# Patient Record
Sex: FEMALE | Race: WHITE | Employment: OTHER | URBAN - METROPOLITAN AREA
[De-identification: names, ages, dates, MRNs, and addresses within clinical notes are randomized per-mention and may not be internally consistent; named-entity substitution may affect disease eponyms.]

---

## 2018-06-14 ENCOUNTER — HOSPITAL ENCOUNTER (INPATIENT)
Age: 83
LOS: 1 days | Discharge: HOME | DRG: 310 | End: 2018-06-15
Attending: EMERGENCY MEDICINE | Admitting: NEUROMUSCULOSKELETAL MEDICINE & OMM
Payer: MEDICARE

## 2018-06-14 ENCOUNTER — APPOINTMENT (OUTPATIENT)
Dept: GENERAL RADIOLOGY | Age: 83
DRG: 310 | End: 2018-06-14
Payer: MEDICARE

## 2018-06-14 DIAGNOSIS — I48.91 NEW ONSET ATRIAL FIBRILLATION (HCC): Primary | ICD-10-CM

## 2018-06-14 LAB
ALBUMIN SERPL-MCNC: 4.3 G/DL (ref 3.5–5.2)
ALP BLD-CCNC: 49 U/L (ref 35–104)
ALT SERPL-CCNC: 12 U/L (ref 0–32)
ANION GAP SERPL CALCULATED.3IONS-SCNC: 15 MMOL/L (ref 7–16)
APTT: 29.5 SEC (ref 24.5–35.1)
AST SERPL-CCNC: 32 U/L (ref 0–31)
BACTERIA: ABNORMAL /HPF
BILIRUB SERPL-MCNC: 0.8 MG/DL (ref 0–1.2)
BILIRUBIN URINE: NEGATIVE
BLOOD, URINE: ABNORMAL
BUN BLDV-MCNC: 16 MG/DL (ref 8–23)
CALCIUM SERPL-MCNC: 9.9 MG/DL (ref 8.6–10.2)
CHLORIDE BLD-SCNC: 101 MMOL/L (ref 98–107)
CLARITY: CLEAR
CO2: 25 MMOL/L (ref 22–29)
COLOR: YELLOW
CORTISOL TOTAL: 1.89 MCG/DL (ref 2.68–18.4)
CORTISOL TOTAL: 18.03 MCG/DL (ref 2.68–18.4)
CREAT SERPL-MCNC: 0.9 MG/DL (ref 0.5–1)
EKG ATRIAL RATE: 147 BPM
EKG Q-T INTERVAL: 332 MS
EKG QRS DURATION: 66 MS
EKG QTC CALCULATION (BAZETT): 499 MS
EKG R AXIS: 67 DEGREES
EKG T AXIS: -94 DEGREES
EKG VENTRICULAR RATE: 136 BPM
GFR AFRICAN AMERICAN: >60
GFR NON-AFRICAN AMERICAN: 60 ML/MIN/1.73
GLUCOSE BLD-MCNC: 107 MG/DL (ref 74–109)
GLUCOSE URINE: NEGATIVE MG/DL
HCT VFR BLD CALC: 50 % (ref 34–48)
HEMOGLOBIN: 16.2 G/DL (ref 11.5–15.5)
INR BLD: 1
KETONES, URINE: 15 MG/DL
LACTIC ACID: 3.4 MMOL/L (ref 0.5–2.2)
LEUKOCYTE ESTERASE, URINE: NEGATIVE
MAGNESIUM: 1.6 MG/DL (ref 1.6–2.6)
MAGNESIUM: 1.8 MG/DL (ref 1.6–2.6)
MCH RBC QN AUTO: 28.2 PG (ref 26–35)
MCHC RBC AUTO-ENTMCNC: 32.4 % (ref 32–34.5)
MCV RBC AUTO: 87 FL (ref 80–99.9)
NITRITE, URINE: NEGATIVE
PDW BLD-RTO: 13.8 FL (ref 11.5–15)
PH UA: 6 (ref 5–9)
PLATELET # BLD: 177 E9/L (ref 130–450)
PMV BLD AUTO: 13.7 FL (ref 7–12)
POTASSIUM SERPL-SCNC: 4.9 MMOL/L (ref 3.5–5)
PRO-BNP: 534 PG/ML (ref 0–450)
PROTEIN UA: NEGATIVE MG/DL
PROTHROMBIN TIME: 11.7 SEC (ref 9.3–12.4)
RBC # BLD: 5.75 E12/L (ref 3.5–5.5)
RBC UA: ABNORMAL /HPF (ref 0–2)
SODIUM BLD-SCNC: 141 MMOL/L (ref 132–146)
SPECIFIC GRAVITY UA: 1.01 (ref 1–1.03)
TOTAL CK: 72 U/L (ref 20–180)
TOTAL PROTEIN: 7 G/DL (ref 6.4–8.3)
TROPONIN: <0.01 NG/ML (ref 0–0.03)
TROPONIN: <0.01 NG/ML (ref 0–0.03)
UROBILINOGEN, URINE: 0.2 E.U./DL
WBC # BLD: 7.2 E9/L (ref 4.5–11.5)
WBC UA: ABNORMAL /HPF (ref 0–5)

## 2018-06-14 PROCEDURE — 6370000000 HC RX 637 (ALT 250 FOR IP): Performed by: EMERGENCY MEDICINE

## 2018-06-14 PROCEDURE — 99223 1ST HOSP IP/OBS HIGH 75: CPT | Performed by: INTERNAL MEDICINE

## 2018-06-14 PROCEDURE — 6360000002 HC RX W HCPCS: Performed by: EMERGENCY MEDICINE

## 2018-06-14 PROCEDURE — 83605 ASSAY OF LACTIC ACID: CPT

## 2018-06-14 PROCEDURE — 2500000003 HC RX 250 WO HCPCS: Performed by: EMERGENCY MEDICINE

## 2018-06-14 PROCEDURE — 2580000003 HC RX 258: Performed by: PHYSICIAN ASSISTANT

## 2018-06-14 PROCEDURE — 6370000000 HC RX 637 (ALT 250 FOR IP): Performed by: INTERNAL MEDICINE

## 2018-06-14 PROCEDURE — 99285 EMERGENCY DEPT VISIT HI MDM: CPT

## 2018-06-14 PROCEDURE — 85610 PROTHROMBIN TIME: CPT

## 2018-06-14 PROCEDURE — 2580000003 HC RX 258: Performed by: EMERGENCY MEDICINE

## 2018-06-14 PROCEDURE — 93005 ELECTROCARDIOGRAM TRACING: CPT | Performed by: PHYSICIAN ASSISTANT

## 2018-06-14 PROCEDURE — 96372 THER/PROPH/DIAG INJ SC/IM: CPT

## 2018-06-14 PROCEDURE — 81001 URINALYSIS AUTO W/SCOPE: CPT

## 2018-06-14 PROCEDURE — 6370000000 HC RX 637 (ALT 250 FOR IP): Performed by: NEUROMUSCULOSKELETAL MEDICINE & OMM

## 2018-06-14 PROCEDURE — 87088 URINE BACTERIA CULTURE: CPT

## 2018-06-14 PROCEDURE — 84484 ASSAY OF TROPONIN QUANT: CPT

## 2018-06-14 PROCEDURE — 83735 ASSAY OF MAGNESIUM: CPT

## 2018-06-14 PROCEDURE — 2140000000 HC CCU INTERMEDIATE R&B

## 2018-06-14 PROCEDURE — 80053 COMPREHEN METABOLIC PANEL: CPT

## 2018-06-14 PROCEDURE — 82533 TOTAL CORTISOL: CPT

## 2018-06-14 PROCEDURE — 71045 X-RAY EXAM CHEST 1 VIEW: CPT

## 2018-06-14 PROCEDURE — 82550 ASSAY OF CK (CPK): CPT

## 2018-06-14 PROCEDURE — 83880 ASSAY OF NATRIURETIC PEPTIDE: CPT

## 2018-06-14 PROCEDURE — 85027 COMPLETE CBC AUTOMATED: CPT

## 2018-06-14 PROCEDURE — 36415 COLL VENOUS BLD VENIPUNCTURE: CPT

## 2018-06-14 PROCEDURE — 96374 THER/PROPH/DIAG INJ IV PUSH: CPT

## 2018-06-14 PROCEDURE — 85730 THROMBOPLASTIN TIME PARTIAL: CPT

## 2018-06-14 RX ORDER — DILTIAZEM HYDROCHLORIDE 5 MG/ML
INJECTION INTRAVENOUS
Status: DISPENSED
Start: 2018-06-14 | End: 2018-06-15

## 2018-06-14 RX ORDER — CHOLECALCIFEROL (VITAMIN D3) 50 MCG
5000 TABLET ORAL DAILY
Status: DISCONTINUED | OUTPATIENT
Start: 2018-06-14 | End: 2018-06-15 | Stop reason: HOSPADM

## 2018-06-14 RX ORDER — LEVOTHYROXINE SODIUM 0.1 MG/1
100 TABLET ORAL DAILY
Status: DISCONTINUED | OUTPATIENT
Start: 2018-06-15 | End: 2018-06-15 | Stop reason: HOSPADM

## 2018-06-14 RX ORDER — ALBUTEROL SULFATE 90 UG/1
2 AEROSOL, METERED RESPIRATORY (INHALATION) EVERY 4 HOURS PRN
Status: DISCONTINUED | OUTPATIENT
Start: 2018-06-14 | End: 2018-06-15 | Stop reason: HOSPADM

## 2018-06-14 RX ORDER — SODIUM CHLORIDE 0.9 % (FLUSH) 0.9 %
10 SYRINGE (ML) INJECTION PRN
Status: DISCONTINUED | OUTPATIENT
Start: 2018-06-14 | End: 2018-06-15 | Stop reason: HOSPADM

## 2018-06-14 RX ORDER — SIMVASTATIN 20 MG
20 TABLET ORAL NIGHTLY
Status: DISCONTINUED | OUTPATIENT
Start: 2018-06-14 | End: 2018-06-15 | Stop reason: HOSPADM

## 2018-06-14 RX ORDER — SODIUM CHLORIDE 0.9 % (FLUSH) 0.9 %
10 SYRINGE (ML) INJECTION EVERY 12 HOURS SCHEDULED
Status: DISCONTINUED | OUTPATIENT
Start: 2018-06-14 | End: 2018-06-15 | Stop reason: HOSPADM

## 2018-06-14 RX ORDER — SIMVASTATIN 40 MG
40 TABLET ORAL NIGHTLY
Refills: 3 | COMMUNITY
Start: 2018-03-29

## 2018-06-14 RX ORDER — ACETAMINOPHEN 325 MG/1
650 TABLET ORAL EVERY 4 HOURS PRN
Status: DISCONTINUED | OUTPATIENT
Start: 2018-06-14 | End: 2018-06-15 | Stop reason: HOSPADM

## 2018-06-14 RX ORDER — VITAMIN E 268 MG
400 CAPSULE ORAL DAILY
Status: DISCONTINUED | OUTPATIENT
Start: 2018-06-15 | End: 2018-06-15 | Stop reason: HOSPADM

## 2018-06-14 RX ORDER — UBIDECARENONE 75 MG
50 CAPSULE ORAL DAILY
Status: DISCONTINUED | OUTPATIENT
Start: 2018-06-15 | End: 2018-06-15 | Stop reason: HOSPADM

## 2018-06-14 RX ORDER — 0.9 % SODIUM CHLORIDE 0.9 %
1000 INTRAVENOUS SOLUTION INTRAVENOUS ONCE
Status: COMPLETED | OUTPATIENT
Start: 2018-06-14 | End: 2018-06-14

## 2018-06-14 RX ORDER — ASPIRIN 81 MG/1
324 TABLET, CHEWABLE ORAL ONCE
Status: COMPLETED | OUTPATIENT
Start: 2018-06-14 | End: 2018-06-14

## 2018-06-14 RX ORDER — CITALOPRAM 20 MG/1
20 TABLET ORAL DAILY
Status: DISCONTINUED | OUTPATIENT
Start: 2018-06-14 | End: 2018-06-15 | Stop reason: HOSPADM

## 2018-06-14 RX ORDER — LEVOTHYROXINE SODIUM 0.1 MG/1
1 TABLET ORAL DAILY
Refills: 3 | COMMUNITY
Start: 2018-03-29 | End: 2021-02-11

## 2018-06-14 RX ORDER — DEXTROSE MONOHYDRATE 50 MG/ML
INJECTION, SOLUTION INTRAVENOUS
Status: DISPENSED
Start: 2018-06-14 | End: 2018-06-15

## 2018-06-14 RX ADMIN — SODIUM CHLORIDE 1000 ML: 9 INJECTION, SOLUTION INTRAVENOUS at 11:29

## 2018-06-14 RX ADMIN — RIVAROXABAN 20 MG: 20 TABLET, FILM COATED ORAL at 21:29

## 2018-06-14 RX ADMIN — DILTIAZEM HYDROCHLORIDE 25 MG: 5 INJECTION INTRAVENOUS at 13:14

## 2018-06-14 RX ADMIN — SIMVASTATIN 20 MG: 20 TABLET, FILM COATED ORAL at 21:29

## 2018-06-14 RX ADMIN — ASPIRIN 81 MG CHEWABLE TABLET 324 MG: 81 TABLET CHEWABLE at 12:50

## 2018-06-14 RX ADMIN — DEXTROSE MONOHYDRATE 2.5 MG/HR: 50 INJECTION, SOLUTION INTRAVENOUS at 13:32

## 2018-06-14 RX ADMIN — ENOXAPARIN SODIUM 80 MG: 80 INJECTION SUBCUTANEOUS at 12:50

## 2018-06-14 RX ADMIN — BECLOMETHASONE DIPROPIONATE 1 PUFF: 40 AEROSOL, METERED RESPIRATORY (INHALATION) at 21:30

## 2018-06-14 ASSESSMENT — PAIN SCALES - GENERAL
PAINLEVEL_OUTOF10: 0
PAINLEVEL_OUTOF10: 8
PAINLEVEL_OUTOF10: 0

## 2018-06-14 NOTE — ED NOTES
All blood work obtained with additional green/yellow top on hold, sent to lab for [address] [place] [address], N  24/21/20 9405

## 2018-06-14 NOTE — ED PROVIDER NOTES
Department of Emergency Medicine   ED  Provider Note  Admit Date/RoomTime: 6/14/2018 10:31 AM  ED Room: 6411/6411-A     HPI:   Eliot An is a 80 y.o. female presenting to the ED for shortness of breath, beginning this morning. The complaint has been persistent, moderate in severity, and worsened by light exertion. Patient states she has a history of asthma and COPD. This morning she experienced shortness of breath and generalized weakness worsened with light exertion. She tried her inhaler once with no relief and states this is different from her normal asthma and COPD. She also complains of back pain she describes as a aching sensation across her shoulder blades with this generalized weakness and shortness of breath. Patient is in atrial fibrillation with rapid ventricular response in the monitor, she reports she has never had this before and no history of atrial fibrillation or irregular heartbeat. Patient denies fever/chills, cough, congestion, edema, headache, abdominal pain, nausea, vomiting, diarrhea, constipation, hematochezia, melena, incontinence, dysuria, hematuria, trauma, neck pain or other complaints. No-patient states her blood pressure is little on the low side, states 98/58 is typical for her. ROS:   Pertinent positives and negatives are stated within HPI, all other systems reviewed and are negative.    --------------------------------------------- PAST HISTORY ---------------------------------------------  Past Medical History:  has a past medical history of Asthma; Depression; Hyperlipidemia; Nausea & vomiting; and Thyroid disease. Past Surgical History:  has a past surgical history that includes Tonsillectomy; Hysterectomy; Thyroidectomy (17 YEARS AGO); Appendectomy (58 YEARS AGO); eye surgery; Neck surgery (9/9/2011); and Cholecystectomy. Social History:  reports that she quit smoking about 23 years ago. Her smoking use included Cigarettes.  She does not have any smokeless extremities x 4. Warm and well perfused. No pedal edema or calf tenderness. Skin: warm and dry without rash. Skin intact. Neurologic: No focal deficits. Cranial nerves II-XII grossly intact. 5/5 muscle strength to extremities x 4. Psych: Normal Affect      ------------------------------ ED COURSE/MEDICAL DECISION MAKING----------------------  Medications   diltiazem (CARDIZEM) 25 mg in dextrose 5% 30 mL IVPB (ER Load) (0 mg Intravenous Stopped 6/14/18 1324)   diltiazem 25 MG/5ML injection (not administered)   dextrose 5 % solution (not administered)   0.9 % sodium chloride bolus (0 mLs Intravenous Stopped 6/14/18 1229)   enoxaparin (LOVENOX) injection 80 mg (80 mg Subcutaneous Given 6/14/18 1250)   aspirin chewable tablet 324 mg (324 mg Oral Given 6/14/18 1250)       Medical Decision Making:    Patient presented ED for shortness of breath and fatigue. Patient is a new onset A. fib and rate control with Cardizem. Her PCP will admit the patient and [address] [facility] cardiology will provide consultation while inpatient. Re-Evaluation:  Time: 1330  Re-evaluation. Patients symptoms are improving  Repeat physical examination is improved    Consultation:  Time: 1340. Spoke with Dr. Ollie Carver (Medicine). Discussed case. They will admit this patient. Please note that the withdrawal or failure to initiate urgent interventions for this patient would likely result in a life threatening deterioration or permanent disability. Accordingly this patient received 30 minutes of critical care time, excluding separately billable procedures. Counseling: The emergency provider has spoken with the patient and family member daughter and discussed todays results, in addition to providing specific details for the plan of care and counseling regarding the diagnosis and prognosis.   Questions are answered at this time and they are agreeable with the plan.      --------------------------------- IMPRESSION AND DISPOSITION ---------------------------------    IMPRESSION  1. New onset atrial fibrillation ([address]        DISPOSITION  Disposition: Admit to telemetry  Patient condition is stable    6/14/18, 11:25 AM.    This note is prepared by Corrina Kim, acting as Scribe for Matthew Garcia DO. Matthew Garcia DO:  The scribe's documentation has been prepared under my direction and personally reviewed by me in its entirety. I confirm that the note above accurately reflects all work, treatment, procedures, and medical decision making performed by me.          Matthew Garcia DO  06/21/18 0040

## 2018-06-15 ENCOUNTER — ANESTHESIA EVENT (OUTPATIENT)
Dept: CARDIAC CATH/INVASIVE PROCEDURES | Age: 83
DRG: 310 | End: 2018-06-15
Payer: MEDICARE

## 2018-06-15 ENCOUNTER — ANESTHESIA (OUTPATIENT)
Dept: CARDIAC CATH/INVASIVE PROCEDURES | Age: 83
DRG: 310 | End: 2018-06-15
Payer: MEDICARE

## 2018-06-15 VITALS
SYSTOLIC BLOOD PRESSURE: 110 MMHG | OXYGEN SATURATION: 94 % | DIASTOLIC BLOOD PRESSURE: 76 MMHG | RESPIRATION RATE: 16 BRPM

## 2018-06-15 VITALS
OXYGEN SATURATION: 96 % | SYSTOLIC BLOOD PRESSURE: 120 MMHG | TEMPERATURE: 98 F | WEIGHT: 182 LBS | BODY MASS INDEX: 28.56 KG/M2 | DIASTOLIC BLOOD PRESSURE: 64 MMHG | HEART RATE: 80 BPM | RESPIRATION RATE: 18 BRPM | HEIGHT: 67 IN

## 2018-06-15 PROBLEM — I48.19 PERSISTENT ATRIAL FIBRILLATION (HCC): Status: ACTIVE | Noted: 2018-06-15

## 2018-06-15 LAB
ALBUMIN SERPL-MCNC: 4.4 G/DL (ref 3.5–5.2)
ALP BLD-CCNC: 58 U/L (ref 35–104)
ALT SERPL-CCNC: 10 U/L (ref 0–32)
ANION GAP SERPL CALCULATED.3IONS-SCNC: 11 MMOL/L (ref 7–16)
AST SERPL-CCNC: 17 U/L (ref 0–31)
BILIRUB SERPL-MCNC: 0.6 MG/DL (ref 0–1.2)
BUN BLDV-MCNC: 16 MG/DL (ref 8–23)
CALCIUM SERPL-MCNC: 9.7 MG/DL (ref 8.6–10.2)
CHLORIDE BLD-SCNC: 101 MMOL/L (ref 98–107)
CO2: 27 MMOL/L (ref 22–29)
CREAT SERPL-MCNC: 0.9 MG/DL (ref 0.5–1)
GFR AFRICAN AMERICAN: >60
GFR NON-AFRICAN AMERICAN: 60 ML/MIN/1.73
GLUCOSE BLD-MCNC: 106 MG/DL (ref 74–109)
HCT VFR BLD CALC: 48.8 % (ref 34–48)
HEMOGLOBIN: 15.5 G/DL (ref 11.5–15.5)
MCH RBC QN AUTO: 27.8 PG (ref 26–35)
MCHC RBC AUTO-ENTMCNC: 31.8 % (ref 32–34.5)
MCV RBC AUTO: 87.5 FL (ref 80–99.9)
PDW BLD-RTO: 13.7 FL (ref 11.5–15)
PLATELET # BLD: 153 E9/L (ref 130–450)
PMV BLD AUTO: 13.9 FL (ref 7–12)
POTASSIUM SERPL-SCNC: 4.1 MMOL/L (ref 3.5–5)
RBC # BLD: 5.58 E12/L (ref 3.5–5.5)
SODIUM BLD-SCNC: 139 MMOL/L (ref 132–146)
TOTAL CK: 71 U/L (ref 20–180)
TOTAL PROTEIN: 6.6 G/DL (ref 6.4–8.3)
TROPONIN: <0.01 NG/ML (ref 0–0.03)
URINE CULTURE, ROUTINE: NORMAL
WBC # BLD: 7.5 E9/L (ref 4.5–11.5)

## 2018-06-15 PROCEDURE — 85027 COMPLETE CBC AUTOMATED: CPT

## 2018-06-15 PROCEDURE — 2580000003 HC RX 258: Performed by: NEUROMUSCULOSKELETAL MEDICINE & OMM

## 2018-06-15 PROCEDURE — 93312 ECHO TRANSESOPHAGEAL: CPT

## 2018-06-15 PROCEDURE — 2580000003 HC RX 258

## 2018-06-15 PROCEDURE — 99024 POSTOP FOLLOW-UP VISIT: CPT | Performed by: INTERNAL MEDICINE

## 2018-06-15 PROCEDURE — 80053 COMPREHEN METABOLIC PANEL: CPT

## 2018-06-15 PROCEDURE — 6370000000 HC RX 637 (ALT 250 FOR IP): Performed by: NEUROMUSCULOSKELETAL MEDICINE & OMM

## 2018-06-15 PROCEDURE — 6360000002 HC RX W HCPCS

## 2018-06-15 PROCEDURE — 93321 DOPPLER ECHO F-UP/LMTD STD: CPT

## 2018-06-15 PROCEDURE — 82550 ASSAY OF CK (CPK): CPT

## 2018-06-15 PROCEDURE — 36415 COLL VENOUS BLD VENIPUNCTURE: CPT

## 2018-06-15 PROCEDURE — 5A2204Z RESTORATION OF CARDIAC RHYTHM, SINGLE: ICD-10-PCS | Performed by: INTERNAL MEDICINE

## 2018-06-15 PROCEDURE — 6370000000 HC RX 637 (ALT 250 FOR IP): Performed by: INTERNAL MEDICINE

## 2018-06-15 PROCEDURE — 2709999900 HC NON-CHARGEABLE SUPPLY

## 2018-06-15 PROCEDURE — 84484 ASSAY OF TROPONIN QUANT: CPT

## 2018-06-15 PROCEDURE — 93325 DOPPLER ECHO COLOR FLOW MAPG: CPT

## 2018-06-15 PROCEDURE — B246ZZ4 ULTRASONOGRAPHY OF RIGHT AND LEFT HEART, TRANSESOPHAGEAL: ICD-10-PCS | Performed by: INTERNAL MEDICINE

## 2018-06-15 PROCEDURE — 92960 CARDIOVERSION ELECTRIC EXT: CPT | Performed by: INTERNAL MEDICINE

## 2018-06-15 RX ORDER — PROPOFOL 10 MG/ML
INJECTION, EMULSION INTRAVENOUS PRN
Status: DISCONTINUED | OUTPATIENT
Start: 2018-06-15 | End: 2018-06-15 | Stop reason: SDUPTHER

## 2018-06-15 RX ORDER — METOPROLOL SUCCINATE 25 MG/1
25 TABLET, EXTENDED RELEASE ORAL DAILY
Qty: 30 TABLET | Refills: 3 | Status: SHIPPED | OUTPATIENT
Start: 2018-06-16 | End: 2019-09-20

## 2018-06-15 RX ORDER — SODIUM CHLORIDE 9 MG/ML
INJECTION, SOLUTION INTRAVENOUS CONTINUOUS PRN
Status: DISCONTINUED | OUTPATIENT
Start: 2018-06-15 | End: 2018-06-15 | Stop reason: SDUPTHER

## 2018-06-15 RX ORDER — METOPROLOL SUCCINATE 25 MG/1
25 TABLET, EXTENDED RELEASE ORAL DAILY
Status: DISCONTINUED | OUTPATIENT
Start: 2018-06-15 | End: 2018-06-15 | Stop reason: HOSPADM

## 2018-06-15 RX ADMIN — SIMVASTATIN 20 MG: 20 TABLET, FILM COATED ORAL at 20:32

## 2018-06-15 RX ADMIN — Medication 10 ML: at 10:47

## 2018-06-15 RX ADMIN — CITALOPRAM 20 MG: 20 TABLET, FILM COATED ORAL at 10:44

## 2018-06-15 RX ADMIN — PROPOFOL 10 MG: 10 INJECTION, EMULSION INTRAVENOUS at 08:42

## 2018-06-15 RX ADMIN — BECLOMETHASONE DIPROPIONATE 1 PUFF: 40 AEROSOL, METERED RESPIRATORY (INHALATION) at 10:47

## 2018-06-15 RX ADMIN — LEVOTHYROXINE SODIUM 100 MCG: 0.1 TABLET ORAL at 06:34

## 2018-06-15 RX ADMIN — SODIUM CHLORIDE: 9 INJECTION, SOLUTION INTRAVENOUS at 08:33

## 2018-06-15 RX ADMIN — VITAMIN E CAP 400 UNIT 400 UNITS: 400 CAP at 10:44

## 2018-06-15 RX ADMIN — PROPOFOL 20 MG: 10 INJECTION, EMULSION INTRAVENOUS at 08:40

## 2018-06-15 RX ADMIN — RIVAROXABAN 20 MG: 20 TABLET, FILM COATED ORAL at 20:32

## 2018-06-15 RX ADMIN — CHOLECALCIFEROL TAB 50 MCG (2000 UNIT) 5000 UNITS: 50 TAB at 10:44

## 2018-06-15 RX ADMIN — PROPOFOL 60 MG: 10 INJECTION, EMULSION INTRAVENOUS at 08:37

## 2018-06-15 RX ADMIN — VITAMIN B12 0.1 MG ORAL TABLET 50 MCG: 0.1 TABLET ORAL at 10:44

## 2018-06-15 RX ADMIN — METOPROLOL SUCCINATE 25 MG: 25 TABLET, FILM COATED, EXTENDED RELEASE ORAL at 19:58

## 2018-06-15 ASSESSMENT — PAIN SCALES - GENERAL
PAINLEVEL_OUTOF10: 0

## 2018-06-15 NOTE — PROGRESS NOTES
Received call from pharmacy regarding Lovenox that Dr Angelito Newman ordered. Dr Bea Skinner already ordered Xarelto. PerfectServe message to Dr Angelito Newman regarding this.

## 2018-06-15 NOTE — PROGRESS NOTES
Pharmacy Note    Brandy Padilla was ordered biotin. As per the [address] [place] [address], herbals and certain dietary supplements will be discontinued. The herbal or dietary supplement may be continued after discharge from the hospital.    Thank you,  Janet Malin.  Haresh Najera, PharmD 6/14/2018 8:38 PM

## 2018-06-15 NOTE — PROGRESS NOTES
PROGRESS NOTE     CARDIOLOGY    Chief complaint: Seen today for follow up, management & recommendations for afib    She denies chest pain or shortness of breath today.     Wt Readings from Last 3 Encounters:   06/14/18 182 lb (82.6 kg)   11/17/17 182 lb (82.6 kg)   09/24/16 178 lb (80.7 kg)     Temp Readings from Last 3 Encounters:   06/15/18 97.2 °F (36.2 °C) (Temporal)   09/24/16 97.9 °F (36.6 °C) (Oral)   08/19/15 97.9 °F (36.6 °C) (Oral)     BP Readings from Last 3 Encounters:   06/15/18 111/65   11/17/17 (!) 162/89   09/24/16 142/85     Pulse Readings from Last 3 Encounters:   06/15/18 71   11/17/17 74   09/24/16 78         Intake/Output Summary (Last 24 hours) at 06/15/18 0549  Last data filed at 06/15/18 0407   Gross per 24 hour   Intake             1270 ml   Output              950 ml   Net              320 ml       Recent Labs      06/14/18   1041  06/15/18   0231   WBC  7.2  7.5   HGB  16.2*  15.5   HCT  50.0*  48.8*   MCV  87.0  87.5   PLT  177  153     Recent Labs      06/14/18   1041  06/14/18   2046  06/15/18   0231   NA  141   --   139   K  4.9   --   4.1   CL  101   --   101   CO2  25   --   27   BUN  16   --   16   CREATININE  0.9   --   0.9   MG  1.8  1.6   --        Recent Labs      06/14/18   1041  06/14/18   2046  06/15/18   0231   CKTOTAL   --   72  71   TROPONINI  <0.01  <0.01  <0.01           diltiazem 100 mg in dextrose 5 % 100 mL infusion (ADD-[place]) Continuous   sodium chloride flush 0.9 % injection 10 mL 2 times per day   sodium chloride flush 0.9 % injection 10 mL PRN   acetaminophen (TYLENOL) tablet 650 mg Q4H PRN   rivaroxaban (XARELTO) tablet 20 mg Daily   albuterol sulfate  (90 Base) MCG/ACT inhaler 2 puff Q4H PRN   beclomethasone (QVAR) 40 MCG/ACT inhaler 1 puff BID   vitamin D tablet 5,000 Units Daily   citalopram (CELEXA) tablet 20 mg Daily   levothyroxine (SYNTHROID) tablet 100 mcg Daily   simvastatin (ZOCOR) tablet 20 mg Nightly   vitamin B-12 (CYANOCOBALAMIN) tablet 50 mcg Daily   vitamin E capsule 400 Units Daily   enoxaparin (LOVENOX) injection 80 mg BID       Review of systems:     Heart: as above   Lungs: as above   Eyes: denies changes in vision or discharge. Ears: denies changes in hearing or pain. Nose: denies epistaxis or masses   Throat: denies sore throat or trouble swallowing. Neuro: denies numbness, tingling, tremors. Skin: denies rashes or itching. : denies hematuria, dysuria   GI: denies vomiting, diarrhea   Psych: denies mood changed, anxiety, depression. Physical exam:    Constitutional: A&O x3, communicates well, no acute distress. Eyes: extraocular muscles intact, PERRL. Normal lids & conjunctiva. No icterus. ENT: clear, no bleeding. No external masses. Lips normal formation. Neck: supple, full ROM, no JVD, no bruits, no lymphadenopathy. No masses. trachea midline. Heart: irregular rate & rhythm, normal S1 & S2, no murmur  Lungs: CTA. No accessory muscles. Abd: soft, non-tender. Normal bowel sounds. Neuro: Full ROM X 4, EOMI, no tremors. EXT: No bilateral lower extremity edema  Skin: warm, dry, intact. Good turgor. Psych: A&O x 3, normal behavior, not anxious. Assessment/Recommendations  1. afib: will do CONSTANTINO guided DC cardioversion today. Pt started yesterday on Xarelto. THERE IS NO NEED FOR LOVENOX.   WILL STOP IMMEDIATELY

## 2018-06-15 NOTE — PROGRESS NOTES
[facility]   Department of Pharmacy   Pharmacist Transition of Care Services         Patient Demographics  Name:  Petra Amin   Medical Record Number:  [id number]  Gender:  female   Age:  80 y.o. YOB: 1934    Address:    [address] [place] [address]  Phone number:  [phone]    Admission date: 6/14/2018  Admission Diagnosis:  New onset atrial fibrillation ([address] [I48.91]  New onset atrial fibrillation ([address] [I48.91]  Atrial fibrillation [facility] [I48.91]  Admitting Physician: Adrien Royal DO    Primary Care Physician: Adrien Royal DO  Primary Care Physician phone number:  [phone]  Outpatient Preferred Pharmacy:   [address] [place] [address] [phone] [facility] [phone] [address] [facility] [address]  Phone: [phone] Fax: [phone]        Readmission Risk (% from EPIC Patient List):   9%    Patient plans to participate in [facility] [place] (Y/N): N      Pharmacist Review and Summary of Medication Changes Made During Admission     Date of last reviewed/update: 06/15/18    Sources(s) of medication information (check all that apply):  [x] EPIC - documentation for current admission  [] EPIC - documentation of recent physician office visit, Care Everywhere chart   [] Patient - interview or personal medication list   [] Patient's family/caretaker/POA/friend   [] Outpatient Pharmacy - phone call   [] Outpatient Pharmacy - medication bottles  [] Physician's office - phone call   [] OARRS Report  [] Nursing home/rehab/assisted living - printed medication list  [] Nursing home/rehab/assisted living - phone call      Category Comments  (Include Pharmacist Initials and Date)   Change in Outpatient Medication  (Dosage Form, Route,   Dose, or Frequency) 1. New Medication Started 1. Xarelto 20 mg daily  2.  Toprol XL 25 mg       Outpatient Medication Discontinued   (or on Hold During Admission) 6/15/2018 9:36 AM

## 2018-06-20 PROBLEM — E78.5 HLD (HYPERLIPIDEMIA): Status: ACTIVE | Noted: 2018-06-20

## 2018-06-22 ENCOUNTER — OFFICE VISIT (OUTPATIENT)
Dept: CARDIOLOGY CLINIC | Age: 83
End: 2018-06-22
Payer: MEDICARE

## 2018-06-22 ENCOUNTER — TELEPHONE (OUTPATIENT)
Dept: CARDIOLOGY CLINIC | Age: 83
End: 2018-06-22

## 2018-06-22 VITALS
HEIGHT: 68 IN | WEIGHT: 183 LBS | HEART RATE: 68 BPM | BODY MASS INDEX: 27.74 KG/M2 | SYSTOLIC BLOOD PRESSURE: 128 MMHG | DIASTOLIC BLOOD PRESSURE: 72 MMHG | RESPIRATION RATE: 14 BRPM

## 2018-06-22 DIAGNOSIS — E78.5 HYPERLIPIDEMIA, UNSPECIFIED HYPERLIPIDEMIA TYPE: ICD-10-CM

## 2018-06-22 DIAGNOSIS — I48.19 PERSISTENT ATRIAL FIBRILLATION (HCC): Primary | ICD-10-CM

## 2018-06-22 PROCEDURE — 1036F TOBACCO NON-USER: CPT | Performed by: INTERNAL MEDICINE

## 2018-06-22 PROCEDURE — G8427 DOCREV CUR MEDS BY ELIG CLIN: HCPCS | Performed by: INTERNAL MEDICINE

## 2018-06-22 PROCEDURE — 1111F DSCHRG MED/CURRENT MED MERGE: CPT | Performed by: INTERNAL MEDICINE

## 2018-06-22 PROCEDURE — G8400 PT W/DXA NO RESULTS DOC: HCPCS | Performed by: INTERNAL MEDICINE

## 2018-06-22 PROCEDURE — 1123F ACP DISCUSS/DSCN MKR DOCD: CPT | Performed by: INTERNAL MEDICINE

## 2018-06-22 PROCEDURE — 4040F PNEUMOC VAC/ADMIN/RCVD: CPT | Performed by: INTERNAL MEDICINE

## 2018-06-22 PROCEDURE — 1090F PRES/ABSN URINE INCON ASSESS: CPT | Performed by: INTERNAL MEDICINE

## 2018-06-22 PROCEDURE — 99214 OFFICE O/P EST MOD 30 MIN: CPT | Performed by: INTERNAL MEDICINE

## 2018-06-22 PROCEDURE — G8419 CALC BMI OUT NRM PARAM NOF/U: HCPCS | Performed by: INTERNAL MEDICINE

## 2018-06-22 PROCEDURE — 93000 ELECTROCARDIOGRAM COMPLETE: CPT | Performed by: INTERNAL MEDICINE

## 2018-07-12 ENCOUNTER — TELEPHONE (OUTPATIENT)
Dept: CARDIOLOGY CLINIC | Age: 83
End: 2018-07-12

## 2018-07-12 NOTE — TELEPHONE ENCOUNTER
Never heard of that as side effect of either  Stop ZOCOR and see if she feels better  If not, call on Monday to let us know.

## 2018-07-12 NOTE — TELEPHONE ENCOUNTER
. Nivia Brainindra called and states that she has been on Xarelto and Metoprolol for about one month and her body is aching so bad she cant stand it. She states she feels it is from one of those medications. Please advise if there is anything else she can take or if it is indeed a side affect of medication.

## 2018-07-12 NOTE — TELEPHONE ENCOUNTER
Patient notified and instructed on stopping medications as stated below. She will call Monday and let us know how she is feeling.

## 2018-07-29 ENCOUNTER — HOSPITAL ENCOUNTER (EMERGENCY)
Age: 83
Discharge: HOME | End: 2018-07-29
Attending: EMERGENCY MEDICINE
Payer: MEDICARE

## 2018-07-29 ENCOUNTER — APPOINTMENT (OUTPATIENT)
Dept: CT IMAGING | Age: 83
End: 2018-07-29
Payer: MEDICARE

## 2018-07-29 VITALS
DIASTOLIC BLOOD PRESSURE: 79 MMHG | HEART RATE: 74 BPM | OXYGEN SATURATION: 95 % | BODY MASS INDEX: 27.28 KG/M2 | SYSTOLIC BLOOD PRESSURE: 129 MMHG | TEMPERATURE: 97.5 F | HEIGHT: 68 IN | WEIGHT: 180 LBS | RESPIRATION RATE: 16 BRPM

## 2018-07-29 DIAGNOSIS — R39.15 URINARY URGENCY: Primary | ICD-10-CM

## 2018-07-29 DIAGNOSIS — R10.9 FLANK PAIN: ICD-10-CM

## 2018-07-29 DIAGNOSIS — R52 BODY ACHES: ICD-10-CM

## 2018-07-29 DIAGNOSIS — R31.9 HEMATURIA, UNSPECIFIED TYPE: ICD-10-CM

## 2018-07-29 LAB
ALBUMIN SERPL-MCNC: 4.3 G/DL (ref 3.5–5.2)
ALP BLD-CCNC: 91 U/L (ref 35–104)
ALT SERPL-CCNC: 22 U/L (ref 0–32)
AMORPHOUS: ABNORMAL
ANION GAP SERPL CALCULATED.3IONS-SCNC: 12 MMOL/L (ref 7–16)
AST SERPL-CCNC: 32 U/L (ref 0–31)
BACTERIA: ABNORMAL /HPF
BASOPHILS ABSOLUTE: 0.05 E9/L (ref 0–0.2)
BASOPHILS RELATIVE PERCENT: 0.5 % (ref 0–2)
BILIRUB SERPL-MCNC: 1.1 MG/DL (ref 0–1.2)
BILIRUBIN URINE: NEGATIVE
BLOOD, URINE: ABNORMAL
BUN BLDV-MCNC: 10 MG/DL (ref 8–23)
CALCIUM SERPL-MCNC: 9.6 MG/DL (ref 8.6–10.2)
CHLORIDE BLD-SCNC: 98 MMOL/L (ref 98–107)
CLARITY: CLEAR
CO2: 27 MMOL/L (ref 22–29)
COLOR: YELLOW
CREAT SERPL-MCNC: 0.8 MG/DL (ref 0.5–1)
EKG ATRIAL RATE: 79 BPM
EKG P AXIS: 57 DEGREES
EKG P-R INTERVAL: 158 MS
EKG Q-T INTERVAL: 424 MS
EKG QRS DURATION: 74 MS
EKG QTC CALCULATION (BAZETT): 486 MS
EKG R AXIS: 19 DEGREES
EKG T AXIS: 43 DEGREES
EKG VENTRICULAR RATE: 79 BPM
EOSINOPHILS ABSOLUTE: 0.16 E9/L (ref 0.05–0.5)
EOSINOPHILS RELATIVE PERCENT: 1.7 % (ref 0–6)
GFR AFRICAN AMERICAN: >60
GFR NON-AFRICAN AMERICAN: >60 ML/MIN/1.73
GLUCOSE BLD-MCNC: 105 MG/DL (ref 74–109)
GLUCOSE URINE: NEGATIVE MG/DL
HCT VFR BLD CALC: 46.6 % (ref 34–48)
HEMOGLOBIN: 15.6 G/DL (ref 11.5–15.5)
IMMATURE GRANULOCYTES #: 0.02 E9/L
IMMATURE GRANULOCYTES %: 0.2 % (ref 0–5)
KETONES, URINE: NEGATIVE MG/DL
LACTIC ACID: 1.4 MMOL/L (ref 0.5–2.2)
LEUKOCYTE ESTERASE, URINE: ABNORMAL
LYMPHOCYTES ABSOLUTE: 1.71 E9/L (ref 1.5–4)
LYMPHOCYTES RELATIVE PERCENT: 17.8 % (ref 20–42)
MCH RBC QN AUTO: 28.5 PG (ref 26–35)
MCHC RBC AUTO-ENTMCNC: 33.5 % (ref 32–34.5)
MCV RBC AUTO: 85.2 FL (ref 80–99.9)
MONOCYTES ABSOLUTE: 0.81 E9/L (ref 0.1–0.95)
MONOCYTES RELATIVE PERCENT: 8.4 % (ref 2–12)
NEUTROPHILS ABSOLUTE: 6.87 E9/L (ref 1.8–7.3)
NEUTROPHILS RELATIVE PERCENT: 71.4 % (ref 43–80)
NITRITE, URINE: NEGATIVE
PDW BLD-RTO: 13.3 FL (ref 11.5–15)
PH UA: 6 (ref 5–9)
PLATELET # BLD: 157 E9/L (ref 130–450)
PMV BLD AUTO: 13.3 FL (ref 7–12)
POTASSIUM SERPL-SCNC: 3.7 MMOL/L (ref 3.5–5)
PROTEIN UA: NEGATIVE MG/DL
RBC # BLD: 5.47 E12/L (ref 3.5–5.5)
RBC UA: ABNORMAL /HPF (ref 0–2)
RENAL EPITHELIAL, UA: ABNORMAL /HPF
SODIUM BLD-SCNC: 137 MMOL/L (ref 132–146)
SPECIFIC GRAVITY UA: 1.01 (ref 1–1.03)
TOTAL PROTEIN: 7.4 G/DL (ref 6.4–8.3)
TROPONIN: <0.01 NG/ML (ref 0–0.03)
UROBILINOGEN, URINE: 0.2 E.U./DL
WBC # BLD: 9.6 E9/L (ref 4.5–11.5)
WBC UA: ABNORMAL /HPF (ref 0–5)

## 2018-07-29 PROCEDURE — 2580000003 HC RX 258: Performed by: PHYSICIAN ASSISTANT

## 2018-07-29 PROCEDURE — 74176 CT ABD & PELVIS W/O CONTRAST: CPT

## 2018-07-29 PROCEDURE — 81001 URINALYSIS AUTO W/SCOPE: CPT

## 2018-07-29 PROCEDURE — 36415 COLL VENOUS BLD VENIPUNCTURE: CPT

## 2018-07-29 PROCEDURE — 87040 BLOOD CULTURE FOR BACTERIA: CPT

## 2018-07-29 PROCEDURE — 80053 COMPREHEN METABOLIC PANEL: CPT

## 2018-07-29 PROCEDURE — 83605 ASSAY OF LACTIC ACID: CPT

## 2018-07-29 PROCEDURE — 99283 EMERGENCY DEPT VISIT LOW MDM: CPT

## 2018-07-29 PROCEDURE — 84484 ASSAY OF TROPONIN QUANT: CPT

## 2018-07-29 PROCEDURE — 6370000000 HC RX 637 (ALT 250 FOR IP): Performed by: PHYSICIAN ASSISTANT

## 2018-07-29 PROCEDURE — 85025 COMPLETE CBC W/AUTO DIFF WBC: CPT

## 2018-07-29 RX ORDER — CEPHALEXIN 500 MG/1
500 CAPSULE ORAL 3 TIMES DAILY
Qty: 21 CAPSULE | Refills: 0 | Status: SHIPPED | OUTPATIENT
Start: 2018-07-29 | End: 2018-08-05

## 2018-07-29 RX ORDER — HYDROCODONE BITARTRATE AND ACETAMINOPHEN 5; 325 MG/1; MG/1
1 TABLET ORAL ONCE
Status: COMPLETED | OUTPATIENT
Start: 2018-07-29 | End: 2018-07-29

## 2018-07-29 RX ORDER — CEPHALEXIN 500 MG/1
500 CAPSULE ORAL ONCE
Status: COMPLETED | OUTPATIENT
Start: 2018-07-29 | End: 2018-07-29

## 2018-07-29 RX ORDER — 0.9 % SODIUM CHLORIDE 0.9 %
1000 INTRAVENOUS SOLUTION INTRAVENOUS ONCE
Status: COMPLETED | OUTPATIENT
Start: 2018-07-29 | End: 2018-07-29

## 2018-07-29 RX ADMIN — HYDROCODONE BITARTRATE AND ACETAMINOPHEN 1 TABLET: 5; 325 TABLET ORAL at 18:25

## 2018-07-29 RX ADMIN — CEPHALEXIN 500 MG: 500 CAPSULE ORAL at 19:57

## 2018-07-29 RX ADMIN — SODIUM CHLORIDE 1000 ML: 9 INJECTION, SOLUTION INTRAVENOUS at 18:25

## 2018-07-29 ASSESSMENT — PAIN DESCRIPTION - PAIN TYPE: TYPE: ACUTE PAIN

## 2018-07-29 ASSESSMENT — PAIN DESCRIPTION - DESCRIPTORS: DESCRIPTORS: ACHING

## 2018-07-29 ASSESSMENT — PAIN SCALES - GENERAL: PAINLEVEL_OUTOF10: 8

## 2018-07-29 ASSESSMENT — PAIN DESCRIPTION - LOCATION: LOCATION: GENERALIZED

## 2018-07-29 NOTE — ED PROVIDER NOTES
ED Attending  CC: Michelle     Department of Emergency Medicine   ED  Provider Note  Admit Date/Time: 7/29/2018  5:21 PM  ED Bed: 18B/18B-18  Chief Complaint:       Urinary Frequency (getting up every hour to go to restroom along with right flank pain starting in the middle of the night) and Generalized Body Aches (onset last evening)    History of Present Illness   Source of history provided by:  patient. History/Exam Limitations: none. Isadora Unger is a 80 y.o. old female who has a past medical history of:   Past Medical History:   Diagnosis Date    Asthma     Depression     Hyperlipidemia     Nausea & vomiting     Thyroid disease     presents to the emergency department by private vehicle, for complaints of increased frequency and R flank pain which began 1 day(s) prior to arrival.   There has been similar episodes in the past with UTI and kidney stones. Since onset the symptoms have been intermittent. The pain is associated with chills, sweats, R flank pain, urinary frequency. The pain is aggravated by none and relieved by nothing. There has been NO chest pain, SOB, documented fever, urinary burning, blood in urine/stool, diarrhea, constipation, nausea, vomiting. Pt notes hx of asthma, A fib, HTN, and hyperlipidemia. ROS   Pertinent positives and negatives are stated within HPI, all other systems reviewed and are negative. Past Surgical History:   Procedure Laterality Date    APPENDECTOMY  58 YEARS AGO    CHOLECYSTECTOMY      EYE SURGERY      CATARACT RIGHT AND LEFT    HYSTERECTOMY      NECK SURGERY  9/9/2011    THYROIDECTOMY  17 YEARS AGO    TONSILLECTOMY     Social History:  reports that she quit smoking about 23 years ago. Her smoking use included Cigarettes. She does not have any smokeless tobacco history on file. She reports that she does not drink alcohol. Family History: family history is not on file.    Allergies: Codeine; Lipitor; Spiriva handihaler [tiotropium bromide outpatient follow-up. The plan has been discussed in detail and they are aware of the specific conditions for emergent return, as well as the importance of follow-up. Counseling:   I have spoken with the patient and discussed todays results, in addition to providing specific details for the plan of care and counseling regarding the diagnosis and prognosis and are agreeable with the plan. Assessment      1. Urinary urgency    2. Flank pain    3. Hematuria, unspecified type    4. Body aches      This patient's ED course included: a personal history and physicial examination, re-evaluation prior to disposition and multiple bedside re-evaluations  This patient has remained hemodynamically stable during their ED course. Plan   Discharge to home. Patient condition is good. New Medications     New Prescriptions    CEPHALEXIN (KEFLEX) 500 MG CAPSULE    Take 1 capsule by mouth 3 times daily for 7 days     Electronically signed by Lashanda Slater PA-C   DD: 7/29/18  **This report was transcribed using voice recognition software. Every effort was made to ensure accuracy; however, inadvertent computerized transcription errors may be present.   END OF PROVIDER NOTE        Toshia Alegria PA-C  07/29/18 6486

## 2018-08-03 LAB
BLOOD CULTURE, ROUTINE: NORMAL
CULTURE, BLOOD 2: NORMAL

## 2018-08-16 ENCOUNTER — TELEPHONE (OUTPATIENT)
Dept: CARDIOLOGY CLINIC | Age: 83
End: 2018-08-16

## 2018-08-27 ENCOUNTER — APPOINTMENT (OUTPATIENT)
Dept: NUCLEAR MEDICINE | Age: 83
End: 2018-08-27
Payer: MEDICARE

## 2018-08-27 ENCOUNTER — HOSPITAL ENCOUNTER (OUTPATIENT)
Age: 83
Setting detail: OBSERVATION
Discharge: HOME | End: 2018-08-27
Attending: EMERGENCY MEDICINE | Admitting: NEUROMUSCULOSKELETAL MEDICINE & OMM
Payer: MEDICARE

## 2018-08-27 ENCOUNTER — APPOINTMENT (OUTPATIENT)
Dept: GENERAL RADIOLOGY | Age: 83
End: 2018-08-27
Payer: MEDICARE

## 2018-08-27 VITALS
HEART RATE: 66 BPM | OXYGEN SATURATION: 94 % | WEIGHT: 184.5 LBS | SYSTOLIC BLOOD PRESSURE: 134 MMHG | RESPIRATION RATE: 18 BRPM | TEMPERATURE: 97.7 F | DIASTOLIC BLOOD PRESSURE: 69 MMHG | HEIGHT: 68 IN | BODY MASS INDEX: 27.96 KG/M2

## 2018-08-27 DIAGNOSIS — R07.9 CHEST PAIN, UNSPECIFIED TYPE: Primary | ICD-10-CM

## 2018-08-27 LAB
ALBUMIN SERPL-MCNC: 4 G/DL (ref 3.5–5.2)
ALP BLD-CCNC: 81 U/L (ref 35–104)
ALT SERPL-CCNC: 12 U/L (ref 0–32)
AMYLASE: 57 U/L (ref 20–100)
ANION GAP SERPL CALCULATED.3IONS-SCNC: 12 MMOL/L (ref 7–16)
APTT: 39.3 SEC (ref 24.5–35.1)
AST SERPL-CCNC: 17 U/L (ref 0–31)
BILIRUB SERPL-MCNC: 0.3 MG/DL (ref 0–1.2)
BILIRUBIN DIRECT: <0.2 MG/DL (ref 0–0.3)
BILIRUBIN, INDIRECT: NORMAL MG/DL (ref 0–1)
BUN BLDV-MCNC: 14 MG/DL (ref 8–23)
CALCIUM SERPL-MCNC: 9.9 MG/DL (ref 8.6–10.2)
CHLORIDE BLD-SCNC: 103 MMOL/L (ref 98–107)
CK MB: 1.6 NG/ML (ref 0–4.3)
CK MB: 2.1 NG/ML (ref 0–4.3)
CO2: 27 MMOL/L (ref 22–29)
CREAT SERPL-MCNC: 0.9 MG/DL (ref 0.5–1)
D DIMER: <200 NG/ML DDU
EKG ATRIAL RATE: 63 BPM
EKG P AXIS: 55 DEGREES
EKG P-R INTERVAL: 156 MS
EKG Q-T INTERVAL: 444 MS
EKG QRS DURATION: 74 MS
EKG QTC CALCULATION (BAZETT): 454 MS
EKG R AXIS: 16 DEGREES
EKG T AXIS: 51 DEGREES
EKG VENTRICULAR RATE: 63 BPM
GFR AFRICAN AMERICAN: >60
GFR NON-AFRICAN AMERICAN: 60 ML/MIN/1.73
GLUCOSE BLD-MCNC: 103 MG/DL (ref 74–109)
HCT VFR BLD CALC: 45.3 % (ref 34–48)
HEMOGLOBIN: 14.2 G/DL (ref 11.5–15.5)
INR BLD: 1.7
LIPASE: 45 U/L (ref 13–60)
LV EF: 63 %
LVEF MODALITY: NORMAL
MAGNESIUM: 1.6 MG/DL (ref 1.6–2.6)
MAGNESIUM: 1.7 MG/DL (ref 1.6–2.6)
MCH RBC QN AUTO: 27.8 PG (ref 26–35)
MCHC RBC AUTO-ENTMCNC: 31.3 % (ref 32–34.5)
MCV RBC AUTO: 88.8 FL (ref 80–99.9)
PDW BLD-RTO: 13.8 FL (ref 11.5–15)
PLATELET # BLD: 149 E9/L (ref 130–450)
PMV BLD AUTO: 12.9 FL (ref 7–12)
POTASSIUM SERPL-SCNC: 3.7 MMOL/L (ref 3.5–5)
PRO-BNP: 1327 PG/ML (ref 0–450)
PROTHROMBIN TIME: 19.9 SEC (ref 9.3–12.4)
RBC # BLD: 5.1 E12/L (ref 3.5–5.5)
SODIUM BLD-SCNC: 142 MMOL/L (ref 132–146)
T4 FREE: 1.21 NG/DL (ref 0.93–1.7)
TOTAL CK: 36 U/L (ref 20–180)
TOTAL PROTEIN: 6.6 G/DL (ref 6.4–8.3)
TROPONIN: <0.01 NG/ML (ref 0–0.03)
TROPONIN: <0.01 NG/ML (ref 0–0.03)
TSH SERPL DL<=0.05 MIU/L-ACNC: 4.43 UIU/ML (ref 0.27–4.2)
WBC # BLD: 8.7 E9/L (ref 4.5–11.5)

## 2018-08-27 PROCEDURE — A9500 TC99M SESTAMIBI: HCPCS | Performed by: RADIOLOGY

## 2018-08-27 PROCEDURE — G0378 HOSPITAL OBSERVATION PER HR: HCPCS

## 2018-08-27 PROCEDURE — 99285 EMERGENCY DEPT VISIT HI MDM: CPT

## 2018-08-27 PROCEDURE — 84484 ASSAY OF TROPONIN QUANT: CPT

## 2018-08-27 PROCEDURE — 80076 HEPATIC FUNCTION PANEL: CPT

## 2018-08-27 PROCEDURE — 6360000002 HC RX W HCPCS: Performed by: NURSE PRACTITIONER

## 2018-08-27 PROCEDURE — 93016 CV STRESS TEST SUPVJ ONLY: CPT | Performed by: INTERNAL MEDICINE

## 2018-08-27 PROCEDURE — 6370000000 HC RX 637 (ALT 250 FOR IP): Performed by: EMERGENCY MEDICINE

## 2018-08-27 PROCEDURE — 85027 COMPLETE CBC AUTOMATED: CPT

## 2018-08-27 PROCEDURE — 84443 ASSAY THYROID STIM HORMONE: CPT

## 2018-08-27 PROCEDURE — 80048 BASIC METABOLIC PNL TOTAL CA: CPT

## 2018-08-27 PROCEDURE — 99223 1ST HOSP IP/OBS HIGH 75: CPT | Performed by: INTERNAL MEDICINE

## 2018-08-27 PROCEDURE — 83690 ASSAY OF LIPASE: CPT

## 2018-08-27 PROCEDURE — 93005 ELECTROCARDIOGRAM TRACING: CPT | Performed by: EMERGENCY MEDICINE

## 2018-08-27 PROCEDURE — 71045 X-RAY EXAM CHEST 1 VIEW: CPT

## 2018-08-27 PROCEDURE — 82550 ASSAY OF CK (CPK): CPT

## 2018-08-27 PROCEDURE — 2580000003 HC RX 258: Performed by: NEUROMUSCULOSKELETAL MEDICINE & OMM

## 2018-08-27 PROCEDURE — 85610 PROTHROMBIN TIME: CPT

## 2018-08-27 PROCEDURE — 85730 THROMBOPLASTIN TIME PARTIAL: CPT

## 2018-08-27 PROCEDURE — 82552 ASSAY OF CPK IN BLOOD: CPT

## 2018-08-27 PROCEDURE — 3430000000 HC RX DIAGNOSTIC RADIOPHARMACEUTICAL: Performed by: RADIOLOGY

## 2018-08-27 PROCEDURE — 36415 COLL VENOUS BLD VENIPUNCTURE: CPT

## 2018-08-27 PROCEDURE — 78452 HT MUSCLE IMAGE SPECT MULT: CPT

## 2018-08-27 PROCEDURE — 93017 CV STRESS TEST TRACING ONLY: CPT

## 2018-08-27 PROCEDURE — 82150 ASSAY OF AMYLASE: CPT

## 2018-08-27 PROCEDURE — 82553 CREATINE MB FRACTION: CPT

## 2018-08-27 PROCEDURE — 6370000000 HC RX 637 (ALT 250 FOR IP): Performed by: NURSE PRACTITIONER

## 2018-08-27 PROCEDURE — 83880 ASSAY OF NATRIURETIC PEPTIDE: CPT

## 2018-08-27 PROCEDURE — 6370000000 HC RX 637 (ALT 250 FOR IP): Performed by: NEUROMUSCULOSKELETAL MEDICINE & OMM

## 2018-08-27 PROCEDURE — 84439 ASSAY OF FREE THYROXINE: CPT

## 2018-08-27 PROCEDURE — 85378 FIBRIN DEGRADE SEMIQUANT: CPT

## 2018-08-27 PROCEDURE — 83735 ASSAY OF MAGNESIUM: CPT

## 2018-08-27 PROCEDURE — 93018 CV STRESS TEST I&R ONLY: CPT | Performed by: INTERNAL MEDICINE

## 2018-08-27 RX ORDER — LANOLIN ALCOHOL/MO/W.PET/CERES
400 CREAM (GRAM) TOPICAL ONCE
Status: COMPLETED | OUTPATIENT
Start: 2018-08-27 | End: 2018-08-27

## 2018-08-27 RX ORDER — CYANOCOBALAMIN (VITAMIN B-12) 500 MCG
1 LOZENGE ORAL DAILY
Status: DISCONTINUED | OUTPATIENT
Start: 2018-08-27 | End: 2018-08-27 | Stop reason: CLARIF

## 2018-08-27 RX ORDER — CHOLECALCIFEROL (VITAMIN D3) 50 MCG
5000 TABLET ORAL DAILY
Status: DISCONTINUED | OUTPATIENT
Start: 2018-08-27 | End: 2018-08-27 | Stop reason: HOSPADM

## 2018-08-27 RX ORDER — UBIDECARENONE 75 MG
50 CAPSULE ORAL DAILY
Status: DISCONTINUED | OUTPATIENT
Start: 2018-08-27 | End: 2018-08-27 | Stop reason: HOSPADM

## 2018-08-27 RX ORDER — CITALOPRAM 20 MG/1
20 TABLET ORAL DAILY
Status: DISCONTINUED | OUTPATIENT
Start: 2018-08-27 | End: 2018-08-27 | Stop reason: HOSPADM

## 2018-08-27 RX ORDER — ASPIRIN 81 MG/1
81 TABLET ORAL DAILY
Status: DISCONTINUED | OUTPATIENT
Start: 2018-08-27 | End: 2018-08-27 | Stop reason: HOSPADM

## 2018-08-27 RX ORDER — SIMVASTATIN 20 MG
20 TABLET ORAL NIGHTLY
Status: DISCONTINUED | OUTPATIENT
Start: 2018-08-27 | End: 2018-08-27 | Stop reason: HOSPADM

## 2018-08-27 RX ORDER — ACETAMINOPHEN 325 MG/1
650 TABLET ORAL EVERY 4 HOURS PRN
Status: DISCONTINUED | OUTPATIENT
Start: 2018-08-27 | End: 2018-08-27 | Stop reason: HOSPADM

## 2018-08-27 RX ORDER — SODIUM CHLORIDE 0.9 % (FLUSH) 0.9 %
10 SYRINGE (ML) INJECTION EVERY 12 HOURS SCHEDULED
Status: DISCONTINUED | OUTPATIENT
Start: 2018-08-27 | End: 2018-08-27 | Stop reason: HOSPADM

## 2018-08-27 RX ORDER — LEVOTHYROXINE SODIUM 0.1 MG/1
100 TABLET ORAL DAILY
Status: DISCONTINUED | OUTPATIENT
Start: 2018-08-27 | End: 2018-08-27 | Stop reason: HOSPADM

## 2018-08-27 RX ORDER — NITROGLYCERIN 0.4 MG/1
0.4 TABLET SUBLINGUAL
Status: DISPENSED | OUTPATIENT
Start: 2018-08-27 | End: 2018-08-27

## 2018-08-27 RX ORDER — SODIUM CHLORIDE 0.9 % (FLUSH) 0.9 %
10 SYRINGE (ML) INJECTION PRN
Status: DISCONTINUED | OUTPATIENT
Start: 2018-08-27 | End: 2018-08-27 | Stop reason: HOSPADM

## 2018-08-27 RX ORDER — ASPIRIN 81 MG/1
324 TABLET, CHEWABLE ORAL ONCE
Status: COMPLETED | OUTPATIENT
Start: 2018-08-27 | End: 2018-08-27

## 2018-08-27 RX ORDER — METOPROLOL SUCCINATE 25 MG/1
25 TABLET, EXTENDED RELEASE ORAL DAILY
Status: DISCONTINUED | OUTPATIENT
Start: 2018-08-27 | End: 2018-08-27 | Stop reason: HOSPADM

## 2018-08-27 RX ORDER — ALBUTEROL SULFATE 90 UG/1
2 AEROSOL, METERED RESPIRATORY (INHALATION) EVERY 4 HOURS PRN
Status: DISCONTINUED | OUTPATIENT
Start: 2018-08-27 | End: 2018-08-27 | Stop reason: HOSPADM

## 2018-08-27 RX ADMIN — LEVOTHYROXINE SODIUM 100 MCG: 100 TABLET ORAL at 16:52

## 2018-08-27 RX ADMIN — ASPIRIN 81 MG 324 MG: 81 TABLET ORAL at 03:02

## 2018-08-27 RX ADMIN — Medication 10 ML: at 11:30

## 2018-08-27 RX ADMIN — VITAMIN D, TAB 1000IU (100/BT) 5000 UNITS: 25 TAB at 16:52

## 2018-08-27 RX ADMIN — BECLOMETHASONE DIPROPIONATE HFA 2 PUFF: 80 AEROSOL, METERED RESPIRATORY (INHALATION) at 11:29

## 2018-08-27 RX ADMIN — REGADENOSON 0.4 MG: 0.08 INJECTION, SOLUTION INTRAVENOUS at 15:00

## 2018-08-27 RX ADMIN — CITALOPRAM 20 MG: 20 TABLET, FILM COATED ORAL at 16:51

## 2018-08-27 RX ADMIN — NITROGLYCERIN 0.4 MG: 0.4 TABLET SUBLINGUAL at 03:04

## 2018-08-27 RX ADMIN — Medication 31.4 MILLICURIE: at 15:04

## 2018-08-27 RX ADMIN — Medication 11 MILLICURIE: at 12:48

## 2018-08-27 RX ADMIN — NITROGLYCERIN 0.4 MG: 0.4 TABLET SUBLINGUAL at 03:09

## 2018-08-27 RX ADMIN — NITROGLYCERIN 1 INCH: 20 OINTMENT TOPICAL at 03:04

## 2018-08-27 RX ADMIN — Medication 400 MG: at 17:03

## 2018-08-27 ASSESSMENT — PAIN SCALES - GENERAL
PAINLEVEL_OUTOF10: 0
PAINLEVEL_OUTOF10: 0
PAINLEVEL_OUTOF10: 8
PAINLEVEL_OUTOF10: 5
PAINLEVEL_OUTOF10: 0

## 2018-08-27 ASSESSMENT — ENCOUNTER SYMPTOMS
NAUSEA: 0
HEARTBURN: 1
VOMITING: 0
DIARRHEA: 0
ABDOMINAL PAIN: 1
EYE PAIN: 0
EYE REDNESS: 0
TROUBLE SWALLOWING: 0
WHEEZING: 0
BACK PAIN: 1
EYE DISCHARGE: 0
SINUS PRESSURE: 0
ABDOMINAL DISTENTION: 0
SORE THROAT: 0
COUGH: 0
ORTHOPNEA: 0
SHORTNESS OF BREATH: 0

## 2018-08-27 ASSESSMENT — PAIN DESCRIPTION - ORIENTATION
ORIENTATION: MID
ORIENTATION: LOWER

## 2018-08-27 ASSESSMENT — PAIN DESCRIPTION - DESCRIPTORS
DESCRIPTORS: BURNING
DESCRIPTORS: SHARP

## 2018-08-27 ASSESSMENT — PAIN DESCRIPTION - PAIN TYPE
TYPE: ACUTE PAIN
TYPE: ACUTE PAIN

## 2018-08-27 ASSESSMENT — PAIN DESCRIPTION - LOCATION
LOCATION: BACK
LOCATION: CHEST

## 2018-08-27 ASSESSMENT — PAIN DESCRIPTION - DIRECTION: RADIATING_TOWARDS: RADIATES TO THE BACK

## 2018-08-27 ASSESSMENT — PAIN DESCRIPTION - ONSET: ONSET: AWAKENED FROM SLEEP

## 2018-08-27 NOTE — PROGRESS NOTES
Dr. Merle Tellez paged via ans service regarding pt discharge. Awaiting discharge. Dr. Merle Tellez returned call; notified stress was negative. States to discharge pt. Orders obtained.

## 2018-08-27 NOTE — PROGRESS NOTES
Pharmacy Note    Navinhanh Higuera was ordered vitamin E. Per the [address], this medication is non-formulary and not stocked by pharmacy for the reason indicated below. The medication can be reordered at discharge.      Medications in which risks outweigh benefits during hospitalization:         -  oral bisphosphonates         -  raloxifene (Evista)    Medications that lack necessity during an acute hospital stay:      -  ezetimibe (Zetia)         -  nasal antihistamines        -  nasal miacalcin        -  acyclovir topical cream/ointment orders for herpes labialis (cold sores)

## 2018-08-27 NOTE — CONSULTS
Inpatient Cardiology Consultation      Reason for Consult:  Chest  Pain    Consulting Physician: Dr Melissa Osborne    Requesting Physician:  Dr Joann Mcneill    Date of Consultation: 8/27/2018    HISTORY OF PRESENT ILLNESS: 79 yo  female known to Dr Dariana Ren seen in office 6/22/2018. [facility] 8/27/2018 around 0300. Was awoken around 0000 with epigastric pain radiating into back took a TUMS with minimal relief but was able to fall back asleep again was awoken to epigastric pain radiating to back  Rated an 8 out of 10, came to ED for evaluation. Bumn/Cr 14/0.9, p-BNP 1327, INR 1.7, CE x 2 negative. Currently resting in bed with no epigastric or back pain. Received ASA and topical nitrates in ED. Patient reports feeling weak, SOB, \"legs felt like jello. \" Felt like she did in AF and took additional 12.5 mg Torpol with resolution of weakness. Please note: past medical records were reviewed per electronic medical record (EMR) - see detailed reports under Past Medical/ Surgical History. Past Medical History:    1. Denies MI, HTN, DM, CVA  2. Tobacco use smoked on and off x 40 years at most 1 ppd quit in 1994  3. Asthma/COPD  4. HLD>> Zocor stopped 7/12/2018 due to myalgias  5. Depression  6. Thyroid nodules s/p thyroidectomy on replacement therapy  7. ANTONIO/BSO, bilateral cataracts, Cholecystomy, tonsillectomy, appendectomy  8. [place] wears hearing aides  9. Exercise MPS 2014. 3:06 minutes. 4.6 METS. 89% MPHR. Non ischemic  10. PAF diagnosed on admission 6/14/2018 (p-) [id number]= 3  11. CONSTANTINO 6/15/2018 Dr [facility]: No valve disease of significance. Dilated left atrium with increased left atrial volume. No evidence of thrombus within left atrium or appendage.  JACKIE emptying velocity: > 50 mm/sec. Atrial fibrillation  12. DCCV with 200 joules x 1 with conversion to SR 6/15/2018  13. Toprol decreased to 12.5 mg daily by PCP (7/2018) due to patient feeling \"foggy\" and weak.  reprots improvement in symptoms with decrease near left midclavicular line   Heart Ausculation -Normal S1 and S2, RRR, no murmur, s3, s4 or rub noted. PV: no extremity edema. No varicosities. Pedal pulses palpable, no clubbing or cyanosis   ABDOMEN: Soft, non-tender to light palpation. Bowel sounds present. No palpable masses no hepatosplenomegaly or splenomegaly; no abdominal bruit / pulsation  MS: Good muscle strength and tone. Not atrophy or abnormal movements. : deferred. SKIN: Warm and dry no statis dermatitis or ulcers. NEURO / PSYCH: Oriented to person, place and time. Speech clear and appropriate. Follows all commands. Pleasant affect. EKG as per my interpretation: SR, nonspecific STT changes  CXR on my review: no CHF      Lab Review     Last 3 Troponin:    Lab Results   Component Value Date    TROPONINI <0.01 08/27/2018    TROPONINI <0.01 08/27/2018    TROPONINI <0.01 07/29/2018        Recent Labs      08/27/18   0258   WBC  8.7   HGB  14.2   HCT  45.3   PLT  149       Recent Labs      08/27/18   0258   NA  142   K  3.7   CL  103   CO2  27   BUN  14   CREATININE  0.9       Recent Labs      08/27/18   0258   AST  17   ALT  12   BILIDIR  <0.2   ALKPHOS  81       Recent Labs      08/27/18   0258  08/27/18   0816   CKTOTAL  36   --    CKMB  2.1  1.6       Recent Labs      08/27/18   0258   INR  1.7       Assessment:  1. Admitted with epigastric pain: nonexertional. No acute EKG changes or biomarker elevation. No clinical indication of ACS or angina. Clinically low suspicion for PE or dissection(no SOB, hypoxia, tachycardia or widened mediastinum). Probably GI in nature: she had fried chicken prior to onset of symptoms. 2. No prior CAD history. 3. PAF: in SR presently. On Xarelto. Toprol lowered secondary to symptoms. Plan:  1. NPO  2. Tara MPS today. 3. Non cardiac CP. If stress test is negative: can be discharged from CV perspective later today. Differential diagnosis and management plans discussed with the patient in detail. Thank you for the consult. Will follow. Dr. Kerline Dangelo MD.  [facility] Card[facility].

## 2018-08-27 NOTE — ED PROVIDER NOTES
time. She appears well-developed and well-nourished. HENT:   Head: Normocephalic and atraumatic. Right Ear: Hearing and external ear normal.   Left Ear: Hearing and external ear normal.   Nose: Nose normal.   Mouth/Throat: Uvula is midline, oropharynx is clear and moist and mucous membranes are normal.   Eyes: Pupils are equal, round, and reactive to light. Conjunctivae, EOM and lids are normal.   Neck: Normal range of motion. Neck supple. Cardiovascular: Normal rate, regular rhythm and normal heart sounds. No murmur heard. Pulmonary/Chest: Effort normal and breath sounds normal.   Abdominal: Soft. Bowel sounds are normal. There is no tenderness. There is no rigidity, no rebound, no guarding and no CVA tenderness. Musculoskeletal: She exhibits no edema. Neurological: She is alert and oriented to person, place, and time. She has normal strength. No cranial nerve deficit or sensory deficit. Coordination and gait normal. GCS eye subscore is 4. GCS verbal subscore is 5. GCS motor subscore is 6. Skin: Skin is warm and dry. No abrasion and no rash noted. Nursing note and vitals reviewed. Procedures    MDM        --------------------------------------------- PAST HISTORY ---------------------------------------------  Past Medical History:  has a past medical history of Asthma; Depression; Hyperlipidemia; Nausea & vomiting; and Thyroid disease. Past Surgical History:  has a past surgical history that includes Tonsillectomy; Hysterectomy; Thyroidectomy (17 YEARS AGO); Appendectomy (58 YEARS AGO); eye surgery; Neck surgery (9/9/2011); and Cholecystectomy. Social History:  reports that she quit smoking about 23 years ago. Her smoking use included Cigarettes. She has never used smokeless tobacco. She reports that she does not drink alcohol. Family History: family history is not on file. The patients home medications have been reviewed.     Allergies: Codeine; Lipitor; Spiriva handihaler [tiotropium bromide monohydrate]; Tudorza pressair [aclidinium bromide]; and Ciprofloxacin    -------------------------------------------------- RESULTS -------------------------------------------------    LABS:  Results for orders placed or performed during the hospital encounter of 08/27/18   Protime-INR   Result Value Ref Range    Protime 19.9 (H) 9.3 - 12.4 sec    INR 1.7        RADIOLOGY:  XR CHEST PORTABLE    (Results Pending)       EKG: This EKG is signed and interpreted by me. Rate: 63  Rhythm: Sinus  Interpretation: no acute changes  Comparison: stable as compared to patient's most recent EKG      ------------------------- NURSING NOTES AND VITALS REVIEWED ---------------------------   The nursing notes within the ED encounter and vital signs as below have been reviewed. /78   Pulse 67   Temp 98 °F (36.7 °C)   Resp 18   Ht 5' 8\" (1.727 m)   Wt 180 lb (81.6 kg)   SpO2 96%   BMI 27.37 kg/m²   Oxygen Saturation Interpretation: Normal      ------------------------------------------ PROGRESS NOTES ------------------------------------------     Consultations:      Counseling:   I have spoken with the patient and discussed todays results, in addition to providing specific details for the plan of care and counseling regarding the diagnosis and prognosis. Their questions are answered at this time and they are agreeable with the plan.      --------------------------------- ADDITIONAL PROVIDER NOTES ---------------------------------         This patient's ED course included: a personal history and physicial examination, re-evaluation prior to disposition, multiple bedside re-evaluations, IV medications, cardiac monitoring, continuous pulse oximetry and complex medical decision making and emergency management    This patient has remained hemodynamically stable during their ED course.     Critical Care:          --------------------------------- IMPRESSION AND DISPOSITION

## 2018-08-27 NOTE — H&P
[address]                   [address] [facility],  [facility]                               HISTORY AND PHYSICAL    PATIENT NAME: Cinthia Johnson                   :        1934  MED REC NO:   [id number]                            ROOM:       6411  ACCOUNT NO:   [de-identified]                           ADMIT DATE: 2018  PROVIDER:     Thomas Thrasher DO    CHIEF COMPLAINT:  Chest pain. HISTORY OF CHIEF COMPLAINT:  An 45-year-old  female presents to  emergency room here at [facility].  She started having substernal chest  pain which radiated to her back. The pain was moderate in severity and  onset was gradual.  The patient was not short of breath with it. Did have  some nausea. Did not vomit. Some abdominal pain with it as well as the  back pain. Nothing seemed to make it better or worse. She denied any  diaphoresis associated with the episodes. Her workup in the ER included  negative cardiac enzymes. Blood work showed normal complete metabolic  panel. ProBNP was 1327. CK total 36, CK-MB 2.1. Troponin less than 0.01.  CBC, white count 8.7, platelets 073, H and H 14.2, 45.3. Her D-dimer was  less than 200. Portable chest x-ray showed the findings compatible with  emphysema, tortuous ectatic aorta, otherwise no other acute findings were  discovered. IMPRESSION:  1. Chest pain, likely noncardiac. 2.  Hyperlipidemia. 3.  Depression. 4.  Hypothyroidism. 5.  Asthma/COPD. 6.  Vitamin D deficiency. 7.  History of atrial fib. PLAN:  The patient will be admitted under my service, observation to  telemetry chest pain unit. The patient does see Dr. Rosalia Fox as an outpatient. We will consult him regarding the chest pain. Continue to cycle cardiac  enzymes. Await further recommendations per consultants and treat  accordingly. DISPOSITION:  To home when medically stable.         Wendie Mcclure DO    D: 2018 8:22:20       T: 08/27/2018 8:23:15     [email or web address]  Job#: [id number]     Doc#: [id number]    CC:

## 2018-08-27 NOTE — CARE COORDINATION
Social Work/Discharge Planning; Pt currently out of room for testing. Chart reviewed. Will follow for care transition as available.   Katia PISANO

## 2018-08-29 NOTE — DISCHARGE SUMMARY
Family Practice Discharge Summary    Julieta Unger  :  1934  MRN:  [id number]    Admit date:  2018  Discharge date:  2018    Admitting Physician:  Krystina Thompson DO    Discharge Diagnoses:    Patient Active Problem List   Diagnosis    Persistent atrial fibrillation ([address]    HLD (hyperlipidemia)    Chest pain       Admission Condition:  fair    Discharged Condition:  fair    Hospital Course:   See chart for clinical details. Discharge Medications:    See medication reconciliation under discharge insructions. Consults:  cardiology    Significant Diagnostic Studies:  nuclear medicine: stress testing normal/negative. Patient MRN:  [id number]   : 1934   Age: 80 years   Gender: Female   Order Date:  2018 10:00 AM   EXAM: NM MYOCARDIAL SPECT REST EXERCISE OR RX   Number of Images: 2 views   INDICATION:  SUBSTERNAL CHEST PAIN RELIEVED BY REST OR NITROGLYCERIN    Procedure Type->Rx    COMPARISON: None       TECHNIQUE:   11 mCi of Tc-99m MIBI was injected intravenously at rest and cardiac   SPECT images were performed. In addition 31.4 mCi of Tc-99m MIBI was   injected intravenously at maximum stress by using Lexiscan stress. Stress SPECT images and gated study were performed.        FINDINGS:   Perfusion images demonstrate no reversible perfusion defect. Wall motion is within normal limits. The end diastolic volume is 62 ml. The end systolic volume is 23 ml. The estimated ejection fraction is 63 %.            Impression   1. No reversible perfusion defect   2. Ejection fraction is 63 %.    3. No significant wall motion abnormality             Treatments:   IV hydration    Discharge Exam:  /69   Pulse 66   Temp 97.7 °F (36.5 °C) (Temporal)   Resp 18   Ht 5' 8\" (1.727 m)   Wt 184 lb 8 oz (83.7 kg)   SpO2 94%   BMI 28.05 kg/m²     General Appearance:    Alert, cooperative, no distress, appears stated age   Head:    Normocephalic, without obvious abnormality, atraumatic   Eyes:    PERRL, conjunctiva/corneas clear, EOM's intact, fundi     benign, both eyes   Ears:    Normal TM's and external ear canals, both ears   Nose:   Nares normal, septum midline, mucosa normal, no drainage    or sinus tenderness   Throat:   Lips, mucosa, and tongue normal; teeth and gums normal   Neck:   Supple, symmetrical, trachea midline, no adenopathy;     thyroid:  no enlargement/tenderness/nodules; no carotid    bruit or JVD   Back:     Symmetric, no curvature, ROM normal, no CVA tenderness   Lungs:     Clear to auscultation bilaterally, respirations unlabored   Chest Wall:    No tenderness or deformity    Heart:    Regular rate and rhythm, S1 and S2 normal, no murmur, rub   or gallop   Breast Exam:    No tenderness, masses, or nipple abnormality   Abdomen:     Soft, non-tender, bowel sounds active all four quadrants,     no masses, no organomegaly   Genitalia:    Normal female without lesion, discharge or tenderness   Rectal:    Normal tone ;guaiac negative stool   Extremities:   Extremities normal, atraumatic, no cyanosis or edema   Pulses:   2+ and symmetric all extremities   Skin:   Skin color, texture, turgor normal, no rashes or lesions   Lymph nodes:   Cervical, supraclavicular, and axillary nodes normal   Neurologic:   CNII-XII intact, normal strength, sensation and reflexes     throughout       Disposition:   home    Signed:  Zohra Luna D.O.  8/29/2018, 9:20 AM

## 2018-08-30 LAB
% CKMB: 0 % (ref 0–4)
CK-BB: 0 % (ref 0–0)
CK-MACRO TYPE I: 0 % (ref 0–0)
CK-MACRO TYPE II: 0 % (ref 0–0)
CK-MM: 100 % (ref 96–100)
TOTAL CK: 28 U/L (ref 20–180)

## 2018-09-17 ENCOUNTER — TELEPHONE (OUTPATIENT)
Dept: CARDIOLOGY CLINIC | Age: 83
End: 2018-09-17

## 2018-09-24 ENCOUNTER — OFFICE VISIT (OUTPATIENT)
Dept: CARDIOLOGY CLINIC | Age: 83
End: 2018-09-24
Payer: MEDICARE

## 2018-09-24 VITALS
HEART RATE: 81 BPM | SYSTOLIC BLOOD PRESSURE: 130 MMHG | HEIGHT: 68 IN | RESPIRATION RATE: 14 BRPM | WEIGHT: 184 LBS | BODY MASS INDEX: 27.89 KG/M2 | DIASTOLIC BLOOD PRESSURE: 74 MMHG

## 2018-09-24 DIAGNOSIS — Z92.89 HISTORY OF NUCLEAR STRESS TEST: ICD-10-CM

## 2018-09-24 PROCEDURE — 1101F PT FALLS ASSESS-DOCD LE1/YR: CPT | Performed by: INTERNAL MEDICINE

## 2018-09-24 PROCEDURE — 1036F TOBACCO NON-USER: CPT | Performed by: INTERNAL MEDICINE

## 2018-09-24 PROCEDURE — G8419 CALC BMI OUT NRM PARAM NOF/U: HCPCS | Performed by: INTERNAL MEDICINE

## 2018-09-24 PROCEDURE — G8400 PT W/DXA NO RESULTS DOC: HCPCS | Performed by: INTERNAL MEDICINE

## 2018-09-24 PROCEDURE — 1123F ACP DISCUSS/DSCN MKR DOCD: CPT | Performed by: INTERNAL MEDICINE

## 2018-09-24 PROCEDURE — G8427 DOCREV CUR MEDS BY ELIG CLIN: HCPCS | Performed by: INTERNAL MEDICINE

## 2018-09-24 PROCEDURE — 93000 ELECTROCARDIOGRAM COMPLETE: CPT | Performed by: INTERNAL MEDICINE

## 2018-09-24 PROCEDURE — 99213 OFFICE O/P EST LOW 20 MIN: CPT | Performed by: INTERNAL MEDICINE

## 2018-09-24 PROCEDURE — 1090F PRES/ABSN URINE INCON ASSESS: CPT | Performed by: INTERNAL MEDICINE

## 2018-09-24 PROCEDURE — 4040F PNEUMOC VAC/ADMIN/RCVD: CPT | Performed by: INTERNAL MEDICINE

## 2018-09-24 NOTE — PROGRESS NOTES
Chief Complaint   Patient presents with    Atrial Fibrillation       Patient Active Problem List    Diagnosis Date Noted    History of nuclear stress test 09/24/2018     Overview Note:     Normal pharm stress 8/2018      HLD (hyperlipidemia) 06/20/2018    Persistent atrial fibrillation ([address] 06/15/2018     Overview Note:     A. Initial presentation June 2018. CHADS-VASC = 3  B. CONSTANTINO guided DC cardioversion  6/15/2018. No significant structural disesae on CONSTANTINO         Current Outpatient Prescriptions   Medication Sig Dispense Refill    rivaroxaban (XARELTO) 20 MG TABS tablet Take 1 tablet by mouth daily 30 tablet 5    metoprolol succinate (TOPROL XL) 25 MG extended release tablet Take 1 tablet by mouth daily (Patient taking differently: Take 12.5 mg by mouth daily ) 30 tablet 3    levothyroxine (SYNTHROID) 100 MCG tablet Take 1 tablet by mouth daily  3    budesonide (PULMICORT) 90 MCG/ACT AEPB inhaler Inhale 2 puffs into the lungs daily      citalopram (CELEXA) 20 MG tablet Take 20 mg by mouth daily      vitamin B-12 (CYANOCOBALAMIN) 100 MCG tablet Take 50 mcg by mouth daily.  albuterol (PROVENTIL HFA) 108 (90 BASE) MCG/ACT inhaler Inhale 2 puffs into the lungs every 4 hours as needed for Wheezing or Shortness of Breath. 1 Inhaler 0    Cholecalciferol (VITAMIN D3) 5000 UNIT TABS Take 1 tablet by mouth daily       Biotin 5000 MCG CAPS Take 1 capsule by mouth daily.  Vitamin E 400 UNIT TABS Take 1 tablet by mouth daily.  rivaroxaban (XARELTO) 20 MG TABS tablet Take 1 tablet by mouth daily (with breakfast) for 1 day 28 tablet 0    simvastatin (ZOCOR) 40 MG tablet Take 20 mg by mouth nightly  3    aspirin 81 MG tablet Take 81 mg by mouth daily       No current facility-administered medications for this visit.          Allergies   Allergen Reactions    Codeine Nausea And Vomiting    Lipitor Other (See Comments)     GENERALIZED ACHING, DIFFICULT TO FUNCTION    Spiriva Handihaler

## 2018-10-22 ENCOUNTER — TELEPHONE (OUTPATIENT)
Dept: CARDIOLOGY CLINIC | Age: 83
End: 2018-10-22

## 2018-11-12 ENCOUNTER — OFFICE VISIT (OUTPATIENT)
Dept: PULMONOLOGY | Age: 83
End: 2018-11-12
Payer: MEDICARE

## 2018-11-12 VITALS
HEIGHT: 68 IN | WEIGHT: 189 LBS | SYSTOLIC BLOOD PRESSURE: 183 MMHG | BODY MASS INDEX: 28.64 KG/M2 | DIASTOLIC BLOOD PRESSURE: 76 MMHG | TEMPERATURE: 98.1 F | HEART RATE: 92 BPM | RESPIRATION RATE: 20 BRPM

## 2018-11-12 DIAGNOSIS — J44.9 CHRONIC OBSTRUCTIVE PULMONARY DISEASE, UNSPECIFIED COPD TYPE (HCC): ICD-10-CM

## 2018-11-12 PROCEDURE — G8926 SPIRO NO PERF OR DOC: HCPCS | Performed by: INTERNAL MEDICINE

## 2018-11-12 PROCEDURE — 1123F ACP DISCUSS/DSCN MKR DOCD: CPT | Performed by: INTERNAL MEDICINE

## 2018-11-12 PROCEDURE — G8484 FLU IMMUNIZE NO ADMIN: HCPCS | Performed by: INTERNAL MEDICINE

## 2018-11-12 PROCEDURE — 1036F TOBACCO NON-USER: CPT | Performed by: INTERNAL MEDICINE

## 2018-11-12 PROCEDURE — G8419 CALC BMI OUT NRM PARAM NOF/U: HCPCS | Performed by: INTERNAL MEDICINE

## 2018-11-12 PROCEDURE — 3023F SPIROM DOC REV: CPT | Performed by: INTERNAL MEDICINE

## 2018-11-12 PROCEDURE — 99213 OFFICE O/P EST LOW 20 MIN: CPT | Performed by: INTERNAL MEDICINE

## 2018-11-12 PROCEDURE — 1090F PRES/ABSN URINE INCON ASSESS: CPT | Performed by: INTERNAL MEDICINE

## 2018-11-12 PROCEDURE — 4040F PNEUMOC VAC/ADMIN/RCVD: CPT | Performed by: INTERNAL MEDICINE

## 2018-11-12 PROCEDURE — G8400 PT W/DXA NO RESULTS DOC: HCPCS | Performed by: INTERNAL MEDICINE

## 2018-11-12 PROCEDURE — G8428 CUR MEDS NOT DOCUMENT: HCPCS | Performed by: INTERNAL MEDICINE

## 2018-11-12 PROCEDURE — 1101F PT FALLS ASSESS-DOCD LE1/YR: CPT | Performed by: INTERNAL MEDICINE

## 2018-11-12 RX ORDER — BUDESONIDE AND FORMOTEROL FUMARATE DIHYDRATE 160; 4.5 UG/1; UG/1
2 AEROSOL RESPIRATORY (INHALATION) 2 TIMES DAILY
Qty: 1 INHALER | Refills: 1 | Status: SHIPPED
Start: 2018-11-12 | End: 2020-07-10 | Stop reason: SDUPTHER

## 2018-11-12 NOTE — PROGRESS NOTES
1 year follow up visit today. Symbicort 160 samples given to pt per Dr. Adriana Ahuja's orders. Instrcuted pt on use and to notify Dr. Potter Back of any issues. Plan for CT scan in 1 year prior to follow up in office. Appt given for 1 yr follow up. Instructed pt we will notify her of date and time of CT Scan.
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 There has been no weight changes. No fevers, fatigue or weakness. Head: Patient denies any history of trauma, convulsive disorder or syncope. Skin:  Patient denies history of changes in pigmentation, eruptions or pruritus. No easy bruising or bleeding. EENT: no nasal congestion   Cardiovascular ,No chest pain ,No edema ,  Respiration:SOB:  No,VASQUES : Minimal  Gastrointestinal:No GI bleed ,no melena  ,no hematemesis    Musculoskeletal: no joint pain ,no swelling  Neurological:no , syncope. Denies twitching, convulsions, loss of consciousness or memory. Endocrine:  . No history of goiter, exophthalmos or dryness of skin. The patient has no history of diabetes. Hematopoietic:  No history of bleeding disorders or easy bruising. Rheumatic:  No connective tissue disease history or polyarthritis/inflammatory joint disease. PHYSICAL EXAMINATION:  Vitals:    11/12/18 1337   BP: (!) 183/76   Pulse: 92   Resp: 20   Temp: 98.1 °F (36.7 °C)     Constitutional: This is a well developed, well nourished 80y.o. year old female who is alert, oriented, cooperative and in no apparent distress. Head was normocephalic and atraumatic. EENT: Mallampati class : 2  Extraocular muscles intact. External canals are patent and no discharge was appreciated. Septum was midline,   mucosa was without erythema, exudates or cobblestoning. No thrush was noted.       Neck: Supple without

## 2018-12-10 ENCOUNTER — TELEPHONE (OUTPATIENT)
Dept: CARDIOLOGY CLINIC | Age: 83
End: 2018-12-10

## 2018-12-18 ENCOUNTER — HOSPITAL ENCOUNTER (OUTPATIENT)
Dept: GENERAL RADIOLOGY | Age: 83
Discharge: HOME | End: 2018-12-20
Payer: MEDICARE

## 2018-12-18 DIAGNOSIS — Z13.9 VISIT FOR SCREENING: ICD-10-CM

## 2018-12-18 PROCEDURE — 77063 BREAST TOMOSYNTHESIS BI: CPT

## 2019-01-21 ENCOUNTER — OFFICE VISIT (OUTPATIENT)
Dept: FAMILY MEDICINE CLINIC | Age: 84
End: 2019-01-21
Payer: MEDICARE

## 2019-01-21 VITALS
TEMPERATURE: 98.2 F | HEIGHT: 67 IN | OXYGEN SATURATION: 95 % | RESPIRATION RATE: 15 BRPM | WEIGHT: 180.5 LBS | BODY MASS INDEX: 28.33 KG/M2 | HEART RATE: 108 BPM

## 2019-01-21 DIAGNOSIS — J06.9 URI WITH COUGH AND CONGESTION: Primary | ICD-10-CM

## 2019-01-21 DIAGNOSIS — J45.31 MILD PERSISTENT ASTHMA WITH ACUTE EXACERBATION: ICD-10-CM

## 2019-01-21 PROCEDURE — 99213 OFFICE O/P EST LOW 20 MIN: CPT | Performed by: PHYSICIAN ASSISTANT

## 2019-01-21 PROCEDURE — 1123F ACP DISCUSS/DSCN MKR DOCD: CPT | Performed by: PHYSICIAN ASSISTANT

## 2019-01-21 PROCEDURE — G8400 PT W/DXA NO RESULTS DOC: HCPCS | Performed by: PHYSICIAN ASSISTANT

## 2019-01-21 PROCEDURE — 1090F PRES/ABSN URINE INCON ASSESS: CPT | Performed by: PHYSICIAN ASSISTANT

## 2019-01-21 PROCEDURE — 87804 INFLUENZA ASSAY W/OPTIC: CPT | Performed by: PHYSICIAN ASSISTANT

## 2019-01-21 PROCEDURE — 1036F TOBACCO NON-USER: CPT | Performed by: PHYSICIAN ASSISTANT

## 2019-01-21 PROCEDURE — G8419 CALC BMI OUT NRM PARAM NOF/U: HCPCS | Performed by: PHYSICIAN ASSISTANT

## 2019-01-21 PROCEDURE — G8484 FLU IMMUNIZE NO ADMIN: HCPCS | Performed by: PHYSICIAN ASSISTANT

## 2019-01-21 PROCEDURE — 1101F PT FALLS ASSESS-DOCD LE1/YR: CPT | Performed by: PHYSICIAN ASSISTANT

## 2019-01-21 PROCEDURE — G8427 DOCREV CUR MEDS BY ELIG CLIN: HCPCS | Performed by: PHYSICIAN ASSISTANT

## 2019-01-21 PROCEDURE — 4040F PNEUMOC VAC/ADMIN/RCVD: CPT | Performed by: PHYSICIAN ASSISTANT

## 2019-01-21 RX ORDER — DOXYCYCLINE HYCLATE 100 MG/1
100 CAPSULE ORAL 2 TIMES DAILY
Qty: 20 CAPSULE | Refills: 0 | Status: SHIPPED | OUTPATIENT
Start: 2019-01-21 | End: 2019-01-31

## 2019-03-25 ENCOUNTER — OFFICE VISIT (OUTPATIENT)
Dept: CARDIOLOGY CLINIC | Age: 84
End: 2019-03-25
Payer: MEDICARE

## 2019-03-25 ENCOUNTER — TELEPHONE (OUTPATIENT)
Dept: CARDIOLOGY CLINIC | Age: 84
End: 2019-03-25

## 2019-03-25 VITALS
SYSTOLIC BLOOD PRESSURE: 126 MMHG | BODY MASS INDEX: 28.19 KG/M2 | WEIGHT: 186 LBS | HEIGHT: 68 IN | DIASTOLIC BLOOD PRESSURE: 78 MMHG | RESPIRATION RATE: 16 BRPM | HEART RATE: 75 BPM

## 2019-03-25 DIAGNOSIS — I48.19 PERSISTENT ATRIAL FIBRILLATION (HCC): Primary | ICD-10-CM

## 2019-03-25 PROCEDURE — 1123F ACP DISCUSS/DSCN MKR DOCD: CPT | Performed by: INTERNAL MEDICINE

## 2019-03-25 PROCEDURE — 1101F PT FALLS ASSESS-DOCD LE1/YR: CPT | Performed by: INTERNAL MEDICINE

## 2019-03-25 PROCEDURE — G8419 CALC BMI OUT NRM PARAM NOF/U: HCPCS | Performed by: INTERNAL MEDICINE

## 2019-03-25 PROCEDURE — 1090F PRES/ABSN URINE INCON ASSESS: CPT | Performed by: INTERNAL MEDICINE

## 2019-03-25 PROCEDURE — 4040F PNEUMOC VAC/ADMIN/RCVD: CPT | Performed by: INTERNAL MEDICINE

## 2019-03-25 PROCEDURE — G8427 DOCREV CUR MEDS BY ELIG CLIN: HCPCS | Performed by: INTERNAL MEDICINE

## 2019-03-25 PROCEDURE — 1036F TOBACCO NON-USER: CPT | Performed by: INTERNAL MEDICINE

## 2019-03-25 PROCEDURE — 93000 ELECTROCARDIOGRAM COMPLETE: CPT | Performed by: INTERNAL MEDICINE

## 2019-03-25 PROCEDURE — 99213 OFFICE O/P EST LOW 20 MIN: CPT | Performed by: INTERNAL MEDICINE

## 2019-03-25 PROCEDURE — G8400 PT W/DXA NO RESULTS DOC: HCPCS | Performed by: INTERNAL MEDICINE

## 2019-03-25 PROCEDURE — G8484 FLU IMMUNIZE NO ADMIN: HCPCS | Performed by: INTERNAL MEDICINE

## 2019-04-04 ENCOUNTER — TELEPHONE (OUTPATIENT)
Dept: CARDIOLOGY CLINIC | Age: 84
End: 2019-04-04

## 2019-04-04 NOTE — TELEPHONE ENCOUNTER
Spoke with Nathan Carvajal and she states she is feeling fine and this only happened once. She is going to continue to check her pulse and let us know if it continues. If it does happen again and she is not feeling well she will take her to the ER.

## 2019-04-04 NOTE — TELEPHONE ENCOUNTER
Abbey Vega called in on [employer] and stated you told her to use the pulse ox to get a better pulse reading for her. She states in the last hour her pulse went from 89 up to 157 and she is concerned about it.   Please advise

## 2019-04-04 NOTE — TELEPHONE ENCOUNTER
Remind her she is on xarelto  If she feels fine she can stay at home and let us know first thing tmrw am  If she feels bad go to [facility] ER

## 2019-06-16 ENCOUNTER — HOSPITAL ENCOUNTER (INPATIENT)
Age: 84
LOS: 3 days | Discharge: HOME | DRG: 440 | End: 2019-06-19
Attending: EMERGENCY MEDICINE | Admitting: NEUROMUSCULOSKELETAL MEDICINE & OMM
Payer: MEDICARE

## 2019-06-16 ENCOUNTER — APPOINTMENT (OUTPATIENT)
Dept: CT IMAGING | Age: 84
DRG: 440 | End: 2019-06-16
Payer: MEDICARE

## 2019-06-16 DIAGNOSIS — R11.2 NAUSEA AND VOMITING, INTRACTABILITY OF VOMITING NOT SPECIFIED, UNSPECIFIED VOMITING TYPE: ICD-10-CM

## 2019-06-16 DIAGNOSIS — K85.90 ACUTE PANCREATITIS, UNSPECIFIED COMPLICATION STATUS, UNSPECIFIED PANCREATITIS TYPE: Primary | ICD-10-CM

## 2019-06-16 DIAGNOSIS — R10.32 ABDOMINAL PAIN, LEFT LOWER QUADRANT: ICD-10-CM

## 2019-06-16 PROBLEM — K85.00 IDIOPATHIC ACUTE PANCREATITIS WITHOUT INFECTION OR NECROSIS: Status: ACTIVE | Noted: 2019-06-16

## 2019-06-16 LAB
ALBUMIN SERPL-MCNC: 4.3 G/DL (ref 3.5–5.2)
ALP BLD-CCNC: 75 U/L (ref 35–104)
ALT SERPL-CCNC: 19 U/L (ref 0–32)
ANION GAP SERPL CALCULATED.3IONS-SCNC: 11 MMOL/L (ref 7–16)
AST SERPL-CCNC: 41 U/L (ref 0–31)
BILIRUB SERPL-MCNC: 0.6 MG/DL (ref 0–1.2)
BUN BLDV-MCNC: 16 MG/DL (ref 8–23)
CALCIUM SERPL-MCNC: 9.3 MG/DL (ref 8.6–10.2)
CHLORIDE BLD-SCNC: 102 MMOL/L (ref 98–107)
CO2: 24 MMOL/L (ref 22–29)
CREAT SERPL-MCNC: 0.9 MG/DL (ref 0.5–1)
GFR AFRICAN AMERICAN: >60
GFR NON-AFRICAN AMERICAN: 60 ML/MIN/1.73
GLUCOSE BLD-MCNC: 134 MG/DL (ref 74–99)
INR BLD: 1.2
LIPASE: 444 U/L (ref 13–60)
POTASSIUM SERPL-SCNC: 4.4 MMOL/L (ref 3.5–5)
PROTHROMBIN TIME: 13.4 SEC (ref 9.3–12.4)
SODIUM BLD-SCNC: 137 MMOL/L (ref 132–146)
TOTAL PROTEIN: 7.4 G/DL (ref 6.4–8.3)

## 2019-06-16 PROCEDURE — 83690 ASSAY OF LIPASE: CPT

## 2019-06-16 PROCEDURE — 87040 BLOOD CULTURE FOR BACTERIA: CPT

## 2019-06-16 PROCEDURE — 36415 COLL VENOUS BLD VENIPUNCTURE: CPT

## 2019-06-16 PROCEDURE — 96374 THER/PROPH/DIAG INJ IV PUSH: CPT

## 2019-06-16 PROCEDURE — 87088 URINE BACTERIA CULTURE: CPT

## 2019-06-16 PROCEDURE — 80053 COMPREHEN METABOLIC PANEL: CPT

## 2019-06-16 PROCEDURE — 6360000002 HC RX W HCPCS: Performed by: PREVENTIVE MEDICINE

## 2019-06-16 PROCEDURE — 81001 URINALYSIS AUTO W/SCOPE: CPT

## 2019-06-16 PROCEDURE — 96375 TX/PRO/DX INJ NEW DRUG ADDON: CPT

## 2019-06-16 PROCEDURE — 2580000003 HC RX 258: Performed by: PREVENTIVE MEDICINE

## 2019-06-16 PROCEDURE — 85025 COMPLETE CBC W/AUTO DIFF WBC: CPT

## 2019-06-16 PROCEDURE — 85610 PROTHROMBIN TIME: CPT

## 2019-06-16 PROCEDURE — 6360000004 HC RX CONTRAST MEDICATION: Performed by: RADIOLOGY

## 2019-06-16 PROCEDURE — 74177 CT ABD & PELVIS W/CONTRAST: CPT

## 2019-06-16 PROCEDURE — 2580000003 HC RX 258: Performed by: RADIOLOGY

## 2019-06-16 PROCEDURE — 1200000000 HC SEMI PRIVATE

## 2019-06-16 PROCEDURE — 99285 EMERGENCY DEPT VISIT HI MDM: CPT

## 2019-06-16 RX ORDER — SODIUM CHLORIDE 0.9 % (FLUSH) 0.9 %
10 SYRINGE (ML) INJECTION PRN
Status: DISCONTINUED | OUTPATIENT
Start: 2019-06-16 | End: 2019-06-17 | Stop reason: SDUPTHER

## 2019-06-16 RX ORDER — FENTANYL CITRATE 50 UG/ML
75 INJECTION, SOLUTION INTRAMUSCULAR; INTRAVENOUS ONCE
Status: COMPLETED | OUTPATIENT
Start: 2019-06-16 | End: 2019-06-16

## 2019-06-16 RX ORDER — ONDANSETRON 2 MG/ML
4 INJECTION INTRAMUSCULAR; INTRAVENOUS ONCE
Status: COMPLETED | OUTPATIENT
Start: 2019-06-16 | End: 2019-06-16

## 2019-06-16 RX ORDER — 0.9 % SODIUM CHLORIDE 0.9 %
1000 INTRAVENOUS SOLUTION INTRAVENOUS ONCE
Status: COMPLETED | OUTPATIENT
Start: 2019-06-16 | End: 2019-06-16

## 2019-06-16 RX ADMIN — ONDANSETRON 4 MG: 2 INJECTION INTRAMUSCULAR; INTRAVENOUS at 21:33

## 2019-06-16 RX ADMIN — FENTANYL CITRATE 75 MCG: 50 INJECTION, SOLUTION INTRAMUSCULAR; INTRAVENOUS at 21:33

## 2019-06-16 RX ADMIN — Medication 10 ML: at 23:12

## 2019-06-16 RX ADMIN — IOPAMIDOL 110 ML: 755 INJECTION, SOLUTION INTRAVENOUS at 23:12

## 2019-06-16 RX ADMIN — SODIUM CHLORIDE 1000 ML: 9 INJECTION, SOLUTION INTRAVENOUS at 21:33

## 2019-06-16 ASSESSMENT — ENCOUNTER SYMPTOMS
COUGH: 0
SHORTNESS OF BREATH: 0
ALLERGIC/IMMUNOLOGIC NEGATIVE: 1
CONSTIPATION: 0
CHEST TIGHTNESS: 0
VOMITING: 1
ABDOMINAL PAIN: 1
NAUSEA: 1
DIARRHEA: 1

## 2019-06-16 ASSESSMENT — PAIN SCALES - GENERAL
PAINLEVEL_OUTOF10: 8
PAINLEVEL_OUTOF10: 8

## 2019-06-16 ASSESSMENT — PAIN DESCRIPTION - PAIN TYPE: TYPE: ACUTE PAIN

## 2019-06-16 ASSESSMENT — PAIN DESCRIPTION - LOCATION: LOCATION: ABDOMEN;GENERALIZED

## 2019-06-16 ASSESSMENT — PAIN DESCRIPTION - DESCRIPTORS: DESCRIPTORS: CRAMPING;ACHING

## 2019-06-17 ENCOUNTER — APPOINTMENT (OUTPATIENT)
Dept: MRI IMAGING | Age: 84
DRG: 440 | End: 2019-06-17
Payer: MEDICARE

## 2019-06-17 LAB
ALBUMIN SERPL-MCNC: 3.9 G/DL (ref 3.5–5.2)
ALP BLD-CCNC: 67 U/L (ref 35–104)
ALT SERPL-CCNC: 18 U/L (ref 0–32)
AMYLASE: 116 U/L (ref 20–100)
ANION GAP SERPL CALCULATED.3IONS-SCNC: 10 MMOL/L (ref 7–16)
AST SERPL-CCNC: 26 U/L (ref 0–31)
BACTERIA: ABNORMAL /HPF
BASOPHILS ABSOLUTE: 0.02 E9/L (ref 0–0.2)
BASOPHILS RELATIVE PERCENT: 0.2 % (ref 0–2)
BILIRUB SERPL-MCNC: 0.4 MG/DL (ref 0–1.2)
BILIRUBIN URINE: NEGATIVE
BLOOD, URINE: ABNORMAL
BUN BLDV-MCNC: 13 MG/DL (ref 8–23)
CALCIUM SERPL-MCNC: 9.1 MG/DL (ref 8.6–10.2)
CHLORIDE BLD-SCNC: 104 MMOL/L (ref 98–107)
CLARITY: CLEAR
CO2: 25 MMOL/L (ref 22–29)
COLOR: YELLOW
CREAT SERPL-MCNC: 0.8 MG/DL (ref 0.5–1)
EOSINOPHILS ABSOLUTE: 0.06 E9/L (ref 0.05–0.5)
EOSINOPHILS RELATIVE PERCENT: 0.7 % (ref 0–6)
GFR AFRICAN AMERICAN: >60
GFR NON-AFRICAN AMERICAN: >60 ML/MIN/1.73
GLUCOSE BLD-MCNC: 120 MG/DL (ref 74–99)
GLUCOSE URINE: NEGATIVE MG/DL
HCT VFR BLD CALC: 46.9 % (ref 34–48)
HCT VFR BLD CALC: 48.3 % (ref 34–48)
HEMOGLOBIN: 14.6 G/DL (ref 11.5–15.5)
HEMOGLOBIN: 15.5 G/DL (ref 11.5–15.5)
IMMATURE GRANULOCYTES #: 0.03 E9/L
IMMATURE GRANULOCYTES %: 0.3 % (ref 0–5)
KETONES, URINE: ABNORMAL MG/DL
LACTIC ACID: 0.9 MMOL/L (ref 0.5–2.2)
LEUKOCYTE ESTERASE, URINE: NEGATIVE
LIPASE: 85 U/L (ref 13–60)
LYMPHOCYTES ABSOLUTE: 0.41 E9/L (ref 1.5–4)
LYMPHOCYTES RELATIVE PERCENT: 4.4 % (ref 20–42)
MCH RBC QN AUTO: 28 PG (ref 26–35)
MCH RBC QN AUTO: 28.4 PG (ref 26–35)
MCHC RBC AUTO-ENTMCNC: 31.1 % (ref 32–34.5)
MCHC RBC AUTO-ENTMCNC: 32.1 % (ref 32–34.5)
MCV RBC AUTO: 88.6 FL (ref 80–99.9)
MCV RBC AUTO: 89.8 FL (ref 80–99.9)
MONOCYTES ABSOLUTE: 0.57 E9/L (ref 0.1–0.95)
MONOCYTES RELATIVE PERCENT: 6.2 % (ref 2–12)
NEUTROPHILS ABSOLUTE: 8.13 E9/L (ref 1.8–7.3)
NEUTROPHILS RELATIVE PERCENT: 88.2 % (ref 43–80)
NITRITE, URINE: NEGATIVE
PDW BLD-RTO: 14.2 FL (ref 11.5–15)
PDW BLD-RTO: 14.4 FL (ref 11.5–15)
PH UA: 5 (ref 5–9)
PLATELET # BLD: 132 E9/L (ref 130–450)
PLATELET # BLD: 134 E9/L (ref 130–450)
PMV BLD AUTO: 13.4 FL (ref 7–12)
PMV BLD AUTO: ABNORMAL FL (ref 7–12)
POTASSIUM SERPL-SCNC: 3.7 MMOL/L (ref 3.5–5)
PROTEIN UA: NEGATIVE MG/DL
RBC # BLD: 5.22 E12/L (ref 3.5–5.5)
RBC # BLD: 5.45 E12/L (ref 3.5–5.5)
RBC UA: ABNORMAL /HPF (ref 0–2)
SODIUM BLD-SCNC: 139 MMOL/L (ref 132–146)
SPECIFIC GRAVITY UA: 1.01 (ref 1–1.03)
TOTAL PROTEIN: 6.4 G/DL (ref 6.4–8.3)
TRIGL SERPL-MCNC: 89 MG/DL (ref 0–149)
UROBILINOGEN, URINE: 0.2 E.U./DL
WBC # BLD: 5.8 E9/L (ref 4.5–11.5)
WBC # BLD: 9.2 E9/L (ref 4.5–11.5)
WBC UA: ABNORMAL /HPF (ref 0–5)

## 2019-06-17 PROCEDURE — 84478 ASSAY OF TRIGLYCERIDES: CPT

## 2019-06-17 PROCEDURE — 2580000003 HC RX 258: Performed by: NEUROMUSCULOSKELETAL MEDICINE & OMM

## 2019-06-17 PROCEDURE — 83605 ASSAY OF LACTIC ACID: CPT

## 2019-06-17 PROCEDURE — 82150 ASSAY OF AMYLASE: CPT

## 2019-06-17 PROCEDURE — 80053 COMPREHEN METABOLIC PANEL: CPT

## 2019-06-17 PROCEDURE — 2580000003 HC RX 258: Performed by: STUDENT IN AN ORGANIZED HEALTH CARE EDUCATION/TRAINING PROGRAM

## 2019-06-17 PROCEDURE — 6360000004 HC RX CONTRAST MEDICATION: Performed by: RADIOLOGY

## 2019-06-17 PROCEDURE — 36415 COLL VENOUS BLD VENIPUNCTURE: CPT

## 2019-06-17 PROCEDURE — 85027 COMPLETE CBC AUTOMATED: CPT

## 2019-06-17 PROCEDURE — 74183 MRI ABD W/O CNTR FLWD CNTR: CPT

## 2019-06-17 PROCEDURE — 1200000000 HC SEMI PRIVATE

## 2019-06-17 PROCEDURE — 6370000000 HC RX 637 (ALT 250 FOR IP): Performed by: NEUROMUSCULOSKELETAL MEDICINE & OMM

## 2019-06-17 PROCEDURE — 6360000002 HC RX W HCPCS: Performed by: NEUROMUSCULOSKELETAL MEDICINE & OMM

## 2019-06-17 PROCEDURE — 83690 ASSAY OF LIPASE: CPT

## 2019-06-17 PROCEDURE — A9579 GAD-BASE MR CONTRAST NOS,1ML: HCPCS | Performed by: RADIOLOGY

## 2019-06-17 PROCEDURE — 99222 1ST HOSP IP/OBS MODERATE 55: CPT | Performed by: TRANSPLANT SURGERY

## 2019-06-17 RX ORDER — SODIUM CHLORIDE 9 MG/ML
INJECTION, SOLUTION INTRAVENOUS CONTINUOUS
Status: DISCONTINUED | OUTPATIENT
Start: 2019-06-17 | End: 2019-06-17

## 2019-06-17 RX ORDER — LEVOTHYROXINE SODIUM 0.1 MG/1
100 TABLET ORAL DAILY
Status: DISCONTINUED | OUTPATIENT
Start: 2019-06-17 | End: 2019-06-19 | Stop reason: HOSPADM

## 2019-06-17 RX ORDER — VITAMIN E 268 MG
400 CAPSULE ORAL DAILY
Status: DISCONTINUED | OUTPATIENT
Start: 2019-06-17 | End: 2019-06-19 | Stop reason: HOSPADM

## 2019-06-17 RX ORDER — SODIUM CHLORIDE 0.9 % (FLUSH) 0.9 %
10 SYRINGE (ML) INJECTION EVERY 12 HOURS SCHEDULED
Status: DISCONTINUED | OUTPATIENT
Start: 2019-06-17 | End: 2019-06-19 | Stop reason: HOSPADM

## 2019-06-17 RX ORDER — CHOLECALCIFEROL (VITAMIN D3) 50 MCG
2000 TABLET ORAL DAILY
Status: DISCONTINUED | OUTPATIENT
Start: 2019-06-17 | End: 2019-06-19 | Stop reason: HOSPADM

## 2019-06-17 RX ORDER — ALBUTEROL SULFATE 90 UG/1
2 AEROSOL, METERED RESPIRATORY (INHALATION) EVERY 6 HOURS PRN
Status: DISCONTINUED | OUTPATIENT
Start: 2019-06-17 | End: 2019-06-19 | Stop reason: HOSPADM

## 2019-06-17 RX ORDER — METOPROLOL SUCCINATE 25 MG/1
12.5 TABLET, EXTENDED RELEASE ORAL DAILY
Status: DISCONTINUED | OUTPATIENT
Start: 2019-06-17 | End: 2019-06-19 | Stop reason: HOSPADM

## 2019-06-17 RX ORDER — LORAZEPAM 2 MG/ML
0.5 INJECTION INTRAMUSCULAR ONCE
Status: COMPLETED | OUTPATIENT
Start: 2019-06-17 | End: 2019-06-17

## 2019-06-17 RX ORDER — SODIUM CHLORIDE 0.9 % (FLUSH) 0.9 %
10 SYRINGE (ML) INJECTION PRN
Status: DISCONTINUED | OUTPATIENT
Start: 2019-06-17 | End: 2019-06-19 | Stop reason: HOSPADM

## 2019-06-17 RX ORDER — SODIUM CHLORIDE, SODIUM LACTATE, POTASSIUM CHLORIDE, CALCIUM CHLORIDE 600; 310; 30; 20 MG/100ML; MG/100ML; MG/100ML; MG/100ML
INJECTION, SOLUTION INTRAVENOUS CONTINUOUS
Status: DISCONTINUED | OUTPATIENT
Start: 2019-06-17 | End: 2019-06-19 | Stop reason: HOSPADM

## 2019-06-17 RX ORDER — LANOLIN ALCOHOL/MO/W.PET/CERES
500 CREAM (GRAM) TOPICAL DAILY
Status: DISCONTINUED | OUTPATIENT
Start: 2019-06-17 | End: 2019-06-19 | Stop reason: HOSPADM

## 2019-06-17 RX ORDER — ACETAMINOPHEN 325 MG/1
650 TABLET ORAL EVERY 4 HOURS PRN
Status: DISCONTINUED | OUTPATIENT
Start: 2019-06-17 | End: 2019-06-19 | Stop reason: HOSPADM

## 2019-06-17 RX ORDER — CITALOPRAM 20 MG/1
20 TABLET ORAL DAILY
Status: DISCONTINUED | OUTPATIENT
Start: 2019-06-17 | End: 2019-06-19 | Stop reason: HOSPADM

## 2019-06-17 RX ADMIN — RIVAROXABAN 20 MG: 20 TABLET, FILM COATED ORAL at 17:26

## 2019-06-17 RX ADMIN — SODIUM CHLORIDE, POTASSIUM CHLORIDE, SODIUM LACTATE AND CALCIUM CHLORIDE: 600; 310; 30; 20 INJECTION, SOLUTION INTRAVENOUS at 10:40

## 2019-06-17 RX ADMIN — Medication 500 MCG: at 08:47

## 2019-06-17 RX ADMIN — ACETAMINOPHEN 650 MG: 325 TABLET, FILM COATED ORAL at 17:26

## 2019-06-17 RX ADMIN — GADOTERIDOL 16 ML: 279.3 INJECTION, SOLUTION INTRAVENOUS at 20:17

## 2019-06-17 RX ADMIN — LORAZEPAM 0.5 MG: 2 INJECTION INTRAMUSCULAR; INTRAVENOUS at 18:35

## 2019-06-17 RX ADMIN — SODIUM CHLORIDE, POTASSIUM CHLORIDE, SODIUM LACTATE AND CALCIUM CHLORIDE: 600; 310; 30; 20 INJECTION, SOLUTION INTRAVENOUS at 03:49

## 2019-06-17 RX ADMIN — ACETAMINOPHEN 650 MG: 325 TABLET, FILM COATED ORAL at 08:08

## 2019-06-17 RX ADMIN — LEVOTHYROXINE SODIUM 100 MCG: 100 TABLET ORAL at 06:27

## 2019-06-17 RX ADMIN — SODIUM CHLORIDE, POTASSIUM CHLORIDE, SODIUM LACTATE AND CALCIUM CHLORIDE: 600; 310; 30; 20 INJECTION, SOLUTION INTRAVENOUS at 17:30

## 2019-06-17 RX ADMIN — Medication 10 ML: at 20:55

## 2019-06-17 RX ADMIN — CITALOPRAM 20 MG: 20 TABLET, FILM COATED ORAL at 08:47

## 2019-06-17 RX ADMIN — ACETAMINOPHEN 650 MG: 325 TABLET, FILM COATED ORAL at 13:26

## 2019-06-17 RX ADMIN — Medication 10 ML: at 21:27

## 2019-06-17 RX ADMIN — MOMETASONE FUROATE AND FORMOTEROL FUMARATE DIHYDRATE 2 PUFF: 200; 5 AEROSOL RESPIRATORY (INHALATION) at 08:48

## 2019-06-17 RX ADMIN — Medication 400 UNITS: at 08:46

## 2019-06-17 RX ADMIN — METOPROLOL SUCCINATE 12.5 MG: 25 TABLET, EXTENDED RELEASE ORAL at 08:47

## 2019-06-17 RX ADMIN — ACETAMINOPHEN 650 MG: 325 TABLET, FILM COATED ORAL at 21:27

## 2019-06-17 RX ADMIN — Medication 2000 UNITS: at 08:47

## 2019-06-17 ASSESSMENT — PAIN SCALES - GENERAL
PAINLEVEL_OUTOF10: 0
PAINLEVEL_OUTOF10: 4
PAINLEVEL_OUTOF10: 0
PAINLEVEL_OUTOF10: 1
PAINLEVEL_OUTOF10: 3
PAINLEVEL_OUTOF10: 3
PAINLEVEL_OUTOF10: 1
PAINLEVEL_OUTOF10: 6
PAINLEVEL_OUTOF10: 0
PAINLEVEL_OUTOF10: 0

## 2019-06-17 ASSESSMENT — PAIN DESCRIPTION - LOCATION
LOCATION: ABDOMEN;GENERALIZED
LOCATION: ABDOMEN;GENERALIZED

## 2019-06-17 ASSESSMENT — PAIN DESCRIPTION - ONSET
ONSET: ON-GOING
ONSET: ON-GOING

## 2019-06-17 ASSESSMENT — PAIN DESCRIPTION - PROGRESSION
CLINICAL_PROGRESSION: GRADUALLY WORSENING
CLINICAL_PROGRESSION: GRADUALLY WORSENING

## 2019-06-17 ASSESSMENT — PAIN - FUNCTIONAL ASSESSMENT
PAIN_FUNCTIONAL_ASSESSMENT: ACTIVITIES ARE NOT PREVENTED
PAIN_FUNCTIONAL_ASSESSMENT: ACTIVITIES ARE NOT PREVENTED

## 2019-06-17 ASSESSMENT — PAIN DESCRIPTION - PAIN TYPE
TYPE: ACUTE PAIN
TYPE: ACUTE PAIN

## 2019-06-17 ASSESSMENT — PAIN DESCRIPTION - DESCRIPTORS
DESCRIPTORS: CRAMPING;ACHING
DESCRIPTORS: CRAMPING;ACHING

## 2019-06-17 ASSESSMENT — PAIN DESCRIPTION - FREQUENCY
FREQUENCY: CONTINUOUS
FREQUENCY: CONTINUOUS

## 2019-06-17 NOTE — H&P
[address]                  [address] [facility],  [facility]                              HISTORY AND PHYSICAL    PATIENT NAME: Melody HI                   :        1934  MED REC NO:   [id number]                            ROOM:       8421  ACCOUNT NO:   [de-identified]                           ADMIT DATE: 2019  PROVIDER:     Krishna Haro DO    CHIEF COMPLAINT:  Vomiting, diarrhea, and abdominal pain. HISTORY OF CHIEF COMPLAINT:  An 80-year-old  female presents to  emergency room with one- to two-day history of persistent nausea,  vomiting, diarrhea and left-sided abdominal pain. The patient states  that she has too numerous to count episodes of diarrhea and vomiting. She did try some Imodium over-the-counter which did help some, but did  not completely resolve the issue. She could not keep any food or  liquids down. She denies any blood in her stool or vomit. She reports  her left side abdominal pain as a 7 out of 10 in intensity with no  radiation. She does have a history of appendectomy, cholecystectomy and  hysterectomy in the past.  She does have some reports of fevers as well. Workup in the ER showed she was afebrile. White count 9.2, H and H 15.5  and 48.3. Her initial liver functions were unremarkable except for an  AST of 41. Her lipase was 444. Urinalysis was negative. CT scan of  the abdomen and pelvis with IV contrast showed no acute pathology seen  in the abdomen or pelvis, biliary _____ and common hepatic duct and bile  duct appears more prominent. There was no stone or mass obstructing it. I would recommend an MRCP. She does have some coronary disease and  moderate sized hiatal hernia with some diverticulosis with no evidence  of diverticulitis. The patient will be admitted under my service with  acute pancreatitis to a telemetry floor.   I will consult to Dr. Blane Klein for GI and possible surgical intervention. We will obtain  an MRCP and treat accordingly. She offers no other complaints at this  time. PAST MEDICAL HISTORY:  1. Hyperlipidemia. 2.  Depression. 3.  Asthma. 4.  Hypothyroidism. PAST SURGICAL HISTORY:  1. Tonsillectomy. 2   Thyroidectomy 17 years ago. 3.  Neck surgery in 09/2011. 4.  Hysterectomy. 5.  Cholecystectomy. 6.  Appendectomy. 7.  Eye surgery. ALLERGIES:  She has an allergy to,  1. CODEINE. 2.  LIPITOR. [address]. 4.  CIPRO. [address] [facility]. MEDICATIONS PRIOR TO ADMISSION:  1. Cymbalta 160/4.5 two puffs twice a day. 2.  Xarelto 20 mg one daily. 3.  Toprol-XL 25 mg one daily. 4.  Synthroid 100 mcg one daily. 5.  Celexa 20 mg one daily. 6.  Vitamin B12 50 mcg one daily. 7.  Proventil two puffs every four to six hours p.r.n. shortness of  breath, congestion. 8.  Vitamin D3 2000 units daily. 9.  Biotin 5000 mcg one capsule daily. 10.  Vitamin E 400 units one capsule daily. 11.  Zocor 20 mg one daily. 12.  Aspirin 81 mg daily. 13.  Pulmicort two puffs by mouth daily. SOCIAL HISTORY:  Former smoker. She quit back in 1994. Nondrinker. No  history of illicit drug use. The patient does not vape. Occupation is  retired. The patient is , does have children. FAMILY HISTORY:  Noncontributory. REVIEW OF SYSTEMS:  As mentioned in chief complaint, otherwise  unremarkable. PHYSICAL EXAMINATION:  VITAL SIGNS:  On admission, temperature 98.8, pulse 90, respirations 18,  blood pressure 118/79, pulse ox 93% on room air. Height 5 feet 8  inches, weight 185 pounds, BMI is 28.2. GENERAL:  Alert and oriented x3, appears to be in no acute distress. HEENT:  Is unremarkable. HEART:  Regular rate and rhythm. No appreciable murmurs, rubs or  gallops. LUNGS:  Clear to auscultation bilaterally. ABDOMEN:  Soft, nontender, nondistended. Good bowel sounds throughout. No masses, no organomegaly, no bruit.   EXTREMITIES:  No clubbing, cyanosis, edema noted. NEUROLOGICAL:  Intact. No focal deficits. Cranial nerves II to XII  grossly intact. MUSCULOSKELETAL:  Appropriate for above-stated age. LABORATORY DATA:  Metabolic panel complete was unremarkable except for  sugar of 134 and lipase of 444, AST 41. CBC, white count 9.2, platelets  038, H and H 15.5 and 48.3. INR of 1.2. Pending her blood cultures x2  and urine culture. Urinalysis showed trace ketones, moderate blood,  rare bacteria. IMPRESSION:  1. Acute pancreatitis with an elevated lipase. 2.  Abdominal pain secondary to #1. 3.  Hypothyroidism. 4.  Depression. 5.  Hyperlipidemia. 6.  Asthma. 7.  Chronic atrial fib. PLAN:  The patient will be admitted under my service to a telemetry  floor with Dr. Brayan Ruiz who consulted for acute pancreatitis. We  will make her n.p.o.  Start IV fluids. We will order an MRCP to  evaluate biliary tree and liver ducts as well. Await further  recommendations per consultants and treat accordingly.         Wnedie Mcclure DO    D: 06/17/2019 10:17:44       T: 06/17/2019 10:21:30     [email or web address]  Job#: [id number]     Doc#: [id number]    CC:

## 2019-06-17 NOTE — ED NOTES
This nurse entered room to do hourly rounding, patient asleep with no s/s of distress, respirations easy, even and unlabored, spox 88% on room air, 3L nc applied, patient up to 96-97%.  Will continue to monitor     Adri Tellez RN  06/16/19 [id number]

## 2019-06-17 NOTE — ED PROVIDER NOTES
Conjunctivae and EOM are normal. Right eye exhibits no discharge. Left eye exhibits no discharge. Sunken appearance of the eyes bilaterally. Pupils 3 mm reactive bilaterally. Neck: Normal range of motion. Neck supple. No JVD present. No tracheal deviation present. No thyromegaly present. Cardiovascular: Normal rate, regular rhythm, normal heart sounds and intact distal pulses. Exam reveals no gallop and no friction rub. No murmur heard. Pulmonary/Chest: Effort normal and breath sounds normal. No respiratory distress. She has no wheezes. She has no rales. Lungs clear bilaterally. Abdominal: Soft. Bowel sounds are normal. She exhibits no distension. There is no tenderness. There is no rebound and no guarding. Semination of the abdomen demonstrates left lower quadrant tenderness to palpation with voluntary guarding, no rebound. Bowel sounds are normoactive. Musculoskeletal: Normal range of motion. She exhibits no edema. Lymphadenopathy:     She has no cervical adenopathy. Neurological: She is alert and oriented to person, place, and time. Skin: Skin is warm and dry. She is not diaphoretic. Psychiatric: She has a normal mood and affect. Her behavior is normal. Judgment and thought content normal.   Vitals reviewed. Procedures    MDM  Number of Diagnoses or Management Options  Acute pancreatitis, unspecified complication status, unspecified pancreatitis type:   Nausea and vomiting, intractability of vomiting not specified, unspecified vomiting type:   Diagnosis management comments: 70-year-old female with the above-stated history, signs and symptoms. During the patient's emergency department stay she was treated supportively and stated much better after receiving pain and nausea medication. Review of laboratory studies demonstrated findings consistent with acute pancreatitis. Plan is for admission.   Case was discussed with the patient's internal medicine Dr. Mary Chávez who is agreeable to plan and general surgeon Jose Hernandez consulted at this time. Patient will remain n.p.o. with IV fluids and pain control at this time. I have discussed the results of the workup as well as the plan with the patient and family who indicate understanding and agreement with the plan. All questions answered at this time. --------------------------------------------- PAST HISTORY ---------------------------------------------  Past Medical History:  has a past medical history of Asthma, Depression, Hyperlipidemia, Nausea & vomiting, and Thyroid disease. Past Surgical History:  has a past surgical history that includes Tonsillectomy; Hysterectomy; Thyroidectomy (17 YEARS AGO); Appendectomy (58 YEARS AGO); eye surgery; Neck surgery (9/9/2011); and Cholecystectomy. Social History:  reports that she quit smoking about 24 years ago. Her smoking use included cigarettes. She has never used smokeless tobacco. She reports that she does not drink alcohol or use drugs. Family History: family history is not on file. The patients home medications have been reviewed. Allergies: Codeine; Codimal-a [brompheniramine]; Lipitor; Spiriva handihaler [tiotropium bromide monohydrate];  Tudorza pressair [aclidinium bromide]; and Ciprofloxacin    -------------------------------------------------- RESULTS -------------------------------------------------    LABS:  Results for orders placed or performed during the hospital encounter of 06/16/19   CBC auto differential   Result Value Ref Range    WBC 9.2 4.5 - 11.5 E9/L    RBC 5.45 3.50 - 5.50 E12/L    Hemoglobin 15.5 11.5 - 15.5 g/dL    Hematocrit 48.3 (H) 34.0 - 48.0 %    MCV 88.6 80.0 - 99.9 fL    MCH 28.4 26.0 - 35.0 pg    MCHC 32.1 32.0 - 34.5 %    RDW 14.4 11.5 - 15.0 fL    Platelets 858 686 - 805 E9/L    MPV NOT CALC 7.0 - 12.0 fL    Neutrophils % 88.2 (H) 43.0 - 80.0 %    Immature Granulocytes % 0.3 0.0 - 5.0 %    Lymphocytes % 4.4 (L) 20.0 - 42.0 % Monocytes % 6.2 2.0 - 12.0 %    Eosinophils % 0.7 0.0 - 6.0 %    Basophils % 0.2 0.0 - 2.0 %    Neutrophils # 8.13 (H) 1.80 - 7.30 E9/L    Immature Granulocytes # 0.03 E9/L    Lymphocytes # 0.41 (L) 1.50 - 4.00 E9/L    Monocytes # 0.57 0.10 - 0.95 E9/L    Eosinophils # 0.06 0.05 - 0.50 E9/L    Basophils # 0.02 0.00 - 0.20 E9/L   Comprehensive Metabolic Panel   Result Value Ref Range    Sodium 137 132 - 146 mmol/L    Potassium 4.4 3.5 - 5.0 mmol/L    Chloride 102 98 - 107 mmol/L    CO2 24 22 - 29 mmol/L    Anion Gap 11 7 - 16 mmol/L    Glucose 134 (H) 74 - 99 mg/dL    BUN 16 8 - 23 mg/dL    CREATININE 0.9 0.5 - 1.0 mg/dL    GFR Non-African American 60 >=60 mL/min/1.73    GFR African American >60     Calcium 9.3 8.6 - 10.2 mg/dL    Total Protein 7.4 6.4 - 8.3 g/dL    Alb 4.3 3.5 - 5.2 g/dL    Total Bilirubin 0.6 0.0 - 1.2 mg/dL    Alkaline Phosphatase 75 35 - 104 U/L    ALT 19 0 - 32 U/L    AST 41 (H) 0 - 31 U/L   Lipase   Result Value Ref Range    Lipase 444 (H) 13 - 60 U/L   Urinalysis   Result Value Ref Range    Color, UA Yellow Straw/Yellow    Clarity, UA Clear Clear    Glucose, Ur Negative Negative mg/dL    Bilirubin Urine Negative Negative    Ketones, Urine TRACE (A) Negative mg/dL    Specific Gravity, UA 1.015 1.005 - 1.030    Blood, Urine MODERATE (A) Negative    pH, UA 5.0 5.0 - 9.0    Protein, UA Negative Negative mg/dL    Urobilinogen, Urine 0.2 <2.0 E.U./dL    Nitrite, Urine Negative Negative    Leukocyte Esterase, Urine Negative Negative   Protime-INR   Result Value Ref Range    Protime 13.4 (H) 9.3 - 12.4 sec    INR 1.2    Microscopic Urinalysis   Result Value Ref Range    WBC, UA NONE 0 - 5 /HPF    RBC, UA 0-1 0 - 2 /HPF    Bacteria, UA RARE (A) /HPF       RADIOLOGY:  CT ABDOMEN PELVIS W IV CONTRAST Additional Contrast? None   Final Result      NO ACUTE PATHOLOGY SEEN IN ABDOMEN OR PELVIS       The biliary radicles and the common hepatic duct and bile duct appears   to be prominent.  There is no stone or mass obstructing it. I would   recommend MRCP for further workup. Liquid stool seen throughout the large bowel      Diverticulosis most pronounced in the sigmoid colon with no evidence   of diverticulitis. Moderate-sized hiatal hernia      Coronary artery disease         MRI ABDOMEN W WO CONTRAST MRCP    (Results Pending)           ------------------------- NURSING NOTES AND VITALS REVIEWED ---------------------------  Date / Time Roomed:  6/16/2019  9:00 PM  ED Bed Assignment:  [id number]    The nursing notes within the ED encounter and vital signs as below have been reviewed. Patient Vitals for the past 24 hrs:   BP Temp Temp src Pulse Resp SpO2 Height Weight   06/17/19 0245 -- -- -- -- -- -- 5' 8\" (1.727 m) 185 lb (83.9 kg)   06/17/19 0130 128/76 98 °F (36.7 °C) Temporal 80 18 -- -- --   06/17/19 0053 111/61 98.3 °F (36.8 °C) Oral 89 17 96 % -- --   06/16/19 2105 -- -- -- -- -- -- -- 195 lb (88.5 kg)   06/16/19 2104 118/79 98.8 °F (37.1 °C) -- 90 18 93 % -- --       Oxygen Saturation Interpretation: Normal    ------------------------------------------ PROGRESS NOTES ------------------------------------------      Counseling:  I have spoken with the patient and discussed todays results, in addition to providing specific details for the plan of care and counseling regarding the diagnosis and prognosis. Their questions are answered at this time and they are agreeable with the plan of admission.    --------------------------------- ADDITIONAL PROVIDER NOTES ---------------------------------  This patient's ED course included: a personal history and physicial examination and re-evaluation prior to disposition    This patient has remained hemodynamically stable during their ED course. Diagnosis:  1. Acute pancreatitis, unspecified complication status, unspecified pancreatitis type    2. Nausea and vomiting, intractability of vomiting not specified, unspecified vomiting type    3.  Abdominal pain, left lower quadrant        Disposition:  Patient's disposition: Admit to med/surg floor  Patient's condition is stable.                 Floyd Verma DO  Resident  06/17/19 9567

## 2019-06-17 NOTE — CONSULTS
[facility]  CONSULT NOTE  6/17/2019    Physician Consulted: Dr. Shantel Currie  Reason for Consult: Pancreatitis  Referring Physician: Dr. Temo ENGEL  Ko Kinney is a 80 y.o. female who presents for evaluation of abdominal pain, nausea, vomiting, and diarrhea. She states the profuse diarrhea started approximately 1 AM Sunday and occurred every hour. Later in the morning she developed nausea and vomiting, which she reported as bilious. She denies any hematemesis, coffee-ground emesis, melena, hematochezia. She endorsed some sharp lower abdominal pain, without radiation anywhere. She also endorsed some midthoracic back pain. She she was a previous smoker, however quit approximately 25 years ago. She endorses social alcohol use, without any recent increased in intake. She denies any weight loss, or recent diagnosis of diabetes. She is never had this type of pain before. She has a history of diverticulitis and states this pain feels different. She last had a colonoscopy by Dr. Madi Cid in April 2019, and had 5 polyps removed. Other surgical history includes thyroidectomy for \"polyps\", appendectomy, cholecystectomy, total abdominal hysterectomy. She is on Xarelto for history of A. fib      Past Medical History:   Diagnosis Date    Asthma     Depression     Hyperlipidemia     Nausea & vomiting     Thyroid disease        Past Surgical History:   Procedure Laterality Date    APPENDECTOMY  58 YEARS AGO    CHOLECYSTECTOMY      EYE SURGERY      CATARACT RIGHT AND LEFT    HYSTERECTOMY      NECK SURGERY  9/9/2011    THYROIDECTOMY  17 YEARS AGO    TONSILLECTOMY         Medications Prior to Admission:    Prior to Admission medications    Medication Sig Start Date End Date Taking? Authorizing Provider   budesonide-formoterol (SYMBICORT) 160-4.5 MCG/ACT AERO Inhale 2 puffs into the lungs 2 times daily Rinse mouth after use.  11/12/18  Yes Sierra Alas MD   rivaroxaban (XARELTO) 20 MG TABS tablet Take 1994     Years since quittin.7    Smokeless tobacco: Never Used   Substance Use Topics    Alcohol use: No     Comment: RARE    Drug use: No         Review of Systems   General ROS: negative for - chills, fatigue, fever or weight loss  Hematological and Lymphatic ROS: negative for - fatigue, jaundice or weight loss  Respiratory ROS: no cough, shortness of breath, or wheezing  Cardiovascular ROS: no chest pain or dyspnea on exertion  Gastrointestinal ROS: as above  Genito-Urinary ROS: no dysuria, trouble voiding, or hematuria  Musculoskeletal ROS: negative      PHYSICAL EXAM:    Vitals:    19 0130   BP: 128/76   Pulse: 80   Resp: 18   Temp: 98 °F (36.7 °C)   SpO2:        General Appearance:  awake, alert, oriented, in no acute distress and well developed, well nourished  Skin:  Skin color, texture, turgor normal. No rashes or lesions. Head/face:  NCAT  Eyes:  No gross abnormalities. Lungs:  No increased work of breathing   Heart:  RR  Abdomen:  Soft, ttp lower abdomen LLQ > RLQ  Extremities: Extremities warm to touch, pink, with no edema. LABS:  CBC  Recent Labs     19   WBC 9.2   HGB 15.5   HCT 48.3*        BMP  Recent Labs     19      K 4.4      CO2 24   BUN 16   CREATININE 0.9   CALCIUM 9.3     Liver Function  Recent Labs     19   LIPASE 444*   BILITOT 0.6   AST 41*   ALT 19   ALKPHOS 75   PROT 7.4   LABALBU 4.3     No results for input(s): LACTATE in the last 72 hours. Recent Labs     19   INR 1.2       RADIOLOGY  Ct Abdomen Pelvis W Iv Contrast Additional Contrast? None    Result Date: 2019  Patient MRN: [id number] : 1934 Age:  80 years Gender: Female Order Date: 2019 9:30 PM Exam: CT ABDOMEN PELVIS W IV CONTRAST Number of Images: 547 views Indication:   LLQ pain, fever, eval for diverticular dz  Comparison: Prior CT from 2018 is available Technique:  The sequential axial CT of the scan of the abdomen and pelvis was obtained from base of the diaphragm to the pubic symphysis with multiplanar reformat. The study was obtained with intravenous contrast. Radiation Output: CTDIvol 25.00 (mGy); DLP 1319.90 (mGy-cm) Finding: The lung bases are clear. The heart and pericardium appear normal. There is coronary calcification suggesting of coronary artery disease. There is a moderate-sized hiatal hernia. The liver demonstrates no evidence of parenchymal lesions. There is dilatation of the intrahepatic biliary radicles. The common hepatic duct and the common bile duct appears to be prominent. . The gallbladder is surgically absent with clips in the gallbladder fossa. . The spleen, pancreas and adrenal glands do not show any appreciable abnormality. Both kidneys have normal nephrogram with normal excretion of contrast. There is no stone, mass or hydronephrosis. No evidence of intestinal obstruction is seen. There is liquid stool seen throughout the large bowel. The appendix is not visualized; however, no evidence of appendicitis is seen. No retroperitoneal or mesenteric lymphadenopathy is detected. There is diverticulosis seen within the sigmoid colon with no evidence of diverticulitis. The abdominal aorta and iliac arteries demonstrate mild atherosclerotic changes with calcified plaque with no evidence of aneurysmal dilatation. The osseous structures of the abdomen and pelvis demonstrate osteopenia. There is multilevel osteoarthritic changes of the lumbar spine. . The urinary bladder appears to be normal. There is no mass or debris seen. There is no mass or free fluid seen in the pelvic structure. The GYN structures is not identified suggesting patient is status post hysterectomy. NO ACUTE PATHOLOGY SEEN IN ABDOMEN OR PELVIS The biliary radicles and the common hepatic duct and bile duct appears to be prominent. There is no stone or mass obstructing it. I would recommend MRCP for further workup.  Liquid stool seen throughout the large bowel Diverticulosis most pronounced in the sigmoid colon with no evidence of diverticulitis. Moderate-sized hiatal hernia Coronary artery disease         ASSESSMENT:  80 y.o. female with likely gastroenteritis and viral induced pancreatitis    PLAN:  - okay to start clears after MRI  - decrease IVF to 100 after MRI  - MRI today - will review results  - pain mostly lower abdominal compared to upper abdominal  - ambulate  - suspect this is a viral induced pancreatitis     Minutes of which greater than 50% was spent counseling or coordinating her care. Thank you for the consultation allowing me to take part in Ms. Dixon's care.       Electronically signed by Ashwin Canales MD on 6/17/2019 at 12:42 PM

## 2019-06-17 NOTE — ED NOTES
Bed: 08  Expected date:   Expected time:   Means of arrival:   Comments:  lauren Gutierrez RN  06/16/19 2100

## 2019-06-18 PROBLEM — I25.10 CAD (CORONARY ARTERY DISEASE): Status: ACTIVE | Noted: 2019-06-18

## 2019-06-18 PROBLEM — E55.9 VITAMIN D DEFICIENCY: Status: ACTIVE | Noted: 2019-06-18

## 2019-06-18 LAB — URINE CULTURE, ROUTINE: NORMAL

## 2019-06-18 PROCEDURE — 99232 SBSQ HOSP IP/OBS MODERATE 35: CPT | Performed by: TRANSPLANT SURGERY

## 2019-06-18 PROCEDURE — 6370000000 HC RX 637 (ALT 250 FOR IP): Performed by: STUDENT IN AN ORGANIZED HEALTH CARE EDUCATION/TRAINING PROGRAM

## 2019-06-18 PROCEDURE — 97161 PT EVAL LOW COMPLEX 20 MIN: CPT | Performed by: PHYSICAL THERAPIST

## 2019-06-18 PROCEDURE — 97530 THERAPEUTIC ACTIVITIES: CPT | Performed by: PHYSICAL THERAPIST

## 2019-06-18 PROCEDURE — 1200000000 HC SEMI PRIVATE

## 2019-06-18 PROCEDURE — 2580000003 HC RX 258: Performed by: NEUROMUSCULOSKELETAL MEDICINE & OMM

## 2019-06-18 PROCEDURE — 6370000000 HC RX 637 (ALT 250 FOR IP): Performed by: NEUROMUSCULOSKELETAL MEDICINE & OMM

## 2019-06-18 RX ORDER — SIMETHICONE 80 MG
80 TABLET,CHEWABLE ORAL EVERY 6 HOURS PRN
Status: DISCONTINUED | OUTPATIENT
Start: 2019-06-18 | End: 2019-06-19 | Stop reason: HOSPADM

## 2019-06-18 RX ADMIN — ACETAMINOPHEN 650 MG: 325 TABLET, FILM COATED ORAL at 02:29

## 2019-06-18 RX ADMIN — METOPROLOL SUCCINATE 12.5 MG: 25 TABLET, EXTENDED RELEASE ORAL at 08:56

## 2019-06-18 RX ADMIN — Medication 10 ML: at 06:42

## 2019-06-18 RX ADMIN — SIMETHICONE CHEW TAB 80 MG 80 MG: 80 TABLET ORAL at 21:51

## 2019-06-18 RX ADMIN — Medication 400 UNITS: at 08:55

## 2019-06-18 RX ADMIN — CITALOPRAM 20 MG: 20 TABLET, FILM COATED ORAL at 08:56

## 2019-06-18 RX ADMIN — MOMETASONE FUROATE AND FORMOTEROL FUMARATE DIHYDRATE 2 PUFF: 200; 5 AEROSOL RESPIRATORY (INHALATION) at 08:49

## 2019-06-18 RX ADMIN — ACETAMINOPHEN 650 MG: 325 TABLET, FILM COATED ORAL at 08:43

## 2019-06-18 RX ADMIN — MOMETASONE FUROATE AND FORMOTEROL FUMARATE DIHYDRATE 2 PUFF: 200; 5 AEROSOL RESPIRATORY (INHALATION) at 21:28

## 2019-06-18 RX ADMIN — ACETAMINOPHEN 650 MG: 325 TABLET, FILM COATED ORAL at 21:29

## 2019-06-18 RX ADMIN — Medication 500 MCG: at 08:56

## 2019-06-18 RX ADMIN — RIVAROXABAN 20 MG: 20 TABLET, FILM COATED ORAL at 18:09

## 2019-06-18 RX ADMIN — LEVOTHYROXINE SODIUM 100 MCG: 100 TABLET ORAL at 06:41

## 2019-06-18 RX ADMIN — Medication 2000 UNITS: at 08:55

## 2019-06-18 ASSESSMENT — PAIN DESCRIPTION - LOCATION
LOCATION: BACK;ABDOMEN
LOCATION: ABDOMEN

## 2019-06-18 ASSESSMENT — PAIN DESCRIPTION - ONSET
ONSET: ON-GOING
ONSET: ON-GOING

## 2019-06-18 ASSESSMENT — PAIN DESCRIPTION - PAIN TYPE
TYPE: ACUTE PAIN
TYPE: ACUTE PAIN

## 2019-06-18 ASSESSMENT — PAIN SCALES - GENERAL
PAINLEVEL_OUTOF10: 0
PAINLEVEL_OUTOF10: 0
PAINLEVEL_OUTOF10: 2
PAINLEVEL_OUTOF10: 3
PAINLEVEL_OUTOF10: 4
PAINLEVEL_OUTOF10: 0
PAINLEVEL_OUTOF10: 8

## 2019-06-18 ASSESSMENT — PAIN DESCRIPTION - FREQUENCY
FREQUENCY: CONTINUOUS
FREQUENCY: CONTINUOUS

## 2019-06-18 ASSESSMENT — PAIN DESCRIPTION - ORIENTATION: ORIENTATION: MID;LOWER

## 2019-06-18 ASSESSMENT — PAIN DESCRIPTION - DESCRIPTORS
DESCRIPTORS: DISCOMFORT
DESCRIPTORS: ACHING;SORE;CRAMPING

## 2019-06-18 ASSESSMENT — PAIN DESCRIPTION - PROGRESSION: CLINICAL_PROGRESSION: GRADUALLY WORSENING

## 2019-06-18 ASSESSMENT — PAIN - FUNCTIONAL ASSESSMENT: PAIN_FUNCTIONAL_ASSESSMENT: ACTIVITIES ARE NOT PREVENTED

## 2019-06-18 NOTE — PROGRESS NOTES
Hepatobiliary and Pancreatic Surgery Progress Note    CC: diarrhea    Subjective: Patient states that her diarrhea is resolving and her lower abdominal pain is markedly improved. Now mostly has back pain from the MRI. OBJECTIVE      Physical    VITALS:  BP (!) 142/68   Pulse 80   Temp 98.6 °F (37 °C) (Temporal)   Resp 16   Ht 5' 8\" (1.727 m)   Wt 185 lb (83.9 kg)   SpO2 92%   BMI 28.13 kg/m²     General appearance: appears in no acute distress  Lungs:CTABL  Heart: RRR  Abdomen: soft, nondistended, nontympanic, no guarding, no peritoneal signs, normoactive bowel sounds  Extremities:no peripheral edema    ASSESSMENT: likely viral gastroenteritis and viral pancreatitis    PLAN:    - I reviewed her MRI and I agree with the read. No clear evidence of a pancreatic mass  - clear liquid diet and advance diet as tolerated  - for completeness sake, will plan for an EUS in 4 - 6 weeks for a definitive answer    Thank you for the consultation and allowing me to take part in Ms. Tanner's care.       Bishop Dean 6/18/2019 7:38 AM

## 2019-06-18 NOTE — CARE COORDINATION
Patient admitted for Acute pancreatitis. Per GI note today, likely viral gastroenteritis and viral pancreatitis. Patient now on IVFS and a full liquid diet, advance as tolerated. Plan is for an EUS in 4 - 6 weeks op. Cm/Sw following for any discharge needs.   Marvel Hernandez RN CM

## 2019-06-18 NOTE — PROGRESS NOTES
General Progress Note  6/18/2019 9:26 AM  Subjective:   Admit Date: 6/16/2019  PCP: Estiven Patel DO  Interval History: no acute issues overnight. Abdominal pain is improving and diarrhea is clearing up. MRI of abdomen/pelvis reviewed. Diet: DIET CLEAR LIQUID;  Pain is:None  Nausea:Mild  Bowel Movement/Flatus yes    Data:   Scheduled Meds:   sodium chloride flush  10 mL Intravenous 2 times per day    vitamin B-12  500 mcg Oral Daily    vitamin D  2,000 Units Oral Daily    citalopram  20 mg Oral Daily    levothyroxine  100 mcg Oral Daily    metoprolol succinate  12.5 mg Oral Daily    rivaroxaban  20 mg Oral Daily    vitamin E  400 Units Oral Daily    mometasone-formoterol  2 puff Inhalation BID     Continuous Infusions:   lactated ringers 50 mL/hr at 06/18/19 0753     PRN Meds:sodium chloride flush, acetaminophen, albuterol sulfate HFA  I/O last 3 completed shifts: In: 988 [P.O.:200; I.V.:788]  Out: -   No intake/output data recorded.     Intake/Output Summary (Last 24 hours) at 6/18/2019 0926  Last data filed at 6/18/2019 0503  Gross per 24 hour   Intake 988 ml   Output --   Net 988 ml       CBC with Differential:    Lab Results   Component Value Date    WBC 5.8 06/17/2019    RBC 5.22 06/17/2019    HGB 14.6 06/17/2019    HCT 46.9 06/17/2019     06/17/2019    MCV 89.8 06/17/2019    MCH 28.0 06/17/2019    MCHC 31.1 06/17/2019    RDW 14.2 06/17/2019    LYMPHOPCT 4.4 06/16/2019    MONOPCT 6.2 06/16/2019    BASOPCT 0.2 06/16/2019    MONOSABS 0.57 06/16/2019    LYMPHSABS 0.41 06/16/2019    EOSABS 0.06 06/16/2019    BASOSABS 0.02 06/16/2019     CMP:    Lab Results   Component Value Date     06/17/2019    K 3.7 06/17/2019     06/17/2019    CO2 25 06/17/2019    BUN 13 06/17/2019    CREATININE 0.8 06/17/2019    GFRAA >60 06/17/2019    LABGLOM >60 06/17/2019    GLUCOSE 120 06/17/2019    PROT 6.4 06/17/2019    LABALBU 3.9 06/17/2019    CALCIUM 9.1 06/17/2019    BILITOT 0.4 06/17/2019 ALKPHOS 67 06/17/2019    AST 26 06/17/2019    ALT 18 06/17/2019     Magnesium:    Lab Results   Component Value Date    MG 1.6 08/27/2018     Phosphorus:  No results found for: PHOS  PT/INR:    Lab Results   Component Value Date    PROTIME 13.4 06/16/2019    INR 1.2 06/16/2019     Last 3 Troponin:    Lab Results   Component Value Date    TROPONINI <0.01 08/27/2018    TROPONINI <0.01 08/27/2018    TROPONINI <0.01 07/29/2018     U/A:    Lab Results   Component Value Date    COLORU Yellow 06/16/2019    PHUR 5.0 06/16/2019    WBCUA NONE 06/16/2019    RBCUA 0-1 06/16/2019    BACTERIA RARE 06/16/2019    CLARITYU Clear 06/16/2019    SPECGRAV 1.015 06/16/2019    LEUKOCYTESUR Negative 06/16/2019    UROBILINOGEN 0.2 06/16/2019    BILIRUBINUR Negative 06/16/2019    BLOODU MODERATE 06/16/2019    GLUCOSEU Negative 06/16/2019    AMORPHOUS FEW 07/29/2018     HgBA1c:  No components found for: HGBA1C  TSH:    Lab Results   Component Value Date    TSH 4.430 08/27/2018     -----------------------------------------------------------------    Objective:   Vitals: /80   Pulse 72   Temp 97.9 °F (36.6 °C) (Temporal)   Resp 16   Ht 5' 8\" (1.727 m)   Wt 185 lb (83.9 kg)   SpO2 95%   BMI 28.13 kg/m²   General appearance: alert, appears stated age and cooperative  Skin: Skin color, texture, turgor normal. No rashes or lesions  HEENT: Head: Normal, normocephalic, atraumatic.   Neck: no adenopathy, no carotid bruit, no JVD, supple, symmetrical, trachea midline and thyroid not enlarged, symmetric, no tenderness/mass/nodules  Lungs: clear to auscultation bilaterally  Heart: regular rate and rhythm, S1, S2 normal, no murmur, click, rub or gallop  Abdomen: soft, non-tender; bowel sounds normal; no masses,  no organomegaly  Extremities: extremities normal, atraumatic, no cyanosis or edema  Neurologic: Mental status: Alert, oriented, thought content appropriate        MRI ABDOMEN W WO CONTRAST MRCP [[id number]] Resulted: 06/17/19 2240 Updated: 19    Narrative:     Patient MRN:  [id number]  : 1934  Age: 80 years  Gender: Female    Order Date:  2019 3:45 AM    EXAM: MRI ABDOMEN W WO CONTRAST MRCP    COMPARISON: Correlation made with CT performed 2019    INDICATION:  r/o pancreatic mass, pancreatitis      FINDINGS:    16 mL ProHance utilized. No evidence of pancreatic mass. Pancreatic duct measures 2 mm in  diameter. No abnormal pancreatic duct side branching. The common bile  duct is nondilated. Common hepatic duct measures 7 mm in diameter. Common bile duct measures 5 mm in diameter. There is evidence of  cholecystectomy. There is mild intrahepatic bile duct dilatation. No  evidence of bile duct stricture or choledocholithiasis. No evidence of  hepatic mass. No ascites. Spleen is nonenlarged. No hydronephrosis. View of lung bases shows no evidence of pneumonia. Impression:       1. Minimal nonspecific prominence of intrahepatic bile ducts, however  no evidence of stricture at level of common hepatic duct or common  bile duct. No evidence of choledocholithiasis. 2. No evidence of acute pancreatitis or evidence of pancreatic mass. Assessment:   Principal Problem:    Idiopathic acute pancreatitis without infection or necrosis  Active Problems:    Persistent atrial fibrillation (HCC)    Hyperlipidemia    Depression    Asthma    CAD (coronary artery disease)    Vitamin D deficiency  Resolved Problems:    * No resolved hospital problems. *    Plan:   1. Advance diet as tolerated. 2. Discharge soon.     Maxwell Ramirez D.O.  9:26 AM  2019

## 2019-06-18 NOTE — PROGRESS NOTES
[facility] MED SURG ONC  Physical Therapy Initial Evaluation    Name: Torres Romero  : 1934  MRN: [id number]    Date of Service: 2019        Evaluating Therapist: Lashaun Alexander PT, DPT       Equipment Recommendations: no needs - pt has cane. Room #: 6898/7352-E  DIAGNOSIS: idiopathic acute pancreatitis   PRECAUTIONS: fall risk     Social:  Pt lives with daughter in a 1 floor plan no  steps and no rails to enter. Prior to admission pt walked with cane PRN     Initial Evaluation  Date:  Treatment      Short Term/ Long Term   Goals   Was pt agreeable to Eval/treatment? yes     Does pt have pain? No c/o pain     Bed Mobility  Rolling: independent  Supine to sit: independent  Sit to supine: NT  Scooting: NT  independent   Transfers Sit to stand: Supervision  Stand to sit: supervision  Stand pivot: SBA  independent   Ambulation    300 feet with no device with SBA - see below. 300+ feet with no device vs cane with independent/modified independent   Stair negotiation: ascended and descended  NT  NT   AM-PAC Raw Score 20/24       Pt is alert and Oriented x3  ROM:  L LE grossly WFL  R LE grossly wFL  Strength:   L LE grossly 4+/5  R LE grossly 4+/5  Balance: standing SBA  Sensation: no c/o numbness/tingling  Endurance: fair +       ASSESSMENT  Pt displays functional ability as noted in the objective portion of this evaluation. Comments/Treatment:  Pt found laying in bed agreeable to evaluation. Pt instructed in mobility. Approximately first 1/2 of ambulation, pt staggers/unsteady and occasionally reaches for wall for support. Pt reports at times she staggers at home as well. As pt ambulates further this improved during session. Pt left sitting side of bed with call button in reach end of evaluation. Patient education  Pt educated on mobility.     Patient response to education:   Pt verbalized understanding Pt demonstrated skill Pt requires further education in this area   x  x     Rehab potential is Good for reaching above PT goals. Pts/ family goals   1. None stated. Patient and or family understand(s) diagnosis, prognosis, and plan of care. PLAN  PT care will be provided in accordance with the objectives noted above. Whenever appropriate, clear delegation orders will be provided for nursing staff. Exercises and functional mobility practice will be used as well as appropriate assistive devices or modalities to obtain goals. Patient and family education will also be administered as needed. Frequency of treatments will be 2-5x/week x 2-4 days.     Time in: 1521  Time out: 1536  Evaluation + 8  minutes tx time    Michelle Way PT, DPT   License number:  [id number]

## 2019-06-19 VITALS
TEMPERATURE: 97.5 F | BODY MASS INDEX: 28.04 KG/M2 | HEIGHT: 68 IN | SYSTOLIC BLOOD PRESSURE: 148 MMHG | WEIGHT: 185 LBS | OXYGEN SATURATION: 96 % | HEART RATE: 60 BPM | DIASTOLIC BLOOD PRESSURE: 62 MMHG | RESPIRATION RATE: 18 BRPM

## 2019-06-19 PROCEDURE — 6370000000 HC RX 637 (ALT 250 FOR IP): Performed by: NEUROMUSCULOSKELETAL MEDICINE & OMM

## 2019-06-19 PROCEDURE — 97165 OT EVAL LOW COMPLEX 30 MIN: CPT

## 2019-06-19 RX ADMIN — MOMETASONE FUROATE AND FORMOTEROL FUMARATE DIHYDRATE 2 PUFF: 200; 5 AEROSOL RESPIRATORY (INHALATION) at 08:15

## 2019-06-19 RX ADMIN — Medication 2000 UNITS: at 08:15

## 2019-06-19 RX ADMIN — Medication 500 MCG: at 08:16

## 2019-06-19 RX ADMIN — Medication 400 UNITS: at 08:15

## 2019-06-19 RX ADMIN — CITALOPRAM 20 MG: 20 TABLET, FILM COATED ORAL at 08:16

## 2019-06-19 RX ADMIN — METOPROLOL SUCCINATE 12.5 MG: 25 TABLET, EXTENDED RELEASE ORAL at 08:18

## 2019-06-19 RX ADMIN — LEVOTHYROXINE SODIUM 100 MCG: 100 TABLET ORAL at 06:17

## 2019-06-19 ASSESSMENT — PAIN SCALES - GENERAL
PAINLEVEL_OUTOF10: 0

## 2019-06-19 ASSESSMENT — PAIN DESCRIPTION - PROGRESSION: CLINICAL_PROGRESSION: GRADUALLY WORSENING

## 2019-06-19 NOTE — CARE COORDINATION
6/19/2019 social work:discharge planning   Initial assessment done with patient. Patient states she is independent and lives by herself. Patient has no hhc or dme. Her dr is dr Bandar Michelle. Discussed sw role and discharge plan/transtion of care. Plan is home with no needs via her dtr. She startes her dtr will be here soon to pick her up.

## 2019-06-19 NOTE — PROGRESS NOTES
WFL  Fine Motor Coordination:  B WFL    Hearing: WFL  Sensation:  No c/o numbness or tingling  Tone:  WFL  Edema: None noted                            Comments/Treatment: Upon arrival, patient in bed. Pt demonstrated indep with ADLs/functional mobility. Rec. D/c OT. Osmel Dominguez At end of session, patient in bed with call light and phone within reach, all lines and tubes intact. Eval Complexity: Low    Assessment of current deficits   Functional mobility []  ADLs [] Strength []  Cognition []  Functional transfers  [] IADLs [] Safety Awareness []  Endurance []  Fine Motor Coordination [] Balance [] Vision/perception [] Sensation []   Gross Motor Coordination [] ROM [] Delirium []                  Motor Control []    Evaluation time includes thorough review of current medical information, gathering information on past medical & social history & PLOF, completion of standardized testing, informal observation of tasks, consultation with other medical professions/disciplines, assessment of data & development of POC/goals.      Low Evaluation + 0 timed treatment minutes  Treatment Time in: 11:00  Treatment Time out: 11:15      Danyell Kirk OTR/L #[id number]

## 2019-06-19 NOTE — PROGRESS NOTES
General Progress Note  6/19/2019 12:07 PM  Subjective:   Admit Date: 6/16/2019  PCP: Chanelle Patel DO  Interval History: tolerating her diet pretty well. No acute issues overnight. Diet: DIET GENERAL;  Pain is:None  Nausea:None  Bowel Movement/Flatus yes    Data:   Scheduled Meds:   sodium chloride flush  10 mL Intravenous 2 times per day    vitamin B-12  500 mcg Oral Daily    vitamin D  2,000 Units Oral Daily    citalopram  20 mg Oral Daily    levothyroxine  100 mcg Oral Daily    metoprolol succinate  12.5 mg Oral Daily    rivaroxaban  20 mg Oral Daily    vitamin E  400 Units Oral Daily    mometasone-formoterol  2 puff Inhalation BID     Continuous Infusions:   lactated ringers 50 mL/hr at 06/18/19 0753     PRN Meds:simethicone, sodium chloride flush, acetaminophen, albuterol sulfate HFA  I/O last 3 completed shifts: In: 360 [P.O.:360]  Out: -   No intake/output data recorded.     Intake/Output Summary (Last 24 hours) at 6/19/2019 1207  Last data filed at 6/18/2019 2141  Gross per 24 hour   Intake 360 ml   Output --   Net 360 ml       CBC with Differential:    Lab Results   Component Value Date    WBC 5.8 06/17/2019    RBC 5.22 06/17/2019    HGB 14.6 06/17/2019    HCT 46.9 06/17/2019     06/17/2019    MCV 89.8 06/17/2019    MCH 28.0 06/17/2019    MCHC 31.1 06/17/2019    RDW 14.2 06/17/2019    LYMPHOPCT 4.4 06/16/2019    MONOPCT 6.2 06/16/2019    BASOPCT 0.2 06/16/2019    MONOSABS 0.57 06/16/2019    LYMPHSABS 0.41 06/16/2019    EOSABS 0.06 06/16/2019    BASOSABS 0.02 06/16/2019     CMP:    Lab Results   Component Value Date     06/17/2019    K 3.7 06/17/2019     06/17/2019    CO2 25 06/17/2019    BUN 13 06/17/2019    CREATININE 0.8 06/17/2019    GFRAA >60 06/17/2019    LABGLOM >60 06/17/2019    GLUCOSE 120 06/17/2019    PROT 6.4 06/17/2019    LABALBU 3.9 06/17/2019    CALCIUM 9.1 06/17/2019    BILITOT 0.4 06/17/2019    ALKPHOS 67 06/17/2019    AST 26 06/17/2019    ALT 18 06/17/2019     Magnesium:    Lab Results   Component Value Date    MG 1.6 08/27/2018     Phosphorus:  No results found for: PHOS  PT/INR:    Lab Results   Component Value Date    PROTIME 13.4 06/16/2019    INR 1.2 06/16/2019     Last 3 Troponin:    Lab Results   Component Value Date    TROPONINI <0.01 08/27/2018    TROPONINI <0.01 08/27/2018    TROPONINI <0.01 07/29/2018     U/A:    Lab Results   Component Value Date    COLORU Yellow 06/16/2019    PHUR 5.0 06/16/2019    WBCUA NONE 06/16/2019    RBCUA 0-1 06/16/2019    BACTERIA RARE 06/16/2019    CLARITYU Clear 06/16/2019    SPECGRAV 1.015 06/16/2019    LEUKOCYTESUR Negative 06/16/2019    UROBILINOGEN 0.2 06/16/2019    BILIRUBINUR Negative 06/16/2019    BLOODU MODERATE 06/16/2019    GLUCOSEU Negative 06/16/2019    AMORPHOUS FEW 07/29/2018     HgBA1c:  No components found for: HGBA1C  TSH:    Lab Results   Component Value Date    TSH 4.430 08/27/2018     -----------------------------------------------------------------    Objective:   Vitals: BP (!) 148/62 Comment: manual  Pulse 60   Temp 97.5 °F (36.4 °C) (Temporal)   Resp 18   Ht 5' 8\" (1.727 m)   Wt 185 lb (83.9 kg)   SpO2 96%   BMI 28.13 kg/m²   General appearance: alert, appears stated age and cooperative  Skin: Skin color, texture, turgor normal. No rashes or lesions  HEENT: Head: Normal, normocephalic, atraumatic.   Neck: no adenopathy, no carotid bruit, no JVD, supple, symmetrical, trachea midline and thyroid not enlarged, symmetric, no tenderness/mass/nodules  Lungs: clear to auscultation bilaterally  Heart: regular rate and rhythm, S1, S2 normal, no murmur, click, rub or gallop  Abdomen: soft, non-tender; bowel sounds normal; no masses,  no organomegaly  Extremities: extremities normal, atraumatic, no cyanosis or edema  Neurologic: Mental status: Alert, oriented, thought content appropriate    Assessment:   Principal Problem:    Idiopathic acute pancreatitis without infection or necrosis  Active Problems:    Persistent atrial fibrillation (HCC)    Hyperlipidemia    Depression    Asthma    CAD (coronary artery disease)    Vitamin D deficiency  Resolved Problems:    * No resolved hospital problems. *    Plan:   1. Discharge to home today.     Galilea Valente D.O.  69:70 PM  6/19/2019

## 2019-06-19 NOTE — PLAN OF CARE
Problem: Falls - Risk of:  Goal: Will remain free from falls  Description  Will remain free from falls  6/19/2019 1103 by Saroj Lopez RN  Outcome: Met This Shift     Problem: Falls - Risk of:  Goal: Absence of physical injury  Description  Absence of physical injury  6/19/2019 1103 by Saroj Lopez RN  Outcome: Met This Shift

## 2019-06-19 NOTE — PLAN OF CARE
Problem: Falls - Risk of:  Goal: Will remain free from falls  Description  Will remain free from falls  6/19/2019 0350 by Shivani Agudelo RN  Outcome: Met This Shift  6/18/2019 1839 by Tammy Juarez RN  Outcome: Met This Shift  Goal: Absence of physical injury  Description  Absence of physical injury  6/19/2019 0350 by Shivani Agudelo RN  Outcome: Met This Shift  6/18/2019 1839 by Tammy Juarez RN  Outcome: Met This Shift

## 2019-06-21 LAB
BLOOD CULTURE, ROUTINE: NORMAL
CULTURE, BLOOD 2: NORMAL

## 2019-06-26 NOTE — DISCHARGE SUMMARY
Impression       1. Minimal nonspecific prominence of intrahepatic bile ducts, however   no evidence of stricture at level of common hepatic duct or common   bile duct. No evidence of choledocholithiasis.       2.  No evidence of acute pancreatitis or evidence of pancreatic mass.                 Discharge Exam:  BP (!) 148/62 Comment: manual  Pulse 60   Temp 97.5 °F (36.4 °C) (Temporal)   Resp 18   Ht 5' 8\" (1.727 m)   Wt 185 lb (83.9 kg)   SpO2 96%   BMI 28.13 kg/m²     General Appearance:    Alert, cooperative, no distress, appears stated age   Head:    Normocephalic, without obvious abnormality, atraumatic   Eyes:    PERRL, conjunctiva/corneas clear, EOM's intact, fundi     benign, both eyes   Ears:    Normal TM's and external ear canals, both ears   Nose:   Nares normal, septum midline, mucosa normal, no drainage    or sinus tenderness   Throat:   Lips, mucosa, and tongue normal; teeth and gums normal   Neck:   Supple, symmetrical, trachea midline, no adenopathy;     thyroid:  no enlargement/tenderness/nodules; no carotid    bruit or JVD   Back:     Symmetric, no curvature, ROM normal, no CVA tenderness   Lungs:     Clear to auscultation bilaterally, respirations unlabored   Chest Wall:    No tenderness or deformity    Heart:    Regular rate and rhythm, S1 and S2 normal, no murmur, rub   or gallop   Breast Exam:    No tenderness, masses, or nipple abnormality   Abdomen:     Soft, non-tender, bowel sounds active all four quadrants,     no masses, no organomegaly   Genitalia:    Normal female without lesion, discharge or tenderness   Rectal:    Normal tone ;guaiac negative stool   Extremities:   Extremities normal, atraumatic, no cyanosis or edema   Pulses:   2+ and symmetric all extremities   Skin:   Skin color, texture, turgor normal, no rashes or lesions   Lymph nodes:   Cervical, supraclavicular, and axillary nodes normal   Neurologic:   CNII-XII intact, normal strength, sensation and reflexes throughout       Disposition:   home       Medication List      CHANGE how you take these medications    metoprolol succinate 25 MG extended release tablet  Commonly known as:  TOPROL XL  Take 1 tablet by mouth daily  What changed:  how much to take        CONTINUE taking these medications    albuterol sulfate  (90 Base) MCG/ACT inhaler  Commonly known as:  PROVENTIL HFA  Inhale 2 puffs into the lungs every 4 hours as needed for Wheezing or Shortness of Breath. aspirin 81 MG tablet     Biotin 5000 MCG Caps     budesonide 90 MCG/ACT Aepb inhaler  Commonly known as:  PULMICORT     budesonide-formoterol 160-4.5 MCG/ACT Aero  Commonly known as:  SYMBICORT  Inhale 2 puffs into the lungs 2 times daily Rinse mouth after use. citalopram 20 MG tablet  Commonly known as:  CELEXA     levothyroxine 100 MCG tablet  Commonly known as:  SYNTHROID     * rivaroxaban 20 MG Tabs tablet  Commonly known as:  XARELTO  Take 1 tablet by mouth daily     * rivaroxaban 20 MG Tabs tablet  Commonly known as:  XARELTO  Take 1 tablet by mouth daily (with breakfast) for 1 day LOT#[id number]  EXP 4/21     simvastatin 40 MG tablet  Commonly known as:  ZOCOR     vitamin B-12 100 MCG tablet  Commonly known as:  CYANOCOBALAMIN     Vitamin D3 2000 units Tabs     Vitamin E 400 units Tabs         * This list has 2 medication(s) that are the same as other medications prescribed for you. Read the directions carefully, and ask your doctor or other care provider to review them with you.                     Signed:  Tamara Zarate D.O.  6/26/2019, 9:51 AM

## 2019-07-15 ENCOUNTER — TELEPHONE (OUTPATIENT)
Dept: CARDIOLOGY CLINIC | Age: 84
End: 2019-07-15

## 2019-07-24 ENCOUNTER — TELEPHONE (OUTPATIENT)
Dept: CARDIOLOGY CLINIC | Age: 84
End: 2019-07-24

## 2019-08-16 ENCOUNTER — TELEPHONE (OUTPATIENT)
Dept: CARDIOLOGY CLINIC | Age: 84
End: 2019-08-16

## 2019-09-10 ENCOUNTER — HOSPITAL ENCOUNTER (EMERGENCY)
Age: 84
Discharge: HOME | End: 2019-09-10
Attending: EMERGENCY MEDICINE
Payer: MEDICARE

## 2019-09-10 ENCOUNTER — APPOINTMENT (OUTPATIENT)
Dept: GENERAL RADIOLOGY | Age: 84
End: 2019-09-10
Payer: MEDICARE

## 2019-09-10 ENCOUNTER — TELEPHONE (OUTPATIENT)
Dept: CARDIOLOGY CLINIC | Age: 84
End: 2019-09-10

## 2019-09-10 VITALS
HEART RATE: 115 BPM | RESPIRATION RATE: 15 BRPM | BODY MASS INDEX: 28.04 KG/M2 | WEIGHT: 185 LBS | OXYGEN SATURATION: 94 % | SYSTOLIC BLOOD PRESSURE: 140 MMHG | TEMPERATURE: 98.7 F | HEIGHT: 68 IN | DIASTOLIC BLOOD PRESSURE: 99 MMHG

## 2019-09-10 DIAGNOSIS — R06.09 DYSPNEA ON EXERTION: Primary | ICD-10-CM

## 2019-09-10 DIAGNOSIS — I48.20 CHRONIC ATRIAL FIBRILLATION (HCC): ICD-10-CM

## 2019-09-10 LAB
ALBUMIN SERPL-MCNC: 4.2 G/DL (ref 3.5–5.2)
ALP BLD-CCNC: 62 U/L (ref 35–104)
ALT SERPL-CCNC: 20 U/L (ref 0–32)
ANION GAP SERPL CALCULATED.3IONS-SCNC: 10 MMOL/L (ref 7–16)
AST SERPL-CCNC: 25 U/L (ref 0–31)
BASOPHILS ABSOLUTE: 0.04 E9/L (ref 0–0.2)
BASOPHILS RELATIVE PERCENT: 0.5 % (ref 0–2)
BILIRUB SERPL-MCNC: 0.9 MG/DL (ref 0–1.2)
BUN BLDV-MCNC: 19 MG/DL (ref 8–23)
CALCIUM SERPL-MCNC: 10.1 MG/DL (ref 8.6–10.2)
CHLORIDE BLD-SCNC: 104 MMOL/L (ref 98–107)
CO2: 29 MMOL/L (ref 22–29)
CREAT SERPL-MCNC: 0.9 MG/DL (ref 0.5–1)
EKG ATRIAL RATE: 312 BPM
EKG Q-T INTERVAL: 364 MS
EKG QRS DURATION: 66 MS
EKG QTC CALCULATION (BAZETT): 495 MS
EKG R AXIS: 15 DEGREES
EKG T AXIS: 90 DEGREES
EKG VENTRICULAR RATE: 111 BPM
EOSINOPHILS ABSOLUTE: 0.07 E9/L (ref 0.05–0.5)
EOSINOPHILS RELATIVE PERCENT: 0.8 % (ref 0–6)
GFR AFRICAN AMERICAN: >60
GFR NON-AFRICAN AMERICAN: 59 ML/MIN/1.73
GLUCOSE BLD-MCNC: 106 MG/DL (ref 74–99)
HCT VFR BLD CALC: 48.7 % (ref 34–48)
HEMOGLOBIN: 15.4 G/DL (ref 11.5–15.5)
IMMATURE GRANULOCYTES #: 0.03 E9/L
IMMATURE GRANULOCYTES %: 0.3 % (ref 0–5)
LYMPHOCYTES ABSOLUTE: 1.46 E9/L (ref 1.5–4)
LYMPHOCYTES RELATIVE PERCENT: 17 % (ref 20–42)
MAGNESIUM: 1.8 MG/DL (ref 1.6–2.6)
MCH RBC QN AUTO: 28.6 PG (ref 26–35)
MCHC RBC AUTO-ENTMCNC: 31.6 % (ref 32–34.5)
MCV RBC AUTO: 90.4 FL (ref 80–99.9)
MONOCYTES ABSOLUTE: 0.68 E9/L (ref 0.1–0.95)
MONOCYTES RELATIVE PERCENT: 7.9 % (ref 2–12)
NEUTROPHILS ABSOLUTE: 6.33 E9/L (ref 1.8–7.3)
NEUTROPHILS RELATIVE PERCENT: 73.5 % (ref 43–80)
PDW BLD-RTO: 14.5 FL (ref 11.5–15)
PLATELET # BLD: 150 E9/L (ref 130–450)
PMV BLD AUTO: 13.5 FL (ref 7–12)
POTASSIUM SERPL-SCNC: 3.9 MMOL/L (ref 3.5–5)
PRO-BNP: 1742 PG/ML (ref 0–450)
RBC # BLD: 5.39 E12/L (ref 3.5–5.5)
SODIUM BLD-SCNC: 143 MMOL/L (ref 132–146)
TOTAL PROTEIN: 6.7 G/DL (ref 6.4–8.3)
TROPONIN: <0.01 NG/ML (ref 0–0.03)
WBC # BLD: 8.6 E9/L (ref 4.5–11.5)

## 2019-09-10 PROCEDURE — 71046 X-RAY EXAM CHEST 2 VIEWS: CPT

## 2019-09-10 PROCEDURE — 36415 COLL VENOUS BLD VENIPUNCTURE: CPT

## 2019-09-10 PROCEDURE — 84484 ASSAY OF TROPONIN QUANT: CPT

## 2019-09-10 PROCEDURE — 93005 ELECTROCARDIOGRAM TRACING: CPT | Performed by: PHYSICIAN ASSISTANT

## 2019-09-10 PROCEDURE — 85025 COMPLETE CBC W/AUTO DIFF WBC: CPT

## 2019-09-10 PROCEDURE — 80053 COMPREHEN METABOLIC PANEL: CPT

## 2019-09-10 PROCEDURE — 83735 ASSAY OF MAGNESIUM: CPT

## 2019-09-10 PROCEDURE — 93010 ELECTROCARDIOGRAM REPORT: CPT | Performed by: INTERNAL MEDICINE

## 2019-09-10 PROCEDURE — 99285 EMERGENCY DEPT VISIT HI MDM: CPT

## 2019-09-10 PROCEDURE — 83880 ASSAY OF NATRIURETIC PEPTIDE: CPT

## 2019-09-10 NOTE — ED NOTES
Discharge instructions reviewed , including diagnosis, medications, follow up appointments, home care, and also when to call 911. All discharge instructions questions answered.          Pt left ED ambulatory         Leigh Carr RN  09/10/19 [id number]

## 2019-09-10 NOTE — ED PROVIDER NOTES
of 09/10/19   CBC Auto Differential   Result Value Ref Range    WBC 8.6 4.5 - 11.5 E9/L    RBC 5.39 3.50 - 5.50 E12/L    Hemoglobin 15.4 11.5 - 15.5 g/dL    Hematocrit 48.7 (H) 34.0 - 48.0 %    MCV 90.4 80.0 - 99.9 fL    MCH 28.6 26.0 - 35.0 pg    MCHC 31.6 (L) 32.0 - 34.5 %    RDW 14.5 11.5 - 15.0 fL    Platelets 366 312 - 896 E9/L    MPV 13.5 (H) 7.0 - 12.0 fL    Neutrophils % 73.5 43.0 - 80.0 %    Immature Granulocytes % 0.3 0.0 - 5.0 %    Lymphocytes % 17.0 (L) 20.0 - 42.0 %    Monocytes % 7.9 2.0 - 12.0 %    Eosinophils % 0.8 0.0 - 6.0 %    Basophils % 0.5 0.0 - 2.0 %    Neutrophils Absolute 6.33 1.80 - 7.30 E9/L    Immature Granulocytes # 0.03 E9/L    Lymphocytes Absolute 1.46 (L) 1.50 - 4.00 E9/L    Monocytes Absolute 0.68 0.10 - 0.95 E9/L    Eosinophils Absolute 0.07 0.05 - 0.50 E9/L    Basophils Absolute 0.04 0.00 - 0.20 E9/L   Comprehensive Metabolic Panel   Result Value Ref Range    Sodium 143 132 - 146 mmol/L    Potassium 3.9 3.5 - 5.0 mmol/L    Chloride 104 98 - 107 mmol/L    CO2 29 22 - 29 mmol/L    Anion Gap 10 7 - 16 mmol/L    Glucose 106 (H) 74 - 99 mg/dL    BUN 19 8 - 23 mg/dL    CREATININE 0.9 0.5 - 1.0 mg/dL    GFR Non-African American 59 >=60 mL/min/1.73    GFR African American >60     Calcium 10.1 8.6 - 10.2 mg/dL    Total Protein 6.7 6.4 - 8.3 g/dL    Alb 4.2 3.5 - 5.2 g/dL    Total Bilirubin 0.9 0.0 - 1.2 mg/dL    Alkaline Phosphatase 62 35 - 104 U/L    ALT 20 0 - 32 U/L    AST 25 0 - 31 U/L   Troponin   Result Value Ref Range    Troponin <0.01 0.00 - 0.03 ng/mL   Magnesium   Result Value Ref Range    Magnesium 1.8 1.6 - 2.6 mg/dL   Brain Natriuretic Peptide   Result Value Ref Range    Pro-BNP 1,742 (H) 0 - 450 pg/mL       RADIOLOGY:  Interpreted by Radiologist.  XR CHEST STANDARD (2 VW)   ED Interpretation   No infiltrates or CHF. Unchanged from previous.       Final Result   No acute cardiopulmonary abnormality is identified.                         ------------------------- NURSING NOTES

## 2019-09-10 NOTE — TELEPHONE ENCOUNTER
It is very unlikely that it is the metoprolol that is making her tired (only takes 12.5 mg) but the RAPID AFIB that is  But she wont be convinced so don't try  Change her to cardizem cd 180 qd  Get ekg on Monday in our office  If afib still we can cardiovert her later in the week

## 2019-09-11 RX ORDER — DILTIAZEM HYDROCHLORIDE 180 MG/1
180 CAPSULE, COATED, EXTENDED RELEASE ORAL DAILY
Qty: 30 CAPSULE | Refills: 5 | Status: SHIPPED | OUTPATIENT
Start: 2019-09-11 | End: 2019-12-18

## 2019-09-16 ENCOUNTER — NURSE ONLY (OUTPATIENT)
Dept: CARDIOLOGY CLINIC | Age: 84
End: 2019-09-16
Payer: MEDICARE

## 2019-09-16 ENCOUNTER — TELEPHONE (OUTPATIENT)
Dept: CARDIOLOGY CLINIC | Age: 84
End: 2019-09-16

## 2019-09-16 DIAGNOSIS — I48.19 PERSISTENT ATRIAL FIBRILLATION (HCC): Primary | ICD-10-CM

## 2019-09-16 PROCEDURE — 93000 ELECTROCARDIOGRAM COMPLETE: CPT | Performed by: INTERNAL MEDICINE

## 2019-09-16 NOTE — TELEPHONE ENCOUNTER
----- Message from Flor Malcolm MD sent at 9/16/2019  1:53 PM EDT -----  Please set up DCCV this week no CONSTANTINO

## 2019-09-17 ENCOUNTER — TELEPHONE (OUTPATIENT)
Dept: CARDIOLOGY CLINIC | Age: 84
End: 2019-09-17

## 2019-09-20 ENCOUNTER — HOSPITAL ENCOUNTER (OUTPATIENT)
Dept: CARDIAC CATH/INVASIVE PROCEDURES | Age: 84
Discharge: HOME | End: 2019-09-20
Payer: MEDICARE

## 2019-09-20 ENCOUNTER — ANESTHESIA (OUTPATIENT)
Dept: CARDIAC CATH/INVASIVE PROCEDURES | Age: 84
End: 2019-09-20

## 2019-09-20 ENCOUNTER — ANESTHESIA EVENT (OUTPATIENT)
Dept: CARDIAC CATH/INVASIVE PROCEDURES | Age: 84
End: 2019-09-20

## 2019-09-20 ENCOUNTER — TELEPHONE (OUTPATIENT)
Dept: CARDIOLOGY CLINIC | Age: 84
End: 2019-09-20

## 2019-09-20 VITALS
RESPIRATION RATE: 13 BRPM | DIASTOLIC BLOOD PRESSURE: 95 MMHG | OXYGEN SATURATION: 93 % | SYSTOLIC BLOOD PRESSURE: 131 MMHG

## 2019-09-20 VITALS
HEIGHT: 68 IN | WEIGHT: 180 LBS | SYSTOLIC BLOOD PRESSURE: 126 MMHG | BODY MASS INDEX: 27.28 KG/M2 | DIASTOLIC BLOOD PRESSURE: 79 MMHG | HEART RATE: 80 BPM | TEMPERATURE: 97 F | RESPIRATION RATE: 12 BRPM | OXYGEN SATURATION: 98 %

## 2019-09-20 PROCEDURE — 92960 CARDIOVERSION ELECTRIC EXT: CPT | Performed by: INTERNAL MEDICINE

## 2019-09-20 PROCEDURE — 2709999900 HC NON-CHARGEABLE SUPPLY

## 2019-09-20 PROCEDURE — 2580000003 HC RX 258: Performed by: NURSE ANESTHETIST, CERTIFIED REGISTERED

## 2019-09-20 PROCEDURE — 2580000003 HC RX 258: Performed by: INTERNAL MEDICINE

## 2019-09-20 PROCEDURE — 6360000002 HC RX W HCPCS: Performed by: NURSE ANESTHETIST, CERTIFIED REGISTERED

## 2019-09-20 PROCEDURE — 3700000000 HC ANESTHESIA ATTENDED CARE

## 2019-09-20 RX ORDER — SODIUM CHLORIDE 9 MG/ML
INJECTION, SOLUTION INTRAVENOUS CONTINUOUS PRN
Status: DISCONTINUED | OUTPATIENT
Start: 2019-09-20 | End: 2019-09-20 | Stop reason: SDUPTHER

## 2019-09-20 RX ORDER — SODIUM CHLORIDE 9 MG/ML
INJECTION, SOLUTION INTRAVENOUS ONCE
Status: COMPLETED | OUTPATIENT
Start: 2019-09-20 | End: 2019-09-20

## 2019-09-20 RX ORDER — PROPOFOL 10 MG/ML
INJECTION, EMULSION INTRAVENOUS PRN
Status: DISCONTINUED | OUTPATIENT
Start: 2019-09-20 | End: 2019-09-20 | Stop reason: SDUPTHER

## 2019-09-20 RX ADMIN — PROPOFOL 80 MG: 10 INJECTION, EMULSION INTRAVENOUS at 08:24

## 2019-09-20 RX ADMIN — SODIUM CHLORIDE: 9 INJECTION, SOLUTION INTRAVENOUS at 08:23

## 2019-09-20 RX ADMIN — SODIUM CHLORIDE: 9 INJECTION, SOLUTION INTRAVENOUS at 08:10

## 2019-09-20 ASSESSMENT — ENCOUNTER SYMPTOMS: SHORTNESS OF BREATH: 1

## 2019-09-20 NOTE — ANESTHESIA POSTPROCEDURE EVALUATION
Department of Anesthesiology  Postprocedure Note    Patient: Guadalupe Contreras  MRN: [id number]  YOB: 1934  Date of evaluation: 9/20/2019  Time:  2:22 PM     Procedure Summary     Date:  09/20/19 Room / Location:  [facility] CATH LAB    Anesthesia Start:  9255 Anesthesia Stop:  0836    Procedure:  CARDIOVERSION WITH ANESTHESIA Diagnosis:  Unspecified atrial fibrillation    Scheduled Providers:   Responsible Provider:  Merari Us MD    Anesthesia Type:  MAC ASA Status:  3          Anesthesia Type: MAC    Lottie Phase I:      Lottie Phase II:      Last vitals: Reviewed and per EMR flowsheets.        Anesthesia Post Evaluation    Patient location during evaluation: PACU  Patient participation: complete - patient participated  Level of consciousness: awake and alert  Airway patency: patent  Nausea & Vomiting: no nausea and no vomiting  Complications: no  Cardiovascular status: hemodynamically stable and blood pressure returned to baseline  Respiratory status: acceptable  Hydration status: euvolemic

## 2019-09-20 NOTE — PROCEDURES
[address]                  [address] [facility],  [facility]                            CARDIAC CATHETERIZATION    PATIENT NAME: Yuliet Sanford                   :        1934  MED REC NO:   [id number]                            ROOM:  ACCOUNT NO:   [de-identified]                           ADMIT DATE: 2019  PROVIDER:     Meggan Jones MD    DATE OF PROCEDURE:  2019    PROCEDURE:  DC cardioversion. TECHNIQUE:  The procedure was performed in the fasting state with  Anesthesia providing sedation. The patient is on chronic  anticoagulation with Xarelto. A single shock of 200 joules restored  sinus rhythm. There were no complications. Pre-cardioversion rhythm: atrial fibrillation. Post-cardioversion rhythm:  sinus rhythm.         Parker Greenberg MD    D: 2019 8:28:32       T: 2019 8:52:08     [email or web address]  Job#: [id number]     Doc#: [id number]    CC:

## 2019-09-20 NOTE — ANESTHESIA PRE PROCEDURE
140/99   06/19/19 (!) 148/62       NPO Status: Solid 9/19/19 1800                         Liquid 9/19/19 2000    BMI:   Wt Readings from Last 3 Encounters:   09/20/19 180 lb (81.6 kg)   09/10/19 185 lb (83.9 kg)   06/17/19 185 lb (83.9 kg)     Body mass index is 27.37 kg/m². CBC:   Lab Results   Component Value Date    WBC 8.6 09/10/2019    RBC 5.39 09/10/2019    HGB 15.4 09/10/2019    HCT 48.7 09/10/2019    MCV 90.4 09/10/2019    RDW 14.5 09/10/2019     09/10/2019       CMP:   Lab Results   Component Value Date     09/10/2019    K 3.9 09/10/2019     09/10/2019    CO2 29 09/10/2019    BUN 19 09/10/2019    CREATININE 0.9 09/10/2019    GFRAA >60 09/10/2019    LABGLOM 59 09/10/2019    GLUCOSE 106 09/10/2019    PROT 6.7 09/10/2019    CALCIUM 10.1 09/10/2019    BILITOT 0.9 09/10/2019    ALKPHOS 62 09/10/2019    AST 25 09/10/2019    ALT 20 09/10/2019       POC Tests: No results for input(s): POCGLU, POCNA, POCK, POCCL, POCBUN, POCHEMO, POCHCT in the last 72 hours.     Coags:   Lab Results   Component Value Date    PROTIME 13.4 06/16/2019    INR 1.2 06/16/2019    APTT 39.3 08/27/2018       HCG (If Applicable): No results found for: PREGTESTUR, PREGSERUM, HCG, HCGQUANT     ABGs: No results found for: PHART, PO2ART, HRI8FEW, HJH4XVK, BEART, U3ZOBTLT     Type & Screen (If Applicable):  No results found for: LABABO, LABRH       CXR 6/14/18  Impression   No gross focal consolidation.           9/10/2019  8:55 PM - Melvin, Mhy Incoming Ekg Results From [facility]     Component Value Ref Range & Units Status Collected Lab   Ventricular Rate 111  BPM Final 09/10/2019 11:42 AM [facility]   Atrial Rate 312  BPM Final 09/10/2019 11:42 AM [facility]   QRS Duration 66  ms Final 09/10/2019 11:42 AM [facility]   Q-T Interval 364  ms Final 09/10/2019 11:42 AM [facility]   QTc Calculation (Bazett) 495  ms Final 09/10/2019 11:42 AM [facility]   R Axis 15  degrees Final 09/10/2019 11:42 AM [facility]   T Axis 90  degrees Final 09/10/2019 11:42 AM [facility]   Testing Performed By     Lab - Abbreviation Name Director Address Valid Date Range   360-[facility] MUSE Unknown Unknown 04/18/16 0721-Present   Narrative   Performed by: [facility]   Atrial flutter with variable AV block  Septal infarct , age undetermined  Abnormal ECG  No previous ECGs available  Confirmed by Lianna Carrillo ([id number]) on 9/10/2019 8:55:22 PM             Anesthesia Evaluation  Patient summary reviewed and Nursing notes reviewed   history of anesthetic complications: PONV. Airway: Mallampati: III  TM distance: >3 FB   Neck ROM: full  Mouth opening: > = 3 FB Dental:      Comment: Pt denies having any loose or chipped teeth. Pt does have removable partials that are for her missing molar teeth. Pt did not have them in @ present. Pt denies having any caps. Pulmonary: breath sounds clear to auscultation  (+) shortness of breath: new,  asthma (pt states the last time she used albuterol was several weeks ago):                           ROS comment: Former smoker, quit 1994   Cardiovascular:    (+) CAD:, dysrhythmias (pt states to have felt SOB and weak, came to ER last night and was found to be in A. Fib): atrial fibrillation, VASQUES:, hyperlipidemia      ECG reviewed  Rhythm: irregular  Rate: abnormal           Beta Blocker:  Not on Beta Blocker         Neuro/Psych:   (+) psychiatric history:depression/anxiety             GI/Hepatic/Renal:             Endo/Other:    (+) hypothyroidism (pt states to have had a thyroidectomy 17 yrs ago), blood dyscrasia (pt taking xarelto): anticoagulation therapy:., .                 Abdominal:           Vascular:   + DVT, . Anesthesia Plan      MAC     ASA 3             Anesthetic plan and risks discussed with patient. Plan discussed with attending.                   Merry Aguirre, SONY - CRNA   9/20/2019

## 2019-09-30 ENCOUNTER — TELEPHONE (OUTPATIENT)
Dept: PULMONOLOGY | Age: 84
End: 2019-09-30

## 2019-10-21 ENCOUNTER — OFFICE VISIT (OUTPATIENT)
Dept: CARDIOLOGY CLINIC | Age: 84
End: 2019-10-21
Payer: MEDICARE

## 2019-10-21 VITALS
DIASTOLIC BLOOD PRESSURE: 80 MMHG | SYSTOLIC BLOOD PRESSURE: 124 MMHG | RESPIRATION RATE: 18 BRPM | HEART RATE: 61 BPM | BODY MASS INDEX: 28.76 KG/M2 | HEIGHT: 68 IN | WEIGHT: 189.8 LBS

## 2019-10-21 DIAGNOSIS — I48.19 PERSISTENT ATRIAL FIBRILLATION (HCC): ICD-10-CM

## 2019-10-21 PROCEDURE — 1123F ACP DISCUSS/DSCN MKR DOCD: CPT | Performed by: INTERNAL MEDICINE

## 2019-10-21 PROCEDURE — 99214 OFFICE O/P EST MOD 30 MIN: CPT | Performed by: INTERNAL MEDICINE

## 2019-10-21 PROCEDURE — 4040F PNEUMOC VAC/ADMIN/RCVD: CPT | Performed by: INTERNAL MEDICINE

## 2019-10-21 PROCEDURE — 1090F PRES/ABSN URINE INCON ASSESS: CPT | Performed by: INTERNAL MEDICINE

## 2019-10-21 PROCEDURE — G8400 PT W/DXA NO RESULTS DOC: HCPCS | Performed by: INTERNAL MEDICINE

## 2019-10-21 PROCEDURE — G8427 DOCREV CUR MEDS BY ELIG CLIN: HCPCS | Performed by: INTERNAL MEDICINE

## 2019-10-21 PROCEDURE — 1036F TOBACCO NON-USER: CPT | Performed by: INTERNAL MEDICINE

## 2019-10-21 PROCEDURE — G8484 FLU IMMUNIZE NO ADMIN: HCPCS | Performed by: INTERNAL MEDICINE

## 2019-10-21 PROCEDURE — G8417 CALC BMI ABV UP PARAM F/U: HCPCS | Performed by: INTERNAL MEDICINE

## 2019-10-21 PROCEDURE — 93000 ELECTROCARDIOGRAM COMPLETE: CPT | Performed by: INTERNAL MEDICINE

## 2019-10-23 ENCOUNTER — TELEPHONE (OUTPATIENT)
Dept: CASE MANAGEMENT | Age: 84
End: 2019-10-23

## 2019-10-23 DIAGNOSIS — J45.20 MILD INTERMITTENT ASTHMA, UNSPECIFIED WHETHER COMPLICATED: Primary | ICD-10-CM

## 2019-10-28 ENCOUNTER — HOSPITAL ENCOUNTER (OUTPATIENT)
Dept: CT IMAGING | Age: 84
Discharge: HOME | End: 2019-10-30
Payer: MEDICARE

## 2019-10-28 DIAGNOSIS — J44.9 CHRONIC OBSTRUCTIVE PULMONARY DISEASE, UNSPECIFIED COPD TYPE (HCC): ICD-10-CM

## 2019-10-28 DIAGNOSIS — J45.20 MILD INTERMITTENT ASTHMA, UNSPECIFIED WHETHER COMPLICATED: ICD-10-CM

## 2019-10-28 PROCEDURE — 71250 CT THORAX DX C-: CPT

## 2019-11-11 ENCOUNTER — OFFICE VISIT (OUTPATIENT)
Dept: PULMONOLOGY | Age: 84
End: 2019-11-11
Payer: MEDICARE

## 2019-11-11 ENCOUNTER — TELEPHONE (OUTPATIENT)
Dept: CARDIOLOGY CLINIC | Age: 84
End: 2019-11-11

## 2019-11-11 VITALS
BODY MASS INDEX: 29.4 KG/M2 | DIASTOLIC BLOOD PRESSURE: 75 MMHG | HEIGHT: 68 IN | OXYGEN SATURATION: 96 % | TEMPERATURE: 98 F | RESPIRATION RATE: 18 BRPM | SYSTOLIC BLOOD PRESSURE: 133 MMHG | HEART RATE: 76 BPM | WEIGHT: 194 LBS

## 2019-11-11 DIAGNOSIS — J45.20 MILD INTERMITTENT ASTHMA WITHOUT COMPLICATION: Primary | ICD-10-CM

## 2019-11-11 LAB
EXPIRATORY TIME: 6.02 SEC
FEF 25-75% %PRED-PRE: 77 L/SEC
FEF 25-75% PRED: 1.54 L/SEC
FEF 25-75%-PRE: 1.2 L/SEC
FENO: 40 PPB
FEV1 %PRED-PRE: 82 %
FEV1 PRED: 2.05 L
FEV1/FVC %PRED-PRE: 97 %
FEV1/FVC PRED: 76 %
FEV1/FVC: 74 %
FEV1: 1.69 L
FVC %PRED-PRE: 82 %
FVC PRED: 2.75 L
FVC: 2.27 L
PEF %PRED-PRE: NORMAL
PEF PRED: NORMAL
PEF-PRE: NORMAL

## 2019-11-11 PROCEDURE — 1123F ACP DISCUSS/DSCN MKR DOCD: CPT | Performed by: INTERNAL MEDICINE

## 2019-11-11 PROCEDURE — 99214 OFFICE O/P EST MOD 30 MIN: CPT | Performed by: INTERNAL MEDICINE

## 2019-11-11 PROCEDURE — G8427 DOCREV CUR MEDS BY ELIG CLIN: HCPCS | Performed by: INTERNAL MEDICINE

## 2019-11-11 PROCEDURE — 1090F PRES/ABSN URINE INCON ASSESS: CPT | Performed by: INTERNAL MEDICINE

## 2019-11-11 PROCEDURE — 99213 OFFICE O/P EST LOW 20 MIN: CPT | Performed by: INTERNAL MEDICINE

## 2019-11-11 PROCEDURE — 4040F PNEUMOC VAC/ADMIN/RCVD: CPT | Performed by: INTERNAL MEDICINE

## 2019-11-11 PROCEDURE — G8417 CALC BMI ABV UP PARAM F/U: HCPCS | Performed by: INTERNAL MEDICINE

## 2019-11-11 PROCEDURE — 1036F TOBACCO NON-USER: CPT | Performed by: INTERNAL MEDICINE

## 2019-11-11 PROCEDURE — 94010 BREATHING CAPACITY TEST: CPT | Performed by: INTERNAL MEDICINE

## 2019-11-11 PROCEDURE — 95012 NITRIC OXIDE EXP GAS DETER: CPT | Performed by: INTERNAL MEDICINE

## 2019-11-11 PROCEDURE — G8484 FLU IMMUNIZE NO ADMIN: HCPCS | Performed by: INTERNAL MEDICINE

## 2019-11-11 PROCEDURE — G8400 PT W/DXA NO RESULTS DOC: HCPCS | Performed by: INTERNAL MEDICINE

## 2019-11-11 ASSESSMENT — PULMONARY FUNCTION TESTS
FEV1/FVC_PREDICTED: 76
FVC_PERCENT_PREDICTED_PRE: 82
FVC_PREDICTED: 2.75
FEV1/FVC: 74
FENO: 40
FEV1_PERCENT_PREDICTED_PRE: 82
FVC: 2.27
FEV1: 1.69
FEV1_PREDICTED: 2.05
FEV1/FVC_PERCENT_PREDICTED_PRE: 97

## 2019-12-04 ENCOUNTER — HOSPITAL ENCOUNTER (EMERGENCY)
Age: 84
Discharge: HOME | End: 2019-12-04
Attending: EMERGENCY MEDICINE
Payer: MEDICARE

## 2019-12-04 ENCOUNTER — APPOINTMENT (OUTPATIENT)
Dept: GENERAL RADIOLOGY | Age: 84
End: 2019-12-04
Payer: MEDICARE

## 2019-12-04 VITALS
OXYGEN SATURATION: 95 % | WEIGHT: 190 LBS | BODY MASS INDEX: 28.79 KG/M2 | HEART RATE: 82 BPM | RESPIRATION RATE: 18 BRPM | TEMPERATURE: 97.8 F | DIASTOLIC BLOOD PRESSURE: 95 MMHG | SYSTOLIC BLOOD PRESSURE: 138 MMHG | HEIGHT: 68 IN

## 2019-12-04 LAB
ALBUMIN SERPL-MCNC: 4.1 G/DL (ref 3.5–5.2)
ALP BLD-CCNC: 63 U/L (ref 35–104)
ALT SERPL-CCNC: 16 U/L (ref 0–32)
ANION GAP SERPL CALCULATED.3IONS-SCNC: 12 MMOL/L (ref 7–16)
AST SERPL-CCNC: 25 U/L (ref 0–31)
BILIRUB SERPL-MCNC: 0.7 MG/DL (ref 0–1.2)
BUN BLDV-MCNC: 11 MG/DL (ref 8–23)
CALCIUM SERPL-MCNC: 9.8 MG/DL (ref 8.6–10.2)
CHLORIDE BLD-SCNC: 104 MMOL/L (ref 98–107)
CO2: 24 MMOL/L (ref 22–29)
CREAT SERPL-MCNC: 0.9 MG/DL (ref 0.5–1)
GFR AFRICAN AMERICAN: >60
GFR NON-AFRICAN AMERICAN: 59 ML/MIN/1.73
GLUCOSE BLD-MCNC: 128 MG/DL (ref 74–99)
HCT VFR BLD CALC: 47.9 % (ref 34–48)
HEMOGLOBIN: 14.8 G/DL (ref 11.5–15.5)
MCH RBC QN AUTO: 27.8 PG (ref 26–35)
MCHC RBC AUTO-ENTMCNC: 30.9 % (ref 32–34.5)
MCV RBC AUTO: 89.9 FL (ref 80–99.9)
PDW BLD-RTO: 14.1 FL (ref 11.5–15)
PLATELET # BLD: 179 E9/L (ref 130–450)
PMV BLD AUTO: 13.1 FL (ref 7–12)
POTASSIUM SERPL-SCNC: 3.7 MMOL/L (ref 3.5–5)
PRO-BNP: 876 PG/ML (ref 0–450)
RBC # BLD: 5.33 E12/L (ref 3.5–5.5)
SODIUM BLD-SCNC: 140 MMOL/L (ref 132–146)
TOTAL PROTEIN: 6.7 G/DL (ref 6.4–8.3)
TROPONIN: <0.01 NG/ML (ref 0–0.03)
WBC # BLD: 7.4 E9/L (ref 4.5–11.5)

## 2019-12-04 PROCEDURE — 80053 COMPREHEN METABOLIC PANEL: CPT

## 2019-12-04 PROCEDURE — 84484 ASSAY OF TROPONIN QUANT: CPT

## 2019-12-04 PROCEDURE — 99285 EMERGENCY DEPT VISIT HI MDM: CPT

## 2019-12-04 PROCEDURE — 85027 COMPLETE CBC AUTOMATED: CPT

## 2019-12-04 PROCEDURE — 93005 ELECTROCARDIOGRAM TRACING: CPT | Performed by: NURSE PRACTITIONER

## 2019-12-04 PROCEDURE — 83880 ASSAY OF NATRIURETIC PEPTIDE: CPT

## 2019-12-04 PROCEDURE — 71046 X-RAY EXAM CHEST 2 VIEWS: CPT

## 2019-12-04 PROCEDURE — 36415 COLL VENOUS BLD VENIPUNCTURE: CPT

## 2019-12-04 NOTE — ED PROVIDER NOTES
ED Attending  CC: No  HPI:  12/4/19,   Time: 3:38 PM       Rayo Napoles is a 80 y.o. female presenting to the ED for palpitations this AM that lasted approximately 40 minutes. She states she did have some \"tingling\" in her fingers at that time also. She states she did not have chest pain when she had the palpitations. She did have some shortness of breath. She states she used her portable pulse oximeter when she had the palpitations and her heart rate went from 40's to 100's. She states she does have a history of A fib and does take Cardizem and Xarelto daily. She did call her cardiologist, Dr Amalia Oh, this am and he told her to come to ER for further evaluation. She denies chest pain, palpitations or shortness of breath at this time. She is sinus rhythm on the monitor. She denies nausea. , vomiting, fever or chills. She denies recent sick contacts. She states she is feeling back to her normal with no \"tingling\" to her fingers at this time. The complaint has been intermittent, mild in severity, and worsened by nothing. Review of Systems:   Pertinent positives and negatives are stated within HPI, all other systems reviewed and are negative.          --------------------------------------------- PAST HISTORY ---------------------------------------------  Past Medical History:  has a past medical history of Asthma, Atrial fibrillation ([facility] [address] CAD (coronary artery disease), Cancer ([facility] [address] Depression, Hx of blood clots, Hyperlipidemia, Nausea & vomiting, PONV (postoperative nausea and vomiting), and Thyroid disease. Past Surgical History:  has a past surgical history that includes Tonsillectomy; Hysterectomy; Thyroidectomy (17 YEARS AGO); Appendectomy (58 YEARS AGO); eye surgery; Neck surgery (9/9/2011); Cholecystectomy; and Cardioversion (09/20/2019). Social History:  reports that she quit smoking about 25 years ago. Her smoking use included cigarettes.  She has never used smokeless tobacco. She providing specific details for the plan of care and counseling regarding the diagnosis and prognosis. Questions are answered at this time and they are agreeable with the plan.       --------------------------------- IMPRESSION AND DISPOSITION ---------------------------------    IMPRESSION  1. Palpitations        DISPOSITION  Disposition: Discharge to home  Patient condition is stable    NOTE: This report was transcribed using voice recognition software.  Every effort was made to ensure accuracy; however, inadvertent computerized transcription errors may be present     Rosanna Helm, SONY - MICHAEL  12/04/19 1544

## 2019-12-05 LAB
EKG ATRIAL RATE: 72 BPM
EKG P AXIS: 69 DEGREES
EKG P-R INTERVAL: 152 MS
EKG Q-T INTERVAL: 418 MS
EKG QRS DURATION: 70 MS
EKG QTC CALCULATION (BAZETT): 457 MS
EKG R AXIS: 66 DEGREES
EKG T AXIS: 88 DEGREES
EKG VENTRICULAR RATE: 72 BPM

## 2019-12-05 PROCEDURE — 93010 ELECTROCARDIOGRAM REPORT: CPT | Performed by: INTERNAL MEDICINE

## 2019-12-10 ENCOUNTER — TELEPHONE (OUTPATIENT)
Dept: CARDIOLOGY CLINIC | Age: 84
End: 2019-12-10

## 2019-12-16 ENCOUNTER — OFFICE VISIT (OUTPATIENT)
Dept: CARDIOLOGY CLINIC | Age: 84
End: 2019-12-16
Payer: MEDICARE

## 2019-12-16 VITALS
WEIGHT: 189 LBS | HEIGHT: 68 IN | SYSTOLIC BLOOD PRESSURE: 124 MMHG | DIASTOLIC BLOOD PRESSURE: 76 MMHG | BODY MASS INDEX: 28.64 KG/M2 | RESPIRATION RATE: 16 BRPM | HEART RATE: 72 BPM

## 2019-12-16 DIAGNOSIS — I48.19 PERSISTENT ATRIAL FIBRILLATION (HCC): Primary | ICD-10-CM

## 2019-12-16 PROCEDURE — 1123F ACP DISCUSS/DSCN MKR DOCD: CPT | Performed by: INTERNAL MEDICINE

## 2019-12-16 PROCEDURE — G8400 PT W/DXA NO RESULTS DOC: HCPCS | Performed by: INTERNAL MEDICINE

## 2019-12-16 PROCEDURE — 93000 ELECTROCARDIOGRAM COMPLETE: CPT | Performed by: INTERNAL MEDICINE

## 2019-12-16 PROCEDURE — 4040F PNEUMOC VAC/ADMIN/RCVD: CPT | Performed by: INTERNAL MEDICINE

## 2019-12-16 PROCEDURE — 99213 OFFICE O/P EST LOW 20 MIN: CPT | Performed by: INTERNAL MEDICINE

## 2019-12-16 PROCEDURE — 1090F PRES/ABSN URINE INCON ASSESS: CPT | Performed by: INTERNAL MEDICINE

## 2019-12-16 PROCEDURE — 1036F TOBACCO NON-USER: CPT | Performed by: INTERNAL MEDICINE

## 2019-12-16 PROCEDURE — G8417 CALC BMI ABV UP PARAM F/U: HCPCS | Performed by: INTERNAL MEDICINE

## 2019-12-16 PROCEDURE — G8427 DOCREV CUR MEDS BY ELIG CLIN: HCPCS | Performed by: INTERNAL MEDICINE

## 2019-12-16 PROCEDURE — G8484 FLU IMMUNIZE NO ADMIN: HCPCS | Performed by: INTERNAL MEDICINE

## 2019-12-18 RX ORDER — DILTIAZEM HYDROCHLORIDE 180 MG/1
CAPSULE, COATED, EXTENDED RELEASE ORAL
Qty: 90 CAPSULE | Refills: 1 | Status: SHIPPED
Start: 2019-12-18 | End: 2020-06-15

## 2019-12-26 ENCOUNTER — HOSPITAL ENCOUNTER (OUTPATIENT)
Dept: GENERAL RADIOLOGY | Age: 84
Discharge: HOME | End: 2019-12-28
Payer: MEDICARE

## 2019-12-26 DIAGNOSIS — Z12.31 VISIT FOR SCREENING MAMMOGRAM: ICD-10-CM

## 2019-12-26 PROCEDURE — 77063 BREAST TOMOSYNTHESIS BI: CPT

## 2019-12-30 ENCOUNTER — OFFICE VISIT (OUTPATIENT)
Dept: FAMILY MEDICINE CLINIC | Age: 84
End: 2019-12-30
Payer: MEDICARE

## 2019-12-30 VITALS
TEMPERATURE: 98.6 F | WEIGHT: 192 LBS | SYSTOLIC BLOOD PRESSURE: 108 MMHG | OXYGEN SATURATION: 95 % | HEART RATE: 70 BPM | DIASTOLIC BLOOD PRESSURE: 68 MMHG | HEIGHT: 68 IN | BODY MASS INDEX: 29.1 KG/M2

## 2019-12-30 DIAGNOSIS — J45.41 MODERATE PERSISTENT ASTHMA WITH EXACERBATION: ICD-10-CM

## 2019-12-30 DIAGNOSIS — J20.9 BRONCHITIS WITH BRONCHOSPASM: Primary | ICD-10-CM

## 2019-12-30 LAB
INFLUENZA A ANTIBODY: NORMAL
INFLUENZA B ANTIBODY: NORMAL

## 2019-12-30 PROCEDURE — 4040F PNEUMOC VAC/ADMIN/RCVD: CPT | Performed by: PHYSICIAN ASSISTANT

## 2019-12-30 PROCEDURE — 87804 INFLUENZA ASSAY W/OPTIC: CPT | Performed by: PHYSICIAN ASSISTANT

## 2019-12-30 PROCEDURE — G8400 PT W/DXA NO RESULTS DOC: HCPCS | Performed by: PHYSICIAN ASSISTANT

## 2019-12-30 PROCEDURE — G8484 FLU IMMUNIZE NO ADMIN: HCPCS | Performed by: PHYSICIAN ASSISTANT

## 2019-12-30 PROCEDURE — 1090F PRES/ABSN URINE INCON ASSESS: CPT | Performed by: PHYSICIAN ASSISTANT

## 2019-12-30 PROCEDURE — 99213 OFFICE O/P EST LOW 20 MIN: CPT | Performed by: PHYSICIAN ASSISTANT

## 2019-12-30 PROCEDURE — G8417 CALC BMI ABV UP PARAM F/U: HCPCS | Performed by: PHYSICIAN ASSISTANT

## 2019-12-30 PROCEDURE — 1036F TOBACCO NON-USER: CPT | Performed by: PHYSICIAN ASSISTANT

## 2019-12-30 PROCEDURE — 1123F ACP DISCUSS/DSCN MKR DOCD: CPT | Performed by: PHYSICIAN ASSISTANT

## 2019-12-30 PROCEDURE — G8427 DOCREV CUR MEDS BY ELIG CLIN: HCPCS | Performed by: PHYSICIAN ASSISTANT

## 2019-12-30 RX ORDER — DOXYCYCLINE HYCLATE 100 MG/1
100 CAPSULE ORAL 2 TIMES DAILY
Qty: 20 CAPSULE | Refills: 0 | Status: SHIPPED | OUTPATIENT
Start: 2019-12-30 | End: 2020-01-09

## 2019-12-30 RX ORDER — BENZONATATE 200 MG/1
200 CAPSULE ORAL 3 TIMES DAILY PRN
Qty: 15 CAPSULE | Refills: 0 | Status: ON HOLD
Start: 2019-12-30 | End: 2020-09-05

## 2019-12-30 RX ORDER — METHYLPREDNISOLONE 4 MG/1
TABLET ORAL
Qty: 1 KIT | Refills: 0 | Status: SHIPPED | OUTPATIENT
Start: 2019-12-30 | End: 2020-01-05

## 2020-03-25 PROBLEM — E78.5 HLD (HYPERLIPIDEMIA): Status: RESOLVED | Noted: 2018-06-20 | Resolved: 2020-03-24

## 2020-06-04 ENCOUNTER — OFFICE VISIT (OUTPATIENT)
Dept: FAMILY MEDICINE CLINIC | Age: 85
End: 2020-06-04
Payer: MEDICARE

## 2020-06-04 VITALS
SYSTOLIC BLOOD PRESSURE: 120 MMHG | TEMPERATURE: 98 F | DIASTOLIC BLOOD PRESSURE: 78 MMHG | WEIGHT: 189 LBS | HEART RATE: 117 BPM | BODY MASS INDEX: 28.64 KG/M2 | OXYGEN SATURATION: 98 % | HEIGHT: 68 IN

## 2020-06-04 PROCEDURE — 99213 OFFICE O/P EST LOW 20 MIN: CPT | Performed by: PHYSICIAN ASSISTANT

## 2020-06-04 PROCEDURE — G8417 CALC BMI ABV UP PARAM F/U: HCPCS | Performed by: PHYSICIAN ASSISTANT

## 2020-06-04 PROCEDURE — 1123F ACP DISCUSS/DSCN MKR DOCD: CPT | Performed by: PHYSICIAN ASSISTANT

## 2020-06-04 PROCEDURE — 1090F PRES/ABSN URINE INCON ASSESS: CPT | Performed by: PHYSICIAN ASSISTANT

## 2020-06-04 PROCEDURE — G8400 PT W/DXA NO RESULTS DOC: HCPCS | Performed by: PHYSICIAN ASSISTANT

## 2020-06-04 PROCEDURE — 4040F PNEUMOC VAC/ADMIN/RCVD: CPT | Performed by: PHYSICIAN ASSISTANT

## 2020-06-04 PROCEDURE — 1036F TOBACCO NON-USER: CPT | Performed by: PHYSICIAN ASSISTANT

## 2020-06-04 PROCEDURE — G8427 DOCREV CUR MEDS BY ELIG CLIN: HCPCS | Performed by: PHYSICIAN ASSISTANT

## 2020-06-04 RX ORDER — METHYLPREDNISOLONE 4 MG/1
TABLET ORAL
Qty: 1 KIT | Refills: 0 | Status: SHIPPED | OUTPATIENT
Start: 2020-06-04 | End: 2020-06-10

## 2020-06-04 ASSESSMENT — PATIENT HEALTH QUESTIONNAIRE - PHQ9
2. FEELING DOWN, DEPRESSED OR HOPELESS: 0
1. LITTLE INTEREST OR PLEASURE IN DOING THINGS: 0
SUM OF ALL RESPONSES TO PHQ9 QUESTIONS 1 & 2: 0
SUM OF ALL RESPONSES TO PHQ QUESTIONS 1-9: 0
SUM OF ALL RESPONSES TO PHQ QUESTIONS 1-9: 0

## 2020-06-04 NOTE — PROGRESS NOTES
Chief Complaint:   Rash (scalp and base of neck x 3 months)      History of Present Illness   Source of history provided by:  patient. Guero Gupta is a 80 y.o. old female who has a past medical history of:   Past Medical History:   Diagnosis Date    Asthma     Atrial fibrillation ([facility] [address]     CAD (coronary artery disease)     Cancer ([facility] [address]     had hysterectomy    Depression     Hx of blood clots     Hyperlipidemia     Nausea & vomiting     PONV (postoperative nausea and vomiting)     Thyroid disease    Presents to the walk in clinic for evaluation of a rash to the scalp and base of the neck which has been present x 3 weeks. Denies any known cause for the rash including any new soaps, detergents, lotions, hair products, shampoos, foods, or medications. Pt does admit to swimming frequently and wonders if this may have played a role in exacerbating the rash. Since onset the symptoms have been persistent and improving slighlty. Reports associated erythema, mild burning, and pruritis. Denies any bleeding or drainage. Denies any lymphangitic streaking, fever, chills, HA , dyspnea, dysphagia, recent illness, myalgias, vomiting, or lethargy. Pt has tried [employer] and Albertina lotion OTC without relief. Denies any known lice exposure. ROS    Unless otherwise stated in this report or unable to obtain because of the patient's clinical or mental status as evidenced by the medical record, this patients's positive and negative responses for Review of Systems, constitutional, psych, eyes, ENT, cardiovascular, respiratory, gastrointestinal, neurological, genitourinary, musculoskeletal, integument systems and systems related to the presenting problem are either stated in the preceding or were not pertinent or were negative for the symptoms and/or complaints related to the medical problem. Past Surgical History:  has a past surgical history that includes Tonsillectomy; Hysterectomy;  Thyroidectomy (17 YEARS AGO); Appendectomy (58 YEARS AGO); eye surgery; Neck surgery (9/9/2011); Cholecystectomy; and Cardioversion (09/20/2019). Social History:  reports that she quit smoking about 25 years ago. Her smoking use included cigarettes. She has never used smokeless tobacco. She reports that she does not drink alcohol or use drugs. Family History: family history is not on file. Allergies: Codeine; Codimal-a [brompheniramine]; Lipitor; Spiriva handihaler [tiotropium bromide monohydrate]; Tudorza pressair [aclidinium bromide]; and Ciprofloxacin    Physical Exam         VS:  /78   Pulse 117   Temp 98 °F (36.7 °C)   Ht 5' 8\" (1.727 m)   Wt 189 lb (85.7 kg)   SpO2 98%   BMI 28.74 kg/m²    Oxygen Saturation Interpretation: Normal.    Constitutional:  Alert, development consistent with age. HEENT:  NC/NT. Airway patent. Eyes:  PERRL, EOMI, no discharge. Lungs:  CTAB, no wheezing, rales, or rhonchi  Heart:  RRR without pathologic murmurs  Skin:  Normal turgor and appropriately dry to touch. Scattered erythematous, maculopapular eczematoid-type rash noted over the scalp and base of the neck. Signs of excoriation noted but no signs of secondary infection including TTP, pustules, induration, or fluctuance. No bleeding or discharge noted although there is some mild scabbing noted. No lymphangitic streaking. No nits or lice noted. Neurological:  Orientation age-appropriate unless noted elsewhere. Motor functions intact. Lab / Imaging Results   (All laboratory and radiology results have been personally reviewed by myself)  Labs:      Imaging: All Radiology results interpreted by Radiologist unless otherwise noted. Assessment / Plan     Impression(s):  1. Allergic contact dermatitis, unspecified trigger    2. Eczema of scalp      Disposition:  Disposition: Discharge to home. Rash appears most consistent with contact dermatitis/acute eczema flare.  Scripts written for a Medrol dose pack and topical

## 2020-06-10 ENCOUNTER — OFFICE VISIT (OUTPATIENT)
Dept: CARDIOLOGY CLINIC | Age: 85
End: 2020-06-10
Payer: MEDICARE

## 2020-06-10 VITALS
HEIGHT: 68 IN | TEMPERATURE: 97.1 F | BODY MASS INDEX: 28.07 KG/M2 | DIASTOLIC BLOOD PRESSURE: 84 MMHG | WEIGHT: 185.2 LBS | SYSTOLIC BLOOD PRESSURE: 128 MMHG | HEART RATE: 104 BPM

## 2020-06-10 PROCEDURE — 1036F TOBACCO NON-USER: CPT | Performed by: INTERNAL MEDICINE

## 2020-06-10 PROCEDURE — G8427 DOCREV CUR MEDS BY ELIG CLIN: HCPCS | Performed by: INTERNAL MEDICINE

## 2020-06-10 PROCEDURE — 99214 OFFICE O/P EST MOD 30 MIN: CPT | Performed by: INTERNAL MEDICINE

## 2020-06-10 PROCEDURE — 1123F ACP DISCUSS/DSCN MKR DOCD: CPT | Performed by: INTERNAL MEDICINE

## 2020-06-10 PROCEDURE — 1090F PRES/ABSN URINE INCON ASSESS: CPT | Performed by: INTERNAL MEDICINE

## 2020-06-10 PROCEDURE — G8417 CALC BMI ABV UP PARAM F/U: HCPCS | Performed by: INTERNAL MEDICINE

## 2020-06-10 PROCEDURE — G8400 PT W/DXA NO RESULTS DOC: HCPCS | Performed by: INTERNAL MEDICINE

## 2020-06-10 PROCEDURE — 4040F PNEUMOC VAC/ADMIN/RCVD: CPT | Performed by: INTERNAL MEDICINE

## 2020-06-10 PROCEDURE — 93000 ELECTROCARDIOGRAM COMPLETE: CPT | Performed by: INTERNAL MEDICINE

## 2020-06-10 NOTE — PROGRESS NOTES
Chief Complaint   Patient presents with    Atrial Fibrillation       Patient Active Problem List    Diagnosis Date Noted    Vitamin D deficiency 06/18/2019    Hyperlipidemia     Depression     Asthma     History of nuclear stress test 09/24/2018     Overview Note:     Normal pharm stress 8/2018      Persistent atrial fibrillation 06/15/2018     Overview Note:     A. Initial presentation June 2018. CHADS-VASC = 3  B. CONSTANTINO guided DC cardioversion  6/15/2018. No significant structural disesae on CONSTANTINO  C. DCCV 9/2019         Current Outpatient Medications   Medication Sig Dispense Refill    mupirocin (BACTROBAN) 2 % ointment Apply twice a day until resolved. 15 g 0    rivaroxaban (XARELTO) 20 MG TABS tablet Take 1 tablet by mouth daily 30 tablet 5    diltiazem (CARDIZEM CD) 180 MG extended release capsule TAKE 1 CAPSULE BY MOUTH EVERY DAY 90 capsule 1    budesonide-formoterol (SYMBICORT) 160-4.5 MCG/ACT AERO Inhale 2 puffs into the lungs 2 times daily Rinse mouth after use. 1 Inhaler 1    levothyroxine (SYNTHROID) 100 MCG tablet Take 1 tablet by mouth daily  3    simvastatin (ZOCOR) 40 MG tablet Take 40 mg by mouth nightly   3    citalopram (CELEXA) 20 MG tablet Take 20 mg by mouth daily      vitamin B-12 (CYANOCOBALAMIN) 100 MCG tablet Take 50 mcg by mouth daily.  albuterol (PROVENTIL HFA) 108 (90 BASE) MCG/ACT inhaler Inhale 2 puffs into the lungs every 4 hours as needed for Wheezing or Shortness of Breath. 1 Inhaler 0    Cholecalciferol (VITAMIN D3) 2000 units TABS Take 1 tablet by mouth daily       Biotin 5000 MCG CAPS Take 1 capsule by mouth daily.  Vitamin E 400 UNIT TABS Take 1 tablet by mouth daily.  methylPREDNISolone (MEDROL DOSEPACK) 4 MG tablet Take by mouth.  (Patient not taking: Reported on 6/10/2020) 1 kit 0    benzonatate (TESSALON) 200 MG capsule Take 1 capsule by mouth 3 times daily as needed for Cough (Patient not taking: Reported on 6/10/2020) 15 capsule 0    Physically abused: Not on file     Forced sexual activity: Not on file   Other Topics Concern    Not on file   Social History Narrative    Not on file       History reviewed. No pertinent family history. SUBJECTIVE: Tahir Ceron presents to the office today for re-evaluation of cardiac diagnoses. She complains of no cardiac symptoms and denies   chest pain, chest pressure/discomfort, claudication, dyspnea, exertional chest pressure/discomfort, fatigue, irregular heart beat, lower extremity edema, near-syncope, orthopnea, palpitations, paroxysmal nocturnal dyspnea, syncope and tachypnea. Compliant with NOACs, no bleeding. Does all  chores without any issue. Review of Systems:   Heart: as above   Lungs: as above   Eyes: denies changes in vision or discharge. Ears: denies changes in hearing or pain. Nose: denies epistaxis or masses   Throat: denies sore throat or trouble swallowing. Neuro: denies numbness, tingling, tremors. Skin: denies rashes or itching. : denies hematuria, dysuria   GI: denies vomiting, diarrhea   Psych: denies mood changed, anxiety, depression. all others negative. Physical Exam   /84   Pulse 104   Temp 97.1 °F (36.2 °C)   Ht 5' 8\" (1.727 m)   Wt 185 lb 3.2 oz (84 kg)   BMI 28.16 kg/m²   Constitutional: Oriented to person, place, and time. Well-developed and well-nourished. No distress. Head: Normocephalic and atraumatic. Eyes: EOM are normal. Pupils are equal, round, and reactive to light. Neck: Normal range of motion. Neck supple. No hepatojugular reflux and no JVD present. Carotid bruit is not present. No tracheal deviation present. No thyromegaly present. Cardiovascular: rapid rate, irregular rhythm, normal heart sounds and intact distal pulses. Exam reveals no gallop and no friction rub. No murmur heard. Pulmonary/Chest: Effort normal and breath sounds normal. No respiratory distress. No wheezes. No rales. No tenderness. Abdominal: Soft. Bowel sounds are normal. No distension and no mass. No tenderness. No rebound and no guarding. Musculoskeletal: Normal range of motion. No edema and no tenderness. Neurological: Alert and oriented to person, place, and time. Skin: Skin is warm and dry. No rash noted. Not diaphoretic. No erythema. Psychiatric: Normal mood and affect. Behavior is normal.     EKG: afib with RVR, nonspecific ST and T waves changes. ASSESSMENT AND PLAN:  Patient Active Problem List   Diagnosis    Persistent atrial fibrillation: s/p DC cardio 6/2018 and 9/2019 with elevated chads vasc . Had some diastolic failure with initial presenation, but no structural disease of significance on CONSTANTINO. Now back in afib. Long discussion with her and sis about rate vs rhtyhm control strategy. I recommended former. will up diltiazem to BID dosing,  Continue  NOAC.  HLD (hyperlipidemia) on statin          The patient was educated as to the symptoms that would require a prompt return to our office.   Return visit one month  Tadeo Angulo M.D  [facility]

## 2020-06-15 RX ORDER — DILTIAZEM HYDROCHLORIDE 180 MG/1
CAPSULE, COATED, EXTENDED RELEASE ORAL
Qty: 90 CAPSULE | Refills: 3 | Status: SHIPPED
Start: 2020-06-15 | End: 2020-07-13 | Stop reason: SDUPTHER

## 2020-06-24 ENCOUNTER — OFFICE VISIT (OUTPATIENT)
Dept: FAMILY MEDICINE CLINIC | Age: 85
End: 2020-06-24
Payer: MEDICARE

## 2020-06-24 VITALS
OXYGEN SATURATION: 98 % | TEMPERATURE: 98 F | SYSTOLIC BLOOD PRESSURE: 138 MMHG | RESPIRATION RATE: 20 BRPM | BODY MASS INDEX: 28.19 KG/M2 | HEART RATE: 103 BPM | DIASTOLIC BLOOD PRESSURE: 80 MMHG | WEIGHT: 186 LBS | HEIGHT: 68 IN

## 2020-06-24 PROCEDURE — 1123F ACP DISCUSS/DSCN MKR DOCD: CPT | Performed by: NURSE PRACTITIONER

## 2020-06-24 PROCEDURE — 1090F PRES/ABSN URINE INCON ASSESS: CPT | Performed by: NURSE PRACTITIONER

## 2020-06-24 PROCEDURE — 4040F PNEUMOC VAC/ADMIN/RCVD: CPT | Performed by: NURSE PRACTITIONER

## 2020-06-24 PROCEDURE — G8427 DOCREV CUR MEDS BY ELIG CLIN: HCPCS | Performed by: NURSE PRACTITIONER

## 2020-06-24 PROCEDURE — G8400 PT W/DXA NO RESULTS DOC: HCPCS | Performed by: NURSE PRACTITIONER

## 2020-06-24 PROCEDURE — 1036F TOBACCO NON-USER: CPT | Performed by: NURSE PRACTITIONER

## 2020-06-24 PROCEDURE — 96372 THER/PROPH/DIAG INJ SC/IM: CPT | Performed by: NURSE PRACTITIONER

## 2020-06-24 PROCEDURE — G8417 CALC BMI ABV UP PARAM F/U: HCPCS | Performed by: NURSE PRACTITIONER

## 2020-06-24 PROCEDURE — 99213 OFFICE O/P EST LOW 20 MIN: CPT | Performed by: NURSE PRACTITIONER

## 2020-06-24 RX ORDER — TRIAMCINOLONE ACETONIDE 40 MG/ML
40 INJECTION, SUSPENSION INTRA-ARTICULAR; INTRAMUSCULAR ONCE
Status: COMPLETED | OUTPATIENT
Start: 2020-06-24 | End: 2020-06-24

## 2020-06-24 RX ORDER — KETOCONAZOLE 20 MG/ML
SHAMPOO TOPICAL
Qty: 1 BOTTLE | Refills: 1 | Status: ON HOLD
Start: 2020-06-24 | End: 2020-09-05

## 2020-06-24 RX ORDER — KETOROLAC TROMETHAMINE 30 MG/ML
30 INJECTION, SOLUTION INTRAMUSCULAR; INTRAVENOUS ONCE
Status: COMPLETED | OUTPATIENT
Start: 2020-06-24 | End: 2020-06-24

## 2020-06-24 RX ADMIN — TRIAMCINOLONE ACETONIDE 40 MG: 40 INJECTION, SUSPENSION INTRA-ARTICULAR; INTRAMUSCULAR at 12:01

## 2020-06-24 RX ADMIN — KETOROLAC TROMETHAMINE 30 MG: 30 INJECTION, SOLUTION INTRAMUSCULAR; INTRAVENOUS at 12:00

## 2020-06-24 NOTE — PROGRESS NOTES
Chief Complaint:   Rash (Rash on back since yesterday / itching  / Given a cream from her PCP for scalp itching, but cannot remember name. of med.)    History of Present Illness   Source of history provided by:  patient. Lena Lion is a 80 y.o. old female who has a past medical history of:   Past Medical History:   Diagnosis Date    Asthma     [facility]rial fibrillation [facility] [address]     CAD (coronary artery disease)     Cancer ([facility] [address]     had hysterectomy    Depression     Hx of blood clots     Hyperlipidemia     Nausea & vomiting     PONV (postoperative nausea and vomiting)     Thyroid disease     presents to the [facility] for complaint of a rash to the back, which began 1 days ago. The symptoms were triggered by unknown, daughter reports possible change in laundry detergent. Since onset the symptoms have remained, reports as itchy, soothed by warm water in shower. Pt denies symptoms in the past, though reports has not improved from previous visit to [facility], has been using steroid & antibiotic cream.  Pt states the area is itchy, red, and has noted streaking. Denies any fever, chills, HA, recent illness, myalgias, vomiting, or lethargy. Had taken Allegra, which stopped because of side effects of making her dizzy. ROS    Unless otherwise stated in this report or unable to obtain because of the patient's clinical or mental status as evidenced by the medical record, this patients's positive and negative responses for Review of Systems, constitutional, psych, eyes, ENT, cardiovascular, respiratory, gastrointestinal, neurological, genitourinary, musculoskeletal, integument systems and systems related to the presenting problem are either stated in the preceding or were not pertinent or were negative for the symptoms and/or complaints related to the medical problem. Past Surgical History:  has a past surgical history that includes Tonsillectomy; Hysterectomy; Thyroidectomy (17 YEARS AGO);  Appendectomy Plan     Impression(s):  1. Eczema of scalp    2. Allergic contact dermatitis, unspecified trigger      Disposition:  Disposition: Discharge to home    New Prescriptions    KETOCONAZOLE (NIZORAL) 2 % SHAMPOO    Apply for 10 minutes to skin daily & rinse, use for 1-2 weeks if symptoms progress. Pt advised to f/u with PCP in 2-3 days for recheck and further management. ER if changes or worse. Pt advised to take all medications as directed.      Administrations This Visit     ketorolac (TORADOL) injection 30 mg     Admin Date  06/24/2020 Action  Given Dose  30 mg Route  Intramuscular Administered By  Levi Bonilla          triamcinolone acetonide (KENALOG-40) injection 40 mg     Admin Date  06/24/2020 Action  Given Dose  40 mg Route  Intramuscular Administered By  Arlettetan

## 2020-07-10 ENCOUNTER — TELEPHONE (OUTPATIENT)
Dept: PULMONOLOGY | Age: 85
End: 2020-07-10

## 2020-07-10 RX ORDER — BUDESONIDE AND FORMOTEROL FUMARATE DIHYDRATE 160; 4.5 UG/1; UG/1
2 AEROSOL RESPIRATORY (INHALATION) 2 TIMES DAILY
Qty: 1 INHALER | Refills: 1 | Status: SHIPPED
Start: 2020-07-10 | End: 2020-11-20 | Stop reason: SDUPTHER

## 2020-07-13 ENCOUNTER — OFFICE VISIT (OUTPATIENT)
Dept: CARDIOLOGY CLINIC | Age: 85
End: 2020-07-13
Payer: MEDICARE

## 2020-07-13 VITALS
BODY MASS INDEX: 28.31 KG/M2 | HEART RATE: 102 BPM | SYSTOLIC BLOOD PRESSURE: 114 MMHG | RESPIRATION RATE: 16 BRPM | DIASTOLIC BLOOD PRESSURE: 82 MMHG | HEIGHT: 68 IN | WEIGHT: 186.8 LBS

## 2020-07-13 PROCEDURE — 1090F PRES/ABSN URINE INCON ASSESS: CPT | Performed by: INTERNAL MEDICINE

## 2020-07-13 PROCEDURE — 93000 ELECTROCARDIOGRAM COMPLETE: CPT | Performed by: INTERNAL MEDICINE

## 2020-07-13 PROCEDURE — G8427 DOCREV CUR MEDS BY ELIG CLIN: HCPCS | Performed by: INTERNAL MEDICINE

## 2020-07-13 PROCEDURE — 99214 OFFICE O/P EST MOD 30 MIN: CPT | Performed by: INTERNAL MEDICINE

## 2020-07-13 PROCEDURE — G8417 CALC BMI ABV UP PARAM F/U: HCPCS | Performed by: INTERNAL MEDICINE

## 2020-07-13 PROCEDURE — 1036F TOBACCO NON-USER: CPT | Performed by: INTERNAL MEDICINE

## 2020-07-13 PROCEDURE — 4040F PNEUMOC VAC/ADMIN/RCVD: CPT | Performed by: INTERNAL MEDICINE

## 2020-07-13 PROCEDURE — 1123F ACP DISCUSS/DSCN MKR DOCD: CPT | Performed by: INTERNAL MEDICINE

## 2020-07-13 RX ORDER — DILTIAZEM HYDROCHLORIDE 180 MG/1
180 CAPSULE, COATED, EXTENDED RELEASE ORAL 2 TIMES DAILY
Qty: 60 CAPSULE | Refills: 5 | Status: SHIPPED
Start: 2020-07-13 | End: 2020-11-11

## 2020-07-13 NOTE — PROGRESS NOTES
Chief Complaint   Patient presents with    Atrial Fibrillation       Patient Active Problem List    Diagnosis Date Noted    Vitamin D deficiency 06/18/2019    Hyperlipidemia     Depression     Asthma     History of nuclear stress test 09/24/2018     Overview Note:     Normal pharm stress 8/2018      Persistent atrial fibrillation 06/15/2018     Overview Note:     A. Initial presentation June 2018. CHADS-VASC = 3  B. CONSTANTINO guided DC cardioversion  6/15/2018. No significant structural disesae on CONSTANTINO  C. DCCV 9/2019         Current Outpatient Medications   Medication Sig Dispense Refill    budesonide-formoterol (SYMBICORT) 160-4.5 MCG/ACT AERO Inhale 2 puffs into the lungs 2 times daily Rinse mouth after use. 1 Inhaler 1    ketoconazole (NIZORAL) 2 % shampoo Apply for 10 minutes to skin daily & rinse, use for 1-2 weeks if symptoms progress. 1 Bottle 1    dilTIAZem (CARDIZEM CD) 180 MG extended release capsule TAKE 1 CAPSULE BY MOUTH EVERY DAY 90 capsule 3    rivaroxaban (XARELTO) 20 MG TABS tablet Take 1 tablet by mouth daily 30 tablet 5    levothyroxine (SYNTHROID) 100 MCG tablet Take 1 tablet by mouth daily  3    simvastatin (ZOCOR) 40 MG tablet Take 40 mg by mouth nightly   3    citalopram (CELEXA) 20 MG tablet Take 20 mg by mouth daily      vitamin B-12 (CYANOCOBALAMIN) 100 MCG tablet Take 50 mcg by mouth daily.  albuterol (PROVENTIL HFA) 108 (90 BASE) MCG/ACT inhaler Inhale 2 puffs into the lungs every 4 hours as needed for Wheezing or Shortness of Breath. 1 Inhaler 0    Cholecalciferol (VITAMIN D3) 2000 units TABS Take 1 tablet by mouth daily       Biotin 5000 MCG CAPS Take 1 capsule by mouth daily.  Vitamin E 400 UNIT TABS Take 1 tablet by mouth daily.  benzonatate (TESSALON) 200 MG capsule Take 1 capsule by mouth 3 times daily as needed for Cough (Patient not taking: Reported on 7/13/2020) 15 capsule 0     No current facility-administered medications for this visit. Allergies   Allergen Reactions    Codeine Nausea And Vomiting    Codimal-A [Brompheniramine]     Lipitor Other (See Comments)     GENERALIZED ACHING, DIFFICULT TO FUNCTION    Spiriva Handihaler [Tiotropium Bromide Monohydrate]     Tudorza Pressair [Aclidinium Bromide]     Ciprofloxacin Nausea And Vomiting       Vitals:    20 0908   BP: 114/82   Pulse: 102   Resp: 16   Weight: 186 lb 12.8 oz (84.7 kg)   Height: 5' 8\" (1.727 m)       Social History     Socioeconomic History    Marital status:      Spouse name: Not on file    Number of children: Not on file    Years of education: Not on file    Highest education level: Not on file   Occupational History    Not on file   Social Needs    Financial resource strain: Not on file    Food insecurity     Worry: Not on file     Inability: Not on file    Transportation needs     Medical: Not on file     Non-medical: Not on file   Tobacco Use    Smoking status: Former Smoker     Types: Cigarettes     Last attempt to quit: 1994     Years since quittin.8    Smokeless tobacco: Never Used   Substance and Sexual Activity    Alcohol use: No     Comment: RARE    Drug use: No    Sexual activity: Not on file   Lifestyle    Physical activity     Days per week: Not on file     Minutes per session: Not on file    Stress: Not on file   Relationships    Social connections     Talks on phone: Not on file     Gets together: Not on file     Attends Je[employer]'s witness service: Not on file     Active member of club or organization: Not on file     Attends meetings of clubs or organizations: Not on file     Relationship status: Not on file    Intimate partner violence     Fear of current or ex partner: Not on file     Emotionally abused: Not on file     Physically abused: Not on file     Forced sexual activity: Not on file   Other Topics Concern    Not on file   Social History Narrative    Not on file       History reviewed.  No pertinent family history. SUBJECTIVE: Ignacia Yip presents to the office today for re-evaluation of cardiac diagnoses. She complains of VASQUES and denies   chest pain, chest pressure/discomfort, claudication, dyspnea, exertional chest pressure/discomfort, fatigue, irregular heart beat, lower extremity edema, near-syncope, orthopnea, palpitations, paroxysmal nocturnal dyspnea, syncope and tachypnea. Compliant with NOACs, no bleeding. Does all  chores without any issue. NOT taking AV yovani blocker as prescribed. Review of Systems:   Heart: as above   Lungs: as above   Eyes: denies changes in vision or discharge. Ears: denies changes in hearing or pain. Nose: denies epistaxis or masses   Throat: denies sore throat or trouble swallowing. Neuro: denies numbness, tingling, tremors. Skin: denies rashes or itching. : denies hematuria, dysuria   GI: denies vomiting, diarrhea   Psych: denies mood changed, anxiety, depression. all others negative. Physical Exam   /82   Pulse 102   Resp 16   Ht 5' 8\" (1.727 m)   Wt 186 lb 12.8 oz (84.7 kg)   BMI 28.40 kg/m²   Constitutional: Oriented to person, place, and time. Well-developed and well-nourished. No distress. Head: Normocephalic and atraumatic. Eyes: EOM are normal. Pupils are equal, round, and reactive to light. Neck: Normal range of motion. Neck supple. No hepatojugular reflux and no JVD present. Carotid bruit is not present. No tracheal deviation present. No thyromegaly present. Cardiovascular: rapid rate, irregular rhythm, normal heart sounds and intact distal pulses. Exam reveals no gallop and no friction rub. No murmur heard. Pulmonary/Chest: Effort normal and breath sounds normal. No respiratory distress. No wheezes. No rales. No tenderness. Abdominal: Soft. Bowel sounds are normal. No distension and no mass. No tenderness. No rebound and no guarding. Musculoskeletal: Normal range of motion. No edema and no tenderness. Neurological: Alert and oriented to person, place, and time. Skin: Skin is warm and dry. No rash noted. Not diaphoretic. No erythema. Psychiatric: Normal mood and affect. Behavior is normal.     EKG: afib with RVR, nonspecific ST and T waves changes. ASSESSMENT AND PLAN:  Patient Active Problem List   Diagnosis    Persistent atrial fibrillation: s/p DC cardio 6/2018 and 9/2019 with elevated chads vasc . Had some diastolic failure with initial presenation, but no structural disease of significance on CONSTANTINO. Now back in afib. Rate control strategy - Xarelto samples given, also E-prescribed a refill, and BID dosing of cardizem.  HLD (hyperlipidemia) on statin          The patient was educated as to the symptoms that would require a prompt return to our office.   Return visit 6 months  Delbert Small M.D  [id number] [facility]

## 2020-08-28 ENCOUNTER — HOSPITAL ENCOUNTER (OUTPATIENT)
Age: 85
Discharge: HOME | End: 2020-08-30

## 2020-08-28 PROCEDURE — 88305 TISSUE EXAM BY PATHOLOGIST: CPT

## 2020-09-05 ENCOUNTER — HOSPITAL ENCOUNTER (OUTPATIENT)
Age: 85
Setting detail: OBSERVATION
Discharge: HOME | End: 2020-09-07
Attending: EMERGENCY MEDICINE | Admitting: INTERNAL MEDICINE
Payer: MEDICARE

## 2020-09-05 PROBLEM — K92.2 LOWER GI BLEED: Status: ACTIVE | Noted: 2020-09-05

## 2020-09-05 LAB
ABO/RH: NORMAL
ALBUMIN SERPL-MCNC: 3.7 G/DL (ref 3.5–5.2)
ALP BLD-CCNC: 64 U/L (ref 35–104)
ALT SERPL-CCNC: 11 U/L (ref 0–32)
ANION GAP SERPL CALCULATED.3IONS-SCNC: 13 MMOL/L (ref 7–16)
ANTIBODY SCREEN: NORMAL
APTT: 34.6 SEC (ref 24.5–35.1)
AST SERPL-CCNC: 20 U/L (ref 0–31)
BASOPHILS ABSOLUTE: 0.03 E9/L (ref 0–0.2)
BASOPHILS RELATIVE PERCENT: 0.4 % (ref 0–2)
BILIRUB SERPL-MCNC: 0.5 MG/DL (ref 0–1.2)
BUN BLDV-MCNC: 20 MG/DL (ref 8–23)
CALCIUM SERPL-MCNC: 9.1 MG/DL (ref 8.6–10.2)
CHLORIDE BLD-SCNC: 103 MMOL/L (ref 98–107)
CO2: 25 MMOL/L (ref 22–29)
CREAT SERPL-MCNC: 0.9 MG/DL (ref 0.5–1)
EOSINOPHILS ABSOLUTE: 0.25 E9/L (ref 0.05–0.5)
EOSINOPHILS RELATIVE PERCENT: 3.2 % (ref 0–6)
GFR AFRICAN AMERICAN: >60
GFR NON-AFRICAN AMERICAN: 59 ML/MIN/1.73
GLUCOSE BLD-MCNC: 100 MG/DL (ref 74–99)
HCT VFR BLD CALC: 37.4 % (ref 34–48)
HCT VFR BLD CALC: 42.2 % (ref 34–48)
HEMOGLOBIN: 11.8 G/DL (ref 11.5–15.5)
HEMOGLOBIN: 13.3 G/DL (ref 11.5–15.5)
IMMATURE GRANULOCYTES #: 0.04 E9/L
IMMATURE GRANULOCYTES %: 0.5 % (ref 0–5)
INR BLD: 1.5
LYMPHOCYTES ABSOLUTE: 1.21 E9/L (ref 1.5–4)
LYMPHOCYTES RELATIVE PERCENT: 15.5 % (ref 20–42)
MCH RBC QN AUTO: 28.6 PG (ref 26–35)
MCHC RBC AUTO-ENTMCNC: 31.5 % (ref 32–34.5)
MCV RBC AUTO: 90.8 FL (ref 80–99.9)
MONOCYTES ABSOLUTE: 0.55 E9/L (ref 0.1–0.95)
MONOCYTES RELATIVE PERCENT: 7 % (ref 2–12)
NEUTROPHILS ABSOLUTE: 5.73 E9/L (ref 1.8–7.3)
NEUTROPHILS RELATIVE PERCENT: 73.4 % (ref 43–80)
PDW BLD-RTO: 14.4 FL (ref 11.5–15)
PLATELET # BLD: 165 E9/L (ref 130–450)
PMV BLD AUTO: 12.7 FL (ref 7–12)
POTASSIUM REFLEX MAGNESIUM: 4.1 MMOL/L (ref 3.5–5)
PROTHROMBIN TIME: 17 SEC (ref 9.3–12.4)
RBC # BLD: 4.65 E12/L (ref 3.5–5.5)
SODIUM BLD-SCNC: 141 MMOL/L (ref 132–146)
TOTAL PROTEIN: 6 G/DL (ref 6.4–8.3)
WBC # BLD: 7.8 E9/L (ref 4.5–11.5)

## 2020-09-05 PROCEDURE — 85025 COMPLETE CBC W/AUTO DIFF WBC: CPT

## 2020-09-05 PROCEDURE — 80053 COMPREHEN METABOLIC PANEL: CPT

## 2020-09-05 PROCEDURE — 99284 EMERGENCY DEPT VISIT MOD MDM: CPT

## 2020-09-05 PROCEDURE — 86850 RBC ANTIBODY SCREEN: CPT

## 2020-09-05 PROCEDURE — G0378 HOSPITAL OBSERVATION PER HR: HCPCS

## 2020-09-05 PROCEDURE — 85014 HEMATOCRIT: CPT

## 2020-09-05 PROCEDURE — 85610 PROTHROMBIN TIME: CPT

## 2020-09-05 PROCEDURE — 86901 BLOOD TYPING SEROLOGIC RH(D): CPT

## 2020-09-05 PROCEDURE — 85730 THROMBOPLASTIN TIME PARTIAL: CPT

## 2020-09-05 PROCEDURE — 85018 HEMOGLOBIN: CPT

## 2020-09-05 PROCEDURE — 86900 BLOOD TYPING SEROLOGIC ABO: CPT

## 2020-09-05 PROCEDURE — 36415 COLL VENOUS BLD VENIPUNCTURE: CPT

## 2020-09-05 RX ORDER — BUDESONIDE AND FORMOTEROL FUMARATE DIHYDRATE 160; 4.5 UG/1; UG/1
2 AEROSOL RESPIRATORY (INHALATION) 2 TIMES DAILY
Status: DISCONTINUED | OUTPATIENT
Start: 2020-09-05 | End: 2020-09-05 | Stop reason: CLARIF

## 2020-09-05 RX ORDER — DILTIAZEM HYDROCHLORIDE 180 MG/1
180 CAPSULE, COATED, EXTENDED RELEASE ORAL DAILY
Status: DISCONTINUED | OUTPATIENT
Start: 2020-09-05 | End: 2020-09-07 | Stop reason: HOSPADM

## 2020-09-05 RX ORDER — ALBUTEROL SULFATE 2.5 MG/3ML
2.5 SOLUTION RESPIRATORY (INHALATION) EVERY 4 HOURS PRN
Status: DISCONTINUED | OUTPATIENT
Start: 2020-09-05 | End: 2020-09-07 | Stop reason: HOSPADM

## 2020-09-05 RX ORDER — ARFORMOTEROL TARTRATE 15 UG/2ML
15 SOLUTION RESPIRATORY (INHALATION) 2 TIMES DAILY
Status: DISCONTINUED | OUTPATIENT
Start: 2020-09-05 | End: 2020-09-07 | Stop reason: HOSPADM

## 2020-09-05 RX ORDER — CITALOPRAM 20 MG/1
20 TABLET ORAL DAILY
Status: DISCONTINUED | OUTPATIENT
Start: 2020-09-05 | End: 2020-09-07 | Stop reason: HOSPADM

## 2020-09-05 RX ORDER — BUDESONIDE 0.5 MG/2ML
0.5 INHALANT ORAL 2 TIMES DAILY
Status: DISCONTINUED | OUTPATIENT
Start: 2020-09-05 | End: 2020-09-07 | Stop reason: HOSPADM

## 2020-09-05 RX ORDER — LEVOTHYROXINE SODIUM 0.1 MG/1
100 TABLET ORAL DAILY
Status: DISCONTINUED | OUTPATIENT
Start: 2020-09-06 | End: 2020-09-07 | Stop reason: HOSPADM

## 2020-09-05 RX ORDER — VITAMIN E 268 MG
400 CAPSULE ORAL DAILY
Status: DISCONTINUED | OUTPATIENT
Start: 2020-09-05 | End: 2020-09-07 | Stop reason: HOSPADM

## 2020-09-05 RX ORDER — LANOLIN ALCOHOL/MO/W.PET/CERES
2000 CREAM (GRAM) TOPICAL DAILY
Status: DISCONTINUED | OUTPATIENT
Start: 2020-09-05 | End: 2020-09-07 | Stop reason: HOSPADM

## 2020-09-05 RX ORDER — ATORVASTATIN CALCIUM 40 MG/1
40 TABLET, FILM COATED ORAL DAILY
Status: DISCONTINUED | OUTPATIENT
Start: 2020-09-05 | End: 2020-09-06

## 2020-09-05 RX ORDER — CHOLECALCIFEROL (VITAMIN D3) 50 MCG
2000 TABLET ORAL DAILY
Status: DISCONTINUED | OUTPATIENT
Start: 2020-09-06 | End: 2020-09-07 | Stop reason: HOSPADM

## 2020-09-05 RX ORDER — PANTOPRAZOLE SODIUM 40 MG/1
40 TABLET, DELAYED RELEASE ORAL
Status: DISCONTINUED | OUTPATIENT
Start: 2020-09-06 | End: 2020-09-07 | Stop reason: HOSPADM

## 2020-09-05 ASSESSMENT — PAIN SCALES - GENERAL
PAINLEVEL_OUTOF10: 0
PAINLEVEL_OUTOF10: 0

## 2020-09-05 NOTE — ED PROVIDER NOTES
HPI:  9/5/20,   Time: 9:49 AM EDT       Tery Thompson is a 80 y.o. female presenting to the ED for rectal bleeding, beginning 4 hrs ago. The complaint has been intermittent, moderate in severity, and worsened by nothing. On xarelto, colonoscopy 1 week ago with biopsies. Gi bleed this am.  No pain, no other c/o, spoke with dr Hattie Blanco, told to come to ed for eval.      Review of Systems:   Pertinent positives and negatives are stated within HPI, all other systems reviewed and are negative.          --------------------------------------------- PAST HISTORY ---------------------------------------------  Past Medical History:  has a past medical history of Asthma, Depression, Hyperlipidemia, and Thyroid disease. Past Surgical History:  has a past surgical history that includes Tonsillectomy; Hysterectomy; Thyroidectomy (17 YEARS AGO); Appendectomy (58 YEARS AGO); eye surgery; Neck surgery (9/9/2011); Cholecystectomy; and Cardioversion (09/20/2019). Social History:  reports that she quit smoking about 25 years ago. Her smoking use included cigarettes. She has never used smokeless tobacco. She reports that she does not drink alcohol or use drugs. Family History: family history is not on file. The patients home medications have been reviewed. Allergies: Codeine; Codimal-a [brompheniramine]; Lipitor; Spiriva handihaler [tiotropium bromide monohydrate];  Tudorza pressair [aclidinium bromide]; and Ciprofloxacin        ---------------------------------------------------PHYSICAL EXAM--------------------------------------    Constitutional/General: Alert and oriented x3, well appearing, non toxic in NAD  Head: Normocephalic and atraumatic  Eyes: PERRL, EOMI, conjunctive normal, sclera non icteric  Mouth: Oropharynx clear, handling secretions, no trismus, no asymmetry of the posterior oropharynx or uvular edema  Neck: Supple, full ROM, non tender to palpation in the midline, no stridor, no crepitus, no meningeal signs  Respiratory: Lungs clear to auscultation bilaterally, no wheezes, rales, or rhonchi. Not in respiratory distress  Cardiovascular:  Regular rate. Regular rhythm. No murmurs, gallops, or rubs. 2+ distal pulses  Chest: No chest wall tenderness  GI:  Abdomen Soft, Non tender, Non distended. +BS. No organomegaly, no palpable masses,  No rebound, guarding, or rigidity. Musculoskeletal: Moves all extremities x 4. Warm and well perfused, no clubbing, cyanosis, or edema. Capillary refill <3 seconds  Integument: skin warm and dry. No rashes. Lymphatic: no lymphadenopathy noted  Neurologic: GCS 15, no focal deficits, symmetric strength 5/5 in the upper and lower extremities bilaterally  Psychiatric: Normal Affect    -------------------------------------------------- RESULTS -------------------------------------------------  I have personally reviewed all laboratory and imaging results for this patient. Results are listed below.      LABS:  Results for orders placed or performed during the hospital encounter of 09/05/20   CBC Auto Differential   Result Value Ref Range    WBC 7.8 4.5 - 11.5 E9/L    RBC 4.65 3.50 - 5.50 E12/L    Hemoglobin 13.3 11.5 - 15.5 g/dL    Hematocrit 42.2 34.0 - 48.0 %    MCV 90.8 80.0 - 99.9 fL    MCH 28.6 26.0 - 35.0 pg    MCHC 31.5 (L) 32.0 - 34.5 %    RDW 14.4 11.5 - 15.0 fL    Platelets 919 823 - 665 E9/L    MPV 12.7 (H) 7.0 - 12.0 fL    Neutrophils % 73.4 43.0 - 80.0 %    Immature Granulocytes % 0.5 0.0 - 5.0 %    Lymphocytes % 15.5 (L) 20.0 - 42.0 %    Monocytes % 7.0 2.0 - 12.0 %    Eosinophils % 3.2 0.0 - 6.0 %    Basophils % 0.4 0.0 - 2.0 %    Neutrophils Absolute 5.73 1.80 - 7.30 E9/L    Immature Granulocytes # 0.04 E9/L    Lymphocytes Absolute 1.21 (L) 1.50 - 4.00 E9/L    Monocytes Absolute 0.55 0.10 - 0.95 E9/L    Eosinophils Absolute 0.25 0.05 - 0.50 E9/L    Basophils Absolute 0.03 0.00 - 0.20 E9/L   Comprehensive Metabolic Panel w/ Reflex to MG   Result Value Ref Range Sodium 141 132 - 146 mmol/L    Potassium reflex Magnesium 4.1 3.5 - 5.0 mmol/L    Chloride 103 98 - 107 mmol/L    CO2 25 22 - 29 mmol/L    Anion Gap 13 7 - 16 mmol/L    Glucose 100 (H) 74 - 99 mg/dL    BUN 20 8 - 23 mg/dL    CREATININE 0.9 0.5 - 1.0 mg/dL    GFR Non-African American 59 >=60 mL/min/1.73    GFR African American >60     Calcium 9.1 8.6 - 10.2 mg/dL    Total Protein 6.0 (L) 6.4 - 8.3 g/dL    Alb 3.7 3.5 - 5.2 g/dL    Total Bilirubin 0.5 0.0 - 1.2 mg/dL    Alkaline Phosphatase 64 35 - 104 U/L    ALT 11 0 - 32 U/L    AST 20 0 - 31 U/L   Protime-INR   Result Value Ref Range    Protime 17.0 (H) 9.3 - 12.4 sec    INR 1.5    APTT   Result Value Ref Range    aPTT 34.6 24.5 - 35.1 sec   TYPE AND SCREEN   Result Value Ref Range    ABO/Rh O NEG     Antibody Screen NEG        RADIOLOGY:  Interpreted by Radiologist.  No orders to display       EKG: This EKG is signed and interpreted by the EP. Time:   Rate:   Rhythm:   Interpretation:   Comparison:       ------------------------- NURSING NOTES AND VITALS REVIEWED ---------------------------   The nursing notes within the ED encounter and vital signs as below have been reviewed by myself. BP (!) 134/90   Pulse 101   Temp 97.1 °F (36.2 °C) (Temporal)   Resp 18   Wt 186 lb (84.4 kg)   SpO2 97%   BMI 28.28 kg/m²   Oxygen Saturation Interpretation: Normal    The patients available past medical records and past encounters were reviewed. ------------------------------ ED COURSE/MEDICAL DECISION MAKING----------------------  Medications - No data to display      ED COURSE:       Medical Decision Making:    Gi bleed on anticoag, obs for serial h/h. Pt agreeable with poc      This patient's ED course included: a personal history and physicial examination    This patient has remained hemodynamically stable during their ED course. Re-Evaluations:             Re-evaluation.   Patients symptoms show no change    Re-examination  9/5/20   9:49 AM

## 2020-09-05 NOTE — CONSULTS
Department of General Surgery - Adult  Surgical Service  Medical Student Consult Note      Reason for Consult:  Hematochezia  Requesting Physician:  Apolinar Mendez COMPLAINT:  Hematochezia    History Obtained From:  patient    HISTORY OF PRESENT ILLNESS:                The patient is a 80 y.o. female with a hx of permanent A-fib on xarelto that presents with painless geovanny bloody diarrhea that started this morning. The patient underwent a colonoscopy performed by Dr. Gissel Matos on 08/28 during which 2x cecal and 1x ascending colon polypectomies were performed. She states that she had experienced no issues from the time the colonoscopy was performed until today, when she had a bowel movement this morning and noticed red/maroon blood filling the toilet bowl. She claims that she had 3 more bloody bowel movements this morning which prompted her to present to the ED for evaluation. The hematochezia has persisted throughout the day, totaling 9-10 bloody bowel movements at the time of evaluation. She reports the passage of some stool and clots with the blood. She denies any associated abdominal pain or discomfort during or following bowel movements. She denies any shortness of breath, chest pain, or dizziness and states that she feels completely normal with the exception of passing blood. The patient is currently on Xarelto 20mg for her A fib and her last dose was last night.     In the ED at 1000 the patient's Hgb was found to be 13.3, repeat H/H at 1500 was 11.8    Past Medical History:        Diagnosis Date    Asthma     Depression     Hyperlipidemia     Thyroid disease      Past Surgical History:        Procedure Laterality Date    APPENDECTOMY  62 YEARS AGO    CARDIOVERSION  09/20/2019    Dr. Aamir Desai 1 shock 200 joules Successful    CHOLECYSTECTOMY      EYE SURGERY      CATARACT RIGHT AND LEFT    HYSTERECTOMY      NECK SURGERY  9/9/2011    THYROIDECTOMY  17 YEARS AGO    TONSILLECTOMY       Current Medications: Current Facility-Administered Medications: albuterol (PROVENTIL) nebulizer solution 2.5 mg, 2.5 mg, Nebulization, Q4H PRN  [START ON 9/6/2020] vitamin D (CHOLECALCIFEROL) tablet 2,000 Units, 2,000 Units, Oral, Daily  citalopram (CELEXA) tablet 20 mg, 20 mg, Oral, Daily  dilTIAZem (CARDIZEM CD) extended release capsule 180 mg, 180 mg, Oral, Daily  [START ON 9/6/2020] levothyroxine (SYNTHROID) tablet 100 mcg, 100 mcg, Oral, Daily  atorvastatin (LIPITOR) tablet 40 mg, 40 mg, Oral, Daily  vitamin B-12 (CYANOCOBALAMIN) tablet 2,000 mcg, 2,000 mcg, Oral, Daily  vitamin E capsule 400 Units, 400 Units, Oral, Daily  Arformoterol Tartrate (BROVANA) nebulizer solution 15 mcg, 15 mcg, Nebulization, BID **AND** budesonide (PULMICORT) nebulizer suspension 500 mcg, 0.5 mg, Nebulization, BID  Allergies:  Codeine; Codimal-a [brompheniramine]; Lipitor; Spiriva handihaler [tiotropium bromide monohydrate]; Tudorza pressair [aclidinium bromide]; and Ciprofloxacin    Social History:   TOBACCO:  Former smoker, quit 15 years ago  ETOH:  Never  DRUGS:  Never used recreational drugs  Family History:   History reviewed. No pertinent family history. REVIEW OF SYSTEMS:    CONSTITUTIONAL:  negative for  fevers, chills, fatigue and malaise  HEENT:  negative for  hearing loss, tinnitus, epistaxis and sore mouth  RESPIRATORY:  negative for  cough with sputum, dyspnea, hemoptysis and chest pain  CARDIOVASCULAR:  negative for  chest pain, dyspnea, palpitations, exertional chest pressure/discomfort, fatigue  GASTROINTESTINAL:  negative for nausea, vomiting, abdominal pain, abdominal distention, regurgitation and hematemesis.  POSITIVE for hematochezia  GENITOURINARY:  negative for frequency, dysuria and nocturia  HEMATOLOGIC/LYMPHATIC:  negative for easy bruising, lymphadenopathy and petechiae  MUSCULOSKELETAL:  negative for  myalgias, joint swelling and muscle weakness  NEUROLOGICAL:  negative for dizziness, seizures, tremor, dysphagia and syncope  BEHAVIOR/PSYCH:  negative for poor appetite, poor concentration and depressed mood    PHYSICAL EXAM:    VITALS:  BP (!) 134/90   Pulse 101   Temp 97.1 °F (36.2 °C) (Temporal)   Resp 18   Wt 186 lb (84.4 kg)   SpO2 97%   BMI 28.28 kg/m²   CONSTITUTIONAL:  awake, alert, cooperative, no apparent distress, and appears younger than stated age  EYES:  pupils equal, round and reactive to light, extra-ocular muscles intact, sclera clear and vision intact  HEMATOLOGIC/LYMPHATICS:  no cervical lymphadenopathy  LUNGS:  No increased work of breathing, good air exchange, clear to auscultation bilaterally, no crackles or wheezing  CARDIOVASCULAR:  irregularly irregular rhythm, normal S1 and S2 and no murmur noted  ABDOMEN:  scars noted right upper quadrant, normal bowel sounds, non-distended and non-tender  NEUROLOGIC:  Awake, alert, oriented to name, place and time. Cranial nerves II-XII are grossly intact.     SKIN:  normal skin color, texture, turgor and no redness, warmth, or swelling  DATA:    CBC with Differential:    Lab Results   Component Value Date    WBC 7.8 09/05/2020    RBC 4.65 09/05/2020    HGB 11.8 09/05/2020    HCT 37.4 09/05/2020     09/05/2020    MCV 90.8 09/05/2020    MCH 28.6 09/05/2020    MCHC 31.5 09/05/2020    RDW 14.4 09/05/2020    LYMPHOPCT 15.5 09/05/2020    MONOPCT 7.0 09/05/2020    BASOPCT 0.4 09/05/2020    MONOSABS 0.55 09/05/2020    LYMPHSABS 1.21 09/05/2020    EOSABS 0.25 09/05/2020    BASOSABS 0.03 09/05/2020     CMP:    Lab Results   Component Value Date     09/05/2020    K 4.1 09/05/2020     09/05/2020    CO2 25 09/05/2020    BUN 20 09/05/2020    CREATININE 0.9 09/05/2020    GFRAA >60 09/05/2020    LABGLOM 59 09/05/2020    GLUCOSE 100 09/05/2020    PROT 6.0 09/05/2020    LABALBU 3.7 09/05/2020    CALCIUM 9.1 09/05/2020    BILITOT 0.5 09/05/2020    ALKPHOS 64 09/05/2020    AST 20 09/05/2020    ALT 11 09/05/2020     Hepatic Function Panel:    Lab Results Component Value Date    ALKPHOS 64 09/05/2020    ALT 11 09/05/2020    AST 20 09/05/2020    PROT 6.0 09/05/2020    BILITOT 0.5 09/05/2020    BILIDIR <0.2 08/27/2018    IBILI see below 08/27/2018    LABALBU 3.7 09/05/2020       Assessment  81 yo F hx A-fib on xarelto s/p colonoscopy w/ polypectomy x3 one week ago presents with painless hematochezia; suspected bleeding from polypectomy sites vs internal hemorrhoids    Plan  - Hold home xarelto - most recent dose last night  - Continue to monitor H/H Q8. Monitor for any signs of symptomatic anemia including dizziness, shortness of breath, chest pain.    - Type and cross; transfuse pRBC's as clinically indicated  - If bleeding fails to slow following cessation of anticoagulation, will consider urgent endoscopic intervention  - Home meds

## 2020-09-06 LAB
HCT VFR BLD CALC: 33.1 % (ref 34–48)
HCT VFR BLD CALC: 34.7 % (ref 34–48)
HCT VFR BLD CALC: 35 % (ref 34–48)
HCT VFR BLD CALC: 35.4 % (ref 34–48)
HEMOGLOBIN: 10.5 G/DL (ref 11.5–15.5)
HEMOGLOBIN: 11 G/DL (ref 11.5–15.5)
HEMOGLOBIN: 11.1 G/DL (ref 11.5–15.5)
HEMOGLOBIN: 11.3 G/DL (ref 11.5–15.5)

## 2020-09-06 PROCEDURE — 36415 COLL VENOUS BLD VENIPUNCTURE: CPT

## 2020-09-06 PROCEDURE — 6370000000 HC RX 637 (ALT 250 FOR IP): Performed by: INTERNAL MEDICINE

## 2020-09-06 PROCEDURE — 6370000000 HC RX 637 (ALT 250 FOR IP): Performed by: STUDENT IN AN ORGANIZED HEALTH CARE EDUCATION/TRAINING PROGRAM

## 2020-09-06 PROCEDURE — 85018 HEMOGLOBIN: CPT

## 2020-09-06 PROCEDURE — 85014 HEMATOCRIT: CPT

## 2020-09-06 PROCEDURE — G0378 HOSPITAL OBSERVATION PER HR: HCPCS

## 2020-09-06 PROCEDURE — 6360000002 HC RX W HCPCS: Performed by: INTERNAL MEDICINE

## 2020-09-06 PROCEDURE — 94664 DEMO&/EVAL PT USE INHALER: CPT

## 2020-09-06 RX ORDER — SIMVASTATIN 10 MG
40 TABLET ORAL NIGHTLY
Status: DISCONTINUED | OUTPATIENT
Start: 2020-09-06 | End: 2020-09-07 | Stop reason: HOSPADM

## 2020-09-06 RX ADMIN — ALBUTEROL SULFATE 2.5 MG: 2.5 SOLUTION RESPIRATORY (INHALATION) at 21:31

## 2020-09-06 RX ADMIN — LEVOTHYROXINE SODIUM 100 MCG: 0.1 TABLET ORAL at 06:21

## 2020-09-06 RX ADMIN — Medication 400 UNITS: at 08:23

## 2020-09-06 RX ADMIN — PANTOPRAZOLE SODIUM 40 MG: 40 TABLET, DELAYED RELEASE ORAL at 08:25

## 2020-09-06 RX ADMIN — SIMVASTATIN 40 MG: 10 TABLET, FILM COATED ORAL at 22:14

## 2020-09-06 RX ADMIN — Medication 2000 UNITS: at 08:22

## 2020-09-06 RX ADMIN — CITALOPRAM HYDROBROMIDE 20 MG: 20 TABLET ORAL at 09:00

## 2020-09-06 RX ADMIN — DILTIAZEM HYDROCHLORIDE 180 MG: 180 CAPSULE, EXTENDED RELEASE ORAL at 08:24

## 2020-09-06 RX ADMIN — Medication 2000 MCG: at 08:23

## 2020-09-06 ASSESSMENT — PAIN SCALES - GENERAL
PAINLEVEL_OUTOF10: 0

## 2020-09-06 NOTE — PROGRESS NOTES
Surgical resident notified that patient has had two more bloody bowel movements within the last hour.     Ethel Carranza RN

## 2020-09-06 NOTE — H&P
Eva Warren is a 80 y.o. female with PMH of HTN, thyroid disease presents with bright red blood per rectum. She had a colonoscopy done by Dr. Clif Huff one week ago and has been having a normal post-operative course until this morning when she had a bowel movement that contained a large amount of blood. She then proceeded to have 4 more of these bloody BMs at home before she called Dr. Clif Huff and was instructed to come to the hospital. While here, her hemoglobin was found to be 13.3 then down to 11. 8. it is being trended every 8 hours. She is on Xarelto at home and her last dose was yesterday evening. She denies any abdominal pain, nausea or vomiting. No dizziness or lightheadedness. admitted to monitor     Past Medical History:   Diagnosis Date    Asthma     Depression     Hyperlipidemia     Thyroid disease        Past Surgical History:   Procedure Laterality Date    APPENDECTOMY  62 YEARS AGO    CARDIOVERSION  09/20/2019    Dr. Ana Cespedes 1 shock 200 joules Successful    CHOLECYSTECTOMY      EYE SURGERY      CATARACT RIGHT AND LEFT    HYSTERECTOMY      NECK SURGERY  9/9/2011    THYROIDECTOMY  17 YEARS AGO    TONSILLECTOMY         History reviewed. No pertinent family history. Prior to Admission medications    Medication Sig Start Date End Date Taking? Authorizing Provider   rivaroxaban (XARELTO) 20 MG TABS tablet Take 1 tablet by mouth daily (with breakfast) for 1 day [id number] 4/22 7/13/20 9/5/20 Yes Betsy Daniel MD   dilTIAZem (CARDIZEM CD) 180 MG extended release capsule Take 1 capsule by mouth 2 times daily  Patient taking differently: Take 180 mg by mouth daily  7/13/20  Yes Betsy Daniel MD   budesonide-formoterol (SYMBICORT) 160-4.5 MCG/ACT AERO Inhale 2 puffs into the lungs 2 times daily Rinse mouth after use.  7/10/20  Yes Manas Gross MD   levothyroxine (SYNTHROID) 100 MCG tablet Take 1 tablet by mouth daily 3/29/18  Yes Historical Provider, MD   simvastatin (ZOCOR) 40 MG tablet Take 40 mg by mouth nightly  3/29/18  Yes Historical Provider, MD   citalopram (CELEXA) 20 MG tablet Take 20 mg by mouth daily   Yes Historical Provider, MD   vitamin B-12 (CYANOCOBALAMIN) 1000 MCG tablet Take 6,000 mcg by mouth daily    Yes Historical Provider, MD   albuterol (PROVENTIL HFA) 108 (90 BASE) MCG/ACT inhaler Inhale 2 puffs into the lungs every 4 hours as needed for Wheezing or Shortness of Breath. 14  Yes Joycelyn Gomez MD   Cholecalciferol (VITAMIN D3) 2000 units TABS Take 1 tablet by mouth daily    Yes Historical Provider, MD   Biotin 5000 MCG CAPS Take 1 capsule by mouth daily. Yes Historical Provider, MD   Vitamin E 400 UNIT TABS Take 1 tablet by mouth daily. Yes Historical Provider, MD        Allergies: Codeine; Codimal-a [brompheniramine]; Lipitor; Spiriva handihaler [tiotropium bromide monohydrate]; Richard Hitch [aclidinium bromide]; and Ciprofloxacin    Social History     Tobacco Use    Smoking status: Former Smoker     Types: Cigarettes     Last attempt to quit: 1994     Years since quittin.9    Smokeless tobacco: Never Used   Substance Use Topics    Alcohol use: No     Comment: RARE        Review of Systems:  Respiratory: negative for cough and hemoptysis  Cardiovascular: negative for chest pain and dyspnea  Gastrointestinal: negative for abdominal pain, diarrhea, nausea and vomiting,pos. Rectal bleed   Genitourinary:negative for dysuria and hematuria  Derm: negative for rash and skin lesion(s)  Neurological: negative for seizures and tremors  Endocrine: negative for diabetic symptoms including polydipsia and polyuria    Physical Exam:  Vitals:    20 0815   BP: 126/86   Pulse: 96   Resp: 18   Temp: 97.8 °F (36.6 °C)   SpO2: 96%      Skin:  Warm and dry.   No rash or bruises  HEENT:  PERRLA, EOMI  Neck:  No JVD, No thyromegaly, No carotid bruit  Cardiac:  RRR, No gallop or murmur  Lungs:  CTA, Normal percussion  Abdomen: Normal bowel sounds, no HSM, non-tender  Extremities:  No clubbing, edema or cyanosis  Neurological:  Moves all extremities. Labs:    CBC with Differential:    Lab Results   Component Value Date    WBC 7.8 09/05/2020    RBC 4.65 09/05/2020    HGB 11.1 09/06/2020    HCT 35.0 09/06/2020     09/05/2020    MCV 90.8 09/05/2020    MCH 28.6 09/05/2020    MCHC 31.5 09/05/2020    RDW 14.4 09/05/2020    LYMPHOPCT 15.5 09/05/2020    MONOPCT 7.0 09/05/2020    BASOPCT 0.4 09/05/2020    MONOSABS 0.55 09/05/2020    LYMPHSABS 1.21 09/05/2020    EOSABS 0.25 09/05/2020    BASOSABS 0.03 09/05/2020     CMP:    Lab Results   Component Value Date     09/05/2020    K 4.1 09/05/2020     09/05/2020    CO2 25 09/05/2020    BUN 20 09/05/2020    CREATININE 0.9 09/05/2020    GFRAA >60 09/05/2020    LABGLOM 59 09/05/2020    GLUCOSE 100 09/05/2020    PROT 6.0 09/05/2020    LABALBU 3.7 09/05/2020    CALCIUM 9.1 09/05/2020    BILITOT 0.5 09/05/2020    ALKPHOS 64 09/05/2020    AST 20 09/05/2020    ALT 11 09/05/2020         Assessment and Plan:    Patient Active Problem List   Diagnosis    Lower GI bleed     Anemia blood loss     Hyperlipidemia    Depression    A. Fib

## 2020-09-06 NOTE — PROGRESS NOTES
This nurse perfect served Dr. Enoch Stone, d/t Pt being upset that she has not seen a doctor today and wants to know what the plan of care is, message left with answering service, awaiting return call.

## 2020-09-06 NOTE — PROGRESS NOTES
GENERAL SURGERY  DAILY PROGRESS NOTE  9/6/2020    Subjective:  No acute events overnight. No nausea or vomiting. I BM at [facility] that showed some minor blood. She is tender in LLQ    Objective:  /86   Pulse 96   Temp 97.8 °F (36.6 °C) (Temporal)   Resp 18   Wt 186 lb (84.4 kg)   SpO2 96%   BMI 28.28 kg/m²     GENERAL:  Laying in bed, awake, and in no apparent distress  HEAD: Normocephalic  EYES: No sclera icterus  LUNGS:  No increased work of breathing  CARDIOVASCULAR:  RR  ABDOMEN:  Soft, non-distended, but mildly tender in the LLQ  EXTREMITIES: No edema or swelling  SKIN: Warm and dry    Assessment/Plan:  80 y.o. female with consistent bloody bowel movements that began this morning, 1 week post op from colonoscopy. - advance diet  - h/h monitoring  - ppi  - holding anticoagulants   - Will discuss with Dr. Maria Elena Pickens     Electronically signed by Odessa Caro DO on 9/6/2020 at 9:04 AM   No further bleeding; Zion Loo for diet; No Xarelto X3 more days. Clinically stable. Will follow in office, unless she rebleeds, our plans will change and she is aware of this.

## 2020-09-06 NOTE — CONSULTS
GENERAL SURGERY  CONSULT NOTE  9/5/2020    Physician Consulted: Dr. Ant Dias  Reason for Consult: Bloody per rectum  Referring Physician: Dr. Nat Vincent    MAREN Huertas is a 80 y.o. female with PMH of HTN, thyroid disease presents with bright red blood per rectum. She had a colonoscopy done by Dr. Jamel Samuels one week ago and has been having a normal post-operative course until this morning when she had a bowel movement that contained a large amount of blood. She then proceeded to have 4 more of these bloody BMs at home before she called Dr. Jamel Samuels and was instructed to come to the hospital. While here, her hemoglobin was found to be 13.3 then down to 11. 8. it is being trended every 8 hours. She is on Xarelto at home and her last dose was yesterday evening. She denies any abdominal pain, nausea or vomiting. No dizziness or lightheadedness. Past Medical History:   Diagnosis Date    Asthma     Depression     Hyperlipidemia     Thyroid disease        Past Surgical History:   Procedure Laterality Date    APPENDECTOMY  62 YEARS AGO    CARDIOVERSION  09/20/2019    Dr. Donnie Yung 1 shock 200 joules Successful    CHOLECYSTECTOMY      EYE SURGERY      CATARACT RIGHT AND LEFT    HYSTERECTOMY      NECK SURGERY  9/9/2011    THYROIDECTOMY  17 YEARS AGO    TONSILLECTOMY         Medications Prior to Admission:    Prior to Admission medications    Medication Sig Start Date End Date Taking? Authorizing Provider   rivaroxaban (XARELTO) 20 MG TABS tablet Take 1 tablet by mouth daily (with breakfast) for 1 day [id number] 4/22 7/13/20 9/5/20 Yes mG Campuzano MD   dilTIAZem (CARDIZEM CD) 180 MG extended release capsule Take 1 capsule by mouth 2 times daily  Patient taking differently: Take 180 mg by mouth daily  7/13/20  Yes Gm Campuzano MD   budesonide-formoterol (SYMBICORT) 160-4.5 MCG/ACT AERO Inhale 2 puffs into the lungs 2 times daily Rinse mouth after use.  7/10/20  Yes Silvano Zabala MD   levothyroxine (SYNTHROID) 100 MCG tablet Take 1 tablet by mouth daily 3/29/18  Yes Historical Provider, MD   simvastatin (ZOCOR) 40 MG tablet Take 40 mg by mouth nightly  3/29/18  Yes Historical Provider, MD   citalopram (CELEXA) 20 MG tablet Take 20 mg by mouth daily   Yes Historical Provider, MD   vitamin B-12 (CYANOCOBALAMIN) 1000 MCG tablet Take 6,000 mcg by mouth daily    Yes Historical Provider, MD   albuterol (PROVENTIL HFA) 108 (90 BASE) MCG/ACT inhaler Inhale 2 puffs into the lungs every 4 hours as needed for Wheezing or Shortness of Breath. 14  Yes Jersey Wayne MD   Cholecalciferol (VITAMIN D3) 2000 units TABS Take 1 tablet by mouth daily    Yes Historical Provider, MD   Biotin 5000 MCG CAPS Take 1 capsule by mouth daily. Yes Historical Provider, MD   Vitamin E 400 UNIT TABS Take 1 tablet by mouth daily. Yes Historical Provider, MD       Allergies   Allergen Reactions    Codeine Nausea And Vomiting    Codimal-A [Brompheniramine] Other (See Comments)     Altered mental state and confusion    Lipitor Other (See Comments)     GENERALIZED ACHING, DIFFICULT TO FUNCTION    Spiriva Handihaler [Tiotropium Bromide Monohydrate]     Tudorza Pressair [Aclidinium Bromide]     Ciprofloxacin Nausea And Vomiting       History reviewed. No pertinent family history.     Social History     Tobacco Use    Smoking status: Former Smoker     Types: Cigarettes     Last attempt to quit: 1994     Years since quittin.9    Smokeless tobacco: Never Used   Substance Use Topics    Alcohol use: No     Comment: RARE    Drug use: No         Review of Systems   General ROS: negative  Hematological and Lymphatic ROS: negative  Respiratory ROS: negative  Cardiovascular ROS: negative  Gastrointestinal ROS: positive for - blood in stools  Genito-Urinary ROS: negative  Musculoskeletal ROS: negative      PHYSICAL EXAM:    Vitals:    20 1230   BP: (!) 134/90   Pulse: 101   Resp: 18   Temp: 97.1 °F (36.2 °C)   SpO2: 97%       General Appearance:  awake, alert, oriented, in no acute distress  Skin:  Skin color, texture, turgor normal. No rashes or lesions. Head/face:  NCAT  Eyes:  No gross abnormalities. Lungs:  Normal expansion. Clear to auscultation. Heart:  Heart regular rate and rhythm  Abdomen:  Soft, non-tender, non-distended  Extremities: Extremities warm to touch, pink, with no edema. LABS:    CBC  Recent Labs     09/05/20 0958 09/05/20  1519   WBC 7.8  --    HGB 13.3 11.8   HCT 42.2 37.4     --      BMP  Recent Labs     09/05/20  0958      K 4.1      CO2 25   BUN 20   CREATININE 0.9   CALCIUM 9.1     Liver Function  Recent Labs     09/05/20  0958   BILITOT 0.5   AST 20   ALT 11   ALKPHOS 64   PROT 6.0*   LABALBU 3.7     No results for input(s): LACTATE in the last 72 hours. Recent Labs     09/05/20  0958   INR 1.5       RADIOLOGY    No results found. ASSESSMENT:  80 y.o. female with consistent bloody bowel movements that began this morning, 1 week post op from colonoscopy.     PLAN:  - H/H q8h  - Type and screen  - Hold anticoagulants  - PPI    Discussed with Dr. Nafisa Alexander    Electronically signed by Zunilda Montano MD on 9/5/20 at 8:15 PM EDT

## 2020-09-07 VITALS
HEART RATE: 88 BPM | WEIGHT: 186 LBS | OXYGEN SATURATION: 98 % | BODY MASS INDEX: 28.28 KG/M2 | RESPIRATION RATE: 16 BRPM | SYSTOLIC BLOOD PRESSURE: 113 MMHG | DIASTOLIC BLOOD PRESSURE: 71 MMHG | TEMPERATURE: 98.9 F

## 2020-09-07 LAB
HCT VFR BLD CALC: 33.1 % (ref 34–48)
HEMOGLOBIN: 10.5 G/DL (ref 11.5–15.5)

## 2020-09-07 PROCEDURE — 36415 COLL VENOUS BLD VENIPUNCTURE: CPT

## 2020-09-07 PROCEDURE — 85014 HEMATOCRIT: CPT

## 2020-09-07 PROCEDURE — 85018 HEMOGLOBIN: CPT

## 2020-09-07 PROCEDURE — G0378 HOSPITAL OBSERVATION PER HR: HCPCS

## 2020-09-07 PROCEDURE — 6370000000 HC RX 637 (ALT 250 FOR IP): Performed by: STUDENT IN AN ORGANIZED HEALTH CARE EDUCATION/TRAINING PROGRAM

## 2020-09-07 PROCEDURE — 6370000000 HC RX 637 (ALT 250 FOR IP): Performed by: INTERNAL MEDICINE

## 2020-09-07 RX ADMIN — PANTOPRAZOLE SODIUM 40 MG: 40 TABLET, DELAYED RELEASE ORAL at 06:44

## 2020-09-07 RX ADMIN — Medication 2000 MCG: at 08:39

## 2020-09-07 RX ADMIN — DILTIAZEM HYDROCHLORIDE 180 MG: 180 CAPSULE, EXTENDED RELEASE ORAL at 08:39

## 2020-09-07 RX ADMIN — LEVOTHYROXINE SODIUM 100 MCG: 0.1 TABLET ORAL at 06:44

## 2020-09-07 RX ADMIN — Medication 400 UNITS: at 08:39

## 2020-09-07 RX ADMIN — Medication 2000 UNITS: at 08:39

## 2020-09-07 RX ADMIN — CITALOPRAM HYDROBROMIDE 20 MG: 20 TABLET ORAL at 08:39

## 2020-09-07 ASSESSMENT — PAIN SCALES - GENERAL: PAINLEVEL_OUTOF10: 0

## 2020-09-07 NOTE — PROGRESS NOTES
GENERAL SURGERY  DAILY PROGRESS NOTE  9/7/2020    Subjective:  No acute events overnight. Did not have any further bleeding episodes overnight. Tolerating diet. Still no abdominal pain. No nausea or vomiting. Objective:  /71   Pulse 88   Temp 98.9 °F (37.2 °C) (Temporal)   Resp 16   Wt 186 lb (84.4 kg)   SpO2 98%   BMI 28.28 kg/m²     GENERAL:  Laying in bed, awake, and in no apparent distress  HEAD: Normocephalic  EYES: No sclera icterus  LUNGS:  No increased work of breathing  CARDIOVASCULAR:  RR  ABDOMEN:  Soft, non-distended, non-tender  EXTREMITIES: No edema or swelling  SKIN: Warm and dry    Assessment/Plan:  80 y.o. female with consistent bloody bowel movements that began this morning, 1 week post op from colonoscopy.     - Ok to discharge from surgery standpoint  - Continue to hold Xarelto until she follows up with Dr. Cindy Magallanes in office later this week    Electronically signed by Cori Painting MD on 9/7/2020 at 8:45 AM

## 2020-09-07 NOTE — PROGRESS NOTES
General Progress Note  9/7/2020 8:21 AM  Subjective:   Admit Date: 9/5/2020  PCP: Brooks Patel DO  Interval History: events of admission noted. Still with intermittent rectal bleeding. Diet: DIET PEPTIC ULCER;  Dietary Nutrition Supplements: Standard High Calorie Oral Supplement  Pain is:None  Nausea:None  Bowel Movement/Flatus yes    Data:   Scheduled Meds:   simvastatin  40 mg Oral Nightly    vitamin D  2,000 Units Oral Daily    citalopram  20 mg Oral Daily    dilTIAZem  180 mg Oral Daily    levothyroxine  100 mcg Oral Daily    vitamin B-12  2,000 mcg Oral Daily    vitamin E  400 Units Oral Daily    Arformoterol Tartrate  15 mcg Nebulization BID    And    budesonide  0.5 mg Nebulization BID    pantoprazole  40 mg Oral QAM AC     Continuous Infusions:  PRN Meds:albuterol  I/O last 3 completed shifts: In: 840 [P.O.:840]  Out: 700 [Urine:300; Stool:400]  No intake/output data recorded.     Intake/Output Summary (Last 24 hours) at 9/7/2020 0821  Last data filed at 9/6/2020 2222  Gross per 24 hour   Intake 600 ml   Output 700 ml   Net -100 ml       CBC with Differential:    Lab Results   Component Value Date    WBC 7.8 09/05/2020    RBC 4.65 09/05/2020    HGB 10.5 09/07/2020    HCT 33.1 09/07/2020     09/05/2020    MCV 90.8 09/05/2020    MCH 28.6 09/05/2020    MCHC 31.5 09/05/2020    RDW 14.4 09/05/2020    LYMPHOPCT 15.5 09/05/2020    MONOPCT 7.0 09/05/2020    BASOPCT 0.4 09/05/2020    MONOSABS 0.55 09/05/2020    LYMPHSABS 1.21 09/05/2020    EOSABS 0.25 09/05/2020    BASOSABS 0.03 09/05/2020     CMP:    Lab Results   Component Value Date     09/05/2020    K 4.1 09/05/2020     09/05/2020    CO2 25 09/05/2020    BUN 20 09/05/2020    CREATININE 0.9 09/05/2020    GFRAA >60 09/05/2020    LABGLOM 59 09/05/2020    GLUCOSE 100 09/05/2020    PROT 6.0 09/05/2020    LABALBU 3.7 09/05/2020    CALCIUM 9.1 09/05/2020    BILITOT 0.5 09/05/2020    ALKPHOS 64 09/05/2020    AST 20 09/05/2020 Asthma    Vitamin D deficiency  Resolved Problems:    * No resolved hospital problems. *    Plan:   1. Continue to hold oral anticoagulation. 2. Monitor H/H.  3. Will follow.     Awilda Cooney D.O.  8:21 AM  9/7/2020

## 2020-09-08 RX ORDER — RIVAROXABAN 20 MG/1
TABLET, FILM COATED ORAL
Qty: 90 TABLET | Refills: 1 | Status: ON HOLD
Start: 2020-09-08 | End: 2021-08-07 | Stop reason: HOSPADM

## 2020-09-08 NOTE — DISCHARGE SUMMARY
Family Practice Discharge Summary    Marco Figueroa  :  1934  MRN:  [id number]    Admit date:  2020  Discharge date:  2020    Admitting Physician:  Fanny Kay MD    Discharge Diagnoses:    Patient Active Problem List   Diagnosis    Persistent atrial fibrillation    History of nuclear stress test    Hyperlipidemia    Depression    Asthma    Vitamin D deficiency    Lower GI bleed       Admission Condition:  fair    Discharged Condition:  fair    Hospital Course:   Wicho Kaur a 80 y.o. female with PMH of HTN, thyroid disease presents with bright red blood per rectum. She had a colonoscopy done by Dr. Mateusz Ernst one week ago and has been having a normal post-operative course until this morning when she had a bowel movement that contained a large amount of blood. She then proceeded to have 4 more of these bloody BMs at home before she called Dr. Mateusz Ernst and was instructed to come to the hospital. While here, her hemoglobin was found to be 13.3 then down to 11. 8. it is being trended every 8 hours. She is on Xarelto at home and her last dose was yesterday evening. She denies any abdominal pain, nausea or vomiting. No dizziness or lightheadedness. admitted to monitor     Discharge Medications:    See medication reconciliation under discharge insructions. Consults:  general surgery (Dr. Mateusz Ernst)    Significant Diagnostic Studies:  labs: decreasing H/H. Treatments:   Held Xarelto, oral anticoagulant.     Discharge Exam:  /71   Pulse 88   Temp 98.9 °F (37.2 °C) (Temporal)   Resp 16   Wt 186 lb (84.4 kg)   SpO2 98%   BMI 28.28 kg/m²     General Appearance:    Alert, cooperative, no distress, appears stated age   Head:    Normocephalic, without obvious abnormality, atraumatic   Eyes:    PERRL, conjunctiva/corneas clear, EOM's intact, fundi     benign, both eyes   Ears:    Normal TM's and external ear canals, both ears   Nose:   Nares normal, septum midline, mucosa normal, no drainage    or sinus tenderness   Throat:   Lips, mucosa, and tongue normal; teeth and gums normal   Neck:   Supple, symmetrical, trachea midline, no adenopathy;     thyroid:  no enlargement/tenderness/nodules; no carotid    bruit or JVD   Back:     Symmetric, no curvature, ROM normal, no CVA tenderness   Lungs:     Clear to auscultation bilaterally, respirations unlabored   Chest Wall:    No tenderness or deformity    Heart:    Regular rate and rhythm, S1 and S2 normal, no murmur, rub   or gallop   Breast Exam:    No tenderness, masses, or nipple abnormality   Abdomen:     Soft, non-tender, bowel sounds active all four quadrants,     no masses, no organomegaly   Genitalia:    Normal female without lesion, discharge or tenderness   Rectal:    Normal tone ;guaiac negative stool   Extremities:   Extremities normal, atraumatic, no cyanosis or edema   Pulses:   2+ and symmetric all extremities   Skin:   Skin color, texture, turgor normal, no rashes or lesions   Lymph nodes:   Cervical, supraclavicular, and axillary nodes normal   Neurologic:   CNII-XII intact, normal strength, sensation and reflexes     throughout       Disposition:   home       Medication List      CHANGE how you take these medications    dilTIAZem 180 MG extended release capsule  Commonly known as:  CARDIZEM CD  Take 1 capsule by mouth 2 times daily  What changed:  when to take this        CONTINUE taking these medications    albuterol sulfate  (90 Base) MCG/ACT inhaler  Commonly known as:  Proventil HFA  Inhale 2 puffs into the lungs every 4 hours as needed for Wheezing or Shortness of Breath. Biotin 5000 MCG Caps     budesonide-formoterol 160-4.5 MCG/ACT Aero  Commonly known as:  Symbicort  Inhale 2 puffs into the lungs 2 times daily Rinse mouth after use.      citalopram 20 MG tablet  Commonly known as:  CELEXA     levothyroxine 100 MCG tablet  Commonly known as:  SYNTHROID     rivaroxaban 20 MG Tabs tablet  Commonly known as:

## 2020-11-11 RX ORDER — DILTIAZEM HYDROCHLORIDE 180 MG/1
CAPSULE, COATED, EXTENDED RELEASE ORAL
Qty: 180 CAPSULE | Refills: 1 | Status: SHIPPED
Start: 2020-11-11 | End: 2021-02-11 | Stop reason: SDUPTHER

## 2020-11-20 ENCOUNTER — TELEPHONE (OUTPATIENT)
Dept: PULMONOLOGY | Age: 85
End: 2020-11-20

## 2020-11-20 RX ORDER — BUDESONIDE AND FORMOTEROL FUMARATE DIHYDRATE 160; 4.5 UG/1; UG/1
2 AEROSOL RESPIRATORY (INHALATION) 2 TIMES DAILY
Qty: 1 INHALER | Refills: 3 | Status: SHIPPED
Start: 2020-11-20 | End: 2021-01-22 | Stop reason: SDUPTHER

## 2020-11-20 NOTE — TELEPHONE ENCOUNTER
Call returned after pt left  requesting samples or refill on Symbicort 160mg script. Pt reports she contacted her PCP and he sent script over to pharmacy for her. Advised pt office will be sending out rescheduled appt card and we are available if nay needs arise. She thanks me for following up with her this afternoon.

## 2021-01-14 ENCOUNTER — OFFICE VISIT (OUTPATIENT)
Dept: CARDIOLOGY CLINIC | Age: 86
End: 2021-01-14
Payer: MEDICARE

## 2021-01-14 VITALS
HEIGHT: 68 IN | DIASTOLIC BLOOD PRESSURE: 82 MMHG | HEART RATE: 120 BPM | RESPIRATION RATE: 14 BRPM | BODY MASS INDEX: 28.95 KG/M2 | SYSTOLIC BLOOD PRESSURE: 110 MMHG | WEIGHT: 191 LBS

## 2021-01-14 DIAGNOSIS — I48.19 PERSISTENT ATRIAL FIBRILLATION (HCC): Primary | ICD-10-CM

## 2021-01-14 PROCEDURE — 1036F TOBACCO NON-USER: CPT | Performed by: INTERNAL MEDICINE

## 2021-01-14 PROCEDURE — G8484 FLU IMMUNIZE NO ADMIN: HCPCS | Performed by: INTERNAL MEDICINE

## 2021-01-14 PROCEDURE — 99214 OFFICE O/P EST MOD 30 MIN: CPT | Performed by: INTERNAL MEDICINE

## 2021-01-14 PROCEDURE — 1090F PRES/ABSN URINE INCON ASSESS: CPT | Performed by: INTERNAL MEDICINE

## 2021-01-14 PROCEDURE — G8427 DOCREV CUR MEDS BY ELIG CLIN: HCPCS | Performed by: INTERNAL MEDICINE

## 2021-01-14 PROCEDURE — 4040F PNEUMOC VAC/ADMIN/RCVD: CPT | Performed by: INTERNAL MEDICINE

## 2021-01-14 PROCEDURE — 1123F ACP DISCUSS/DSCN MKR DOCD: CPT | Performed by: INTERNAL MEDICINE

## 2021-01-14 PROCEDURE — 93000 ELECTROCARDIOGRAM COMPLETE: CPT | Performed by: INTERNAL MEDICINE

## 2021-01-14 PROCEDURE — G8417 CALC BMI ABV UP PARAM F/U: HCPCS | Performed by: INTERNAL MEDICINE

## 2021-01-14 NOTE — PROGRESS NOTES
MA placed 24 hour Holter monitor on Pt per Dr. Berna Hood. Pt tolerated and understood use and return of device.      Electronically signed by Jaxson Moreno MA on 1/14/2021 at 11:33 AM

## 2021-01-14 NOTE — PROGRESS NOTES
Chief Complaint   Patient presents with    Atrial Fibrillation       Patient Active Problem List    Diagnosis Date Noted    Lower GI bleed 09/05/2020    Vitamin D deficiency 06/18/2019    Hyperlipidemia     Depression     Asthma     History of nuclear stress test 09/24/2018     Overview Note:     Normal pharm stress 8/2018      Persistent atrial fibrillation 06/15/2018     Overview Note:     A. Initial presentation June 2018. CHADS-VASC = 3  B. CONSTANTINO guided DC cardioversion  6/15/2018. No significant structural disesae on CONSTANTINO  C. DCCV 9/2019         Current Outpatient Medications   Medication Sig Dispense Refill    budesonide-formoterol (SYMBICORT) 160-4.5 MCG/ACT AERO Inhale 2 puffs into the lungs 2 times daily Rinse mouth after use. 1 Inhaler 3    dilTIAZem (CARDIZEM CD) 180 MG extended release capsule TAKE 1 CAPSULE BY MOUTH TWICE A DAY (Patient taking differently: 180 mg 2 times daily ) 180 capsule 1    XARELTO 20 MG TABS tablet TAKE 1 TABLET BY MOUTH EVERY DAY 90 tablet 1    levothyroxine (SYNTHROID) 100 MCG tablet Take 1 tablet by mouth daily  3    simvastatin (ZOCOR) 40 MG tablet Take 40 mg by mouth nightly   3    citalopram (CELEXA) 20 MG tablet Take 20 mg by mouth daily      vitamin B-12 (CYANOCOBALAMIN) 1000 MCG tablet Take 6,000 mcg by mouth daily       albuterol (PROVENTIL HFA) 108 (90 BASE) MCG/ACT inhaler Inhale 2 puffs into the lungs every 4 hours as needed for Wheezing or Shortness of Breath. 1 Inhaler 0    Cholecalciferol (VITAMIN D3) 2000 units TABS Take 1 tablet by mouth daily       Biotin 5000 MCG CAPS Take 1 capsule by mouth daily.  Vitamin E 400 UNIT TABS Take 1 tablet by mouth daily. No current facility-administered medications for this visit.          Allergies   Allergen Reactions    Codeine Nausea And Vomiting    Codimal-A [Brompheniramine] Other (See Comments)     Altered mental state and confusion    Lipitor Other (See Comments) GENERALIZED ACHING, DIFFICULT TO FUNCTION    Spiriva Handihaler [Tiotropium Bromide Monohydrate]     Tudorza Pressair [Aclidinium Bromide]     Ciprofloxacin Nausea And Vomiting       Vitals:    21 1111   BP: 110/82   Pulse: 120   Resp: 14   Weight: 191 lb (86.6 kg)   Height: 5' 8\" (1.727 m)       Social History     Socioeconomic History    Marital status:      Spouse name: Not on file    Number of children: Not on file    Years of education: Not on file    Highest education level: Not on file   Occupational History    Not on file   Social Needs    Financial resource strain: Not on file    Food insecurity     Worry: Not on file     Inability: Not on file    Transportation needs     Medical: Not on file     Non-medical: Not on file   Tobacco Use    Smoking status: Former Smoker     Types: Cigarettes     Quit date: 1994     Years since quittin.3    Smokeless tobacco: Never Used   Substance and Sexual Activity    Alcohol use: No     Comment: RARE    Drug use: No    Sexual activity: Not on file   Lifestyle    Physical activity     Days per week: Not on file     Minutes per session: Not on file    Stress: Not on file   Relationships    Social connections     Talks on phone: Not on file     Gets together: Not on file     Attends Gnosticism service: Not on file     Active member of club or organization: Not on file     Attends meetings of clubs or organizations: Not on file     Relationship status: Not on file    Intimate partner violence     Fear of current or ex partner: Not on file     Emotionally abused: Not on file     Physically abused: Not on file     Forced sexual activity: Not on file   Other Topics Concern    Not on file   Social History Narrative    Not on file       History reviewed. No pertinent family history. SUBJECTIVE: Jose Cotto presents to the office today for re-evaluation of cardiac diagnoses.   She complains of VASQUES and denies chest pain, chest pressure/discomfort, claudication, dyspnea, exertional chest pressure/discomfort, fatigue, irregular heart beat, lower extremity edema, near-syncope, orthopnea, palpitations, paroxysmal nocturnal dyspnea, syncope and tachypnea. Compliant with NOACs, no bleeding, but has not been seen, nor had labs done, since her GIB of 9/2020. Claims compliance AV yovani blocker as prescribed. No fever, cough, myalgias, no exposure to people with COVID. Review of Systems:   Heart: as above   Lungs: as above   Eyes: denies changes in vision or discharge. Ears: denies changes in hearing or pain. Nose: denies epistaxis or masses   Throat: denies sore throat or trouble swallowing. Neuro: denies numbness, tingling, tremors. Skin: denies rashes or itching. : denies hematuria, dysuria   GI: denies vomiting, diarrhea   Psych: denies mood changed, anxiety, depression. all others negative. Physical Exam   /82   Pulse 120   Resp 14   Ht 5' 8\" (1.727 m)   Wt 191 lb (86.6 kg)   BMI 29.04 kg/m²   Constitutional: Oriented to person, place, and time. Well-developed and well-nourished. No distress. Head: Normocephalic and atraumatic. Eyes: EOM are normal. Pupils are equal, round, and reactive to light. Neck: Normal range of motion. Neck supple. No hepatojugular reflux and no JVD present. Carotid bruit is not present. No tracheal deviation present. No thyromegaly present. Cardiovascular: rapid rate, irregular rhythm, normal heart sounds and intact distal pulses. Exam reveals no gallop and no friction rub. No murmur heard. Pulmonary/Chest: Effort normal and breath sounds normal. No respiratory distress. No wheezes. No rales. No tenderness. Abdominal: Soft. Bowel sounds are normal. No distension and no mass. No tenderness. No rebound and no guarding. Musculoskeletal: Normal range of motion. No edema and no tenderness. Neurological: Alert and oriented to person, place, and time. Skin: Skin is warm and dry. No rash noted. Not diaphoretic. No erythema. Psychiatric: Normal mood and affect. Behavior is normal.     EKG: afib with RVR, nonspecific ST and T waves changes. ASSESSMENT AND PLAN:  Patient Active Problem List   Diagnosis    Persistent atrial fibrillation: s/p DC cardio 6/2018 and 9/2019 with elevated chads vasc . Had some diastolic failure with initial presenation, but no structural disease of significance on CONSTANTINO. Now back in afib. Rate control strategy - will place 24 hour Holter. She is agitated today, complaining of mask requirement etc.  May need second AV yovani blocker, or alternatively, revisit rhythm control strategy with EP assistance, no sign of heart failure on exam despite rapid rate. Needs labs checked, noe hemoglobin with PCP.       HLD (hyperlipidemia) on statin          Balbir Nuno M.D  [id number] [facility] Card[facility]

## 2021-01-19 ENCOUNTER — TELEPHONE (OUTPATIENT)
Dept: CARDIOLOGY CLINIC | Age: 86
End: 2021-01-19

## 2021-01-19 DIAGNOSIS — I48.19 PERSISTENT ATRIAL FIBRILLATION (HCC): ICD-10-CM

## 2021-01-22 DIAGNOSIS — J45.20 MILD INTERMITTENT ASTHMA WITHOUT COMPLICATION: ICD-10-CM

## 2021-01-22 RX ORDER — BUDESONIDE AND FORMOTEROL FUMARATE DIHYDRATE 160; 4.5 UG/1; UG/1
2 AEROSOL RESPIRATORY (INHALATION) 2 TIMES DAILY
Qty: 1 INHALER | Refills: 5 | Status: SHIPPED
Start: 2021-01-22 | End: 2021-02-15 | Stop reason: CLARIF

## 2021-01-29 ENCOUNTER — HOSPITAL ENCOUNTER (OUTPATIENT)
Age: 86
Discharge: HOME | End: 2021-01-31
Payer: MEDICARE

## 2021-01-29 DIAGNOSIS — Z01.810 PREOPERATIVE CARDIOVASCULAR EXAMINATION: ICD-10-CM

## 2021-01-29 DIAGNOSIS — Z01.810 PREOPERATIVE CARDIOVASCULAR EXAMINATION: Primary | ICD-10-CM

## 2021-01-29 PROCEDURE — U0003 INFECTIOUS AGENT DETECTION BY NUCLEIC ACID (DNA OR RNA); SEVERE ACUTE RESPIRATORY SYNDROME CORONAVIRUS 2 (SARS-COV-2) (CORONAVIRUS DISEASE [COVID-19]), AMPLIFIED PROBE TECHNIQUE, MAKING USE OF HIGH THROUGHPUT TECHNOLOGIES AS DESCRIBED BY CMS-2020-01-R: HCPCS

## 2021-01-29 NOTE — TELEPHONE ENCOUNTER
Patient notified and instructed on monitor and need DCCV scheduled. She will like this set up at [facility]. She stated understanding.

## 2021-01-30 LAB
SARS-COV-2: NOT DETECTED
SOURCE: NORMAL

## 2021-02-03 ENCOUNTER — HOSPITAL ENCOUNTER (OUTPATIENT)
Dept: CARDIAC CATH/INVASIVE PROCEDURES | Age: 86
Discharge: HOME | End: 2021-02-03
Payer: MEDICARE

## 2021-02-03 ENCOUNTER — ANESTHESIA (OUTPATIENT)
Dept: CARDIAC CATH/INVASIVE PROCEDURES | Age: 86
End: 2021-02-03

## 2021-02-03 ENCOUNTER — ANESTHESIA EVENT (OUTPATIENT)
Dept: CARDIAC CATH/INVASIVE PROCEDURES | Age: 86
End: 2021-02-03

## 2021-02-03 VITALS
WEIGHT: 190 LBS | RESPIRATION RATE: 16 BRPM | HEIGHT: 68 IN | BODY MASS INDEX: 28.79 KG/M2 | OXYGEN SATURATION: 92 % | SYSTOLIC BLOOD PRESSURE: 139 MMHG | DIASTOLIC BLOOD PRESSURE: 92 MMHG | TEMPERATURE: 97.1 F | HEART RATE: 91 BPM

## 2021-02-03 VITALS — OXYGEN SATURATION: 92 % | SYSTOLIC BLOOD PRESSURE: 139 MMHG | DIASTOLIC BLOOD PRESSURE: 92 MMHG

## 2021-02-03 LAB
EKG ATRIAL RATE: 91 BPM
EKG P AXIS: 61 DEGREES
EKG P-R INTERVAL: 222 MS
EKG Q-T INTERVAL: 590 MS
EKG QRS DURATION: 64 MS
EKG QTC CALCULATION (BAZETT): 725 MS
EKG R AXIS: 26 DEGREES
EKG T AXIS: 74 DEGREES
EKG VENTRICULAR RATE: 91 BPM

## 2021-02-03 PROCEDURE — 2709999900 HC NON-CHARGEABLE SUPPLY

## 2021-02-03 PROCEDURE — 6360000002 HC RX W HCPCS

## 2021-02-03 PROCEDURE — 3700000000 HC ANESTHESIA ATTENDED CARE

## 2021-02-03 PROCEDURE — 93005 ELECTROCARDIOGRAM TRACING: CPT | Performed by: INTERNAL MEDICINE

## 2021-02-03 PROCEDURE — 3700000001 HC ADD 15 MINUTES (ANESTHESIA)

## 2021-02-03 PROCEDURE — 92960 CARDIOVERSION ELECTRIC EXT: CPT | Performed by: INTERNAL MEDICINE

## 2021-02-03 PROCEDURE — 93010 ELECTROCARDIOGRAM REPORT: CPT | Performed by: INTERNAL MEDICINE

## 2021-02-03 PROCEDURE — 2580000003 HC RX 258

## 2021-02-03 RX ORDER — PROPOFOL 10 MG/ML
INJECTION, EMULSION INTRAVENOUS PRN
Status: DISCONTINUED | OUTPATIENT
Start: 2021-02-03 | End: 2021-02-03 | Stop reason: SDUPTHER

## 2021-02-03 RX ORDER — SODIUM CHLORIDE 9 MG/ML
INJECTION, SOLUTION INTRAVENOUS CONTINUOUS PRN
Status: DISCONTINUED | OUTPATIENT
Start: 2021-02-03 | End: 2021-02-03 | Stop reason: SDUPTHER

## 2021-02-03 RX ADMIN — SODIUM CHLORIDE: 9 INJECTION, SOLUTION INTRAVENOUS at 13:19

## 2021-02-03 RX ADMIN — PROPOFOL 60 MG: 10 INJECTION, EMULSION INTRAVENOUS at 13:30

## 2021-02-03 ASSESSMENT — COPD QUESTIONNAIRES: CAT_SEVERITY: MILD

## 2021-02-03 ASSESSMENT — LIFESTYLE VARIABLES: SMOKING_STATUS: 0

## 2021-02-03 NOTE — ANESTHESIA POSTPROCEDURE EVALUATION
Department of Anesthesiology  Postprocedure Note    Patient: Jose David Zepeda  MRN: [id number]  YOB: 1934  Date of evaluation: 2/3/2021  Time:  1:53 PM     Procedure Summary     Date: 02/03/21 Room / Location: [facility] CATH LAB    Anesthesia Start: 1319 Anesthesia Stop: 1335    Procedure: CARDIOVERSION WITH ANESTHESIA Diagnosis: Unspecified atrial fibrillation    Scheduled Providers:  Responsible Provider: Eddie Burns MD    Anesthesia Type: MAC ASA Status: 3          Anesthesia Type: MAC    Lottie Phase I:      Lottie Phase II:      Last vitals: Reviewed and per EMR flowsheets.        Anesthesia Post Evaluation    Patient location during evaluation: PACU  Patient participation: complete - patient participated  Level of consciousness: awake and alert  Airway patency: patent  Nausea & Vomiting: no nausea and no vomiting  Complications: no  Cardiovascular status: hemodynamically stable and blood pressure returned to baseline  Respiratory status: acceptable  Hydration status: euvolemic

## 2021-02-03 NOTE — ANESTHESIA PRE PROCEDURE
Department of Anesthesiology  Preprocedure Note       Name:  Nancy Carbajal   Age:  80 y.o.  :  1934                                          MRN:  [id number]         Date:  2/3/2021      Surgeon: * Surgery not found *    Procedure: DCCV    Medications prior to admission:   Prior to Admission medications    Medication Sig Start Date End Date Taking? Authorizing Provider   budesonide-formoterol (SYMBICORT) 160-4.5 MCG/ACT AERO Inhale 2 puffs into the lungs 2 times daily Rinse mouth after use. 21  Yes Afshin Breen MD   dilTIAZem (CARDIZEM CD) 180 MG extended release capsule TAKE 1 CAPSULE BY MOUTH TWICE A DAY  Patient taking differently: 180 mg 2 times daily  20  Yes Susna Santizo MD   XARELTO 20 MG TABS tablet TAKE 1 TABLET BY MOUTH EVERY DAY 20  Yes Alondra Rivera MD   levothyroxine (SYNTHROID) 100 MCG tablet Take 1 tablet by mouth daily 3/29/18  Yes Historical Provider, MD   simvastatin (ZOCOR) 40 MG tablet Take 40 mg by mouth nightly  3/29/18  Yes Historical Provider, MD   citalopram (CELEXA) 20 MG tablet Take 20 mg by mouth daily   Yes Historical Provider, MD   vitamin B-12 (CYANOCOBALAMIN) 1000 MCG tablet Take 6,000 mcg by mouth daily    Yes Historical Provider, MD   albuterol (PROVENTIL HFA) 108 (90 BASE) MCG/ACT inhaler Inhale 2 puffs into the lungs every 4 hours as needed for Wheezing or Shortness of Breath. 14  Yes Merline Spivey MD   Cholecalciferol (VITAMIN D3) 2000 units TABS Take 1 tablet by mouth daily    Yes Historical Provider, MD   Biotin 5000 MCG CAPS Take 1 capsule by mouth daily. Yes Historical Provider, MD   Vitamin E 400 UNIT TABS Take 1 tablet by mouth daily. Yes Historical Provider, MD       Current medications:    Current Outpatient Medications   Medication Sig Dispense Refill    budesonide-formoterol (SYMBICORT) 160-4.5 MCG/ACT AERO Inhale 2 puffs into the lungs 2 times daily Rinse mouth after use.  1 Inhaler 5    dilTIAZem (CARDIZEM CD) 180 MG extended release capsule TAKE 1 CAPSULE BY MOUTH TWICE A DAY (Patient taking differently: 180 mg 2 times daily ) 180 capsule 1    XARELTO 20 MG TABS tablet TAKE 1 TABLET BY MOUTH EVERY DAY 90 tablet 1    levothyroxine (SYNTHROID) 100 MCG tablet Take 1 tablet by mouth daily  3    simvastatin (ZOCOR) 40 MG tablet Take 40 mg by mouth nightly   3    citalopram (CELEXA) 20 MG tablet Take 20 mg by mouth daily      vitamin B-12 (CYANOCOBALAMIN) 1000 MCG tablet Take 6,000 mcg by mouth daily       albuterol (PROVENTIL HFA) 108 (90 BASE) MCG/ACT inhaler Inhale 2 puffs into the lungs every 4 hours as needed for Wheezing or Shortness of Breath. 1 Inhaler 0    Cholecalciferol (VITAMIN D3) 2000 units TABS Take 1 tablet by mouth daily       Biotin 5000 MCG CAPS Take 1 capsule by mouth daily.  Vitamin E 400 UNIT TABS Take 1 tablet by mouth daily. No current facility-administered medications for this encounter. Allergies:     Allergies   Allergen Reactions    Codeine Nausea And Vomiting    Codimal-A [Brompheniramine] Other (See Comments)     Altered mental state and confusion    Lipitor Other (See Comments)     GENERALIZED ACHING, DIFFICULT TO FUNCTION    Spiriva Handihaler [Tiotropium Bromide Monohydrate]     Tudorza Pressair [Aclidinium Bromide]     Ciprofloxacin Nausea And Vomiting       Problem List:    Patient Active Problem List   Diagnosis Code    Persistent atrial fibrillation I48.19    History of nuclear stress test Z92.89    Hyperlipidemia E78.5    Depression F32.9    Asthma J45.909    Vitamin D deficiency E55.9    Lower GI bleed K92.2       Past Medical History:        Diagnosis Date    Asthma     Depression     Hyperlipidemia     Thyroid disease        Past Surgical History:        Procedure Laterality Date    APPENDECTOMY  62 YEARS AGO    CARDIOVERSION  09/20/2019    Dr. Jahaira Hamilton 1 shock 200 joules Successful    CHOLECYSTECTOMY      EYE SURGERY      CATARACT RIGHT AND LEFT  HYSTERECTOMY      NECK SURGERY  2011    THYROIDECTOMY  17 YEARS AGO    TONSILLECTOMY         Social History:    Social History     Tobacco Use    Smoking status: Former Smoker     Types: Cigarettes     Quit date: 1994     Years since quittin.3    Smokeless tobacco: Never Used   Substance Use Topics    Alcohol use: No     Comment: RARE                                Counseling given: Not Answered      Vital Signs (Current):   Vitals:    21 1221   BP: 120/65   Resp: 20   Temp: 36.2 °C (97.1 °F)   SpO2: 94%   Weight: 190 lb (86.2 kg)   Height: 5' 8\" (1.727 m)                                              BP Readings from Last 3 Encounters:   21 120/65   21 110/82   20 113/71       NPO Status:   2100 on 21                                                                               BMI:   Wt Readings from Last 3 Encounters:   21 190 lb (86.2 kg)   21 191 lb (86.6 kg)   20 186 lb (84.4 kg)     Body mass index is 28.89 kg/m². CBC:   Lab Results   Component Value Date    WBC 7.8 2020    RBC 4.65 2020    HGB 10.5 2020    HCT 33.1 2020    MCV 90.8 2020    RDW 14.4 2020     2020       CMP:   Lab Results   Component Value Date     2020    K 4.1 2020     2020    CO2 25 2020    BUN 20 2020    CREATININE 0.9 2020    GFRAA >60 2020    LABGLOM 59 2020    GLUCOSE 100 2020    PROT 6.0 2020    CALCIUM 9.1 2020    BILITOT 0.5 2020    ALKPHOS 64 2020    AST 20 2020    ALT 11 2020       POC Tests: No results for input(s): POCGLU, POCNA, POCK, POCCL, POCBUN, POCHEMO, POCHCT in the last 72 hours.     Coags:   Lab Results   Component Value Date    PROTIME 17.0 2020    INR 1.5 2020    APTT 34.6 2020       HCG (If Applicable): No results found for: PREGTESTUR, PREGSERUM, HCG, HCGQUANT     ABGs: No results found for: PHART, PO2ART, EOE8YCV, ROF8UDS, BEART, Z0AORFAP     Type & Screen (If Applicable):  No results found for: LABABO, LABRH    Drug/Infectious Status (If Applicable):  No results found for: HIV, HEPCAB    COVID-19 Screening (If Applicable):   Lab Results   Component Value Date    COVID19 Not Detected 01/29/2021         Anesthesia Evaluation  Patient summary reviewed and Nursing notes reviewed   history of anesthetic complications: PONV. Airway: Mallampati: II  TM distance: >3 FB   Neck ROM: full  Mouth opening: > = 3 FB Dental:    (+) partials      Pulmonary:normal exam  breath sounds clear to auscultation  (+) COPD: mild,  asthma:     (-) not a current smoker                           Cardiovascular:    (+) dysrhythmias: atrial fibrillation,         Rhythm: irregular             Beta Blocker:  Not on Beta Blocker         Neuro/Psych:   (+) psychiatric history:            GI/Hepatic/Renal:             Endo/Other:    (+) hypothyroidism, blood dyscrasia: anticoagulation therapy:., .                 Abdominal:       Abdomen: soft. Vascular:   + DVT, . Anesthesia Plan      MAC     ASA 3       Induction: intravenous. Anesthetic plan and risks discussed with patient. Plan discussed with attending.                   John Red, SONY - CRNA   2/3/2021

## 2021-02-03 NOTE — PROCEDURES
CARDIOVERSION REPORT     CARDIOLOGIST: Erendira Javed    DATE OF SERVICE: 2/3/2021    PROCEDURE: DC Electrical Cardioversion    CURRENT MEDICATIONS PRIOR TO ENCOUNTER:  Current Outpatient Medications   Medication Sig Dispense Refill    budesonide-formoterol (SYMBICORT) 160-4.5 MCG/ACT AERO Inhale 2 puffs into the lungs 2 times daily Rinse mouth after use. 1 Inhaler 5    dilTIAZem (CARDIZEM CD) 180 MG extended release capsule TAKE 1 CAPSULE BY MOUTH TWICE A DAY (Patient taking differently: 180 mg 2 times daily ) 180 capsule 1    XARELTO 20 MG TABS tablet TAKE 1 TABLET BY MOUTH EVERY DAY 90 tablet 1    levothyroxine (SYNTHROID) 100 MCG tablet Take 1 tablet by mouth daily  3    simvastatin (ZOCOR) 40 MG tablet Take 40 mg by mouth nightly   3    citalopram (CELEXA) 20 MG tablet Take 20 mg by mouth daily      vitamin B-12 (CYANOCOBALAMIN) 1000 MCG tablet Take 6,000 mcg by mouth daily       albuterol (PROVENTIL HFA) 108 (90 BASE) MCG/ACT inhaler Inhale 2 puffs into the lungs every 4 hours as needed for Wheezing or Shortness of Breath. 1 Inhaler 0    Cholecalciferol (VITAMIN D3) 2000 units TABS Take 1 tablet by mouth daily       Biotin 5000 MCG CAPS Take 1 capsule by mouth daily.  Vitamin E 400 UNIT TABS Take 1 tablet by mouth daily. No current facility-administered medications for this encounter. HISTORY: Symptomatic atrial fibrillation    TECHNIQUE: Risks, benefits and alternatives to DC cardioversion were explained to the patient at length, and the patient acknowledged understanding. The patient was in a stable fasting condition. Cardiac rhythm, arterial oxygen saturation and blood pressure were continuously monitored. The patient was sedated by the Department of Anesthesiology.     RESULTS:  Patch/Paddle Position: Anterior/posterior  Energy (Joules): 200  Initial Rhythm: Atrial fibrillation  Outcome Rhythm: Sinus rhythm  COMPLICATIONS: None  CONCLUSION:  Successful electrical cardioversion of atrial fibrillation    Henry Lozoya MD  [facility] ([facility]) Cardiology

## 2021-02-04 ENCOUNTER — TELEPHONE (OUTPATIENT)
Dept: ADMINISTRATIVE | Age: 86
End: 2021-02-04

## 2021-02-11 ENCOUNTER — OFFICE VISIT (OUTPATIENT)
Dept: CARDIOLOGY CLINIC | Age: 86
End: 2021-02-11
Payer: MEDICARE

## 2021-02-11 VITALS
SYSTOLIC BLOOD PRESSURE: 115 MMHG | HEART RATE: 111 BPM | DIASTOLIC BLOOD PRESSURE: 71 MMHG | RESPIRATION RATE: 20 BRPM | HEIGHT: 68 IN | WEIGHT: 193.6 LBS | OXYGEN SATURATION: 98 % | BODY MASS INDEX: 29.34 KG/M2

## 2021-02-11 DIAGNOSIS — I48.19 PERSISTENT ATRIAL FIBRILLATION (HCC): ICD-10-CM

## 2021-02-11 PROCEDURE — G8427 DOCREV CUR MEDS BY ELIG CLIN: HCPCS | Performed by: INTERNAL MEDICINE

## 2021-02-11 PROCEDURE — G8484 FLU IMMUNIZE NO ADMIN: HCPCS | Performed by: INTERNAL MEDICINE

## 2021-02-11 PROCEDURE — G8417 CALC BMI ABV UP PARAM F/U: HCPCS | Performed by: INTERNAL MEDICINE

## 2021-02-11 PROCEDURE — 1090F PRES/ABSN URINE INCON ASSESS: CPT | Performed by: INTERNAL MEDICINE

## 2021-02-11 PROCEDURE — 99215 OFFICE O/P EST HI 40 MIN: CPT | Performed by: INTERNAL MEDICINE

## 2021-02-11 PROCEDURE — 1036F TOBACCO NON-USER: CPT | Performed by: INTERNAL MEDICINE

## 2021-02-11 PROCEDURE — 1123F ACP DISCUSS/DSCN MKR DOCD: CPT | Performed by: INTERNAL MEDICINE

## 2021-02-11 PROCEDURE — 4040F PNEUMOC VAC/ADMIN/RCVD: CPT | Performed by: INTERNAL MEDICINE

## 2021-02-11 PROCEDURE — 93000 ELECTROCARDIOGRAM COMPLETE: CPT | Performed by: INTERNAL MEDICINE

## 2021-02-11 RX ORDER — LEVOTHYROXINE SODIUM 13 UG/1
CAPSULE ORAL
COMMUNITY
Start: 2021-01-28 | End: 2021-06-28

## 2021-02-11 RX ORDER — LEVOTHYROXINE SODIUM 0.1 MG/1
100 TABLET ORAL DAILY
Status: ON HOLD | COMMUNITY
Start: 2021-01-15 | End: 2021-08-07 | Stop reason: HOSPADM

## 2021-02-11 RX ORDER — DILTIAZEM HYDROCHLORIDE 180 MG/1
CAPSULE, COATED, EXTENDED RELEASE ORAL
Qty: 180 CAPSULE | Refills: 1 | Status: SHIPPED
Start: 2021-02-11 | End: 2021-10-11 | Stop reason: SDUPTHER

## 2021-02-11 NOTE — PROGRESS NOTES
Chief Complaint   Patient presents with    Atrial Fibrillation       Patient Active Problem List    Diagnosis Date Noted    Lower GI bleed 09/05/2020    Vitamin D deficiency 06/18/2019    Hyperlipidemia     Depression     Asthma     History of nuclear stress test 09/24/2018     Overview Note:     Normal pharm stress 8/2018      Persistent atrial fibrillation 06/15/2018     Overview Note:     A. Initial presentation June 2018. CHADS-VASC = 3  B. CONSTANTINO guided DC cardioversion  6/15/2018. No significant structural disesae on CONSTANTINO  C. DCCV 9/2019  D. DCCV 2/3/2021         Current Outpatient Medications   Medication Sig Dispense Refill    levothyroxine (SYNTHROID) 112 MCG tablet TAKE 1 TABLET BY MOUTH EVERY DAY      Levothyroxine Sodium 13 MCG CAPS TAKE 1 CAPSULE BY MOUTH EVERY DAY      dilTIAZem (CARDIZEM CD) 180 MG extended release capsule TAKE 1 CAPSULE BY MOUTH TWICE A  capsule 1    budesonide-formoterol (SYMBICORT) 160-4.5 MCG/ACT AERO Inhale 2 puffs into the lungs 2 times daily Rinse mouth after use. 1 Inhaler 5    XARELTO 20 MG TABS tablet TAKE 1 TABLET BY MOUTH EVERY DAY 90 tablet 1    simvastatin (ZOCOR) 40 MG tablet Take 40 mg by mouth nightly   3    citalopram (CELEXA) 20 MG tablet Take 20 mg by mouth daily      vitamin B-12 (CYANOCOBALAMIN) 1000 MCG tablet Take 6,000 mcg by mouth daily       albuterol (PROVENTIL HFA) 108 (90 BASE) MCG/ACT inhaler Inhale 2 puffs into the lungs every 4 hours as needed for Wheezing or Shortness of Breath. 1 Inhaler 0    Cholecalciferol (VITAMIN D3) 2000 units TABS Take 1 tablet by mouth daily       Biotin 5000 MCG CAPS Take 1 capsule by mouth daily.  Vitamin E 400 UNIT TABS Take 1 tablet by mouth daily.         dronedarone hcl (MULTAQ) 400 MG TABS Take 1 tablet by mouth 2 times daily (with meals) for 1 day Lot: [id number]  Exp: 1/22 60 tablet 0    rivaroxaban (XARELTO) 20 MG TABS tablet Take 1 tablet by mouth daily (with breakfast) for 1 day Lot: [id number]  Exp: 10/22 35 tablet 0     No current facility-administered medications for this visit. Allergies   Allergen Reactions    Codeine Nausea And Vomiting    Codimal-A [Brompheniramine] Other (See Comments)     Altered mental state and confusion    Lipitor Other (See Comments)     GENERALIZED ACHING, DIFFICULT TO FUNCTION    Spiriva Handihaler [Tiotropium Bromide Monohydrate]     Tudorza Pressair [Aclidinium Bromide]     Ciprofloxacin Nausea And Vomiting       Vitals:    21 0953   BP: 115/71   Site: Right Upper Arm   Position: Sitting   Cuff Size: Medium Adult   Pulse: 111   Resp: 20   SpO2: 98%   Weight: 193 lb 9.6 oz (87.8 kg)   Height: 5' 8\" (1.727 m)       Social History     Socioeconomic History    Marital status:       Spouse name: Not on file    Number of children: Not on file    Years of education: Not on file    Highest education level: Not on file   Occupational History    Not on file   Social Needs    Financial resource strain: Not on file    Food insecurity     Worry: Not on file     Inability: Not on file    Transportation needs     Medical: Not on file     Non-medical: Not on file   Tobacco Use    Smoking status: Former Smoker     Types: Cigarettes     Quit date: 1994     Years since quittin.4    Smokeless tobacco: Never Used   Substance and Sexual Activity    Alcohol use: No     Comment: RARE    Drug use: No    Sexual activity: Not on file   Lifestyle    Physical activity     Days per week: Not on file     Minutes per session: Not on file    Stress: Not on file   Relationships    Social connections     Talks on phone: Not on file     Gets together: Not on file     Attends Tenriism service: Not on file     Active member of club or organization: Not on file     Attends meetings of clubs or organizations: Not on file     Relationship status: Not on file    Intimate partner violence     Fear of current or ex partner: Not on file     Emotionally abused: Not on file     Physically abused: Not on file     Forced sexual activity: Not on file   Other Topics Concern    Not on file   Social History Narrative    Not on file       History reviewed. No pertinent family history. SUBJECTIVE: Ricardo Rodriguez presents to the office today for re-evaluation of cardiac diagnoses. Since our last visit had successful OP DCCV, but felt good for only 24 hours. .  She complains of VASQUES and denies   chest pain, chest pressure/discomfort, claudication, dyspnea, exertional chest pressure/discomfort, fatigue, irregular heart beat, lower extremity edema, near-syncope, orthopnea, palpitations, paroxysmal nocturnal dyspnea, syncope and tachypnea. Compliant with NOAC and AV yovani blocker, no bleeding    Review of Systems:   Heart: as above   Lungs: as above   Eyes: denies changes in vision or discharge. Ears: denies changes in hearing or pain. Nose: denies epistaxis or masses   Throat: denies sore throat or trouble swallowing. Neuro: denies numbness, tingling, tremors. Skin: denies rashes or itching. : denies hematuria, dysuria   GI: denies vomiting, diarrhea   Psych: denies mood changed, anxiety, depression. all others negative. Physical Exam   /71 (Site: Right Upper Arm, Position: Sitting, Cuff Size: Medium Adult)   Pulse 111   Resp 20   Ht 5' 8\" (1.727 m)   Wt 193 lb 9.6 oz (87.8 kg)   SpO2 98%   BMI 29.44 kg/m²   Constitutional: Oriented to person, place, and time. Well-developed and well-nourished. No distress. Head: Normocephalic and atraumatic. Eyes: EOM are normal. Pupils are equal, round, and reactive to light. Neck: Normal range of motion. Neck supple. No hepatojugular reflux and no JVD present. Carotid bruit is not present. No tracheal deviation present. No thyromegaly present. Cardiovascular: rapid rate, irregular rhythm, normal heart sounds and intact distal pulses. Exam reveals no gallop and no friction rub.   No murmur heard.  Pulmonary/Chest: Effort normal and breath sounds normal. No respiratory distress. No wheezes. No rales. No tenderness. Abdominal: Soft. Bowel sounds are normal. No distension and no mass. No tenderness. No rebound and no guarding. Musculoskeletal: Normal range of motion. No edema and no tenderness. Neurological: Alert and oriented to person, place, and time. Skin: Skin is warm and dry. No rash noted. Not diaphoretic. No erythema. Psychiatric: Normal mood and affect. Behavior is normal.     EKG: afib with RVR, axis +44, nonspecific ST and T waves changes. ASSESSMENT AND PLAN:  Patient Active Problem List   Diagnosis    Persistent atrial fibrillation: s/p DC cardio 6/2018 and 9/2019 and 2/2021 with elevated chads vasc . Had some diastolic failure with initial presenation, but no structural disease of significance on CONSTANTINO. Now back in afib only 24 hours after DCCV. Had a long discussion with her about benefits/risks of rhythm control vs rate control strategy, and given her functional limitations in afib, have decided on AAD. Had normal pharm stress in 2018. Will start Multaq 400 mg bid and see her back in a week. If has not converted will do DCCV.      HLD (hyperlipidemia) on statin          Jorge Hernandez M.D  [facility] Cardiology

## 2021-02-15 DIAGNOSIS — J45.20 MILD INTERMITTENT ASTHMA WITHOUT COMPLICATION: Primary | ICD-10-CM

## 2021-02-18 ENCOUNTER — OFFICE VISIT (OUTPATIENT)
Dept: CARDIOLOGY CLINIC | Age: 86
End: 2021-02-18
Payer: MEDICARE

## 2021-02-18 VITALS
DIASTOLIC BLOOD PRESSURE: 78 MMHG | HEART RATE: 102 BPM | WEIGHT: 195.6 LBS | BODY MASS INDEX: 29.64 KG/M2 | RESPIRATION RATE: 18 BRPM | HEIGHT: 68 IN | SYSTOLIC BLOOD PRESSURE: 138 MMHG

## 2021-02-18 DIAGNOSIS — E78.5 HYPERLIPIDEMIA, UNSPECIFIED HYPERLIPIDEMIA TYPE: Primary | ICD-10-CM

## 2021-02-18 DIAGNOSIS — I48.19 PERSISTENT ATRIAL FIBRILLATION (HCC): ICD-10-CM

## 2021-02-18 PROCEDURE — 99214 OFFICE O/P EST MOD 30 MIN: CPT | Performed by: INTERNAL MEDICINE

## 2021-02-18 PROCEDURE — 93000 ELECTROCARDIOGRAM COMPLETE: CPT | Performed by: INTERNAL MEDICINE

## 2021-02-18 PROCEDURE — 1090F PRES/ABSN URINE INCON ASSESS: CPT | Performed by: INTERNAL MEDICINE

## 2021-02-18 PROCEDURE — G8417 CALC BMI ABV UP PARAM F/U: HCPCS | Performed by: INTERNAL MEDICINE

## 2021-02-18 PROCEDURE — G8484 FLU IMMUNIZE NO ADMIN: HCPCS | Performed by: INTERNAL MEDICINE

## 2021-02-18 PROCEDURE — 1123F ACP DISCUSS/DSCN MKR DOCD: CPT | Performed by: INTERNAL MEDICINE

## 2021-02-18 PROCEDURE — 1036F TOBACCO NON-USER: CPT | Performed by: INTERNAL MEDICINE

## 2021-02-18 PROCEDURE — 4040F PNEUMOC VAC/ADMIN/RCVD: CPT | Performed by: INTERNAL MEDICINE

## 2021-02-18 PROCEDURE — G8427 DOCREV CUR MEDS BY ELIG CLIN: HCPCS | Performed by: INTERNAL MEDICINE

## 2021-02-18 NOTE — PROGRESS NOTES
Chief Complaint   Patient presents with    Atrial Fibrillation       Patient Active Problem List    Diagnosis Date Noted    Lower GI bleed 09/05/2020    Vitamin D deficiency 06/18/2019    Hyperlipidemia     Depression     Asthma     History of nuclear stress test 09/24/2018     Overview Note:     Normal pharm stress 8/2018      Persistent atrial fibrillation 06/15/2018     Overview Note:     A. Initial presentation June 2018. CHADS-VASC = 3  B. CONSTANTINO guided DC cardioversion  6/15/2018. No significant structural disesae on CONSTANTINO  C. DCCV 9/2019  D. DCCV 2/3/2021         Current Outpatient Medications   Medication Sig Dispense Refill    fluticasone-salmeterol (WIXELA INHUB) 250-50 MCG/DOSE AEPB Inhale 1 puff into the lungs every 12 hours 3 Inhaler 3    levothyroxine (SYNTHROID) 112 MCG tablet TAKE 1 TABLET BY MOUTH EVERY DAY      Levothyroxine Sodium 13 MCG CAPS TAKE 1 CAPSULE BY MOUTH EVERY DAY      dilTIAZem (CARDIZEM CD) 180 MG extended release capsule TAKE 1 CAPSULE BY MOUTH TWICE A  capsule 1    dronedarone hcl (MULTAQ) 400 MG TABS Take 1 tablet by mouth 2 times daily (with meals) for 1 day Lot: [id number]  Exp: 1/22 60 tablet 0    XARELTO 20 MG TABS tablet TAKE 1 TABLET BY MOUTH EVERY DAY 90 tablet 1    simvastatin (ZOCOR) 40 MG tablet Take 40 mg by mouth nightly   3    citalopram (CELEXA) 20 MG tablet Take 20 mg by mouth daily      vitamin B-12 (CYANOCOBALAMIN) 1000 MCG tablet Take 6,000 mcg by mouth daily       albuterol (PROVENTIL HFA) 108 (90 BASE) MCG/ACT inhaler Inhale 2 puffs into the lungs every 4 hours as needed for Wheezing or Shortness of Breath. 1 Inhaler 0    Cholecalciferol (VITAMIN D3) 2000 units TABS Take 1 tablet by mouth daily       Biotin 5000 MCG CAPS Take 1 capsule by mouth daily.  Vitamin E 400 UNIT TABS Take 1 tablet by mouth daily.         rivaroxaban (XARELTO) 20 MG TABS tablet Take 1 tablet by mouth daily (with breakfast) for 1 day Lot: [id number]  Exp: 10/22 35 tablet 0     No current facility-administered medications for this visit. Allergies   Allergen Reactions    Codeine Nausea And Vomiting    Codimal-A [Brompheniramine] Other (See Comments)     Altered mental state and confusion    Lipitor Other (See Comments)     GENERALIZED ACHING, DIFFICULT TO FUNCTION    Spiriva Handihaler [Tiotropium Bromide Monohydrate]     Tudorza Pressair [Aclidinium Bromide]     Ciprofloxacin Nausea And Vomiting       Vitals:    21 0952   BP: 138/78   Pulse: 102   Resp: 18   Weight: 195 lb 9.6 oz (88.7 kg)   Height: 5' 8\" (1.727 m)       Social History     Socioeconomic History    Marital status:       Spouse name: Not on file    Number of children: Not on file    Years of education: Not on file    Highest education level: Not on file   Occupational History    Not on file   Social Needs    Financial resource strain: Not on file    Food insecurity     Worry: Not on file     Inability: Not on file    Transportation needs     Medical: Not on file     Non-medical: Not on file   Tobacco Use    Smoking status: Former Smoker     Types: Cigarettes     Quit date: 1994     Years since quittin.4    Smokeless tobacco: Never Used   Substance and Sexual Activity    Alcohol use: No     Comment: RARE    Drug use: No    Sexual activity: Not on file   Lifestyle    Physical activity     Days per week: Not on file     Minutes per session: Not on file    Stress: Not on file   Relationships    Social connections     Talks on phone: Not on file     Gets together: Not on file     Attends Spiritism service: Not on file     Active member of club or organization: Not on file     Attends meetings of clubs or organizations: Not on file     Relationship status: Not on file    Intimate partner violence     Fear of current or ex partner: Not on file     Emotionally abused: Not on file     Physically abused: Not on file     Forced sexual activity: Not on file   Other Topics Concern    Not on file   Social History Narrative    Not on file       History reviewed. No pertinent family history. SUBJECTIVE: Stephania Jurado presents to the office today for re-evaluation of cardiac diagnoses. She is an extremely difficult historian , and the longer I work with her the less reliability I can place on her stated symptoms. She denies clear cut anginal   chest pain,  claudication, dyspnea, exertional chest pressure/discomfort, fatigue, near-syncope, orthopnea, paroxysmal nocturnal dyspnea, syncope and tachypnea. Compliant with NOAC and AV yovani blocker, no bleeding    Review of Systems:   Heart: as above   Lungs: as above   Eyes: denies changes in vision or discharge. Ears: denies changes in hearing or pain. Nose: denies epistaxis or masses   Throat: denies sore throat or trouble swallowing. Neuro: denies numbness, tingling, tremors. Skin: denies rashes or itching. : denies hematuria, dysuria   GI: denies vomiting, diarrhea   Psych: denies mood changed, anxiety, depression. all others negative. Physical Exam   /78   Pulse 102   Resp 18   Ht 5' 8\" (1.727 m)   Wt 195 lb 9.6 oz (88.7 kg)   BMI 29.74 kg/m²   Constitutional: Oriented to person, place, and time. Well-developed and well-nourished. No distress. Head: Normocephalic and atraumatic. Eyes: EOM are normal. Pupils are equal, round, and reactive to light. Neck: Normal range of motion. Neck supple. No hepatojugular reflux and no JVD present. Carotid bruit is not present. No tracheal deviation present. No thyromegaly present. Cardiovascular:  irregular rhythm, normal heart sounds and intact distal pulses. Exam reveals no gallop and no friction rub. No murmur heard. Pulmonary/Chest: Effort normal and breath sounds normal. No respiratory distress. No wheezes. No rales. No tenderness. Abdominal: Soft. Bowel sounds are normal. No distension and no mass. No tenderness.  No rebound and no guarding. Musculoskeletal: Normal range of motion. No edema and no tenderness. Neurological: Alert and oriented to person, place, and time. Skin: Skin is warm and dry. No rash noted. Not diaphoretic. No erythema. Psychiatric: Normal mood and affect. Behavior is normal.     EKG: afib, rate 102 bpm, axis +44, nonspecific ST and T waves changes. ASSESSMENT AND PLAN:  Patient Active Problem List   Diagnosis    Persistent atrial fibrillation: s/p DC cardio 6/2018 and 9/2019 and 2/2021 with elevated chads vasc . Had some diastolic failure with initial presenation, but no structural disease of significance on CONSTANTINO. Now back in afib only 24 hours after DCCV a few weeks back. Compliant with Multaq 400 mg bid and NOAC. I did not expect her to convert with the med, but it will up her chance of remaining in sinus post DCCV. Procedure discussed again in detail.       HLD (hyperlipidemia) on statin          Radha Soni M.D  [facility]

## 2021-02-22 DIAGNOSIS — Z01.810 PREOPERATIVE CARDIOVASCULAR EXAMINATION: Primary | ICD-10-CM

## 2021-02-26 DIAGNOSIS — Z01.810 PREOPERATIVE CARDIOVASCULAR EXAMINATION: ICD-10-CM

## 2021-02-28 LAB
SARS-COV-2: NOT DETECTED
SOURCE: NORMAL

## 2021-03-04 ENCOUNTER — ANESTHESIA (OUTPATIENT)
Dept: CARDIAC CATH/INVASIVE PROCEDURES | Age: 86
End: 2021-03-04

## 2021-03-04 ENCOUNTER — HOSPITAL ENCOUNTER (OUTPATIENT)
Dept: CARDIAC CATH/INVASIVE PROCEDURES | Age: 86
Discharge: HOME | End: 2021-03-04
Payer: MEDICARE

## 2021-03-04 ENCOUNTER — ANESTHESIA EVENT (OUTPATIENT)
Dept: CARDIAC CATH/INVASIVE PROCEDURES | Age: 86
End: 2021-03-04

## 2021-03-04 LAB
EKG ATRIAL RATE: 86 BPM
EKG P AXIS: 51 DEGREES
EKG P-R INTERVAL: 208 MS
EKG Q-T INTERVAL: 426 MS
EKG QRS DURATION: 64 MS
EKG QTC CALCULATION (BAZETT): 509 MS
EKG R AXIS: 11 DEGREES
EKG T AXIS: 86 DEGREES
EKG VENTRICULAR RATE: 86 BPM

## 2021-03-04 PROCEDURE — 93005 ELECTROCARDIOGRAM TRACING: CPT | Performed by: INTERNAL MEDICINE

## 2021-03-04 PROCEDURE — 93010 ELECTROCARDIOGRAM REPORT: CPT | Performed by: INTERNAL MEDICINE

## 2021-03-04 ASSESSMENT — LIFESTYLE VARIABLES: SMOKING_STATUS: 0

## 2021-03-04 ASSESSMENT — COPD QUESTIONNAIRES: CAT_SEVERITY: MILD

## 2021-03-04 NOTE — ANESTHESIA PRE PROCEDURE
Department of Anesthesiology  Preprocedure Note       Name:  Marla Leonardo   Age:  80 y.o.  :  1934                                          MRN:  [id number]         Date:  3/4/2021      Surgeon: * Surgery not found *    Procedure: DCCV    Medications prior to admission:   Prior to Admission medications    Medication Sig Start Date End Date Taking? Authorizing Provider   fluticasone-salmeterol (WIXELA INHUB) 250-50 MCG/DOSE AEPB Inhale 1 puff into the lungs every 12 hours 2/15/21 5/16/21  Sierra Crump MD   levothyroxine (SYNTHROID) 112 MCG tablet TAKE 1 TABLET BY MOUTH EVERY DAY 1/15/21   Historical Provider, MD   Levothyroxine Sodium 13 MCG CAPS TAKE 1 CAPSULE BY MOUTH EVERY DAY 21   Historical Provider, MD   dilTIAZem (CARDIZEM CD) 180 MG extended release capsule TAKE 1 CAPSULE BY MOUTH TWICE A DAY 21   Tomasa Sterling MD   dronedarone hcl (MULTAQ) 400 MG TABS Take 1 tablet by mouth 2 times daily (with meals) for 1 day Lot: [id number]  Exp: 21  Tomasa Sterling MD   rivaroxaban (XARELTO) 20 MG TABS tablet Take 1 tablet by mouth daily (with breakfast) for 1 day Lot: [id number]  Exp: 10/22 2/11/21 2/12/21  Tomasa Sterling MD   XARELTO 20 MG TABS tablet TAKE 1 TABLET BY MOUTH EVERY DAY 20   Tomasa Sterling MD   simvastatin (ZOCOR) 40 MG tablet Take 40 mg by mouth nightly  3/29/18   Historical Provider, MD   citalopram (CELEXA) 20 MG tablet Take 20 mg by mouth daily    Historical Provider, MD   vitamin B-12 (CYANOCOBALAMIN) 1000 MCG tablet Take 6,000 mcg by mouth daily     Historical Provider, MD   albuterol (PROVENTIL HFA) 108 (90 BASE) MCG/ACT inhaler Inhale 2 puffs into the lungs every 4 hours as needed for Wheezing or Shortness of Breath. 14   Keyla Alas MD   Cholecalciferol (VITAMIN D3) 2000 units TABS Take 1 tablet by mouth daily     Historical Provider, MD   Biotin 5000 MCG CAPS Take 1 capsule by mouth daily.       Historical Provider, MD Problem List:    Patient Active Problem List   Diagnosis Code    Persistent atrial fibrillation I48.19    History of nuclear stress test Z92.89    Hyperlipidemia E78.5    Depression F32.9    Asthma J45.909    Vitamin D deficiency E55.9    Lower GI bleed K92.2       Past Medical History:        Diagnosis Date    Asthma     Depression     Hyperlipidemia     Thyroid disease        Past Surgical History:        Procedure Laterality Date    APPENDECTOMY  62 YEARS AGO    CARDIOVERSION  2019    Dr. Marcio Alvarez 1 shock 200 joules Successful    CHOLECYSTECTOMY      EYE SURGERY      CATARACT RIGHT AND LEFT    HYSTERECTOMY      NECK SURGERY  2011    THYROIDECTOMY  17 YEARS AGO    TONSILLECTOMY         Social History:    Social History     Tobacco Use    Smoking status: Former Smoker     Types: Cigarettes     Quit date: 1994     Years since quittin.4    Smokeless tobacco: Never Used   Substance Use Topics    Alcohol use: No     Comment: RARE                                Counseling given: Not Answered      Vital Signs (Current): There were no vitals filed for this visit.                                            BP Readings from Last 3 Encounters:   21 138/78   21 115/71   21 (!) 139/92       NPO Status:                                                                                  BMI:   Wt Readings from Last 3 Encounters:   21 195 lb 9.6 oz (88.7 kg)   21 193 lb 9.6 oz (87.8 kg)   21 190 lb (86.2 kg)     There is no height or weight on file to calculate BMI.    CBC:   Lab Results   Component Value Date    WBC 7.8 2020    RBC 4.65 2020    HGB 10.5 2020    HCT 33.1 2020    MCV 90.8 2020    RDW 14.4 2020     2020       CMP:   Lab Results   Component Value Date     2020    K 4.1 2020     2020    CO2 25 2020    BUN 20 2020    CREATININE 0.9 2020 GFRAA >60 09/05/2020    LABGLOM 59 09/05/2020    GLUCOSE 100 09/05/2020    PROT 6.0 09/05/2020    CALCIUM 9.1 09/05/2020    BILITOT 0.5 09/05/2020    ALKPHOS 64 09/05/2020    AST 20 09/05/2020    ALT 11 09/05/2020       POC Tests: No results for input(s): POCGLU, POCNA, POCK, POCCL, POCBUN, POCHEMO, POCHCT in the last 72 hours. Coags:   Lab Results   Component Value Date    PROTIME 17.0 09/05/2020    INR 1.5 09/05/2020    APTT 34.6 09/05/2020       HCG (If Applicable): No results found for: PREGTESTUR, PREGSERUM, HCG, HCGQUANT     ABGs: No results found for: PHART, PO2ART, VVS3NFU, KWY3AON, BEART, T2JEAUWT     Type & Screen (If Applicable):  No results found for: LABABO, LABRH    Drug/Infectious Status (If Applicable):  No results found for: HIV, HEPCAB    COVID-19 Screening (If Applicable):   Lab Results   Component Value Date    COVID19 Not Detected 02/26/2021         Anesthesia Evaluation  Patient summary reviewed and Nursing notes reviewed   history of anesthetic complications: PONV. Airway: Mallampati: II  TM distance: >3 FB   Neck ROM: full  Mouth opening: > = 3 FB Dental:    (+) partials      Pulmonary:normal exam  breath sounds clear to auscultation  (+) COPD: mild,  asthma:     (-) not a current smoker                           Cardiovascular:    (+) dysrhythmias: atrial fibrillation,         Rhythm: irregular             Beta Blocker:  Not on Beta Blocker         Neuro/Psych:   (+) psychiatric history:            GI/Hepatic/Renal:             Endo/Other:    (+) hypothyroidism, blood dyscrasia: anticoagulation therapy:., .                 Abdominal:       Abdomen: soft. Vascular:   + DVT, . Anesthesia Plan      MAC     ASA 4       Induction: intravenous. Anesthetic plan and risks discussed with patient. Plan discussed with attending.                   SONY Hernandez - CRNA   3/4/2021

## 2021-03-05 ENCOUNTER — TELEPHONE (OUTPATIENT)
Dept: CARDIOLOGY CLINIC | Age: 86
End: 2021-03-05

## 2021-03-05 NOTE — TELEPHONE ENCOUNTER
----- Message from Neha Hooks MD sent at 3/4/2021  8:41 AM EST -----  One month    ----- Message -----  From: Seferino Janessa  Sent: 3/4/2021   8:31 AM EST  To: Neha Hooks MD    When to see in follow-up ?   ----- Message -----  From: Trang Mackey MD  Sent: 3/4/2021   8:21 AM EST  To: Christelle Fallon    Patient scheduled by Peter Lozoya for cardioversion of atrial fibrillation and presented in sinus rhythm.   Please assure that follow-up with him is scheduled

## 2021-03-30 ENCOUNTER — OFFICE VISIT (OUTPATIENT)
Dept: CARDIOLOGY CLINIC | Age: 86
End: 2021-03-30
Payer: MEDICARE

## 2021-03-30 VITALS
SYSTOLIC BLOOD PRESSURE: 134 MMHG | DIASTOLIC BLOOD PRESSURE: 72 MMHG | BODY MASS INDEX: 29.58 KG/M2 | HEART RATE: 102 BPM | WEIGHT: 195.2 LBS | HEIGHT: 68 IN | RESPIRATION RATE: 16 BRPM | OXYGEN SATURATION: 93 %

## 2021-03-30 DIAGNOSIS — I48.0 PAROXYSMAL ATRIAL FIBRILLATION (HCC): ICD-10-CM

## 2021-03-30 DIAGNOSIS — E78.5 HYPERLIPIDEMIA, UNSPECIFIED HYPERLIPIDEMIA TYPE: Primary | ICD-10-CM

## 2021-03-30 PROCEDURE — 99213 OFFICE O/P EST LOW 20 MIN: CPT | Performed by: INTERNAL MEDICINE

## 2021-03-30 PROCEDURE — 1123F ACP DISCUSS/DSCN MKR DOCD: CPT | Performed by: INTERNAL MEDICINE

## 2021-03-30 PROCEDURE — G8484 FLU IMMUNIZE NO ADMIN: HCPCS | Performed by: INTERNAL MEDICINE

## 2021-03-30 PROCEDURE — G8417 CALC BMI ABV UP PARAM F/U: HCPCS | Performed by: INTERNAL MEDICINE

## 2021-03-30 PROCEDURE — 1036F TOBACCO NON-USER: CPT | Performed by: INTERNAL MEDICINE

## 2021-03-30 PROCEDURE — 1090F PRES/ABSN URINE INCON ASSESS: CPT | Performed by: INTERNAL MEDICINE

## 2021-03-30 PROCEDURE — 4040F PNEUMOC VAC/ADMIN/RCVD: CPT | Performed by: INTERNAL MEDICINE

## 2021-03-30 PROCEDURE — 93000 ELECTROCARDIOGRAM COMPLETE: CPT | Performed by: INTERNAL MEDICINE

## 2021-03-30 PROCEDURE — G8427 DOCREV CUR MEDS BY ELIG CLIN: HCPCS | Performed by: INTERNAL MEDICINE

## 2021-03-30 NOTE — PROGRESS NOTES
Chief Complaint   Patient presents with    Atrial Fibrillation       Patient Active Problem List    Diagnosis Date Noted    Lower GI bleed 09/05/2020    Vitamin D deficiency 06/18/2019    Hyperlipidemia     Depression     Asthma     History of nuclear stress test 09/24/2018     Overview Note:     Normal pharm stress 8/2018      Paroxysmal atrial fibrillation ([address] 06/15/2018     Overview Note:     A. Initial presentation June 2018. CHADS-VASC = 3  B. CONSTANTINO guided DC cardioversion  6/15/2018. No significant structural disesae on CONSTANTINO  C. DCCV 9/2019  D. DCCV 2/3/2021         Current Outpatient Medications   Medication Sig Dispense Refill    fluticasone-salmeterol (WIXELA INHUB) 250-50 MCG/DOSE AEPB Inhale 1 puff into the lungs every 12 hours 3 Inhaler 3    levothyroxine (SYNTHROID) 112 MCG tablet 100 mcg       dilTIAZem (CARDIZEM CD) 180 MG extended release capsule TAKE 1 CAPSULE BY MOUTH TWICE A  capsule 1    dronedarone hcl (MULTAQ) 400 MG TABS Take 1 tablet by mouth 2 times daily (with meals) for 1 day Lot: [id number]  Exp: 1/22 60 tablet 0    rivaroxaban (XARELTO) 20 MG TABS tablet Take 1 tablet by mouth daily (with breakfast) for 1 day Lot: [id number]  Exp: 10/22 35 tablet 0    XARELTO 20 MG TABS tablet TAKE 1 TABLET BY MOUTH EVERY DAY 90 tablet 1    simvastatin (ZOCOR) 40 MG tablet Take 40 mg by mouth nightly   3    citalopram (CELEXA) 20 MG tablet Take 20 mg by mouth daily      vitamin B-12 (CYANOCOBALAMIN) 1000 MCG tablet Take 6,000 mcg by mouth daily       albuterol (PROVENTIL HFA) 108 (90 BASE) MCG/ACT inhaler Inhale 2 puffs into the lungs every 4 hours as needed for Wheezing or Shortness of Breath. 1 Inhaler 0    Cholecalciferol (VITAMIN D3) 2000 units TABS Take 1 tablet by mouth daily       Biotin 5000 MCG CAPS Take 1 capsule by mouth daily.  Vitamin E 400 UNIT TABS Take 1 tablet by mouth daily.         Levothyroxine Sodium 13 MCG CAPS TAKE 1 CAPSULE BY MOUTH EVERY DAY       No current facility-administered medications for this visit. Allergies   Allergen Reactions    Codeine Nausea And Vomiting    Codimal-A [Brompheniramine] Other (See Comments)     Altered mental state and confusion    Lipitor Other (See Comments)     GENERALIZED ACHING, DIFFICULT TO FUNCTION    Spiriva Handihaler [Tiotropium Bromide Monohydrate]     Tudorza Pressair [Aclidinium Bromide]     Ciprofloxacin Nausea And Vomiting       Vitals:    21 0924   BP: 134/72   Pulse: 102   Resp: 16   SpO2: 93%   Weight: 195 lb 3.2 oz (88.5 kg)   Height: 5' 8\" (1.727 m)       Social History     Socioeconomic History    Marital status:       Spouse name: Not on file    Number of children: Not on file    Years of education: Not on file    Highest education level: Not on file   Occupational History    Not on file   Social Needs    Financial resource strain: Not on file    Food insecurity     Worry: Not on file     Inability: Not on file    Transportation needs     Medical: Not on file     Non-medical: Not on file   Tobacco Use    Smoking status: Former Smoker     Types: Cigarettes     Quit date: 1994     Years since quittin.5    Smokeless tobacco: Never Used   Substance and Sexual Activity    Alcohol use: No     Comment: RARE    Drug use: No    Sexual activity: Not on file   Lifestyle    Physical activity     Days per week: Not on file     Minutes per session: Not on file    Stress: Not on file   Relationships    Social connections     Talks on phone: Not on file     Gets together: Not on file     Attends Latter day service: Not on file     Active member of club or organization: Not on file     Attends meetings of clubs or organizations: Not on file     Relationship status: Not on file    Intimate partner violence     Fear of current or ex partner: Not on file     Emotionally abused: Not on file     Physically abused: Not on file     Forced sexual activity: Not on file   Other Topics Concern    Not on file   Social History Narrative    Not on file       History reviewed. No pertinent family history. SUBJECTIVE: David Ram presents to the office today for re-evaluation of cardiac diagnoses. Since the last visit she was to have had DCCV, but was in sinus on arrival   She is an extremely difficult historian , and the longer I work with her the less reliability I can place on her stated symptoms. She denies clear cut anginal   chest pain,  claudication, dyspnea, exertional chest pressure/discomfort, fatigue, near-syncope, orthopnea, paroxysmal nocturnal dyspnea, syncope and tachypnea. Compliant with NOAC . NO bleeding    Review of Systems:   Heart: as above   Lungs: as above   Eyes: denies changes in vision or discharge. Ears: denies changes in hearing or pain. Nose: denies epistaxis or masses   Throat: denies sore throat or trouble swallowing. Neuro: denies numbness, tingling, tremors. Skin: denies rashes or itching. : denies hematuria, dysuria   GI: denies vomiting, diarrhea   Psych: denies mood changed, anxiety, depression. all others negative. Physical Exam   /72   Pulse 102   Resp 16   Ht 5' 8\" (1.727 m)   Wt 195 lb 3.2 oz (88.5 kg)   SpO2 93%   BMI 29.68 kg/m²   Constitutional: Oriented to person, place, and time. Well-developed and well-nourished. No distress. Head: Normocephalic and atraumatic. Eyes: EOM are normal. Pupils are equal, round, and reactive to light. Neck: Normal range of motion. Neck supple. No hepatojugular reflux and no JVD present. Carotid bruit is not present. No tracheal deviation present. No thyromegaly present. Cardiovascular:  irregular rhythm, normal heart sounds and intact distal pulses. Exam reveals no gallop and no friction rub. No murmur heard. Pulmonary/Chest: Effort normal and breath sounds normal. No respiratory distress. No wheezes. No rales. No tenderness. Abdominal: Soft.  Bowel sounds are normal. No distension and no mass. No tenderness. No rebound and no guarding. Musculoskeletal: Normal range of motion. No edema and no tenderness. Neurological: Alert and oriented to person, place, and time. Skin: Skin is warm and dry. No rash noted. Not diaphoretic. No erythema. Psychiatric: Normal mood and affect. Behavior is normal.     EKG: afib, rate 102 bpm, axis +57, nonspecific ST and T waves changes. ASSESSMENT AND PLAN:  Patient Active Problem List   Diagnosis    Persistent atrial fibrillation: s/p DC cardio 6/2018 and 9/2019 and 2/2021 with elevated chads vasc . Had some diastolic failure with initial presenation, but no structural disease of significance on CONSTANTINO. Now back in afib despite AAD with dronedarone. We discussed a trial of amiodarone, or mere rate control, and we both favored the latter. Will stop multaq and up diltiazem to 360 q AM  Continue Xarelto.       HLD (hyperlipidemia) on statin        OV 3 months      Андрей Lopez M.D  [facility]

## 2021-04-01 ENCOUNTER — HOSPITAL ENCOUNTER (OUTPATIENT)
Dept: GENERAL RADIOLOGY | Age: 86
Discharge: HOME | End: 2021-04-03
Payer: MEDICARE

## 2021-04-01 DIAGNOSIS — Z12.31 VISIT FOR SCREENING MAMMOGRAM: ICD-10-CM

## 2021-04-01 PROCEDURE — 77063 BREAST TOMOSYNTHESIS BI: CPT

## 2021-05-04 LAB
CHOLESTEROL, TOTAL: NORMAL
CHOLESTEROL/HDL RATIO: NORMAL
HDLC SERPL-MCNC: NORMAL MG/DL
LDL CHOLESTEROL CALCULATED: NORMAL
NONHDLC SERPL-MCNC: NORMAL MG/DL
TRIGL SERPL-MCNC: NORMAL MG/DL
VLDLC SERPL CALC-MCNC: NORMAL MG/DL

## 2021-06-10 ENCOUNTER — OFFICE VISIT (OUTPATIENT)
Dept: FAMILY MEDICINE CLINIC | Age: 86
End: 2021-06-10
Payer: MEDICARE

## 2021-06-10 VITALS
OXYGEN SATURATION: 96 % | WEIGHT: 196 LBS | SYSTOLIC BLOOD PRESSURE: 124 MMHG | HEART RATE: 106 BPM | HEIGHT: 68 IN | BODY MASS INDEX: 29.7 KG/M2 | TEMPERATURE: 97.7 F | RESPIRATION RATE: 16 BRPM | DIASTOLIC BLOOD PRESSURE: 72 MMHG

## 2021-06-10 DIAGNOSIS — L02.415 ABSCESS OF RIGHT LEG: ICD-10-CM

## 2021-06-10 DIAGNOSIS — Z23 NEED FOR TETANUS, DIPHTHERIA, AND ACELLULAR PERTUSSIS (TDAP) VACCINE: Primary | ICD-10-CM

## 2021-06-10 PROCEDURE — 4040F PNEUMOC VAC/ADMIN/RCVD: CPT | Performed by: FAMILY MEDICINE

## 2021-06-10 PROCEDURE — 90715 TDAP VACCINE 7 YRS/> IM: CPT | Performed by: FAMILY MEDICINE

## 2021-06-10 PROCEDURE — 90471 IMMUNIZATION ADMIN: CPT | Performed by: FAMILY MEDICINE

## 2021-06-10 PROCEDURE — 99213 OFFICE O/P EST LOW 20 MIN: CPT | Performed by: FAMILY MEDICINE

## 2021-06-10 PROCEDURE — 1036F TOBACCO NON-USER: CPT | Performed by: FAMILY MEDICINE

## 2021-06-10 PROCEDURE — 1123F ACP DISCUSS/DSCN MKR DOCD: CPT | Performed by: FAMILY MEDICINE

## 2021-06-10 PROCEDURE — G8427 DOCREV CUR MEDS BY ELIG CLIN: HCPCS | Performed by: FAMILY MEDICINE

## 2021-06-10 PROCEDURE — G8417 CALC BMI ABV UP PARAM F/U: HCPCS | Performed by: FAMILY MEDICINE

## 2021-06-10 PROCEDURE — 1090F PRES/ABSN URINE INCON ASSESS: CPT | Performed by: FAMILY MEDICINE

## 2021-06-10 RX ORDER — CEPHALEXIN 500 MG/1
500 CAPSULE ORAL 3 TIMES DAILY
Qty: 30 CAPSULE | Refills: 0 | Status: SHIPPED | OUTPATIENT
Start: 2021-06-10 | End: 2021-06-20

## 2021-06-10 NOTE — PROGRESS NOTES
Historical Provider, MD   vitamin B-12 (CYANOCOBALAMIN) 1000 MCG tablet Take 6,000 mcg by mouth daily  Yes Historical Provider, MD   albuterol (PROVENTIL HFA) 108 (90 BASE) MCG/ACT inhaler Inhale 2 puffs into the lungs every 4 hours as needed for Wheezing or Shortness of Breath. Yes Jennyfer Aguilar MD   Cholecalciferol (VITAMIN D3) 2000 units TABS Take 1 tablet by mouth daily  Yes Historical Provider, MD   Biotin 5000 MCG CAPS Take 1 capsule by mouth daily. Yes Historical Provider, MD   Vitamin E 400 UNIT TABS Take 1 tablet by mouth daily. Yes Historical Provider, MD   fluticasone-salmeterol (WIXELA INHUB) 250-50 MCG/DOSE AEPB Inhale 1 puff into the lungs every 12 hours  Kris Castro MD   dronedarone hcl (MULTAQ) 400 MG TABS Take 1 tablet by mouth 2 times daily (with meals) for 1 day Lot: [id number]  Exp:   Sridhar Sousa MD          There is no immunization history on file for this patient. Past Surgical History:   Procedure Laterality Date    APPENDECTOMY  62 YEARS AGO    CARDIOVERSION  2019    Dr. Stef Adams 1 shock 200 joules Successful    CHOLECYSTECTOMY      EYE SURGERY      CATARACT RIGHT AND LEFT    HYSTERECTOMY      NECK SURGERY  2011    THYROIDECTOMY  17 YEARS AGO    TONSILLECTOMY         Social History     Socioeconomic History    Marital status:       Spouse name: Not on file    Number of children: Not on file    Years of education: Not on file    Highest education level: Not on file   Occupational History    Not on file   Tobacco Use    Smoking status: Former Smoker     Types: Cigarettes     Quit date: 1994     Years since quittin.7    Smokeless tobacco: Never Used   Vaping Use    Vaping Use: Never used   Substance and Sexual Activity    Alcohol use: No     Comment: RARE    Drug use: No    Sexual activity: Not on file   Other Topics Concern    Not on file   Social History Narrative    Not on file     Social Determinants of Health     Financial exudate or posterior oropharyngeal erythema. Eyes:      General: No scleral icterus. Right eye: No discharge. Left eye: No discharge. Extraocular Movements: Extraocular movements intact. Conjunctiva/sclera: Conjunctivae normal.      Pupils: Pupils are equal, round, and reactive to light. Neck:      Vascular: No carotid bruit. Cardiovascular:      Rate and Rhythm: Normal rate. Rhythm irregular. Pulses: Normal pulses. Heart sounds: Normal heart sounds. No murmur heard. No friction rub. No gallop. Pulmonary:      Effort: Pulmonary effort is normal. No respiratory distress. Breath sounds: Normal breath sounds. No stridor. No wheezing, rhonchi or rales. Abdominal:      General: Abdomen is flat. Bowel sounds are normal. There is no distension. Palpations: Abdomen is soft. There is no mass. Tenderness: There is no abdominal tenderness. There is no right CVA tenderness, left CVA tenderness, guarding or rebound. Musculoskeletal:         General: No swelling, tenderness, deformity or signs of injury. Normal range of motion. Cervical back: Normal range of motion and neck supple. No rigidity. No muscular tenderness. Right lower leg: No edema. Left lower leg: No edema. Lymphadenopathy:      Cervical: No cervical adenopathy. Skin:     General: Skin is warm and dry. Capillary Refill: Capillary refill takes less than 2 seconds. Coloration: Skin is not jaundiced. Findings: No lesion or rash. Neurological:      General: No focal deficit present. Mental Status: She is alert and oriented to person, place, and time. Cranial Nerves: No cranial nerve deficit. Sensory: No sensory deficit. Motor: No weakness.       Coordination: Coordination normal.      Deep Tendon Reflexes: Reflexes normal.   Psychiatric:         Mood and Affect: Mood normal.         Behavior: Behavior normal.         ASSESSMENT/PLAN:  Nahun Ram was seen today for skin problem. Diagnoses and all orders for this visit:    Abscess of right leg  -     cephALEXin (KEFLEX) 500 MG capsule; Take 1 capsule by mouth 3 times daily for 10 days    Office visit plus I&D of abscess of leg. Return in about 4 days (around 6/14/2021). An  electronic signature was used to authenticate this note.     --Norah Gonzalez, DO on 6/10/2021 at 12:18 PM

## 2021-06-11 ENCOUNTER — OFFICE VISIT (OUTPATIENT)
Dept: FAMILY MEDICINE CLINIC | Age: 86
End: 2021-06-11
Payer: MEDICARE

## 2021-06-11 VITALS
HEART RATE: 115 BPM | SYSTOLIC BLOOD PRESSURE: 104 MMHG | HEIGHT: 68 IN | WEIGHT: 193.8 LBS | TEMPERATURE: 96.7 F | DIASTOLIC BLOOD PRESSURE: 70 MMHG | BODY MASS INDEX: 29.37 KG/M2 | OXYGEN SATURATION: 96 %

## 2021-06-11 DIAGNOSIS — M70.21 OLECRANON BURSITIS OF RIGHT ELBOW: ICD-10-CM

## 2021-06-11 PROCEDURE — 1090F PRES/ABSN URINE INCON ASSESS: CPT | Performed by: FAMILY MEDICINE

## 2021-06-11 PROCEDURE — 1036F TOBACCO NON-USER: CPT | Performed by: FAMILY MEDICINE

## 2021-06-11 PROCEDURE — 99213 OFFICE O/P EST LOW 20 MIN: CPT | Performed by: FAMILY MEDICINE

## 2021-06-11 PROCEDURE — G8417 CALC BMI ABV UP PARAM F/U: HCPCS | Performed by: FAMILY MEDICINE

## 2021-06-11 PROCEDURE — G8427 DOCREV CUR MEDS BY ELIG CLIN: HCPCS | Performed by: FAMILY MEDICINE

## 2021-06-11 PROCEDURE — 4040F PNEUMOC VAC/ADMIN/RCVD: CPT | Performed by: FAMILY MEDICINE

## 2021-06-11 PROCEDURE — 1123F ACP DISCUSS/DSCN MKR DOCD: CPT | Performed by: FAMILY MEDICINE

## 2021-06-13 LAB
GRAM STAIN RESULT: ABNORMAL
ORGANISM: ABNORMAL
WOUND/ABSCESS: ABNORMAL

## 2021-06-14 ENCOUNTER — OFFICE VISIT (OUTPATIENT)
Dept: FAMILY MEDICINE CLINIC | Age: 86
End: 2021-06-14
Payer: MEDICARE

## 2021-06-14 ENCOUNTER — HOSPITAL ENCOUNTER (EMERGENCY)
Age: 86
Discharge: HOME | End: 2021-06-15
Attending: EMERGENCY MEDICINE
Payer: MEDICARE

## 2021-06-14 ENCOUNTER — APPOINTMENT (OUTPATIENT)
Dept: GENERAL RADIOLOGY | Age: 86
End: 2021-06-14
Payer: MEDICARE

## 2021-06-14 ENCOUNTER — APPOINTMENT (OUTPATIENT)
Dept: ULTRASOUND IMAGING | Age: 86
End: 2021-06-14
Payer: MEDICARE

## 2021-06-14 VITALS
HEART RATE: 71 BPM | SYSTOLIC BLOOD PRESSURE: 115 MMHG | WEIGHT: 193 LBS | OXYGEN SATURATION: 97 % | RESPIRATION RATE: 16 BRPM | BODY MASS INDEX: 29.25 KG/M2 | DIASTOLIC BLOOD PRESSURE: 75 MMHG | TEMPERATURE: 97.8 F | HEIGHT: 68 IN

## 2021-06-14 VITALS — HEART RATE: 110 BPM | TEMPERATURE: 97.5 F | OXYGEN SATURATION: 96 %

## 2021-06-14 DIAGNOSIS — L03.115 CELLULITIS OF RIGHT LOWER EXTREMITY: Primary | ICD-10-CM

## 2021-06-14 DIAGNOSIS — L02.91 ABSCESS: ICD-10-CM

## 2021-06-14 DIAGNOSIS — L03.119 CELLULITIS AND ABSCESS OF LEG: Primary | ICD-10-CM

## 2021-06-14 DIAGNOSIS — L02.419 CELLULITIS AND ABSCESS OF LEG: Primary | ICD-10-CM

## 2021-06-14 LAB
ANION GAP SERPL CALCULATED.3IONS-SCNC: 9 MMOL/L (ref 7–16)
BASOPHILS ABSOLUTE: 0.03 E9/L (ref 0–0.2)
BASOPHILS RELATIVE PERCENT: 0.3 % (ref 0–2)
BUN BLDV-MCNC: 21 MG/DL (ref 6–23)
CALCIUM SERPL-MCNC: 9.4 MG/DL (ref 8.6–10.2)
CHLORIDE BLD-SCNC: 104 MMOL/L (ref 98–107)
CO2: 25 MMOL/L (ref 22–29)
CREAT SERPL-MCNC: 1 MG/DL (ref 0.5–1)
EOSINOPHILS ABSOLUTE: 0.08 E9/L (ref 0.05–0.5)
EOSINOPHILS RELATIVE PERCENT: 0.9 % (ref 0–6)
GFR AFRICAN AMERICAN: >60
GFR NON-AFRICAN AMERICAN: 52 ML/MIN/1.73
GLUCOSE BLD-MCNC: 114 MG/DL (ref 74–99)
HCT VFR BLD CALC: 43.6 % (ref 34–48)
HEMOGLOBIN: 13 G/DL (ref 11.5–15.5)
IMMATURE GRANULOCYTES #: 0.02 E9/L
IMMATURE GRANULOCYTES %: 0.2 % (ref 0–5)
LACTIC ACID: 1.4 MMOL/L (ref 0.5–2.2)
LYMPHOCYTES ABSOLUTE: 1.31 E9/L (ref 1.5–4)
LYMPHOCYTES RELATIVE PERCENT: 14.4 % (ref 20–42)
MCH RBC QN AUTO: 24.9 PG (ref 26–35)
MCHC RBC AUTO-ENTMCNC: 29.8 % (ref 32–34.5)
MCV RBC AUTO: 83.4 FL (ref 80–99.9)
MONOCYTES ABSOLUTE: 0.76 E9/L (ref 0.1–0.95)
MONOCYTES RELATIVE PERCENT: 8.4 % (ref 2–12)
NEUTROPHILS ABSOLUTE: 6.87 E9/L (ref 1.8–7.3)
NEUTROPHILS RELATIVE PERCENT: 75.8 % (ref 43–80)
PDW BLD-RTO: 17 FL (ref 11.5–15)
PLATELET # BLD: 204 E9/L (ref 130–450)
PMV BLD AUTO: 13 FL (ref 7–12)
POTASSIUM SERPL-SCNC: 4.2 MMOL/L (ref 3.5–5)
RBC # BLD: 5.23 E12/L (ref 3.5–5.5)
SODIUM BLD-SCNC: 138 MMOL/L (ref 132–146)
WBC # BLD: 9.1 E9/L (ref 4.5–11.5)

## 2021-06-14 PROCEDURE — 80048 BASIC METABOLIC PNL TOTAL CA: CPT

## 2021-06-14 PROCEDURE — 85025 COMPLETE CBC W/AUTO DIFF WBC: CPT

## 2021-06-14 PROCEDURE — 73610 X-RAY EXAM OF ANKLE: CPT

## 2021-06-14 PROCEDURE — 1090F PRES/ABSN URINE INCON ASSESS: CPT | Performed by: NURSE PRACTITIONER

## 2021-06-14 PROCEDURE — 83605 ASSAY OF LACTIC ACID: CPT

## 2021-06-14 PROCEDURE — 1123F ACP DISCUSS/DSCN MKR DOCD: CPT | Performed by: NURSE PRACTITIONER

## 2021-06-14 PROCEDURE — G8427 DOCREV CUR MEDS BY ELIG CLIN: HCPCS | Performed by: NURSE PRACTITIONER

## 2021-06-14 PROCEDURE — 1036F TOBACCO NON-USER: CPT | Performed by: NURSE PRACTITIONER

## 2021-06-14 PROCEDURE — 99283 EMERGENCY DEPT VISIT LOW MDM: CPT

## 2021-06-14 PROCEDURE — 4040F PNEUMOC VAC/ADMIN/RCVD: CPT | Performed by: NURSE PRACTITIONER

## 2021-06-14 PROCEDURE — G8417 CALC BMI ABV UP PARAM F/U: HCPCS | Performed by: NURSE PRACTITIONER

## 2021-06-14 PROCEDURE — 93971 EXTREMITY STUDY: CPT

## 2021-06-14 PROCEDURE — 73630 X-RAY EXAM OF FOOT: CPT

## 2021-06-14 PROCEDURE — 96365 THER/PROPH/DIAG IV INF INIT: CPT

## 2021-06-14 PROCEDURE — 99214 OFFICE O/P EST MOD 30 MIN: CPT | Performed by: NURSE PRACTITIONER

## 2021-06-14 PROCEDURE — 93971 EXTREMITY STUDY: CPT | Performed by: RADIOLOGY

## 2021-06-14 ASSESSMENT — PAIN SCALES - GENERAL: PAINLEVEL_OUTOF10: 10

## 2021-06-14 NOTE — PROGRESS NOTES
21  Robert Cr : 1934 Sex: female  Age 80 y.o. Subjective:  Chief Complaint   Patient presents with    Skin Problem     follow up of possible insect bite    Foot Swelling     x 3 days       HPI:   Robert Cr , 80 y.o. female presents to the clinic for evaluation of lateral right lower leg abscess x 5 days. The patient was seen and treated on 6/10/2021 for symptoms. The patient reports worsening symptoms including increased redness, pain, and new swelling to ankle and foot. Patient also reports purulent drainage and increased fatigue. The patient is currently taking Keflex for symptoms. The patient denies lymphangitic streaking. The patient denies myalgia, arthralgia, chills, and lethargy. The patient also denies headache, fever, chest pain, abdominal pain, shortness of breath, and nausea / vomiting / diarrhea. ROS:   Unless otherwise stated in this report the patient's positive and negative responses for review of systems for constitutional, eyes, ENT, cardiovascular, respiratory, gastrointestinal, neurological, , musculoskeletal, and integument systems and related systems to the presenting problem are either stated in the history of present illness or were not pertinent or were negative for the symptoms and/or complaints related to the presenting medical problem. Positives and pertinent negatives as per HPI. All others reviewed and are negative. PMH:     Past Medical History:   Diagnosis Date    Asthma     Depression     Hyperlipidemia     Thyroid disease        Past Surgical History:   Procedure Laterality Date    APPENDECTOMY  62 YEARS AGO    CARDIOVERSION  2019    Dr. Owen Pickup 1 shock 200 joules Successful    CHOLECYSTECTOMY      EYE SURGERY      CATARACT RIGHT AND LEFT    HYSTERECTOMY      NECK SURGERY  2011    THYROIDECTOMY  17 YEARS AGO    TONSILLECTOMY         History reviewed. No pertinent family history.     Medications:     Current Outpatient Medications:     cephALEXin (KEFLEX) 500 MG capsule, Take 1 capsule by mouth 3 times daily for 10 days, Disp: 30 capsule, Rfl: 0    fluticasone-salmeterol (WIXELA INHUB) 250-50 MCG/DOSE AEPB, Inhale 1 puff into the lungs every 12 hours, Disp: 3 Inhaler, Rfl: 3    levothyroxine (SYNTHROID) 112 MCG tablet, 100 mcg , Disp: , Rfl:     Levothyroxine Sodium 13 MCG CAPS, TAKE 1 CAPSULE BY MOUTH EVERY DAY, Disp: , Rfl:     dilTIAZem (CARDIZEM CD) 180 MG extended release capsule, TAKE 1 CAPSULE BY MOUTH TWICE A DAY (Patient taking differently: Take 180 mg by mouth 2 times daily TAKE 1 CAPSULE BY MOUTH TWICE A DAY TAKES 2 ONCE A DAY 6/10/21), Disp: 180 capsule, Rfl: 1    dronedarone hcl (MULTAQ) 400 MG TABS, Take 1 tablet by mouth 2 times daily (with meals) for 1 day Lot: [id number] Exp: 1/22, Disp: 60 tablet, Rfl: 0    XARELTO 20 MG TABS tablet, TAKE 1 TABLET BY MOUTH EVERY DAY, Disp: 90 tablet, Rfl: 1    simvastatin (ZOCOR) 40 MG tablet, Take 40 mg by mouth nightly , Disp: , Rfl: 3    citalopram (CELEXA) 20 MG tablet, Take 20 mg by mouth daily, Disp: , Rfl:     vitamin B-12 (CYANOCOBALAMIN) 1000 MCG tablet, Take 6,000 mcg by mouth daily , Disp: , Rfl:     albuterol (PROVENTIL HFA) 108 (90 BASE) MCG/ACT inhaler, Inhale 2 puffs into the lungs every 4 hours as needed for Wheezing or Shortness of Breath., Disp: 1 Inhaler, Rfl: 0    Cholecalciferol (VITAMIN D3) 2000 units TABS, Take 1 tablet by mouth daily , Disp: , Rfl:     Biotin 5000 MCG CAPS, Take 1 capsule by mouth daily. , Disp: , Rfl:     Vitamin E 400 UNIT TABS, Take 1 tablet by mouth daily. , Disp: , Rfl:     Allergies:      Allergies   Allergen Reactions    Codeine Nausea And Vomiting    Codimal-A [Brompheniramine] Other (See Comments)     Altered mental state and confusion    Lipitor Other (See Comments)     GENERALIZED ACHING, DIFFICULT TO FUNCTION    Spiriva Handihaler [Tiotropium Bromide Monohydrate]     Tudorza Pressair [Aclidinium

## 2021-06-15 PROCEDURE — 96365 THER/PROPH/DIAG IV INF INIT: CPT

## 2021-06-15 PROCEDURE — 2500000003 HC RX 250 WO HCPCS: Performed by: NURSE PRACTITIONER

## 2021-06-15 PROCEDURE — 6370000000 HC RX 637 (ALT 250 FOR IP): Performed by: NURSE PRACTITIONER

## 2021-06-15 RX ORDER — CLINDAMYCIN HYDROCHLORIDE 300 MG/1
300 CAPSULE ORAL 3 TIMES DAILY
Qty: 21 CAPSULE | Refills: 0 | Status: SHIPPED | OUTPATIENT
Start: 2021-06-15 | End: 2021-06-22

## 2021-06-15 RX ORDER — CLINDAMYCIN PHOSPHATE 600 MG/50ML
600 INJECTION INTRAVENOUS ONCE
Status: COMPLETED | OUTPATIENT
Start: 2021-06-15 | End: 2021-06-15

## 2021-06-15 RX ADMIN — MUPIROCIN: 20 OINTMENT TOPICAL at 01:01

## 2021-06-15 RX ADMIN — CLINDAMYCIN PHOSPHATE 600 MG: 600 INJECTION, SOLUTION INTRAVENOUS at 00:40

## 2021-06-15 NOTE — ED PROVIDER NOTES
patients home medications have been reviewed.     Allergies: Codeine, Codimal-a [brompheniramine], Lipitor, Spiriva handihaler [tiotropium bromide monohydrate], Tudorza pressair [aclidinium bromide], and Ciprofloxacin    -------------------------------------------------- RESULTS -------------------------------------------------  All laboratory and radiology results have been personally reviewed by myself   LABS:  Results for orders placed or performed during the hospital encounter of 06/14/21   CBC Auto Differential   Result Value Ref Range    WBC 9.1 4.5 - 11.5 E9/L    RBC 5.23 3.50 - 5.50 E12/L    Hemoglobin 13.0 11.5 - 15.5 g/dL    Hematocrit 43.6 34.0 - 48.0 %    MCV 83.4 80.0 - 99.9 fL    MCH 24.9 (L) 26.0 - 35.0 pg    MCHC 29.8 (L) 32.0 - 34.5 %    RDW 17.0 (H) 11.5 - 15.0 fL    Platelets 901 333 - 921 E9/L    MPV 13.0 (H) 7.0 - 12.0 fL    Neutrophils % 75.8 43.0 - 80.0 %    Immature Granulocytes % 0.2 0.0 - 5.0 %    Lymphocytes % 14.4 (L) 20.0 - 42.0 %    Monocytes % 8.4 2.0 - 12.0 %    Eosinophils % 0.9 0.0 - 6.0 %    Basophils % 0.3 0.0 - 2.0 %    Neutrophils Absolute 6.87 1.80 - 7.30 E9/L    Immature Granulocytes # 0.02 E9/L    Lymphocytes Absolute 1.31 (L) 1.50 - 4.00 E9/L    Monocytes Absolute 0.76 0.10 - 0.95 E9/L    Eosinophils Absolute 0.08 0.05 - 0.50 E9/L    Basophils Absolute 0.03 0.00 - 0.20 Q9/L   Basic Metabolic Panel   Result Value Ref Range    Sodium 138 132 - 146 mmol/L    Potassium 4.2 3.5 - 5.0 mmol/L    Chloride 104 98 - 107 mmol/L    CO2 25 22 - 29 mmol/L    Anion Gap 9 7 - 16 mmol/L    Glucose 114 (H) 74 - 99 mg/dL    BUN 21 6 - 23 mg/dL    CREATININE 1.0 0.5 - 1.0 mg/dL    GFR Non-African American 52 >=60 mL/min/1.73    GFR African American >60     Calcium 9.4 8.6 - 10.2 mg/dL   Lactic Acid, Plasma   Result Value Ref Range    Lactic Acid 1.4 0.5 - 2.2 mmol/L       RADIOLOGY:  Interpreted by Radiologist.  US DUP LOWER EXTREMITY RIGHT RUTH ANN   Final Result   Within the visualized vessels there is no evidence for deep venous   thrombosis               XR FOOT RIGHT (MIN 3 VIEWS)   Final Result   Soft tissue swelling with no subcutaneous pockets of gas. No acute bony pathology. XR ANKLE RIGHT (MIN 3 VIEWS)   Final Result   No acute abnormality of the ankle. Soft tissue swelling. No subcutaneous pockets of gas.             ------------------------- NURSING NOTES AND VITALS REVIEWED ---------------------------   The nursing notes within the ED encounter and vital signs as below have been reviewed. Pulse 110   Temp 97.5 °F (36.4 °C) (Tympanic)   SpO2 96%   Oxygen Saturation Interpretation: Normal      ---------------------------------------------------PHYSICAL EXAM--------------------------------------      Constitutional/General: Alert and oriented x3, well appearing, non toxic in NAD  Head: Normocephalic and atraumatic  Eyes: PERRL, EOMI  Mouth: Oropharynx clear, handling secretions, no trismus  Neck: Supple, full ROM,   Pulmonary: Lungs clear to auscultation bilaterally, no wheezes, rales, or rhonchi. Not in respiratory distress  Cardiovascular:  Regular rate and rhythm, no murmurs, gallops, or rubs. 2+ distal pulses  Abdomen: Soft, non tender, non distended,   Extremities: Moves all extremities x 4. Warm and well perfused  Skin: warm and dry without rash, patient with dark erythema noted to right lower extremity above the malleolus with very mild soft tissue swelling as well as bruising extending down to the lateral medial aspect of the foot. 2+ dorsal pedal pulses. Otherwise no lower extremity swelling, compartments soft with full sensations intact.   Neurologic: GCS 15,  Psych: Normal Affect      ------------------------------ ED COURSE/MEDICAL DECISION MAKING----------------------  Medications   clindamycin (CLEOCIN) 600 mg in dextrose 5 % 50 mL IVPB (0 mg Intravenous Stopped 6/15/21 0105)   mupirocin (BACTROBAN) 2 % ointment ( Topical Given 6/15/21 0101)         ED COURSE:       Medical Decision Making: Patient started on Keflex has been taking antibiotics for several days but concerned regarding appearance, patient did have incision and drainage. Patient being followed by primary care physician as well as being seen initially at urgent care clinic. Labs resulted unremarkable, CBC negative, chemistry panel negative, lactic acid level negative, ultrasound negative for DVT, x-ray of the right foot showing soft tissue swelling with no subcutaneous pocket of gas also x-ray of the ankle also completely negative. Plan will be to change antibiotic, will start patient on clindamycin, she was provided with first dose here in the emergency department as well as Bactroban they were also educated to avoid touching or squeezing at the abscess site. No indication for incision and drainage today at all. They were educated on when to return back to the emergency department especially any increase in cellulitis as well as streaking, fevers or drainage. Patient otherwise nontoxic, neurovascular intact she is able to ambulate easily and independently. Patient safely discharged home       Counseling: The emergency provider has spoken with the patient and discussed todays results, in addition to providing specific details for the plan of care and counseling regarding the diagnosis and prognosis. Questions are answered at this time and they are agreeable with the plan.      --------------------------------- IMPRESSION AND DISPOSITION ---------------------------------    IMPRESSION  1. Cellulitis and abscess of leg        DISPOSITION  Disposition: Discharge to home  Patient condition is good      NOTE: This report was transcribed using voice recognition software.  Every effort was made to ensure accuracy; however, inadvertent computerized transcription errors may be present     SONY Aparicio CNP  06/21/21 2037       SONY Aparicio CNP  06/21/21 2038

## 2021-06-28 ENCOUNTER — OFFICE VISIT (OUTPATIENT)
Dept: CARDIOLOGY CLINIC | Age: 86
End: 2021-06-28
Payer: MEDICARE

## 2021-06-28 VITALS
HEART RATE: 111 BPM | BODY MASS INDEX: 29.25 KG/M2 | RESPIRATION RATE: 18 BRPM | DIASTOLIC BLOOD PRESSURE: 84 MMHG | SYSTOLIC BLOOD PRESSURE: 127 MMHG | WEIGHT: 193 LBS | HEIGHT: 68 IN

## 2021-06-28 DIAGNOSIS — I48.19 PERSISTENT ATRIAL FIBRILLATION (HCC): Primary | ICD-10-CM

## 2021-06-28 PROBLEM — L02.415 ABSCESS OF RIGHT LEG: Status: RESOLVED | Noted: 2021-06-10 | Resolved: 2021-06-28

## 2021-06-28 PROBLEM — M70.21 OLECRANON BURSITIS OF RIGHT ELBOW: Status: RESOLVED | Noted: 2021-06-11 | Resolved: 2021-06-28

## 2021-06-28 PROBLEM — K92.2 LOWER GI BLEED: Status: RESOLVED | Noted: 2020-09-05 | Resolved: 2021-06-28

## 2021-06-28 PROCEDURE — 1036F TOBACCO NON-USER: CPT | Performed by: INTERNAL MEDICINE

## 2021-06-28 PROCEDURE — G8427 DOCREV CUR MEDS BY ELIG CLIN: HCPCS | Performed by: INTERNAL MEDICINE

## 2021-06-28 PROCEDURE — 99214 OFFICE O/P EST MOD 30 MIN: CPT | Performed by: INTERNAL MEDICINE

## 2021-06-28 PROCEDURE — 1123F ACP DISCUSS/DSCN MKR DOCD: CPT | Performed by: INTERNAL MEDICINE

## 2021-06-28 PROCEDURE — 4040F PNEUMOC VAC/ADMIN/RCVD: CPT | Performed by: INTERNAL MEDICINE

## 2021-06-28 PROCEDURE — 93000 ELECTROCARDIOGRAM COMPLETE: CPT | Performed by: INTERNAL MEDICINE

## 2021-06-28 PROCEDURE — G8417 CALC BMI ABV UP PARAM F/U: HCPCS | Performed by: INTERNAL MEDICINE

## 2021-06-28 PROCEDURE — 1090F PRES/ABSN URINE INCON ASSESS: CPT | Performed by: INTERNAL MEDICINE

## 2021-06-28 RX ORDER — AMIODARONE HYDROCHLORIDE 400 MG/1
400 TABLET ORAL DAILY
Qty: 30 TABLET | Refills: 3 | Status: ON HOLD
Start: 2021-06-28 | End: 2021-08-07 | Stop reason: HOSPADM

## 2021-06-28 NOTE — PROGRESS NOTES
Chief Complaint   Patient presents with    Atrial Fibrillation       Patient Active Problem List    Diagnosis Date Noted    Vitamin D deficiency 06/18/2019    Hyperlipidemia     Depression     Asthma     History of nuclear stress test 09/24/2018     Overview Note:     Normal pharm stress 8/2018      Persistent atrial fibrillation ([address] 06/15/2018     Overview Note:     A. Initial presentation June 2018. CHADS-VASC = 3  B. CONSTANTINO guided DC cardioversion  6/15/2018. No significant structural disesae on CONSTANTINO  C. DCCV 9/2019  D. DCCV 2/3/2021         Current Outpatient Medications   Medication Sig Dispense Refill    amiodarone (PACERONE) 400 MG tablet Take 1 tablet by mouth daily 30 tablet 3    levothyroxine (SYNTHROID) 125 MCG tablet 125 mcg Daily       dilTIAZem (CARDIZEM CD) 180 MG extended release capsule TAKE 1 CAPSULE BY MOUTH TWICE A DAY (Patient taking differently: Take 180 mg by mouth 2 times daily TAKE 1 CAPSULE BY MOUTH TWICE A DAY  TAKES 2 ONCE A DAY 6/10/21) 180 capsule 1    XARELTO 20 MG TABS tablet TAKE 1 TABLET BY MOUTH EVERY DAY 90 tablet 1    simvastatin (ZOCOR) 40 MG tablet Take 40 mg by mouth nightly   3    citalopram (CELEXA) 20 MG tablet Take 20 mg by mouth daily      vitamin B-12 (CYANOCOBALAMIN) 1000 MCG tablet Take 6,000 mcg by mouth daily       albuterol (PROVENTIL HFA) 108 (90 BASE) MCG/ACT inhaler Inhale 2 puffs into the lungs every 4 hours as needed for Wheezing or Shortness of Breath. 1 Inhaler 0    Cholecalciferol (VITAMIN D3) 2000 units TABS Take 2 tablets by mouth daily       Biotin 5000 MCG CAPS Take 1 capsule by mouth daily.  Vitamin E 400 UNIT TABS Take 1 tablet by mouth daily.  rivaroxaban (XARELTO) 20 MG TABS tablet Take 1 tablet by mouth daily (with breakfast) for 1 day Lot: [id number]  Exp: 10/21 35 tablet 0     No current facility-administered medications for this visit.         Allergies   Allergen Reactions    Codeine Nausea And Vomiting    Codimal-A [Brompheniramine] Other (See Comments)     Altered mental state and confusion    Lipitor Other (See Comments)     GENERALIZED ACHING, DIFFICULT TO FUNCTION    Spiriva Handihaler [Tiotropium Bromide Monohydrate]     Tudorza Pressair [Aclidinium Bromide]     Ciprofloxacin Nausea And Vomiting       Vitals:    21 1008   BP: 127/84   Pulse: 111   Resp: 18   Weight: 193 lb (87.5 kg)   Height: 5' 8\" (1.727 m)       Social History     Socioeconomic History    Marital status:      Spouse name: Not on file    Number of children: Not on file    Years of education: Not on file    Highest education level: Not on file   Occupational History    Not on file   Tobacco Use    Smoking status: Former Smoker     Types: Cigarettes     Quit date: 1994     Years since quittin.7    Smokeless tobacco: Never Used   Vaping Use    Vaping Use: Never used   Substance and Sexual Activity    Alcohol use: No     Comment: RARE    Drug use: No    Sexual activity: Not on file   Other Topics Concern    Not on file   Social History Narrative    Not on file     Social Determinants of Health     Financial Resource Strain:     Difficulty of Paying Living Expenses:    Food Insecurity:     Worried About Running Out of Food in the Last Year:     [address] in the Last Year:    Transportation Needs:     Lack of Transportation (Medical):      Lack of Transportation (Non-Medical):    Physical Activity:     Days of Exercise per Week:     Minutes of Exercise per Session:    Stress:     Feeling of Stress :    Social Connections:     Frequency of Communication with Friends and Family:     Frequency of Social Gatherings with Friends and Family:     Attends [employer] Services:     Active Member of Clubs or Organizations:     Attends Club or Organization Meetings:     Marital Status:    Intimate Partner Violence:     Fear of Current or Ex-Partner:     Emotionally Abused:     Physically Abused:     Sexually Abused:        History reviewed. No pertinent family history. SUBJECTIVE: Nataliia Velasquez presents to the office today for re-evaluation of cardiac diagnoses. Since the last visit she has raised her diltiazem as requested, but still is sob with activity   She is an extremely difficult historian , and the longer I work with her the less reliability I can place on her stated symptoms. She denies clear cut anginal   chest pain,  claudication,  exertional chest pressure/discomfort, fatigue, near-syncope, orthopnea, paroxysmal nocturnal dyspnea, syncope and tachypnea. Compliant with NOAC . NO bleeding    Review of Systems:   Heart: as above   Lungs: as above   Eyes: denies changes in vision or discharge. Ears: denies changes in hearing or pain. Nose: denies epistaxis or masses   Throat: denies sore throat or trouble swallowing. Neuro: denies numbness, tingling, tremors. Skin: denies rashes or itching. : denies hematuria, dysuria   GI: denies vomiting, diarrhea   Psych: denies mood changed, anxiety, depression. all others negative. Physical Exam   /84   Pulse 111   Resp 18   Ht 5' 8\" (1.727 m)   Wt 193 lb (87.5 kg)   BMI 29.35 kg/m²   Constitutional: Oriented to person, place, and time. Well-developed and well-nourished. No distress. Head: Normocephalic and atraumatic. Eyes: EOM are normal. Pupils are equal, round, and reactive to light. Neck: Normal range of motion. Neck supple. No hepatojugular reflux and no JVD present. Carotid bruit is not present. No tracheal deviation present. No thyromegaly present. Cardiovascular:  irregular rhythm, normal heart sounds and intact distal pulses. Exam reveals no gallop and no friction rub. No murmur heard. Pulmonary/Chest: Effort normal and breath sounds normal. No respiratory distress. No wheezes. No rales. No tenderness. Abdominal: Soft. Bowel sounds are normal. No distension and no mass. No tenderness. No rebound and no guarding. Musculoskeletal: Normal range of motion. No edema and no tenderness. Neurological: Alert and oriented to person, place, and time. Skin: Skin is warm and dry. No rash noted. Not diaphoretic. No erythema. Psychiatric: Normal mood and affect. Behavior is normal.     EKG: afib, rate 111 bpm, axis +57, nonspecific ST and T waves changes. ASSESSMENT AND PLAN:  Patient Active Problem List   Diagnosis    Persistent atrial fibrillation: s/p DC cardio 6/2018 and 9/2019 and 2/2021 with elevated chads vasc . Had some diastolic failure with initial presenation, but no structural disease of significance on CONSTANTINO. Now back in afib despite AAD with dronedarone, since discontinued. Despite max dose calcium channel blocker her heart rate still elevated. Other two classes of AV yovani blockers not idea. Will initiate amiodarone at 400 mg with an eye toward eventual DCCV after loading  Continue Xarelto.   Samples given    HLD (hyperlipidemia) on statin        OV 2 weeks      Burton Huddleston M.D  [facility]

## 2021-07-09 ENCOUNTER — OFFICE VISIT (OUTPATIENT)
Dept: FAMILY MEDICINE CLINIC | Age: 86
End: 2021-07-09
Payer: MEDICARE

## 2021-07-09 VITALS
DIASTOLIC BLOOD PRESSURE: 62 MMHG | WEIGHT: 194.4 LBS | RESPIRATION RATE: 18 BRPM | HEIGHT: 68 IN | BODY MASS INDEX: 29.46 KG/M2 | OXYGEN SATURATION: 96 % | HEART RATE: 95 BPM | TEMPERATURE: 97.6 F | SYSTOLIC BLOOD PRESSURE: 96 MMHG

## 2021-07-09 DIAGNOSIS — J45.20 MILD INTERMITTENT ASTHMA WITHOUT COMPLICATION: ICD-10-CM

## 2021-07-09 DIAGNOSIS — E78.5 HYPERLIPIDEMIA, UNSPECIFIED HYPERLIPIDEMIA TYPE: ICD-10-CM

## 2021-07-09 DIAGNOSIS — I48.19 PERSISTENT ATRIAL FIBRILLATION (HCC): Primary | ICD-10-CM

## 2021-07-09 DIAGNOSIS — F34.1 DYSTHYMIA: ICD-10-CM

## 2021-07-09 DIAGNOSIS — E55.9 VITAMIN D DEFICIENCY: ICD-10-CM

## 2021-07-09 PROCEDURE — 4040F PNEUMOC VAC/ADMIN/RCVD: CPT | Performed by: FAMILY MEDICINE

## 2021-07-09 PROCEDURE — G8427 DOCREV CUR MEDS BY ELIG CLIN: HCPCS | Performed by: FAMILY MEDICINE

## 2021-07-09 PROCEDURE — 1036F TOBACCO NON-USER: CPT | Performed by: FAMILY MEDICINE

## 2021-07-09 PROCEDURE — 1090F PRES/ABSN URINE INCON ASSESS: CPT | Performed by: FAMILY MEDICINE

## 2021-07-09 PROCEDURE — 99214 OFFICE O/P EST MOD 30 MIN: CPT | Performed by: FAMILY MEDICINE

## 2021-07-09 PROCEDURE — 1123F ACP DISCUSS/DSCN MKR DOCD: CPT | Performed by: FAMILY MEDICINE

## 2021-07-09 PROCEDURE — G8417 CALC BMI ABV UP PARAM F/U: HCPCS | Performed by: FAMILY MEDICINE

## 2021-07-09 RX ORDER — CITALOPRAM 20 MG/1
20 TABLET ORAL DAILY
Qty: 30 TABLET | Refills: 3 | Status: SHIPPED
Start: 2021-07-09 | End: 2021-11-15 | Stop reason: SDUPTHER

## 2021-07-09 SDOH — ECONOMIC STABILITY: FOOD INSECURITY: WITHIN THE PAST 12 MONTHS, YOU WORRIED THAT YOUR FOOD WOULD RUN OUT BEFORE YOU GOT MONEY TO BUY MORE.: NEVER TRUE

## 2021-07-09 SDOH — ECONOMIC STABILITY: FOOD INSECURITY: WITHIN THE PAST 12 MONTHS, THE FOOD YOU BOUGHT JUST DIDN'T LAST AND YOU DIDN'T HAVE MONEY TO GET MORE.: NEVER TRUE

## 2021-07-09 ASSESSMENT — ENCOUNTER SYMPTOMS
ALLERGIC/IMMUNOLOGIC NEGATIVE: 1
EYES NEGATIVE: 1
GASTROINTESTINAL NEGATIVE: 1
SHORTNESS OF BREATH: 1

## 2021-07-09 ASSESSMENT — SOCIAL DETERMINANTS OF HEALTH (SDOH): HOW HARD IS IT FOR YOU TO PAY FOR THE VERY BASICS LIKE FOOD, HOUSING, MEDICAL CARE, AND HEATING?: NOT HARD AT ALL

## 2021-07-09 NOTE — PROGRESS NOTES
2021    Filemon Wiseman (:  1934) is a 80 y.o. female, here for evaluation of the following medical concerns:  Chief Complaint   Patient presents with    New Patient     spider bite doing better, Dr. Suzanne Samayoa prescribed Amiodarone to lower heart rate    Medication Refill     wants 90 day supply    Bursitis     sleeve helps       HPI    1. Persistent atrial fibrillation ([address]  2. Mild intermittent asthma without complication  3. Hyperlipidemia, unspecified hyperlipidemia type  4. Dysthymia  -     citalopram (CELEXA) 20 MG tablet; Take 1 tablet by mouth daily, Disp-30 tablet, R-3Normal  5. Vitamin D deficiency        Allergies   Allergen Reactions    Codeine Nausea And Vomiting    Codimal-A [Brompheniramine] Other (See Comments)     Altered mental state and confusion    Lipitor Other (See Comments)     GENERALIZED ACHING, DIFFICULT TO FUNCTION    Spiriva Handihaler [Tiotropium Bromide Monohydrate]     Tudorza Pressair [Aclidinium Bromide]     Ciprofloxacin Nausea And Vomiting       Prior to Visit Medications    Medication Sig Taking?  Authorizing Provider   citalopram (CELEXA) 20 MG tablet Take 1 tablet by mouth daily Yes Olivia Batista DO   amiodarone (PACERONE) 400 MG tablet Take 1 tablet by mouth daily Yes Julio Cesar Lazo MD   levothyroxine (SYNTHROID) 125 MCG tablet 125 mcg Daily  Yes Historical Provider, MD   dilTIAZem (CARDIZEM CD) 180 MG extended release capsule TAKE 1 CAPSULE BY MOUTH TWICE A DAY  Patient taking differently: Take 180 mg by mouth 2 times daily TAKE 1 CAPSULE BY MOUTH TWICE A DAY  TAKES 2 ONCE A DAY 6/10/21 Yes Julio Cesar Lazo MD   XARELTO 20 MG TABS tablet TAKE 1 TABLET BY MOUTH EVERY DAY Yes Julio Cesar Lazo MD   simvastatin (ZOCOR) 40 MG tablet Take 40 mg by mouth nightly  Yes Historical Provider, MD   vitamin B-12 (CYANOCOBALAMIN) 1000 MCG tablet Take 6,000 mcg by mouth daily  Yes Historical Provider, MD   albuterol (PROVENTIL HFA) 108 (90 BASE) MCG/ACT inhaler Inhale 2 puffs into the lungs every 4 hours as needed for Wheezing or Shortness of Breath. Yes Carmelina Pressley MD   Cholecalciferol (VITAMIN D3) 2000 units TABS Take 2 tablets by mouth daily  Yes Historical Provider, MD   Biotin 5000 MCG CAPS Take 1 capsule by mouth daily. Yes Historical Provider, MD   Vitamin E 400 UNIT TABS Take 1 tablet by mouth daily. Yes Historical Provider, MD   rivaroxaban (XARELTO) 20 MG TABS tablet Take 1 tablet by mouth daily (with breakfast) for 1 day Lot: [id number]  Exp: 10/21  John Stevens MD        Immunization History   Administered Date(s) Administered    Tdap (Boostrix, Adacel) 06/10/2021        Past Surgical History:   Procedure Laterality Date    APPENDECTOMY  62 YEARS AGO    CARDIOVERSION  2019    Dr. Donna Gomez 1 shock 200 joules Successful    CHOLECYSTECTOMY      EYE SURGERY      CATARACT RIGHT AND LEFT    HYSTERECTOMY      NECK SURGERY  2011    THYROIDECTOMY  17 YEARS AGO    TONSILLECTOMY         Social History     Socioeconomic History    Marital status:       Spouse name: Not on file    Number of children: Not on file    Years of education: Not on file    Highest education level: Not on file   Occupational History    Not on file   Tobacco Use    Smoking status: Former Smoker     Types: Cigarettes     Quit date: 1994     Years since quittin.8    Smokeless tobacco: Never Used   Vaping Use    Vaping Use: Never used   Substance and Sexual Activity    Alcohol use: No     Comment: RARE    Drug use: No    Sexual activity: Not on file   Other Topics Concern    Not on file   Social History Narrative    Not on file     Social Determinants of Health     Financial Resource Strain: Low Risk     Difficulty of Paying Living Expenses: Not hard at all   Food Insecurity: No Food Insecurity    Worried About [facility] [facility] in the Last Year: Never true    [employer] [employer] in the Last Year: Never true   Transportation Needs:     Lack of Transportation (Medical):  Lack of Transportation (Non-Medical):    Physical Activity:     Days of Exercise per Week:     Minutes of Exercise per Session:    Stress:     Feeling of Stress :    Social Connections:     Frequency of Communication with Friends and Family:     Frequency of Social Gatherings with Friends and Family:     Attends [employer] Services:     Active Member of Clubs or Organizations:     Attends Club or Organization Meetings:     Marital Status:    Intimate Partner Violence:     Fear of Current or Ex-Partner:     Emotionally Abused:     Physically Abused:     Sexually Abused:         History reviewed. No pertinent family history. Review of Systems   Constitutional: Negative. HENT: Negative. Eyes: Negative. Respiratory: Positive for shortness of breath. Cardiovascular:        Chronic a-fib. Gastrointestinal: Negative. Endocrine: Negative. Genitourinary: Negative. Musculoskeletal: Positive for arthralgias. Left index finger. Knees. Skin: Negative. Allergic/Immunologic: Negative. Neurological: Positive for numbness. Hematological: Bruises/bleeds easily. Psychiatric/Behavioral: Negative. Vitals:    07/09/21 0938   BP: 96/62   Pulse: 95   Resp: 18   Temp: 97.6 °F (36.4 °C)   TempSrc: Oral   SpO2: 96%   Weight: 194 lb 6.4 oz (88.2 kg)   Height: 5' 8\" (1.727 m)       Estimated body mass index is 29.56 kg/m² as calculated from the following:    Height as of this encounter: 5' 8\" (1.727 m). Weight as of this encounter: 194 lb 6.4 oz (88.2 kg). BP Readings from Last 3 Encounters:   07/09/21 96/62   06/28/21 127/84   06/14/21 115/75        Physical Exam  Vitals and nursing note reviewed. Constitutional:       General: She is not in acute distress. Appearance: Normal appearance. She is not toxic-appearing or diaphoretic. HENT:      Head: Normocephalic and atraumatic.       Right Ear: Tympanic membrane, ear canal and external ear normal. There is no impacted cerumen. Left Ear: Tympanic membrane, ear canal and external ear normal. There is no impacted cerumen. Ears:      Comments: Hearing aids     Nose: Nose normal. No congestion or rhinorrhea. Mouth/Throat:      Mouth: Mucous membranes are moist.      Pharynx: Oropharynx is clear. No oropharyngeal exudate or posterior oropharyngeal erythema. Comments: Partial.  Eyes:      General: No scleral icterus. Right eye: No discharge. Left eye: No discharge. Extraocular Movements: Extraocular movements intact. Conjunctiva/sclera: Conjunctivae normal.      Pupils: Pupils are equal, round, and reactive to light. Neck:      Vascular: No carotid bruit. Cardiovascular:      Rate and Rhythm: Normal rate. Rhythm irregular. Pulses: Normal pulses. Heart sounds: Normal heart sounds. No murmur heard. No friction rub. No gallop. Pulmonary:      Effort: Pulmonary effort is normal. No respiratory distress. Breath sounds: Normal breath sounds. No stridor. No wheezing, rhonchi or rales. Abdominal:      General: Abdomen is flat. Bowel sounds are normal. There is no distension. Palpations: Abdomen is soft. There is no mass. Tenderness: There is no abdominal tenderness. There is no right CVA tenderness, left CVA tenderness, guarding or rebound. Musculoskeletal:         General: No swelling, tenderness, deformity or signs of injury. Normal range of motion. Cervical back: Normal range of motion and neck supple. No rigidity. No muscular tenderness. Right lower leg: No edema. Left lower leg: No edema. Comments: Trigger finger right ring finger. Possible cts on left. Lymphadenopathy:      Cervical: No cervical adenopathy. Skin:     General: Skin is warm and dry. Capillary Refill: Capillary refill takes less than 2 seconds. Coloration: Skin is not jaundiced. Findings: No lesion or rash. Neurological:      General: No focal deficit present. Mental Status: She is alert and oriented to person, place, and time. Cranial Nerves: No cranial nerve deficit. Sensory: No sensory deficit. Motor: No weakness. Coordination: Coordination normal.      Deep Tendon Reflexes: Reflexes normal.   Psychiatric:         Mood and Affect: Mood normal.         Behavior: Behavior normal.         ASSESSMENT/PLAN:  Laurel Fournier was seen today for new patient, medication refill and bursitis. Diagnoses and all orders for this visit:    Persistent atrial fibrillation (HCC)    Mild intermittent asthma without complication    Hyperlipidemia, unspecified hyperlipidemia type    Dysthymia  -     citalopram (CELEXA) 20 MG tablet; Take 1 tablet by mouth daily    Vitamin D deficiency         Return in about 2 months (around 9/9/2021). An  electronic signature was used to authenticate this note.     --Paige Pichardo DO on 7/9/2021 at 10:20 AM

## 2021-07-12 ENCOUNTER — OFFICE VISIT (OUTPATIENT)
Dept: CARDIOLOGY CLINIC | Age: 86
End: 2021-07-12
Payer: MEDICARE

## 2021-07-12 VITALS
SYSTOLIC BLOOD PRESSURE: 113 MMHG | DIASTOLIC BLOOD PRESSURE: 79 MMHG | RESPIRATION RATE: 16 BRPM | BODY MASS INDEX: 29.4 KG/M2 | WEIGHT: 194 LBS | HEART RATE: 87 BPM | HEIGHT: 68 IN

## 2021-07-12 DIAGNOSIS — I48.19 PERSISTENT ATRIAL FIBRILLATION (HCC): Primary | ICD-10-CM

## 2021-07-12 PROCEDURE — G8427 DOCREV CUR MEDS BY ELIG CLIN: HCPCS | Performed by: INTERNAL MEDICINE

## 2021-07-12 PROCEDURE — 93000 ELECTROCARDIOGRAM COMPLETE: CPT | Performed by: INTERNAL MEDICINE

## 2021-07-12 PROCEDURE — G8417 CALC BMI ABV UP PARAM F/U: HCPCS | Performed by: INTERNAL MEDICINE

## 2021-07-12 PROCEDURE — 1090F PRES/ABSN URINE INCON ASSESS: CPT | Performed by: INTERNAL MEDICINE

## 2021-07-12 PROCEDURE — 1123F ACP DISCUSS/DSCN MKR DOCD: CPT | Performed by: INTERNAL MEDICINE

## 2021-07-12 PROCEDURE — 99214 OFFICE O/P EST MOD 30 MIN: CPT | Performed by: INTERNAL MEDICINE

## 2021-07-12 PROCEDURE — 1036F TOBACCO NON-USER: CPT | Performed by: INTERNAL MEDICINE

## 2021-07-12 PROCEDURE — 4040F PNEUMOC VAC/ADMIN/RCVD: CPT | Performed by: INTERNAL MEDICINE

## 2021-07-12 NOTE — PROGRESS NOTES
Chief Complaint   Patient presents with    Atrial Fibrillation       Patient Active Problem List    Diagnosis Date Noted    Vitamin D deficiency 06/18/2019    Hyperlipidemia     Depression     Asthma     History of nuclear stress test 09/24/2018     Overview Note:     Normal pharm stress 8/2018      Persistent atrial fibrillation ([address] 06/15/2018     Overview Note:     A. Initial presentation June 2018. CHADS-VASC = 3  B. CONSTANTINO guided DC cardioversion  6/15/2018. No significant structural disesae on CONSTANTINO  C. DCCV 9/2019  D. DCCV 2/3/2021         Current Outpatient Medications   Medication Sig Dispense Refill    citalopram (CELEXA) 20 MG tablet Take 1 tablet by mouth daily 30 tablet 3    amiodarone (PACERONE) 400 MG tablet Take 1 tablet by mouth daily 30 tablet 3    levothyroxine (SYNTHROID) 125 MCG tablet 125 mcg Daily       dilTIAZem (CARDIZEM CD) 180 MG extended release capsule TAKE 1 CAPSULE BY MOUTH TWICE A DAY (Patient taking differently: Take 180 mg by mouth 2 times daily TAKE 1 CAPSULE BY MOUTH TWICE A DAY  TAKES 2 ONCE A DAY 6/10/21) 180 capsule 1    XARELTO 20 MG TABS tablet TAKE 1 TABLET BY MOUTH EVERY DAY 90 tablet 1    simvastatin (ZOCOR) 40 MG tablet Take 40 mg by mouth nightly   3    vitamin B-12 (CYANOCOBALAMIN) 1000 MCG tablet Take 6,000 mcg by mouth daily       albuterol (PROVENTIL HFA) 108 (90 BASE) MCG/ACT inhaler Inhale 2 puffs into the lungs every 4 hours as needed for Wheezing or Shortness of Breath. 1 Inhaler 0    Cholecalciferol (VITAMIN D3) 2000 units TABS Take 2 tablets by mouth daily       Biotin 5000 MCG CAPS Take 1 capsule by mouth daily.  Vitamin E 400 UNIT TABS Take 1 tablet by mouth daily. No current facility-administered medications for this visit.         Allergies   Allergen Reactions    Codeine Nausea And Vomiting    Codimal-A [Brompheniramine] Other (See Comments)     Altered mental state and confusion    Lipitor Other (See Comments) GENERALIZED ACHING, DIFFICULT TO FUNCTION    Spiriva Handihaler [Tiotropium Bromide Monohydrate]     Tudorza Pressair [Aclidinium Bromide]     Ciprofloxacin Nausea And Vomiting       Vitals:    21 1140   BP: 113/79   Pulse: 87   Resp: 16   Weight: 194 lb (88 kg)   Height: 5' 8\" (1.727 m)       Social History     Socioeconomic History    Marital status:      Spouse name: Not on file    Number of children: Not on file    Years of education: Not on file    Highest education level: Not on file   Occupational History    Not on file   Tobacco Use    Smoking status: Former Smoker     Types: Cigarettes     Quit date: 1994     Years since quittin.8    Smokeless tobacco: Never Used   Vaping Use    Vaping Use: Never used   Substance and Sexual Activity    Alcohol use: No     Comment: RARE    Drug use: No    Sexual activity: Not on file   Other Topics Concern    Not on file   Social History Narrative    Not on file     Social Determinants of Health     Financial Resource Strain: Low Risk     Difficulty of Paying Living Expenses: Not hard at all   Food Insecurity: No Food Insecurity    Worried About [address] in the Last Year: Never true    [facility] in the Last Year: Never true   Transportation Needs:     Lack of Transportation (Medical):  Lack of Transportation (Non-Medical):    Physical Activity:     Days of Exercise per Week:     Minutes of Exercise per Session:    Stress:     Feeling of Stress :    Social Connections:     Frequency of Communication with Friends and Family:     Frequency of Social Gatherings with Friends and Family:     Attends Muslim Services:     Active Member of Clubs or Organizations:     Attends Club or Organization Meetings:     Marital Status:    Intimate Partner Violence:     Fear of Current or Ex-Partner:     Emotionally Abused:     Physically Abused:     Sexually Abused:        History reviewed.  No pertinent family history. SUBJECTIVE: Enmanuel Bauman presents to the office today for re-evaluation of cardiac diagnoses. Since the last visit she has started amiodarone as  requested, but still is sob with activity   She is an extremely difficult historian , and the longer I work with her the less reliability I can place on her stated symptoms. She denies clear cut anginal   chest pain,  claudication,  exertional chest pressure/discomfort, fatigue, near-syncope, orthopnea, paroxysmal nocturnal dyspnea, syncope and tachypnea. Compliant with NOAC . NO bleeding    Review of Systems:   Heart: as above   Lungs: as above   Eyes: denies changes in vision or discharge. Ears: denies changes in hearing or pain. Nose: denies epistaxis or masses   Throat: denies sore throat or trouble swallowing. Neuro: denies numbness, tingling, tremors. Skin: denies rashes or itching. : denies hematuria, dysuria   GI: denies vomiting, diarrhea   Psych: denies mood changed, anxiety, depression. all others negative. Physical Exam   /79   Pulse 87   Resp 16   Ht 5' 8\" (1.727 m)   Wt 194 lb (88 kg)   BMI 29.50 kg/m²   Constitutional: Oriented to person, place, and time. Well-developed and well-nourished. No distress. Head: Normocephalic and atraumatic. Eyes: EOM are normal. Pupils are equal, round, and reactive to light. Neck: Normal range of motion. Neck supple. No hepatojugular reflux and no JVD present. Carotid bruit is not present. No tracheal deviation present. No thyromegaly present. Cardiovascular:  irregular rhythm, normal heart sounds and intact distal pulses. Exam reveals no gallop and no friction rub. No murmur heard. Pulmonary/Chest: Effort normal and breath sounds normal. No respiratory distress. No wheezes. No rales. No tenderness. Abdominal: Soft. Bowel sounds are normal. No distension and no mass. No tenderness. No rebound and no guarding. Musculoskeletal: Normal range of motion.  No edema and no tenderness. Neurological: Alert and oriented to person, place, and time. Skin: Skin is warm and dry. No rash noted. Not diaphoretic. No erythema. Psychiatric: Normal mood and affect. Behavior is normal.     EKG: afib, rate 87 bpm, axis +57, nonspecific ST and T waves changes. ASSESSMENT AND PLAN:  Patient Active Problem List   Diagnosis    Persistent atrial fibrillation: s/p DC cardio 6/2018 and 9/2019 and 2/2021 with elevated chads vasc . Had some diastolic failure with initial presenation, but no structural disease of significance on CONSTANTINO. Now back in afib despite AAD with dronedarone, since discontinued. Will drop cardizem to qd dosing, and continue amiodarone at 400 mg with an eye toward eventual DCCV in 2 weeks. Procedure reviewed again with patient  Continue Xarelto.      HLD (hyperlipidemia) on statin              Evangelina Espino M.D  [id number] [facility]

## 2021-07-13 DIAGNOSIS — Z01.810 PREOPERATIVE CARDIOVASCULAR EXAMINATION: Primary | ICD-10-CM

## 2021-07-28 DIAGNOSIS — Z01.810 PREOPERATIVE CARDIOVASCULAR EXAMINATION: ICD-10-CM

## 2021-07-30 ENCOUNTER — TELEPHONE (OUTPATIENT)
Dept: CARDIAC CATH/INVASIVE PROCEDURES | Age: 86
End: 2021-07-30

## 2021-07-30 LAB — SARS-COV-2, PCR: NOT DETECTED

## 2021-07-30 NOTE — TELEPHONE ENCOUNTER
Reminded patient of scheduled procedure on 8/2/21. Instructions given and COVID questionnaire completed.

## 2021-08-02 ENCOUNTER — ANESTHESIA (OUTPATIENT)
Dept: CARDIAC CATH/INVASIVE PROCEDURES | Age: 86
End: 2021-08-02

## 2021-08-02 ENCOUNTER — ANESTHESIA EVENT (OUTPATIENT)
Dept: CARDIAC CATH/INVASIVE PROCEDURES | Age: 86
End: 2021-08-02

## 2021-08-02 ENCOUNTER — HOSPITAL ENCOUNTER (OUTPATIENT)
Dept: CARDIAC CATH/INVASIVE PROCEDURES | Age: 86
Discharge: HOME | End: 2021-08-02
Payer: MEDICARE

## 2021-08-02 VITALS
DIASTOLIC BLOOD PRESSURE: 85 MMHG | SYSTOLIC BLOOD PRESSURE: 134 MMHG | OXYGEN SATURATION: 95 % | RESPIRATION RATE: 14 BRPM

## 2021-08-02 VITALS
OXYGEN SATURATION: 93 % | WEIGHT: 190 LBS | HEIGHT: 68 IN | RESPIRATION RATE: 16 BRPM | HEART RATE: 106 BPM | SYSTOLIC BLOOD PRESSURE: 111 MMHG | BODY MASS INDEX: 28.79 KG/M2 | DIASTOLIC BLOOD PRESSURE: 84 MMHG | TEMPERATURE: 98.2 F

## 2021-08-02 LAB
EKG ATRIAL RATE: 315 BPM
EKG ATRIAL RATE: 81 BPM
EKG P AXIS: 64 DEGREES
EKG P-R INTERVAL: 228 MS
EKG Q-T INTERVAL: 432 MS
EKG Q-T INTERVAL: 440 MS
EKG QRS DURATION: 74 MS
EKG QRS DURATION: 76 MS
EKG QTC CALCULATION (BAZETT): 511 MS
EKG QTC CALCULATION (BAZETT): 522 MS
EKG R AXIS: 2 DEGREES
EKG R AXIS: 6 DEGREES
EKG T AXIS: -129 DEGREES
EKG T AXIS: 91 DEGREES
EKG VENTRICULAR RATE: 81 BPM
EKG VENTRICULAR RATE: 88 BPM

## 2021-08-02 PROCEDURE — 3700000000 HC ANESTHESIA ATTENDED CARE

## 2021-08-02 PROCEDURE — 92960 CARDIOVERSION ELECTRIC EXT: CPT | Performed by: INTERNAL MEDICINE

## 2021-08-02 PROCEDURE — 2709999900 HC NON-CHARGEABLE SUPPLY

## 2021-08-02 PROCEDURE — 2580000003 HC RX 258: Performed by: NURSE ANESTHETIST, CERTIFIED REGISTERED

## 2021-08-02 PROCEDURE — 3700000001 HC ADD 15 MINUTES (ANESTHESIA)

## 2021-08-02 PROCEDURE — 93010 ELECTROCARDIOGRAM REPORT: CPT | Performed by: INTERNAL MEDICINE

## 2021-08-02 PROCEDURE — 93005 ELECTROCARDIOGRAM TRACING: CPT | Performed by: INTERNAL MEDICINE

## 2021-08-02 PROCEDURE — 6360000002 HC RX W HCPCS: Performed by: NURSE ANESTHETIST, CERTIFIED REGISTERED

## 2021-08-02 RX ORDER — SODIUM CHLORIDE 9 MG/ML
INJECTION, SOLUTION INTRAVENOUS CONTINUOUS PRN
Status: DISCONTINUED | OUTPATIENT
Start: 2021-08-02 | End: 2021-08-02 | Stop reason: SDUPTHER

## 2021-08-02 RX ORDER — PROPOFOL 10 MG/ML
INJECTION, EMULSION INTRAVENOUS PRN
Status: DISCONTINUED | OUTPATIENT
Start: 2021-08-02 | End: 2021-08-02 | Stop reason: SDUPTHER

## 2021-08-02 RX ORDER — SODIUM CHLORIDE 0.9 % (FLUSH) 0.9 %
5-40 SYRINGE (ML) INJECTION EVERY 12 HOURS SCHEDULED
Status: DISCONTINUED | OUTPATIENT
Start: 2021-08-02 | End: 2021-08-03 | Stop reason: HOSPADM

## 2021-08-02 RX ORDER — SODIUM CHLORIDE 9 MG/ML
25 INJECTION, SOLUTION INTRAVENOUS PRN
Status: DISCONTINUED | OUTPATIENT
Start: 2021-08-02 | End: 2021-08-03 | Stop reason: HOSPADM

## 2021-08-02 RX ORDER — SODIUM CHLORIDE 0.9 % (FLUSH) 0.9 %
5-40 SYRINGE (ML) INJECTION PRN
Status: DISCONTINUED | OUTPATIENT
Start: 2021-08-02 | End: 2021-08-03 | Stop reason: HOSPADM

## 2021-08-02 RX ADMIN — PROPOFOL 40 MG: 10 INJECTION, EMULSION INTRAVENOUS at 09:23

## 2021-08-02 RX ADMIN — SODIUM CHLORIDE: 9 INJECTION, SOLUTION INTRAVENOUS at 09:15

## 2021-08-02 NOTE — ANESTHESIA POSTPROCEDURE EVALUATION
Department of Anesthesiology  Postprocedure Note    Patient: Kareen Burns  MRN: [id number]  YOB: 1934  Date of evaluation: 8/2/2021  Time:  10:14 AM     Procedure Summary     Date: 08/02/21 Room / Location: [facility] CATH LAB    Anesthesia Start: 0915 Anesthesia Stop: 8963    Procedure: CARDIOVERSION WITH ANESTHESIA Diagnosis: Other persistent atrial fibrillation    Scheduled Providers:  Responsible Provider: Harini Jones MD    Anesthesia Type: MAC ASA Status: 3          Anesthesia Type: MAC    Lottie Phase I:      Lottie Phase II:      Last vitals: Reviewed and per EMR flowsheets.        Anesthesia Post Evaluation    Patient location during evaluation: PACU  Patient participation: complete - patient participated  Level of consciousness: awake  Pain score: 0  Airway patency: patent  Nausea & Vomiting: no nausea  Complications: no  Cardiovascular status: hemodynamically stable  Respiratory status: acceptable  Hydration status: stable

## 2021-08-02 NOTE — PROCEDURES
Preprocedure diagnosis:Persistentl atrial fibrillation    Procedures, cardioversion elective arrhythmia external    Prior tests anticoagulated    Primary procedure  Written informed consent was obtained, cardioversion was performed under stable fasting condition, self-adhesive anterior-posterior defibrillation pads were applied, the defibrillator was programmed to deliver energy in a synchronized fashion with a EKG. The patient was set up for monitoring of surface EKG and pulse oximetry continuously. Blood pressure was monitored and with automatic cuff measurements. Supplemental oxygen was administered. The procedure was performed under anesthesia by anesthesiology. After ensuring adequate anesthesia, DC CV was performed by delivering 200 J of direct current delivered in a synchronized fashion. Result: Successful cardioversion to sinus rhythm, confirmed on 12-lead EKG. Complication: None    Patient was monitored until awake and vitals remained stable. Luis Alberto Patel M.D.   [facility] cardiology

## 2021-08-04 ENCOUNTER — TELEPHONE (OUTPATIENT)
Dept: CARDIOLOGY CLINIC | Age: 86
End: 2021-08-04

## 2021-08-06 ENCOUNTER — HOSPITAL ENCOUNTER (INPATIENT)
Age: 86
LOS: 1 days | Discharge: HOME | DRG: 309 | End: 2021-08-07
Attending: EMERGENCY MEDICINE | Admitting: INTERNAL MEDICINE
Payer: MEDICARE

## 2021-08-06 ENCOUNTER — APPOINTMENT (OUTPATIENT)
Dept: GENERAL RADIOLOGY | Age: 86
DRG: 309 | End: 2021-08-06
Payer: MEDICARE

## 2021-08-06 ENCOUNTER — TELEPHONE (OUTPATIENT)
Dept: CARDIOLOGY CLINIC | Age: 86
End: 2021-08-06

## 2021-08-06 DIAGNOSIS — R53.1 GENERAL WEAKNESS: ICD-10-CM

## 2021-08-06 DIAGNOSIS — E87.6 HYPOKALEMIA: ICD-10-CM

## 2021-08-06 DIAGNOSIS — I48.92 ATRIAL FLUTTER WITH RAPID VENTRICULAR RESPONSE (HCC): Primary | ICD-10-CM

## 2021-08-06 LAB
ALBUMIN SERPL-MCNC: 3.8 G/DL (ref 3.5–5.2)
ALP BLD-CCNC: 77 U/L (ref 35–104)
ALT SERPL-CCNC: 16 U/L (ref 0–32)
ANION GAP SERPL CALCULATED.3IONS-SCNC: 10 MMOL/L (ref 7–16)
AST SERPL-CCNC: 24 U/L (ref 0–31)
BASOPHILS ABSOLUTE: 0.03 E9/L (ref 0–0.2)
BASOPHILS RELATIVE PERCENT: 0.3 % (ref 0–2)
BILIRUB SERPL-MCNC: 0.6 MG/DL (ref 0–1.2)
BUN BLDV-MCNC: 15 MG/DL (ref 6–23)
CALCIUM SERPL-MCNC: 9.2 MG/DL (ref 8.6–10.2)
CHLORIDE BLD-SCNC: 103 MMOL/L (ref 98–107)
CHOLESTEROL, TOTAL: 136 MG/DL (ref 0–199)
CO2: 27 MMOL/L (ref 22–29)
CREAT SERPL-MCNC: 1.4 MG/DL (ref 0.5–1)
EOSINOPHILS ABSOLUTE: 0.04 E9/L (ref 0.05–0.5)
EOSINOPHILS RELATIVE PERCENT: 0.4 % (ref 0–6)
GFR AFRICAN AMERICAN: 43
GFR NON-AFRICAN AMERICAN: 36 ML/MIN/1.73
GLUCOSE BLD-MCNC: 129 MG/DL (ref 74–99)
HBA1C MFR BLD: 5.6 % (ref 4–5.6)
HCT VFR BLD CALC: 44.3 % (ref 34–48)
HDLC SERPL-MCNC: 75 MG/DL
HEMOGLOBIN: 13.7 G/DL (ref 11.5–15.5)
IMMATURE GRANULOCYTES #: 0.03 E9/L
IMMATURE GRANULOCYTES %: 0.3 % (ref 0–5)
LDL CHOLESTEROL CALCULATED: 38 MG/DL (ref 0–99)
LYMPHOCYTES ABSOLUTE: 0.78 E9/L (ref 1.5–4)
LYMPHOCYTES RELATIVE PERCENT: 8.2 % (ref 20–42)
MAGNESIUM: 1.6 MG/DL (ref 1.6–2.6)
MCH RBC QN AUTO: 26 PG (ref 26–35)
MCHC RBC AUTO-ENTMCNC: 30.9 % (ref 32–34.5)
MCV RBC AUTO: 84.2 FL (ref 80–99.9)
MONOCYTES ABSOLUTE: 0.76 E9/L (ref 0.1–0.95)
MONOCYTES RELATIVE PERCENT: 8 % (ref 2–12)
NEUTROPHILS ABSOLUTE: 7.86 E9/L (ref 1.8–7.3)
NEUTROPHILS RELATIVE PERCENT: 82.8 % (ref 43–80)
PDW BLD-RTO: 17.2 FL (ref 11.5–15)
PLATELET # BLD: 182 E9/L (ref 130–450)
PMV BLD AUTO: 12.9 FL (ref 7–12)
POTASSIUM SERPL-SCNC: 3.4 MMOL/L (ref 3.5–5)
PRO-BNP: 1324 PG/ML (ref 0–450)
RBC # BLD: 5.26 E12/L (ref 3.5–5.5)
SODIUM BLD-SCNC: 140 MMOL/L (ref 132–146)
TOTAL PROTEIN: 6.2 G/DL (ref 6.4–8.3)
TRIGL SERPL-MCNC: 115 MG/DL (ref 0–149)
TROPONIN, HIGH SENSITIVITY: 18 NG/L (ref 0–9)
TROPONIN, HIGH SENSITIVITY: 18 NG/L (ref 0–9)
TROPONIN, HIGH SENSITIVITY: 7 NG/L (ref 0–9)
TSH SERPL DL<=0.05 MIU/L-ACNC: 0.98 UIU/ML (ref 0.27–4.2)
TSH SERPL DL<=0.05 MIU/L-ACNC: 1.1 UIU/ML (ref 0.27–4.2)
VLDLC SERPL CALC-MCNC: 23 MG/DL
WBC # BLD: 9.5 E9/L (ref 4.5–11.5)

## 2021-08-06 PROCEDURE — APPSS45 APP SPLIT SHARED TIME 31-45 MINUTES: Performed by: NURSE PRACTITIONER

## 2021-08-06 PROCEDURE — 84443 ASSAY THYROID STIM HORMONE: CPT

## 2021-08-06 PROCEDURE — 85025 COMPLETE CBC W/AUTO DIFF WBC: CPT

## 2021-08-06 PROCEDURE — 83880 ASSAY OF NATRIURETIC PEPTIDE: CPT

## 2021-08-06 PROCEDURE — 2580000003 HC RX 258: Performed by: NURSE PRACTITIONER

## 2021-08-06 PROCEDURE — 83036 HEMOGLOBIN GLYCOSYLATED A1C: CPT

## 2021-08-06 PROCEDURE — 80061 LIPID PANEL: CPT

## 2021-08-06 PROCEDURE — 6360000002 HC RX W HCPCS: Performed by: NURSE PRACTITIONER

## 2021-08-06 PROCEDURE — 99284 EMERGENCY DEPT VISIT MOD MDM: CPT

## 2021-08-06 PROCEDURE — 36415 COLL VENOUS BLD VENIPUNCTURE: CPT

## 2021-08-06 PROCEDURE — 2140000000 HC CCU INTERMEDIATE R&B

## 2021-08-06 PROCEDURE — 71046 X-RAY EXAM CHEST 2 VIEWS: CPT

## 2021-08-06 PROCEDURE — 99222 1ST HOSP IP/OBS MODERATE 55: CPT | Performed by: INTERNAL MEDICINE

## 2021-08-06 PROCEDURE — 80053 COMPREHEN METABOLIC PANEL: CPT

## 2021-08-06 PROCEDURE — 6370000000 HC RX 637 (ALT 250 FOR IP): Performed by: NURSE PRACTITIONER

## 2021-08-06 PROCEDURE — 93005 ELECTROCARDIOGRAM TRACING: CPT | Performed by: PHYSICIAN ASSISTANT

## 2021-08-06 PROCEDURE — 6370000000 HC RX 637 (ALT 250 FOR IP): Performed by: EMERGENCY MEDICINE

## 2021-08-06 PROCEDURE — 83735 ASSAY OF MAGNESIUM: CPT

## 2021-08-06 PROCEDURE — 84484 ASSAY OF TROPONIN QUANT: CPT

## 2021-08-06 RX ORDER — ACETAMINOPHEN 650 MG/1
650 SUPPOSITORY RECTAL EVERY 6 HOURS PRN
Status: DISCONTINUED | OUTPATIENT
Start: 2021-08-06 | End: 2021-08-07 | Stop reason: HOSPADM

## 2021-08-06 RX ORDER — MAGNESIUM SULFATE 1 G/100ML
1000 INJECTION INTRAVENOUS ONCE
Status: COMPLETED | OUTPATIENT
Start: 2021-08-06 | End: 2021-08-06

## 2021-08-06 RX ORDER — BUDESONIDE AND FORMOTEROL FUMARATE DIHYDRATE 160; 4.5 UG/1; UG/1
2 AEROSOL RESPIRATORY (INHALATION) DAILY
COMMUNITY
End: 2022-01-27 | Stop reason: ALTCHOICE

## 2021-08-06 RX ORDER — ONDANSETRON 4 MG/1
4 TABLET, ORALLY DISINTEGRATING ORAL EVERY 8 HOURS PRN
Status: DISCONTINUED | OUTPATIENT
Start: 2021-08-06 | End: 2021-08-07 | Stop reason: HOSPADM

## 2021-08-06 RX ORDER — ACETAMINOPHEN 325 MG/1
650 TABLET ORAL EVERY 6 HOURS PRN
Status: DISCONTINUED | OUTPATIENT
Start: 2021-08-06 | End: 2021-08-07 | Stop reason: HOSPADM

## 2021-08-06 RX ORDER — DILTIAZEM HYDROCHLORIDE 180 MG/1
180 CAPSULE, COATED, EXTENDED RELEASE ORAL DAILY
Status: CANCELLED | OUTPATIENT
Start: 2021-08-06

## 2021-08-06 RX ORDER — POLYETHYLENE GLYCOL 3350 17 G/17G
17 POWDER, FOR SOLUTION ORAL DAILY PRN
Status: DISCONTINUED | OUTPATIENT
Start: 2021-08-06 | End: 2021-08-07 | Stop reason: HOSPADM

## 2021-08-06 RX ORDER — AMIODARONE HYDROCHLORIDE 200 MG/1
200 TABLET ORAL DAILY
Status: DISCONTINUED | OUTPATIENT
Start: 2021-08-07 | End: 2021-08-07 | Stop reason: HOSPADM

## 2021-08-06 RX ORDER — DILTIAZEM HYDROCHLORIDE 180 MG/1
180 CAPSULE, COATED, EXTENDED RELEASE ORAL DAILY
Status: DISCONTINUED | OUTPATIENT
Start: 2021-08-07 | End: 2021-08-07 | Stop reason: HOSPADM

## 2021-08-06 RX ORDER — ONDANSETRON 2 MG/ML
4 INJECTION INTRAMUSCULAR; INTRAVENOUS EVERY 6 HOURS PRN
Status: DISCONTINUED | OUTPATIENT
Start: 2021-08-06 | End: 2021-08-07 | Stop reason: HOSPADM

## 2021-08-06 RX ORDER — POTASSIUM CHLORIDE 20 MEQ/1
40 TABLET, EXTENDED RELEASE ORAL PRN
Status: DISCONTINUED | OUTPATIENT
Start: 2021-08-06 | End: 2021-08-07 | Stop reason: HOSPADM

## 2021-08-06 RX ORDER — CITALOPRAM 20 MG/1
20 TABLET ORAL DAILY
Status: DISCONTINUED | OUTPATIENT
Start: 2021-08-06 | End: 2021-08-07 | Stop reason: HOSPADM

## 2021-08-06 RX ORDER — SODIUM CHLORIDE 9 MG/ML
INJECTION, SOLUTION INTRAVENOUS CONTINUOUS
Status: ACTIVE | OUTPATIENT
Start: 2021-08-06 | End: 2021-08-07

## 2021-08-06 RX ORDER — ATORVASTATIN CALCIUM 20 MG/1
20 TABLET, FILM COATED ORAL DAILY
Status: DISCONTINUED | OUTPATIENT
Start: 2021-08-06 | End: 2021-08-06

## 2021-08-06 RX ORDER — ROSUVASTATIN CALCIUM 10 MG/1
10 TABLET, COATED ORAL NIGHTLY
Status: DISCONTINUED | OUTPATIENT
Start: 2021-08-06 | End: 2021-08-07 | Stop reason: HOSPADM

## 2021-08-06 RX ORDER — POTASSIUM CHLORIDE 7.45 MG/ML
10 INJECTION INTRAVENOUS PRN
Status: DISCONTINUED | OUTPATIENT
Start: 2021-08-06 | End: 2021-08-07 | Stop reason: HOSPADM

## 2021-08-06 RX ORDER — SODIUM CHLORIDE 0.9 % (FLUSH) 0.9 %
5-40 SYRINGE (ML) INJECTION EVERY 12 HOURS SCHEDULED
Status: DISCONTINUED | OUTPATIENT
Start: 2021-08-06 | End: 2021-08-07 | Stop reason: HOSPADM

## 2021-08-06 RX ORDER — SODIUM CHLORIDE 0.9 % (FLUSH) 0.9 %
5-40 SYRINGE (ML) INJECTION PRN
Status: DISCONTINUED | OUTPATIENT
Start: 2021-08-06 | End: 2021-08-07 | Stop reason: HOSPADM

## 2021-08-06 RX ORDER — SODIUM CHLORIDE 9 MG/ML
25 INJECTION, SOLUTION INTRAVENOUS PRN
Status: DISCONTINUED | OUTPATIENT
Start: 2021-08-06 | End: 2021-08-07 | Stop reason: HOSPADM

## 2021-08-06 RX ORDER — AMIODARONE HYDROCHLORIDE 200 MG/1
400 TABLET ORAL DAILY
Status: CANCELLED | OUTPATIENT
Start: 2021-08-06

## 2021-08-06 RX ADMIN — ROSUVASTATIN CALCIUM 10 MG: 10 TABLET, FILM COATED ORAL at 21:56

## 2021-08-06 RX ADMIN — POTASSIUM BICARBONATE 40 MEQ: 782 TABLET, EFFERVESCENT ORAL at 15:51

## 2021-08-06 RX ADMIN — SODIUM CHLORIDE, PRESERVATIVE FREE 10 ML: 5 INJECTION INTRAVENOUS at 19:10

## 2021-08-06 RX ADMIN — SODIUM CHLORIDE: 9 INJECTION, SOLUTION INTRAVENOUS at 17:08

## 2021-08-06 RX ADMIN — MAGNESIUM SULFATE HEPTAHYDRATE 1000 MG: 1 INJECTION, SOLUTION INTRAVENOUS at 19:10

## 2021-08-06 RX ADMIN — RIVAROXABAN 15 MG: 15 TABLET, FILM COATED ORAL at 21:56

## 2021-08-06 ASSESSMENT — PAIN SCALES - GENERAL: PAINLEVEL_OUTOF10: 0

## 2021-08-06 NOTE — ED PROVIDER NOTES
HPI:  8/6/21, Time: 3:07 PM EDT         Filemon Wiseman is a 80 y.o. female presenting to the ED for generalized fatigue, beginning 1 week ago. The complaint has been persistent, moderate in severity, and worsened by nothing. Patient states he was discharged in the hospital week ago and at that time she was cardioverted from atrial fibrillation. She states after she got home she felt generalized fatigue that has been persistent since returning home. . Patient denies fever/chills, sore throat, cough, congestion, chest pain, shortness of breath, edema, headache, visual disturbances, focal paresthesias, focal weakness, abdominal pain, nausea, vomiting, diarrhea, constipation, dysuria, hematuria, trauma, neck or back pain, rash or other complaints. ROS:   A complete review of systems was performed and all pertinent positives and negatives are stated within HPI, all other systems reviewed and are negative.      --------------------------------------------- PAST HISTORY ---------------------------------------------  Past Medical History:  has a past medical history of Asthma, Depression, Hyperlipidemia, and Thyroid disease. Past Surgical History:  has a past surgical history that includes Tonsillectomy; Hysterectomy; Thyroidectomy (17 YEARS AGO); Appendectomy (58 YEARS AGO); eye surgery; Neck surgery (9/9/2011); Cholecystectomy; Cardioversion (09/20/2019); and Cardioversion (08/02/2021). Social History:  reports that she quit smoking about 26 years ago. Her smoking use included cigarettes. She has never used smokeless tobacco. She reports that she does not drink alcohol and does not use drugs. Family History: family history is not on file. The patients home medications have been reviewed.     Allergies: Codeine, Codimal-a [brompheniramine], Lipitor, Spiriva handihaler [tiotropium bromide monohydrate], Tudorza pressair [aclidinium bromide], and Eosinophils % 0.4 0.0 - 6.0 %    Basophils % 0.3 0.0 - 2.0 %    Neutrophils Absolute 7.86 (H) 1.80 - 7.30 E9/L    Immature Granulocytes # 0.03 E9/L    Lymphocytes Absolute 0.78 (L) 1.50 - 4.00 E9/L    Monocytes Absolute 0.76 0.10 - 0.95 E9/L    Eosinophils Absolute 0.04 (L) 0.05 - 0.50 E9/L    Basophils Absolute 0.03 0.00 - 0.20 E9/L   Comprehensive Metabolic Panel   Result Value Ref Range    Sodium 140 132 - 146 mmol/L    Potassium 3.4 (L) 3.5 - 5.0 mmol/L    Chloride 103 98 - 107 mmol/L    CO2 27 22 - 29 mmol/L    Anion Gap 10 7 - 16 mmol/L    Glucose 129 (H) 74 - 99 mg/dL    BUN 15 6 - 23 mg/dL    CREATININE 1.4 (H) 0.5 - 1.0 mg/dL    GFR Non-African American 36 >=60 mL/min/1.73    GFR African American 43     Calcium 9.2 8.6 - 10.2 mg/dL    Total Protein 6.2 (L) 6.4 - 8.3 g/dL    Albumin 3.8 3.5 - 5.2 g/dL    Total Bilirubin 0.6 0.0 - 1.2 mg/dL    Alkaline Phosphatase 77 35 - 104 U/L    ALT 16 0 - 32 U/L    AST 24 0 - 31 U/L   Magnesium   Result Value Ref Range    Magnesium 1.6 1.6 - 2.6 mg/dL   Troponin   Result Value Ref Range    Troponin, High Sensitivity 7 0 - 9 ng/L   Brain Natriuretic Peptide   Result Value Ref Range    Pro-BNP 1,324 (H) 0 - 450 pg/mL       RADIOLOGY:  Interpreted by Radiologist.  XR CHEST (2 VW)   Final Result   No acute process. Mild cardiomegaly. EKG Interpretation by Me  Time: 1439  Rhythm: atrial flutter  Rate: tachycardia  Axis: normal  Conduction: nonspecific interventricular conduction block  ST Segments: no acute change  T Waves: no acute change  Clinical Impression: atrial flutter (new onset)  Comparison to prior EKG: changes compared to previous EKG      ------------------------- NURSING NOTES AND VITALS REVIEWED ---------------------------  The nursing notes within the ED encounter and vital signs as below have been reviewed by myself.     BP 98/73   Pulse 94   Temp 97.3 °F (36.3 °C)   Resp 20   Ht 5' 8\" (1.727 m)   Wt 190 lb (86.2 kg)   SpO2 92%   BMI 28.89 kg/m²   Oxygen Saturation Interpretation: Normal      The patients available past medical records and past encounters were reviewed. ------------------------------ ED COURSE/MEDICAL DECISION MAKING----------------------  Medications   0.9 % sodium chloride infusion (has no administration in time range)   amiodarone (CORDARONE) tablet 200 mg (has no administration in time range)   rosuvastatin (CRESTOR) tablet 10 mg (has no administration in time range)   perflutren lipid microspheres (DEFINITY) injection 1.65 mg (has no administration in time range)   potassium bicarb-citric acid (EFFER-K) effervescent tablet 40 mEq (40 mEq Oral Given 8/6/21 1361)              Medical Decision Making:    Patient is 44-year-old female presents emergency department due to generalized fatigue for the last week. She was found to be in atrial flutter rhythm on EKG. While in the room her heart rate ranged from 90 bpm to 130 bpm.  Patient states that she had generalized fatigue and shortness of breath with ambulation throughout the last week. She does state that she has failed multiple medications for control of her atrial fibrillation in the past.  Well-known to cardiology. Cardiology was consulted in the emergency department and came down to evaluate her for continued care and treatment. In addition patient was admitted to internal medicine in order to facilitate improved control her atrial fibrillation. Patient also found to be hypokalemic. She has been given oral potassium supplementation.     This patient's ED course included: a personal history and physicial examination, re-evaluation prior to disposition, multiple bedside re-evaluations, IV medications, cardiac monitoring, continuous pulse oximetry, complex medical decision making and emergency management and a personal history and physicial eaxmination    This patient has remained hemodynamically stable and been closely monitored during their ED course. Re-Evaluations:  Time: 1342   Re-evaluation. Patients symptoms show no change  Repeat physical examination is not changed      Consultations:   Spoke with Dr. Marcie Little, She will admit the patient for further cardiac evaluation. Spoke with cardiology, they will come to the emergency department and evaluate the patient. Counseling: The emergency provider has spoken with the patient and family member patient and daughter and discussed todays results, in addition to providing specific details for the plan of care and counseling regarding the diagnosis and prognosis. Questions are answered at this time and they are agreeable with the plan.    --------------------------- IMPRESSION AND DISPOSITION ---------------------------------    IMPRESSION  1. Atrial flutter with rapid ventricular response ([address]    2. Hypokalemia    3.  General weakness        DISPOSITION  Disposition: Admit to telemetry  Patient condition is fair              Stacia Lynn DO  Resident  08/06/21 9209

## 2021-08-06 NOTE — ED TRIAGE NOTES
FIRST PROVIDER CONTACT ASSESSMENT NOTE      Department of Emergency Medicine   8/6/21  12:46 PM EDT    Chief Complaint: No chief complaint on file. History of Present Illness:    Pasha Young is a 80 y.o. female who presents to the ED by private car for fatigue and increased sob since cardioversion on Monday. Focused Screening Exam:  Constitutional:  Alert, appears stated age and is in no distress.       *ALLERGIES*     Codeine, Codimal-a [brompheniramine], Lipitor, Spiriva handihaler [tiotropium bromide monohydrate], Tudorza pressair [aclidinium bromide], and Ciprofloxacin     ED Triage Vitals [08/06/21 1243]   BP Temp Temp src Pulse Resp SpO2 Height Weight   -- 97.3 °F (36.3 °C) -- 117 -- 97 % -- --        Initial Plan of Care:  Initiate Treatment-Testing, Proceed toTreatment Area When Bed Available for ED Attending/MLP to Continue Care    -----------------END OF FIRST PROVIDER CONTACT ASSESSMENT NOTE--------------  Electronically signed by Claritza Markham PA-C   DD: 8/6/21

## 2021-08-06 NOTE — TELEPHONE ENCOUNTER
Patient states she does not feel well and her heart rate is at 54. She is going to then ER. wanted to let you know.

## 2021-08-06 NOTE — CONSULTS
Inpatient Cardiology Consultation      Reason for Consult: Atrial flutter    Consulting Physician: Dr. Marilyn Stratton    Requesting Physician:  Dr. Marissa Berrios    Date of Consultation: 8/6/2021    HISTORY OF PRESENT ILLNESS:     This 68-year-old female is known to [facility] cardiology and is followed by Dr. Therese Weber. She has a history of congestive heart failure, atrial fibrillation and has undergone 3 cardioversions with the last one being in February 2021. Amiodarone was initiated on June 28 when she failed Multaq. She was evaluated by Dr. Therese Weber as an outpatient on July 12 and is on chronic amiodarone therapy, and Xarelto. She was in atrial fibrillation and a DCCV planned. She underwent cardioversion on August 2, 2021. It was successful. Postop EKG showed sinus rhythm with  ms    Since the cardioversion, she has been fatigued. She has no chest pain, palpitations, unusual dizziness, dyspnea, orthopnea, swelling of the lower extremities, vomiting or diarrhea. She phoned our office this morning saying that her heart rate was 54 (checked with a pulse oximeter and by palpation) and she did not feel well and if she was going to the emergency room. She was in atrial fibrillation at the office visit and heart rate was 87. Blood pressure on admission was 137/76 with a heart rate of 117 she was afebrile with no  Hypoxia. EKG on admission showed atrial fibrillation with controlled ventricular response with a heart rate of 91 and nonspecific ST-T wave changes inferior laterally. QTC is 442 ms. Potassium was 3.4 and BUN 15 with a creatinine of 1.4 (baseline creatinine 1.0 from June 14, 2021), proBNP 1324 and troponin VII and H&H 13.7 and 44.3 and WBCs 9.5, and magnesium was not done. Chest x-ray showed no acute process. She was treated with oral potassium and is being hydrated. Past medical history  1. Denies MI, HTN, DM, CVA  2.  Tobacco use smoked on and off x 40 years at most 1 ppd quit in 1994  3. Asthma/COPD  4. HLD>> Zocor stopped 7/12/2018 due to myalgias  5. Depression  6. Thyroid nodules s/p thyroidectomy on replacement therapy  7. ANTONIO/BSO (cancer), bilateral cataracts, Cholecystomy, tonsillectomy, appendectomy  8. [place] wears hearing aides  9. Exercise MPS 2014. 3:06 minutes. 4.6 METS. 89% MPHR. Non ischemic  10. PAF diagnosed on admission 6/14/2018 (p-) [id number]= 3  11. CONSTANTINO 6/15/2018 Dr Gomez Noss: No valve disease of significance. Dilated left atrium with increased left atrial volume. No evidence of thrombus within left atrium or appendage.  JACKIE emptying velocity: > 50 mm/sec. Atrial fibrillation  12. DCCV with 200 joules x 1 with conversion to SR 6/15/2018  13. Toprol decreased to 12.5 mg daily by PCP (7/2018) due to patient feeling \"foggy\" and weak. reprots improvement in symptoms with decrease in BB. 14. Chronic kidney disease  15. January 19, 2020 one 24-hour Holter basic rhythm was sustained atrial fibrillation with RVR maximum heart rate 59 bpm minimum heart rate 53 bpm average heart rate 95 bpm and PVC burden 0.15%  16. Cardioversion February 2021, multaq  17. Failed Multaq and started on amiodarone June 28, 2021  18. DCCV August 2, 2021 was successful        Medications Prior to admit:  Prior to Admission medications    Medication Sig Start Date End Date Taking?  Authorizing Provider   citalopram (CELEXA) 20 MG tablet Take 1 tablet by mouth daily 7/9/21  Yes Binh Horton DO   amiodarone (PACERONE) 400 MG tablet Take 1 tablet by mouth daily 6/28/21  Yes Lashawn Francis MD   levothyroxine (SYNTHROID) 125 MCG tablet 125 mcg Daily  1/15/21  Yes Historical Provider, MD   dilTIAZem (CARDIZEM CD) 180 MG extended release capsule TAKE 1 CAPSULE BY MOUTH TWICE A DAY  Patient taking differently: Take 180 mg by mouth daily  2/11/21  Yes Lashawn Francis MD   XARELTO 20 MG TABS tablet TAKE 1 TABLET BY MOUTH EVERY DAY 9/8/20  Yes Lashawn Francis MD   simvastatin (ZOCOR) 40 MG tablet Take 40 mg by mouth nightly  3/29/18  Yes Historical Provider, MD   vitamin B-12 (CYANOCOBALAMIN) 1000 MCG tablet Take 6,000 mcg by mouth daily    Yes Historical Provider, MD   albuterol (PROVENTIL HFA) 108 (90 BASE) MCG/ACT inhaler Inhale 2 puffs into the lungs every 4 hours as needed for Wheezing or Shortness of Breath. 11/5/14  Yes Chey Crump MD   Cholecalciferol (VITAMIN D3) 2000 units TABS Take 2 tablets by mouth daily    Yes Historical Provider, MD   Biotin 5000 MCG CAPS Take 1 capsule by mouth daily. Yes Historical Provider, MD   Vitamin E 400 UNIT TABS Take 1 tablet by mouth daily. Yes Historical Provider, MD       Current Medications:    No current facility-administered medications for this encounter. Allergies:  Codeine, Codimal-a [brompheniramine], Lipitor, Spiriva handihaler [tiotropium bromide monohydrate], Tudorza pressair [aclidinium bromide], and Ciprofloxacin    Social History: Former smoker and quit in [address], no alcohol or illicit drug,       Family History:   History reviewed. No pertinent family history. REVIEW OF SYSTEMS:     · Constitutional: Denies fevers, chills or night sweats  · Eyes: Denies visual changes or drainage  · ENT: Denies headaches or hearing loss. No mouth sores or sore throat. No epistaxis   · Cardiovascular: Denies chest pain, pressure or palpitations. No lower extremity swelling. · Respiratory: Denies VASQUES, but has occasional cough and asthma attacks he uses albuterol as rescue, orthopnea or PND. No hemoptysis   · Gastrointestinal: Denies hematemesis or anorexia. No hematochezia or melena    · Genitourinary: Denies urgency, dysuria or hematuria. · Musculoskeletal: Denies gait disturbance, weakness or joint complaints  · Integumentary: Denies rash, hives or pruritis   · Neurological: Denies dizziness, headaches or seizures. No numbness or tingling  · Psychiatric: anxiety or depression. · Endocrine: Denies temperature intolerance. No recent weight change. .  · Hematologic/Lymphatic: Denies abnormal bruising or bleeding. No swollen lymph nodes    PHYSICAL EXAM:   BP 98/73   Pulse 94   Temp 97.3 °F (36.3 °C)   Resp 18   Ht 5' 8\" (1.727 m)   Wt 190 lb (86.2 kg)   SpO2 94%   BMI 28.89 kg/m²   CONST:  Well developed, well nourished who appears of stated age. Awake, alert and cooperative. No apparent distress. HEENT:   Head- Normocephalic, atraumatic   Eyes- Conjunctivae pink, anicteric  Throat- Oral mucosa pink and moist  Neck-  No stridor, trachea midline, no jugular venous distention. No carotid bruit. CHEST: Chest symmetrical and non-tender to palpation. No accessory muscle use or intercostal retractions  RESPIRATORY: Lung sounds - clear throughout fields   CARDIOVASCULAR:     Heart Inspection- shows no noted pulsations  Heart Palpation- no heaves or thrills; PMI is non-displaced   Heart Ausculation-  irregularly irregular rate and rhythm, no murmur. No s3, s4 or rub   PV: No lower extremity edema. No varicosities. Pedal pulses palpable, no clubbing or cyanosis   ABDOMEN: Soft, non-tender to light palpation. Bowel sounds present. No palpable masses no organomegaly; no abdominal bruit  MS: Good muscle strength and tone. No atrophy or abnormal movements. : Deferred  SKIN: Warm and dry no statis dermatitis or ulcers   NEURO / PSYCH: Oriented to person, place and time. Speech clear and appropriate. Follows all commands. Pleasant affect     DATA:    ECG / Tele strips:  atrial fibrillation with controlled ventricular response  Diagnostic:    No intake or output data in the 24 hours ending 08/06/21 1509    Labs:   CBC:   Recent Labs     08/06/21  1406   WBC 9.5   HGB 13.7   HCT 44.3        BMP:   Recent Labs     08/06/21  1406      K 3.4*   CO2 27   BUN 15   CREATININE 1.4*   LABGLOM 36   CALCIUM 9.2     Mag:   Recent Labs     08/06/21  1406   MG 1.6     Phos: No results for input(s): PHOS in the last 72 hours.   TFT:   Lab Results   Component Value Date    TSH 4.430 (H) 08/27/2018    T4FREE 1.21 08/27/2018      HgA1c: No results found for: LABA1C  No results found for: EAG  proBNP:   Recent Labs     08/06/21  1406   PROBNP 1,324*     PT/INR: No results for input(s): PROTIME, INR in the last 72 hours. APTT:No results for input(s): APTT in the last 72 hours. CARDIAC ENZYMES:  Recent Labs     08/06/21  1406   TROPHS 7     FASTING LIPID PANEL:  Lab Results   Component Value Date    TRIG 89 06/17/2019     LIVER PROFILE:  Recent Labs     08/06/21  1406   AST 24   ALT 16   LABALBU 3.8     Impressions  1. Paroxysmal atrial fibrillation and flutter with a history of multiple DC-cardioversions with recurrence of arrhythmia; on oral anticoagulation with rivaroxaban; HR well controlled   2. MALGORZATA - likely prerenal due to poor oral intake  3. Hypokalemia  4. Elevated proBNP with no signs of volume overload by physical exam or by chest x-ray  5. Asthma COPD and remote history of tobacco use  6. Hyperlipidemia  7. Depression  8. Status post thyroidectomy and on replacement therapy  9. Chronic kidney disease  10. No angina, no ischemic EKG changes. Minimal hs-Jenny elevation consistent with very minor acute myocardial injury in setting of recent DCCV, AF, MALGORZATA, dehydration      Plans  1. TTE to add insight to her recurrent arrhythmia despite amiodarone and multiple DCCV  2. Change simvastatin to rosuvastatin due to significant interaction with amiodarone and diltiazem  3. decrease amiodarone to 200 mg daily for now - estimated load >12g. Optimally would d/c as OP as it has not been successful in rhythm control. 1. If needed increase diltiazem from 180 mg QD to BID. 4. No further work-up for her minor acute myocardial injury. 5. Follow-up with Dr. Neelima Dewitt as an outpatient  6. Cardiology will follow peripherally pending TTE results.      electronically signed by SONY Black CNP on 8/6/2021 at 3:09 PM     PHYSICIAN ADDENDUM:  I independently interviewed and examined the patient. I have reviewed the above documentation completed by the JENNIFER. Please see my additional contributions to the HPI, physical exam, and assessment / medical decision making. I independently reviewed the HPI, ROS, PMH, PSH, [address], [place], and medications independently and agree with above documentation.     I discussed the assessment and plan with the patient/family at the bedside as well as the bedside nurse and the primary team.    Lopez Brand MD, [facility]  Interventional Cardiology/Structural Heart Disease  Office: [phone]

## 2021-08-06 NOTE — ED NOTES
Bed: 11  Expected date:   Expected time:   Means of arrival:   Comments:  triage     Aleksandr Romero RN  08/06/21 4241

## 2021-08-06 NOTE — H&P
Hospitalist History & Physical      PCP: Olivia Batista DO    Date of Admission: 8/6/2021    Date of Service: Pt seen/examined on 8/6/2021 and is admitted to Inpatient with expected LOS greater than two midnights due to medical therapy. Chief Complaint:  had concerns including Fatigue (had cardioversion done on Monday and has been fatigued, SOB with excertion and overall not feeling well. ). History Of Present Illness:    Ms. Filemon Wiseman, a 80y.o. year old female  who  has a past medical history of Asthma, Depression, Hyperlipidemia, and Thyroid disease. Patient presented to the ED with complaints of fatigue since DCCV Monday. She denies any SOB, edema, orthopnea, palpitations, CP, N/V/D, fever. She has been eating and drinking okay. She was originally diagnosed with atrial fibrillation in 6/2018. She underwent a total of 4 DCCV (6/2018, 9/2019, 2/2021, 8/2/2021). She is currently on amiodarone and Cardizem. She denies a history of RIZWAN though her daughter, who is at bedside and lives with her, states that she occasionally stops breathing for short periods of time while sleeping. ER COURSE:  In ED vitals remained stable. On initial presentation heart rate was 117. Now in the 90s. Laboratory work-up significant for hypokalemia-3.4 MALGORZATA-1.4/15, elevated BNP-1324, and hyperglycemia-129. EKG with a flutter. Cardiology consulted from ED. Past Medical History:        Diagnosis Date    Asthma     Depression     Hyperlipidemia     Thyroid disease        Past Surgical History:        Procedure Laterality Date    APPENDECTOMY  62 YEARS AGO    CARDIOVERSION  09/20/2019    Dr. Suzanne Samayoa 1 shock 200 joules Successful    CARDIOVERSION  08/02/2021    Dr. Olivier Domingo.  200J x1 converted to NSR    CHOLECYSTECTOMY      EYE SURGERY      CATARACT RIGHT AND LEFT    HYSTERECTOMY      NECK SURGERY  9/9/2011    THYROIDECTOMY  17 YEARS AGO    TONSILLECTOMY         Medications Prior to Admission: weight loss  Psychological:  Anxiety, disorientation, hallucinations. ENT:  Epistaxis, headaches, vertigo, visual changes. Cardiovascular:  Chest pain, irregular heartbeats, palpitations, paroxysmal nocturnal dyspnea. Respiratory:  Shortness of breath, coughing, sputum production, hemoptysis, wheezing, orthopnea. Gastrointestinal:  Nausea, vomiting, diarrhea, heartburn, constipation, abdominal pain, hematemesis, hematochezia, melena, acholic stools  Genito-Urinary:  Dysuria, urgency, frequency, hematuria  Musculoskeletal:  Joint pain, joint stiffness, joint swelling, muscle pain  Neurology:  Headache, focal neurological deficits, weakness, numbness, paresthesia  Derm:  Rashes, ulcers, excoriations, bruising  Extremities:  Decreased ROM, peripheral edema, mottling    PHYSICAL EXAM:  BP 98/73   Pulse 94   Temp 97.3 °F (36.3 °C)   Resp 18   Ht 5' 8\" (1.727 m)   Wt 190 lb (86.2 kg)   SpO2 94%   BMI 28.89 kg/m²   General appearance: Elderly female in no apparent distress, appears stated age and cooperative. HEENT: Normal cephalic, atraumatic without obvious deformity. Pupils equal, round, and reactive to light. Extra ocular muscles intact. Conjunctivae/corneas clear. Neck: Supple, with full range of motion. No jugular venous distention. Trachea midline. Respiratory:  Clear to auscultation bilaterally. No apparent distress. Cardiovascular:  Irregularly irregular rate and rhythm. S1, S2 without murmurs, rubs, or gallops. PV: Brisk capillary refill. +2 pedal and radial pulses bilaterally. No clubbing, cyanosis, edema of bilateral lower extremities. Abdomen: Soft, non-tender, non-distended. +BS  Musculoskeletal: No obvious deformities or erythematous or edematous joints. Skin: Normal skin color. No rashes or lesions. Neurologic:  Neurovascularly intact without any focal sensory/motor deficits.  Cranial nerves: II-XII intact, grossly non-focal.  Psychiatric: Alert and oriented, thought content appropriate, normal insight    Reviewed EKG and CXR personally    CBC:   Recent Labs     08/06/21  1406   WBC 9.5   RBC 5.26   HGB 13.7   HCT 44.3   MCV 84.2   RDW 17.2*        BMP:   Recent Labs     08/06/21  1406      K 3.4*      CO2 27   BUN 15   CREATININE 1.4*   MG 1.6     LFT:  Recent Labs     08/06/21  1406   PROT 6.2*   ALKPHOS 77   ALT 16   AST 24   BILITOT 0.6     D Dimer:   Lab Results   Component Value Date    DDIMER <200 08/27/2018      Lactic Acid:   Lab Results   Component Value Date    LACTA 1.4 06/14/2021     Thyroid Studies:   Lab Results   Component Value Date    TSH 4.430 (H) 08/27/2018     Oupatient labs:  Lab Results   Component Value Date    TRIG 89 06/17/2019    TSH 4.430 (H) 08/27/2018    INR 1.5 09/05/2020     Urinalysis:    Lab Results   Component Value Date    NITRU Negative 06/16/2019    WBCUA NONE 06/16/2019    BACTERIA RARE 06/16/2019    RBCUA 0-1 06/16/2019    BLOODU MODERATE 06/16/2019    SPECGRAV 1.015 06/16/2019    GLUCOSEU Negative 06/16/2019     Imaging:  XR CHEST (2 VW)  Result Date: 8/6/2021  No acute process. Mild cardiomegaly.      ASSESSMENT:  Fatigue  Recurrent atrial flutter  MALGORZATA  Hypokalemia  Hypothyroidism  HLD  Depression  Suspected RIZWAN    PLAN:  Admit to intermediate  Cardiology following  IVF, monitor renal fxn, daily weights, I&O  Replace electrolytes PRN  Check TSH, A1c, lipids  Echo  PT/OT  Will need RIZWAN work up OP    Diet: No diet orders on file  Code Status: Prior  Surrogate decision maker confirmed with patient:   Extended Emergency Contact Information  Primary Emergency Contact: Sukhwinder [address] of [address] [place] [address] Phone: [phone]  Relation: Child  Secondary Emergency Contact: Genny Dexter  Address: [address] [place] [address] Phone: [phone]  Work Phone: [phone]  Relation: Brother/Sister    DVT Prophylaxis: []Lovenox []Heparin []PCD [x] [address] []Encouraged ambulation  Disposition: []Med/Surg [x] Intermediate [] ICU/CCU  Admit status: [] Observation [x] Inpatient     +++++++++++++++++++++++++++++++++++++++++++++++++  JASON Ceballos/ Jeffry [address]  +++++++++++++++++++++++++++++++++++++++++++++++++  NOTE: This report was transcribed using voice recognition software. Every effort was made to ensure accuracy; however, inadvertent computerized transcription errors may be present.

## 2021-08-07 VITALS
DIASTOLIC BLOOD PRESSURE: 85 MMHG | RESPIRATION RATE: 18 BRPM | TEMPERATURE: 98.2 F | WEIGHT: 191.2 LBS | HEIGHT: 68 IN | BODY MASS INDEX: 28.98 KG/M2 | HEART RATE: 98 BPM | SYSTOLIC BLOOD PRESSURE: 154 MMHG | OXYGEN SATURATION: 93 %

## 2021-08-07 LAB
ANION GAP SERPL CALCULATED.3IONS-SCNC: 9 MMOL/L (ref 7–16)
BASOPHILS ABSOLUTE: 0.04 E9/L (ref 0–0.2)
BASOPHILS RELATIVE PERCENT: 0.6 % (ref 0–2)
BUN BLDV-MCNC: 15 MG/DL (ref 6–23)
CALCIUM SERPL-MCNC: 9.2 MG/DL (ref 8.6–10.2)
CHLORIDE BLD-SCNC: 104 MMOL/L (ref 98–107)
CO2: 26 MMOL/L (ref 22–29)
CREAT SERPL-MCNC: 1 MG/DL (ref 0.5–1)
EOSINOPHILS ABSOLUTE: 0.08 E9/L (ref 0.05–0.5)
EOSINOPHILS RELATIVE PERCENT: 1.1 % (ref 0–6)
GFR AFRICAN AMERICAN: >60
GFR NON-AFRICAN AMERICAN: 52 ML/MIN/1.73
GLUCOSE BLD-MCNC: 100 MG/DL (ref 74–99)
HCT VFR BLD CALC: 40.4 % (ref 34–48)
HEMOGLOBIN: 12.7 G/DL (ref 11.5–15.5)
IMMATURE GRANULOCYTES #: 0.02 E9/L
IMMATURE GRANULOCYTES %: 0.3 % (ref 0–5)
LV EF: 55 %
LVEF MODALITY: NORMAL
LYMPHOCYTES ABSOLUTE: 1.28 E9/L (ref 1.5–4)
LYMPHOCYTES RELATIVE PERCENT: 18.2 % (ref 20–42)
MAGNESIUM: 1.8 MG/DL (ref 1.6–2.6)
MCH RBC QN AUTO: 26.1 PG (ref 26–35)
MCHC RBC AUTO-ENTMCNC: 31.4 % (ref 32–34.5)
MCV RBC AUTO: 83.1 FL (ref 80–99.9)
MONOCYTES ABSOLUTE: 0.72 E9/L (ref 0.1–0.95)
MONOCYTES RELATIVE PERCENT: 10.2 % (ref 2–12)
NEUTROPHILS ABSOLUTE: 4.91 E9/L (ref 1.8–7.3)
NEUTROPHILS RELATIVE PERCENT: 69.6 % (ref 43–80)
PDW BLD-RTO: 17.2 FL (ref 11.5–15)
PLATELET # BLD: 162 E9/L (ref 130–450)
PMV BLD AUTO: 13.6 FL (ref 7–12)
POTASSIUM SERPL-SCNC: 3.5 MMOL/L (ref 3.5–5)
RBC # BLD: 4.86 E12/L (ref 3.5–5.5)
SODIUM BLD-SCNC: 139 MMOL/L (ref 132–146)
WBC # BLD: 7.1 E9/L (ref 4.5–11.5)

## 2021-08-07 PROCEDURE — 6370000000 HC RX 637 (ALT 250 FOR IP): Performed by: NURSE PRACTITIONER

## 2021-08-07 PROCEDURE — 6370000000 HC RX 637 (ALT 250 FOR IP): Performed by: INTERNAL MEDICINE

## 2021-08-07 PROCEDURE — 83735 ASSAY OF MAGNESIUM: CPT

## 2021-08-07 PROCEDURE — 93306 TTE W/DOPPLER COMPLETE: CPT

## 2021-08-07 PROCEDURE — 2580000003 HC RX 258: Performed by: NURSE PRACTITIONER

## 2021-08-07 PROCEDURE — 80048 BASIC METABOLIC PNL TOTAL CA: CPT

## 2021-08-07 PROCEDURE — 36415 COLL VENOUS BLD VENIPUNCTURE: CPT

## 2021-08-07 PROCEDURE — 85025 COMPLETE CBC W/AUTO DIFF WBC: CPT

## 2021-08-07 RX ORDER — ROSUVASTATIN CALCIUM 10 MG/1
10 TABLET, COATED ORAL NIGHTLY
Qty: 30 TABLET | Refills: 0 | Status: SHIPPED | OUTPATIENT
Start: 2021-08-07 | End: 2021-09-08

## 2021-08-07 RX ORDER — LEVOTHYROXINE SODIUM 0.12 MG/1
125 TABLET ORAL DAILY
Qty: 30 TABLET | Refills: 0 | Status: SHIPPED | OUTPATIENT
Start: 2021-08-08 | End: 2021-10-18 | Stop reason: SDUPTHER

## 2021-08-07 RX ORDER — AMIODARONE HYDROCHLORIDE 200 MG/1
200 TABLET ORAL DAILY
Qty: 30 TABLET | Refills: 0 | Status: SHIPPED | OUTPATIENT
Start: 2021-08-08 | End: 2021-10-11 | Stop reason: CLARIF

## 2021-08-07 RX ADMIN — LEVOTHYROXINE SODIUM 125 MCG: 0.07 TABLET ORAL at 09:11

## 2021-08-07 RX ADMIN — DILTIAZEM HYDROCHLORIDE 180 MG: 180 CAPSULE, EXTENDED RELEASE ORAL at 09:11

## 2021-08-07 RX ADMIN — CITALOPRAM HYDROBROMIDE 20 MG: 20 TABLET ORAL at 09:11

## 2021-08-07 RX ADMIN — SODIUM CHLORIDE, PRESERVATIVE FREE 10 ML: 5 INJECTION INTRAVENOUS at 09:12

## 2021-08-07 RX ADMIN — AMIODARONE HYDROCHLORIDE 200 MG: 200 TABLET ORAL at 09:11

## 2021-08-07 ASSESSMENT — PAIN SCALES - GENERAL
PAINLEVEL_OUTOF10: 0
PAINLEVEL_OUTOF10: 0

## 2021-08-07 NOTE — DISCHARGE SUMMARY
medication list     Medication List      START taking these medications    rosuvastatin 10 MG tablet  Commonly known as: CRESTOR  Take 1 tablet by mouth nightly        CHANGE how you take these medications    amiodarone 200 MG tablet  Commonly known as: CORDARONE  Take 1 tablet by mouth daily  Start taking on: August 8, 2021  What changed:   · medication strength  · how much to take     dilTIAZem 180 MG extended release capsule  Commonly known as: CARDIZEM CD  TAKE 1 CAPSULE BY MOUTH TWICE A DAY  What changed:   · how much to take  · how to take this  · when to take this  · additional instructions     levothyroxine 125 MCG tablet  Commonly known as: SYNTHROID  Take 1 tablet by mouth Daily  Start taking on: August 8, 2021  What changed:   · medication strength  · how much to take  · how to take this     rivaroxaban 15 MG Tabs tablet  Commonly known as: XARELTO  Take 1 tablet by mouth daily  What changed:   · medication strength  · See the new instructions. CONTINUE taking these medications    albuterol sulfate  (90 Base) MCG/ACT inhaler  Commonly known as: Proventil HFA  Inhale 2 puffs into the lungs every 4 hours as needed for Wheezing or Shortness of Breath.      Biotin 5000 MCG Caps     citalopram 20 MG tablet  Commonly known as: CELEXA  Take 1 tablet by mouth daily     simvastatin 40 MG tablet  Commonly known as: ZOCOR     Symbicort 160-4.5 MCG/ACT Aero  Generic drug: budesonide-formoterol     vitamin B-12 1000 MCG tablet  Commonly known as: CYANOCOBALAMIN     Vitamin D3 50 MCG (2000 UT) Tabs     Vitamin E 400 units Tabs           Where to Get Your Medications      These medications were sent to [address] [place] [address] [phone] Rem[place] [phone]  [address] [id number]    Phone: [phone]   · amiodarone 200 MG tablet  · levothyroxine 125 MCG tablet  · rivaroxaban 15 MG Tabs tablet  · rosuvastatin 10 MG tablet       Patient Instructions: Resume home medications any changes while in hospital      Discharged Condition: good  Disposition: home  Activity: activity as tolerated  Diet: cardiac diet  Wound Care: none needed    Follow-up: Dr. Krystyna Zarate in 1-2 weeks         Electronically signed by Svetlana William MD on 8/7/2021 at 2:45 PM  [facility] Hospitalist   Time spent on discharge 45 minutes

## 2021-08-07 NOTE — PROGRESS NOTES
HOSPITALIST PROGRESS NOTE  Date: 8/7/2021   Name: Clara Andrews   MRN: [id number]   YOB: 1934        Hospital Course:   Mr Yonis Levine is 80 YF presented to emergency department in atrial flutter with rapid ventricular response and complaint of generalized weakness for the last 3 days cardiology is following echo is pending    Subjective/Interval Hx:   Patient denies any chest pain shortness of breath or dyspnea is awaiting echo to be done    Objective:   Physical Exam:   BP (!) 154/85   Pulse 98   Temp 98.2 °F (36.8 °C) (Oral)   Resp 18   Ht 5' 8\" (1.727 m)   Wt 191 lb 3.2 oz (86.7 kg)   SpO2 93%   BMI 29.07 kg/m²   General: no acute distress, well nourished and well hydrated  HEENT: NCAT  Heart: S1S2 RRR  Lungs: Clear to ascultation bilaterally, respiratory effort normal  Abdomen: soft, NT/ND, positive bowel sounds  Extremities: no pitting edema, nontender   Neuro: patient is awake, alert and orientated times 3, no gross deficits  Skin: no rashes or ecchymosis        Meds:   Meds:    citalopram  20 mg Oral Daily    levothyroxine  125 mcg Oral Daily    rivaroxaban  15 mg Oral Daily    sodium chloride flush  5-40 mL Intravenous 2 times per day    amiodarone  200 mg Oral Daily    rosuvastatin  10 mg Oral Nightly    dilTIAZem  180 mg Oral Daily      Infusions:    sodium chloride       PRN Meds: sodium chloride flush, 5-40 mL, PRN  sodium chloride, 25 mL, PRN  ondansetron, 4 mg, Q8H PRN   Or  ondansetron, 4 mg, Q6H PRN  polyethylene glycol, 17 g, Daily PRN  acetaminophen, 650 mg, Q6H PRN   Or  acetaminophen, 650 mg, Q6H PRN  potassium chloride, 40 mEq, PRN   Or  potassium alternative oral replacement, 40 mEq, PRN   Or  potassium chloride, 10 mEq, PRN  perflutren lipid microspheres, 1.5 mL, ONCE PRN        Data/Labs:     Recent Labs     08/06/21  1406 08/07/21  0450   WBC 9.5 7.1   HGB 13.7 12.7   HCT 44.3 40.4    162      Recent Labs     08/06/21  1406 08/07/21  0450    139   K 3. 4* 3.5    104   CO2 27 26   BUN 15 15   CREATININE 1.4* 1.0     Recent Labs     08/06/21  1406   AST 24   ALT 16   BILITOT 0.6   ALKPHOS 77     No results for input(s): INR in the last 72 hours. No results for input(s): CKTOTAL, CKMB, CKMBINDEX, TROPONINT in the last 72 hours. No intake/output data recorded. Intake/Output Summary (Last 24 hours) at 8/7/2021 1222  Last data filed at 8/7/2021 1118  Gross per 24 hour   Intake 240 ml   Output --   Net 240 ml        Assessment/Plan:   1. Atrial flutter with rapid ventricular response-cardiology has assessed and echo is pending if it is negative patient may be discharged home and follow-up in the outpatient setting she denies chest pain or shortness of breath at this time, Xarelto  2. Hypertension-diltiazem, monitor blood pressure  3. Coronary artery disease-amiodarone daily  4. Hypothyroidism-Synthroid daily  5. Hyperlipidemia-Crestorat night  6. Chronic depression-Celexa    DVT Prophylaxis: Xarelto  Diet: ADULT DIET;  Regular; Low Fat/Low Chol/High Fiber/MARGARITO  Code Status: Full Code    Dispo: when stable     Electronically signed by Santos Smith MD on 8/7/2021 at 12:22 PM  [facility]

## 2021-08-08 LAB
EKG ATRIAL RATE: 293 BPM
EKG Q-T INTERVAL: 360 MS
EKG QRS DURATION: 66 MS
EKG QTC CALCULATION (BAZETT): 442 MS
EKG R AXIS: -5 DEGREES
EKG T AXIS: 156 DEGREES
EKG VENTRICULAR RATE: 91 BPM

## 2021-08-09 ENCOUNTER — TELEPHONE (OUTPATIENT)
Dept: FAMILY MEDICINE CLINIC | Age: 86
End: 2021-08-09

## 2021-08-09 NOTE — TELEPHONE ENCOUNTER
Michele [facility] Transitions Initial Follow Up Call    Outreach made within 2 business days of discharge: Yes    Patient: Enmanuel Bauman Patient : 1934   MRN: [id number]  Reason for Admission: There are no discharge diagnoses documented for the most recent discharge. Discharge Date: 21       Spoke with: Lisbet Cavanaugh    Discharge department/facility: [facility] Interactive Patient Contact:  Was patient able to fill all prescriptions: Yes  Was patient instructed to bring all medications to the follow-up visit: Yes  Is patient taking all medications as directed in the discharge summary? Yes  Does patient understand their discharge instructions: Yes  Does patient have questions or concerns that need addressed prior to 7-14 day follow up office visit: no    Scheduled appointment with PCP within 7-14 days    Lisbet Cavanaugh will call if she feels the need to move up her Dr. Hitesh Arreola appointment. She will see Dr. Catarina Cristobal tomorrow.       Follow Up  Future Appointments   Date Time Provider Yuki Mayfield   8/10/2021 11:30 AM Adalberto Turner MD [facility]   2021 10:00 AM DO Yuki Singleton

## 2021-08-10 ENCOUNTER — OFFICE VISIT (OUTPATIENT)
Dept: CARDIOLOGY CLINIC | Age: 86
End: 2021-08-10
Payer: MEDICARE

## 2021-08-10 VITALS
HEART RATE: 93 BPM | SYSTOLIC BLOOD PRESSURE: 108 MMHG | WEIGHT: 193.6 LBS | BODY MASS INDEX: 29.34 KG/M2 | HEIGHT: 68 IN | DIASTOLIC BLOOD PRESSURE: 65 MMHG | RESPIRATION RATE: 16 BRPM

## 2021-08-10 DIAGNOSIS — I48.19 PERSISTENT ATRIAL FIBRILLATION (HCC): ICD-10-CM

## 2021-08-10 PROBLEM — I48.92 ATRIAL FLUTTER WITH RAPID VENTRICULAR RESPONSE (HCC): Status: RESOLVED | Noted: 2021-08-06 | Resolved: 2021-08-10

## 2021-08-10 PROCEDURE — G8417 CALC BMI ABV UP PARAM F/U: HCPCS | Performed by: INTERNAL MEDICINE

## 2021-08-10 PROCEDURE — G8427 DOCREV CUR MEDS BY ELIG CLIN: HCPCS | Performed by: INTERNAL MEDICINE

## 2021-08-10 PROCEDURE — 1123F ACP DISCUSS/DSCN MKR DOCD: CPT | Performed by: INTERNAL MEDICINE

## 2021-08-10 PROCEDURE — 93000 ELECTROCARDIOGRAM COMPLETE: CPT | Performed by: INTERNAL MEDICINE

## 2021-08-10 PROCEDURE — 1111F DSCHRG MED/CURRENT MED MERGE: CPT | Performed by: INTERNAL MEDICINE

## 2021-08-10 PROCEDURE — 1036F TOBACCO NON-USER: CPT | Performed by: INTERNAL MEDICINE

## 2021-08-10 PROCEDURE — 99214 OFFICE O/P EST MOD 30 MIN: CPT | Performed by: INTERNAL MEDICINE

## 2021-08-10 PROCEDURE — 1090F PRES/ABSN URINE INCON ASSESS: CPT | Performed by: INTERNAL MEDICINE

## 2021-08-10 PROCEDURE — 4040F PNEUMOC VAC/ADMIN/RCVD: CPT | Performed by: INTERNAL MEDICINE

## 2021-08-10 NOTE — PROGRESS NOTES
Chief Complaint   Patient presents with    Atrial Fibrillation       Patient Active Problem List    Diagnosis Date Noted    Vitamin D deficiency 06/18/2019    Hyperlipidemia     Depression     Asthma     History of nuclear stress test 09/24/2018     Overview Note:     Normal pharm stress 8/2018      Persistent atrial fibrillation ([address] 06/15/2018     Overview Note:     A. Initial presentation June 2018. CHADS-VASC = 3  B. CONSTANTINO guided DC cardioversion  6/15/2018. No significant structural disesae on CONTSANTINO  C. DCCV 9/2019  D. DCCV 2/3/2021  E. DCCV 8/2/21 on amidarone  F. Chestine Neri: 8/6/21         Current Outpatient Medications   Medication Sig Dispense Refill    amiodarone (CORDARONE) 200 MG tablet Take 1 tablet by mouth daily 30 tablet 0    rivaroxaban (XARELTO) 15 MG TABS tablet Take 1 tablet by mouth daily 42 tablet 0    rosuvastatin (CRESTOR) 10 MG tablet Take 1 tablet by mouth nightly 30 tablet 0    levothyroxine (SYNTHROID) 125 MCG tablet Take 1 tablet by mouth Daily 30 tablet 0    budesonide-formoterol (SYMBICORT) 160-4.5 MCG/ACT AERO Inhale 2 puffs into the lungs daily      citalopram (CELEXA) 20 MG tablet Take 1 tablet by mouth daily 30 tablet 3    dilTIAZem (CARDIZEM CD) 180 MG extended release capsule TAKE 1 CAPSULE BY MOUTH TWICE A DAY (Patient taking differently: Take 180 mg by mouth daily ) 180 capsule 1    simvastatin (ZOCOR) 40 MG tablet Take 40 mg by mouth nightly   3    vitamin B-12 (CYANOCOBALAMIN) 1000 MCG tablet Take 6,000 mcg by mouth daily       albuterol (PROVENTIL HFA) 108 (90 BASE) MCG/ACT inhaler Inhale 2 puffs into the lungs every 4 hours as needed for Wheezing or Shortness of Breath. 1 Inhaler 0    Cholecalciferol (VITAMIN D3) 2000 units TABS Take 1 tablet by mouth daily       Biotin 5000 MCG CAPS Take 1 capsule by mouth daily.  Vitamin E 400 UNIT TABS Take 1 tablet by mouth daily. No current facility-administered medications for this visit. Allergies   Allergen Reactions    Codeine Nausea And Vomiting    Codimal-A [Brompheniramine] Other (See Comments)     Altered mental state and confusion    Lipitor Other (See Comments)     GENERALIZED ACHING, DIFFICULT TO FUNCTION    Spiriva Handihaler [Tiotropium Bromide Monohydrate]     Tudorza Pressair [Aclidinium Bromide]     Ciprofloxacin Nausea And Vomiting       Vitals:    08/10/21 1125   BP: 108/65   Pulse: 93   Resp: 16   Weight: 193 lb 9.6 oz (87.8 kg)   Height: 5' 8\" (1.727 m)       Social History     Socioeconomic History    Marital status:      Spouse name: Not on file    Number of children: Not on file    Years of education: Not on file    Highest education level: Not on file   Occupational History    Not on file   Tobacco Use    Smoking status: Former Smoker     Types: Cigarettes     Quit date: 1994     Years since quittin.8    Smokeless tobacco: Never Used   Vaping Use    Vaping Use: Never used   Substance and Sexual Activity    Alcohol use: No     Comment: RARE    Drug use: No    Sexual activity: Not on file   Other Topics Concern    Not on file   Social History Narrative    Not on file     Social Determinants of Health     Financial Resource Strain: Low Risk     Difficulty of Paying Living Expenses: Not hard at all   Food Insecurity: No Food Insecurity    Worried About [address] in the Last Year: Never true    [address] in the Last Year: Never true   Transportation Needs:     Lack of Transportation (Medical):      Lack of Transportation (Non-Medical):    Physical Activity:     Days of Exercise per Week:     Minutes of Exercise per Session:    Stress:     Feeling of Stress :    Social Connections:     Frequency of Communication with Friends and Family:     Frequency of Social Gatherings with Friends and Family:     Attends Restoration Services:     Active Member of Clubs or Organizations:     Attends Club or Organization Meetings:  Marital Status:    Intimate Partner Violence:     Fear of Current or Ex-Partner:     Emotionally Abused:     Physically Abused:     Sexually Abused:        History reviewed. No pertinent family history. SUBJECTIVE: Ermelinda Simmonds presents to the office today for re-evaluation of cardiac diagnoses and hfu.  I reviewed records. She was noted by cardiology to be in AF again. Changed her to xarelto 15 mg based on a single creat, value next day was normal    She is an extremely difficult historian  She denies clear cut anginal   chest pain,  claudication,  exertional chest pressure/discomfort, fatigue, near-syncope, orthopnea, paroxysmal nocturnal dyspnea, syncope and tachypnea. Compliant with NOAC . NO bleeding    Review of Systems:   Heart: as above   Lungs: as above   Eyes: denies changes in vision or discharge. Ears: denies changes in hearing or pain. Nose: denies epistaxis or masses   Throat: denies sore throat or trouble swallowing. Neuro: denies numbness, tingling, tremors. Skin: denies rashes or itching. : denies hematuria, dysuria   GI: denies vomiting, diarrhea   Psych: denies mood changed, anxiety, depression. all others negative. Physical Exam   /65   Pulse 93   Resp 16   Ht 5' 8\" (1.727 m)   Wt 193 lb 9.6 oz (87.8 kg)   BMI 29.44 kg/m²   Constitutional: Oriented to person, place, and time. Well-developed and well-nourished. No distress. Head: Normocephalic and atraumatic. Eyes: EOM are normal. Pupils are equal, round, and reactive to light. Neck: Normal range of motion. Neck supple. No hepatojugular reflux and no JVD present. Carotid bruit is not present. No tracheal deviation present. No thyromegaly present. Cardiovascular:  irregular rhythm, normal heart sounds and intact distal pulses. Exam reveals no gallop and no friction rub. No murmur heard. Pulmonary/Chest: Effort normal and breath sounds normal. No respiratory distress. No wheezes. No rales.  No tenderness. Abdominal: Soft. Bowel sounds are normal. No distension and no mass. No tenderness. No rebound and no guarding. Musculoskeletal: Normal range of motion. No edema and no tenderness. Neurological: Alert and oriented to person, place, and time. Skin: Skin is warm and dry. No rash noted. Not diaphoretic. No erythema. Psychiatric: Normal mood and affect. Behavior is normal.     EKG: afib, rate 93 bpm, axis +57, nonspecific ST and T waves changes. ASSESSMENT AND PLAN:  Patient Active Problem List   Diagnosis    Persistent atrial fibrillation: s/p DC cardio 6/2018 and 9/2019 and 2/2021 with elevated chads vasc . Had some diastolic failure with initial presenation, but no structural disease of significance on CONSTANTINO. Now back in afib despite AAD with dronedarone and amiodarone trial attempts  Will increase cardizem to bid dosing, and DISCONTINUE amiodarone and change to rate control strategy. She never had symptomatic improvement in sinus rhythm anyway. Continue Xarelto but raise to proper dose of 20 mg. Samples provided.      HLD (hyperlipidemia) on statin      OV 2 months        Felicia Henderson M.D  [facility]

## 2021-09-08 ENCOUNTER — OFFICE VISIT (OUTPATIENT)
Dept: FAMILY MEDICINE CLINIC | Age: 86
End: 2021-09-08
Payer: MEDICARE

## 2021-09-08 VITALS
RESPIRATION RATE: 16 BRPM | WEIGHT: 190 LBS | TEMPERATURE: 97.2 F | OXYGEN SATURATION: 95 % | DIASTOLIC BLOOD PRESSURE: 78 MMHG | HEART RATE: 100 BPM | BODY MASS INDEX: 28.89 KG/M2 | SYSTOLIC BLOOD PRESSURE: 124 MMHG

## 2021-09-08 DIAGNOSIS — F34.1 DYSTHYMIA: ICD-10-CM

## 2021-09-08 DIAGNOSIS — G62.9 NEUROPATHY: Primary | ICD-10-CM

## 2021-09-08 DIAGNOSIS — J45.20 MILD INTERMITTENT ASTHMA WITHOUT COMPLICATION: ICD-10-CM

## 2021-09-08 DIAGNOSIS — E78.5 HYPERLIPIDEMIA, UNSPECIFIED HYPERLIPIDEMIA TYPE: ICD-10-CM

## 2021-09-08 DIAGNOSIS — I48.19 PERSISTENT ATRIAL FIBRILLATION (HCC): ICD-10-CM

## 2021-09-08 PROCEDURE — G8417 CALC BMI ABV UP PARAM F/U: HCPCS | Performed by: INTERNAL MEDICINE

## 2021-09-08 PROCEDURE — G8427 DOCREV CUR MEDS BY ELIG CLIN: HCPCS | Performed by: INTERNAL MEDICINE

## 2021-09-08 PROCEDURE — 1036F TOBACCO NON-USER: CPT | Performed by: INTERNAL MEDICINE

## 2021-09-08 PROCEDURE — 4040F PNEUMOC VAC/ADMIN/RCVD: CPT | Performed by: INTERNAL MEDICINE

## 2021-09-08 PROCEDURE — 1123F ACP DISCUSS/DSCN MKR DOCD: CPT | Performed by: INTERNAL MEDICINE

## 2021-09-08 PROCEDURE — 99214 OFFICE O/P EST MOD 30 MIN: CPT | Performed by: INTERNAL MEDICINE

## 2021-09-08 PROCEDURE — 1090F PRES/ABSN URINE INCON ASSESS: CPT | Performed by: INTERNAL MEDICINE

## 2021-09-08 RX ORDER — GABAPENTIN 100 MG/1
100 CAPSULE ORAL 2 TIMES DAILY
Qty: 180 CAPSULE | Refills: 0 | Status: SHIPPED
Start: 2021-09-08 | End: 2022-04-11 | Stop reason: CLARIF

## 2021-09-08 NOTE — PROGRESS NOTES
[facility]  [address] [place] [address]  Dept: [phone]  Dept Fax: [phone]     NAME: Pam Dixon        :  1934        MRN:  [id number]    Chief Complaint   Patient presents with   Melvincarmencita Peterson New Doctor    Numbness     LT arm and hand off and on for approx 3-4 years / asking if she is a candiate for Gabapentin because it helped a family member. History of Present Illness  Irma Peterson is a 80 y.o. female who presents today to [facility] care. Prior patient of Dr. Mathew Hanson  Patient reports a history of persistent atrial fibrillation, asthma, hyperlipidemia, and depression all of which are controlled on her current medication regimen. Patient also follows with cardiology, Dr. Merline Altes. She denies needing any refills at this time. Patient's only complaint today is that she has intermittent left arm and hand pain, numbness, and tingling intermittently. This has been occurring for the last 3 to 4 years and has never really been evaluated. Patient states that she is speaking with some of her family members who have neuropathy and they report similar symptoms and have told her that being on gabapentin has significantly helped them and she is wondering if she may be a candidate to try some gabapentin to see if it helps her arm pain. Review of Systems  Please see HPI above. All bolded are positive.   Gen: fever, chills, fatigue, weakness, diaphoresis, or unintentional weight change  Head: headache, vision change, hearing loss  Chest: chest pain/heaviness, palpitations  Lungs: shortness of breath, wheezing, coughing, hemoptysis  Abdomen: abdominal pain, nausea, vomiting, diarrhea, constipation, melena, hematochezia, hematemesis, or loss of appetite  Extremities: lower extremity edema, myalgias, arthralgias, upper extremity nerve pain  Urinary: dysuria, hematuria, weak flow, or increase in frequency  Neurologic: lightheadedness, dizziness, confusion, syncope  Endocrine: polydipsia, polyuria, heat or cold intolerance  Psychiatric: depression, suicidal ideation, or anxiety  Derm: Rashes, ulcers, burns    Medical History   Past Medical History:   Diagnosis Date    Asthma     Depression     Hyperlipidemia     Thyroid disease        Surgical History   Past Surgical History:   Procedure Laterality Date    APPENDECTOMY  62 YEARS AGO    CARDIOVERSION  2019    Dr. Renetta Irving 1 shock 200 joules Successful    CARDIOVERSION  2021    Dr. Nuvia Perrin. 200J x1 converted to NSR    CHOLECYSTECTOMY      EYE SURGERY      CATARACT RIGHT AND LEFT    HYSTERECTOMY      NECK SURGERY  2011    THYROIDECTOMY  17 YEARS AGO    TONSILLECTOMY         Family History  No family history on file. Social History  Social History     Tobacco Use    Smoking status: Former Smoker     Types: Cigarettes     Quit date: 1994     Years since quittin.9    Smokeless tobacco: Never Used   Substance Use Topics    Alcohol use: No     Comment: RARE       Home Medications  Current Outpatient Medications   Medication Sig Dispense Refill    gabapentin (NEURONTIN) 100 MG capsule Take 1 capsule by mouth 2 times daily for 180 days.  Intended supply: 90 days 180 capsule 0    amiodarone (CORDARONE) 200 MG tablet Take 1 tablet by mouth daily 30 tablet 0    rivaroxaban (XARELTO) 15 MG TABS tablet Take 1 tablet by mouth daily 42 tablet 0    levothyroxine (SYNTHROID) 125 MCG tablet Take 1 tablet by mouth Daily 30 tablet 0    budesonide-formoterol (SYMBICORT) 160-4.5 MCG/ACT AERO Inhale 2 puffs into the lungs daily      citalopram (CELEXA) 20 MG tablet Take 1 tablet by mouth daily 30 tablet 3    dilTIAZem (CARDIZEM CD) 180 MG extended release capsule TAKE 1 CAPSULE BY MOUTH TWICE A DAY (Patient taking differently: Take 180 mg by mouth daily ) 180 capsule 1    simvastatin (ZOCOR) 40 MG tablet Take 40 mg by mouth nightly   3    vitamin B-12 (CYANOCOBALAMIN) 1000 MCG tablet Take 6,000 mcg by mouth daily       albuterol (PROVENTIL HFA) 108 (90 BASE) MCG/ACT inhaler Inhale 2 puffs into the lungs every 4 hours as needed for Wheezing or Shortness of Breath. 1 Inhaler 0    Cholecalciferol (VITAMIN D3) 2000 units TABS Take 1 tablet by mouth daily       Biotin 5000 MCG CAPS Take 1 capsule by mouth daily.  Vitamin E 400 UNIT TABS Take 1 tablet by mouth daily.  WIXELA INHUB 250-50 MCG/DOSE AEPB INHALE 1 PUFF INTO THE LUNGS EVERY 12 HOURS       No current facility-administered medications for this visit. Allergies  Allergies   Allergen Reactions    Codeine Nausea And Vomiting    Codimal-A [Brompheniramine] Other (See Comments)     Altered mental state and confusion    Lipitor Other (See Comments)     GENERALIZED ACHING, DIFFICULT TO FUNCTION    Spiriva Handihaler [Tiotropium Bromide Monohydrate]     Tudorza Pressair [Aclidinium Bromide]     Ciprofloxacin Nausea And Vomiting       Objective  Vitals:    09/08/21 1044   BP: 124/78   Pulse: 100   Resp: 16   Temp: 97.2 °F (36.2 °C)   TempSrc: Temporal   SpO2: 95%   Weight: 190 lb (86.2 kg)        Physical Exam:  General: Awake, alert, and oriented to person, place, time, and purpose, appears stated age and cooperative, No acute distress  Head: Normocephalic, atraumatic  Eyes: conjunctivae/corneas clear, EOM's intact. Mouth: Mucous membranes moist with no pharyngeal exudate or erythema  Neck: no JVD, no adenopathy, no carotid bruit, supple, symmetrical, trachea midline  Back: symmetric, ROM normal, No CVA tenderness.   Lungs: clear to auscultation bilaterally without wheezes, rales, or rhonchi  Heart: Irregularly irregular rhythm consistent with atrial fibrillation, rate controlled, S1, S2 normal, no murmur, click, rub or gallop  Abdomen: soft, non-tender; bowel sounds normal; no masses,  no organomegaly  Extremities: atraumatic, no cyanosis, no edema, 2+ pulses palpated in all 4 extremities  Skin: color, texture, turgor within normal limits. No rashes or lesions or normal  Neurologic: speech appropriate, moves all 4 extremities, normal muscle strength and tone, CN 2-12 grossly intact    Labs  Lab Results   Component Value Date    WBC 7.1 08/07/2021    HGB 12.7 08/07/2021    HCT 40.4 08/07/2021     08/07/2021     08/07/2021    K 3.5 08/07/2021     08/07/2021    CREATININE 1.0 08/07/2021    BUN 15 08/07/2021    CO2 26 08/07/2021    GLUCOSE 100 (H) 08/07/2021    ALT 16 08/06/2021    AST 24 08/06/2021    INR 1.5 09/05/2020     Lab Results   Component Value Date    TSH 0.979 08/06/2021     Lab Results   Component Value Date    TRIG 115 08/06/2021    TRIG 89 06/17/2019     Lab Results   Component Value Date    HDL 75 08/06/2021     Lab Results   Component Value Date    LDLCALC 38 08/06/2021     Lab Results   Component Value Date    LABA1C 5.6 08/06/2021     Lab Results   Component Value Date    INR 1.5 09/05/2020    INR 1.2 06/16/2019    INR 1.7 08/27/2018    PROTIME 17.0 (H) 09/05/2020    PROTIME 13.4 (H) 06/16/2019    PROTIME 19.9 (H) 08/27/2018      *All recent labs were reviewed. Please see electronic chart for a more comprehensive set of labs    Radiology  No results found. Health Maintenance Due   Topic Date Due    Shingles Vaccine (1 of 2) Never done    Pneumococcal 65+ years Vaccine (1 of 1 - PPSV23) Never done   Dee Me Annual Wellness Visit (AWV)  Never done    Flu vaccine (1) Never done         Assessment and Plan  Eleni Doyle presents today to establish care. Daija Burt was seen today for established new doctor and numbness. Diagnoses and all orders for this visit:    Neuropathy  -     gabapentin (NEURONTIN) 100 MG capsule; Take 1 capsule by mouth 2 times daily for 180 days.  Intended supply: 90 days    Hyperlipidemia, unspecified hyperlipidemia type    Persistent atrial fibrillation (HCC)    Mild intermittent asthma without

## 2021-09-14 NOTE — ADDENDUM NOTE
Addended by: Zeina Schreiber on: 9/14/2021 05:49 PM     Modules accepted: Level of Service
bilateral normal...
no

## 2021-10-04 ENCOUNTER — TELEPHONE (OUTPATIENT)
Dept: FAMILY MEDICINE CLINIC | Age: 86
End: 2021-10-04

## 2021-10-04 ENCOUNTER — OFFICE VISIT (OUTPATIENT)
Dept: FAMILY MEDICINE CLINIC | Age: 86
End: 2021-10-04
Payer: MEDICARE

## 2021-10-04 VITALS
WEIGHT: 185 LBS | DIASTOLIC BLOOD PRESSURE: 78 MMHG | RESPIRATION RATE: 18 BRPM | HEART RATE: 104 BPM | BODY MASS INDEX: 28.04 KG/M2 | TEMPERATURE: 97 F | SYSTOLIC BLOOD PRESSURE: 118 MMHG | OXYGEN SATURATION: 96 % | HEIGHT: 68 IN

## 2021-10-04 DIAGNOSIS — J01.90 ACUTE BACTERIAL SINUSITIS: ICD-10-CM

## 2021-10-04 DIAGNOSIS — B96.89 ACUTE BACTERIAL SINUSITIS: ICD-10-CM

## 2021-10-04 DIAGNOSIS — R05.9 COUGH: Primary | ICD-10-CM

## 2021-10-04 PROCEDURE — G8484 FLU IMMUNIZE NO ADMIN: HCPCS | Performed by: STUDENT IN AN ORGANIZED HEALTH CARE EDUCATION/TRAINING PROGRAM

## 2021-10-04 PROCEDURE — 99213 OFFICE O/P EST LOW 20 MIN: CPT | Performed by: STUDENT IN AN ORGANIZED HEALTH CARE EDUCATION/TRAINING PROGRAM

## 2021-10-04 PROCEDURE — 1123F ACP DISCUSS/DSCN MKR DOCD: CPT | Performed by: STUDENT IN AN ORGANIZED HEALTH CARE EDUCATION/TRAINING PROGRAM

## 2021-10-04 PROCEDURE — 1036F TOBACCO NON-USER: CPT | Performed by: STUDENT IN AN ORGANIZED HEALTH CARE EDUCATION/TRAINING PROGRAM

## 2021-10-04 PROCEDURE — G8427 DOCREV CUR MEDS BY ELIG CLIN: HCPCS | Performed by: STUDENT IN AN ORGANIZED HEALTH CARE EDUCATION/TRAINING PROGRAM

## 2021-10-04 PROCEDURE — G8417 CALC BMI ABV UP PARAM F/U: HCPCS | Performed by: STUDENT IN AN ORGANIZED HEALTH CARE EDUCATION/TRAINING PROGRAM

## 2021-10-04 PROCEDURE — 4040F PNEUMOC VAC/ADMIN/RCVD: CPT | Performed by: STUDENT IN AN ORGANIZED HEALTH CARE EDUCATION/TRAINING PROGRAM

## 2021-10-04 PROCEDURE — 1090F PRES/ABSN URINE INCON ASSESS: CPT | Performed by: STUDENT IN AN ORGANIZED HEALTH CARE EDUCATION/TRAINING PROGRAM

## 2021-10-04 RX ORDER — AMOXICILLIN AND CLAVULANATE POTASSIUM 875; 125 MG/1; MG/1
1 TABLET, FILM COATED ORAL 2 TIMES DAILY
Qty: 14 TABLET | Refills: 0 | Status: SHIPPED
Start: 2021-10-04 | End: 2021-10-11 | Stop reason: CLARIF

## 2021-10-04 NOTE — TELEPHONE ENCOUNTER
Advised to come into walk in      ----- Message from Artem [id number] sent at 10/4/2021 10:54 AM EDT -----  Subject: Appointment Request    Reason for Call: Urgent Cough Cold    QUESTIONS  Type of Appointment? Established Patient  Reason for appointment request? Available appointments did not meet   patient need  Additional Information for Provider? Patient has a sinus problem and would   like to be seen earlier by doctor Asaf Adorno, and she doesn't want   a vv. She would also just like medicine for it and if was to get her she   asked if you can leave your name and saying that you calling from the   office. ---------------------------------------------------------------------------  --------------  Ky Fraction INFO  What is the best way for the office to contact you? OK to leave message on   voicemail  Preferred Call Back Phone Number? [phone]  ---------------------------------------------------------------------------  --------------  SCRIPT ANSWERS  Relationship to Patient? Self  Are you currently unable to finish sentences due to any difficulty   breathing? No  Are you unable to swallow liquids? No  Are you having fevers (100.4 or greater), chills, or sweats? No  Do you have COPD, asthma or a chronic lung condition? Yes   Have you been diagnosed with, awaiting test results for, or told that you   are suspected of having COVID-19 (Coronavirus)? (If patient has tested   negative or was tested as a requirement for work, school, or travel and   not based on symptoms, answer no)? No  Within the past two weeks have you developed any of the following symptoms   (answer no if symptoms have been present longer than 2 weeks or began   more than 2 weeks ago)?  Fever or Chills, Cough, Shortness of breath or   difficulty breathing, Loss of taste or smell, Sore throat, Nasal   congestion, Sneezing or runny nose, Fatigue or generalized body aches   (answer no if pain is specific to a body part e.g. back pain), Diarrhea,   Headache?  Yes

## 2021-10-04 NOTE — PROGRESS NOTES
Medications:     amoxicillin-clavulanate (AUGMENTIN) 875-125 MG per tablet, Take 1 tablet by mouth 2 times daily for 7 days, Disp: 14 tablet, Rfl: 0    rivaroxaban (XARELTO) 20 MG TABS tablet, Take 1 tablet by mouth daily (with breakfast), Disp: 30 tablet, Rfl: 1    WIXELA INHUB 250-50 MCG/DOSE AEPB, INHALE 1 PUFF INTO THE LUNGS EVERY 12 HOURS, Disp: , Rfl:     gabapentin (NEURONTIN) 100 MG capsule, Take 1 capsule by mouth 2 times daily for 180 days. Intended supply: 90 days, Disp: 180 capsule, Rfl: 0    amiodarone (CORDARONE) 200 MG tablet, Take 1 tablet by mouth daily, Disp: 30 tablet, Rfl: 0    levothyroxine (SYNTHROID) 125 MCG tablet, Take 1 tablet by mouth Daily, Disp: 30 tablet, Rfl: 0    budesonide-formoterol (SYMBICORT) 160-4.5 MCG/ACT AERO, Inhale 2 puffs into the lungs daily, Disp: , Rfl:     citalopram (CELEXA) 20 MG tablet, Take 1 tablet by mouth daily, Disp: 30 tablet, Rfl: 3    dilTIAZem (CARDIZEM CD) 180 MG extended release capsule, TAKE 1 CAPSULE BY MOUTH TWICE A DAY (Patient taking differently: Take 180 mg by mouth daily ), Disp: 180 capsule, Rfl: 1    simvastatin (ZOCOR) 40 MG tablet, Take 40 mg by mouth nightly , Disp: , Rfl: 3    vitamin B-12 (CYANOCOBALAMIN) 1000 MCG tablet, Take 6,000 mcg by mouth daily , Disp: , Rfl:     albuterol (PROVENTIL HFA) 108 (90 BASE) MCG/ACT inhaler, Inhale 2 puffs into the lungs every 4 hours as needed for Wheezing or Shortness of Breath., Disp: 1 Inhaler, Rfl: 0    Cholecalciferol (VITAMIN D3) 2000 units TABS, Take 1 tablet by mouth daily , Disp: , Rfl:     Biotin 5000 MCG CAPS, Take 1 capsule by mouth daily. , Disp: , Rfl:     Vitamin E 400 UNIT TABS, Take 1 tablet by mouth daily. , Disp: , Rfl:     Allergies:      Allergies   Allergen Reactions    Codeine Nausea And Vomiting    Codimal-A [Brompheniramine] Other (See Comments)     Altered mental state and confusion    Lipitor Other (See Comments)     GENERALIZED ACHING, DIFFICULT TO FUNCTION    Spiriva Handihaler [Tiotropium Bromide Monohydrate]     Tudorza Pressair [Aclidinium Bromide]     Ciprofloxacin Nausea And Vomiting       Social History:     Social History     Tobacco Use    Smoking status: Former Smoker     Types: Cigarettes     Quit date: 1994     Years since quittin.0    Smokeless tobacco: Never Used   Vaping Use    Vaping Use: Never used   Substance Use Topics    Alcohol use: No     Comment: RARE    Drug use: No         Physical Exam:     Vitals:    10/04/21 1202   BP: 118/78   Pulse: 104   Resp: 18   Temp: 97 °F (36.1 °C)   TempSrc: Temporal   SpO2: 96%   Weight: 185 lb (83.9 kg)   Height: 5' 8\" (1.727 m)       Physical Exam (PE)    Physical Exam  Vitals and nursing note reviewed. Constitutional:       Appearance: Normal appearance. HENT:      Head: Normocephalic and atraumatic. Right Ear: Tympanic membrane, ear canal and external ear normal.      Left Ear: Ear canal and external ear normal.      Ears:      Comments: Fluid behind left ear drum     Nose: Nose normal.      Mouth/Throat:      Mouth: Mucous membranes are moist.      Pharynx: Oropharynx is clear. Eyes:      Extraocular Movements: Extraocular movements intact. Conjunctiva/sclera: Conjunctivae normal.      Pupils: Pupils are equal, round, and reactive to light. Cardiovascular:      Rate and Rhythm: Normal rate and regular rhythm. Pulses: Normal pulses. Heart sounds: Normal heart sounds. Pulmonary:      Effort: Pulmonary effort is normal.      Breath sounds: Normal breath sounds. Abdominal:      General: Bowel sounds are normal.      Palpations: Abdomen is soft. Musculoskeletal:         General: Normal range of motion. Cervical back: Normal range of motion and neck supple. Skin:     General: Skin is warm and dry. Capillary Refill: Capillary refill takes less than 2 seconds. Neurological:      General: No focal deficit present.       Mental Status: She is alert and oriented to person, place, and time. Psychiatric:         Mood and Affect: Mood normal.         Behavior: Behavior normal.         Thought Content: Thought content normal.          Testing:   (All laboratory and radiology results have been personally reviewed by myself)  Labs:  No results found for this visit on 10/04/21. Imaging: All Radiology results interpreted by Radiologist unless otherwise noted. No orders to display       Assessment / Plan:   The patient's vitals, allergies, medications, and past medical history have been reviewed. Hunter Mercado was seen today for sinus problem and cough. Diagnoses and all orders for this visit:    Cough  -     Covid-19 Ambulatory; Future    Acute bacterial sinusitis  -     amoxicillin-clavulanate (AUGMENTIN) 875-125 MG per tablet; Take 1 tablet by mouth 2 times daily for 7 days        - Patient is directed to take the prescribed medication as ordered. Discussed taking vitamin D, vitamin C, and zinc supplementation.     - COVID-19 swab obtained and pending, will call with results once available. Advised cautionary self-quarantine at home in the interim. Increase fluids and rest. Symptomatic relief discussed including Tylenol prn pain/fever. Schedule virtual f/u with PCP in 2-3 days. Red flag symptoms were discussed with the patient today. The patient is directed to go the ED if symptoms worsen or change. Pt verbalizes understanding and is in agreement with plan of care. All questions answered.     SIGNATURE: Caty Olivas DO

## 2021-10-11 ENCOUNTER — OFFICE VISIT (OUTPATIENT)
Dept: CARDIOLOGY CLINIC | Age: 86
End: 2021-10-11
Payer: MEDICARE

## 2021-10-11 VITALS
HEART RATE: 109 BPM | BODY MASS INDEX: 28.49 KG/M2 | SYSTOLIC BLOOD PRESSURE: 120 MMHG | HEIGHT: 68 IN | DIASTOLIC BLOOD PRESSURE: 78 MMHG | RESPIRATION RATE: 20 BRPM | WEIGHT: 188 LBS

## 2021-10-11 DIAGNOSIS — I48.19 PERSISTENT ATRIAL FIBRILLATION (HCC): Primary | ICD-10-CM

## 2021-10-11 PROCEDURE — G8417 CALC BMI ABV UP PARAM F/U: HCPCS | Performed by: INTERNAL MEDICINE

## 2021-10-11 PROCEDURE — 1036F TOBACCO NON-USER: CPT | Performed by: INTERNAL MEDICINE

## 2021-10-11 PROCEDURE — 93000 ELECTROCARDIOGRAM COMPLETE: CPT | Performed by: INTERNAL MEDICINE

## 2021-10-11 PROCEDURE — 4040F PNEUMOC VAC/ADMIN/RCVD: CPT | Performed by: INTERNAL MEDICINE

## 2021-10-11 PROCEDURE — 99214 OFFICE O/P EST MOD 30 MIN: CPT | Performed by: INTERNAL MEDICINE

## 2021-10-11 PROCEDURE — 1123F ACP DISCUSS/DSCN MKR DOCD: CPT | Performed by: INTERNAL MEDICINE

## 2021-10-11 PROCEDURE — G8484 FLU IMMUNIZE NO ADMIN: HCPCS | Performed by: INTERNAL MEDICINE

## 2021-10-11 PROCEDURE — G8427 DOCREV CUR MEDS BY ELIG CLIN: HCPCS | Performed by: INTERNAL MEDICINE

## 2021-10-11 PROCEDURE — 1090F PRES/ABSN URINE INCON ASSESS: CPT | Performed by: INTERNAL MEDICINE

## 2021-10-11 RX ORDER — DILTIAZEM HYDROCHLORIDE 180 MG/1
CAPSULE, COATED, EXTENDED RELEASE ORAL
Qty: 180 CAPSULE | Refills: 1 | Status: SHIPPED
Start: 2021-10-11 | End: 2021-12-06 | Stop reason: SDUPTHER

## 2021-10-11 RX ORDER — METOPROLOL SUCCINATE 25 MG/1
25 TABLET, EXTENDED RELEASE ORAL DAILY
Qty: 30 TABLET | Refills: 3
Start: 2021-10-11 | End: 2021-10-12 | Stop reason: SDUPTHER

## 2021-10-11 NOTE — PROGRESS NOTES
Chief Complaint   Patient presents with    Atrial Fibrillation       Patient Active Problem List    Diagnosis Date Noted    Vitamin D deficiency 06/18/2019    Hyperlipidemia     Depression     Asthma     History of nuclear stress test 09/24/2018     Overview Note:     Normal pharm stress 8/2018      Persistent atrial fibrillation ([address] 06/15/2018     Overview Note:     A. Initial presentation June 2018. CHADS-VASC = 3  B. CONSTANTINO guided DC cardioversion  6/15/2018. No significant structural disesae on CONSTANTINO  C. DCCV 9/2019  D. DCCV 2/3/2021  E. DCCV 8/2/21 on amidarone  OSRAYA Sorenson Heads: 8/6/21         Current Outpatient Medications   Medication Sig Dispense Refill    metoprolol succinate (TOPROL XL) 25 MG extended release tablet Take 1 tablet by mouth daily 30 tablet 3    dilTIAZem (CARDIZEM CD) 180 MG extended release capsule TAKE 1 CAPSULE BY MOUTH TWICE A  capsule 1    rivaroxaban (XARELTO) 20 MG TABS tablet Take 1 tablet by mouth daily (with breakfast) 30 tablet 1    WIXELA INHUB 250-50 MCG/DOSE AEPB INHALE 1 PUFF INTO THE LUNGS EVERY 12 HOURS      gabapentin (NEURONTIN) 100 MG capsule Take 1 capsule by mouth 2 times daily for 180 days. Intended supply: 90 days 180 capsule 0    levothyroxine (SYNTHROID) 125 MCG tablet Take 1 tablet by mouth Daily 30 tablet 0    budesonide-formoterol (SYMBICORT) 160-4.5 MCG/ACT AERO Inhale 2 puffs into the lungs daily      citalopram (CELEXA) 20 MG tablet Take 1 tablet by mouth daily 30 tablet 3    simvastatin (ZOCOR) 40 MG tablet Take 40 mg by mouth nightly   3    vitamin B-12 (CYANOCOBALAMIN) 1000 MCG tablet Take 6,000 mcg by mouth daily       albuterol (PROVENTIL HFA) 108 (90 BASE) MCG/ACT inhaler Inhale 2 puffs into the lungs every 4 hours as needed for Wheezing or Shortness of Breath. 1 Inhaler 0    Cholecalciferol (VITAMIN D3) 2000 units TABS Take 1 tablet by mouth daily       Biotin 5000 MCG CAPS Take 1 capsule by mouth daily.         Vitamin E 400 UNIT TABS Take 1 tablet by mouth daily. No current facility-administered medications for this visit. Allergies   Allergen Reactions    Codeine Nausea And Vomiting    Codimal-A [Brompheniramine] Other (See Comments)     Altered mental state and confusion    Lipitor Other (See Comments)     GENERALIZED ACHING, DIFFICULT TO FUNCTION    Spiriva Handihaler [Tiotropium Bromide Monohydrate]     Tudorza Pressair [Aclidinium Bromide]     Ciprofloxacin Nausea And Vomiting       Vitals:    10/11/21 0930   BP: 120/78   Pulse: 109   Resp: 20   Weight: 188 lb (85.3 kg)   Height: 5' 8\" (1.727 m)       Social History     Socioeconomic History    Marital status:      Spouse name: Not on file    Number of children: Not on file    Years of education: Not on file    Highest education level: Not on file   Occupational History    Not on file   Tobacco Use    Smoking status: Former Smoker     Types: Cigarettes     Quit date: 1994     Years since quittin.0    Smokeless tobacco: Never Used   Vaping Use    Vaping Use: Never used   Substance and Sexual Activity    Alcohol use: No     Comment: RARE    Drug use: No    Sexual activity: Not on file   Other Topics Concern    Not on file   Social History Narrative    Not on file     Social Determinants of Health     Financial Resource Strain: Low Risk     Difficulty of Paying Living Expenses: Not hard at all   Food Insecurity: No Food Insecurity    Worried About [address] in the Last Year: Never true    [facility] in the Last Year: Never true   Transportation Needs:     Lack of Transportation (Medical):      Lack of Transportation (Non-Medical):    Physical Activity:     Days of Exercise per Week:     Minutes of Exercise per Session:    Stress:     Feeling of Stress :    Social Connections:     Frequency of Communication with Friends and Family:     Frequency of Social Gatherings with Friends and Family:     Attends Holiness Services:     Active Member of Clubs or Organizations:     Attends Club or Organization Meetings:     Marital Status:    Intimate Partner Violence:     Fear of Current or Ex-Partner:     Emotionally Abused:     Physically Abused:     Sexually Abused:        History reviewed. No pertinent family history. SUBJECTIVE: Evy Currie presents to the office today for re-evaluation of cardiac diagnoses   She is an extremely difficult historian, but it seems that when the weather is nice she can accomplish activities without issue. She denies clear cut anginal   chest pain,  claudication,  exertional chest pressure/discomfort, fatigue, near-syncope, orthopnea, paroxysmal nocturnal dyspnea, syncope and tachypnea. Compliant with NOAC . NO bleeding    Review of Systems:   Heart: as above   Lungs: as above   Eyes: denies changes in vision or discharge. Ears: denies changes in hearing or pain. Nose: denies epistaxis or masses   Throat: denies sore throat or trouble swallowing. Neuro: denies numbness, tingling, tremors. Skin: denies rashes or itching. : denies hematuria, dysuria   GI: denies vomiting, diarrhea   Psych: denies mood changed, anxiety, depression. all others negative. Physical Exam   /78   Pulse 109   Resp 20   Ht 5' 8\" (1.727 m)   Wt 188 lb (85.3 kg)   BMI 28.59 kg/m²   Constitutional: Oriented to person, place, and time. Well-developed and well-nourished. No distress. Head: Normocephalic and atraumatic. Eyes: EOM are normal. Pupils are equal, round, and reactive to light. Neck: Normal range of motion. Neck supple. No hepatojugular reflux and no JVD present. Carotid bruit is not present. No tracheal deviation present. No thyromegaly present. Cardiovascular:  irregular rhythm, normal heart sounds and intact distal pulses. Exam reveals no gallop and no friction rub. No murmur heard.   Pulmonary/Chest: Effort normal and breath sounds normal. No

## 2021-10-12 RX ORDER — METOPROLOL SUCCINATE 25 MG/1
25 TABLET, EXTENDED RELEASE ORAL DAILY
Qty: 30 TABLET | Refills: 3 | Status: SHIPPED
Start: 2021-10-12 | End: 2022-06-08

## 2021-10-18 RX ORDER — LEVOTHYROXINE SODIUM 0.12 MG/1
125 TABLET ORAL DAILY
Qty: 90 TABLET | Refills: 1 | Status: SHIPPED
Start: 2021-10-18 | End: 2022-06-08 | Stop reason: SDUPTHER

## 2021-10-18 NOTE — TELEPHONE ENCOUNTER
Last Appointment:  9/8/2021  Future Appointments   Date Time Provider Yuki Mayfield   12/9/2021  9:30 AM Edwin Phillips [facility]   4/11/2022 10:45 AM Agustina Steele MD [facility]

## 2021-11-15 DIAGNOSIS — F34.1 DYSTHYMIA: ICD-10-CM

## 2021-11-15 RX ORDER — CITALOPRAM 20 MG/1
20 TABLET ORAL DAILY
Qty: 30 TABLET | Refills: 3 | Status: SHIPPED
Start: 2021-11-15 | End: 2022-03-09

## 2021-11-15 NOTE — TELEPHONE ENCOUNTER
Last Appointment:  9/8/2021  Future Appointments   Date Time Provider Yuki Mayfield   12/9/2021  9:30 AM Lizet Gastelum [facility]   4/11/2022 10:45 AM Lansing Fothergill, MD [address]              Pt would like a 90 day supply

## 2021-12-06 RX ORDER — DILTIAZEM HYDROCHLORIDE 180 MG/1
CAPSULE, COATED, EXTENDED RELEASE ORAL
Qty: 180 CAPSULE | Refills: 1 | Status: SHIPPED
Start: 2021-12-06 | End: 2022-04-18 | Stop reason: SDUPTHER

## 2021-12-09 ENCOUNTER — OFFICE VISIT (OUTPATIENT)
Dept: FAMILY MEDICINE CLINIC | Age: 86
End: 2021-12-09
Payer: MEDICARE

## 2021-12-09 VITALS
RESPIRATION RATE: 18 BRPM | HEIGHT: 68 IN | SYSTOLIC BLOOD PRESSURE: 120 MMHG | TEMPERATURE: 97.5 F | OXYGEN SATURATION: 96 % | HEART RATE: 102 BPM | BODY MASS INDEX: 28.34 KG/M2 | WEIGHT: 187 LBS | DIASTOLIC BLOOD PRESSURE: 80 MMHG

## 2021-12-09 DIAGNOSIS — Z00.00 ROUTINE GENERAL MEDICAL EXAMINATION AT A HEALTH CARE FACILITY: Primary | ICD-10-CM

## 2021-12-09 DIAGNOSIS — E55.9 VITAMIN D DEFICIENCY: ICD-10-CM

## 2021-12-09 DIAGNOSIS — E78.5 HYPERLIPIDEMIA, UNSPECIFIED HYPERLIPIDEMIA TYPE: ICD-10-CM

## 2021-12-09 DIAGNOSIS — J45.20 MILD INTERMITTENT ASTHMA WITHOUT COMPLICATION: ICD-10-CM

## 2021-12-09 DIAGNOSIS — I48.19 PERSISTENT ATRIAL FIBRILLATION (HCC): ICD-10-CM

## 2021-12-09 PROCEDURE — 1123F ACP DISCUSS/DSCN MKR DOCD: CPT | Performed by: INTERNAL MEDICINE

## 2021-12-09 PROCEDURE — 4040F PNEUMOC VAC/ADMIN/RCVD: CPT | Performed by: INTERNAL MEDICINE

## 2021-12-09 PROCEDURE — G8484 FLU IMMUNIZE NO ADMIN: HCPCS | Performed by: INTERNAL MEDICINE

## 2021-12-09 PROCEDURE — G0438 PPPS, INITIAL VISIT: HCPCS | Performed by: INTERNAL MEDICINE

## 2021-12-09 ASSESSMENT — PATIENT HEALTH QUESTIONNAIRE - PHQ9
SUM OF ALL RESPONSES TO PHQ QUESTIONS 1-9: 1
2. FEELING DOWN, DEPRESSED OR HOPELESS: 0
SUM OF ALL RESPONSES TO PHQ QUESTIONS 1-9: 1
SUM OF ALL RESPONSES TO PHQ9 QUESTIONS 1 & 2: 1
1. LITTLE INTEREST OR PLEASURE IN DOING THINGS: 1
SUM OF ALL RESPONSES TO PHQ QUESTIONS 1-9: 1

## 2021-12-09 ASSESSMENT — LIFESTYLE VARIABLES: HOW OFTEN DO YOU HAVE A DRINK CONTAINING ALCOHOL: 0

## 2021-12-09 NOTE — PATIENT INSTRUCTIONS
Personalized Preventive Plan for Asya Walker - 12/9/2021  Medicare offers a range of preventive health benefits. Some of the tests and screenings are paid in full while other may be subject to a deductible, co-insurance, and/or copay. Some of these benefits include a comprehensive review of your medical history including lifestyle, illnesses that may run in your family, and various assessments and screenings as appropriate. After reviewing your medical record and screening and assessments performed today your provider may have ordered immunizations, labs, imaging, and/or referrals for you. A list of these orders (if applicable) as well as your Preventive Care list are included within your After Visit Summary for your review. Other Preventive Recommendations:    · A preventive eye exam performed by an eye specialist is recommended every 1-2 years to screen for glaucoma; cataracts, macular degeneration, and other eye disorders. · A preventive dental visit is recommended every 6 months. · Try to get at least 150 minutes of exercise per week or 10,000 steps per day on a pedometer . · Order or download the FREE \"Exercise & Physical Activity: Your Everyday Guide\" from [employer]. Call 4-329.369.5538 or search [employer] online. · You need 3778-2608 mg of calcium and 9749-6938 IU of vitamin D per day. It is possible to meet your calcium requirement with diet alone, but a vitamin D supplement is usually necessary to meet this goal.  · When exposed to the sun, use a sunscreen that protects against both UVA and UVB radiation with an SPF of 30 or greater. Reapply every 2 to 3 hours or after sweating, drying off with a towel, or swimming. · Always wear a seat belt when traveling in a car. Always wear a helmet when riding a bicycle or motorcycle.

## 2021-12-09 NOTE — PROGRESS NOTES
Medicare Annual Wellness Visit  Name: Rogelio Ferrara Date: 2021   MRN: [id number] Sex: Female   Age: 80 y.o. Ethnicity: Non- / Non    : 1934 Race: White (non-)      Zain Arroyo is here for Medicare AWV    Screenings for behavioral, psychosocial and functional/safety risks, and cognitive dysfunction are all negative except as indicated below. These results, as well as other patient data from the [address] form, are documented in Flowsheets linked to this Encounter. Allergies   Allergen Reactions    Codeine Nausea And Vomiting    Codimal-A [Brompheniramine] Other (See Comments)     Altered mental state and confusion    Lipitor Other (See Comments)     GENERALIZED ACHING, DIFFICULT TO FUNCTION    Spiriva Handihaler [Tiotropium Bromide Monohydrate]     Tudorza Pressair [Aclidinium Bromide]     Ciprofloxacin Nausea And Vomiting         Prior to Visit Medications    Medication Sig Taking? Authorizing Provider   dilTIAZem (CARDIZEM CD) 180 MG extended release capsule TAKE 1 CAPSULE BY MOUTH TWICE A DAY Yes Cande Vazquez MD   citalopram (CELEXA) 20 MG tablet Take 1 tablet by mouth daily Yes Chelo Santacruz DO   levothyroxine (SYNTHROID) 125 MCG tablet Take 1 tablet by mouth Daily Yes Chelo Santacruz DO   metoprolol succinate (TOPROL XL) 25 MG extended release tablet Take 1 tablet by mouth daily Yes Cande Vazquez MD   rivaroxaban (XARELTO) 20 MG TABS tablet Take 1 tablet by mouth daily (with breakfast) Yes Nellie De Leon MD   Christina Raid INHUB 250-50 MCG/DOSE AEPB INHALE 1 PUFF INTO THE LUNGS EVERY 12 HOURS Yes Historical Provider, MD   gabapentin (NEURONTIN) 100 MG capsule Take 1 capsule by mouth 2 times daily for 180 days.  Intended supply: 90 days Yes Chelo Santacruz DO   budesonide-formoterol (SYMBICORT) 160-4.5 MCG/ACT AERO Inhale 2 puffs into the lungs daily Yes Historical Provider, MD   simvastatin (ZOCOR) 40 MG tablet Take 40 mg by mouth nightly  Yes Historical Provider, MD   vitamin B-12 (CYANOCOBALAMIN) 1000 MCG tablet Take 6,000 mcg by mouth daily  Yes Historical Provider, MD   albuterol (PROVENTIL HFA) 108 (90 BASE) MCG/ACT inhaler Inhale 2 puffs into the lungs every 4 hours as needed for Wheezing or Shortness of Breath. Yes Sharlene Guaman MD   Cholecalciferol (VITAMIN D3) 2000 units TABS Take 1 tablet by mouth daily  Yes Historical Provider, MD   Biotin 5000 MCG CAPS Take 1 capsule by mouth daily. Yes Historical Provider, MD   Vitamin E 400 UNIT TABS Take 1 tablet by mouth daily. Yes Historical Provider, MD         Past Medical History:   Diagnosis Date    Asthma     Depression     Generalized osteoarthritis 9/12/2006    Hyperlipidemia     Knee pain     Sees Dr. Carlos Gonzalez for b/l knee pain / gel injections    Pinched nerve in neck     Thyroid disease        Past Surgical History:   Procedure Laterality Date    APPENDECTOMY  62 YEARS AGO    CARDIOVERSION  09/20/2019    Dr. Traore Saliva 1 shock 200 joules Successful    CARDIOVERSION  08/02/2021    Dr. Joseline Suero. 200J x1 converted to NSR    CHOLECYSTECTOMY      EYE SURGERY      CATARACT RIGHT AND LEFT    HYSTERECTOMY      NECK SURGERY  9/9/2011    THYROIDECTOMY  17 YEARS AGO    TONSILLECTOMY         No family history on file. CareTeam (Including outside providers/suppliers regularly involved in providing care):   Patient Care Team:  Edwin Phillips DO as PCP - General (Internal Medicine)  Edwin Phillips DO as PCP - REHABILITATION [facility] Empaneled Provider    Wt Readings from Last 3 Encounters:   12/09/21 187 lb (84.8 kg)   10/11/21 188 lb (85.3 kg)   10/04/21 185 lb (83.9 kg)     Vitals:    12/09/21 0917   BP: 120/80   Pulse: 102   Resp: 18   Temp: 97.5 °F (36.4 °C)   TempSrc: Temporal   SpO2: 96%   Weight: 187 lb (84.8 kg)   Height: 5' 8\" (1.727 m)     Body mass index is 28.43 kg/m².     Based upon direct observation of the patient, evaluation of cognition reveals recent and remote memory intact. Physical Exam:  General: Awake, alert, and oriented to person, place, time, and purpose, appears stated age and cooperative, No acute distress  Head: Normocephalic, atraumatic  Eyes: conjunctivae/corneas clear, EOM's intact. Mouth: Mucous membranes moist with no pharyngeal exudate or erythema  Neck: no JVD, no adenopathy, no carotid bruit, supple, symmetrical, trachea midline  Back: symmetric, ROM normal, No CVA tenderness. Lungs: clear to auscultation bilaterally without wheezes, rales, or rhonchi  Heart: regular rate and rhythm, S1, S2 normal, no murmur, click, rub or gallop  Abdomen: soft, non-tender; bowel sounds normal; no masses,  no organomegaly  Extremities: atraumatic, no cyanosis, no edema, 2+ pulses palpated in all 4 extremities  Skin: color, texture, turgor within normal limits. No rashes or lesions or normal  Neurologic: speech appropriate, moves all 4 extremities, normal muscle strength and tone, CN 2-12 grossly intact      Patient's complete Health Risk Assessment and screening values have been reviewed and are found in Flowsheets. The following problems were reviewed today and where indicated follow up appointments were made and/or referrals ordered. Positive Risk Factor Screenings with Interventions:            General Health and ACP:  General  In general, how would you say your health is?: Fair  In the past 7 days, have you experienced any of the following?  New or Increased Pain, New or Increased Fatigue, Loneliness, Social Isolation, Stress or Anger?: (!) New or Increased Pain (B/L knee pain / sees Dr. Elana Farmer for gel injections / recently had 3 inject.)  Do you get the social and emotional support that you need?: Yes  Do you have a Living Will?: Yes  Advance Directives     Power of  Living Will ACP-Advance Directive ACP-Power of     Not on File Not on File Not on File Not on File      General Health Risk Interventions:  · Pain issues: patient declines any further evaluation/treatment for this issue    Health Habits/Nutrition:  Health Habits/Nutrition  Do you exercise for at least 20 minutes 2-3 times per week?: Yes  Have you lost any weight without trying in the past 3 months?: (!) Yes  Do you eat only one meal per day?: No  Have you seen the dentist within the past year?: N/A - wear dentures  Body mass index: (!) 28.43  Health Habits/Nutrition Interventions:  · Nutritional issues:  patient is not ready to address his/her nutritional/weight issues at this time     Safety:  Safety  Do you have working smoke detectors?: Yes  Have all throw rugs been removed or fastened?: (!) No  Do you have non-slip mats or surfaces in all bathtubs/showers?: Yes  Do all of your stairways have a railing or banister?: Yes  Are your doorways, halls and stairs free of clutter?: Yes  Do you always fasten your seatbelt when you are in a car?: Yes  Safety Interventions:  · Patient declines any further evaluation/treatment for this issue     Personalized Preventive Plan   Current Health Maintenance Status  Immunization History   Administered Date(s) Administered    COVID-19, Magan Dudley, Primary or Immunocompromised, PF, 100mcg/0.5mL 08/21/2021, 09/18/2021    Tdap (Boostrix, Adacel) 06/10/2021        Health Maintenance   Topic Date Due    Shingles Vaccine (1 of 2) Never done    Pneumococcal 65+ years Vaccine (1 of 1 - PPSV23) Never done   [employer] Visit (AWV)  Never done    Flu vaccine (1) 12/09/2022 (Originally 9/1/2021)    COVID-19 Vaccine (3 - Booster for Moderna series) 03/18/2022    Lipid screen  08/06/2022    TSH testing  08/06/2022    DTaP/Tdap/Td vaccine (2 - Td or Tdap) 06/10/2031    Hepatitis A vaccine  Aged Out    Hepatitis B vaccine  Aged Out    Hib vaccine  Aged Out    Meningococcal (ACWY) vaccine  Aged Out     Recommendations for [employer] Due: see orders and patient instructions/AVS.  .   Recommended screening schedule for the next 5-10 years is provided to the patient in written form: see Patient Instructions/AVS.    Khushi Limon was seen today for medicare awv.     Diagnoses and all orders for this visit:    Routine general medical examination at a health care facility    Persistent [facility] ([facility]    Hyperlipidemia, unspecified hyperlipidemia type    Vitamin D deficiency    Mild intermittent asthma without complication         Patient refused a flu shot today    Morgan Gaspar DO  12/9/2021  2:20 PM

## 2021-12-20 ENCOUNTER — OFFICE VISIT (OUTPATIENT)
Dept: FAMILY MEDICINE CLINIC | Age: 86
End: 2021-12-20
Payer: MEDICARE

## 2021-12-20 VITALS — WEIGHT: 187 LBS | HEIGHT: 68 IN | BODY MASS INDEX: 28.34 KG/M2

## 2021-12-20 DIAGNOSIS — M25.562 ACUTE PAIN OF LEFT KNEE: Primary | ICD-10-CM

## 2021-12-20 PROCEDURE — G8484 FLU IMMUNIZE NO ADMIN: HCPCS | Performed by: INTERNAL MEDICINE

## 2021-12-20 PROCEDURE — 1036F TOBACCO NON-USER: CPT | Performed by: INTERNAL MEDICINE

## 2021-12-20 PROCEDURE — 99213 OFFICE O/P EST LOW 20 MIN: CPT | Performed by: INTERNAL MEDICINE

## 2021-12-20 PROCEDURE — G8417 CALC BMI ABV UP PARAM F/U: HCPCS | Performed by: INTERNAL MEDICINE

## 2021-12-20 PROCEDURE — 1090F PRES/ABSN URINE INCON ASSESS: CPT | Performed by: INTERNAL MEDICINE

## 2021-12-20 PROCEDURE — 96372 THER/PROPH/DIAG INJ SC/IM: CPT | Performed by: INTERNAL MEDICINE

## 2021-12-20 PROCEDURE — G8427 DOCREV CUR MEDS BY ELIG CLIN: HCPCS | Performed by: INTERNAL MEDICINE

## 2021-12-20 PROCEDURE — 4040F PNEUMOC VAC/ADMIN/RCVD: CPT | Performed by: INTERNAL MEDICINE

## 2021-12-20 PROCEDURE — 1123F ACP DISCUSS/DSCN MKR DOCD: CPT | Performed by: INTERNAL MEDICINE

## 2021-12-20 RX ORDER — KETOROLAC TROMETHAMINE 30 MG/ML
30 INJECTION, SOLUTION INTRAMUSCULAR; INTRAVENOUS ONCE
Status: COMPLETED | OUTPATIENT
Start: 2021-12-20 | End: 2021-12-20

## 2021-12-20 RX ADMIN — KETOROLAC TROMETHAMINE 30 MG: 30 INJECTION, SOLUTION INTRAMUSCULAR; INTRAVENOUS at 12:16

## 2021-12-20 NOTE — PROGRESS NOTES
Chief Complaint:   Knee Pain (Otside of left knee. She rolled over in bed this morning and had a shooting pain in it. It is swollen.)      History of Present Illness   Source of history provided by:  patient. Norman Snow is a 80 y.o. old female who has a past medical history of:   Past Medical History:   Diagnosis Date    Asthma     Depression     Generalized osteoarthritis 9/12/2006    Hyperlipidemia     Knee pain     Sees Dr. Jean Cagle for b/l knee pain / gel injections    Pinched nerve in neck     Thyroid disease     Presents to the walk in clinic for left knee pain that began acutely this morning. States the pain is located over the latereral aspect and does not radiate. States the pain is progressive. Reports that the pain began acutely after rolling over in bed this morning. She reports that her foot was likely caught in the blanket and when rolling over the knee shifted and immediately pain ensued. Reports associated swelling and moderate pain with ROM. Pain is also exacerbated by ambulation. Denies any weakness, paresthesias, calf pain/edema, foot/ankle pain, hip pain, back pain, abrasions, fever, chills, rash, or any other symptoms. There has been a history or prior knee problems and she is following with Dr. Christopher Crisostomo for knee injections for arthritis. Denies any history of previous knee surgery. ROS    Unless otherwise stated in this report or unable to obtain because of the patient's clinical or mental status as evidenced by the medical record, this patients's positive and negative responses for Review of Systems, constitutional, psych, eyes, ENT, cardiovascular, respiratory, gastrointestinal, neurological, genitourinary, musculoskeletal, integument systems and systems related to the presenting problem are either stated in the preceding or were not pertinent or were negative for the symptoms and/or complaints related to the medical problem.elvia.     Past Medical History:   Past Surgical History:   Procedure Laterality Date    APPENDECTOMY  62 YEARS AGO    CARDIOVERSION  09/20/2019    Dr. Ismael Carlson 1 shock 200 joules Successful    CARDIOVERSION  08/02/2021    Dr. Aftab Saunders. 200J x1 converted to NSR    CHOLECYSTECTOMY      EYE SURGERY      CATARACT RIGHT AND LEFT    HYSTERECTOMY      NECK SURGERY  9/9/2011    THYROIDECTOMY  17 YEARS AGO    TONSILLECTOMY       Social History:  reports that she quit smoking about 27 years ago. Her smoking use included cigarettes. She has never used smokeless tobacco. She reports that she does not drink alcohol and does not use drugs. Family History: family history is not on file. Allergies: Codeine, Codimal-a [brompheniramine], Lipitor, Spiriva handihaler [tiotropium bromide monohydrate], Tudorza pressair [aclidinium bromide], and Ciprofloxacin    Physical Exam         VS:  Ht 5' 8\" (1.727 m)   Wt 187 lb (84.8 kg)   BMI 28.43 kg/m²     Oxygen Saturation Interpretation: Normal.    Constitutional:  Alert, development consistent with age. Lungs: CTAB without wheezing, rales, or rhonchi. CV: RRR without pathologic murmurs or gallops. Knee: Inspection: left knee without obvious deformity. Tenderness:  Severe TTP over left lateral knee. Swelling/Effusion: mild edema noted over lateral aspect. Deformity: No obvious deformity. ROM: ROM 0º-120º with moderate discomfort. Skin:  no bruising noted. No abrasions, rashes, or erythema noted. Drawer:  Unable to perform due to pain level. Kojo's:  Unable to perform due to pain level. Valgus/Varus Stress:  Unable to perform due to pain level. Crepitus: no crepitus noted with flexion and extension. Hip:            Tenderness:  No TTP.              ROM: FROM hip without pain. Joint(s) Below: unremarkable calf/ankle/foot                Tenderness:  No TTP over calf, ankle, or foot. No edema noted.  Negative Gabriel's

## 2021-12-23 ENCOUNTER — TELEPHONE (OUTPATIENT)
Dept: FAMILY MEDICINE CLINIC | Age: 86
End: 2021-12-23

## 2021-12-23 NOTE — TELEPHONE ENCOUNTER
X-ray results received. Per Dr. Claire Becerra, x-ray showed arthritis and mild bone spurs. No dislocation. She should contact her ortho doctor and proceed with the MRI if pain is still severe. I spoke with patient. She stated that whatever shot she received while she was here, worked wonders. The following day she was 100% better. She will not be getting the MRI at this time.

## 2022-01-04 VITALS
SYSTOLIC BLOOD PRESSURE: 114 MMHG | DIASTOLIC BLOOD PRESSURE: 64 MMHG | WEIGHT: 195 LBS | HEIGHT: 67 IN | OXYGEN SATURATION: 97 % | BODY MASS INDEX: 30.61 KG/M2 | HEART RATE: 109 BPM | RESPIRATION RATE: 18 BRPM

## 2022-01-06 ENCOUNTER — APPOINTMENT (OUTPATIENT)
Dept: GENERAL RADIOLOGY | Age: 87
End: 2022-01-06
Payer: MEDICARE

## 2022-01-06 ENCOUNTER — HOSPITAL ENCOUNTER (EMERGENCY)
Age: 87
Discharge: HOME | End: 2022-01-06
Payer: MEDICARE

## 2022-01-06 VITALS
HEART RATE: 127 BPM | OXYGEN SATURATION: 96 % | RESPIRATION RATE: 20 BRPM | HEIGHT: 66 IN | DIASTOLIC BLOOD PRESSURE: 67 MMHG | TEMPERATURE: 97.4 F | SYSTOLIC BLOOD PRESSURE: 107 MMHG | BODY MASS INDEX: 28.93 KG/M2 | WEIGHT: 180 LBS

## 2022-01-06 DIAGNOSIS — J01.40 ACUTE PANSINUSITIS, RECURRENCE NOT SPECIFIED: Primary | ICD-10-CM

## 2022-01-06 LAB — SARS-COV-2, NAAT: NOT DETECTED

## 2022-01-06 PROCEDURE — 87635 SARS-COV-2 COVID-19 AMP PRB: CPT

## 2022-01-06 PROCEDURE — 99212 OFFICE O/P EST SF 10 MIN: CPT

## 2022-01-06 PROCEDURE — 71046 X-RAY EXAM CHEST 2 VIEWS: CPT

## 2022-01-06 RX ORDER — AMOXICILLIN AND CLAVULANATE POTASSIUM 875; 125 MG/1; MG/1
1 TABLET, FILM COATED ORAL 2 TIMES DAILY
Qty: 20 TABLET | Refills: 0 | Status: SHIPPED | OUTPATIENT
Start: 2022-01-06 | End: 2022-01-16

## 2022-01-06 NOTE — ED PROVIDER NOTES
[address] [facility]  Department of Emergency Medicine   ED  Encounter Note  Admit Date/RoomTime: 2022  3:39 PM  ED Room:   NAME: Romeo Jeff  : 1934  MRN: [id number]     Chief Complaint:  Sinusitis (started on  with sinus infection)    HISTORY OF PRESENT ILLNESS        Romeo Jeff is a 80 y.o. female who presents to the ED with complaint of sinus infection. Patient states for 4 to 5 days now she has just been totally congested up in the sinuses. A lot of nasal drainage. Coughing. Patient denies fevers or chills. Denies loss of taste or loss of smell. Patient denies shortness of breath, but daughter at bedside shakes her head yes that she has been short of breath. Patient denies any nausea, vomiting or diarrhea. Heart rate is 127 in the door. Both daughter and patient states that she has A. fib and it is normally all over the place. She is not having any chest pain. She is not concerned about her heart at all and only wants me to fix her sinuses. Patient has not been tested for Covid. She states she is vaccinated, so she cannot have it. Symptoms are moderate in severity. Daughter has given her a Tylenol Cold and sinus and Robitussin which has helped a little bit. ROS   Pertinent positives and negatives are stated within HPI, all other systems reviewed and are negative. Past Medical History:  has a past medical history of Asthma, Depression, Generalized osteoarthritis, Hyperlipidemia, Knee pain, Pinched nerve in neck, Thyroid disease, and Vitamin D deficiency. Surgical History:  has a past surgical history that includes Tonsillectomy; Hysterectomy; Thyroidectomy (17 YEARS AGO); Appendectomy (58 YEARS AGO); eye surgery; Neck surgery (2011); Cholecystectomy; Cardioversion (2019); and Cardioversion (2021). Social History:  reports that she quit smoking about 27 years ago. Her smoking use included cigarettes.  She has never used smokeless tobacco. She reports that she does not drink alcohol and does not use drugs. Family History: family history is not on file. Allergies: Codeine, Codimal-a [brompheniramine], Lipitor, Spiriva handihaler [tiotropium bromide monohydrate], Tudorza pressair [aclidinium bromide], and Ciprofloxacin    PHYSICAL EXAM   Oxygen Saturation Interpretation: Normal on room air analysis. ED Triage Vitals [01/06/22 1540]   BP Temp Temp Source Pulse Resp SpO2 Height Weight   107/67 97.4 °F (36.3 °C) Infrared 127 20 96 % 5' 6\" (1.676 m) 180 lb (81.6 kg)       General:  NAD. Alert and Oriented. Well-appearing. Skin:  Warm, dry. No rashes. Head:  Normocephalic. Atraumatic. Eyes:  EOMI. Conjunctiva normal.  ENT:  Oral mucosa moist.  Airway patent. Posterior pharynx without erythema, without swelling, without exudate. Uvula is midline with equal rise. Copious clear postnasal drainage. Nasal turbinates significantly swollen with clear drainage. Neck:  Supple. Normal ROM. Respiratory:  No respiratory distress. No labored breathing. Lungs clear without rales, rhonchi or wheezing. Oxygen saturation fluctuated from 94 to 97%. Cardiovascular: Irregular rate. No Murmur. No peripheral edema. Extremities warm and good color. Did pop her on the pulse ox. She went anywhere from the 90s to 133 with her heart rate. Extremities:  Normal ROM. Nontender to palpation. Atraumatic. Back:  Normal ROM. Nontender to palpation. Neuro:  Alert and Oriented to person, place, time and situation. Normal LOC. Moves all extremities. Speech fluent. Psych:  Calm and Cooperative. Normal thought process. Normal judgement.         Lab / Imaging Results   (All laboratory and radiology results have been personally reviewed by myself)  Labs:  Results for orders placed or performed during the hospital encounter of 01/06/22   COVID-19, Rapid    Specimen: Nasopharyngeal Swab   Result Value Ref Range    SARS-CoV-2, NAAT Not Detected Not Detected     Imaging: All Radiology results interpreted by Radiologist unless otherwise noted. XR CHEST (2 VW)   Final Result   No acute cardiopulmonary process. ED Course / Medical Decision Making   Medications - No data to display     Re-examination:  1/6/22       Time:   Patients condition . Consult(s):   None    Procedure(s):   none    MDM:   Covid is negative. Chest x-ray is clear. I again discussed with them the A. fib. They are aware and states she is in and out of it all the time. She is on Xarelto. They do understand she has a risk factor for stroke. Their only concern right now is her sinuses. Based on the severity of her complaints. Her comorbidities. I will cover her on an antibiotic. I discussed with them over-the-counter Coricidin which should be safer with her history of A. fib then something with pseudoephedrine in it. Plan of Care/Counseling:  Physician Assistant on duty reviewed today's visit with the patient in addition to providing specific details for the plan of care and counseling regarding the diagnosis and prognosis. Questions are answered at this time and are agreeable with the plan. ASSESSMENT     1. Acute pansinusitis, recurrence not specified New Problem     PLAN   Discharged home. Patient condition is good    New Medications     New Prescriptions    AMOXICILLIN-CLAVULANATE (AUGMENTIN) 875-125 MG PER TABLET    Take 1 tablet by mouth 2 times daily for 10 days     Electronically signed by EV Hatfield   DD: 1/6/22  **This report was transcribed using voice recognition software. Every effort was made to ensure accuracy; however, inadvertent computerized transcription errors may be present.   END OF ED PROVIDER NOTE       Gordon Hatfield  01/06/22 [id number]

## 2022-01-06 NOTE — Clinical Note
Anisha Walker accompanied Kushal Coleman to the emergency department on 1/6/2022. They may return to work on 01/07/2022. If you have any questions or concerns, please don't hesitate to call.       Gordon Viera

## 2022-01-27 ENCOUNTER — APPOINTMENT (OUTPATIENT)
Dept: GENERAL RADIOLOGY | Age: 87
End: 2022-01-27
Payer: MEDICARE

## 2022-01-27 ENCOUNTER — HOSPITAL ENCOUNTER (EMERGENCY)
Age: 87
Discharge: HOME | End: 2022-01-27
Attending: STUDENT IN AN ORGANIZED HEALTH CARE EDUCATION/TRAINING PROGRAM
Payer: MEDICARE

## 2022-01-27 ENCOUNTER — APPOINTMENT (OUTPATIENT)
Dept: CT IMAGING | Age: 87
End: 2022-01-27
Payer: MEDICARE

## 2022-01-27 ENCOUNTER — OFFICE VISIT (OUTPATIENT)
Dept: FAMILY MEDICINE CLINIC | Age: 87
End: 2022-01-27
Payer: MEDICARE

## 2022-01-27 VITALS
DIASTOLIC BLOOD PRESSURE: 71 MMHG | WEIGHT: 169 LBS | BODY MASS INDEX: 25.61 KG/M2 | HEIGHT: 68 IN | OXYGEN SATURATION: 95 % | TEMPERATURE: 97.9 F | SYSTOLIC BLOOD PRESSURE: 113 MMHG | RESPIRATION RATE: 18 BRPM | HEART RATE: 114 BPM

## 2022-01-27 VITALS
HEIGHT: 66 IN | HEART RATE: 74 BPM | DIASTOLIC BLOOD PRESSURE: 60 MMHG | SYSTOLIC BLOOD PRESSURE: 112 MMHG | OXYGEN SATURATION: 95 % | TEMPERATURE: 97.2 F | BODY MASS INDEX: 28.93 KG/M2 | WEIGHT: 180 LBS | RESPIRATION RATE: 18 BRPM

## 2022-01-27 DIAGNOSIS — R31.9 HEMATURIA, UNSPECIFIED TYPE: ICD-10-CM

## 2022-01-27 DIAGNOSIS — R35.0 URINARY FREQUENCY: ICD-10-CM

## 2022-01-27 DIAGNOSIS — U07.1 COVID: Primary | ICD-10-CM

## 2022-01-27 DIAGNOSIS — R53.83 FATIGUE, UNSPECIFIED TYPE: ICD-10-CM

## 2022-01-27 DIAGNOSIS — J01.90 ACUTE SINUSITIS, RECURRENCE NOT SPECIFIED, UNSPECIFIED LOCATION: ICD-10-CM

## 2022-01-27 LAB
ALBUMIN SERPL-MCNC: 3.8 G/DL (ref 3.5–5.2)
ALP BLD-CCNC: 97 U/L (ref 35–104)
ALT SERPL-CCNC: 19 U/L (ref 0–32)
ANION GAP SERPL CALCULATED.3IONS-SCNC: 11 MMOL/L (ref 7–16)
AST SERPL-CCNC: 26 U/L (ref 0–31)
BASOPHILS ABSOLUTE: 0.03 E9/L (ref 0–0.2)
BASOPHILS RELATIVE PERCENT: 0.4 % (ref 0–2)
BILIRUB SERPL-MCNC: 0.3 MG/DL (ref 0–1.2)
BILIRUBIN, POC: NORMAL
BLOOD URINE, POC: NORMAL
BUN BLDV-MCNC: 28 MG/DL (ref 6–23)
CALCIUM SERPL-MCNC: 9.4 MG/DL (ref 8.6–10.2)
CHLORIDE BLD-SCNC: 100 MMOL/L (ref 98–107)
CLARITY, POC: NORMAL
CO2: 25 MMOL/L (ref 22–29)
COLOR, POC: YELLOW
CREAT SERPL-MCNC: 1.1 MG/DL (ref 0.5–1)
EOSINOPHILS ABSOLUTE: 0.12 E9/L (ref 0.05–0.5)
EOSINOPHILS RELATIVE PERCENT: 1.4 % (ref 0–6)
GFR AFRICAN AMERICAN: 57
GFR NON-AFRICAN AMERICAN: 47 ML/MIN/1.73
GLUCOSE BLD-MCNC: 93 MG/DL (ref 74–99)
GLUCOSE URINE, POC: NEGATIVE
HCT VFR BLD CALC: 49.6 % (ref 34–48)
HEMOGLOBIN: 15.2 G/DL (ref 11.5–15.5)
IMMATURE GRANULOCYTES #: 0.02 E9/L
IMMATURE GRANULOCYTES %: 0.2 % (ref 0–5)
KETONES, POC: NORMAL
LACTIC ACID, SEPSIS: 2.1 MMOL/L (ref 0.5–1.9)
LEUKOCYTE EST, POC: NEGATIVE
LYMPHOCYTES ABSOLUTE: 0.94 E9/L (ref 1.5–4)
LYMPHOCYTES RELATIVE PERCENT: 11.3 % (ref 20–42)
Lab: ABNORMAL
MCH RBC QN AUTO: 25.7 PG (ref 26–35)
MCHC RBC AUTO-ENTMCNC: 30.6 % (ref 32–34.5)
MCV RBC AUTO: 83.9 FL (ref 80–99.9)
MONOCYTES ABSOLUTE: 0.74 E9/L (ref 0.1–0.95)
MONOCYTES RELATIVE PERCENT: 8.9 % (ref 2–12)
NEUTROPHILS ABSOLUTE: 6.47 E9/L (ref 1.8–7.3)
NEUTROPHILS RELATIVE PERCENT: 77.8 % (ref 43–80)
NITRITE, POC: NORMAL
PDW BLD-RTO: 16.3 FL (ref 11.5–15)
PERFORMING INSTRUMENT: ABNORMAL
PH, POC: 5.5
PLATELET # BLD: 177 E9/L (ref 130–450)
PMV BLD AUTO: 12.4 FL (ref 7–12)
POTASSIUM REFLEX MAGNESIUM: 3.6 MMOL/L (ref 3.5–5)
PROTEIN, POC: 30
QC PASS/FAIL: ABNORMAL
RBC # BLD: 5.91 E12/L (ref 3.5–5.5)
REASON FOR REJECTION: NORMAL
REJECTED TEST: NORMAL
SARS-COV-2, POC: DETECTED
SODIUM BLD-SCNC: 136 MMOL/L (ref 132–146)
SPECIFIC GRAVITY, POC: 1.02
TOTAL PROTEIN: 6.5 G/DL (ref 6.4–8.3)
TROPONIN, HIGH SENSITIVITY: 16 NG/L (ref 0–9)
UROBILINOGEN, POC: 0.2
WBC # BLD: 8.3 E9/L (ref 4.5–11.5)

## 2022-01-27 PROCEDURE — 85025 COMPLETE CBC W/AUTO DIFF WBC: CPT

## 2022-01-27 PROCEDURE — 99214 OFFICE O/P EST MOD 30 MIN: CPT | Performed by: STUDENT IN AN ORGANIZED HEALTH CARE EDUCATION/TRAINING PROGRAM

## 2022-01-27 PROCEDURE — 1090F PRES/ABSN URINE INCON ASSESS: CPT | Performed by: STUDENT IN AN ORGANIZED HEALTH CARE EDUCATION/TRAINING PROGRAM

## 2022-01-27 PROCEDURE — 87040 BLOOD CULTURE FOR BACTERIA: CPT

## 2022-01-27 PROCEDURE — 70450 CT HEAD/BRAIN W/O DYE: CPT

## 2022-01-27 PROCEDURE — 99283 EMERGENCY DEPT VISIT LOW MDM: CPT

## 2022-01-27 PROCEDURE — 74176 CT ABD & PELVIS W/O CONTRAST: CPT

## 2022-01-27 PROCEDURE — 71045 X-RAY EXAM CHEST 1 VIEW: CPT

## 2022-01-27 PROCEDURE — 93005 ELECTROCARDIOGRAM TRACING: CPT | Performed by: STUDENT IN AN ORGANIZED HEALTH CARE EDUCATION/TRAINING PROGRAM

## 2022-01-27 PROCEDURE — G8484 FLU IMMUNIZE NO ADMIN: HCPCS | Performed by: STUDENT IN AN ORGANIZED HEALTH CARE EDUCATION/TRAINING PROGRAM

## 2022-01-27 PROCEDURE — G8417 CALC BMI ABV UP PARAM F/U: HCPCS | Performed by: STUDENT IN AN ORGANIZED HEALTH CARE EDUCATION/TRAINING PROGRAM

## 2022-01-27 PROCEDURE — 36415 COLL VENOUS BLD VENIPUNCTURE: CPT

## 2022-01-27 PROCEDURE — 83605 ASSAY OF LACTIC ACID: CPT

## 2022-01-27 PROCEDURE — 87426 SARSCOV CORONAVIRUS AG IA: CPT | Performed by: STUDENT IN AN ORGANIZED HEALTH CARE EDUCATION/TRAINING PROGRAM

## 2022-01-27 PROCEDURE — 1036F TOBACCO NON-USER: CPT | Performed by: STUDENT IN AN ORGANIZED HEALTH CARE EDUCATION/TRAINING PROGRAM

## 2022-01-27 PROCEDURE — 4040F PNEUMOC VAC/ADMIN/RCVD: CPT | Performed by: STUDENT IN AN ORGANIZED HEALTH CARE EDUCATION/TRAINING PROGRAM

## 2022-01-27 PROCEDURE — G8427 DOCREV CUR MEDS BY ELIG CLIN: HCPCS | Performed by: STUDENT IN AN ORGANIZED HEALTH CARE EDUCATION/TRAINING PROGRAM

## 2022-01-27 PROCEDURE — 81002 URINALYSIS NONAUTO W/O SCOPE: CPT | Performed by: STUDENT IN AN ORGANIZED HEALTH CARE EDUCATION/TRAINING PROGRAM

## 2022-01-27 PROCEDURE — 80053 COMPREHEN METABOLIC PANEL: CPT

## 2022-01-27 PROCEDURE — 2580000003 HC RX 258: Performed by: STUDENT IN AN ORGANIZED HEALTH CARE EDUCATION/TRAINING PROGRAM

## 2022-01-27 PROCEDURE — 1123F ACP DISCUSS/DSCN MKR DOCD: CPT | Performed by: STUDENT IN AN ORGANIZED HEALTH CARE EDUCATION/TRAINING PROGRAM

## 2022-01-27 PROCEDURE — 84484 ASSAY OF TROPONIN QUANT: CPT

## 2022-01-27 RX ORDER — ALBUTEROL SULFATE 90 UG/1
2 AEROSOL, METERED RESPIRATORY (INHALATION) EVERY 4 HOURS PRN
Qty: 1 EACH | Refills: 1 | Status: SHIPPED | OUTPATIENT
Start: 2022-01-27

## 2022-01-27 RX ORDER — 0.9 % SODIUM CHLORIDE 0.9 %
500 INTRAVENOUS SOLUTION INTRAVENOUS ONCE
Status: DISCONTINUED | OUTPATIENT
Start: 2022-01-27 | End: 2022-01-27 | Stop reason: HOSPADM

## 2022-01-27 RX ORDER — 0.9 % SODIUM CHLORIDE 0.9 %
1000 INTRAVENOUS SOLUTION INTRAVENOUS ONCE
Status: COMPLETED | OUTPATIENT
Start: 2022-01-27 | End: 2022-01-27

## 2022-01-27 RX ORDER — AMOXICILLIN AND CLAVULANATE POTASSIUM 875; 125 MG/1; MG/1
1 TABLET, FILM COATED ORAL 2 TIMES DAILY
Qty: 20 TABLET | Refills: 0 | Status: SHIPPED | OUTPATIENT
Start: 2022-01-27 | End: 2022-02-06

## 2022-01-27 RX ADMIN — SODIUM CHLORIDE 1000 ML: 9 INJECTION, SOLUTION INTRAVENOUS at 17:41

## 2022-01-27 NOTE — ED NOTES
Bed: 04  Expected date:   Expected time:   Means of arrival:   Comments:  terminal     Jose Machado RN  01/27/22 2713

## 2022-01-27 NOTE — ED NOTES
Bed: 09  Expected date:   Expected time:   Means of arrival:   Comments:  Sarthak Mccann RN  01/27/22 1640

## 2022-01-27 NOTE — PROGRESS NOTES
Urgent Care      Patient:  Ignacio Cartwright 80 y.o. female     Date of Service: 11/12/21      Chief complaint:   Chief Complaint   Patient presents with    Headache    Extremity Weakness           History ofPresent Illness   The patient is a 80 y.o. female  presented to the clinic with complaints as above. Headache  -new issue  -started yesterday  -noticed some hearing loss  -having some fatigue, extremity weakness  -is having some ear pain   -treated for sinus infection several weeks ago, stopped taking after 5 days, however restarted taking this yesterday  -feeling very fatigued, thinks she is currently in a fib   -denies any chest pain  -has baseline SOB, seems to be worsening somewhat   -coughing more frequently, somewhat increased SOB     Past Medical History:      Diagnosis Date    Asthma     Depression     Generalized osteoarthritis 9/12/2006    Hyperlipidemia     Knee pain     Sees Dr. Kandy Pacheco for b/l knee pain / gel injections    Pinched nerve in neck     Thyroid disease     Vitamin D deficiency        PastSurgical History:        Procedure Laterality Date    APPENDECTOMY  62 YEARS AGO    CARDIOVERSION  09/20/2019    Dr. Shana Evans 1 shock 200 joules Successful    CARDIOVERSION  08/02/2021    Dr. Diane Romano. 200J x1 converted to NSR    CHOLECYSTECTOMY      EYE SURGERY      CATARACT RIGHT AND LEFT    HYSTERECTOMY      NECK SURGERY  9/9/2011    THYROIDECTOMY  17 YEARS AGO    TONSILLECTOMY         Allergies:    Codeine, Codimal-a [brompheniramine], Lipitor, Spiriva handihaler [tiotropium bromide monohydrate], Tudorza pressair [aclidinium bromide], and Ciprofloxacin    Social History:   Social History     Socioeconomic History    Marital status:       Spouse name: Not on file    Number of children: Not on file    Years of education: Not on file    Highest education level: Not on file   Occupational History    Not on file   Tobacco Use    Smoking status: Former Smoker     Types: Cigarettes Quit date: 1994     Years since quittin.3    Smokeless tobacco: Never Used   Vaping Use    Vaping Use: Never used   Substance and Sexual Activity    Alcohol use: No     Comment: RARE    Drug use: No    Sexual activity: Not on file   Other Topics Concern    Not on file   Social History Narrative    Not on file     Social Determinants of Health     Financial Resource Strain: Low Risk     Difficulty of Paying Living Expenses: Not hard at all   Food Insecurity: No Food Insecurity    Worried About Running Out of Food in the Last Year: Never true    Mora of Food in the Last Year: Never true   Transportation Needs:     Lack of Transportation (Medical): Not on file    Lack of Transportation (Non-Medical): Not on file   Physical Activity:     Days of Exercise per Week: Not on file    Minutes of Exercise per Session: Not on file   Stress:     Feeling of Stress : Not on file   Social Connections:     Frequency of Communication with Friends and Family: Not on file    Frequency of Social Gatherings with Friends and Family: Not on file    Attends [employer] Services: Not on file    Active Member of [address] [place] [address] or Organizations: Not on file    Attends Club or Organization Meetings: Not on file    Marital Status: Not on file   Intimate Partner Violence:     Fear of Current or Ex-Partner: Not on file    Emotionally Abused: Not on file    Physically Abused: Not on file    Sexually Abused: Not on file   Housing Stability:     Unable to Pay for Housing in the Last Year: Not on file    Number of [place] in the Last Year: Not on file    Unstable Housing in the Last Year: Not on file        Family History:   No family history on file.     Review of Systems:   Review of Systems - as above     Physical Exam   Vitals: /60   Pulse 74   Temp 97.2 °F (36.2 °C) (Infrared)   Resp 18   Ht 5' 6\" (1.676 m)   Wt 180 lb (81.6 kg)   SpO2 95%   BMI 29.05 kg/m²   Physical Exam  Constitutional: Appearance: She is well-developed. She is ill-appearing. HENT:      Head: Normocephalic. Cardiovascular:      Rate and Rhythm: Normal rate. Rhythm irregular. Heart sounds: Normal heart sounds. No murmur heard. Pulmonary:      Effort: Pulmonary effort is normal. No respiratory distress. Breath sounds: No wheezing. Comments: Crackles throughout all lung fields  Abdominal:      General: Bowel sounds are normal.      Palpations: Abdomen is soft. Tenderness: There is abdominal tenderness (suprapubic). Comments: lloyds punch negative bilaterally    Musculoskeletal:         General: No tenderness. Normal range of motion. Comments: Unable to ambulate more than 20 steps, while ambulating was off balance   Skin:     General: Skin is warm and dry. Neurological:      Mental Status: She is alert and oriented to person, place, and time. Motor: Weakness present. Psychiatric:         Behavior: Behavior normal.           Assessment and Plan       1. COVID  New issue  -Rapid COVID positive, given patient age and appearance, concerns she will not be safe at home (high fall risk, concerns for dehydration, increased risk of bleed given on xarelto), given these concerns, advised patient and her daughter that she should go directly to the emergency department for further work up/possible admission  -Patient and daughter agreeable to this  -ED informed of patient      2. Urinary frequency  New issue  -Has hematuria and with abdominal findings, concerns for kidney stone vs other, may need further worked up in ED with lab work (BMP and CBC? Possible imaging? ? )  -ED updated of this also   - POCT Urinalysis no Micro  - Culture, Urine; Future    3. Fatigue, unspecified type  As above  - POCT COVID-19, Antigen    4. Hematuria, unspecified type  As above       Counseled regarding above diagnosis, including possible risks and complications,  especially if left uncontrolled.  Counseled regarding the possible side effects, risks, benefits and alternatives to treatment;patient and/or guardian verbalizes understanding, agrees, feels comfortable with and wishes to proceed with above treatment plan. Call or go to [address] if symptoms worsen or persist. Advised patient to call with any new medication issues, and, as applicable, read all Rx info from pharmacy to assure aware of all possible risks and side effects of medicationbefore taking. Patient and/or guardian given opportunity to ask questions/raise concerns. The patient verbalized comfort and understanding ofinstructions. I encourage further reading and education about your health conditions. Information on many health conditions is provided by [facility] [employer]: [email or web address]  Please bring any questions to me at your nextvisit. Return to Office: Return if symptoms worsen or fail to improve. Medication List:    Current Outpatient Medications   Medication Sig Dispense Refill    dilTIAZem (CARDIZEM CD) 180 MG extended release capsule TAKE 1 CAPSULE BY MOUTH TWICE A  capsule 1    citalopram (CELEXA) 20 MG tablet Take 1 tablet by mouth daily 30 tablet 3    levothyroxine (SYNTHROID) 125 MCG tablet Take 1 tablet by mouth Daily 90 tablet 1    metoprolol succinate (TOPROL XL) 25 MG extended release tablet Take 1 tablet by mouth daily 30 tablet 3    rivaroxaban (XARELTO) 20 MG TABS tablet Take 1 tablet by mouth daily (with breakfast) 30 tablet 1    WIXELA INHUB 250-50 MCG/DOSE AEPB INHALE 1 PUFF INTO THE LUNGS EVERY 12 HOURS      gabapentin (NEURONTIN) 100 MG capsule Take 1 capsule by mouth 2 times daily for 180 days.  Intended supply: 90 days 180 capsule 0    budesonide-formoterol (SYMBICORT) 160-4.5 MCG/ACT AERO Inhale 2 puffs into the lungs daily       simvastatin (ZOCOR) 40 MG tablet Take 40 mg by mouth nightly   3    vitamin B-12 (CYANOCOBALAMIN) 1000 MCG tablet Take 6,000 mcg by mouth daily  albuterol (PROVENTIL HFA) 108 (90 BASE) MCG/ACT inhaler Inhale 2 puffs into the lungs every 4 hours as needed for Wheezing or Shortness of Breath. 1 Inhaler 0    Cholecalciferol (VITAMIN D3) 2000 units TABS Take 1 tablet by mouth daily       Biotin 5000 MCG CAPS Take 1 capsule by mouth daily.  Vitamin E 400 UNIT TABS Take 1 tablet by mouth daily. No current facility-administered medications for this visit. Martha Etienne,        This document may have been prepared at least partially through the use of voice recognition software. Although effort is taken to assure the accuracy ofthis document, it is possible that grammatical, syntax,  or spelling errors may occur.

## 2022-01-28 ENCOUNTER — CARE COORDINATION (OUTPATIENT)
Dept: CARE COORDINATION | Age: 87
End: 2022-01-28

## 2022-01-28 LAB
EKG ATRIAL RATE: 375 BPM
EKG ATRIAL RATE: 375 BPM
EKG Q-T INTERVAL: 316 MS
EKG Q-T INTERVAL: 316 MS
EKG QRS DURATION: 64 MS
EKG QRS DURATION: 64 MS
EKG QTC CALCULATION (BAZETT): 411 MS
EKG QTC CALCULATION (BAZETT): 411 MS
EKG R AXIS: -10 DEGREES
EKG R AXIS: -10 DEGREES
EKG T AXIS: -167 DEGREES
EKG T AXIS: -167 DEGREES
EKG VENTRICULAR RATE: 102 BPM
EKG VENTRICULAR RATE: 102 BPM

## 2022-01-28 NOTE — CARE COORDINATION
Date/Time:  1/28/2022 11:10 AM  Attempted to reach patient by telephone. Call within 2 business days of discharge: Yes Left HIPPA compliant message requesting a return call. Will attempt to reach patient again.

## 2022-01-29 LAB — URINE CULTURE, ROUTINE: NORMAL

## 2022-01-29 NOTE — ED PROVIDER NOTES
Loi You is an 80year old female with PMH of HLD and afib on xarelto, patient presented from outpatient pcp for evaluation of multiple complaints including fatigue, shortness of breath, myalgias, right flank pain that has been present for at least one week and is unchanged. Patient denies hx of VTE and is on anticoagulation. Patient is vaccinated for COVID, patient's daughter is caregiver. Patient was diagnosed with COVID today but has had symptoms for at least one week or longer. Nothing makes symptoms better or worse symptoms are moderate in severity and coming and going. The history is provided by the patient and medical records. Review of Systems   Constitutional: Positive for fatigue. Negative for chills, diaphoresis and fever. Eyes: Negative for photophobia and visual disturbance. Respiratory: Positive for shortness of breath. Negative for cough and chest tightness. Cardiovascular: Negative for chest pain, palpitations and leg swelling. Gastrointestinal: Negative for abdominal distention, abdominal pain, diarrhea, nausea and vomiting. Genitourinary: Positive for flank pain. Negative for dysuria. Musculoskeletal: Positive for myalgias. Negative for neck pain and neck stiffness. Skin: Negative for pallor and rash. Neurological: Negative for headaches. Psychiatric/Behavioral: Negative for confusion. Physical Exam  Vitals and nursing note reviewed. Constitutional:       General: She is not in acute distress. Appearance: Normal appearance. She is ill-appearing. Comments: Mildly ill appearing    HENT:      Head: Normocephalic and atraumatic. Eyes:      General: No scleral icterus. Conjunctiva/sclera: Conjunctivae normal.      Pupils: Pupils are equal, round, and reactive to light. Cardiovascular:      Rate and Rhythm: Normal rate. Rhythm irregular. Pulmonary:      Effort: Pulmonary effort is normal.      Breath sounds: Normal breath sounds. Abdominal:      General: Bowel sounds are normal. There is no distension. Palpations: Abdomen is soft. Tenderness: There is no abdominal tenderness. There is no guarding or rebound. Musculoskeletal:      Cervical back: Normal range of motion and neck supple. No rigidity or tenderness. No muscular tenderness. Right lower leg: No edema. Left lower leg: No edema. Comments: Right sided reproducible pain   Skin:     General: Skin is warm and dry. Capillary Refill: Capillary refill takes less than 2 seconds. Coloration: Skin is not pale. Findings: No erythema or rash. Neurological:      Mental Status: She is alert and oriented to person, place, and time. Psychiatric:         Mood and Affect: Mood normal.          Procedures     MDM  Number of Diagnoses or Management Options  Acute sinusitis, recurrence not specified, unspecified location  COVID  Diagnosis management comments: Vaishali Nguyen is an 80year old female who presented to ED with concern for covid. EKG and troponin were low risk, patient was given refills on medication, patient was not having any chest pain, unlikely that symptoms are due to VTE patient is  Already on anticoagulation. Patient will be treated with supportive care, patient's daughter can monitor patient's pulse oximeter at home. Discussed results and plan with patient along with indications to return to ED they are agreeable to plan and patient is well appearing and not in any distress at time of discharge tolerating PO normally and ambulating well without hypoxia. Patient was also having findings of sinusitis on CT scan and symptoms for several weeks, patient well appearing at time of discharge not in any distress. Discussed results and plan with patient along with indications to return to ED, patient is agreeable to plan and well appearing at time of discharge not in any distress. EKG: This EKG is signed and interpreted by me.     Rate: 102  Rhythm: Atrial fibrillation  Interpretation: non-specific EKG, no St elevations or depressions  Comparison: stable as compared to patient's most recent EKG              --------------------------------------------- PAST HISTORY ---------------------------------------------  Past Medical History:  has a past medical history of Asthma, Depression, Generalized osteoarthritis, Hyperlipidemia, Knee pain, Pinched nerve in neck, Thyroid disease, and Vitamin D deficiency. Past Surgical History:  has a past surgical history that includes Tonsillectomy; Hysterectomy; Thyroidectomy (17 YEARS AGO); Appendectomy (58 YEARS AGO); eye surgery; Neck surgery (9/9/2011); Cholecystectomy; Cardioversion (09/20/2019); and Cardioversion (08/02/2021). Social History:  reports that she quit smoking about 27 years ago. Her smoking use included cigarettes. She has never used smokeless tobacco. She reports that she does not drink alcohol and does not use drugs. Family History: family history is not on file. The patients home medications have been reviewed.     Allergies: Codeine, Codimal-a [brompheniramine], Lipitor, Spiriva handihaler [tiotropium bromide monohydrate], Tudorza pressair [aclidinium bromide], and Ciprofloxacin    -------------------------------------------------- RESULTS -------------------------------------------------  Labs:  Results for orders placed or performed during the hospital encounter of 01/27/22   Culture, Blood 1    Specimen: Blood   Result Value Ref Range    Blood Culture, Routine 24 Hours no growth    Culture, Blood 2    Specimen: Blood   Result Value Ref Range    Culture, Blood 2 24 Hours no growth    CBC auto differential   Result Value Ref Range    WBC 8.3 4.5 - 11.5 E9/L    RBC 5.91 (H) 3.50 - 5.50 E12/L    Hemoglobin 15.2 11.5 - 15.5 g/dL    Hematocrit 49.6 (H) 34.0 - 48.0 %    MCV 83.9 80.0 - 99.9 fL    MCH 25.7 (L) 26.0 - 35.0 pg    MCHC 30.6 (L) 32.0 - 34.5 %    RDW 16.3 (H) 11.5 - 15.0 fL    Platelets 641 336 - 682 E9/L    MPV 12.4 (H) 7.0 - 12.0 fL    Neutrophils % 77.8 43.0 - 80.0 %    Immature Granulocytes % 0.2 0.0 - 5.0 %    Lymphocytes % 11.3 (L) 20.0 - 42.0 %    Monocytes % 8.9 2.0 - 12.0 %    Eosinophils % 1.4 0.0 - 6.0 %    Basophils % 0.4 0.0 - 2.0 %    Neutrophils Absolute 6.47 1.80 - 7.30 E9/L    Immature Granulocytes # 0.02 E9/L    Lymphocytes Absolute 0.94 (L) 1.50 - 4.00 E9/L    Monocytes Absolute 0.74 0.10 - 0.95 E9/L    Eosinophils Absolute 0.12 0.05 - 0.50 E9/L    Basophils Absolute 0.03 0.00 - 0.20 E9/L   Lactate, Sepsis   Result Value Ref Range    Lactic Acid, Sepsis 2.1 (H) 0.5 - 1.9 mmol/L   Comprehensive Metabolic Panel w/ Reflex to MG   Result Value Ref Range    Sodium 136 132 - 146 mmol/L    Potassium reflex Magnesium 3.6 3.5 - 5.0 mmol/L    Chloride 100 98 - 107 mmol/L    CO2 25 22 - 29 mmol/L    Anion Gap 11 7 - 16 mmol/L    Glucose 93 74 - 99 mg/dL    BUN 28 (H) 6 - 23 mg/dL    CREATININE 1.1 (H) 0.5 - 1.0 mg/dL    GFR Non-African American 47 >=60 mL/min/1.73    GFR African American 57     Calcium 9.4 8.6 - 10.2 mg/dL    Total Protein 6.5 6.4 - 8.3 g/dL    Albumin 3.8 3.5 - 5.2 g/dL    Total Bilirubin 0.3 0.0 - 1.2 mg/dL    Alkaline Phosphatase 97 35 - 104 U/L    ALT 19 0 - 32 U/L    AST 26 0 - 31 U/L   Troponin   Result Value Ref Range    Troponin, High Sensitivity 16 (H) 0 - 9 ng/L   SPECIMEN REJECTION   Result Value Ref Range    Rejected Test CMP TRP     Reason for Rejection see below    EKG 12 Lead   Result Value Ref Range    Ventricular Rate 102 BPM    Atrial Rate 375 BPM    QRS Duration 64 ms    Q-T Interval 316 ms    QTc Calculation (Bazett) 411 ms    R Axis -10 degrees    T Axis -167 degrees   EKG 12 Lead   Result Value Ref Range    Ventricular Rate 102 BPM    Atrial Rate 375 BPM    QRS Duration 64 ms    Q-T Interval 316 ms    QTc Calculation (Bazett) 411 ms    R Axis -10 degrees    T Axis -167 degrees       Radiology:  CT HEAD WO CONTRAST   Final Result   1.  No time the patient is without objective evidence of an acute process requiring hospitalization or inpatient management. They have remained hemodynamically stable throughout their entire ED visit and are stable for discharge with outpatient follow-up. The plan has been discussed in detail and they are aware of the specific conditions for emergent return, as well as the importance of follow-up. Discharge Medication List as of 1/27/2022  8:33 PM      START taking these medications    Details   amoxicillin-clavulanate (AUGMENTIN) 875-125 MG per tablet Take 1 tablet by mouth 2 times daily for 10 days, Disp-20 tablet, R-0Normal      !! fluticasone-salmeterol (WIXELA INHUB) 250-50 MCG/DOSE AEPB Inhale 1 puff into the lungs every 12 hours, Disp-60 each, R-3Normal       !! - Potential duplicate medications found. Please discuss with provider. Diagnosis:  1. COVID    2. Acute sinusitis, recurrence not specified, unspecified location        Disposition:  Patient's disposition: Discharge to home  Patient's condition is stable.        Verena Hernandez MD  01/30/22 [address] Tavares MD  01/30/22 1210

## 2022-01-30 ASSESSMENT — ENCOUNTER SYMPTOMS
ABDOMINAL PAIN: 0
PHOTOPHOBIA: 0
ABDOMINAL DISTENTION: 0
DIARRHEA: 0
CHEST TIGHTNESS: 0
VOMITING: 0
NAUSEA: 0
COUGH: 0
SHORTNESS OF BREATH: 1

## 2022-01-31 ENCOUNTER — CARE COORDINATION (OUTPATIENT)
Dept: CARE COORDINATION | Age: 87
End: 2022-01-31

## 2022-01-31 NOTE — CARE COORDINATION
Follow Up Call    Challenges to be reviewed by the provider   Additional needs identified to be addressed with provider: No  none                 Encounter was not routed to provider for escalation. Method of communication with provider:  none. Contacted the patient by telephone to follow up after ED. Status: significantly improved  Interventions to address identified needs: Obtained and reviewed discharge summary and/or continuity of care documents    [facility] follow up appointment(s):   Future Appointments   Date Time Provider Yuki Tran   3/9/2022 10:00 AM [address] [place] [address]   4/11/2022 10:45 AM Azalia Davis MD [address] [place] [address]     Non-[facility] follow up appointment(s): na   Follow up appointment completed? No.    Provided contact information for future needs. No further follow-up call indicated based on severity of symptoms and risk factors.   Plan for next call: Ibrahima Ca RN

## 2022-02-01 LAB
BLOOD CULTURE, ROUTINE: NORMAL
CULTURE, BLOOD 2: NORMAL

## 2022-02-05 DIAGNOSIS — J45.20 MILD INTERMITTENT ASTHMA, UNCOMPLICATED: ICD-10-CM

## 2022-02-28 ENCOUNTER — OFFICE VISIT (OUTPATIENT)
Dept: FAMILY MEDICINE CLINIC | Age: 87
End: 2022-02-28
Payer: MEDICARE

## 2022-02-28 VITALS
BODY MASS INDEX: 28 KG/M2 | HEIGHT: 66 IN | OXYGEN SATURATION: 97 % | DIASTOLIC BLOOD PRESSURE: 60 MMHG | TEMPERATURE: 97.4 F | SYSTOLIC BLOOD PRESSURE: 102 MMHG | HEART RATE: 126 BPM | WEIGHT: 174.2 LBS | RESPIRATION RATE: 18 BRPM

## 2022-02-28 DIAGNOSIS — M25.531 RIGHT WRIST PAIN: Primary | ICD-10-CM

## 2022-02-28 PROCEDURE — 1036F TOBACCO NON-USER: CPT | Performed by: NURSE PRACTITIONER

## 2022-02-28 PROCEDURE — 1090F PRES/ABSN URINE INCON ASSESS: CPT | Performed by: NURSE PRACTITIONER

## 2022-02-28 PROCEDURE — G8417 CALC BMI ABV UP PARAM F/U: HCPCS | Performed by: NURSE PRACTITIONER

## 2022-02-28 PROCEDURE — G8427 DOCREV CUR MEDS BY ELIG CLIN: HCPCS | Performed by: NURSE PRACTITIONER

## 2022-02-28 PROCEDURE — 1123F ACP DISCUSS/DSCN MKR DOCD: CPT | Performed by: NURSE PRACTITIONER

## 2022-02-28 PROCEDURE — G8484 FLU IMMUNIZE NO ADMIN: HCPCS | Performed by: NURSE PRACTITIONER

## 2022-02-28 PROCEDURE — 99214 OFFICE O/P EST MOD 30 MIN: CPT | Performed by: NURSE PRACTITIONER

## 2022-02-28 PROCEDURE — 4040F PNEUMOC VAC/ADMIN/RCVD: CPT | Performed by: NURSE PRACTITIONER

## 2022-02-28 RX ORDER — METHYLPREDNISOLONE 4 MG/1
TABLET ORAL
Qty: 1 KIT | Refills: 0 | Status: SHIPPED
Start: 2022-02-28 | End: 2022-03-16 | Stop reason: ALTCHOICE

## 2022-02-28 NOTE — PROGRESS NOTES
22  Morales Sotomayor : 1934 Sex: female  Age 80 y.o. Subjective:  Chief Complaint   Patient presents with    Wrist Pain     right wrist pain, had IV one month ago in right hand, x 1 month       HPI:   Morales Sotomayro , 80 y.o. female presents to the clinic for evaluation of right wrist pain x 1 month. The patient reports symptoms developed after needle stick during hospital visit. The patient has not taken any treatment for symptoms. The patient reports symptoms improve with rest and worsen with ROM / activity. The patient reports unchanged symptoms over time. The patient denies paresthesias, and loss of ROM / strength. The patient also denies headache, fever, chest pain, abdominal pain, shortness of breath, and nausea / vomiting / diarrhea. ROS:   Unless otherwise stated in this report the patient's positive and negative responses for review of systems for constitutional, eyes, ENT, cardiovascular, respiratory, gastrointestinal, neurological, , musculoskeletal, and integument systems and related systems to the presenting problem are either stated in the history of present illness or were not pertinent or were negative for the symptoms and/or complaints related to the presenting medical problem. Positives and pertinent negatives as per HPI. All others reviewed and are negative. PMH:     Past Medical History:   Diagnosis Date    Asthma     Depression     Generalized osteoarthritis 2006    Hyperlipidemia     Knee pain     Sees Dr. Carlyn Mckeon for b/l knee pain / gel injections    Pinched nerve in neck     Thyroid disease     Vitamin D deficiency        Past Surgical History:   Procedure Laterality Date    APPENDECTOMY  62 YEARS AGO    CARDIOVERSION  2019    Dr. Mike Leone 1 shock 200 joules Successful    CARDIOVERSION  2021    Dr. Nanci Marques.  200J x1 converted to NSR    CHOLECYSTECTOMY      EYE SURGERY      CATARACT RIGHT AND LEFT    HYSTERECTOMY      NECK SURGERY 9/9/2011    THYROIDECTOMY  17 YEARS AGO    TONSILLECTOMY         History reviewed. No pertinent family history. Medications:     Current Outpatient Medications:     methylPREDNISolone (MEDROL DOSEPACK) 4 MG tablet, Take by mouth., Disp: 1 kit, Rfl: 0    WIXELA INHUB 250-50 MCG/DOSE AEPB, INHALE 1 PUFF INTO THE LUNGS EVERY 12 HOURS, Disp: 180 each, Rfl: 3    albuterol sulfate HFA (PROVENTIL HFA) 108 (90 Base) MCG/ACT inhaler, Inhale 2 puffs into the lungs every 4 hours as needed for Wheezing or Shortness of Breath, Disp: 1 each, Rfl: 1    dilTIAZem (CARDIZEM CD) 180 MG extended release capsule, TAKE 1 CAPSULE BY MOUTH TWICE A DAY, Disp: 180 capsule, Rfl: 1    citalopram (CELEXA) 20 MG tablet, Take 1 tablet by mouth daily, Disp: 30 tablet, Rfl: 3    levothyroxine (SYNTHROID) 125 MCG tablet, Take 1 tablet by mouth Daily, Disp: 90 tablet, Rfl: 1    metoprolol succinate (TOPROL XL) 25 MG extended release tablet, Take 1 tablet by mouth daily, Disp: 30 tablet, Rfl: 3    rivaroxaban (XARELTO) 20 MG TABS tablet, Take 1 tablet by mouth daily (with breakfast), Disp: 30 tablet, Rfl: 1    gabapentin (NEURONTIN) 100 MG capsule, Take 1 capsule by mouth 2 times daily for 180 days. Intended supply: 90 days, Disp: 180 capsule, Rfl: 0    simvastatin (ZOCOR) 40 MG tablet, Take 40 mg by mouth nightly , Disp: , Rfl: 3    vitamin B-12 (CYANOCOBALAMIN) 1000 MCG tablet, Take 6,000 mcg by mouth daily , Disp: , Rfl:     Cholecalciferol (VITAMIN D3) 2000 units TABS, Take 1 tablet by mouth daily , Disp: , Rfl:     Biotin 5000 MCG CAPS, Take 1 capsule by mouth daily. , Disp: , Rfl:     Vitamin E 400 UNIT TABS, Take 1 tablet by mouth daily. , Disp: , Rfl:     Allergies:      Allergies   Allergen Reactions    Codeine Nausea And Vomiting    Codimal-A [Brompheniramine] Other (See Comments)     Altered mental state and confusion    Lipitor Other (See Comments)     GENERALIZED ACHING, DIFFICULT TO FUNCTION    Spiriva Handihaler [Tiotropium Bromide Monohydrate]     Tudorza Pressair [Aclidinium Bromide]     Ciprofloxacin Nausea And Vomiting       Social History:     Social History     Tobacco Use    Smoking status: Former Smoker     Types: Cigarettes     Quit date: 1994     Years since quittin.4    Smokeless tobacco: Never Used   Vaping Use    Vaping Use: Never used   Substance Use Topics    Alcohol use: No     Comment: RARE    Drug use: No       Patient lives at home. Physical Exam:     Vitals:    22 1444   BP: 102/60   Pulse: 126   Resp: 18   Temp: 97.4 °F (36.3 °C)   TempSrc: Infrared   SpO2: 97%   Weight: 174 lb 3.2 oz (79 kg)   Height: 5' 6\" (1.676 m)       Physical Exam (PE)   Constitutional: Alert, development consistent with age. HENT:      Head: Normocephalic. Right Ear: External ear normal.      Left Ear: External ear normal.      Nose: Normal.      Mouth/Throat:     Mouth: Mucous membranes are moist.      Pharynx: Oropharynx is clear. Eyes: Pupils: Pupils are equal, round, and reactive to light. Neck: Normal ROM. Supple. Cardiovascular: Heart RRR without pathologic murmurs or gallops. Pulmonary: Respiratory effort normal.  Normal breath sounds. Abdomen: Soft, nontender, normal bowel sounds. Wrist: Inspection of right wrist.            Tenderness: Mild TTP near radius            Swelling: Mild             Deformity: No obvious deformity noted. ROM: Normal.             Tinsel Test: Positive               Phalen Test: Positive  Hand: Inspection of right hand. Tenderness: No              Swelling: No.            Deformity: No obvious deformity. No malrotation noted. ROM: Normal.    Neurovascular: Motor deficit: /UE strength 5/5 bilaterally. No increased pain with supination or pronation of the hand. Sensory deficit:   Sensation intact proximally and distally to the injury site. Pulse deficit: 2+ and bounding. Capillary refill: Less then 2 sec throughout. Neurological:  Alert and oriented. Motor functions intact. Skin:  No ecchymosis, erythema, rashes, or abrasions noted. Psychiatric: Mood and Affect: Mood normal. Behavior: Behavior normal.    Testing:   (All laboratory and radiology results have been personally reviewed by myself)  Labs:  No results found for this visit on 02/28/22. Imaging: All Radiology results interpreted by Radiologist unless otherwise noted. XR WRIST RIGHT (MIN 3 VIEWS)    (Results Pending)       Assessment / Plan:   The patient's vitals, allergies, medications, and past medical history have been reviewed. Mitul Richardson was seen today for wrist pain. Diagnoses and all orders for this visit:    Right wrist pain  -     XR WRIST RIGHT (MIN 3 VIEWS); Future  -     methylPREDNISolone (MEDROL DOSEPACK) 4 MG tablet; Take by mouth. -     Splint wrist velcro        - Disposition: Home    - Educational material printed for patient's review and were included in patient instructions. After Visit Summary was given to patient at the end of visit. - The patient is to call for any concerns or return if any changing symptoms. The patient is to follow-up with PCP in the next 2-3 days for repeat evaluation. Discussed symptomatic treatments with the patient today including Tylenol prn pain. Red flags were discussed with the patient today. If symptoms worsen the patient is to go directly to the emergency department. Pt verbalizes understanding and is in agreement with plan of care. All questions answered. SIGNATURE: Ashely Maria, APRN-CNP    *NOTE: This report was transcribed using voice recognition software. Every effort was made to ensure accuracy; however, inadvertent computerized transcription errors may be present.

## 2022-03-09 ENCOUNTER — OFFICE VISIT (OUTPATIENT)
Dept: FAMILY MEDICINE CLINIC | Age: 87
End: 2022-03-09
Payer: MEDICARE

## 2022-03-09 VITALS
TEMPERATURE: 97.3 F | WEIGHT: 170 LBS | SYSTOLIC BLOOD PRESSURE: 110 MMHG | HEART RATE: 92 BPM | DIASTOLIC BLOOD PRESSURE: 62 MMHG | OXYGEN SATURATION: 98 % | HEIGHT: 66 IN | BODY MASS INDEX: 27.32 KG/M2 | RESPIRATION RATE: 18 BRPM

## 2022-03-09 DIAGNOSIS — M25.531 RIGHT WRIST PAIN: Primary | ICD-10-CM

## 2022-03-09 DIAGNOSIS — F34.1 DYSTHYMIA: ICD-10-CM

## 2022-03-09 DIAGNOSIS — Z12.31 BREAST CANCER SCREENING BY MAMMOGRAM: ICD-10-CM

## 2022-03-09 PROCEDURE — G8427 DOCREV CUR MEDS BY ELIG CLIN: HCPCS | Performed by: INTERNAL MEDICINE

## 2022-03-09 PROCEDURE — 1090F PRES/ABSN URINE INCON ASSESS: CPT | Performed by: INTERNAL MEDICINE

## 2022-03-09 PROCEDURE — G8484 FLU IMMUNIZE NO ADMIN: HCPCS | Performed by: INTERNAL MEDICINE

## 2022-03-09 PROCEDURE — 1036F TOBACCO NON-USER: CPT | Performed by: INTERNAL MEDICINE

## 2022-03-09 PROCEDURE — 1123F ACP DISCUSS/DSCN MKR DOCD: CPT | Performed by: INTERNAL MEDICINE

## 2022-03-09 PROCEDURE — 99213 OFFICE O/P EST LOW 20 MIN: CPT | Performed by: INTERNAL MEDICINE

## 2022-03-09 PROCEDURE — 4040F PNEUMOC VAC/ADMIN/RCVD: CPT | Performed by: INTERNAL MEDICINE

## 2022-03-09 PROCEDURE — G8417 CALC BMI ABV UP PARAM F/U: HCPCS | Performed by: INTERNAL MEDICINE

## 2022-03-09 RX ORDER — CITALOPRAM 20 MG/1
TABLET ORAL
Qty: 30 TABLET | Refills: 5 | Status: SHIPPED
Start: 2022-03-09 | End: 2022-09-21 | Stop reason: SDUPTHER

## 2022-03-09 RX ORDER — PREDNISONE 2.5 MG
2.5 TABLET ORAL DAILY
Qty: 14 TABLET | Refills: 0 | Status: SHIPPED | OUTPATIENT
Start: 2022-03-09 | End: 2022-03-23

## 2022-03-09 NOTE — TELEPHONE ENCOUNTER
Last Appointment:  3/9/2022  Future Appointments   Date Time Provider Yuki Felipei   3/10/2022  3:00 PM Karla Muro MD [facility]   4/6/2022 10:00 AM [facility] MOBILE TIFFANY RM 1 SEYZ [facility]   4/11/2022 10:45 AM Bertin Carranza MD [facility]   6/8/2022  9:45 AM Shreya Kapoor DO [address] [place] [address] Street

## 2022-03-09 NOTE — PROGRESS NOTES
[facility] PRIMARY CARE  [address] [place], [place] [address] [id number]  Dept: [phone]  Dept Fax: [phone]     NAME: Lc Pierre        :  1934        MRN:  [id number]    Chief Complaint   Patient presents with    Check-Up     3 mos     Wrist Pain     continuing rt wrist pain / as soon as pred was finished the pain came back right away.  Health Maintenance     Scheduled for Colonoscopy on 22 with Dr. Grant Dinero at [facility]       Subjective     History of Present Illness  Lc Pierre is a 80 y.o. female who presents today for routine follow up. Patient was seen on 3/28/2022 for wrist pain that has been persistent for about a month. Patient states the pain began after an IV was placed in her wrist for blood draw. She did have some swelling after this pain is persisted. She was seen through walk-in for this and given a Medrol Dosepak. She states that the steroid did help significantly with the pain although as soon as she was finished with the pack the pain returned. X-rays were also ordered at that visit and these were reviewed with the patient. She has been wearing the splint provided although states it has not helped much with the pain. X-ray done through [facility]:  \"RESULT: X-ray examination of the right wrist was obtained in 3 views. This study is compared to previous dated 2020. There is moderate triscaphe degenerative changes and less so throughout. I see no fracture or dislocation. There is loss of bone density. There   is mild 5 mm or less heterotopic appearing calcification in the soft   tissues in the radial scaphoid region best seen on lateral and less so   oblique views. This was not present on previous study this could be on   the basis of chondrocalcinosis. If further measures required a follow-up   MRI study is recommended.  Otherwise this study is unchanged from previous    IMPRESSION  IMPRESSION: MODERATE DEGENERATIVE CHANGES RIGHT WRIST WITH MILD APPEARING   SOFT TISSUE CALCIFICATION WITH RECOMMENDATION AND DESCRIPTION ABOVE\"    Review of Systems  Please see HPI above. All bolded are positive. Gen: fever, chills, fatigue, weakness, diaphoresis, unintentional weight change  Head: headache, vision change, hearing loss  Chest: chest pain/heaviness, palpitations  Lungs: shortness of breath, wheezing, coughing, hemoptysis  Abdomen: abdominal pain, nausea, vomiting, diarrhea, constipation, melena, hematochezia, hematemesis, loss of appetite  Extremities: lower extremity edema, myalgias, arthralgias  Urinary: dysuria, hematuria, weak flow, increase in frequency  Neurologic: lightheadedness, dizziness, confusion, syncope  Endocrine: polydipsia, polyuria, heat or cold intolerance  Psychiatric: depression, suicidal ideation, anxiety  Derm: Rashes, ulcers, burns    Past Medical Hx:  Past Medical History:   Diagnosis Date    Asthma     Depression     Generalized osteoarthritis 2006    Hyperlipidemia     Knee pain     Sees Dr. Jhonny Segura for b/l knee pain / gel injections    Pinched nerve in neck     Thyroid disease     Vitamin D deficiency        Past Surgical Hx:  Past Surgical History:   Procedure Laterality Date    APPENDECTOMY  62 YEARS AGO    CARDIOVERSION  2019    Dr. Austyn Rojas 1 shock 200 joules Successful    CARDIOVERSION  2021    Dr. Malick Trent. 200J x1 converted to NSR    CHOLECYSTECTOMY      EYE SURGERY      CATARACT RIGHT AND LEFT    HYSTERECTOMY      NECK SURGERY  2011    THYROIDECTOMY  17 YEARS AGO    TONSILLECTOMY         Family Hx:  No family history on file.     Social Hx:  Social History     Tobacco Use    Smoking status: Former Smoker     Types: Cigarettes     Quit date: 1994     Years since quittin.4    Smokeless tobacco: Never Used   Substance Use Topics    Alcohol use: No     Comment: RARE       Home Medications:  Current Outpatient Medications   Medication Sig Dispense Refill    predniSONE (DELTASONE) 2.5 MG tablet Take 1 tablet by mouth daily for 14 days 14 tablet 0    WIXELA INHUB 250-50 MCG/DOSE AEPB INHALE 1 PUFF INTO THE LUNGS EVERY 12 HOURS 180 each 3    albuterol sulfate HFA (PROVENTIL HFA) 108 (90 Base) MCG/ACT inhaler Inhale 2 puffs into the lungs every 4 hours as needed for Wheezing or Shortness of Breath 1 each 1    dilTIAZem (CARDIZEM CD) 180 MG extended release capsule TAKE 1 CAPSULE BY MOUTH TWICE A  capsule 1    levothyroxine (SYNTHROID) 125 MCG tablet Take 1 tablet by mouth Daily 90 tablet 1    metoprolol succinate (TOPROL XL) 25 MG extended release tablet Take 1 tablet by mouth daily 30 tablet 3    rivaroxaban (XARELTO) 20 MG TABS tablet Take 1 tablet by mouth daily (with breakfast) 30 tablet 1    gabapentin (NEURONTIN) 100 MG capsule Take 1 capsule by mouth 2 times daily for 180 days. Intended supply: 90 days 180 capsule 0    simvastatin (ZOCOR) 40 MG tablet Take 40 mg by mouth nightly   3    vitamin B-12 (CYANOCOBALAMIN) 1000 MCG tablet Take 6,000 mcg by mouth daily       Cholecalciferol (VITAMIN D3) 2000 units TABS Take 1 tablet by mouth daily       Biotin 5000 MCG CAPS Take 1 capsule by mouth daily.  Vitamin E 400 UNIT TABS Take 1 tablet by mouth daily.  citalopram (CELEXA) 20 MG tablet Take 1 tablet by mouth once daily 30 tablet 5    methylPREDNISolone (MEDROL DOSEPACK) 4 MG tablet Take by mouth. 1 kit 0     No current facility-administered medications for this visit. Allergies:   Allergies   Allergen Reactions    Codeine Nausea And Vomiting    Codimal-A [Brompheniramine] Other (See Comments)     Altered mental state and confusion    Lipitor Other (See Comments)     GENERALIZED ACHING, DIFFICULT TO FUNCTION    Spiriva Handihaler [Tiotropium Bromide Monohydrate]     Tudorza Pressair [Aclidinium Bromide]     Ciprofloxacin Nausea And Vomiting       Objective     Vitals:    03/09/22 1009   BP: 110/62   Pulse: 92   Resp: 18   Temp: 97.3 °F (36.3 °C)   TempSrc: Temporal   SpO2: 98%   Weight: 170 lb (77.1 kg)   Height: 5' 6\" (1.676 m)        Physical Exam  General: Awake, alert, and oriented to person, place, time, and purpose, appears stated age and cooperative, No acute distress  Head: Normocephalic, atraumatic  Eyes: conjunctivae/corneas clear, EOMI  Mouth: Mucous membranes moist with no pharyngeal exudate or erythema  Neck: no JVD, no adenopathy, no carotid bruit, supple, symmetrical, trachea midline  Back: symmetric, ROM normal, No CVA tenderness. Lungs: clear to auscultation bilaterally without wheezes, rales, or rhonchi  Heart: regular rate and rhythm, S1, S2 normal, no murmur, click, rub or gallop  Abdomen: soft, non-tender; bowel sounds normal; no masses,  no organomegaly  Extremities: atraumatic, no cyanosis, no edema, tenderness present to base of thumb and lateral aspect of her right wrist  Skin: color, texture, turgor within normal limits.  No rashes or lesions   Neurologic:speech appropriate, moves all 4 extremities, normal muscle strength and tone, CN 2-12 grossly intact    Labs:  Lab Results   Component Value Date    WBC 8.3 01/27/2022    HGB 15.2 01/27/2022    HCT 49.6 (H) 01/27/2022     01/27/2022     01/27/2022    K 3.6 01/27/2022     01/27/2022    CREATININE 1.1 (H) 01/27/2022    BUN 28 (H) 01/27/2022    CO2 25 01/27/2022    GLUCOSE 93 01/27/2022    ALT 19 01/27/2022    AST 26 01/27/2022    INR 1.5 09/05/2020     Lab Results   Component Value Date    TSH 0.979 08/06/2021     Lab Results   Component Value Date    TRIG 115 08/06/2021    TRIG 89 06/17/2019     Lab Results   Component Value Date    HDL 75 08/06/2021     Lab Results   Component Value Date    LDLCALC 38 08/06/2021     Lab Results   Component Value Date    LABA1C 5.6 08/06/2021     Lab Results   Component Value Date    INR 1.5 09/05/2020    INR 1.2 06/16/2019    INR 1.7 08/27/2018    PROTIME 17.0 (H) 09/05/2020    PROTIME 13. 4 (H) 06/16/2019    PROTIME 19.9 (H) 08/27/2018      *All recent labs were reviewed. Please see electronic chart for a more comprehensive set of labs    Radiology:  No results found. Assessment and Plan     Patient is a 80 y.o. female who presented to the office for follow up. Full problem list is as follows:  Patient Active Problem List   Diagnosis    Persistent atrial fibrillation ([address]    History of nuclear stress test    Hyperlipidemia    Depression    Asthma    Vitamin D deficiency    Benign hypertensive heart disease without congestive heart failure    Generalized osteoarthritis    Hypothyroidism    COVID    Urinary frequency    Fatigue    Hematuria       Enrique Thurston was seen today for check-up, wrist pain and health maintenance. Diagnoses and all orders for this visit:    Right wrist pain  -     predniSONE (DELTASONE) 2.5 MG tablet; Take 1 tablet by mouth daily for 14 days  -     Demond Coppola MD, Sports Medicine, [facility]    Breast cancer screening by mammogram  -     [facility] JOSE DIGITAL SCREEN BILATERAL; Future        Educational materials and/or home exercises printed for patient's review and were included in patient instructions on his/her After Visit Summary and given to patient at the end of visit. Counseled regarding above diagnosis, including possible risks and complications, especially if left uncontrolled. Counseled regarding the possible side effects, risks, benefits and alternatives to treatment; patient and/or guardian verbalizes understanding, agrees, feels comfortable with and wishes to proceed with above treatment plan. Advised patient to call Jocelyn Faith new medication issues, and read all Rx info from pharmacy to assure aware of all possible risks and side effects of any medication before taking. Reviewed age and gender appropriate health screening exams and vaccinations.   Advised patient regarding importance of keeping up with recommended health maintenance and to schedule as soon as possible if overdue, as this is important in assessing for undiagnosed pathology, especially cancer, as well as protecting against potentially harmful/life threatening disease. Patient verbalizes understanding and agrees with above counseling, assessment and plan. All questions answered.     Marcial Marcelo, DO

## 2022-03-10 ENCOUNTER — OFFICE VISIT (OUTPATIENT)
Dept: SPORTS MEDICINE | Age: 87
End: 2022-03-10
Payer: MEDICARE

## 2022-03-10 VITALS — WEIGHT: 170 LBS | BODY MASS INDEX: 25.76 KG/M2 | HEIGHT: 68 IN

## 2022-03-10 DIAGNOSIS — M25.532 BILATERAL WRIST PAIN: ICD-10-CM

## 2022-03-10 DIAGNOSIS — Z12.31 BREAST CANCER SCREENING BY MAMMOGRAM: ICD-10-CM

## 2022-03-10 DIAGNOSIS — M25.531 BILATERAL WRIST PAIN: ICD-10-CM

## 2022-03-10 DIAGNOSIS — G56.02 CARPAL TUNNEL SYNDROME ON LEFT: Primary | ICD-10-CM

## 2022-03-10 PROCEDURE — 99204 OFFICE O/P NEW MOD 45 MIN: CPT | Performed by: FAMILY MEDICINE

## 2022-03-10 PROCEDURE — 20526 THER INJECTION CARP TUNNEL: CPT | Performed by: FAMILY MEDICINE

## 2022-03-10 PROCEDURE — G8484 FLU IMMUNIZE NO ADMIN: HCPCS | Performed by: FAMILY MEDICINE

## 2022-03-10 PROCEDURE — 1123F ACP DISCUSS/DSCN MKR DOCD: CPT | Performed by: FAMILY MEDICINE

## 2022-03-10 PROCEDURE — 4040F PNEUMOC VAC/ADMIN/RCVD: CPT | Performed by: FAMILY MEDICINE

## 2022-03-10 PROCEDURE — 1036F TOBACCO NON-USER: CPT | Performed by: FAMILY MEDICINE

## 2022-03-10 PROCEDURE — 1090F PRES/ABSN URINE INCON ASSESS: CPT | Performed by: FAMILY MEDICINE

## 2022-03-10 PROCEDURE — G8417 CALC BMI ABV UP PARAM F/U: HCPCS | Performed by: FAMILY MEDICINE

## 2022-03-10 PROCEDURE — G8427 DOCREV CUR MEDS BY ELIG CLIN: HCPCS | Performed by: FAMILY MEDICINE

## 2022-03-10 RX ORDER — DEXAMETHASONE SODIUM PHOSPHATE 4 MG/ML
4 INJECTION, SOLUTION INTRA-ARTICULAR; INTRALESIONAL; INTRAMUSCULAR; INTRAVENOUS; SOFT TISSUE ONCE
Status: COMPLETED | OUTPATIENT
Start: 2022-03-10 | End: 2022-03-10

## 2022-03-10 RX ORDER — LIDOCAINE HYDROCHLORIDE 10 MG/ML
1 INJECTION, SOLUTION EPIDURAL; INFILTRATION; INTRACAUDAL; PERINEURAL ONCE
Status: COMPLETED | OUTPATIENT
Start: 2022-03-10 | End: 2022-03-10

## 2022-03-10 RX ADMIN — LIDOCAINE HYDROCHLORIDE 1 ML: 10 INJECTION, SOLUTION EPIDURAL; INFILTRATION; INTRACAUDAL; PERINEURAL at 15:52

## 2022-03-10 RX ADMIN — DEXAMETHASONE SODIUM PHOSPHATE 4 MG: 4 INJECTION, SOLUTION INTRA-ARTICULAR; INTRALESIONAL; INTRAMUSCULAR; INTRAVENOUS; SOFT TISSUE at 15:51

## 2022-03-10 NOTE — PROGRESS NOTES
[facility]  PRIMARY CARE SPORTS MEDICINE  DATE OF VISIT : 3/10/2022    Patient : Lc Pierre  Age : 80 y.o.  : 1934  MRN : [id number]   ______________________________________________________________________    Chief Complaint :   Chief Complaint   Patient presents with    Wrist Pain     right wrist pain, has a spot where she had IV put in that is still painful in the wrist area, has a brace she cannot use due to it lacing up, also got an otc one that is like a glove that hurts as well. Pt also has left hand tingling , numbness and burning, feels like electric is going through her hand. had x-rays at [facility] within the last few weeks        HPI : Lc Pierre is 80 y.o. female who presented to the clinic today for evaluation of bilateral wrist pain. Right Wrist Pain - Onset of the symptoms was  a few months ago,  associated after an IV was placed during a recent ER visit. Current symptoms include pain located in the thumb that radiates towards the forearm. Pain is aggravated by any repetitive use of the hand especially gripping. Evaluation to date: XRs which show some degenerative change, images not available for personal review. Treatment to date: avoidance of offending activity, soft wrist splints and OTC analgesics which are not very effective. Left Wrist Pain - Patient presents for presents evaluation of left hand numbness, tingling and pain. Onset of the symptoms was several months ago. Current symptoms affect the first 3 digits on the left hand. Inciting event/aggravating factors: none known Patient's course of sx:gradually worsening. Evaluation to date: none. Treatment to date: has avoided aggravating activity which has been not very effective, OTC analgesics, which has been not very effective and OTC soft wrist splint used at night, which has been somewhat effective.     Past Medical History :  Past Medical History:   Diagnosis Date    Asthma     Depression     Generalized osteoarthritis 9/12/2006    Hyperlipidemia     Knee pain     Sees Dr. Tara Obando for b/l knee pain / gel injections    Pinched nerve in neck     Thyroid disease     Vitamin D deficiency      Past Surgical History:   Procedure Laterality Date    APPENDECTOMY  62 YEARS AGO    CARDIOVERSION  09/20/2019    Dr. Tonia Singh 1 shock 200 joules Successful    CARDIOVERSION  08/02/2021    Dr. Alfredo Cotto. 200J x1 converted to NSR    CHOLECYSTECTOMY      EYE SURGERY      CATARACT RIGHT AND LEFT    HYSTERECTOMY      NECK SURGERY  9/9/2011    THYROIDECTOMY  17 YEARS AGO    TONSILLECTOMY         Allergies : Allergies   Allergen Reactions    Codeine Nausea And Vomiting    Codimal-A [Brompheniramine] Other (See Comments)     Altered mental state and confusion    Lipitor Other (See Comments)     GENERALIZED ACHING, DIFFICULT TO FUNCTION    Spiriva Handihaler [Tiotropium Bromide Monohydrate]     Tudorza Pressair [Aclidinium Bromide]     Ciprofloxacin Nausea And Vomiting       Medication List :    Current Outpatient Medications   Medication Sig Dispense Refill    predniSONE (DELTASONE) 2.5 MG tablet Take 1 tablet by mouth daily for 14 days 14 tablet 0    citalopram (CELEXA) 20 MG tablet Take 1 tablet by mouth once daily 30 tablet 5    methylPREDNISolone (MEDROL DOSEPACK) 4 MG tablet Take by mouth.  1 kit 0    WIXELA INHUB 250-50 MCG/DOSE AEPB INHALE 1 PUFF INTO THE LUNGS EVERY 12 HOURS 180 each 3    albuterol sulfate HFA (PROVENTIL HFA) 108 (90 Base) MCG/ACT inhaler Inhale 2 puffs into the lungs every 4 hours as needed for Wheezing or Shortness of Breath 1 each 1    dilTIAZem (CARDIZEM CD) 180 MG extended release capsule TAKE 1 CAPSULE BY MOUTH TWICE A  capsule 1    levothyroxine (SYNTHROID) 125 MCG tablet Take 1 tablet by mouth Daily 90 tablet 1    metoprolol succinate (TOPROL XL) 25 MG extended release tablet Take 1 tablet by mouth daily 30 tablet 3    rivaroxaban (XARELTO) 20 MG TABS tablet Take 1 tablet by mouth daily (with breakfast) 30 tablet 1    simvastatin (ZOCOR) 40 MG tablet Take 40 mg by mouth nightly   3    vitamin B-12 (CYANOCOBALAMIN) 1000 MCG tablet Take 6,000 mcg by mouth daily       Cholecalciferol (VITAMIN D3) 2000 units TABS Take 1 tablet by mouth daily       Biotin 5000 MCG CAPS Take 1 capsule by mouth daily.  Vitamin E 400 UNIT TABS Take 1 tablet by mouth daily.  gabapentin (NEURONTIN) 100 MG capsule Take 1 capsule by mouth 2 times daily for 180 days. Intended supply: 90 days 180 capsule 0     No current facility-administered medications for this visit.      ______________________________________________________________________    Physical Exam :    Vitals: Ht 5' 8\" (1.727 m)   Wt 170 lb (77.1 kg)   BMI 25.85 kg/m²   General Appearance: Nontoxic, awake, alert, and in no acute distress. Chest wall/Lung: Respirations regular and unlabored. No cyanosis. Heart: RRR, distal pulses intact. Neurologic: Alert&Oriented x3. Sensation grossly intact. No focal motor deficits detected. Musculokeletal:  RIGHT Hand:  No ecchymosis or swelling. No active triggering. No obvious deformities. No hypothenar or thenar atrophy. TTP: ( - ) Scaphoid, ( - ) Ulnar Styloid, ( - ) Distal Radius, ( - ) A1 Pulley  Carpal Tunnel: ( - ) Tinels, ( - ) Phalens, ( - ) Carpal Tunnel Compression  Special Tests: ( + ) CMC Compression, ( + ) Finkelsteins, ( - ) Thumb UCL Stress Test  Strength and sensation intact. LEFT Hand:  No ecchymosis or swelling. No active triggering. No obvious deformities. No hypothenar or thenar atrophy.   TTP: ( - ) Scaphoid, ( - ) Ulnar Styloid, ( - ) Distal Radius, ( - ) A1 Pulley  Carpal Tunnel: ( + ) Tinels, ( + ) Phalens, ( + ) Carpal Tunnel Compression  Special Tests: ( + ) CMC Compression, ( - ) Finkelsteins, ( - ) Thumb UCL Stress Test  Strength and sensation intact.  ______________________________________________________________________    Assessment & Plan :    1. Bilateral wrist pain  2. Carpal tunnel syndrome on left  Patient presents to the office today for evaluation of bilateral wrist pain. History and physical exam of the left wrist are consistent with median mononeuropathy at the LEFT wrist (carpal tunnel syndrome). Treatment options discussed with patient in the office today including nighttime wrist splints, gabapentin, ultrasound-guided corticosteroid injection for the carpal tunnel and referral to orthopedic hand surgery for surgical opinion of carpal tunnel release. Patient is opted for a palpation guided corticosteroid injection for carpal tunnel syndrome which was performed in the office today, please see below for further details. Patient will follow up in 6 weeks for reevaluation and consider escalation of therapy should her symptoms persist.  History and physical exam of the right wrist are consistent with de Quervain's tenosynovitis vs osteoarthritis of the right wrist and thumb. Treatment options discussed with patient in the office today including activity modification, oral anti-inflammatories, physical therapy, injection options, bracing options, advanced imaging in the form of a MRI and referral to orthopedic surgery for discussion of surgical opinion. Patient wishes to proceed with conservative treatment in the form of ultrasound-guided corticosteroid injections which will be performed at her earliest convenience. Patient will follow up for injections in the near future. Patient is agreeable with the above plan and all questions and concerns were addressed in the office today. PROCEDURE NOTE: Carpal Tunnel Injection (Left)     The procedure and its risks, benefits and alternatives were discussed with the patient, and informed consent was obtained. The palmaris longus tendon of the left wrist was identified and the injection site just ulnar to the tendon was marked. The area was prepped and cleaned with Alcohol and Betadine.  Ethyl Chloride was used for local anesthesia. A 25G 1.5\" needle was inserted into the the carpal canal, patient was confirmed to be free from symptoms of pain or numbness and 1.0 mL of 1% Lidocaine without Epinephrine mixed with 1mL of Dexamethasone 4mg/1mL was injected into the carpal canal without difficulty. A band aid was placed over the injection site. There was no blood loss. The patient tolerated the procedure well and remained without symptoms of numbness, tingling or weakness in the left hand. Signs and symptoms of infection were discussed with the patient and he was advised to call or proceed directly to the ER should the symptoms arise. Patient verbalizes understanding and agrees with above assessment, plan, procedure and counseling. All questions answered. - dexamethasone (DECADRON) injection 4 mg  - lidocaine PF 1 % injection 1 mL    Return to Office: No follow-ups on file.     Mila Alicia MD

## 2022-03-18 ENCOUNTER — OFFICE VISIT (OUTPATIENT)
Dept: SPORTS MEDICINE | Age: 87
End: 2022-03-18
Payer: MEDICARE

## 2022-03-18 VITALS — BODY MASS INDEX: 25.76 KG/M2 | HEIGHT: 68 IN | WEIGHT: 170 LBS

## 2022-03-18 DIAGNOSIS — G56.02 CARPAL TUNNEL SYNDROME ON LEFT: Primary | ICD-10-CM

## 2022-03-18 PROCEDURE — 99999 PR OFFICE/OUTPT VISIT,PROCEDURE ONLY: CPT | Performed by: FAMILY MEDICINE

## 2022-03-18 PROCEDURE — 76942 ECHO GUIDE FOR BIOPSY: CPT | Performed by: FAMILY MEDICINE

## 2022-03-18 PROCEDURE — 20550 NJX 1 TENDON SHEATH/LIGAMENT: CPT | Performed by: FAMILY MEDICINE

## 2022-03-18 RX ORDER — LIDOCAINE HYDROCHLORIDE 10 MG/ML
1 INJECTION, SOLUTION EPIDURAL; INFILTRATION; INTRACAUDAL; PERINEURAL ONCE
Status: COMPLETED | OUTPATIENT
Start: 2022-03-18 | End: 2022-03-18

## 2022-03-18 RX ORDER — BETAMETHASONE SODIUM PHOSPHATE AND BETAMETHASONE ACETATE 3; 3 MG/ML; MG/ML
6 INJECTION, SUSPENSION INTRA-ARTICULAR; INTRALESIONAL; INTRAMUSCULAR; SOFT TISSUE ONCE
Status: COMPLETED | OUTPATIENT
Start: 2022-03-18 | End: 2022-03-18

## 2022-03-18 RX ADMIN — LIDOCAINE HYDROCHLORIDE 1 ML: 10 INJECTION, SOLUTION EPIDURAL; INFILTRATION; INTRACAUDAL; PERINEURAL at 11:06

## 2022-03-18 RX ADMIN — BETAMETHASONE SODIUM PHOSPHATE AND BETAMETHASONE ACETATE 6 MG: 3; 3 INJECTION, SUSPENSION INTRA-ARTICULAR; INTRALESIONAL; INTRAMUSCULAR; SOFT TISSUE at 11:06

## 2022-03-18 NOTE — Clinical Note
[address] [address]  Phone: [phone]  Fax: [phone]    Mila Alicia MD        March 19, 2022     Patient: Alexsander Alford   YOB: 1934   Date of Visit: 3/18/2022       To Whom It May Concern: It is my medical opinion that Reji Martin {Work release (duty restriction):32430}. If you have any questions or concerns, please don't hesitate to call.     Sincerely,        Mila Alicia MD

## 2022-03-19 NOTE — PROGRESS NOTES
PROCEDURE NOTE:    DIAGNOSIS    RIGHT thumb/wrist pain pain. RIGHT de Quervain's tenosynovitis    PROCEDURE    Ultrasound-guided RIGHT first dorsal compartment tendon sheath corticosteroid injection. PROCEDURAL PAUSE    Procedural pause conducted to verify: correct patient identity, procedure to be performed, and as applicable, correct side and site, correct patient position, and availability of implants, special equipment, or special requirements. PROCEDURE DETAILS    The procedure was carried out under sterile technique. Patient Position: Supine. Localization Process: The first dorsal compartment was evaluated under ultrasound prior to starting the procedure. The skin was prepped with Betadine and Alcohol. Approach: Out-of-plane. Local Anesthesia: Local anesthesia was obtained with vapocoolant cold spray and 0.5 cc of 1% lidocaine using a 30-gauge 1-1/4-inch needle to create a skin wheal.    Injection/Aspiration: A 25-gauge 2-inch needle was advanced from an out-of-plane approach into the first dorsal compartment. After visualization of the needle tip in the target area and negative aspiration for blood, a mixture of 0.5 cc of 1% lidocaine and 1 cc of betamethasone (6 mg/cc) was injected into the first dorsal compartment with excellent sonographic flow. Images of procedure were permanently recorded. Postprocedure Care: The patient will avoid heavy exertion with the hand and avoid soaking the hand under water for two days. The patient will contact me with any problems related to the injection. PATIENT EDUCATION    Ready to learn, no apparent learning barriers were identified; learning preferences include listening. Explained diagnosis and treatment plan; patient expressed understanding of the content. INFORMED CONSENT    Discussed the risks, benefits, alternatives, and the necessity of other members of the healthcare team participating in the procedure.  All questions answered and consent given.    Following denial of allergy and review of potential side effects and complications including but not necessarily limited to infection, allergic reaction, local tissue breakdown, aseptic effusion potentially necessitating aspiration and corticosteroid injection, elevation of blood glucose, injury to soft tissue and/or nerves, and seizure, the patient indicated their understanding and agreed to proceed. FOLLOW UP    Follow up in 6-8 weeks.     Electronically signed by Babatunde Swann MD on 3/18/2022

## 2022-04-06 ENCOUNTER — HOSPITAL ENCOUNTER (OUTPATIENT)
Dept: MAMMOGRAPHY | Age: 87
Discharge: HOME | End: 2022-04-08
Payer: MEDICARE

## 2022-04-06 VITALS — HEIGHT: 67 IN | WEIGHT: 167 LBS | BODY MASS INDEX: 26.21 KG/M2

## 2022-04-06 DIAGNOSIS — Z12.31 VISIT FOR SCREENING MAMMOGRAM: ICD-10-CM

## 2022-04-06 LAB — MAMMOGRAPHY, EXTERNAL: NORMAL

## 2022-04-06 PROCEDURE — 77063 BREAST TOMOSYNTHESIS BI: CPT

## 2022-04-11 ENCOUNTER — OFFICE VISIT (OUTPATIENT)
Dept: CARDIOLOGY CLINIC | Age: 87
End: 2022-04-11
Payer: MEDICARE

## 2022-04-11 VITALS
SYSTOLIC BLOOD PRESSURE: 166 MMHG | WEIGHT: 173 LBS | HEIGHT: 68 IN | BODY MASS INDEX: 26.22 KG/M2 | HEART RATE: 110 BPM | DIASTOLIC BLOOD PRESSURE: 155 MMHG | RESPIRATION RATE: 18 BRPM

## 2022-04-11 DIAGNOSIS — I48.19 PERSISTENT ATRIAL FIBRILLATION (HCC): Primary | ICD-10-CM

## 2022-04-11 PROBLEM — R31.9 HEMATURIA: Status: RESOLVED | Noted: 2022-01-27 | Resolved: 2022-04-11

## 2022-04-11 PROBLEM — R53.83 FATIGUE: Status: RESOLVED | Noted: 2022-01-27 | Resolved: 2022-04-11

## 2022-04-11 PROCEDURE — 99214 OFFICE O/P EST MOD 30 MIN: CPT | Performed by: INTERNAL MEDICINE

## 2022-04-11 PROCEDURE — G8427 DOCREV CUR MEDS BY ELIG CLIN: HCPCS | Performed by: INTERNAL MEDICINE

## 2022-04-11 PROCEDURE — 4040F PNEUMOC VAC/ADMIN/RCVD: CPT | Performed by: INTERNAL MEDICINE

## 2022-04-11 PROCEDURE — 1123F ACP DISCUSS/DSCN MKR DOCD: CPT | Performed by: INTERNAL MEDICINE

## 2022-04-11 PROCEDURE — 93000 ELECTROCARDIOGRAM COMPLETE: CPT | Performed by: INTERNAL MEDICINE

## 2022-04-11 PROCEDURE — 1090F PRES/ABSN URINE INCON ASSESS: CPT | Performed by: INTERNAL MEDICINE

## 2022-04-11 PROCEDURE — G8417 CALC BMI ABV UP PARAM F/U: HCPCS | Performed by: INTERNAL MEDICINE

## 2022-04-11 PROCEDURE — 1036F TOBACCO NON-USER: CPT | Performed by: INTERNAL MEDICINE

## 2022-04-11 RX ORDER — AMIODARONE HYDROCHLORIDE 100 MG/1
100 TABLET ORAL DAILY
Qty: 30 TABLET | Refills: 5 | Status: SHIPPED
Start: 2022-04-11 | End: 2022-10-05

## 2022-04-11 NOTE — PROGRESS NOTES
Chief Complaint   Patient presents with    Atrial Fibrillation       Patient Active Problem List    Diagnosis Date Noted    COVID 01/27/2022    Urinary frequency 01/27/2022    Vitamin D deficiency 06/18/2019    Hyperlipidemia     Depression     Asthma     History of nuclear stress test 09/24/2018     Overview Note:     Normal pharm stress 8/2018      Persistent atrial fibrillation ([address] 06/15/2018     Overview Note:     A. Initial presentation June 2018. CHADS-VASC = 3  B. CONSTANTINO guided DC cardioversion  6/15/2018.    No significant structural disesae on CONSTANTINO  C. DCCV 9/2019  D. DCCV 2/3/2021  E. DCCV 8/2/21 on amidarone  F. Miky Sleek: 8/6/21      Benign hypertensive heart disease without congestive heart failure 09/12/2006    Hypothyroidism 09/12/2006       Current Outpatient Medications   Medication Sig Dispense Refill    amiodarone (PACERONE) 100 MG tablet Take 1 tablet by mouth daily 30 tablet 5    citalopram (CELEXA) 20 MG tablet Take 1 tablet by mouth once daily 30 tablet 5    WIXELA INHUB 250-50 MCG/DOSE AEPB INHALE 1 PUFF INTO THE LUNGS EVERY 12 HOURS 180 each 3    albuterol sulfate HFA (PROVENTIL HFA) 108 (90 Base) MCG/ACT inhaler Inhale 2 puffs into the lungs every 4 hours as needed for Wheezing or Shortness of Breath 1 each 1    dilTIAZem (CARDIZEM CD) 180 MG extended release capsule TAKE 1 CAPSULE BY MOUTH TWICE A  capsule 1    levothyroxine (SYNTHROID) 125 MCG tablet Take 1 tablet by mouth Daily 90 tablet 1    metoprolol succinate (TOPROL XL) 25 MG extended release tablet Take 1 tablet by mouth daily 30 tablet 3    rivaroxaban (XARELTO) 20 MG TABS tablet Take 1 tablet by mouth daily (with breakfast) 30 tablet 1    simvastatin (ZOCOR) 40 MG tablet Take 40 mg by mouth nightly   3    Cyanocobalamin (VITAMIN B-12 PO) Take 6,000 mcg by mouth daily       Cholecalciferol (VITAMIN D3) 2000 units TABS Take 1 tablet by mouth daily       Biotin 5000 MCG CAPS Take 1 capsule by mouth Family: Not on file    Frequency of Social Gatherings with Friends and Family: Not on file    Attends Orthodox Services: Not on file    Active Member of Clubs or Organizations: Not on file    Attends Club or Organization Meetings: Not on file    Marital Status: Not on file   Intimate Partner Violence:     Fear of Current or Ex-Partner: Not on file    Emotionally Abused: Not on file    Physically Abused: Not on file    Sexually Abused: Not on file   Housing Stability:     Unable to Pay for Housing in the Last Year: Not on file    Number of [place] in the Last Year: Not on file    Unstable Housing in the Last Year: Not on file       Family History   Problem Relation Age of Onset    Melanoma Mother 61    Breast Cancer Daughter 36        invasive         SUBJECTIVE: Prince Bragg presents to the office today for re-evaluation of cardiac diagnoses   She is an extremely difficult historian, but it seems she is more sob now. .   She denies clear cut anginal   chest pain,  claudication,  exertional chest pressure/discomfort, fatigue, near-syncope, orthopnea, paroxysmal nocturnal dyspnea, syncope and tachypnea. Compliant with NOAC but could not tolerate BB at even low dose due to fatigue. . NO bleeding    Review of Systems:   Heart: as above   Lungs: as above   Eyes: denies changes in vision or discharge. Ears: denies changes in hearing or pain. Nose: denies epistaxis or masses   Throat: denies sore throat or trouble swallowing. Neuro: denies numbness, tingling, tremors. Skin: denies rashes or itching. : denies hematuria, dysuria   GI: denies vomiting, diarrhea   Psych: denies mood changed, anxiety, depression. all others negative. Physical Exam   BP (!) 166/155   Pulse 110   Resp 18   Ht 5' 8\" (1.727 m)   Wt 173 lb (78.5 kg)   BMI 26.30 kg/m²   Constitutional: Oriented to person, place, and time. Well-developed and well-nourished. No distress.     Head: Normocephalic and atraumatic. Eyes: EOM are normal. Pupils are equal, round, and reactive to light. Neck: Normal range of motion. Neck supple. No hepatojugular reflux and no JVD present. Carotid bruit is not present. No tracheal deviation present. No thyromegaly present. Cardiovascular:  Rapid rate, irregular rhythm, normal heart sounds and intact distal pulses. Exam reveals no gallop and no friction rub. No murmur heard. Pulmonary/Chest: Effort normal and breath sounds normal. No respiratory distress. No wheezes. No rales. No tenderness. Abdominal: Soft. Bowel sounds are normal. No distension and no mass. No tenderness. No rebound and no guarding. Musculoskeletal: Normal range of motion. No edema and no tenderness. Neurological: Alert and oriented to person, place, and time. Skin: Skin is warm and dry. No rash noted. Not diaphoretic. No erythema. Psychiatric: Normal mood and affect. Behavior is normal.     EKG: afib, [id number], nonspecific ST and T waves changes. ASSESSMENT AND PLAN:  Patient Active Problem List   Diagnosis    Persistent atrial fibrillation: s/p DC cardio 6/2018 and 9/2019 and 2/2021 with elevated chads vasc . Had some diastolic failure with initial presenation, but no structural disease of significance on CONSTANTINO. Recurrent afib despite AAD with dronedarone and amiodarone trial attempts. Now with rate control strategy. Unable to tolerate toprol xl 25 mg qd. I think digoxin in 80year old with CKD is unwise. I earl add amiodarone 100 mg qd for rate control. Patient and daughter know that TFTs and LFTs are needed regularly,  Samples given of Xarelto  .      HLD (hyperlipidemia) on statin      OV 2 months        Royal Garcia M.D  [facility]

## 2022-04-14 ENCOUNTER — TELEPHONE (OUTPATIENT)
Dept: CARDIOLOGY CLINIC | Age: 87
End: 2022-04-14

## 2022-04-14 NOTE — TELEPHONE ENCOUNTER
Patient called and stated she started the Amiodarone 100 mg one dose and her heart rate went up to 125 and beating very fast and she feels very out of breath.   Please advise

## 2022-04-18 RX ORDER — DILTIAZEM HYDROCHLORIDE 180 MG/1
CAPSULE, COATED, EXTENDED RELEASE ORAL
Qty: 180 CAPSULE | Refills: 1 | Status: SHIPPED
Start: 2022-04-18 | End: 2022-06-09 | Stop reason: SDUPTHER

## 2022-04-23 ENCOUNTER — APPOINTMENT (OUTPATIENT)
Dept: ULTRASOUND IMAGING | Age: 87
End: 2022-04-23
Payer: MEDICARE

## 2022-04-23 ENCOUNTER — APPOINTMENT (OUTPATIENT)
Dept: GENERAL RADIOLOGY | Age: 87
End: 2022-04-23
Payer: MEDICARE

## 2022-04-23 ENCOUNTER — HOSPITAL ENCOUNTER (EMERGENCY)
Age: 87
Discharge: HOME | End: 2022-04-23
Payer: MEDICARE

## 2022-04-23 VITALS
HEART RATE: 110 BPM | SYSTOLIC BLOOD PRESSURE: 132 MMHG | RESPIRATION RATE: 18 BRPM | TEMPERATURE: 97.2 F | WEIGHT: 167 LBS | OXYGEN SATURATION: 96 % | HEIGHT: 68 IN | BODY MASS INDEX: 25.31 KG/M2 | DIASTOLIC BLOOD PRESSURE: 87 MMHG

## 2022-04-23 DIAGNOSIS — M25.572 ACUTE LEFT ANKLE PAIN: Primary | ICD-10-CM

## 2022-04-23 DIAGNOSIS — S86.002A INJURY OF LEFT ACHILLES TENDON, INITIAL ENCOUNTER: ICD-10-CM

## 2022-04-23 PROCEDURE — 93971 EXTREMITY STUDY: CPT

## 2022-04-23 PROCEDURE — 99284 EMERGENCY DEPT VISIT MOD MDM: CPT

## 2022-04-23 PROCEDURE — 93971 EXTREMITY STUDY: CPT | Performed by: RADIOLOGY

## 2022-04-23 PROCEDURE — 73610 X-RAY EXAM OF ANKLE: CPT

## 2022-04-23 RX ORDER — HYDROCODONE BITARTRATE AND ACETAMINOPHEN 5; 325 MG/1; MG/1
1 TABLET ORAL EVERY 8 HOURS PRN
Qty: 3 TABLET | Refills: 0 | Status: SHIPPED | OUTPATIENT
Start: 2022-04-23 | End: 2022-04-24

## 2022-04-23 ASSESSMENT — ENCOUNTER SYMPTOMS
COLOR CHANGE: 0
COUGH: 0
SHORTNESS OF BREATH: 0
CHEST TIGHTNESS: 0
BACK PAIN: 0

## 2022-04-23 ASSESSMENT — PAIN DESCRIPTION - ORIENTATION: ORIENTATION: LEFT

## 2022-04-23 ASSESSMENT — PAIN DESCRIPTION - DESCRIPTORS: DESCRIPTORS: SHARP;ACHING

## 2022-04-23 ASSESSMENT — PAIN - FUNCTIONAL ASSESSMENT: PAIN_FUNCTIONAL_ASSESSMENT: 0-10

## 2022-04-23 ASSESSMENT — PAIN SCALES - GENERAL: PAINLEVEL_OUTOF10: 9

## 2022-04-23 ASSESSMENT — PAIN DESCRIPTION - LOCATION: LOCATION: ANKLE

## 2022-04-24 NOTE — ED PROVIDER NOTES
[facility]        Department of Emergency Medicine   ED  Provider Note  Admit Date/RoomTime: 4/23/2022  8:27 PM  ED Room: [place] [address]  HPI:  4/23/22, Time: 10:30 PM EDT      Patient is an 49-year-old female presenting to the emergency department with her daughter with pain and swelling of her left ankle. Patient reports that she was walking up the steps into the house from her garage and felt a \"twinge\" in her ankle. She states she did not think much of it and continued to go about her day. She she was then playing cards earlier today and moved her foot and had sudden onset excruciating pain in the back of her ankle. She states that instantly started to swell and she has noticed some bruising. Patient states she did not actually injure it that she can remember. She took some Tylenol for the pain prior to arrival.  Patient states the pain is worse when she bears weight and moves her foot at all. Patient is supposed to be on Xarelto but just recently stopped it due to being scheduled to have a colonoscopy next week. The pain does radiate from her ankle up into her calf. Patient does not have any redness, warmth, numbness or tingling, weakness in the foot. The history is provided by the patient and a relative. No  was used. REVIEW OF SYSTEMS:  Review of Systems   Constitutional: Negative for activity change, chills, fatigue and fever. Respiratory: Negative for cough, chest tightness and shortness of breath. Cardiovascular: Negative for chest pain, palpitations and leg swelling. Musculoskeletal: Positive for arthralgias and joint swelling. Negative for back pain, myalgias, neck pain and neck stiffness. Skin: Negative for color change, pallor and rash. Neurological: Negative for dizziness, weakness, light-headedness and headaches. Psychiatric/Behavioral: Negative for agitation, behavioral problems and confusion.       Pertinent positives and negatives are stated within HPI, all other systems reviewed and are negative.      --------------------------------------------- PAST HISTORY ---------------------------------------------  Past Medical History:  has a past medical history of Asthma, Depression, Generalized osteoarthritis, Hyperlipidemia, Knee pain, Pinched nerve in neck, Thyroid disease, and Vitamin D deficiency. Past Surgical History:  has a past surgical history that includes Tonsillectomy; Hysterectomy; Thyroidectomy (17 YEARS AGO); Appendectomy (58 YEARS AGO); eye surgery; Neck surgery (9/9/2011); Cholecystectomy; Cardioversion (09/20/2019); and Cardioversion (08/02/2021). Social History:  reports that she quit smoking about 27 years ago. Her smoking use included cigarettes. She has never used smokeless tobacco. She reports that she does not drink alcohol and does not use drugs. Family History: family history includes Breast Cancer (age of onset: 36) in her daughter; Melanoma (age of onset: 61) in her mother. The patients home medications have been reviewed. Allergies: Codeine, Codimal-a [brompheniramine], Lipitor, Spiriva handihaler [tiotropium bromide monohydrate], Tudorza pressair [aclidinium bromide], and Ciprofloxacin    -------------------------------------------------- RESULTS -------------------------------------------------  All laboratory and radiology results have been personally reviewed by myself   LABS:  No results found for this visit on 04/23/22. RADIOLOGY:  Interpreted by Radiologist.  [address] DUP LOWER EXTREMITY LEFT RUTH ANN   Final Result   Within the visualized vessels there is no evidence for deep venous   thrombosis               XR ANKLE LEFT (MIN 3 VIEWS)   Final Result   Left ankle soft tissue swelling. No joint effusion. Osseous irregularity   towards the distal aspect of the medial malleolus. Please correlate with   point tenderness in this area as fracture cannot be excluded. Osseous mineralization is decreased.   Normal overall alignment. RECOMMENDATION:   Consider CT scan of the left ankle if indicated. ------------------------- NURSING NOTES AND VITALS REVIEWED ---------------------------   The nursing notes within the ED encounter and vital signs as below have been reviewed. /87   Pulse 110   Temp 97.2 °F (36.2 °C)   Resp 18   Ht 5' 8\" (1.727 m)   Wt 167 lb (75.8 kg)   SpO2 96%   BMI 25.39 kg/m²   Oxygen Saturation Interpretation: Normal      ---------------------------------------------------PHYSICAL EXAM--------------------------------------    Physical Exam  Vitals and nursing note reviewed. Constitutional:       General: She is not in acute distress. Appearance: Normal appearance. She is well-developed. She is not ill-appearing. HENT:      Head: Normocephalic and atraumatic. Mouth/Throat:      Mouth: Mucous membranes are moist.      Pharynx: Oropharynx is clear. No oropharyngeal exudate. Neck:      Vascular: No JVD. Trachea: No tracheal deviation. Cardiovascular:      Rate and Rhythm: Normal rate and regular rhythm. Heart sounds: Normal heart sounds. No murmur heard. Pulmonary:      Effort: Pulmonary effort is normal. No respiratory distress. Breath sounds: Normal breath sounds. Musculoskeletal:         General: No deformity. Normal range of motion. Cervical back: Normal range of motion and neck supple. Comments: Tenderness over the Achilles tendon and distal calf muscles. Pain with plantar and dorsiflexion of the foot. No laxity or deformity. Very mild overlying edema of the ankle. No tenderness to the medial or lateral malleolus. 2+ DP pulse. Distal sensation intact. Skin:     General: Skin is warm and dry. Capillary Refill: Capillary refill takes less than 2 seconds. Coloration: Skin is not pale. Findings: No erythema. Neurological:      Mental Status: She is alert and oriented to person, place, and time.  Mental status is at baseline. Motor: No weakness. Psychiatric:         Mood and Affect: Mood normal.         Behavior: Behavior normal.         Thought Content: Thought content normal.            ------------------------------ ED COURSE/MEDICAL DECISION MAKING----------------------  Medications - No data to display      ED COURSE:      US DUP LOWER EXTREMITY LEFT RUTH ANN   Final Result   Within the visualized vessels there is no evidence for deep venous   thrombosis               XR ANKLE LEFT (MIN 3 VIEWS)   Final Result   Left ankle soft tissue swelling. No joint effusion. Osseous irregularity   towards the distal aspect of the medial malleolus. Please correlate with   point tenderness in this area as fracture cannot be excluded. Osseous mineralization is decreased. Normal overall alignment. RECOMMENDATION:   Consider CT scan of the left ankle if indicated. Procedures:  Procedures     Medical Decision Making:   MDM   25-year-old female presenting to the ED with nontraumatic left ankle pain and swelling that began earlier today. Patient does have tenderness over the Achilles tendon and the distal calf muscles but no obvious laxity or deformity to suggest a complete Achilles tendon rupture. This may be a calf muscle strain versus partial Achilles tear. US negative for DVT. X-ray questions an osseous abnormality at the medial malleolus but patient has no point tenderness at this location. This is likely chronic in nature. Patient lives with her daughter and states she does not have to go up and down steps. She is placed in a walking boot given a very short course of Norco.  Her and the daughter educated on the safe use of the medication and advised to use it only as a last resort. Patient sees Dr. Gurinder Sanderson and will follow up this week. She is to return to the ED with any new or worsening symptoms. Counseling:    The emergency provider has spoken with the patient and family member patient and daughter and discussed todays results, in addition to providing specific details for the plan of care and counseling regarding the diagnosis and prognosis. Questions are answered at this time and they are agreeable with the plan.      --------------------------------- IMPRESSION AND DISPOSITION ---------------------------------    IMPRESSION  1. Acute left ankle pain    2. Injury of left Achilles tendon, initial encounter        DISPOSITION  Disposition: Discharge to home  Patient condition is good      Electronically signed by Samantha Olson PA-C   DD: 4/23/22  **This report was transcribed using voice recognition software. Every effort was made to ensure accuracy; however, inadvertent computerized transcription errors may be present.   END OF ED PROVIDER NOTE          Samantha Olson PA-C  04/23/22 [id number]

## 2022-04-27 ENCOUNTER — HOSPITAL ENCOUNTER (OUTPATIENT)
Age: 87
Discharge: HOME | End: 2022-04-29
Payer: MEDICARE

## 2022-04-27 PROCEDURE — 88305 TISSUE EXAM BY PATHOLOGIST: CPT

## 2022-05-05 ENCOUNTER — HOSPITAL ENCOUNTER (OUTPATIENT)
Dept: NEUROLOGY | Age: 87
Discharge: HOME | End: 2022-05-05
Payer: MEDICARE

## 2022-05-05 VITALS — BODY MASS INDEX: 25.76 KG/M2 | WEIGHT: 170 LBS | HEIGHT: 68 IN

## 2022-05-05 DIAGNOSIS — G56.02 CARPAL TUNNEL SYNDROME ON LEFT: ICD-10-CM

## 2022-05-05 PROCEDURE — 95909 NRV CNDJ TST 5-6 STUDIES: CPT | Performed by: PSYCHIATRY & NEUROLOGY

## 2022-05-05 PROCEDURE — 95886 MUSC TEST DONE W/N TEST COMP: CPT | Performed by: PSYCHIATRY & NEUROLOGY

## 2022-05-05 PROCEDURE — 95886 MUSC TEST DONE W/N TEST COMP: CPT

## 2022-05-05 PROCEDURE — 95909 NRV CNDJ TST 5-6 STUDIES: CPT

## 2022-05-05 NOTE — PROCEDURES
[address]   Electrodiagnostic Laboratory  [place]        Full Name: Juan Ochoa Gender: Female  MRN: [id number] YOB: 1934  Location[de-identified] Outpt. Visit Date: 5/5/2022 11:10  Age: 80 Years 5 Months Old  Examining Physician: Dr. Selvin Tabares  Referring Physician: Dr. Randene Najjar  Technician: Ebenezer Artist   Height: 5 feet 8 inch  Weight: 170 lbs  BMI: 25.4  Notes: CTS, left G56.02        Motor NCS      Nerve / Sites Lat. Amplitude Distance Lat Diff Velocity Temp. Amp. 1-2    ms mV cm ms m/s °C %   L Median - APB      Wrist 5.63 4.6 8   32.6 100      Elbow 9.69 4.4 21 4.06 52 32.7 95.5   L Ulnar - ADM      Wrist 2.76 9.7 8   32.9 100      B. Elbow 5.89 9.3 18 3.13 58 32.7 95.7      A. Elbow 7.55 8.6 10 1.67 60 32.6 89.1       Sensory NCS      Nerve / Sites Onset Lat Peak Lat PP Amp Distance Velocity Temp.    ms ms µV cm m/s °C   L Median - Digit II (Antidromic)      Mid Palm 1.46 2.19 8.9 7 48 33      Wrist 4.38 5.36 8.9 14 32 33   L Ulnar - Digit V (Antidromic)      Wrist 2.29 2.97 29.5 14 61 33   L Radial - Anatomical snuff box (Forearm)      Forearm 1.98 2.50 13.5 10 51 32.7       F  Wave      Nerve F Lat M Lat F-M Lat    ms ms ms   L Median - APB 31.9 5.7 26.3   L Ulnar - ADM 27.7 2.7 25.0         EMG         EMG Summary Table     Spontaneous MUAP Recruitment   Muscle IA Fib PSW Fasc H.F. Amp Dur. PPP Pattern   L. First dorsal interosseous N None None None None N N N N   L. Abductor pollicis brevis N None None None None N N N Sl Decr   L. Flexor pollicis longus N None None None None N N N N   L. Flexor digitorum profundus (Ulnar) N None None None None N N N N   L. Extensor indicis proprius N None None None None N N N N   L. Brachioradialis N None None None None N N N N   L. Biceps brachii N None None None None N N N N   L. Triceps brachii N None None None None N N N N   L. Deltoid N None None None None N N N N   L.  Cervical paraspinals (low) N None None None None N N N N   L. Cervical paraspinals (mid) N None None None None N N N N         Nerve conduction studies in the left arm disclosed the following abnormalities--- moderate delay in the distal latency of the left median nerve at the wrist; left median nerve palmar and distal sensory latencies were also prolonged, with decreased antidromic conduction velocities. Left median nerve F wave latency was delayed as well. These findings were compared to the referential values in this laboratory, available upon request.    Monopolar needle examination of the left arm and only minimal conduction block in the abductor pollicis brevis muscle. Needle testing of the paraspinals was unrevealing. Electrodiagnostic examination of the left arm disclosed evidence diagnostic of the left median neuropathy at/or distal to the wrist, of moderate severity. These findings were consistent with carpal tunnel syndrome. There were no other peripheral neuropathies. There were no motor radiculopathies or intracanalicular lesions. Sensory radiculopathies cannot be evaluated by electrodiagnostic means. Clinically, the patient presented with tingling sensations in the left, especially at night. On brief neurological examination, I found a Tinel's sign at the left wrist, but no motor or sensory compromise. Her difficulties are related to her carpal tunnel syndrome. Clinical correlation was highly advised.

## 2022-05-09 ENCOUNTER — TELEPHONE (OUTPATIENT)
Dept: PULMONOLOGY | Age: 87
End: 2022-05-09

## 2022-05-09 NOTE — TELEPHONE ENCOUNTER
Call from pt requesting records be faxed to Dr. Dunn Foot office. She is wanting to establish care and they are requesting office fax her record. Office faxed notes as requested and advised pt to call if further needs.

## 2022-05-23 ENCOUNTER — TELEPHONE (OUTPATIENT)
Dept: FAMILY MEDICINE CLINIC | Age: 87
End: 2022-05-23

## 2022-05-23 DIAGNOSIS — J44.9 CHRONIC OBSTRUCTIVE PULMONARY DISEASE, UNSPECIFIED COPD TYPE (HCC): Primary | ICD-10-CM

## 2022-05-23 NOTE — TELEPHONE ENCOUNTER
----- Message from Annette Lugo sent at 5/23/2022 10:59 AM EDT -----  Subject: Referral Request    QUESTIONS   Reason for referral request? PT called in regards to needing a referral to   see a pulmonologist. PT would like to see Dr. Robert Wick. PT has been   already been diagnosed with COPD @ [facility] in   [facility]. Has the physician seen you for this condition before? No   Preferred Specialist (if applicable)? Do you already have an appointment scheduled? Additional Information for Provider?   ---------------------------------------------------------------------------  --------------  CALL BACK INFO  What is the best way for the office to contact you? OK to leave message on   voicemail  Preferred Call Back Phone Number? [phone]  ---------------------------------------------------------------------------  --------------  SCRIPT ANSWERS  Relationship to Patient? Other  Representative Name? Marquise Bonilla   Is the Representative on the appropriate HIPAA document in Epic?  Yes

## 2022-05-23 NOTE — TELEPHONE ENCOUNTER
I spoke with patient because it looks like Dr. Willam Lambert's office was going to do a referral to Dr. Candida Eli office but patient states she never heard anything. I do not see a referral in her chart. Please advise.

## 2022-06-08 ENCOUNTER — OFFICE VISIT (OUTPATIENT)
Dept: FAMILY MEDICINE CLINIC | Age: 87
End: 2022-06-08
Payer: MEDICARE

## 2022-06-08 VITALS
DIASTOLIC BLOOD PRESSURE: 78 MMHG | RESPIRATION RATE: 16 BRPM | HEIGHT: 68 IN | OXYGEN SATURATION: 97 % | TEMPERATURE: 97 F | SYSTOLIC BLOOD PRESSURE: 130 MMHG | BODY MASS INDEX: 26.22 KG/M2 | HEART RATE: 104 BPM | WEIGHT: 173 LBS

## 2022-06-08 DIAGNOSIS — R06.02 SHORTNESS OF BREATH: ICD-10-CM

## 2022-06-08 DIAGNOSIS — R05.9 COUGH: Primary | ICD-10-CM

## 2022-06-08 DIAGNOSIS — E03.9 HYPOTHYROIDISM, UNSPECIFIED TYPE: ICD-10-CM

## 2022-06-08 DIAGNOSIS — J45.20 MILD INTERMITTENT ASTHMA WITHOUT COMPLICATION: ICD-10-CM

## 2022-06-08 DIAGNOSIS — E78.5 HYPERLIPIDEMIA, UNSPECIFIED HYPERLIPIDEMIA TYPE: ICD-10-CM

## 2022-06-08 DIAGNOSIS — J44.9 CHRONIC OBSTRUCTIVE PULMONARY DISEASE, UNSPECIFIED COPD TYPE (HCC): ICD-10-CM

## 2022-06-08 LAB
Lab: NORMAL
PERFORMING INSTRUMENT: NORMAL
QC PASS/FAIL: NORMAL
SARS-COV-2, POC: NORMAL

## 2022-06-08 PROCEDURE — 3023F SPIROM DOC REV: CPT | Performed by: INTERNAL MEDICINE

## 2022-06-08 PROCEDURE — G8427 DOCREV CUR MEDS BY ELIG CLIN: HCPCS | Performed by: INTERNAL MEDICINE

## 2022-06-08 PROCEDURE — 1123F ACP DISCUSS/DSCN MKR DOCD: CPT | Performed by: INTERNAL MEDICINE

## 2022-06-08 PROCEDURE — 99214 OFFICE O/P EST MOD 30 MIN: CPT | Performed by: INTERNAL MEDICINE

## 2022-06-08 PROCEDURE — 1090F PRES/ABSN URINE INCON ASSESS: CPT | Performed by: INTERNAL MEDICINE

## 2022-06-08 PROCEDURE — G8417 CALC BMI ABV UP PARAM F/U: HCPCS | Performed by: INTERNAL MEDICINE

## 2022-06-08 PROCEDURE — 87426 SARSCOV CORONAVIRUS AG IA: CPT | Performed by: INTERNAL MEDICINE

## 2022-06-08 PROCEDURE — 1036F TOBACCO NON-USER: CPT | Performed by: INTERNAL MEDICINE

## 2022-06-08 RX ORDER — METHYLPREDNISOLONE 4 MG/1
TABLET ORAL
Qty: 1 KIT | Refills: 0 | Status: SHIPPED | OUTPATIENT
Start: 2022-06-08 | End: 2022-06-14

## 2022-06-08 RX ORDER — AZITHROMYCIN 250 MG/1
250 TABLET, FILM COATED ORAL SEE ADMIN INSTRUCTIONS
Qty: 6 TABLET | Refills: 0 | Status: SHIPPED
Start: 2022-06-08 | End: 2022-06-13 | Stop reason: CLARIF

## 2022-06-08 RX ORDER — LEVOTHYROXINE SODIUM 0.12 MG/1
125 TABLET ORAL DAILY
Qty: 90 TABLET | Refills: 1 | Status: SHIPPED | OUTPATIENT
Start: 2022-06-08

## 2022-06-08 RX ORDER — ALBUTEROL SULFATE 2.5 MG/3ML
2.5 SOLUTION RESPIRATORY (INHALATION) EVERY 6 HOURS PRN
Qty: 60 EACH | Refills: 5 | Status: SHIPPED | OUTPATIENT
Start: 2022-06-08 | End: 2022-09-09

## 2022-06-08 ASSESSMENT — PATIENT HEALTH QUESTIONNAIRE - PHQ9
9. THOUGHTS THAT YOU WOULD BE BETTER OFF DEAD, OR OF HURTING YOURSELF: 0
2. FEELING DOWN, DEPRESSED OR HOPELESS: 0
SUM OF ALL RESPONSES TO PHQ9 QUESTIONS 1 & 2: 0
10. IF YOU CHECKED OFF ANY PROBLEMS, HOW DIFFICULT HAVE THESE PROBLEMS MADE IT FOR YOU TO DO YOUR WORK, TAKE CARE OF THINGS AT HOME, OR GET ALONG WITH OTHER PEOPLE: 0
1. LITTLE INTEREST OR PLEASURE IN DOING THINGS: 0
8. MOVING OR SPEAKING SO SLOWLY THAT OTHER PEOPLE COULD HAVE NOTICED. OR THE OPPOSITE, BEING SO FIGETY OR RESTLESS THAT YOU HAVE BEEN MOVING AROUND A LOT MORE THAN USUAL: 0
SUM OF ALL RESPONSES TO PHQ QUESTIONS 1-9: 1
7. TROUBLE CONCENTRATING ON THINGS, SUCH AS READING THE NEWSPAPER OR WATCHING TELEVISION: 0
SUM OF ALL RESPONSES TO PHQ QUESTIONS 1-9: 1
SUM OF ALL RESPONSES TO PHQ QUESTIONS 1-9: 1
6. FEELING BAD ABOUT YOURSELF - OR THAT YOU ARE A FAILURE OR HAVE LET YOURSELF OR YOUR FAMILY DOWN: 0
5. POOR APPETITE OR OVEREATING: 0
SUM OF ALL RESPONSES TO PHQ QUESTIONS 1-9: 1
3. TROUBLE FALLING OR STAYING ASLEEP: 0
4. FEELING TIRED OR HAVING LITTLE ENERGY: 1

## 2022-06-08 NOTE — PROGRESS NOTES
[facility] PRIMARY CARE  [address] [place] [id number]  Dept: [phone]  Dept Fax: [phone]     NAME: Kathryn Dixon        :  1934        MRN:  [id number]    Chief Complaint   Patient presents with    3 Month Follow-Up    Cough     Started 2 days ago.  Shortness of Breath     She has been using her daughters nebulizer machine and would like to get a script for herself. Subjective     History of Present Illness  Maryellen Lomeli is a 80 y.o. female who presents today for routine follow up and medication refill. Patient's daughter tested positive for COVID Monday and is in the room with her today. Patient now has symptoms of cough and shortness of breath. Rapid COVID test was negative today. Patient has been sharing a nebulizer machine with her daughter. They state they each have their own tubing and masks. Review of Systems  Please see HPI above. All bolded are positive.   Gen: fever, chills, fatigue, weakness, diaphoresis, unintentional weight change  Head: headache, vision change, hearing loss  Chest: chest pain/heaviness, palpitations  Lungs: shortness of breath, wheezing, coughing, hemoptysis  Abdomen: abdominal pain, nausea, vomiting, diarrhea, constipation, melena, hematochezia, hematemesis, loss of appetite  Extremities: lower extremity edema, myalgias, arthralgias  Urinary: dysuria, hematuria, weak flow, increase in frequency  Neurologic: lightheadedness, dizziness, confusion, syncope  Endocrine: polydipsia, polyuria, heat or cold intolerance  Psychiatric: depression, suicidal ideation, anxiety  Derm: Rashes, ulcers, burns    Past Medical Hx:  Past Medical History:   Diagnosis Date    Asthma     Depression     Generalized osteoarthritis 2006    Hyperlipidemia     Knee pain     Sees Dr. Raj Isbell for b/l knee pain / gel injections    Pinched nerve in neck     Thyroid disease     Vitamin D deficiency Past Surgical Hx:  Past Surgical History:   Procedure Laterality Date    APPENDECTOMY  62 YEARS AGO    CARDIOVERSION  2019    Dr. Elie Fish 1 shock 200 joules Successful    CARDIOVERSION  2021    Dr. Rashad Contreras. 200J x1 converted to NSR    CHOLECYSTECTOMY      EYE SURGERY      CATARACT RIGHT AND LEFT    HYSTERECTOMY (CERVIX STATUS UNKNOWN)      NECK SURGERY  2011    THYROIDECTOMY  17 YEARS AGO    TONSILLECTOMY         Family Hx:  Family History   Problem Relation Age of Onset    Melanoma Mother 61    Breast Cancer Daughter 36        invasive       Social Hx:  Social History     Tobacco Use    Smoking status: Former Smoker     Types: Cigarettes     Quit date: 1994     Years since quittin.7    Smokeless tobacco: Never Used   Substance Use Topics    Alcohol use: No     Comment: RARE       Home Medications:  Current Outpatient Medications   Medication Sig Dispense Refill    albuterol (PROVENTIL) (2.5 MG/3ML) 0.083% nebulizer solution Take 3 mLs by nebulization every 6 hours as needed for Wheezing or Shortness of Breath 60 each 5    methylPREDNISolone (MEDROL DOSEPACK) 4 MG tablet Take by mouth.  1 kit 0    levothyroxine (SYNTHROID) 125 MCG tablet Take 1 tablet by mouth Daily 90 tablet 1    rivaroxaban (XARELTO) 20 MG TABS tablet Take 1 tablet by mouth daily (with breakfast) 30 tablet 5    amiodarone (PACERONE) 100 MG tablet Take 1 tablet by mouth daily 30 tablet 5    citalopram (CELEXA) 20 MG tablet Take 1 tablet by mouth once daily 30 tablet 5    WIXELA INHUB 250-50 MCG/DOSE AEPB INHALE 1 PUFF INTO THE LUNGS EVERY 12 HOURS 180 each 3    albuterol sulfate HFA (PROVENTIL HFA) 108 (90 Base) MCG/ACT inhaler Inhale 2 puffs into the lungs every 4 hours as needed for Wheezing or Shortness of Breath 1 each 1    simvastatin (ZOCOR) 40 MG tablet Take 40 mg by mouth nightly   3    Cyanocobalamin (VITAMIN B-12 PO) Take 6,000 mcg by mouth daily       Cholecalciferol (VITAMIN D3)  units TABS Take 1 tablet by mouth daily       Biotin 5000 MCG CAPS Take 1 capsule by mouth daily.  Vitamin E 400 UNIT TABS Take 1 tablet by mouth daily.  dilTIAZem (CARDIZEM CD) 180 MG extended release capsule TAKE 1 CAPSULE BY MOUTH TWICE A  capsule 1     No current facility-administered medications for this visit. Allergies: Allergies   Allergen Reactions    Codeine Nausea And Vomiting    Codimal-A [Brompheniramine] Other (See Comments)     Altered mental state and confusion    Lipitor Other (See Comments)     GENERALIZED ACHING, DIFFICULT TO FUNCTION    Spiriva Handihaler [Tiotropium Bromide Monohydrate]     Tudorza Pressair [Aclidinium Bromide]     Ciprofloxacin Nausea And Vomiting       Objective     Vitals:    06/08/22 0951   BP: 130/78   Pulse: (!) 104   Resp: 16   Temp: 97 °F (36.1 °C)   TempSrc: Temporal   SpO2: 97%   Weight: 173 lb (78.5 kg)   Height: 5' 8\" (1.727 m)        Physical Exam  General: Awake, alert, and oriented to person, place, time, and purpose, appears stated age and cooperative, No acute distress  Head: Normocephalic, atraumatic  Eyes: conjunctivae/corneas clear, EOMI  Mouth: Mucous membranes moist with no pharyngeal exudate or erythema  Neck: no JVD, no adenopathy, no carotid bruit, supple, symmetrical, trachea midline  Back: symmetric, ROM normal, No CVA tenderness. Lungs: clear to auscultation bilaterally without wheezes, rales, or rhonchi  Heart: regular rate and rhythm, S1, S2 normal, no murmur, click, rub or gallop  Abdomen: soft, non-tender; bowel sounds normal; no masses,  no organomegaly  Extremities: atraumatic, no cyanosis, no edema  Skin: color, texture, turgor within normal limits.  No rashes or lesions   Neurologic:speech appropriate, moves all 4 extremities, normal muscle strength and tone, CN 2-12 grossly intact    Labs:  Lab Results   Component Value Date    WBC 8.3 01/27/2022    HGB 15.2 01/27/2022    HCT 49.6 (H) 01/27/2022     01/27/2022     01/27/2022    K 3.6 01/27/2022     01/27/2022    CREATININE 1.1 (H) 01/27/2022    BUN 28 (H) 01/27/2022    CO2 25 01/27/2022    GLUCOSE 93 01/27/2022    ALT 19 01/27/2022    AST 26 01/27/2022    INR 1.5 09/05/2020     Lab Results   Component Value Date    TSH 0.979 08/06/2021     Lab Results   Component Value Date    TRIG 115 08/06/2021    TRIG 89 06/17/2019     Lab Results   Component Value Date    HDL 75 08/06/2021     Lab Results   Component Value Date    LDLCALC 38 08/06/2021     Lab Results   Component Value Date    LABA1C 5.6 08/06/2021     Lab Results   Component Value Date    INR 1.5 09/05/2020    INR 1.2 06/16/2019    INR 1.7 08/27/2018    PROTIME 17.0 (H) 09/05/2020    PROTIME 13.4 (H) 06/16/2019    PROTIME 19.9 (H) 08/27/2018      *All recent labs were reviewed. Please see electronic chart for a more comprehensive set of labs    Radiology:  No results found. Assessment and Plan     Patient is a 80 y.o. female who presented to the office for follow up. Full problem list is as follows:  Patient Active Problem List   Diagnosis    Persistent atrial fibrillation ([facility] [address]    History of nuclear stress test    Hyperlipidemia    Depression    Asthma    Vitamin D deficiency    Benign hypertensive heart disease without congestive heart failure    Hypothyroidism    COVID    Urinary frequency       Fernanda Needs was seen today for 3 month follow-up, cough and shortness of breath. Diagnoses and all orders for this visit:    Cough  -     POCT COVID-19, Antigen  -     DME Order for Nebulizer as OP  -     albuterol (PROVENTIL) (2.5 MG/3ML) 0.083% nebulizer solution; Take 3 mLs by nebulization every 6 hours as needed for Wheezing or Shortness of Breath  -     Discontinue: azithromycin (ZITHROMAX) 250 MG tablet;  Take 1 tablet by mouth See Admin Instructions for 5 days 500mg on day 1 followed by 250mg on days 2 - 5 (Patient not taking: Reported on 6/13/2022)  -     methylPREDNISolone (MEDROL DOSEPACK) 4 MG tablet; Take by mouth. Shortness of breath  -     POCT COVID-19, Antigen  -     DME Order for Nebulizer as OP  -     albuterol (PROVENTIL) (2.5 MG/3ML) 0.083% nebulizer solution; Take 3 mLs by nebulization every 6 hours as needed for Wheezing or Shortness of Breath  -     Discontinue: azithromycin (ZITHROMAX) 250 MG tablet; Take 1 tablet by mouth See Admin Instructions for 5 days 500mg on day 1 followed by 250mg on days 2 - 5 (Patient not taking: Reported on 6/13/2022)  -     methylPREDNISolone (MEDROL DOSEPACK) 4 MG tablet; Take by mouth. Chronic obstructive pulmonary disease, unspecified COPD type ([facility] [address]  -     DME Order for Nebulizer as OP  -     albuterol (PROVENTIL) (2.5 MG/3ML) 0.083% nebulizer solution; Take 3 mLs by nebulization every 6 hours as needed for Wheezing or Shortness of Breath    Hyperlipidemia, unspecified hyperlipidemia type    Hypothyroidism, unspecified type  -     levothyroxine (SYNTHROID) 125 MCG tablet; Take 1 tablet by mouth Daily    Mild intermittent asthma without complication    Nebulizer was ordered for the patient as she should have her own and her own Rx for the medications as well. Advised both her and her daughter to continue to quarantine until after day 5 of symptoms. Unfortunately it does not seem as though her daughter is taking her recommended quarantine seriously as she states she thinks she had a false positive result. Educational materials and/or home exercises printed for patient's review and were included in patient instructions on his/her After Visit Summary and given to patient at the end of visit. Counseled regarding above diagnosis, including possible risks and complications, especially if left uncontrolled.      Counseled regarding the possible side effects, risks, benefits and alternatives to treatment; patient and/or guardian verbalizes understanding, agrees, feels comfortable with and wishes to proceed with above treatment plan.     Advised patient to call [facility] Pun new medication issues, and read all Rx info from pharmacy to assure aware of all possible risks and side effects of any medication before taking. Patient verbalizes understanding and agrees with above counseling, assessment and plan. All questions answered.     Gabriela Cesar,

## 2022-06-09 RX ORDER — DILTIAZEM HYDROCHLORIDE 180 MG/1
CAPSULE, COATED, EXTENDED RELEASE ORAL
Qty: 180 CAPSULE | Refills: 1 | Status: SHIPPED | OUTPATIENT
Start: 2022-06-09

## 2022-06-10 ENCOUNTER — TELEPHONE (OUTPATIENT)
Dept: FAMILY MEDICINE CLINIC | Age: 87
End: 2022-06-10

## 2022-06-10 DIAGNOSIS — R05.9 COUGH: ICD-10-CM

## 2022-06-10 DIAGNOSIS — J45.20 MILD INTERMITTENT ASTHMA WITHOUT COMPLICATION: Primary | ICD-10-CM

## 2022-06-10 NOTE — TELEPHONE ENCOUNTER
Pulmonary is instructing her to have a cat scan and or chest xray prior to 8/11/2022 appointment. Can you please put the orders in?  She will  when ready please call [phone]

## 2022-06-13 ENCOUNTER — OFFICE VISIT (OUTPATIENT)
Dept: CARDIOLOGY CLINIC | Age: 87
End: 2022-06-13
Payer: MEDICARE

## 2022-06-13 VITALS
RESPIRATION RATE: 16 BRPM | HEART RATE: 101 BPM | WEIGHT: 170.6 LBS | BODY MASS INDEX: 25.85 KG/M2 | SYSTOLIC BLOOD PRESSURE: 117 MMHG | HEIGHT: 68 IN | DIASTOLIC BLOOD PRESSURE: 80 MMHG

## 2022-06-13 DIAGNOSIS — I48.19 PERSISTENT ATRIAL FIBRILLATION (HCC): Primary | ICD-10-CM

## 2022-06-13 PROCEDURE — 1123F ACP DISCUSS/DSCN MKR DOCD: CPT | Performed by: INTERNAL MEDICINE

## 2022-06-13 PROCEDURE — 99214 OFFICE O/P EST MOD 30 MIN: CPT | Performed by: INTERNAL MEDICINE

## 2022-06-13 PROCEDURE — 1036F TOBACCO NON-USER: CPT | Performed by: INTERNAL MEDICINE

## 2022-06-13 PROCEDURE — G8417 CALC BMI ABV UP PARAM F/U: HCPCS | Performed by: INTERNAL MEDICINE

## 2022-06-13 PROCEDURE — G8427 DOCREV CUR MEDS BY ELIG CLIN: HCPCS | Performed by: INTERNAL MEDICINE

## 2022-06-13 PROCEDURE — 93000 ELECTROCARDIOGRAM COMPLETE: CPT | Performed by: INTERNAL MEDICINE

## 2022-06-13 PROCEDURE — 1090F PRES/ABSN URINE INCON ASSESS: CPT | Performed by: INTERNAL MEDICINE

## 2022-06-13 NOTE — PROGRESS NOTES
Chief Complaint   Patient presents with    Atrial Fibrillation       Patient Active Problem List    Diagnosis Date Noted    COVID 01/27/2022    Urinary frequency 01/27/2022    Vitamin D deficiency 06/18/2019    Hyperlipidemia     Depression     Asthma     History of nuclear stress test 09/24/2018     Overview Note:     Normal pharm stress 8/2018      Persistent atrial fibrillation ([address] 06/15/2018     Overview Note:     A. Initial presentation June 2018. CHADS-VASC = 3  B. CONSTANTINO guided DC cardioversion  6/15/2018. No significant structural disesae on CONSTANTINO  C. DCCV 9/2019  D. DCCV 2/3/2021  E. DCCV 8/2/21 on amidarone  F. Laren Huge: 8/6/21      Benign hypertensive heart disease without congestive heart failure 09/12/2006    Hypothyroidism 09/12/2006       Current Outpatient Medications   Medication Sig Dispense Refill    dilTIAZem (CARDIZEM CD) 180 MG extended release capsule TAKE 1 CAPSULE BY MOUTH TWICE A  capsule 1    albuterol (PROVENTIL) (2.5 MG/3ML) 0.083% nebulizer solution Take 3 mLs by nebulization every 6 hours as needed for Wheezing or Shortness of Breath 60 each 5    methylPREDNISolone (MEDROL DOSEPACK) 4 MG tablet Take by mouth.  1 kit 0    levothyroxine (SYNTHROID) 125 MCG tablet Take 1 tablet by mouth Daily 90 tablet 1    rivaroxaban (XARELTO) 20 MG TABS tablet Take 1 tablet by mouth daily (with breakfast) 30 tablet 5    amiodarone (PACERONE) 100 MG tablet Take 1 tablet by mouth daily 30 tablet 5    citalopram (CELEXA) 20 MG tablet Take 1 tablet by mouth once daily 30 tablet 5    WIXELA INHUB 250-50 MCG/DOSE AEPB INHALE 1 PUFF INTO THE LUNGS EVERY 12 HOURS 180 each 3    albuterol sulfate HFA (PROVENTIL HFA) 108 (90 Base) MCG/ACT inhaler Inhale 2 puffs into the lungs every 4 hours as needed for Wheezing or Shortness of Breath 1 each 1    simvastatin (ZOCOR) 40 MG tablet Take 40 mg by mouth nightly   3    Cyanocobalamin (VITAMIN B-12 PO) Take 6,000 mcg by mouth daily       Cholecalciferol (VITAMIN D3) 2000 units TABS Take 1 tablet by mouth daily       Biotin 5000 MCG CAPS Take 1 capsule by mouth daily.  Vitamin E 400 UNIT TABS Take 1 tablet by mouth daily. No current facility-administered medications for this visit. Allergies   Allergen Reactions    Codeine Nausea And Vomiting    Codimal-A [Brompheniramine] Other (See Comments)     Altered mental state and confusion    Lipitor Other (See Comments)     GENERALIZED ACHING, DIFFICULT TO FUNCTION    Spiriva Handihaler [Tiotropium Bromide Monohydrate]     Tudorza Pressair [Aclidinium Bromide]     Ciprofloxacin Nausea And Vomiting       Vitals:    22 1002   BP: 117/80   Pulse: (!) 101   Resp: 16   Weight: 170 lb 9.6 oz (77.4 kg)   Height: 5' 8\" (1.727 m)       Social History     Socioeconomic History    Marital status:      Spouse name: Not on file    Number of children: Not on file    Years of education: Not on file    Highest education level: Not on file   Occupational History    Not on file   Tobacco Use    Smoking status: Former Smoker     Types: Cigarettes     Quit date: 1994     Years since quittin.7    Smokeless tobacco: Never Used   Vaping Use    Vaping Use: Never used   Substance and Sexual Activity    Alcohol use: No     Comment: RARE    Drug use: No    Sexual activity: Not on file   Other Topics Concern    Not on file   Social History Narrative    Not on file     Social Determinants of Health     Financial Resource Strain: Low Risk     Difficulty of Paying Living Expenses: Not hard at all   Food Insecurity: No Food Insecurity    Worried About [employer] [employer] in the Last Year: Never true    [facility] in the Last Year: Never true   Transportation Needs:     Lack of Transportation (Medical): Not on file    Lack of Transportation (Non-Medical):  Not on file   Physical Activity:     Days of Exercise per Week: Not on file    Minutes of Exercise per Session: Not on file   Stress:     Feeling of Stress : Not on file   Social Connections:     Frequency of Communication with Friends and Family: Not on file    Frequency of Social Gatherings with Friends and Family: Not on file    Attends Orthodox Services: Not on file    Active Member of [address] [place] [address] or Organizations: Not on file    Attends Club or Organization Meetings: Not on file    Marital Status: Not on file   Intimate Partner Violence:     Fear of Current or Ex-Partner: Not on file    Emotionally Abused: Not on file    Physically Abused: Not on file    Sexually Abused: Not on file   Housing Stability:     Unable to Pay for Housing in the Last Year: Not on file    Number of [place] in the Last Year: Not on file    Unstable Housing in the Last Year: Not on file       Family History   Problem Relation Age of Onset    Melanoma Mother 61    Breast Cancer Daughter 40        invasive         SUBJECTIVE: Filemon Wiseman presents to the office today for re-evaluation of cardiac diagnoses   She is an extremely difficult historian, but it seems she is feeling better on the low dose amio / cardizem combo. .   She denies clear cut anginal   chest pain,  claudication,  exertional chest pressure/discomfort, fatigue, near-syncope, orthopnea, paroxysmal nocturnal dyspnea, syncope and tachypnea. Compliant with NOAC but could not tolerate BB at even low dose due to fatigue. . NO bleeding    Review of Systems:   Heart: as above   Lungs: as above   Eyes: denies changes in vision or discharge. Ears: denies changes in hearing or pain. Nose: denies epistaxis or masses   Throat: denies sore throat or trouble swallowing. Neuro: denies numbness, tingling, tremors. Skin: denies rashes or itching. : denies hematuria, dysuria   GI: denies vomiting, diarrhea   Psych: denies mood changed, anxiety, depression. all others negative.     Physical Exam   /80   Pulse (!) 101   Resp 16   Ht 5' 8\" (1.727 m) Wt 170 lb 9.6 oz (77.4 kg)   BMI 25.94 kg/m²   Constitutional: Oriented to person, place, and time. Well-developed and well-nourished. No distress. Head: Normocephalic and atraumatic. Eyes: EOM are normal. Pupils are equal, round, and reactive to light. Neck: Normal range of motion. Neck supple. No hepatojugular reflux and no JVD present. Carotid bruit is not present. No tracheal deviation present. No thyromegaly present. Cardiovascular:  Normal  rate, irregular rhythm, normal heart sounds and intact distal pulses. Exam reveals no gallop and no friction rub. No murmur heard. Pulmonary/Chest: Effort normal and breath sounds normal. No respiratory distress. No wheezes. No rales. No tenderness. Abdominal: Soft. Bowel sounds are normal. No distension and no mass. No tenderness. No rebound and no guarding. Musculoskeletal: Normal range of motion. No edema and no tenderness. Neurological: Alert and oriented to person, place, and time. Skin: Skin is warm and dry. No rash noted. Not diaphoretic. No erythema. Psychiatric: Normal mood and affect. Behavior is normal.     EKG: afib, rate 101, , nonspecific ST and T waves changes. ASSESSMENT AND PLAN:  Patient Active Problem List   Diagnosis    Persistent atrial fibrillation: s/p DC cardio 6/2018 and 9/2019 and 2/2021 with elevated chads vasc . Had some diastolic failure with initial presenation, but no structural disease of significance on CONSTANTINO. Recurrent afib despite AAD with dronedarone and amiodarone trial attempts. Now with rate control strategy. Unable to tolerate toprol xl 25 mg qd. I think digoxin in 80year old with CKD is unwise. Continue  amiodarone 100 mg qd for rate control in combo with cardizem, dose limited by BP. Patient and daughter know that TFTs and LFTs are needed regularly,  Samples given of Xarelto  .      HLD (hyperlipidemia) on statin      OV 6 months        Jose Daniel Adorno M.D  [facility]

## 2022-08-01 DIAGNOSIS — G56.02 CARPAL TUNNEL SYNDROME ON LEFT: Primary | ICD-10-CM

## 2022-09-09 ENCOUNTER — OFFICE VISIT (OUTPATIENT)
Dept: FAMILY MEDICINE CLINIC | Age: 87
End: 2022-09-09
Payer: MEDICARE

## 2022-09-09 VITALS
SYSTOLIC BLOOD PRESSURE: 124 MMHG | BODY MASS INDEX: 26.07 KG/M2 | TEMPERATURE: 97.8 F | HEART RATE: 95 BPM | RESPIRATION RATE: 16 BRPM | WEIGHT: 172 LBS | OXYGEN SATURATION: 96 % | DIASTOLIC BLOOD PRESSURE: 62 MMHG | HEIGHT: 68 IN

## 2022-09-09 DIAGNOSIS — E03.9 HYPOTHYROIDISM, UNSPECIFIED TYPE: ICD-10-CM

## 2022-09-09 DIAGNOSIS — R31.9 HEMATURIA, UNSPECIFIED TYPE: ICD-10-CM

## 2022-09-09 DIAGNOSIS — E55.9 VITAMIN D DEFICIENCY: ICD-10-CM

## 2022-09-09 DIAGNOSIS — E78.5 HYPERLIPIDEMIA, UNSPECIFIED HYPERLIPIDEMIA TYPE: ICD-10-CM

## 2022-09-09 DIAGNOSIS — I11.9 BENIGN HYPERTENSIVE HEART DISEASE WITHOUT CONGESTIVE HEART FAILURE: Primary | ICD-10-CM

## 2022-09-09 DIAGNOSIS — I48.19 PERSISTENT ATRIAL FIBRILLATION (HCC): ICD-10-CM

## 2022-09-09 PROBLEM — U07.1 COVID: Status: RESOLVED | Noted: 2022-01-27 | Resolved: 2022-09-09

## 2022-09-09 LAB
BACTERIA: ABNORMAL /HPF
BILIRUBIN URINE: ABNORMAL
BLOOD, URINE: ABNORMAL
CLARITY: ABNORMAL
COLOR: YELLOW
CRYSTALS, UA: ABNORMAL /HPF
GLUCOSE URINE: NEGATIVE MG/DL
HGB, POC: 14.6
KETONES, URINE: ABNORMAL MG/DL
LEUKOCYTE ESTERASE, URINE: NEGATIVE
NITRITE, URINE: NEGATIVE
PH UA: 6 (ref 5–9)
PROTEIN UA: ABNORMAL MG/DL
RBC UA: ABNORMAL /HPF (ref 0–2)
SPECIFIC GRAVITY UA: >=1.03 (ref 1–1.03)
UROBILINOGEN, URINE: 0.2 E.U./DL
WBC UA: ABNORMAL /HPF (ref 0–5)

## 2022-09-09 PROCEDURE — 85018 HEMOGLOBIN: CPT | Performed by: INTERNAL MEDICINE

## 2022-09-09 PROCEDURE — G8427 DOCREV CUR MEDS BY ELIG CLIN: HCPCS | Performed by: INTERNAL MEDICINE

## 2022-09-09 PROCEDURE — 1036F TOBACCO NON-USER: CPT | Performed by: INTERNAL MEDICINE

## 2022-09-09 PROCEDURE — 99214 OFFICE O/P EST MOD 30 MIN: CPT | Performed by: INTERNAL MEDICINE

## 2022-09-09 PROCEDURE — G8417 CALC BMI ABV UP PARAM F/U: HCPCS | Performed by: INTERNAL MEDICINE

## 2022-09-09 PROCEDURE — 1090F PRES/ABSN URINE INCON ASSESS: CPT | Performed by: INTERNAL MEDICINE

## 2022-09-09 PROCEDURE — 1123F ACP DISCUSS/DSCN MKR DOCD: CPT | Performed by: INTERNAL MEDICINE

## 2022-09-09 SDOH — ECONOMIC STABILITY: FOOD INSECURITY: WITHIN THE PAST 12 MONTHS, YOU WORRIED THAT YOUR FOOD WOULD RUN OUT BEFORE YOU GOT MONEY TO BUY MORE.: NEVER TRUE

## 2022-09-09 SDOH — ECONOMIC STABILITY: FOOD INSECURITY: WITHIN THE PAST 12 MONTHS, THE FOOD YOU BOUGHT JUST DIDN'T LAST AND YOU DIDN'T HAVE MONEY TO GET MORE.: NEVER TRUE

## 2022-09-09 ASSESSMENT — SOCIAL DETERMINANTS OF HEALTH (SDOH): HOW HARD IS IT FOR YOU TO PAY FOR THE VERY BASICS LIKE FOOD, HOUSING, MEDICAL CARE, AND HEATING?: NOT HARD AT ALL

## 2022-09-09 NOTE — PROGRESS NOTES
Vitamin D deficiency        Past Surgical Hx:  Past Surgical History:   Procedure Laterality Date    APPENDECTOMY  62 YEARS AGO    CARDIOVERSION  2019    Dr. Ismael Carlson 1 shock 200 joules Successful    CARDIOVERSION  2021    Dr. Aftab Saunders.  200J x1 converted to NSR    CHOLECYSTECTOMY      EYE SURGERY      CATARACT RIGHT AND LEFT    HYSTERECTOMY (CERVIX STATUS UNKNOWN)      NECK SURGERY  2011    THYROIDECTOMY  17 YEARS AGO    TONSILLECTOMY         Family Hx:  Family History   Problem Relation Age of Onset    Melanoma Mother 61    Breast Cancer Daughter 36        invasive       Social Hx:  Social History     Tobacco Use    Smoking status: Former     Types: Cigarettes     Quit date: 1994     Years since quittin.9    Smokeless tobacco: Never   Substance Use Topics    Alcohol use: No     Comment: RARE       Home Medications:  Current Outpatient Medications   Medication Sig Dispense Refill    dilTIAZem (CARDIZEM CD) 180 MG extended release capsule TAKE 1 CAPSULE BY MOUTH TWICE A  capsule 1    albuterol (PROVENTIL) (2.5 MG/3ML) 0.083% nebulizer solution Take 3 mLs by nebulization every 6 hours as needed for Wheezing or Shortness of Breath 60 each 5    levothyroxine (SYNTHROID) 125 MCG tablet Take 1 tablet by mouth Daily 90 tablet 1    rivaroxaban (XARELTO) 20 MG TABS tablet Take 1 tablet by mouth daily (with breakfast) 30 tablet 5    amiodarone (PACERONE) 100 MG tablet Take 1 tablet by mouth daily 30 tablet 5    citalopram (CELEXA) 20 MG tablet Take 1 tablet by mouth once daily 30 tablet 5    WIXELA INHUB 250-50 MCG/DOSE AEPB INHALE 1 PUFF INTO THE LUNGS EVERY 12 HOURS 180 each 3    albuterol sulfate HFA (PROVENTIL HFA) 108 (90 Base) MCG/ACT inhaler Inhale 2 puffs into the lungs every 4 hours as needed for Wheezing or Shortness of Breath 1 each 1    simvastatin (ZOCOR) 40 MG tablet Take 40 mg by mouth nightly   3    Cyanocobalamin (VITAMIN B-12 PO) Take 6,000 mcg by mouth daily       Cholecalciferol BUN 28 (H) 01/27/2022    CO2 25 01/27/2022    GLUCOSE 93 01/27/2022    ALT 19 01/27/2022    AST 26 01/27/2022    INR 1.5 09/05/2020     Lab Results   Component Value Date    TSH 0.979 08/06/2021     Lab Results   Component Value Date    TRIG 115 08/06/2021    TRIG 89 06/17/2019     Lab Results   Component Value Date    HDL 75 08/06/2021     Lab Results   Component Value Date    LDLCALC 38 08/06/2021     Lab Results   Component Value Date    LABA1C 5.6 08/06/2021     Lab Results   Component Value Date    INR 1.5 09/05/2020    INR 1.2 06/16/2019    INR 1.7 08/27/2018    PROTIME 17.0 (H) 09/05/2020    PROTIME 13.4 (H) 06/16/2019    PROTIME 19.9 (H) 08/27/2018      *All recent labs were reviewed. Please see electronic chart for a more comprehensive set of labs    Radiology:  No results found. Assessment and Plan     Patient is a 80 y.o. female who presented to the office for follow up. Full problem list is as follows:  Patient Active Problem List   Diagnosis    Persistent atrial fibrillation ([address]    History of nuclear stress test    Hyperlipidemia    Depression    Asthma    Vitamin D deficiency    Benign hypertensive heart disease without congestive heart failure    Hypothyroidism    Urinary frequency       Unk Golhanh was seen today for 3 month follow-up and lower back pain. Diagnoses and all orders for this visit:    Benign hypertensive heart disease without congestive heart failure  - stable, cardiology notes reviewed    Persistent atrial fibrillation ([address]  - stable, cardiology notes reviewed    Hypothyroidism, unspecified type    Vitamin D deficiency    Hyperlipidemia, unspecified hyperlipidemia type    Hematuria, unspecified type  -     Urinalysis; Future  -     Culture, Urine; Future  -     POCT hemoglobin    Patient is unable to give us an adequate urine sample in the office today. Given a specimen cup and will be dropping off a sample to the office later today.         Educational materials and/or home exercises printed for patient's review and were included in patient instructions on his/her After Visit Summary and given to patient at the end of visit. Counseled regarding above diagnosis, including possible risks and complications, especially if left uncontrolled. Counseled regarding the possible side effects, risks, benefits and alternatives to treatment; patient and/or guardian verbalizes understanding, agrees, feels comfortable with and wishes to proceed with above treatment plan. Advised patient to call [facility] new medication issues, and read all Rx info from pharmacy to assure aware of all possible risks and side effects of any medication before taking. Patient verbalizes understanding and agrees with above counseling, assessment and plan. All questions answered.     Cholo Mayers, DO

## 2022-09-12 LAB — URINE CULTURE, ROUTINE: NORMAL

## 2022-09-14 DIAGNOSIS — F34.1 DYSTHYMIA: ICD-10-CM

## 2022-09-15 RX ORDER — CITALOPRAM 20 MG/1
TABLET ORAL
Qty: 30 TABLET | Refills: 0 | OUTPATIENT
Start: 2022-09-15

## 2022-09-19 DIAGNOSIS — F34.1 DYSTHYMIA: ICD-10-CM

## 2022-09-19 NOTE — TELEPHONE ENCOUNTER
Last Appointment:  9/9/2022  Future Appointments   Date Time Provider Yuki Mayfield   10/11/2022  1:30 PM Shonda Phillips MD [facility]   12/9/2022 10:00 AM Shaylee Davisonr, [facility]   12/12/2022 10:15 AM Fuentes Lindsay MD [address]   12/13/2022  2:30 PM Pablito Segovia MD [facility]

## 2022-09-20 RX ORDER — CITALOPRAM 20 MG/1
TABLET ORAL
Qty: 30 TABLET | Refills: 0 | OUTPATIENT
Start: 2022-09-20

## 2022-09-21 RX ORDER — CITALOPRAM 20 MG/1
TABLET ORAL
Qty: 30 TABLET | Refills: 5 | Status: SHIPPED | OUTPATIENT
Start: 2022-09-21

## 2022-10-05 RX ORDER — AMIODARONE HYDROCHLORIDE 100 MG/1
TABLET ORAL
Qty: 90 TABLET | Refills: 5 | Status: SHIPPED | OUTPATIENT
Start: 2022-10-05

## 2022-10-11 ENCOUNTER — OFFICE VISIT (OUTPATIENT)
Dept: ORTHOPEDIC SURGERY | Age: 87
End: 2022-10-11
Payer: MEDICARE

## 2022-10-11 ENCOUNTER — TELEPHONE (OUTPATIENT)
Dept: ORTHOPEDIC SURGERY | Age: 87
End: 2022-10-11

## 2022-10-11 VITALS — WEIGHT: 165 LBS | BODY MASS INDEX: 25.01 KG/M2 | HEIGHT: 68 IN

## 2022-10-11 DIAGNOSIS — G56.02 LEFT CARPAL TUNNEL SYNDROME: ICD-10-CM

## 2022-10-11 DIAGNOSIS — G56.02 COMPRESSION OF LEFT MEDIAN NERVE AT ELBOW: ICD-10-CM

## 2022-10-11 DIAGNOSIS — M18.12 ARTHRITIS OF CARPOMETACARPAL (CMC) JOINT OF LEFT THUMB: Primary | ICD-10-CM

## 2022-10-11 DIAGNOSIS — G56.02 CARPAL TUNNEL SYNDROME ON LEFT: Primary | ICD-10-CM

## 2022-10-11 PROCEDURE — G8427 DOCREV CUR MEDS BY ELIG CLIN: HCPCS | Performed by: ORTHOPAEDIC SURGERY

## 2022-10-11 PROCEDURE — 1036F TOBACCO NON-USER: CPT | Performed by: ORTHOPAEDIC SURGERY

## 2022-10-11 PROCEDURE — 20526 THER INJECTION CARP TUNNEL: CPT | Performed by: ORTHOPAEDIC SURGERY

## 2022-10-11 PROCEDURE — 20600 DRAIN/INJ JOINT/BURSA W/O US: CPT | Performed by: ORTHOPAEDIC SURGERY

## 2022-10-11 PROCEDURE — G8417 CALC BMI ABV UP PARAM F/U: HCPCS | Performed by: ORTHOPAEDIC SURGERY

## 2022-10-11 PROCEDURE — 1123F ACP DISCUSS/DSCN MKR DOCD: CPT | Performed by: ORTHOPAEDIC SURGERY

## 2022-10-11 PROCEDURE — 99203 OFFICE O/P NEW LOW 30 MIN: CPT | Performed by: ORTHOPAEDIC SURGERY

## 2022-10-11 PROCEDURE — 1090F PRES/ABSN URINE INCON ASSESS: CPT | Performed by: ORTHOPAEDIC SURGERY

## 2022-10-11 PROCEDURE — G8484 FLU IMMUNIZE NO ADMIN: HCPCS | Performed by: ORTHOPAEDIC SURGERY

## 2022-10-11 RX ORDER — BETAMETHASONE SODIUM PHOSPHATE AND BETAMETHASONE ACETATE 3; 3 MG/ML; MG/ML
12 INJECTION, SUSPENSION INTRA-ARTICULAR; INTRALESIONAL; INTRAMUSCULAR; SOFT TISSUE ONCE
Status: COMPLETED | OUTPATIENT
Start: 2022-10-11 | End: 2022-10-11

## 2022-10-11 RX ADMIN — BETAMETHASONE SODIUM PHOSPHATE AND BETAMETHASONE ACETATE 12 MG: 3; 3 INJECTION, SUSPENSION INTRA-ARTICULAR; INTRALESIONAL; INTRAMUSCULAR; SOFT TISSUE at 17:12

## 2022-10-11 NOTE — PROGRESS NOTES
Department of Orthopedic Surgery  History and Physical      CHIEF COMPLAINT: Left hand numbness and tingling    HISTORY OF PRESENT ILLNESS:                The patient is a 80 y.o. female who presents with numbness and tingling to the left hand that is been going on for roughly a year. Numbness and tingling is intermittent although she states that her fingertips currently feel numb. She is also complaining of pain to the base of the thumb that is going on for many years. Denies any specific injury or trauma. She does have trouble gripping things secondary to the pain. Denies any pain about the elbow. Patient is right-hand dominant. Patient retired. Patient states that on the right hand she also has locking and clicking of her ring finger but this is not that bothersome. In terms of treatment. Patient states that she will wear a neoprene sleeve/wrap on her left hand and this gives her some relief. She takes Tylenol occasionally for pain but denies any NSAIDs that she is on Xarelto. Has not had any corticosteroid injections in the past.  No formal physical therapy. Does not do home exercises. 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      Past Medical History:        Diagnosis Date    Asthma     Depression     Generalized osteoarthritis 9/12/2006    Hyperlipidemia     Knee pain     Sees Dr. Hank Gonzáles for b/l knee pain / gel injections    Pinched nerve in neck     Thyroid disease     Vitamin D deficiency      Past Surgical History:        Procedure Laterality Date    APPENDECTOMY  62 YEARS AGO    CARDIOVERSION  09/20/2019    Dr. Ivette Williamson 1 shock 200 joules Successful    CARDIOVERSION  08/02/2021    Dr. Harsh Stout. 200J x1 converted to NSR    CHOLECYSTECTOMY      EYE SURGERY      CATARACT RIGHT AND LEFT    HYSTERECTOMY (CERVIX STATUS UNKNOWN)      NECK SURGERY  9/9/2011    THYROIDECTOMY  17 YEARS AGO    TONSILLECTOMY       Current Medications:   No current facility-administered medications for this visit. Allergies:  Codeine, Codimal-a [brompheniramine], Lipitor, Spiriva handihaler [tiotropium bromide monohydrate], Tudorza pressair [aclidinium bromide], and Ciprofloxacin    Social History:   TOBACCO:   reports that she quit smoking about 28 years ago. Her smoking use included cigarettes. She has never used smokeless tobacco.  ETOH:   reports no history of alcohol use. DRUGS:   reports no history of drug use. ACTIVITIES OF DAILY LIVING:    OCCUPATION:    Family History:       Problem Relation Age of Onset    Melanoma Mother 61    Breast Cancer Daughter 36        invasive       REVIEW OF SYSTEMS:  CONSTITUTIONAL:  negative  RESPIRATORY:  negative  CARDIOVASCULAR:  negative  GASTROINTESTINAL:  negative  INTEGUMENT/BREAST:  negative  HEMATOLOGIC/LYMPHATIC:  negative  ALLERGIC/IMMUNOLOGIC:  negative  ENDOCRINE:  negative  MUSCULOSKELETAL:  positive for  pain  NEUROLOGICAL:  positive for numbness and tingling    PHYSICAL EXAM:    VITALS:  Ht 5' 8\" (1.727 m)   Wt 165 lb (74.8 kg)   BMI 25.09 kg/m²   CONSTITUTIONAL:  awake, alert, cooperative, no apparent distress, and appears stated age  EYES:  Lids and lashes normal, pupils equal, round and reactive to light, extra ocular muscles intact, sclera clear, conjunctiva normal  ENT:  Normocephalic, without obvious abnormality, atraumatic, sinuses nontender on palpation, external ears without lesions, oral pharynx with moist mucus membranes, tonsils without erythema or exudates, gums normal and good dentition.   NECK:  Supple, symmetrical, trachea midline, no adenopathy, thyroid symmetric, not enlarged and no tenderness, skin normal  LUNGS:  CTA  CARDIOVASCULAR:  2+ radial pulses, extremities warm and well perfused  ABDOMEN:   NTTP  CHEST:  Atraumatic   GENITAL/URINARY:  deferred  NEUROLOGIC:  Awake, alert, oriented to name, place and time. Cranial nerves II-XII are grossly intact. Motor is 5 out of 5 bilaterally. Sensory is intact.  gait is normal.  MUSCULOSKELETAL:    Left upper extremity  Non-tender about the shoulder and elbow with good ROM. + tinels of the cubital tunnel, + tinels of the carpal tunnel, + durkans, - finkelsteins, + CMC grind, -+lacertus, - tenderness over the A1 pulleys with no active triggering. Full flexion and extension of the fingers. - wartenbergs and cross finger testing, APB strength 5/5 with no atrophy. Median, ulnar, radial n intact to light touch. Brisk capillary refill. Gross motor 5/5. Right upper extremity   Non-tender about the shoulder and elbow with good ROM. - tinels of the cubital tunnel, - tinels of the carpal tunnel, - durkans, - finkelsteins, - CMC grind, + tenderness over the A1 pulley of the ring with no active triggering. Full flexion and extension of the fingers. - wartenbergs and cross finger testing, APB strength 5/5 with no atrophy. Median, ulnar, radial n intact to light touch. Brisk capillary refill. Gross motor 5/5. DATA:    CBC:   Lab Results   Component Value Date/Time    WBC 8.3 01/27/2022 05:30 PM    RBC 5.91 01/27/2022 05:30 PM    HGB 14.6 09/09/2022 10:14 AM    HGB 15.2 01/27/2022 05:30 PM    HCT 49.6 01/27/2022 05:30 PM    MCV 83.9 01/27/2022 05:30 PM    MCH 25.7 01/27/2022 05:30 PM    MCHC 30.6 01/27/2022 05:30 PM    RDW 16.3 01/27/2022 05:30 PM     01/27/2022 05:30 PM    MPV 12.4 01/27/2022 05:30 PM     PT/INR:    Lab Results   Component Value Date/Time    PROTIME 17.0 09/05/2020 09:58 AM    INR 1.5 09/05/2020 09:58 AM       Radiology Review:  Xray: x-rays of the left wrist were obtained today in the office and reviewed with the patient.   4 views: AP, oblique, lateral, carpal tunnel: demonstrate no acute fractures dislocations, stage III Rod Bernstein, [facility] arthritis  Impression: stage III eaton littler CMC arthritis    IMPRESSION:  Left carpal tunnel syndrome  Left thumb CMC arthritis  Right ring trigger finger  Left lacertus syndrome  Left ulnar neuritis  Asthma  Thyroid disease  Vitamin D deficiency  Depression     PLAN:  Discussed treatment diagnosis patient. Patient would like to proceed with conservative management for the left carpal tunnel as well as the left basilar thumb arthritis. At this time she does not wish to pursue any treatment for the trigger finger on the right. She would like corticosteroid injections today. We will also give her information about bracing for the [facility] arthritis. All questions and concerns answered    Procedure Note Carpal Tunnel Injection    The left carpal tunnel was identified as the injection site. The risk and benefits of a cortisone injection were explained and the patient consented to the injection. Under sterile conditions, the carpal canal was injected with a mixture of 1 mL of 0.25% Marcaine and 1 mL of 6 mg/mL Celestone without complication. A sterile bandage was applied. Procedure Note Wrist Thumb CMC Cortisone Injection    The left wrist thumb CMC joint was identified as the injection site. The risk and benefits of a cortisone injection were explained and the patient consented to the injection. Under sterile conditions, the wrist was injected with a mixture of 1 mL of 0.25% marcaine and 1 mL of 6 mg/mL celestone without complication. A sterile bandage was applied      I have seen and evaluated the patient and agree with the above assessment and plan on today's visit. I have performed the key components of the history and physical examination with significant findings of eft thumb and left carpal tunnel syndrome. Patient also has exquisite tenderness over the lacertus on her left side as well. Discussed findings with her in detail. Both surgical and nonsurgical treatment option explained discussed. Patient wished to have injections. She was measured for a [employer] brace and provided information to obtain 1 through [address] [facility] [address] if desired. . I concur with the findings and plan as documented.     Ama Vick MD  10/11/2022  L

## 2022-10-31 ENCOUNTER — OFFICE VISIT (OUTPATIENT)
Dept: FAMILY MEDICINE CLINIC | Age: 87
End: 2022-10-31
Payer: MEDICARE

## 2022-10-31 VITALS
HEIGHT: 68 IN | TEMPERATURE: 97.8 F | HEART RATE: 111 BPM | BODY MASS INDEX: 25.01 KG/M2 | RESPIRATION RATE: 18 BRPM | WEIGHT: 165 LBS | DIASTOLIC BLOOD PRESSURE: 68 MMHG | OXYGEN SATURATION: 95 % | SYSTOLIC BLOOD PRESSURE: 122 MMHG

## 2022-10-31 DIAGNOSIS — J02.9 SORE THROAT: ICD-10-CM

## 2022-10-31 DIAGNOSIS — B34.9 VIRAL ILLNESS: Primary | ICD-10-CM

## 2022-10-31 DIAGNOSIS — R05.9 COUGH, UNSPECIFIED TYPE: ICD-10-CM

## 2022-10-31 LAB
Lab: NORMAL
PERFORMING INSTRUMENT: NORMAL
QC PASS/FAIL: NORMAL
S PYO AG THROAT QL: NORMAL
SARS-COV-2, POC: NORMAL

## 2022-10-31 PROCEDURE — G8417 CALC BMI ABV UP PARAM F/U: HCPCS | Performed by: NURSE PRACTITIONER

## 2022-10-31 PROCEDURE — 1036F TOBACCO NON-USER: CPT | Performed by: NURSE PRACTITIONER

## 2022-10-31 PROCEDURE — G8427 DOCREV CUR MEDS BY ELIG CLIN: HCPCS | Performed by: NURSE PRACTITIONER

## 2022-10-31 PROCEDURE — 99213 OFFICE O/P EST LOW 20 MIN: CPT | Performed by: NURSE PRACTITIONER

## 2022-10-31 PROCEDURE — G8484 FLU IMMUNIZE NO ADMIN: HCPCS | Performed by: NURSE PRACTITIONER

## 2022-10-31 PROCEDURE — 87880 STREP A ASSAY W/OPTIC: CPT | Performed by: NURSE PRACTITIONER

## 2022-10-31 PROCEDURE — 87426 SARSCOV CORONAVIRUS AG IA: CPT | Performed by: NURSE PRACTITIONER

## 2022-10-31 PROCEDURE — 1090F PRES/ABSN URINE INCON ASSESS: CPT | Performed by: NURSE PRACTITIONER

## 2022-10-31 PROCEDURE — 1123F ACP DISCUSS/DSCN MKR DOCD: CPT | Performed by: NURSE PRACTITIONER

## 2022-10-31 NOTE — PROGRESS NOTES
10/31/22  Laura Jacobs : 1934 Sex: female  Age 80 y.o. Subjective:  Chief Complaint   Patient presents with    Cough     Started yesterday     Pharyngitis     Very Difficult to swallow this am       HPI:   Laura Jacobs , 80 y.o. female presents to the clinic for evaluation of mild cough x 1 day. The patient also reports sinus drainage and sore throat (today). The patient has not taken any treatment for symptoms. The patient reports unchanged symptoms over time. The patient denies known ill exposure. The patient reports hx of COVID-19. The patient denies acute loss of taste and smell, headache, rash, and fever. The patient also denies chest pain, abdominal pain, shortness of breath, and nausea / vomiting / diarrhea. ROS:   Unless otherwise stated in this report the patient's positive and negative responses for review of systems for constitutional, eyes, ENT, cardiovascular, respiratory, gastrointestinal, neurological, , musculoskeletal, and integument systems and related systems to the presenting problem are either stated in the history of present illness or were not pertinent or were negative for the symptoms and/or complaints related to the presenting medical problem. Positives and pertinent negatives as per HPI. All others reviewed and are negative. PMH:     Past Medical History:   Diagnosis Date    Asthma     Depression     Generalized osteoarthritis 2006    Hyperlipidemia     Knee pain     Sees Dr. Karri Haas for b/l knee pain / gel injections    Pinched nerve in neck     Thyroid disease     Vitamin D deficiency        Past Surgical History:   Procedure Laterality Date    APPENDECTOMY  62 YEARS AGO    CARDIOVERSION  2019    Dr. Sin Campos 1 shock 200 joules Successful    CARDIOVERSION  2021    Dr. Lindle Simmonds.  200J x1 converted to NSR    CHOLECYSTECTOMY      EYE SURGERY      CATARACT RIGHT AND LEFT    HYSTERECTOMY (CERVIX STATUS UNKNOWN)      NECK SURGERY  2011 THYROIDECTOMY  17 YEARS AGO    TONSILLECTOMY         Family History   Problem Relation Age of Onset    Melanoma Mother 61    Breast Cancer Daughter 36        invasive       Medications:     Current Outpatient Medications:     rivaroxaban (XARELTO) 20 MG TABS tablet, Take 1 tablet by mouth daily (with breakfast), Disp: 90 tablet, Rfl: 1    PACERONE 100 MG tablet, Take 1 tablet by mouth once daily, Disp: 90 tablet, Rfl: 5    citalopram (CELEXA) 20 MG tablet, Take 1 tablet by mouth once daily, Disp: 30 tablet, Rfl: 5    dilTIAZem (CARDIZEM CD) 180 MG extended release capsule, TAKE 1 CAPSULE BY MOUTH TWICE A DAY, Disp: 180 capsule, Rfl: 1    levothyroxine (SYNTHROID) 125 MCG tablet, Take 1 tablet by mouth Daily, Disp: 90 tablet, Rfl: 1    WIXELA INHUB 250-50 MCG/DOSE AEPB, INHALE 1 PUFF INTO THE LUNGS EVERY 12 HOURS, Disp: 180 each, Rfl: 3    albuterol sulfate HFA (PROVENTIL HFA) 108 (90 Base) MCG/ACT inhaler, Inhale 2 puffs into the lungs every 4 hours as needed for Wheezing or Shortness of Breath, Disp: 1 each, Rfl: 1    simvastatin (ZOCOR) 40 MG tablet, Take 40 mg by mouth nightly , Disp: , Rfl: 3    Cyanocobalamin (VITAMIN B-12 PO), Take 6,000 mcg by mouth daily , Disp: , Rfl:     Cholecalciferol (VITAMIN D3) 2000 units TABS, Take 1 tablet by mouth daily , Disp: , Rfl:     Biotin 5000 MCG CAPS, Take 1 capsule by mouth daily. , Disp: , Rfl:     Vitamin E 400 UNIT TABS, Take 1 tablet by mouth daily. , Disp: , Rfl:     albuterol (PROVENTIL) (2.5 MG/3ML) 0.083% nebulizer solution, Take 3 mLs by nebulization every 6 hours as needed for Wheezing or Shortness of Breath, Disp: 60 each, Rfl: 5    Allergies:      Allergies   Allergen Reactions    Codeine Nausea And Vomiting    Codimal-A [Brompheniramine] Other (See Comments)     Altered mental state and confusion    Lipitor Other (See Comments)     GENERALIZED ACHING, DIFFICULT TO FUNCTION    Spiriva Handihaler [Tiotropium Bromide Monohydrate]     Malka Lai [Aclidinium Bromide]     Ciprofloxacin Nausea And Vomiting       Social History:     Social History     Tobacco Use    Smoking status: Former     Types: Cigarettes     Quit date: 1994     Years since quittin.1    Smokeless tobacco: Never   Vaping Use    Vaping Use: Never used   Substance Use Topics    Alcohol use: No     Comment: RARE    Drug use: No       Physical Exam:     Vitals:    10/31/22 1033   BP: 122/68   Pulse: (!) 111   Resp: 18   Temp: 97.8 °F (36.6 °C)   TempSrc: Temporal   SpO2: 95%   Weight: 165 lb (74.8 kg)   Height: 5' 8\" (1.727 m)       Physical Exam (PE)    Physical Exam  Constitutional:       Appearance: Normal appearance. HENT:      Head: Normocephalic. Right Ear: Tympanic membrane, ear canal and external ear normal.      Left Ear: Tympanic membrane, ear canal and external ear normal.      Nose: Rhinorrhea present. Mouth/Throat:      Mouth: Mucous membranes are moist.      Pharynx: Oropharynx is clear. Posterior oropharyngeal erythema present. Eyes:      Pupils: Pupils are equal, round, and reactive to light. Cardiovascular:      Rate and Rhythm: Normal rate and regular rhythm. Pulses: Normal pulses. Heart sounds: Normal heart sounds. Pulmonary:      Effort: Pulmonary effort is normal.      Breath sounds: Normal breath sounds. No wheezing, rhonchi or rales. Abdominal:      General: Bowel sounds are normal.      Palpations: Abdomen is soft. Musculoskeletal:         General: Normal range of motion. Cervical back: Normal range of motion and neck supple. Lymphadenopathy:      Cervical: No cervical adenopathy. Skin:     General: Skin is warm and dry. Capillary Refill: Capillary refill takes less than 2 seconds. Neurological:      General: No focal deficit present. Mental Status: She is alert and oriented to person, place, and time.    Psychiatric:         Mood and Affect: Mood normal.         Behavior: Behavior normal.        Testing: (All laboratory and radiology results have been personally reviewed by myself)  Labs:  Results for orders placed or performed in visit on 10/31/22   POCT COVID-19, Antigen   Result Value Ref Range    SARS-COV-2, POC Not-Detected Not Detected    Lot Number [id number]     QC Pass/Fail pass     Performing Instrument BD Veritor    POCT rapid strep A   Result Value Ref Range    Strep A Ag None Detected None Detected       Imaging: All Radiology results interpreted by Radiologist unless otherwise noted. No orders to display       Assessment / Plan:   The patient's vitals, allergies, medications, and past medical history have been reviewed. Lydia Preston was seen today for cough and pharyngitis. Diagnoses and all orders for this visit:    Viral illness    Sore throat  -     POCT COVID-19, Antigen  -     POCT rapid strep A    Cough, unspecified type  -     POCT COVID-19, Antigen  -     POCT rapid strep A      - Disposition: Home    - Educational material printed for patient's review and were included in patient instructions. After Visit Summary was given to patient at the end of visit. - COVID-19 PCR test declined today. Encouraged oral fluids and rest. Discussed symptomatic treatments with patient today. The patient is to schedule a follow-up with PCP in the next 2-3 days for reevaluation. Red flag symptoms were also discussed with the patient today. If symptoms worsen the patient is to go directly to the emergency department for reevaluation and treatment. Pt verbalizes understanding and is in agreement with plan of care. All questions answered. SIGNATURE: ALISON Mendez    *NOTE: This report was transcribed using voice recognition software. Every effort was made to ensure accuracy; however, inadvertent computerized transcription errors may be present.

## 2022-11-11 ENCOUNTER — TELEPHONE (OUTPATIENT)
Dept: FAMILY MEDICINE CLINIC | Age: 87
End: 2022-11-11

## 2022-11-11 NOTE — TELEPHONE ENCOUNTER
Patient called and said her daughter lives in [facility] and her oncologist said Linda Kim would need  a genetic screening. I remember tiarra ordering one for my daughters christina.   Please advise

## 2022-11-16 ENCOUNTER — OFFICE VISIT (OUTPATIENT)
Dept: FAMILY MEDICINE CLINIC | Age: 87
End: 2022-11-16
Payer: MEDICARE

## 2022-11-16 VITALS
BODY MASS INDEX: 26.98 KG/M2 | HEIGHT: 68 IN | OXYGEN SATURATION: 97 % | TEMPERATURE: 97.8 F | HEART RATE: 98 BPM | RESPIRATION RATE: 16 BRPM | WEIGHT: 178 LBS | DIASTOLIC BLOOD PRESSURE: 64 MMHG | SYSTOLIC BLOOD PRESSURE: 118 MMHG

## 2022-11-16 DIAGNOSIS — J45.20 MILD INTERMITTENT ASTHMA WITHOUT COMPLICATION: Primary | ICD-10-CM

## 2022-11-16 DIAGNOSIS — J30.1 SEASONAL ALLERGIC RHINITIS DUE TO POLLEN: ICD-10-CM

## 2022-11-16 DIAGNOSIS — R29.898 WEAKNESS OF BOTH LEGS: ICD-10-CM

## 2022-11-16 PROCEDURE — 99214 OFFICE O/P EST MOD 30 MIN: CPT | Performed by: INTERNAL MEDICINE

## 2022-11-16 PROCEDURE — 1123F ACP DISCUSS/DSCN MKR DOCD: CPT | Performed by: INTERNAL MEDICINE

## 2022-11-16 PROCEDURE — G8427 DOCREV CUR MEDS BY ELIG CLIN: HCPCS | Performed by: INTERNAL MEDICINE

## 2022-11-16 PROCEDURE — G8417 CALC BMI ABV UP PARAM F/U: HCPCS | Performed by: INTERNAL MEDICINE

## 2022-11-16 PROCEDURE — 1036F TOBACCO NON-USER: CPT | Performed by: INTERNAL MEDICINE

## 2022-11-16 PROCEDURE — 1090F PRES/ABSN URINE INCON ASSESS: CPT | Performed by: INTERNAL MEDICINE

## 2022-11-16 PROCEDURE — G8484 FLU IMMUNIZE NO ADMIN: HCPCS | Performed by: INTERNAL MEDICINE

## 2022-11-16 RX ORDER — CETIRIZINE HYDROCHLORIDE 10 MG/1
10 TABLET ORAL DAILY
Qty: 30 TABLET | Refills: 5 | Status: SHIPPED | OUTPATIENT
Start: 2022-11-16 | End: 2022-12-16

## 2022-11-16 NOTE — PROGRESS NOTES
[facility]  [address] [facility] Giuliana Hernandez  [address] [place] [address]  Dept: [phone]  Dept Fax: [phone]     NAME: Froy Macdonald        :  1934        MRN:  [id number]    Chief Complaint   Patient presents with    Other     Would like to discuss getting genetic testing because her daughter was diagnosed with breast cancer. Shortness of Breath     With exertion. Subjective     History of Present Illness  Froy Macdonald is a 80 y.o. female who presents today for routine follow up and medication refill.     2 daughters have been diagnosed with breast cancer, although she is unsure exactly what type. She has 6 daughters and multiple grand kids and was told by one of her daughters that live in [place] that genetic testing was recommended now that 2 of them have breast cancer. Review of Systems  Please see HPI above. All bolded are positive.   Gen: fever, chills, fatigue, weakness, diaphoresis, unintentional weight change  Head: headache, vision change, hearing loss  Chest: chest pain/heaviness, palpitations  Lungs: shortness of breath, wheezing, coughing, hemoptysis  Abdomen: abdominal pain, nausea, vomiting, diarrhea, constipation, melena, hematochezia, hematemesis, loss of appetite  Extremities: lower extremity edema, myalgias, arthralgias  Urinary: dysuria, hematuria, weak flow, increase in frequency  Neurologic: lightheadedness, dizziness, confusion, syncope  Endocrine: polydipsia, polyuria, heat or cold intolerance  Psychiatric: depression, suicidal ideation, anxiety  Derm: Rashes, ulcers, burns    Past Medical Hx:  Past Medical History:   Diagnosis Date    Asthma     Depression     Generalized osteoarthritis 2006    Hyperlipidemia     Knee pain     Sees Dr. Baltazar Cotto for b/l knee pain / gel injections    Pinched nerve in neck     Thyroid disease     Vitamin D deficiency        Past Surgical Hx:  Past Surgical History:   Procedure Laterality Date    APPENDECTOMY  62 YEARS AGO    CARDIOVERSION  2019    Dr. Taylor Mccoy 1 shock 200 joules Successful    CARDIOVERSION  2021    Dr. Bigg Morales.  200J x1 converted to NSR    CHOLECYSTECTOMY      EYE SURGERY      CATARACT RIGHT AND LEFT    HYSTERECTOMY (CERVIX STATUS UNKNOWN)      NECK SURGERY  2011    THYROIDECTOMY  17 YEARS AGO    TONSILLECTOMY         Family Hx:  Family History   Problem Relation Age of Onset    Melanoma Mother 61    Breast Cancer Daughter 36        invasive       Social Hx:  Social History     Tobacco Use    Smoking status: Former     Types: Cigarettes     Quit date: 1994     Years since quittin.1    Smokeless tobacco: Never   Substance Use Topics    Alcohol use: No     Comment: RARE       Home Medications:  Current Outpatient Medications   Medication Sig Dispense Refill    cetirizine (ZYRTEC) 10 MG tablet Take 1 tablet by mouth daily 30 tablet 5    rivaroxaban (XARELTO) 20 MG TABS tablet Take 1 tablet by mouth daily (with breakfast) 90 tablet 1    PACERONE 100 MG tablet Take 1 tablet by mouth once daily 90 tablet 5    citalopram (CELEXA) 20 MG tablet Take 1 tablet by mouth once daily 30 tablet 5    dilTIAZem (CARDIZEM CD) 180 MG extended release capsule TAKE 1 CAPSULE BY MOUTH TWICE A  capsule 1    albuterol (PROVENTIL) (2.5 MG/3ML) 0.083% nebulizer solution Take 3 mLs by nebulization every 6 hours as needed for Wheezing or Shortness of Breath 60 each 5    levothyroxine (SYNTHROID) 125 MCG tablet Take 1 tablet by mouth Daily 90 tablet 1    albuterol sulfate HFA (PROVENTIL HFA) 108 (90 Base) MCG/ACT inhaler Inhale 2 puffs into the lungs every 4 hours as needed for Wheezing or Shortness of Breath 1 each 1    simvastatin (ZOCOR) 40 MG tablet Take 40 mg by mouth nightly   3    Cyanocobalamin (VITAMIN B-12 PO) Take 6,000 mcg by mouth daily       Cholecalciferol (VITAMIN D3) 2000 units TABS Take 1 tablet by mouth daily       Biotin 5000 MCG CAPS Take 1 capsule by mouth daily. Vitamin E 400 UNIT TABS Take 1 tablet by mouth daily. Dayanna Moots 250-50 MCG/DOSE AEPB INHALE 1 PUFF INTO THE LUNGS EVERY 12 HOURS (Patient not taking: Reported on 11/16/2022) 180 each 3     No current facility-administered medications for this visit. Allergies: Allergies   Allergen Reactions    Codeine Nausea And Vomiting    Codimal-A [Brompheniramine] Other (See Comments)     Altered mental state and confusion    Lipitor Other (See Comments)     GENERALIZED ACHING, DIFFICULT TO FUNCTION    Spiriva Handihaler [Tiotropium Bromide Monohydrate]     Tudorza Pressair [Aclidinium Bromide]     Ciprofloxacin Nausea And Vomiting       Objective     Vitals:    11/16/22 1428   BP: 118/64   Pulse: 98   Resp: 16   Temp: 97.8 °F (36.6 °C)   TempSrc: Temporal   SpO2: 97%   Weight: 178 lb (80.7 kg)   Height: 5' 8\" (1.727 m)        Physical Exam  General: Awake, alert, and oriented to person, place, time, and purpose, appears stated age and cooperative, No acute distress  Head: Normocephalic, atraumatic  Eyes: conjunctivae/corneas clear, EOMI  Mouth: Mucous membranes moist with no pharyngeal exudate or erythema  Neck: no JVD, no adenopathy, no carotid bruit, supple, symmetrical, trachea midline  Back: symmetric, ROM normal, No CVA tenderness. Lungs: clear to auscultation bilaterally without wheezes, rales, or rhonchi  Heart: regular rate and rhythm, S1, S2 normal, no murmur, click, rub or gallop  Abdomen: soft, non-tender; bowel sounds normal; no masses,  no organomegaly  Extremities: atraumatic, no cyanosis, no edema  Skin: color, texture, turgor within normal limits.  No rashes or lesions   Neurologic:speech appropriate, moves all 4 extremities, normal muscle strength and tone, CN 2-12 grossly intact    Labs:  Lab Results   Component Value Date    WBC 8.3 01/27/2022    HGB 14.6 09/09/2022    HCT 49.6 (H) 01/27/2022     01/27/2022     01/27/2022    K 3.6 01/27/2022     01/27/2022    CREATININE 1.1 (H) 01/27/2022    BUN 28 (H) 01/27/2022    CO2 25 01/27/2022    GLUCOSE 93 01/27/2022    ALT 19 01/27/2022    AST 26 01/27/2022    INR 1.5 09/05/2020     Lab Results   Component Value Date    TSH 0.979 08/06/2021     Lab Results   Component Value Date    TRIG 115 08/06/2021    TRIG 89 06/17/2019     Lab Results   Component Value Date    HDL 75 08/06/2021     Lab Results   Component Value Date    LDLCALC 38 08/06/2021     Lab Results   Component Value Date    LABA1C 5.6 08/06/2021     Lab Results   Component Value Date    INR 1.5 09/05/2020    INR 1.2 06/16/2019    INR 1.7 08/27/2018    PROTIME 17.0 (H) 09/05/2020    PROTIME 13.4 (H) 06/16/2019    PROTIME 19.9 (H) 08/27/2018      *All recent labs were reviewed. Please see electronic chart for a more comprehensive set of labs    Radiology:  No results found. Assessment and Plan     Patient is a 80 y.o. female who presented to the office for follow up. Full problem list is as follows:  Patient Active Problem List   Diagnosis    Persistent atrial fibrillation ([facility] [address]    History of nuclear stress test    Hyperlipidemia    Depression    Asthma    Vitamin D deficiency    Benign hypertensive heart disease without congestive heart failure    Hypothyroidism    Urinary frequency       Canales Reveal was seen today for other and shortness of breath. Diagnoses and all orders for this visit:    Mild intermittent asthma without complication  - stable, continue albuterol inhaler and nebulizer     Weakness of both legs  -     External Referral To Physical Therapy    Seasonal allergic rhinitis due to pollen  -     cetirizine (ZYRTEC) 10 MG tablet; Take 1 tablet by mouth daily    Long discussion was had with the patient about her daughter's diagnosis of breast cancer and the reasons for genetic testing. Given patient's age, genetic testing is not indicated.  Patient states she has no interest in the results or doing any radial proactive treatments based on the results. I did educate her on the benefits of her other children and especially her grandchildren (who are directly related to those recently diagnosed with breast cancer) to be tested as anyone in the younger generations would certainly benefit from taking proactive measures based off genetic results. Patient agrees and understands that her results individually would not change the results of any of her daughter's tests or what they do with their results. On this date, 11/16/22 I have spent 36 minutes reviewing previous notes, test results and face to face with the patient discussing the diagnosis and importance of compliance with the treatment plan as well as documenting on the day of the visit. Educational materials and/or home exercises printed for patient's review and were included in patient instructions on his/her After Visit Summary and given to patient at the end of visit. Counseled regarding above diagnosis, including possible risks and complications, especially if left uncontrolled. Counseled regarding the possible side effects, risks, benefits and alternatives to treatment; patient and/or guardian verbalizes understanding, agrees, feels comfortable with and wishes to proceed with above treatment plan. Advised patient to call Michell Cazares new medication issues, and read all Rx info from pharmacy to assure aware of all possible risks and side effects of any medication before taking. Patient verbalizes understanding and agrees with above counseling, assessment and plan. All questions answered.     Yeison Black, DO

## 2022-12-01 RX ORDER — DILTIAZEM HYDROCHLORIDE 180 MG/1
CAPSULE, COATED, EXTENDED RELEASE ORAL
Qty: 180 CAPSULE | Refills: 1 | Status: SHIPPED | OUTPATIENT
Start: 2022-12-01

## 2022-12-13 ENCOUNTER — OFFICE VISIT (OUTPATIENT)
Dept: CARDIOLOGY CLINIC | Age: 87
End: 2022-12-13
Payer: MEDICARE

## 2022-12-13 VITALS
HEART RATE: 95 BPM | RESPIRATION RATE: 16 BRPM | DIASTOLIC BLOOD PRESSURE: 70 MMHG | WEIGHT: 172.6 LBS | BODY MASS INDEX: 26.16 KG/M2 | HEIGHT: 68 IN | SYSTOLIC BLOOD PRESSURE: 120 MMHG

## 2022-12-13 DIAGNOSIS — I48.19 PERSISTENT ATRIAL FIBRILLATION (HCC): Primary | ICD-10-CM

## 2022-12-13 PROBLEM — R35.0 URINARY FREQUENCY: Status: RESOLVED | Noted: 2022-01-27 | Resolved: 2022-12-13

## 2022-12-13 PROCEDURE — 1036F TOBACCO NON-USER: CPT | Performed by: INTERNAL MEDICINE

## 2022-12-13 PROCEDURE — G8427 DOCREV CUR MEDS BY ELIG CLIN: HCPCS | Performed by: INTERNAL MEDICINE

## 2022-12-13 PROCEDURE — 99204 OFFICE O/P NEW MOD 45 MIN: CPT | Performed by: INTERNAL MEDICINE

## 2022-12-13 PROCEDURE — 1090F PRES/ABSN URINE INCON ASSESS: CPT | Performed by: INTERNAL MEDICINE

## 2022-12-13 PROCEDURE — 1123F ACP DISCUSS/DSCN MKR DOCD: CPT | Performed by: INTERNAL MEDICINE

## 2022-12-13 PROCEDURE — G8417 CALC BMI ABV UP PARAM F/U: HCPCS | Performed by: INTERNAL MEDICINE

## 2022-12-13 PROCEDURE — G8484 FLU IMMUNIZE NO ADMIN: HCPCS | Performed by: INTERNAL MEDICINE

## 2022-12-13 PROCEDURE — 93000 ELECTROCARDIOGRAM COMPLETE: CPT | Performed by: INTERNAL MEDICINE

## 2022-12-13 NOTE — PROGRESS NOTES
Chief Complaint   Patient presents with    Atrial Fibrillation       Patient Active Problem List    Diagnosis Date Noted    Vitamin D deficiency 06/18/2019    Hyperlipidemia     Depression     Asthma     History of nuclear stress test 09/24/2018     Overview Note:     Normal pharm stress 8/2018      Persistent atrial fibrillation ([address] 06/15/2018     Overview Note:     A. Initial presentation June 2018. CHADS-VASC = 3  B. CONSTANTINO guided DC cardioversion  6/15/2018. No significant structural disesae on CONSTANTINO  C. DCCV 9/2019  D. DCCV 2/3/2021  E. DCCV 8/2/21 on amidarone  F. German Flores: 8/6/21      Benign hypertensive heart disease without congestive heart failure 09/12/2006    Hypothyroidism 09/12/2006       Current Outpatient Medications   Medication Sig Dispense Refill    dilTIAZem (CARDIZEM CD) 180 MG extended release capsule Take 1 capsule by mouth twice daily 180 capsule 1    rivaroxaban (XARELTO) 20 MG TABS tablet Take 1 tablet by mouth daily (with breakfast) 90 tablet 1    PACERONE 100 MG tablet Take 1 tablet by mouth once daily 90 tablet 5    citalopram (CELEXA) 20 MG tablet Take 1 tablet by mouth once daily 30 tablet 5    levothyroxine (SYNTHROID) 125 MCG tablet Take 1 tablet by mouth Daily 90 tablet 1    albuterol sulfate HFA (PROVENTIL HFA) 108 (90 Base) MCG/ACT inhaler Inhale 2 puffs into the lungs every 4 hours as needed for Wheezing or Shortness of Breath 1 each 1    simvastatin (ZOCOR) 40 MG tablet Take 40 mg by mouth nightly   3    Cyanocobalamin (VITAMIN B-12 PO) Take 6,000 mcg by mouth daily       Cholecalciferol (VITAMIN D3) 2000 units TABS Take 1 tablet by mouth daily       Vitamin E 400 UNIT TABS Take 1 tablet by mouth daily. No current facility-administered medications for this visit.         Allergies   Allergen Reactions    Codeine Nausea And Vomiting    Codimal-A [Brompheniramine] Other (See Comments)     Altered mental state and confusion    Lipitor Other (See Comments)     GENERALIZED ACHING, DIFFICULT TO FUNCTION    Spiriva Handihaler [Tiotropium Bromide Monohydrate]     Tudorza Pressair [Aclidinium Bromide]     Ciprofloxacin Nausea And Vomiting       Vitals:    12/13/22 1259   BP: 120/70   Pulse: 95   Resp: 16   Weight: 172 lb 9.6 oz (78.3 kg)   Height: 5' 8\" (1.727 m)           SUBJECTIVE: Kayleen Metcalf presents to the office today for re-evaluation of cardiac diagnoses   She is an extremely difficult historian, but it seems she is doing well on  low dose amio / cardizem combo for rate control. .   She denies clear cut anginal   chest pain,  claudication,  exertional chest pressure/discomfort, fatigue, near-syncope, orthopnea, paroxysmal nocturnal dyspnea, syncope and tachypnea. Compliant with NOAC but could not tolerate BB at even low dose due to fatigue. . NO bleeding  I did review pulmonary note indicating she has no intrinsic lung disease. Physical Exam   /70   Pulse 95   Resp 16   Ht 5' 8\" (1.727 m)   Wt 172 lb 9.6 oz (78.3 kg)   BMI 26.24 kg/m²   Constitutional: Oriented to person, place, and time. Well-developed and well-nourished. No distress. Neck:  no JVD present. Carotid bruit is not present. Cardiovascular:  Normal  rate, irregular rhythm, normal heart sounds and intact distal pulses. Exam reveals no gallop and no friction rub. No murmur heard. Pulmonary/Chest: Effort normal and breath sounds normal. No respiratory distress. No wheezes. No rales. Abdominal: Soft. Bowel sounds are normal.  Musculoskeletal: Normal range of motion. No edema and no tenderness. Neurological: Alert and oriented to person, place, and time. Skin: Skin is warm and dry. No rash noted. Not diaphoretic. No erythema. Psychiatric: Normal mood and affect. Behavior is normal.     EKG: afib, rate 95, , nonspecific ST and T waves changes.     ASSESSMENT AND PLAN:  Patient Active Problem List   Diagnosis    Persistent atrial fibrillation: s/p DC cardio 6/2018 and 9/2019 and 2/2021 with elevated chads vasc . Had some diastolic failure with initial presenation, but no structural disease of significance on CONSTANTINO. Recurrent afib despite AAD with dronedarone and amiodarone trial attempts. Now with rate control strategy. Unable to tolerate toprol xl 25 mg qd. I think digoxin in 80year old with CKD is unwise. Continue  amiodarone 100 mg qd for rate control in combo with cardizem, dose limited by BP. Patient knows  that TFTs and LFTs are needed regularly,  Samples given of Xarelto, and orders for  CMP, TSH and CXR entered into computer - she will go to [id number].      HLD (hyperlipidemia) on statin      OV 6 months        Hong Martins M.D  [id number]

## 2023-01-04 DIAGNOSIS — E03.9 HYPOTHYROIDISM, UNSPECIFIED TYPE: ICD-10-CM

## 2023-01-04 RX ORDER — LEVOTHYROXINE SODIUM 0.12 MG/1
TABLET ORAL
Qty: 90 TABLET | Refills: 3 | Status: SHIPPED | OUTPATIENT
Start: 2023-01-04

## 2023-01-04 NOTE — TELEPHONE ENCOUNTER
Last Appointment:  11/16/2022  Future Appointments   Date Time Provider Yuki Mayfield   1/6/2023 10:15 AM EV Fowler [facility]   1/16/2023  9:30 AM Jim Basurto [facility]   6/13/2023  9:45 AM Ayaka Armendariz MD [address] [facility]   12/13/2023  2:30 PM Vidal Benton MD [facility]

## 2023-01-06 ENCOUNTER — OFFICE VISIT (OUTPATIENT)
Dept: ORTHOPEDIC SURGERY | Age: 88
End: 2023-01-06

## 2023-01-06 VITALS — BODY MASS INDEX: 25.01 KG/M2 | WEIGHT: 165 LBS | HEIGHT: 68 IN

## 2023-01-06 DIAGNOSIS — G56.02 CARPAL TUNNEL SYNDROME ON LEFT: Primary | ICD-10-CM

## 2023-01-06 DIAGNOSIS — M18.12 ARTHRITIS OF CARPOMETACARPAL (CMC) JOINT OF LEFT THUMB: ICD-10-CM

## 2023-01-06 RX ORDER — BETAMETHASONE SODIUM PHOSPHATE AND BETAMETHASONE ACETATE 3; 3 MG/ML; MG/ML
12 INJECTION, SUSPENSION INTRA-ARTICULAR; INTRALESIONAL; INTRAMUSCULAR; SOFT TISSUE ONCE
Status: COMPLETED | OUTPATIENT
Start: 2023-01-06 | End: 2023-01-06

## 2023-01-06 RX ADMIN — BETAMETHASONE SODIUM PHOSPHATE AND BETAMETHASONE ACETATE 12 MG: 3; 3 INJECTION, SUSPENSION INTRA-ARTICULAR; INTRALESIONAL; INTRAMUSCULAR; SOFT TISSUE at 10:59

## 2023-01-06 NOTE — PROGRESS NOTES
Department of Orthopedic Surgery  History and Physical      CHIEF COMPLAINT: Left hand numbness and tingling    HISTORY OF PRESENT ILLNESS:                The patient is a 80 y.o. female who presents with numbness and tingling to the left hand that is been going on for roughly a year. Numbness and tingling is intermittent although she states that her fingertips currently feel numb. She is also complaining of pain to the base of the thumb that is going on for many years. Denies any specific injury or trauma. She does have trouble gripping things secondary to the pain. Denies any pain about the elbow. Patient is right-hand dominant. Patient retired. Patient states that on the right hand she also has locking and clicking of her ring finger but this is not that bothersome. In terms of treatment. Patient states that she will wear a neoprene sleeve/wrap on her left hand and this gives her some relief. She takes Tylenol occasionally for pain but denies any NSAIDs that she is on Xarelto. Has not had any corticosteroid injections in the past.  No formal physical therapy. Does not do home exercises. 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 3 months ago the patient had cortisone injections to the left carpal tunnel and left thumb CMC joint. She states this provided her relief for about 1 week. She presents today with her daughter to discuss surgical options. Patient is very interested in scheduling surgery at today's visit.     Past Medical History:        Diagnosis Date    Asthma     Depression     Generalized osteoarthritis 9/12/2006 Hyperlipidemia     Knee pain     Sees Dr. Leann Aguilar for b/l knee pain / gel injections    Pinched nerve in neck     Thyroid disease     Vitamin D deficiency      Past Surgical History:        Procedure Laterality Date    APPENDECTOMY  62 YEARS AGO    CARDIOVERSION  09/20/2019    Dr. Sacha Bertrand 1 shock 200 joules Successful    CARDIOVERSION  08/02/2021    Dr. Sahara Pena. 200J x1 converted to NSR    CHOLECYSTECTOMY      EYE SURGERY      CATARACT RIGHT AND LEFT    HYSTERECTOMY (CERVIX STATUS UNKNOWN)      NECK SURGERY  9/9/2011    THYROIDECTOMY  17 YEARS AGO    TONSILLECTOMY       Current Medications:   No current facility-administered medications for this visit. Allergies:  Codeine, Codimal-a [brompheniramine], Lipitor, Spiriva handihaler [tiotropium bromide monohydrate], Tudorza pressair [aclidinium bromide], and Ciprofloxacin    Social History:   TOBACCO:   reports that she quit smoking about 28 years ago. Her smoking use included cigarettes. She has never used smokeless tobacco.  ETOH:   reports no history of alcohol use. DRUGS:   reports no history of drug use.   ACTIVITIES OF DAILY LIVING:    OCCUPATION:    Family History:       Problem Relation Age of Onset    Melanoma Mother 61    Breast Cancer Daughter 36        invasive       REVIEW OF SYSTEMS:  CONSTITUTIONAL:  negative  RESPIRATORY:  negative  CARDIOVASCULAR:  negative  GASTROINTESTINAL:  negative  INTEGUMENT/BREAST:  negative  HEMATOLOGIC/LYMPHATIC:  negative  ALLERGIC/IMMUNOLOGIC:  negative  ENDOCRINE:  negative  MUSCULOSKELETAL:  left upper extremity pain  NEUROLOGICAL:  positive for numbness and tingling    PHYSICAL EXAM:    VITALS:  Ht 5' 8\" (1.727 m)   Wt 165 lb (74.8 kg)   BMI 25.09 kg/m²   CONSTITUTIONAL:  awake, alert, cooperative, no apparent distress, and appears stated age  EYES:  Lids and lashes normal, pupils equal, round and reactive to light, extra ocular muscles intact, sclera clear, conjunctiva normal  ENT:  Normocephalic, without obvious abnormality, atraumatic, sinuses nontender on palpation, external ears without lesions, oral pharynx with moist mucus membranes, tonsils without erythema or exudates, gums normal and good dentition. NECK:  Supple, symmetrical, trachea midline, no adenopathy, thyroid symmetric, not enlarged and no tenderness, skin normal  LUNGS:  CTA  CARDIOVASCULAR:  2+ radial pulses, extremities warm and well perfused  ABDOMEN:   NTTP  CHEST:  Atraumatic   GENITAL/URINARY:  deferred  NEUROLOGIC:  Awake, alert, oriented to name, place and time. Cranial nerves II-XII are grossly intact. Motor is 5 out of 5 bilaterally. Sensory is intact.  gait is normal.  MUSCULOSKELETAL:    Left upper extremity  Non-tender about the shoulder and elbow with good ROM. + tinels of the cubital tunnel, + tinels of the carpal tunnel, + durkans, - finkelsteins, + CMC grind, +lacertus, - tenderness over the A1 pulleys with no active triggering. Full flexion and extension of the fingers. - wartenbergs and cross finger testing, APB strength 5/5 with no atrophy. Median, ulnar, radial n intact to light touch. Brisk capillary refill. Gross motor 5/5. DATA:    CBC:   Lab Results   Component Value Date/Time    WBC 8.3 01/27/2022 05:30 PM    RBC 5.91 01/27/2022 05:30 PM    HGB 14.6 09/09/2022 10:14 AM    HGB 15.2 01/27/2022 05:30 PM    HCT 49.6 01/27/2022 05:30 PM    MCV 83.9 01/27/2022 05:30 PM    MCH 25.7 01/27/2022 05:30 PM    MCHC 30.6 01/27/2022 05:30 PM    RDW 16.3 01/27/2022 05:30 PM     01/27/2022 05:30 PM    MPV 12.4 01/27/2022 05:30 PM     PT/INR:    Lab Results   Component Value Date/Time    PROTIME 17.0 09/05/2020 09:58 AM    INR 1.5 09/05/2020 09:58 AM           IMPRESSION:  Left carpal tunnel syndrome  Left thumb CMC arthritis  Left lacertus syndrome  Left ulnar neuritis  Asthma  Thyroid disease  Vitamin D deficiency  Depression     PLAN:  Discussed treatment diagnosis patient and her daughter. This was explained in detail.   She reports 1 week relief from her last set of injections. She is requesting repeat injections at today's visit. She also would like to schedule surgery. This was explained to the patient in detail discussed just a left carpal tunnel release versus a left carpal tunnel release with left thumb trapeziectomy with ligament reconstruction versus a left carpal tunnel release with left thumb trapeziectomy with ligament reconstruction and median nerve decompression at the elbow. Ultimately after discussion with the patient and her daughter they would like to proceed with a left carpal tunnel release, left thumb trapeziectomy with ligament reconstruction and fiber tack and left median nerve decompression at the elbow. Postoperative course explained the patient and her daughter. Patient would like to proceed. Did discuss she is more than welcome to schedule appointment with Dr. Gloria Oconnor to discuss surgery further if needed. Patient states she has no further questions and would like to schedule and proceed with surgery. All questions answered. Procedure Note Carpal Tunnel Injection    The left carpal tunnel was identified as the injection site. The risk and benefits of a cortisone injection were explained and the patient consented to the injection. Under sterile conditions, the carpal canal was injected with a mixture of 1 mL of 1% Lidocaine and 1 mL of 6 mg/mL Betamethasone without complication. A sterile bandage was applied. Procedure Note Wrist Cortisone Injection    The left wrist was identified as the injection site. The risk and benefits of a cortisone injection were explained and the patient consented to the injection. Under sterile conditions, the wrist was injected with a mixture of 1 mL of 1% Lidocaine and 1 mL of 6 mg/mL Betamethasone without complication. A sterile bandage was applied.

## 2023-01-10 DIAGNOSIS — M18.12 ARTHRITIS OF CARPOMETACARPAL (CMC) JOINT OF LEFT THUMB: Primary | ICD-10-CM

## 2023-01-16 ENCOUNTER — OFFICE VISIT (OUTPATIENT)
Dept: FAMILY MEDICINE CLINIC | Age: 88
End: 2023-01-16
Payer: MEDICARE

## 2023-01-16 VITALS
HEART RATE: 96 BPM | WEIGHT: 168 LBS | BODY MASS INDEX: 25.46 KG/M2 | HEIGHT: 68 IN | TEMPERATURE: 97.2 F | OXYGEN SATURATION: 96 % | SYSTOLIC BLOOD PRESSURE: 110 MMHG | DIASTOLIC BLOOD PRESSURE: 62 MMHG

## 2023-01-16 DIAGNOSIS — E78.5 HYPERLIPIDEMIA, UNSPECIFIED HYPERLIPIDEMIA TYPE: ICD-10-CM

## 2023-01-16 DIAGNOSIS — J45.20 MILD INTERMITTENT ASTHMA WITHOUT COMPLICATION: ICD-10-CM

## 2023-01-16 DIAGNOSIS — J30.1 SEASONAL ALLERGIC RHINITIS DUE TO POLLEN: ICD-10-CM

## 2023-01-16 DIAGNOSIS — I48.19 PERSISTENT ATRIAL FIBRILLATION (HCC): Primary | ICD-10-CM

## 2023-01-16 PROCEDURE — G8417 CALC BMI ABV UP PARAM F/U: HCPCS | Performed by: INTERNAL MEDICINE

## 2023-01-16 PROCEDURE — 1036F TOBACCO NON-USER: CPT | Performed by: INTERNAL MEDICINE

## 2023-01-16 PROCEDURE — 1090F PRES/ABSN URINE INCON ASSESS: CPT | Performed by: INTERNAL MEDICINE

## 2023-01-16 PROCEDURE — 99214 OFFICE O/P EST MOD 30 MIN: CPT | Performed by: INTERNAL MEDICINE

## 2023-01-16 PROCEDURE — 1123F ACP DISCUSS/DSCN MKR DOCD: CPT | Performed by: INTERNAL MEDICINE

## 2023-01-16 PROCEDURE — G8484 FLU IMMUNIZE NO ADMIN: HCPCS | Performed by: INTERNAL MEDICINE

## 2023-01-16 PROCEDURE — G8427 DOCREV CUR MEDS BY ELIG CLIN: HCPCS | Performed by: INTERNAL MEDICINE

## 2023-01-16 RX ORDER — LORATADINE 10 MG/1
10 TABLET ORAL DAILY
Qty: 30 TABLET | Refills: 5 | Status: SHIPPED | OUTPATIENT
Start: 2023-01-16 | End: 2023-02-15

## 2023-01-16 RX ORDER — CETIRIZINE HYDROCHLORIDE 10 MG/1
TABLET ORAL
COMMUNITY
Start: 2023-01-10 | End: 2023-01-16

## 2023-01-16 ASSESSMENT — PATIENT HEALTH QUESTIONNAIRE - PHQ9
8. MOVING OR SPEAKING SO SLOWLY THAT OTHER PEOPLE COULD HAVE NOTICED. OR THE OPPOSITE, BEING SO FIGETY OR RESTLESS THAT YOU HAVE BEEN MOVING AROUND A LOT MORE THAN USUAL: 0
SUM OF ALL RESPONSES TO PHQ QUESTIONS 1-9: 3
4. FEELING TIRED OR HAVING LITTLE ENERGY: 0
3. TROUBLE FALLING OR STAYING ASLEEP: 3
2. FEELING DOWN, DEPRESSED OR HOPELESS: 0
7. TROUBLE CONCENTRATING ON THINGS, SUCH AS READING THE NEWSPAPER OR WATCHING TELEVISION: 0
SUM OF ALL RESPONSES TO PHQ QUESTIONS 1-9: 3
SUM OF ALL RESPONSES TO PHQ9 QUESTIONS 1 & 2: 0
1. LITTLE INTEREST OR PLEASURE IN DOING THINGS: 0
6. FEELING BAD ABOUT YOURSELF - OR THAT YOU ARE A FAILURE OR HAVE LET YOURSELF OR YOUR FAMILY DOWN: 0
10. IF YOU CHECKED OFF ANY PROBLEMS, HOW DIFFICULT HAVE THESE PROBLEMS MADE IT FOR YOU TO DO YOUR WORK, TAKE CARE OF THINGS AT HOME, OR GET ALONG WITH OTHER PEOPLE: 0
9. THOUGHTS THAT YOU WOULD BE BETTER OFF DEAD, OR OF HURTING YOURSELF: 0
SUM OF ALL RESPONSES TO PHQ QUESTIONS 1-9: 3
5. POOR APPETITE OR OVEREATING: 0
SUM OF ALL RESPONSES TO PHQ QUESTIONS 1-9: 3

## 2023-01-16 NOTE — PROGRESS NOTES
[facility]  [address] [place] [address]  [place] [address]  Dept: [phone]  Dept Fax: [phone]     NAME: Perla Dixon        :  1934        MRN:  [id number]    Chief Complaint   Patient presents with    Follow-up     2 month follow up,    Asthma         Subjective     History of Present Illness  Prince Bragg is a 80 y.o. female who presents today for routine follow up. Carpel tunnel surgery scheduled on 3/29, she will be needing clearance from cardiology, pulmonology and PCP before then. Only concern is overall fatigue. Likely due to several medications, her a fib, and age. Agreed to switch zyrtec to Claritin to see if that helps. Review of Systems  Please see HPI above. All bolded are positive.   Gen: fever, chills, fatigue, weakness, diaphoresis, unintentional weight change  Head: headache, vision change, hearing loss  Chest: chest pain/heaviness, palpitations  Lungs: shortness of breath, wheezing, coughing, hemoptysis  Abdomen: abdominal pain, nausea, vomiting, diarrhea, constipation, melena, hematochezia, hematemesis, loss of appetite  Extremities: lower extremity edema, myalgias, arthralgias  Urinary: dysuria, hematuria, weak flow, increase in frequency  Neurologic: lightheadedness, dizziness, confusion, syncope  Endocrine: polydipsia, polyuria, heat or cold intolerance  Psychiatric: depression, suicidal ideation, anxiety  Derm: Rashes, ulcers, burns    Past Medical Hx:  Past Medical History:   Diagnosis Date    Asthma     Depression     Generalized osteoarthritis 2006    Hyperlipidemia     Knee pain     Sees Dr. Alexandr Ferguson for b/l knee pain / gel injections    Pinched nerve in neck     Thyroid disease     Vitamin D deficiency        Past Surgical Hx:  Past Surgical History:   Procedure Laterality Date    APPENDECTOMY  62 YEARS AGO    CARDIOVERSION  2019    Dr. Emmett Rueda 1 shock 200 joules Successful    CARDIOVERSION 2021    Dr. Corona Conception. 200J x1 converted to NSR    CHOLECYSTECTOMY      EYE SURGERY      CATARACT RIGHT AND LEFT    HYSTERECTOMY (CERVIX STATUS UNKNOWN)      NECK SURGERY  2011    THYROIDECTOMY  17 YEARS AGO    TONSILLECTOMY         Family Hx:  Family History   Problem Relation Age of Onset    Melanoma Mother 61    Breast Cancer Daughter 36        invasive       Social Hx:  Social History     Tobacco Use    Smoking status: Former     Types: Cigarettes     Quit date: 1994     Years since quittin.3    Smokeless tobacco: Never   Substance Use Topics    Alcohol use: No     Comment: RARE       Home Medications:  Current Outpatient Medications   Medication Sig Dispense Refill    loratadine (CLARITIN) 10 MG tablet Take 1 tablet by mouth daily 30 tablet 5    levothyroxine (SYNTHROID) 125 MCG tablet Take 1 tablet by mouth once daily 90 tablet 3    fluticasone-salmeterol (ADVAIR) 250-50 MCG/ACT AEPB diskus inhaler Inhale 1 puff into the lungs every 12 hours      dilTIAZem (CARDIZEM CD) 180 MG extended release capsule Take 1 capsule by mouth twice daily 180 capsule 1    rivaroxaban (XARELTO) 20 MG TABS tablet Take 1 tablet by mouth daily (with breakfast) 90 tablet 1    PACERONE 100 MG tablet Take 1 tablet by mouth once daily 90 tablet 5    citalopram (CELEXA) 20 MG tablet Take 1 tablet by mouth once daily 30 tablet 5    albuterol sulfate HFA (PROVENTIL HFA) 108 (90 Base) MCG/ACT inhaler Inhale 2 puffs into the lungs every 4 hours as needed for Wheezing or Shortness of Breath 1 each 1    simvastatin (ZOCOR) 40 MG tablet Take 40 mg by mouth nightly   3    Cyanocobalamin (VITAMIN B-12 PO) Take 6,000 mcg by mouth daily       Cholecalciferol (VITAMIN D3) 2000 units TABS Take 1 tablet by mouth daily       Vitamin E 400 UNIT TABS Take 1 tablet by mouth daily. No current facility-administered medications for this visit. Allergies:   Allergies   Allergen Reactions    Codeine Nausea And Vomiting Codimal-A [Brompheniramine] Other (See Comments)     Altered mental state and confusion    Lipitor Other (See Comments)     GENERALIZED ACHING, DIFFICULT TO FUNCTION    Spiriva Handihaler [Tiotropium Bromide Monohydrate]     Tudorza Pressair [Aclidinium Bromide]     Ciprofloxacin Nausea And Vomiting       Objective     Vitals:    01/16/23 0941   BP: 110/62   Pulse: 96   Temp: 97.2 °F (36.2 °C)   SpO2: 96%   Weight: 168 lb (76.2 kg)   Height: 5' 8\" (1.727 m)        Physical Exam  General: Awake, alert, and oriented to person, place, time, and purpose, appears stated age and cooperative, No acute distress  Head: Normocephalic, atraumatic  Eyes: conjunctivae/corneas clear, EOMI  Neck: symmetrical, trachea midline  Back: symmetric, ROM normal, No CVA tenderness. Lungs: clear to auscultation bilaterally without wheezes, rales, or rhonchi  Heart: irregularly irregular rate and rhythm consistent with a fib, S1, S2 normal, no murmur, click, rub or gallop  Abdomen: soft, non-tender; bowel sounds normal; no masses,  no organomegaly  Extremities: atraumatic, no cyanosis, no edema  Skin: color, texture, turgor within normal limits.   Neurologic:speech appropriate, moves all 4 extremities, normal muscle strength and tone, CN 2-12 grossly intact    Labs:  Lab Results   Component Value Date    WBC 8.3 01/27/2022    HGB 14.6 09/09/2022    HCT 49.6 (H) 01/27/2022     01/27/2022     01/27/2022    K 3.6 01/27/2022     01/27/2022    CREATININE 1.1 (H) 01/27/2022    BUN 28 (H) 01/27/2022    CO2 25 01/27/2022    GLUCOSE 93 01/27/2022    ALT 19 01/27/2022    AST 26 01/27/2022    INR 1.5 09/05/2020     Lab Results   Component Value Date    TSH 0.979 08/06/2021     Lab Results   Component Value Date    TRIG 115 08/06/2021    TRIG 89 06/17/2019     Lab Results   Component Value Date    HDL 75 08/06/2021     Lab Results   Component Value Date    LDLCALC 38 08/06/2021     Lab Results   Component Value Date    LABA1C 5.6 08/06/2021     Lab Results   Component Value Date    INR 1.5 09/05/2020    INR 1.2 06/16/2019    INR 1.7 08/27/2018    PROTIME 17.0 (H) 09/05/2020    PROTIME 13.4 (H) 06/16/2019    PROTIME 19.9 (H) 08/27/2018      *All recent labs were reviewed. Please see electronic chart for a more comprehensive set of labs    Radiology:  No results found. Assessment and Plan     Patient is a 80 y.o. female who presented to the office for follow up. Full problem list is as follows:  Patient Active Problem List   Diagnosis    Persistent atrial fibrillation ([facility] [address]    History of nuclear stress test    Hyperlipidemia    Depression    Asthma    Vitamin D deficiency    Benign hypertensive heart disease without congestive heart failure    Hypothyroidism       Lu Artis was seen today for follow-up and asthma. Diagnoses and all orders for this visit:    Persistent atrial fibrillation ([facility] [address]  - stable, follows with cardiology, notes reviewed  - continue pacerone, cardizem, and xarelto     Mild intermittent asthma without complication  - stable on advair and PRN albuterol   - follows with pulmonology, notes reviewed. Hyperlipidemia, unspecified hyperlipidemia type    Seasonal allergic rhinitis due to pollen  -     loratadine (CLARITIN) 10 MG tablet; Take 1 tablet by mouth daily  - zrytec was making her excessively tired, will trial claritin and patient was informed this may have the same fatigue side effect. Educational materials and/or home exercises printed for patient's review and were included in patient instructions on his/her After Visit Summary and given to patient at the end of visit. Counseled regarding above diagnosis, including possible risks and complications, especially if left uncontrolled.      Counseled regarding the possible side effects, risks, benefits and alternatives to treatment; patient and/or guardian verbalizes understanding, agrees, feels comfortable with and wishes to proceed with above treatment plan. Advised patient to call Cholo Pickard new medication issues, and read all Rx info from pharmacy to assure aware of all possible risks and side effects of any medication before taking. Patient verbalizes understanding and agrees with above counseling, assessment and plan. All questions answered.     Clint Carreno, DO

## 2023-03-03 DIAGNOSIS — F34.1 DYSTHYMIA: ICD-10-CM

## 2023-03-03 RX ORDER — CITALOPRAM 20 MG/1
TABLET ORAL
Qty: 30 TABLET | Refills: 5 | Status: SHIPPED | OUTPATIENT
Start: 2023-03-03

## 2023-03-03 NOTE — TELEPHONE ENCOUNTER
Last Appointment:  1/16/2023  Future Appointments   Date Time Provider Yuki Mayfield   3/20/2023 11:15 AM Becky De La Rosa [facility]   4/6/2023 10:40 AM Shae Short MD [facility]   6/15/2023  9:45 AM Genny Brown MD [address] [facility]   12/13/2023  2:30 PM Simba King MD [facility]

## 2023-03-13 ENCOUNTER — OFFICE VISIT (OUTPATIENT)
Dept: FAMILY MEDICINE CLINIC | Age: 88
End: 2023-03-13
Payer: MEDICARE

## 2023-03-13 VITALS
RESPIRATION RATE: 20 BRPM | HEIGHT: 68 IN | OXYGEN SATURATION: 96 % | DIASTOLIC BLOOD PRESSURE: 68 MMHG | BODY MASS INDEX: 26.07 KG/M2 | TEMPERATURE: 97.5 F | WEIGHT: 172 LBS | HEART RATE: 111 BPM | SYSTOLIC BLOOD PRESSURE: 106 MMHG

## 2023-03-13 DIAGNOSIS — R09.81 SINUS CONGESTION: ICD-10-CM

## 2023-03-13 DIAGNOSIS — R05.1 ACUTE COUGH: ICD-10-CM

## 2023-03-13 DIAGNOSIS — J40 SINOBRONCHITIS: Primary | ICD-10-CM

## 2023-03-13 DIAGNOSIS — J04.0 LARYNGITIS: ICD-10-CM

## 2023-03-13 DIAGNOSIS — J32.9 SINOBRONCHITIS: Primary | ICD-10-CM

## 2023-03-13 PROCEDURE — 1123F ACP DISCUSS/DSCN MKR DOCD: CPT | Performed by: NURSE PRACTITIONER

## 2023-03-13 PROCEDURE — 1090F PRES/ABSN URINE INCON ASSESS: CPT | Performed by: NURSE PRACTITIONER

## 2023-03-13 PROCEDURE — G8427 DOCREV CUR MEDS BY ELIG CLIN: HCPCS | Performed by: NURSE PRACTITIONER

## 2023-03-13 PROCEDURE — 1036F TOBACCO NON-USER: CPT | Performed by: NURSE PRACTITIONER

## 2023-03-13 PROCEDURE — G8484 FLU IMMUNIZE NO ADMIN: HCPCS | Performed by: NURSE PRACTITIONER

## 2023-03-13 PROCEDURE — 99213 OFFICE O/P EST LOW 20 MIN: CPT | Performed by: NURSE PRACTITIONER

## 2023-03-13 PROCEDURE — G8417 CALC BMI ABV UP PARAM F/U: HCPCS | Performed by: NURSE PRACTITIONER

## 2023-03-13 RX ORDER — METHYLPREDNISOLONE 4 MG/1
TABLET ORAL
Qty: 1 KIT | Refills: 0 | Status: SHIPPED | OUTPATIENT
Start: 2023-03-13

## 2023-03-13 RX ORDER — DOXYCYCLINE HYCLATE 100 MG/1
100 CAPSULE ORAL 2 TIMES DAILY
Qty: 20 CAPSULE | Refills: 0 | Status: SHIPPED | OUTPATIENT
Start: 2023-03-13 | End: 2023-03-23

## 2023-03-13 RX ORDER — GUAIFENESIN DEXTROMETHORPHAN HYDROBROMIDE ORAL SOLUTION 10; 100 MG/5ML; MG/5ML
10 SOLUTION ORAL EVERY 4 HOURS PRN
Qty: 240 ML | Refills: 0 | Status: SHIPPED | OUTPATIENT
Start: 2023-03-13

## 2023-03-13 SDOH — ECONOMIC STABILITY: FOOD INSECURITY: WITHIN THE PAST 12 MONTHS, YOU WORRIED THAT YOUR FOOD WOULD RUN OUT BEFORE YOU GOT MONEY TO BUY MORE.: NEVER TRUE

## 2023-03-13 SDOH — ECONOMIC STABILITY: HOUSING INSECURITY
IN THE LAST 12 MONTHS, WAS THERE A TIME WHEN YOU DID NOT HAVE A STEADY PLACE TO SLEEP OR SLEPT IN A SHELTER (INCLUDING NOW)?: NO

## 2023-03-13 SDOH — ECONOMIC STABILITY: FOOD INSECURITY: WITHIN THE PAST 12 MONTHS, THE FOOD YOU BOUGHT JUST DIDN'T LAST AND YOU DIDN'T HAVE MONEY TO GET MORE.: NEVER TRUE

## 2023-03-13 SDOH — ECONOMIC STABILITY: INCOME INSECURITY: HOW HARD IS IT FOR YOU TO PAY FOR THE VERY BASICS LIKE FOOD, HOUSING, MEDICAL CARE, AND HEATING?: NOT HARD AT ALL

## 2023-03-16 ENCOUNTER — PREP FOR PROCEDURE (OUTPATIENT)
Dept: ORTHOPEDIC SURGERY | Age: 88
End: 2023-03-16

## 2023-03-16 RX ORDER — SODIUM CHLORIDE 9 MG/ML
INJECTION, SOLUTION INTRAVENOUS CONTINUOUS
Status: CANCELLED | OUTPATIENT
Start: 2023-03-16

## 2023-03-16 RX ORDER — SODIUM CHLORIDE 9 MG/ML
INJECTION, SOLUTION INTRAVENOUS PRN
Status: CANCELLED | OUTPATIENT
Start: 2023-03-16

## 2023-03-16 RX ORDER — SODIUM CHLORIDE 0.9 % (FLUSH) 0.9 %
5-40 SYRINGE (ML) INJECTION PRN
Status: CANCELLED | OUTPATIENT
Start: 2023-03-16

## 2023-03-16 RX ORDER — SODIUM CHLORIDE 0.9 % (FLUSH) 0.9 %
5-40 SYRINGE (ML) INJECTION EVERY 12 HOURS SCHEDULED
Status: CANCELLED | OUTPATIENT
Start: 2023-03-16

## 2023-03-20 ENCOUNTER — OFFICE VISIT (OUTPATIENT)
Dept: FAMILY MEDICINE CLINIC | Age: 88
End: 2023-03-20
Payer: MEDICARE

## 2023-03-20 VITALS
TEMPERATURE: 97 F | RESPIRATION RATE: 18 BRPM | DIASTOLIC BLOOD PRESSURE: 60 MMHG | HEART RATE: 111 BPM | BODY MASS INDEX: 26.07 KG/M2 | WEIGHT: 172 LBS | SYSTOLIC BLOOD PRESSURE: 98 MMHG | OXYGEN SATURATION: 92 % | HEIGHT: 68 IN

## 2023-03-20 DIAGNOSIS — Z01.818 PRE-OPERATIVE CLEARANCE: Primary | ICD-10-CM

## 2023-03-20 DIAGNOSIS — I48.19 PERSISTENT ATRIAL FIBRILLATION (HCC): ICD-10-CM

## 2023-03-20 DIAGNOSIS — I11.9 BENIGN HYPERTENSIVE HEART DISEASE WITHOUT CONGESTIVE HEART FAILURE: ICD-10-CM

## 2023-03-20 DIAGNOSIS — Z12.31 BREAST CANCER SCREENING BY MAMMOGRAM: ICD-10-CM

## 2023-03-20 PROCEDURE — G8417 CALC BMI ABV UP PARAM F/U: HCPCS | Performed by: INTERNAL MEDICINE

## 2023-03-20 PROCEDURE — 93000 ELECTROCARDIOGRAM COMPLETE: CPT | Performed by: INTERNAL MEDICINE

## 2023-03-20 PROCEDURE — 1123F ACP DISCUSS/DSCN MKR DOCD: CPT | Performed by: INTERNAL MEDICINE

## 2023-03-20 PROCEDURE — 99214 OFFICE O/P EST MOD 30 MIN: CPT | Performed by: INTERNAL MEDICINE

## 2023-03-20 PROCEDURE — 1036F TOBACCO NON-USER: CPT | Performed by: INTERNAL MEDICINE

## 2023-03-20 PROCEDURE — G8484 FLU IMMUNIZE NO ADMIN: HCPCS | Performed by: INTERNAL MEDICINE

## 2023-03-20 PROCEDURE — G8427 DOCREV CUR MEDS BY ELIG CLIN: HCPCS | Performed by: INTERNAL MEDICINE

## 2023-03-20 PROCEDURE — 1090F PRES/ABSN URINE INCON ASSESS: CPT | Performed by: INTERNAL MEDICINE

## 2023-03-20 RX ORDER — LORATADINE 10 MG/1
1 TABLET ORAL DAILY
COMMUNITY
Start: 2023-03-09

## 2023-03-20 NOTE — PROGRESS NOTES
Hypothyroidism       Kaci Evans was seen today for pre-op exam.    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 Cardiology clearance scanned into chart on 1/16/23, recommended holding xarelto for 48 hrs     Medically cleared for surgery     Educational materials and/or home exercises printed for patient's review and were included in patient instructions on his/her After Visit Summary and given to patient at the end of visit. Counseled regarding above diagnosis, including possible risks and complications, especially if left uncontrolled. Counseled regarding the possible side effects, risks, benefits and alternatives to treatment; patient and/or guardian verbalizes understanding, agrees, feels comfortable with and wishes to proceed with above treatment plan. Advised patient to call Maria C Doan new medication issues, and read all Rx info from pharmacy to assure aware of all possible risks and side effects of any medication before taking. Patient verbalizes understanding and agrees with above counseling, assessment and plan. All questions answered.     Marissa Wiseman, DO

## 2023-03-22 RX ORDER — RIVAROXABAN 20 MG/1
TABLET, FILM COATED ORAL
Qty: 30 TABLET | Refills: 5 | Status: SHIPPED | OUTPATIENT
Start: 2023-03-22

## 2023-03-27 NOTE — DISCHARGE INSTRUCTIONS
DISCHARGE INSTRUCTIONS TO HOME FOLLOWING SURGERY    ACTIVITY INSTRUCTIONS:    Elevate extremity to help reduce swelling. Use Purple Pillow for elevation of hand/arm   Use sling as needed. No heavy lifting, pushing, pulling or strenuous activity    WOUND/DRESSING INSTRUCTIONS:  Always ensure you and your care giver clean hands before and after caring for the wound. Keep the dressing, cast, or splint clean and dry until seen in office. Do not remove dressing   OK to shower- Keep your dressing dry. Can ice surgical region to help reduce swelling, Do not apply ice to fingers or toes       MEDICATION INSTRUCTIONS:  Take pain medicine as directed. When taking pain medications, you may experience dizziness or drowsiness. Do not drink alcohol or drive when taking these medications. Do not take any other medication containing acetaminophen (Tylenol) while taking the prescribed pain medication. Ibuprofen, Aleve, Motrin, or Naproxen are okay but should not be taken on an empty stomach. *Watch for these significant complications:  Call physician if these or any other problems occur:  Fever over 101°, redness, swelling or warmth at the operative site  Unrelieved nausea    Foul smelling or cloudy drainage at the operative site   Unrelieved pain  Breathing issues such as shortness of breath or difficulty breathing    Blood soaked dressing. (Some oozing may be normal)     Numb, pale, blue, cold or tingling extremity       Make an appointment to be seen 8-10 days after surgery  FOLLOW-UP CARE:    Dr. Sudeep Medina.  Daisy Saleh [address] [place] [address]  [phone]

## 2023-03-28 RX ORDER — FLUTICASONE PROPIONATE 50 MCG
1 SPRAY, SUSPENSION (ML) NASAL PRN
COMMUNITY

## 2023-03-29 ENCOUNTER — ANESTHESIA EVENT (OUTPATIENT)
Dept: OPERATING ROOM | Age: 88
End: 2023-03-29
Payer: MEDICARE

## 2023-03-29 ENCOUNTER — HOSPITAL ENCOUNTER (OUTPATIENT)
Age: 88
Setting detail: OBSERVATION
Discharge: HOME | End: 2023-03-30
Attending: ORTHOPAEDIC SURGERY | Admitting: INTERNAL MEDICINE
Payer: MEDICARE

## 2023-03-29 ENCOUNTER — ANESTHESIA (OUTPATIENT)
Dept: OPERATING ROOM | Age: 88
End: 2023-03-29
Payer: MEDICARE

## 2023-03-29 ENCOUNTER — APPOINTMENT (OUTPATIENT)
Dept: GENERAL RADIOLOGY | Age: 88
End: 2023-03-29
Attending: ORTHOPAEDIC SURGERY
Payer: MEDICARE

## 2023-03-29 DIAGNOSIS — G89.18 POST-OPERATIVE PAIN: Primary | ICD-10-CM

## 2023-03-29 PROBLEM — J69.0 ASPIRATION PNEUMONIA (HCC): Status: ACTIVE | Noted: 2023-03-29

## 2023-03-29 PROBLEM — R09.02 HYPOXIA: Status: ACTIVE | Noted: 2023-03-29

## 2023-03-29 LAB
ANION GAP SERPL CALCULATED.3IONS-SCNC: 12 MMOL/L (ref 7–16)
ANION GAP SERPL CALCULATED.3IONS-SCNC: 9 MMOL/L (ref 7–16)
B PARAP IS1001 DNA NPH QL NAA+NON-PROBE: NOT DETECTED
B PERT.PT PRMT NPH QL NAA+NON-PROBE: NOT DETECTED
BUN SERPL-MCNC: 16 MG/DL (ref 6–23)
BUN SERPL-MCNC: 17 MG/DL (ref 6–23)
C PNEUM DNA NPH QL NAA+NON-PROBE: NOT DETECTED
CALCIUM SERPL-MCNC: 8.8 MG/DL (ref 8.6–10.2)
CALCIUM SERPL-MCNC: 9.1 MG/DL (ref 8.6–10.2)
CHLORIDE SERPL-SCNC: 106 MMOL/L (ref 98–107)
CHLORIDE SERPL-SCNC: 109 MMOL/L (ref 98–107)
CO2 SERPL-SCNC: 23 MMOL/L (ref 22–29)
CO2 SERPL-SCNC: 26 MMOL/L (ref 22–29)
CREAT SERPL-MCNC: 0.8 MG/DL (ref 0.5–1)
CREAT SERPL-MCNC: 0.9 MG/DL (ref 0.5–1)
ERYTHROCYTE [DISTWIDTH] IN BLOOD BY AUTOMATED COUNT: 14.6 FL (ref 11.5–15)
FLUAV RNA NPH QL NAA+NON-PROBE: NOT DETECTED
FLUBV RNA NPH QL NAA+NON-PROBE: NOT DETECTED
GLUCOSE SERPL-MCNC: 131 MG/DL (ref 74–99)
GLUCOSE SERPL-MCNC: 154 MG/DL (ref 74–99)
HADV DNA NPH QL NAA+NON-PROBE: NOT DETECTED
HCOV 229E RNA NPH QL NAA+NON-PROBE: NOT DETECTED
HCOV HKU1 RNA NPH QL NAA+NON-PROBE: NOT DETECTED
HCOV NL63 RNA NPH QL NAA+NON-PROBE: NOT DETECTED
HCOV OC43 RNA NPH QL NAA+NON-PROBE: NOT DETECTED
HCT VFR BLD AUTO: 43 % (ref 34–48)
HGB BLD-MCNC: 13.7 G/DL (ref 11.5–15.5)
HMPV RNA NPH QL NAA+NON-PROBE: NOT DETECTED
HPIV1 RNA NPH QL NAA+NON-PROBE: NOT DETECTED
HPIV2 RNA NPH QL NAA+NON-PROBE: NOT DETECTED
HPIV3 RNA NPH QL NAA+NON-PROBE: NOT DETECTED
HPIV4 RNA NPH QL NAA+NON-PROBE: NOT DETECTED
LACTATE BLDV-SCNC: 1.9 MMOL/L (ref 0.5–2.2)
M PNEUMO DNA NPH QL NAA+NON-PROBE: NOT DETECTED
MCH RBC QN AUTO: 27.6 PG (ref 26–35)
MCHC RBC AUTO-ENTMCNC: 31.9 % (ref 32–34.5)
MCV RBC AUTO: 86.7 FL (ref 80–99.9)
PLATELET # BLD AUTO: 166 E9/L (ref 130–450)
PMV BLD AUTO: 12.7 FL (ref 7–12)
POTASSIUM SERPL-SCNC: 3.4 MMOL/L (ref 3.5–5)
POTASSIUM SERPL-SCNC: 4.3 MMOL/L (ref 3.5–5)
RBC # BLD AUTO: 4.96 E12/L (ref 3.5–5.5)
RSV RNA NPH QL NAA+NON-PROBE: NOT DETECTED
RV+EV RNA NPH QL NAA+NON-PROBE: NOT DETECTED
SARS-COV-2 RNA NPH QL NAA+NON-PROBE: NOT DETECTED
SODIUM SERPL-SCNC: 141 MMOL/L (ref 132–146)
SODIUM SERPL-SCNC: 144 MMOL/L (ref 132–146)
WBC # BLD: 8.9 E9/L (ref 4.5–11.5)

## 2023-03-29 PROCEDURE — G0378 HOSPITAL OBSERVATION PER HR: HCPCS

## 2023-03-29 PROCEDURE — 93005 ELECTROCARDIOGRAM TRACING: CPT | Performed by: NURSE PRACTITIONER

## 2023-03-29 PROCEDURE — 85027 COMPLETE CBC AUTOMATED: CPT

## 2023-03-29 PROCEDURE — 84145 PROCALCITONIN (PCT): CPT

## 2023-03-29 PROCEDURE — 6360000002 HC RX W HCPCS: Performed by: NURSE ANESTHETIST, CERTIFIED REGISTERED

## 2023-03-29 PROCEDURE — 36415 COLL VENOUS BLD VENIPUNCTURE: CPT

## 2023-03-29 PROCEDURE — 3700000000 HC ANESTHESIA ATTENDED CARE: Performed by: ORTHOPAEDIC SURGERY

## 2023-03-29 PROCEDURE — 6360000002 HC RX W HCPCS: Performed by: NURSE PRACTITIONER

## 2023-03-29 PROCEDURE — 2709999900 HC NON-CHARGEABLE SUPPLY: Performed by: ORTHOPAEDIC SURGERY

## 2023-03-29 PROCEDURE — 71045 X-RAY EXAM CHEST 1 VIEW: CPT

## 2023-03-29 PROCEDURE — 3600000013 HC SURGERY LEVEL 3 ADDTL 15MIN: Performed by: ORTHOPAEDIC SURGERY

## 2023-03-29 PROCEDURE — 6360000002 HC RX W HCPCS: Performed by: PHYSICIAN ASSISTANT

## 2023-03-29 PROCEDURE — 94640 AIRWAY INHALATION TREATMENT: CPT

## 2023-03-29 PROCEDURE — 2580000003 HC RX 258: Performed by: PHYSICIAN ASSISTANT

## 2023-03-29 PROCEDURE — 2500000003 HC RX 250 WO HCPCS: Performed by: NURSE ANESTHETIST, CERTIFIED REGISTERED

## 2023-03-29 PROCEDURE — 6370000000 HC RX 637 (ALT 250 FOR IP): Performed by: NURSE PRACTITIONER

## 2023-03-29 PROCEDURE — 6370000000 HC RX 637 (ALT 250 FOR IP): Performed by: INTERNAL MEDICINE

## 2023-03-29 PROCEDURE — 85025 COMPLETE CBC W/AUTO DIFF WBC: CPT

## 2023-03-29 PROCEDURE — 87040 BLOOD CULTURE FOR BACTERIA: CPT

## 2023-03-29 PROCEDURE — 2500000003 HC RX 250 WO HCPCS: Performed by: STUDENT IN AN ORGANIZED HEALTH CARE EDUCATION/TRAINING PROGRAM

## 2023-03-29 PROCEDURE — 7100000001 HC PACU RECOVERY - ADDTL 15 MIN: Performed by: ORTHOPAEDIC SURGERY

## 2023-03-29 PROCEDURE — 6360000002 HC RX W HCPCS: Performed by: STUDENT IN AN ORGANIZED HEALTH CARE EDUCATION/TRAINING PROGRAM

## 2023-03-29 PROCEDURE — 7100000000 HC PACU RECOVERY - FIRST 15 MIN: Performed by: ORTHOPAEDIC SURGERY

## 2023-03-29 PROCEDURE — 83605 ASSAY OF LACTIC ACID: CPT

## 2023-03-29 PROCEDURE — C1713 ANCHOR/SCREW BN/BN,TIS/BN: HCPCS | Performed by: ORTHOPAEDIC SURGERY

## 2023-03-29 PROCEDURE — 2500000003 HC RX 250 WO HCPCS: Performed by: ORTHOPAEDIC SURGERY

## 2023-03-29 PROCEDURE — 3600000003 HC SURGERY LEVEL 3 BASE: Performed by: ORTHOPAEDIC SURGERY

## 2023-03-29 PROCEDURE — 2580000003 HC RX 258: Performed by: NURSE ANESTHETIST, CERTIFIED REGISTERED

## 2023-03-29 PROCEDURE — 96365 THER/PROPH/DIAG IV INF INIT: CPT

## 2023-03-29 PROCEDURE — 2580000003 HC RX 258: Performed by: NURSE PRACTITIONER

## 2023-03-29 PROCEDURE — 0202U NFCT DS 22 TRGT SARS-COV-2: CPT

## 2023-03-29 PROCEDURE — 80048 BASIC METABOLIC PNL TOTAL CA: CPT

## 2023-03-29 PROCEDURE — 3700000001 HC ADD 15 MINUTES (ANESTHESIA): Performed by: ORTHOPAEDIC SURGERY

## 2023-03-29 DEVICE — FIBERLOCK SUSPENSION SYSTEM
Type: IMPLANTABLE DEVICE | Site: ARM | Status: FUNCTIONAL
Brand: ARTHREX®

## 2023-03-29 RX ORDER — ONDANSETRON 2 MG/ML
4 INJECTION INTRAMUSCULAR; INTRAVENOUS
Status: DISCONTINUED | OUTPATIENT
Start: 2023-03-29 | End: 2023-03-29 | Stop reason: HOSPADM

## 2023-03-29 RX ORDER — ACETAMINOPHEN 650 MG/1
650 SUPPOSITORY RECTAL EVERY 6 HOURS PRN
Status: DISCONTINUED | OUTPATIENT
Start: 2023-03-29 | End: 2023-03-30 | Stop reason: HOSPADM

## 2023-03-29 RX ORDER — DIPHENHYDRAMINE HYDROCHLORIDE 50 MG/ML
12.5 INJECTION INTRAMUSCULAR; INTRAVENOUS
Status: DISCONTINUED | OUTPATIENT
Start: 2023-03-29 | End: 2023-03-29 | Stop reason: HOSPADM

## 2023-03-29 RX ORDER — DEXAMETHASONE SODIUM PHOSPHATE 4 MG/ML
INJECTION, SOLUTION INTRA-ARTICULAR; INTRALESIONAL; INTRAMUSCULAR; INTRAVENOUS; SOFT TISSUE PRN
Status: DISCONTINUED | OUTPATIENT
Start: 2023-03-29 | End: 2023-03-29 | Stop reason: SDUPTHER

## 2023-03-29 RX ORDER — LEVALBUTEROL INHALATION SOLUTION 0.63 MG/3ML
0.63 SOLUTION RESPIRATORY (INHALATION) ONCE
Status: COMPLETED | OUTPATIENT
Start: 2023-03-29 | End: 2023-03-29

## 2023-03-29 RX ORDER — SODIUM CHLORIDE 9 MG/ML
INJECTION, SOLUTION INTRAVENOUS CONTINUOUS PRN
Status: DISCONTINUED | OUTPATIENT
Start: 2023-03-29 | End: 2023-03-29 | Stop reason: SDUPTHER

## 2023-03-29 RX ORDER — SODIUM CHLORIDE 0.9 % (FLUSH) 0.9 %
5-40 SYRINGE (ML) INJECTION PRN
Status: DISCONTINUED | OUTPATIENT
Start: 2023-03-29 | End: 2023-03-29 | Stop reason: HOSPADM

## 2023-03-29 RX ORDER — SODIUM CHLORIDE 0.9 % (FLUSH) 0.9 %
5-40 SYRINGE (ML) INJECTION EVERY 12 HOURS SCHEDULED
Status: DISCONTINUED | OUTPATIENT
Start: 2023-03-29 | End: 2023-03-30 | Stop reason: HOSPADM

## 2023-03-29 RX ORDER — FENTANYL CITRATE 50 UG/ML
INJECTION, SOLUTION INTRAMUSCULAR; INTRAVENOUS PRN
Status: DISCONTINUED | OUTPATIENT
Start: 2023-03-29 | End: 2023-03-29 | Stop reason: SDUPTHER

## 2023-03-29 RX ORDER — CITALOPRAM 20 MG/1
20 TABLET ORAL DAILY
Status: DISCONTINUED | OUTPATIENT
Start: 2023-03-29 | End: 2023-03-30 | Stop reason: HOSPADM

## 2023-03-29 RX ORDER — DEXTROSE MONOHYDRATE 100 MG/ML
INJECTION, SOLUTION INTRAVENOUS CONTINUOUS PRN
Status: DISCONTINUED | OUTPATIENT
Start: 2023-03-29 | End: 2023-03-29 | Stop reason: HOSPADM

## 2023-03-29 RX ORDER — LIDOCAINE HYDROCHLORIDE 20 MG/ML
INJECTION, SOLUTION EPIDURAL; INFILTRATION; INTRACAUDAL; PERINEURAL PRN
Status: DISCONTINUED | OUTPATIENT
Start: 2023-03-29 | End: 2023-03-29 | Stop reason: SDUPTHER

## 2023-03-29 RX ORDER — DILTIAZEM HYDROCHLORIDE 180 MG/1
180 CAPSULE, COATED, EXTENDED RELEASE ORAL 2 TIMES DAILY
Status: DISCONTINUED | OUTPATIENT
Start: 2023-03-29 | End: 2023-03-30 | Stop reason: HOSPADM

## 2023-03-29 RX ORDER — PROPOFOL 10 MG/ML
INJECTION, EMULSION INTRAVENOUS PRN
Status: DISCONTINUED | OUTPATIENT
Start: 2023-03-29 | End: 2023-03-29 | Stop reason: SDUPTHER

## 2023-03-29 RX ORDER — SODIUM CHLORIDE 9 MG/ML
INJECTION, SOLUTION INTRAVENOUS PRN
Status: DISCONTINUED | OUTPATIENT
Start: 2023-03-29 | End: 2023-03-30 | Stop reason: HOSPADM

## 2023-03-29 RX ORDER — POLYETHYLENE GLYCOL 3350 17 G/17G
17 POWDER, FOR SOLUTION ORAL DAILY PRN
Status: DISCONTINUED | OUTPATIENT
Start: 2023-03-29 | End: 2023-03-30 | Stop reason: HOSPADM

## 2023-03-29 RX ORDER — OXYCODONE HYDROCHLORIDE 5 MG/1
5 TABLET ORAL
Status: DISCONTINUED | OUTPATIENT
Start: 2023-03-29 | End: 2023-03-29 | Stop reason: HOSPADM

## 2023-03-29 RX ORDER — ACETAMINOPHEN 325 MG/1
650 TABLET ORAL EVERY 6 HOURS PRN
Status: DISCONTINUED | OUTPATIENT
Start: 2023-03-29 | End: 2023-03-30 | Stop reason: HOSPADM

## 2023-03-29 RX ORDER — ONDANSETRON 4 MG/1
4 TABLET, ORALLY DISINTEGRATING ORAL EVERY 8 HOURS PRN
Status: DISCONTINUED | OUTPATIENT
Start: 2023-03-29 | End: 2023-03-30 | Stop reason: HOSPADM

## 2023-03-29 RX ORDER — SODIUM CHLORIDE 0.9 % (FLUSH) 0.9 %
5-40 SYRINGE (ML) INJECTION EVERY 12 HOURS SCHEDULED
Status: DISCONTINUED | OUTPATIENT
Start: 2023-03-29 | End: 2023-03-29 | Stop reason: HOSPADM

## 2023-03-29 RX ORDER — ONDANSETRON 2 MG/ML
4 INJECTION INTRAMUSCULAR; INTRAVENOUS EVERY 6 HOURS PRN
Status: DISCONTINUED | OUTPATIENT
Start: 2023-03-29 | End: 2023-03-30 | Stop reason: HOSPADM

## 2023-03-29 RX ORDER — DROPERIDOL 2.5 MG/ML
0.62 INJECTION, SOLUTION INTRAMUSCULAR; INTRAVENOUS
Status: COMPLETED | OUTPATIENT
Start: 2023-03-29 | End: 2023-03-29

## 2023-03-29 RX ORDER — SODIUM CHLORIDE 0.9 % (FLUSH) 0.9 %
5-40 SYRINGE (ML) INJECTION PRN
Status: DISCONTINUED | OUTPATIENT
Start: 2023-03-29 | End: 2023-03-30 | Stop reason: HOSPADM

## 2023-03-29 RX ORDER — LABETALOL HYDROCHLORIDE 5 MG/ML
10 INJECTION, SOLUTION INTRAVENOUS
Status: DISCONTINUED | OUTPATIENT
Start: 2023-03-29 | End: 2023-03-29 | Stop reason: HOSPADM

## 2023-03-29 RX ORDER — HYDROCODONE BITARTRATE AND ACETAMINOPHEN 5; 325 MG/1; MG/1
1 TABLET ORAL EVERY 6 HOURS PRN
Qty: 28 TABLET | Refills: 0 | Status: SHIPPED | OUTPATIENT
Start: 2023-03-29 | End: 2023-04-05

## 2023-03-29 RX ORDER — SODIUM CHLORIDE, SODIUM LACTATE, POTASSIUM CHLORIDE, CALCIUM CHLORIDE 600; 310; 30; 20 MG/100ML; MG/100ML; MG/100ML; MG/100ML
INJECTION, SOLUTION INTRAVENOUS CONTINUOUS
Status: DISCONTINUED | OUTPATIENT
Start: 2023-03-29 | End: 2023-03-30

## 2023-03-29 RX ORDER — ACETAMINOPHEN 325 MG/1
650 TABLET ORAL
Status: DISCONTINUED | OUTPATIENT
Start: 2023-03-29 | End: 2023-03-29 | Stop reason: HOSPADM

## 2023-03-29 RX ORDER — LABETALOL HYDROCHLORIDE 5 MG/ML
2.5 INJECTION, SOLUTION INTRAVENOUS EVERY 10 MIN PRN
Status: COMPLETED | OUTPATIENT
Start: 2023-03-29 | End: 2023-03-29

## 2023-03-29 RX ORDER — SODIUM CHLORIDE 9 MG/ML
INJECTION, SOLUTION INTRAVENOUS PRN
Status: DISCONTINUED | OUTPATIENT
Start: 2023-03-29 | End: 2023-03-29 | Stop reason: HOSPADM

## 2023-03-29 RX ORDER — BUPIVACAINE HYDROCHLORIDE AND EPINEPHRINE 5; 5 MG/ML; UG/ML
INJECTION, SOLUTION EPIDURAL; INTRACAUDAL; PERINEURAL PRN
Status: DISCONTINUED | OUTPATIENT
Start: 2023-03-29 | End: 2023-03-29 | Stop reason: ALTCHOICE

## 2023-03-29 RX ORDER — ROCURONIUM BROMIDE 10 MG/ML
INJECTION, SOLUTION INTRAVENOUS PRN
Status: DISCONTINUED | OUTPATIENT
Start: 2023-03-29 | End: 2023-03-29 | Stop reason: SDUPTHER

## 2023-03-29 RX ORDER — SODIUM CHLORIDE 9 MG/ML
INJECTION, SOLUTION INTRAVENOUS CONTINUOUS
Status: DISCONTINUED | OUTPATIENT
Start: 2023-03-29 | End: 2023-03-30

## 2023-03-29 RX ORDER — HYDROCODONE BITARTRATE AND ACETAMINOPHEN 5; 325 MG/1; MG/1
1 TABLET ORAL EVERY 6 HOURS PRN
Status: DISCONTINUED | OUTPATIENT
Start: 2023-03-29 | End: 2023-03-30 | Stop reason: HOSPADM

## 2023-03-29 RX ORDER — HYDRALAZINE HYDROCHLORIDE 20 MG/ML
10 INJECTION INTRAMUSCULAR; INTRAVENOUS
Status: DISCONTINUED | OUTPATIENT
Start: 2023-03-29 | End: 2023-03-29 | Stop reason: HOSPADM

## 2023-03-29 RX ORDER — POTASSIUM CHLORIDE 20 MEQ/1
40 TABLET, EXTENDED RELEASE ORAL ONCE
Status: COMPLETED | OUTPATIENT
Start: 2023-03-29 | End: 2023-03-29

## 2023-03-29 RX ORDER — AMIODARONE HYDROCHLORIDE 200 MG/1
100 TABLET ORAL DAILY
Status: DISCONTINUED | OUTPATIENT
Start: 2023-03-29 | End: 2023-03-30 | Stop reason: HOSPADM

## 2023-03-29 RX ORDER — LEVOTHYROXINE SODIUM 0.12 MG/1
125 TABLET ORAL DAILY
Status: DISCONTINUED | OUTPATIENT
Start: 2023-03-29 | End: 2023-03-30 | Stop reason: HOSPADM

## 2023-03-29 RX ORDER — LABETALOL HYDROCHLORIDE 5 MG/ML
10 INJECTION, SOLUTION INTRAVENOUS ONCE
Status: DISCONTINUED | OUTPATIENT
Start: 2023-03-29 | End: 2023-03-30 | Stop reason: HOSPADM

## 2023-03-29 RX ORDER — LEVALBUTEROL INHALATION SOLUTION 0.63 MG/3ML
0.63 SOLUTION RESPIRATORY (INHALATION)
Status: DISCONTINUED | OUTPATIENT
Start: 2023-03-29 | End: 2023-03-30 | Stop reason: HOSPADM

## 2023-03-29 RX ORDER — ONDANSETRON 2 MG/ML
INJECTION INTRAMUSCULAR; INTRAVENOUS PRN
Status: DISCONTINUED | OUTPATIENT
Start: 2023-03-29 | End: 2023-03-29 | Stop reason: SDUPTHER

## 2023-03-29 RX ADMIN — SODIUM CHLORIDE, PRESERVATIVE FREE 10 ML: 5 INJECTION INTRAVENOUS at 20:26

## 2023-03-29 RX ADMIN — LEVOTHYROXINE SODIUM 125 MCG: 0.12 TABLET ORAL at 18:24

## 2023-03-29 RX ADMIN — ACETAMINOPHEN 650 MG: 325 TABLET ORAL at 19:19

## 2023-03-29 RX ADMIN — LEVALBUTEROL HYDROCHLORIDE 0.63 MG: 0.63 SOLUTION RESPIRATORY (INHALATION) at 20:59

## 2023-03-29 RX ADMIN — FENTANYL CITRATE 50 MCG: 50 INJECTION, SOLUTION INTRAMUSCULAR; INTRAVENOUS at 10:49

## 2023-03-29 RX ADMIN — AMIODARONE HYDROCHLORIDE 100 MG: 200 TABLET ORAL at 18:24

## 2023-03-29 RX ADMIN — AMPICILLIN SODIUM AND SULBACTAM SODIUM 3000 MG: 2; 1 INJECTION, POWDER, FOR SOLUTION INTRAMUSCULAR; INTRAVENOUS at 18:27

## 2023-03-29 RX ADMIN — PHENYLEPHRINE HYDROCHLORIDE 100 MCG: 10 INJECTION INTRAVENOUS at 11:29

## 2023-03-29 RX ADMIN — PROPOFOL 150 MG: 10 INJECTION, EMULSION INTRAVENOUS at 10:14

## 2023-03-29 RX ADMIN — CITALOPRAM HYDROBROMIDE 20 MG: 20 TABLET ORAL at 18:24

## 2023-03-29 RX ADMIN — ONDANSETRON 4 MG: 2 INJECTION INTRAMUSCULAR; INTRAVENOUS at 10:23

## 2023-03-29 RX ADMIN — POTASSIUM CHLORIDE 40 MEQ: 1500 TABLET, EXTENDED RELEASE ORAL at 22:24

## 2023-03-29 RX ADMIN — DILTIAZEM HYDROCHLORIDE 180 MG: 180 CAPSULE, COATED, EXTENDED RELEASE ORAL at 22:24

## 2023-03-29 RX ADMIN — FENTANYL CITRATE 50 MCG: 50 INJECTION, SOLUTION INTRAMUSCULAR; INTRAVENOUS at 11:10

## 2023-03-29 RX ADMIN — LIDOCAINE HYDROCHLORIDE 100 MG: 20 INJECTION, SOLUTION EPIDURAL; INFILTRATION; INTRACAUDAL; PERINEURAL at 10:13

## 2023-03-29 RX ADMIN — LABETALOL HYDROCHLORIDE 2.5 MG: 5 INJECTION INTRAVENOUS at 13:30

## 2023-03-29 RX ADMIN — DEXAMETHASONE SODIUM PHOSPHATE 10 MG: 4 INJECTION, SOLUTION INTRAMUSCULAR; INTRAVENOUS at 10:23

## 2023-03-29 RX ADMIN — ROCURONIUM BROMIDE 35 MG: 50 INJECTION, SOLUTION INTRAVENOUS at 10:14

## 2023-03-29 RX ADMIN — LEVALBUTEROL 0.63 MG: 0.63 SOLUTION RESPIRATORY (INHALATION) at 13:24

## 2023-03-29 RX ADMIN — SUGAMMADEX 200 MG: 100 INJECTION, SOLUTION INTRAVENOUS at 11:37

## 2023-03-29 RX ADMIN — SODIUM CHLORIDE, PRESERVATIVE FREE 10 ML: 5 INJECTION INTRAVENOUS at 22:25

## 2023-03-29 RX ADMIN — FENTANYL CITRATE 50 MCG: 50 INJECTION, SOLUTION INTRAMUSCULAR; INTRAVENOUS at 10:24

## 2023-03-29 RX ADMIN — WATER 2000 MG: 1 INJECTION INTRAMUSCULAR; INTRAVENOUS; SUBCUTANEOUS at 10:09

## 2023-03-29 RX ADMIN — FENTANYL CITRATE 50 MCG: 50 INJECTION, SOLUTION INTRAMUSCULAR; INTRAVENOUS at 10:12

## 2023-03-29 RX ADMIN — SODIUM CHLORIDE: 9 INJECTION, SOLUTION INTRAVENOUS at 17:44

## 2023-03-29 RX ADMIN — SODIUM CHLORIDE: 9 INJECTION, SOLUTION INTRAVENOUS at 09:43

## 2023-03-29 RX ADMIN — LABETALOL HYDROCHLORIDE 2.5 MG: 5 INJECTION INTRAVENOUS at 13:20

## 2023-03-29 RX ADMIN — SODIUM CHLORIDE: 9 INJECTION, SOLUTION INTRAVENOUS at 11:26

## 2023-03-29 RX ADMIN — DROPERIDOL 0.62 MG: 2.5 INJECTION, SOLUTION INTRAMUSCULAR; INTRAVENOUS at 12:16

## 2023-03-29 ASSESSMENT — PAIN - FUNCTIONAL ASSESSMENT: PAIN_FUNCTIONAL_ASSESSMENT: 0-10

## 2023-03-29 ASSESSMENT — PAIN DESCRIPTION - ONSET
ONSET: GRADUAL
ONSET: ON-GOING
ONSET: GRADUAL

## 2023-03-29 ASSESSMENT — PAIN DESCRIPTION - LOCATION
LOCATION: HEAD;HAND
LOCATION: WRIST

## 2023-03-29 ASSESSMENT — PAIN SCALES - GENERAL
PAINLEVEL_OUTOF10: 0
PAINLEVEL_OUTOF10: 2
PAINLEVEL_OUTOF10: 0
PAINLEVEL_OUTOF10: 7
PAINLEVEL_OUTOF10: 0

## 2023-03-29 ASSESSMENT — PAIN DESCRIPTION - ORIENTATION
ORIENTATION: LEFT
ORIENTATION: LEFT
ORIENTATION: LEFT;MID
ORIENTATION: LEFT

## 2023-03-29 ASSESSMENT — PAIN DESCRIPTION - FREQUENCY
FREQUENCY: INTERMITTENT

## 2023-03-29 ASSESSMENT — PAIN DESCRIPTION - PAIN TYPE
TYPE: SURGICAL PAIN

## 2023-03-29 ASSESSMENT — PAIN DESCRIPTION - DESCRIPTORS
DESCRIPTORS: DISCOMFORT
DESCRIPTORS: ACHING;DISCOMFORT
DESCRIPTORS: ACHING
DESCRIPTORS: DISCOMFORT
DESCRIPTORS: DISCOMFORT

## 2023-03-29 NOTE — BRIEF OP NOTE
Brief Postoperative Note      Patient: Ebony Simmons  YOB: 1934  MRN: [id number]    Date of Procedure: 3/29/2023    Pre-Op Diagnosis: Carpal tunnel syndrome on left [G56.02]  Primary osteoarthritis of first carpometacarpal joint of left hand [M18.12]    Post-Op Diagnosis: Same       Procedure(s):  LEFT CARPAL TUNNEL RELEASE LEFT MEDIAN NERVE DECOMPRESSION AT ELBOW LEFT THUMB TRAPEZIECTOMY LIGAMENT RECONSTRUCTION TENDON INTERPOSITION WITH FIBERTAK ++ARTHREX++    Surgeon(s):  Sylvia Arora MD    Assistant:  Physician Assistant: EV Nj  Resident: Harper Jeffrey DO    Anesthesia: General    Estimated Blood Loss (mL): Minimal    Complications: None    Specimens:   * No specimens in log *    Implants:  Implant Name Type Inv.  Item Serial No.  Lot No. LRB No. Used Action   KIT FIBERLOCK SUSPENSION IMPLANT - [id number]  KIT FIBERLOCK SUSPENSION IMPLANT  [employer] [facility] [employer]- [id number] Left 1 Implanted         Drains: * No LDAs found *    Findings: see op note    Electronically signed by Harper Jeffrey DO on 3/29/2023 at 11:59 AM

## 2023-03-29 NOTE — ANESTHESIA PRE PROCEDURE
Department of Anesthesiology  Preprocedure Note       Name:  Lisha Holder   Age:  80 y.o.  :  1934                                          MRN:  [id number]         Date:  3/29/2023      Surgeon: Maciel Soriano MD      Procedure: LEFT CARPAL TUNNEL RELEASE LEFT MEDIAN NERVE DECOMPRESSION AT ELBOW LEFT THUMB TRAPEZIECTOMY LIGAMENT RECONSTRUCTION TENDON INTERPOSITION WITH FIBERTAK ++ARTHREX++ (Left)      Medications prior to admission:   Prior to Admission medications    Medication Sig Start Date End Date Taking?  Authorizing Provider   fluticasone (FLONASE) 50 MCG/ACT nasal spray 1 spray by Each Nostril route as needed for Rhinitis    Historical Provider, MD   NONFORMULARY Take 1 capsule by mouth daily FOCUS FACTOR    Historical Provider, MD   XARELTO 20 MG TABS tablet Take 1 tablet by mouth once daily with breakfast 3/22/23   Rosie Ross MD   loratadine (CLARITIN) 10 MG tablet Take 1 tablet by mouth daily 3/9/23   Historical Provider, MD   citalopram (CELEXA) 20 MG tablet Take 1 tablet by mouth once daily 3/3/23   Allie Horton, DO   levothyroxine (SYNTHROID) 125 MCG tablet Take 1 tablet by mouth once daily 23   Allie Horton, DO   fluticasone-salmeterol (ADVAIR) 250-50 MCG/ACT AEPB diskus inhaler Inhale 1 puff into the lungs 2 times daily as needed    Historical Provider, MD   dilTIAZem (CARDIZEM CD) 180 MG extended release capsule Take 1 capsule by mouth twice daily 22   Rosie Ross MD   PACERONE 100 MG tablet Take 1 tablet by mouth once daily 10/5/22   Rosie Ross MD   albuterol sulfate HFA (PROVENTIL HFA) 108 (90 Base) MCG/ACT inhaler Inhale 2 puffs into the lungs every 4 hours as needed for Wheezing or Shortness of Breath 22   Ruth Ann Pleitez MD   simvastatin (ZOCOR) 40 MG tablet Take 40 mg by mouth nightly  3/29/18   Historical Provider, MD   Cyanocobalamin (VITAMIN B-12 PO) Take 6,000 mcg by mouth daily     Historical Provider, MD   Cholecalciferol (VITAMIN

## 2023-03-29 NOTE — H&P
Department of Orthopedic Surgery  History and Physical        CHIEF COMPLAINT: Left hand numbness and tingling     HISTORY OF PRESENT ILLNESS:                 The patient is a 80 y.o. female who presents with numbness and tingling to the left hand that is been going on for roughly a year. Numbness and tingling is intermittent although she states that her fingertips currently feel numb. She is also complaining of pain to the base of the thumb that is going on for many years. Denies any specific injury or trauma. She does have trouble gripping things secondary to the pain. Denies any pain about the elbow. Patient is right-hand dominant. Patient retired. Patient states that on the right hand she also has locking and clicking of her ring finger but this is not that bothersome. In terms of treatment. Patient states that she will wear a neoprene sleeve/wrap on her left hand and this gives her some relief. She takes Tylenol occasionally for pain but denies any NSAIDs that she is on Xarelto. Has not had any corticosteroid injections in the past.  No formal physical therapy. Does not do home exercises. 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 3 months ago the patient had cortisone injections to the left carpal tunnel and left thumb CMC joint. She states this provided her relief for about 1 week. She presents today with her daughter to discuss surgical options. Patient is very interested in scheduling surgery at today's visit.      Past Medical History:    Past Medical History             Diagnosis Date    Asthma      Depression lashes normal, pupils equal, round and reactive to light, extra ocular muscles intact, sclera clear, conjunctiva normal  ENT:  Normocephalic, without obvious abnormality, atraumatic, sinuses nontender on palpation, external ears without lesions, oral pharynx with moist mucus membranes, tonsils without erythema or exudates, gums normal and good dentition. NECK:  Supple, symmetrical, trachea midline, no adenopathy, thyroid symmetric, not enlarged and no tenderness, skin normal  LUNGS:  CTA  CARDIOVASCULAR:  2+ radial pulses, extremities warm and well perfused  ABDOMEN:   NTTP  CHEST:  Atraumatic   GENITAL/URINARY:  deferred  NEUROLOGIC:  Awake, alert, oriented to name, place and time. Cranial nerves II-XII are grossly intact. Motor is 5 out of 5 bilaterally. Sensory is intact.  gait is normal.  MUSCULOSKELETAL:     Left upper extremity  Non-tender about the shoulder and elbow with good ROM. + tinels of the cubital tunnel, + tinels of the carpal tunnel, + durkans, - finkelsteins, + CMC grind, +lacertus, - tenderness over the A1 pulleys with no active triggering. Full flexion and extension of the fingers. - wartenbergs and cross finger testing, APB strength 5/5 with no atrophy. Median, ulnar, radial n intact to light touch. Brisk capillary refill. Gross motor 5/5.              DATA:    CBC:         Lab Results   Component Value Date/Time     WBC 8.3 01/27/2022 05:30 PM     RBC 5.91 01/27/2022 05:30 PM     HGB 14.6 09/09/2022 10:14 AM     HGB 15.2 01/27/2022 05:30 PM     HCT 49.6 01/27/2022 05:30 PM     MCV 83.9 01/27/2022 05:30 PM     MCH 25.7 01/27/2022 05:30 PM     MCHC 30.6 01/27/2022 05:30 PM     RDW 16.3 01/27/2022 05:30 PM      01/27/2022 05:30 PM     MPV 12.4 01/27/2022 05:30 PM      PT/INR:          Lab Results   Component Value Date/Time     PROTIME 17.0 09/05/2020 09:58 AM     INR 1.5 09/05/2020 09:58 AM               IMPRESSION:  Left carpal tunnel syndrome  Left thumb CMC arthritis  Left lacertus

## 2023-03-29 NOTE — H&P
lymphadenopathy  Pulmonary/Chest: clear to auscultation bilaterally- no wheezes, rales or rhonchi. Normal air movement. No respiratory distress. Cardiovascular: normal rate, regular rhythm. Normal S1 and S2. No murmurs, rubs, clicks, or gallops  Abdomen: soft, non-tender, non-distended. Normal bowel sounds. No masses or organomegaly  Extremities: no cyanosis, clubbing or edema  Musculoskeletal: normal range of motion, no joint swelling, deformity or tenderness  Neurologic: reflexes normal and symmetric, no cranial nerve deficit, gait, coordination and speech normal      /77   Pulse 86   Temp 97.3 °F (36.3 °C) (Tympanic)   Resp 15   Ht 5' 8\" (1.727 m)   Wt 163 lb (73.9 kg)   SpO2 99%   BMI 24.78 kg/m²     Recent Labs     03/29/23  0740      K 3.4*      CO2 26   BUN 17   CREATININE 0.9   GLUCOSE 131*   CALCIUM 9.1       Recent Labs     03/29/23  0740   WBC 8.9   RBC 4.96   HGB 13.7   HCT 43.0   MCV 86.7   MCH 27.6   MCHC 31.9*   RDW 14.6      MPV 12.7*     Radiology:   XR CHEST PORTABLE   Final Result   Suspect subtle developing pneumonia on the left. Cardiomegaly. Assessment & Plan:    Left lobe pneumonia, suspect aspiration  Hypoxia  -Blood cultures, procal, lactic acid, resp panel, resp culture, urine strep & legionella ordered  -unasyn for empiric coverage. Nsg communication to collect BC's prior to initial administration  -xopenex q4h while awake  -oxygen therapy protocol  -incentive spirometry  -pt does not wear O2 at baseline, will need ambulating pulse ox prior to discharge    Persistent A-fib  -continue home cardizem & pacerone  -hold home xarelto s/p surgery.  OK to resume afternoon of 3/30  -EKG  -continuous telemetry    Asthma  -continue home advair  -xopenex q4h while awake  -oxygen therapy protocol    Hypokalemia  -repeat BMP, if low-will need supplementation    Hypothyroidism  -continue home levothyroxine  -TSH    Code Status: DNRCCA  DVT prophylaxis: Xarelto on hold. SCD's    Disposition: From home. Anticipate discharge to home when medically stable. 45 minutes or more spent reviewing patient chart, assessing patient, discussing plan of care with patient and family, discussing plan of care with collaborating physician, and documentation. NOTE: This report was transcribed using voice recognition software. Every effort was made to ensure accuracy; however, inadvertent computerized transcription errors may be present.      Electronically signed by SONY Arguelles NP on 3/29/2023 at 5:02 PM

## 2023-03-29 NOTE — ANESTHESIA POSTPROCEDURE EVALUATION
Department of Anesthesiology  Postprocedure Note    Patient: Paulina Younger  MRN: [id number]  YOB: 1934  Date of evaluation: 3/29/2023      Procedure Summary     Date: 03/29/23 Room / Location: [facility] OR 03 / [facility]    Anesthesia Start: 1009 Anesthesia Stop: 9902    Procedure: LEFT CARPAL TUNNEL RELEASE LEFT MEDIAN NERVE DECOMPRESSION AT ELBOW LEFT THUMB TRAPEZIECTOMY LIGAMENT RECONSTRUCTION TENDON INTERPOSITION WITH FIBERTAK (Left) Diagnosis:       Carpal tunnel syndrome on left      Primary osteoarthritis of first carpometacarpal joint of left hand      (Carpal tunnel syndrome on left [G56.02])      (Primary osteoarthritis of first carpometacarpal joint of left hand [M18.12])    Surgeons: Shiloh Thapa MD Responsible Provider: Moshe Melendez MD    Anesthesia Type: MAC, general ASA Status: 3          Anesthesia Type: No value filed. Lottie Phase I: Lottie Score: 9    Lottie Phase II:        Anesthesia Post Evaluation    Patient location during evaluation: PACU  Patient participation: complete - patient participated  Level of consciousness: awake and alert  Pain score: 1  Airway patency: patent  Nausea & Vomiting: no nausea and no vomiting  Complications: no  Cardiovascular status: hemodynamically stable  Respiratory status: acceptable  Hydration status: stable  Comments: Patient initially with O2 sats 86-90 off 2 L O2. IS and deep breathing + albeuterol neb. given.

## 2023-03-29 NOTE — OP NOTE
Operative Note      Patient: Angel Alcazar  YOB: 1934  MRN: [id number]    Date of Procedure: 3/29/2023    Pre-Op Diagnosis: Carpal tunnel syndrome on left [G56.02]  Primary osteoarthritis of first carpometacarpal joint of left hand [M18.12]    Post-Op Diagnosis: Same       Procedure(s):  LEFT CARPAL TUNNEL RELEASE LEFT MEDIAN NERVE DECOMPRESSION AT ELBOW LEFT THUMB TRAPEZIECTOMY LIGAMENT RECONSTRUCTION TENDON INTERPOSITION WITH FIBERTAK    Surgeon(s):  Pedro Gibson MD    Assistant:   Physician Assistant: EV Shah  Resident: Bora Bustamante DO    Anesthesia: General    Estimated Blood Loss (mL): Minimal    Complications: None    Specimens:   * No specimens in log *    Implants:  Implant Name Type Inv. Item Serial No.  Lot No. LRB No. Used Action   KIT FIBERLOCK SUSPENSION IMPLANT - [id number]  KIT FIBERLOCK SUSPENSION IMPLANT  [address] [employer] [id number] Left 1 Implanted         Drains: * No LDAs found *    Findings: see details    Detailed Description of Procedure:   Detailed Description of Procedure:   [address] [address]     OPERATIVE REPORT         DATE OF PROCEDURE: 3/29/2023       PREOPERATIVE DIAGNOSIS:   1. Left thumb basal joint arthritis   2. left carpal tunnel syndrome  3. 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 Left thumb trapeziectomy. 2 .Left thumb volar oblique ligament reconstruction using a split flexor  carpi radialis tendon transfer. 3. Ethel of split flexor carpi radialis tendon for ligament reconstruction. 4. Left thumb application of Fiberlock anchor  5. Left carpal tunnel release  6. Left Median nerve decompression at elbow       ANESTHESIA:  1. General.  2. Local anesthetic by surgeon consisting of approximately 20 mL of 0.25%  Marcaine with epinephrine. ASSISTANT:   1. Dr Bora Bustamante  orthopedic resident, orthopedic surgery resident.   2. injection site. . The patient was then seen by Anesthesia and taken to the operating room, placed supine on the table, underwent general anesthesia per anesthesia department. All bony prominences were well padded. A well-padded arm tourniquet was placed. The right upper extremity  was prepped and draped in standard sterile fashion. The arm was exsanguinated  and the tourniquet was inflated to 250 mmHg. An incision in line with the radial border of the ring finger, extending from  Shah's cardinal line to within 1 cm of the volar wrist flexion crease was  then created followed by blunt dissection and identification of the  superficial palmar fascia, underlying median nerve, and transverse carpal  ligament. Dissection was performed proximally to identify the contents of  Guyon canal, which was reflected off of the distal forearm fascia and  transverse carpal ligament. The transverse carpal ligament was then clearly  identified and released from a proximal to distal direction, decompressing  the carpal tunnel. The median nerve was inspected. There was flattening and  hyperemia to the nerve, consistent with median nerve compression. The  remaining portion of the proximal transverse carpal ligament and the distal  forearm fascia was then released using blunt-tip Metzenbaum scissors with a  distal to proximal slide technique while visualizing and protecting the  underlying median nerve. The median nerve was fully decompressed  throughout its visualized course, including the distal forearm fascia,  through the carpal tunnel and to its level of trifurcation distally. The wound was copiously irrigated out. Attention was then directed towards the median nerve at the elbow. Longitudinal incision extending from the elbow flexion crease distally for 4 to 6 cm was then created. The Lacertus was identified medial to the biceps tendon. It was thickened.   This was incised longitudinally sharply with a 15 blade scalpel

## 2023-03-30 VITALS
BODY MASS INDEX: 26.9 KG/M2 | HEIGHT: 68 IN | HEART RATE: 81 BPM | WEIGHT: 177.5 LBS | SYSTOLIC BLOOD PRESSURE: 103 MMHG | RESPIRATION RATE: 20 BRPM | OXYGEN SATURATION: 98 % | DIASTOLIC BLOOD PRESSURE: 74 MMHG | TEMPERATURE: 97.4 F

## 2023-03-30 PROBLEM — Z92.89 HISTORY OF ADVERSE REACTION TO ANESTHESIA: Status: ACTIVE | Noted: 2023-03-30

## 2023-03-30 PROBLEM — J96.01 ACUTE RESPIRATORY FAILURE WITH HYPOXIA (HCC): Status: ACTIVE | Noted: 2023-03-30

## 2023-03-30 PROBLEM — M25.569 CHRONIC KNEE PAIN: Status: ACTIVE | Noted: 2023-03-30

## 2023-03-30 PROBLEM — Z79.01 ANTICOAGULATED: Status: ACTIVE | Noted: 2023-03-30

## 2023-03-30 PROBLEM — G89.29 CHRONIC KNEE PAIN: Status: ACTIVE | Noted: 2023-03-30

## 2023-03-30 PROBLEM — T88.59XA COMPLICATION OF ANESTHESIA: Status: ACTIVE | Noted: 2023-03-30

## 2023-03-30 PROBLEM — J69.0 ASPIRATION PNEUMONITIS (HCC): Status: ACTIVE | Noted: 2023-03-30

## 2023-03-30 PROBLEM — E78.2 COMBINED HYPERLIPIDEMIA: Status: ACTIVE | Noted: 2023-03-30

## 2023-03-30 PROBLEM — Z98.890 HISTORY OF CARPAL TUNNEL SURGERY OF LEFT WRIST: Status: ACTIVE | Noted: 2023-03-30

## 2023-03-30 LAB
ANION GAP SERPL CALCULATED.3IONS-SCNC: 9 MMOL/L (ref 7–16)
BASOPHILS # BLD: 0 E9/L (ref 0–0.2)
BASOPHILS # BLD: 0 E9/L (ref 0–0.2)
BASOPHILS NFR BLD: 0 % (ref 0–2)
BASOPHILS NFR BLD: 0 % (ref 0–2)
BUN SERPL-MCNC: 18 MG/DL (ref 6–23)
CALCIUM SERPL-MCNC: 8.8 MG/DL (ref 8.6–10.2)
CHLORIDE SERPL-SCNC: 111 MMOL/L (ref 98–107)
CO2 SERPL-SCNC: 25 MMOL/L (ref 22–29)
CREAT SERPL-MCNC: 0.8 MG/DL (ref 0.5–1)
EKG ATRIAL RATE: 308 BPM
EKG Q-T INTERVAL: 392 MS
EKG QRS DURATION: 68 MS
EKG QTC CALCULATION (BAZETT): 460 MS
EKG R AXIS: -179 DEGREES
EKG T AXIS: -16 DEGREES
EKG VENTRICULAR RATE: 83 BPM
EOSINOPHIL # BLD: 0 E9/L (ref 0.05–0.5)
EOSINOPHIL # BLD: 0 E9/L (ref 0.05–0.5)
EOSINOPHIL NFR BLD: 0 % (ref 0–6)
EOSINOPHIL NFR BLD: 0 % (ref 0–6)
ERYTHROCYTE [DISTWIDTH] IN BLOOD BY AUTOMATED COUNT: 14.6 FL (ref 11.5–15)
ERYTHROCYTE [DISTWIDTH] IN BLOOD BY AUTOMATED COUNT: 14.8 FL (ref 11.5–15)
GLUCOSE SERPL-MCNC: 145 MG/DL (ref 74–99)
HCT VFR BLD AUTO: 38.5 % (ref 34–48)
HCT VFR BLD AUTO: 40.4 % (ref 34–48)
HGB BLD-MCNC: 11.9 G/DL (ref 11.5–15.5)
HGB BLD-MCNC: 12.3 G/DL (ref 11.5–15.5)
HYPOCHROMIA: ABNORMAL
HYPOCHROMIA: ABNORMAL
LEGIONELLA AG UR QL: NORMAL
LYMPHOCYTES # BLD: 0.14 E9/L (ref 1.5–4)
LYMPHOCYTES # BLD: 0.24 E9/L (ref 1.5–4)
LYMPHOCYTES NFR BLD: 1.7 % (ref 20–42)
LYMPHOCYTES NFR BLD: 2 % (ref 20–42)
MCH RBC QN AUTO: 27.3 PG (ref 26–35)
MCH RBC QN AUTO: 27.8 PG (ref 26–35)
MCHC RBC AUTO-ENTMCNC: 30.4 % (ref 32–34.5)
MCHC RBC AUTO-ENTMCNC: 30.9 % (ref 32–34.5)
MCV RBC AUTO: 89.8 FL (ref 80–99.9)
MCV RBC AUTO: 90 FL (ref 80–99.9)
METAMYELOCYTES NFR BLD MANUAL: 1 % (ref 0–1)
METER GLUCOSE: 144 MG/DL (ref 74–99)
MONOCYTES # BLD: 0 E9/L (ref 0.1–0.95)
MONOCYTES # BLD: 0.07 E9/L (ref 0.1–0.95)
MONOCYTES NFR BLD: 0 % (ref 2–12)
MONOCYTES NFR BLD: 1 % (ref 2–12)
NEUTROPHILS # BLD: 6.89 E9/L (ref 1.8–7.3)
NEUTROPHILS # BLD: 7.76 E9/L (ref 1.8–7.3)
NEUTS SEG NFR BLD: 96 % (ref 43–80)
NEUTS SEG NFR BLD: 97.4 % (ref 43–80)
NRBC BLD-RTO: 0 /100 WBC
NRBC BLD-RTO: 0 /100 WBC
OVALOCYTES: ABNORMAL
OVALOCYTES: ABNORMAL
PLATELET # BLD AUTO: 147 E9/L (ref 130–450)
PLATELET # BLD AUTO: 151 E9/L (ref 130–450)
PMV BLD AUTO: 12.4 FL (ref 7–12)
PMV BLD AUTO: 12.9 FL (ref 7–12)
POLYCHROMASIA: ABNORMAL
POLYCHROMASIA: ABNORMAL
POTASSIUM SERPL-SCNC: 4.3 MMOL/L (ref 3.5–5)
PROCALCITONIN: 0.07 NG/ML (ref 0–0.08)
RBC # BLD AUTO: 4.28 E12/L (ref 3.5–5.5)
RBC # BLD AUTO: 4.5 E12/L (ref 3.5–5.5)
S PNEUM AG SPEC QL: NORMAL
SODIUM SERPL-SCNC: 145 MMOL/L (ref 132–146)
VARIANT LYMPHS NFR BLD: 0.9 % (ref 0–4)
WBC # BLD: 7.1 E9/L (ref 4.5–11.5)
WBC # BLD: 8 E9/L (ref 4.5–11.5)

## 2023-03-30 PROCEDURE — 6370000000 HC RX 637 (ALT 250 FOR IP): Performed by: NURSE PRACTITIONER

## 2023-03-30 PROCEDURE — 96366 THER/PROPH/DIAG IV INF ADDON: CPT

## 2023-03-30 PROCEDURE — G0378 HOSPITAL OBSERVATION PER HR: HCPCS

## 2023-03-30 PROCEDURE — 94640 AIRWAY INHALATION TREATMENT: CPT

## 2023-03-30 PROCEDURE — 6360000002 HC RX W HCPCS: Performed by: NURSE PRACTITIONER

## 2023-03-30 PROCEDURE — 87449 NOS EACH ORGANISM AG IA: CPT

## 2023-03-30 PROCEDURE — 97161 PT EVAL LOW COMPLEX 20 MIN: CPT

## 2023-03-30 PROCEDURE — 80048 BASIC METABOLIC PNL TOTAL CA: CPT

## 2023-03-30 PROCEDURE — 82962 GLUCOSE BLOOD TEST: CPT

## 2023-03-30 PROCEDURE — 2580000003 HC RX 258: Performed by: NURSE PRACTITIONER

## 2023-03-30 PROCEDURE — 36415 COLL VENOUS BLD VENIPUNCTURE: CPT

## 2023-03-30 PROCEDURE — 85025 COMPLETE CBC W/AUTO DIFF WBC: CPT

## 2023-03-30 PROCEDURE — 2580000003 HC RX 258: Performed by: PHYSICIAN ASSISTANT

## 2023-03-30 RX ORDER — AMOXICILLIN AND CLAVULANATE POTASSIUM 875; 125 MG/1; MG/1
1 TABLET, FILM COATED ORAL 2 TIMES DAILY
Qty: 10 TABLET | Refills: 0 | Status: SHIPPED | OUTPATIENT
Start: 2023-03-30 | End: 2023-04-04

## 2023-03-30 RX ADMIN — LEVALBUTEROL HYDROCHLORIDE 0.63 MG: 0.63 SOLUTION RESPIRATORY (INHALATION) at 08:24

## 2023-03-30 RX ADMIN — SODIUM CHLORIDE: 9 INJECTION, SOLUTION INTRAVENOUS at 02:00

## 2023-03-30 RX ADMIN — SODIUM CHLORIDE, PRESERVATIVE FREE 10 ML: 5 INJECTION INTRAVENOUS at 10:13

## 2023-03-30 RX ADMIN — AMPICILLIN SODIUM AND SULBACTAM SODIUM 3000 MG: 2; 1 INJECTION, POWDER, FOR SOLUTION INTRAMUSCULAR; INTRAVENOUS at 05:39

## 2023-03-30 RX ADMIN — LEVALBUTEROL HYDROCHLORIDE 0.63 MG: 0.63 SOLUTION RESPIRATORY (INHALATION) at 12:17

## 2023-03-30 RX ADMIN — AMIODARONE HYDROCHLORIDE 100 MG: 200 TABLET ORAL at 10:12

## 2023-03-30 RX ADMIN — AMPICILLIN SODIUM AND SULBACTAM SODIUM 3000 MG: 2; 1 INJECTION, POWDER, FOR SOLUTION INTRAMUSCULAR; INTRAVENOUS at 00:11

## 2023-03-30 RX ADMIN — CITALOPRAM HYDROBROMIDE 20 MG: 20 TABLET ORAL at 10:12

## 2023-03-30 RX ADMIN — ACETAMINOPHEN 650 MG: 325 TABLET ORAL at 10:06

## 2023-03-30 RX ADMIN — DILTIAZEM HYDROCHLORIDE 180 MG: 180 CAPSULE, COATED, EXTENDED RELEASE ORAL at 10:11

## 2023-03-30 ASSESSMENT — PAIN DESCRIPTION - ORIENTATION: ORIENTATION: LEFT

## 2023-03-30 ASSESSMENT — PAIN SCALES - GENERAL: PAINLEVEL_OUTOF10: 4

## 2023-03-30 ASSESSMENT — PAIN DESCRIPTION - DESCRIPTORS: DESCRIPTORS: SORE;DISCOMFORT

## 2023-03-30 ASSESSMENT — PAIN DESCRIPTION - LOCATION: LOCATION: HAND

## 2023-03-30 NOTE — DISCHARGE SUMMARY
nursing with no hypoxia,.      2. Possible LLL pneumonia- suspected aspiration vs bacterial: procal neg. . Wean 02. Incentive spirometer. Strep/ legionella antigens neg. Viral panel negative. IV unasyn started. Pt reporting she feels good. Denies cough or sob. Afebrile. Imaging reviewed without significant pneumonia. Will transition IV unasyn to augmentin x 5 days. 3. Persistent afib; continue cardizem/ amiodarone. Plan to resume xarelto this afternoon      4. Asthma: continue advair/ xopenex     5. Hypokalemia: supplemented     6. Thyroid disease: synthroid     7. S/p left carpal tunnel release on 3/29- outpt follow up with orthopedic surgery        Patient discharged in stable condition with the following medications, instructions and follow up. Discharge Exam:  General Appearance: alert and oriented to person, place and time and in no acute distress  Skin: warm and dry  Head: normocephalic and atraumatic  Neck: neck supple and non tender without mass   Pulmonary/Chest: clear to auscultation bilaterally-  Cardiovascular: normal rate, normal S1 and S2 and no carotid bruits  Abdomen: soft, non-tender, non-distended, normal bowel sounds, no masses or organomegaly  Extremities: no cyanosis, no clubbing and no edema- left wrist with DSD  Neurologic: speech normal     I/O last 3 completed shifts:   In: 1000 [I.V.:1000]  Out: 26 [Urine:1; Blood:25]  I/O this shift:  In: -   Out: 200 [Urine:200]      LABS:  Recent Labs     03/29/23 0740 03/29/23 2005 03/30/23  0236    144 145   K 3.4* 4.3 4.3    109* 111*   CO2 26 23 25   BUN 17 16 18   CREATININE 0.9 0.8 0.8   GLUCOSE 131* 154* 145*   CALCIUM 9.1 8.8 8.8       Recent Labs     03/29/23  0740 03/29/23 2005 03/30/23  0236   WBC 8.9 7.1 8.0   RBC 4.96 4.50 4.28   HGB 13.7 12.3 11.9   HCT 43.0 40.4 38.5   MCV 86.7 89.8 90.0   MCH 27.6 27.3 27.8   MCHC 31.9* 30.4* 30.9*   RDW 14.6 14.6 14.8    151 147   MPV 12.7* 12.9* 12.4*       No results for input(s): POCGLU in the last 72 hours. Imaging:  XR CHEST PORTABLE    Result Date: 3/29/2023  EXAMINATION: ONE XRAY VIEW OF THE CHEST 3/29/2023 1:57 pm COMPARISON: 27 January 2022 HISTORY: ORDERING SYSTEM PROVIDED HISTORY: DECREASED sPo2 TECHNOLOGIST PROVIDED HISTORY: Reason for exam:->DECREASED sPo2 FINDINGS: Heart is enlarged. Pulmonary vascularity is normal.  Coarsening of pulmonary markings is subtly increased on the left suggesting developing pneumonia. Neither costophrenic angle is blunted. Suspect subtle developing pneumonia on the left. Cardiomegaly. Patient Instructions:      Medication List        START taking these medications      amoxicillin-clavulanate 875-125 MG per tablet  Commonly known as: AUGMENTIN  Take 1 tablet by mouth 2 times daily for 5 days     HYDROcodone-acetaminophen 5-325 MG per tablet  Commonly known as: Norco  Take 1 tablet by mouth every 6 hours as needed for Pain for up to 7 days.  Max Daily Amount: 4 tablets            CONTINUE taking these medications      albuterol sulfate  (90 Base) MCG/ACT inhaler  Commonly known as: Proventil HFA  Inhale 2 puffs into the lungs every 4 hours as needed for Wheezing or Shortness of Breath     citalopram 20 MG tablet  Commonly known as: CELEXA  Take 1 tablet by mouth once daily     dilTIAZem 180 MG extended release capsule  Commonly known as: CARDIZEM CD  Take 1 capsule by mouth twice daily     fluticasone 50 MCG/ACT nasal spray  Commonly known as: FLONASE     fluticasone-salmeterol 250-50 MCG/ACT Aepb diskus inhaler  Commonly known as: ADVAIR     levothyroxine 125 MCG tablet  Commonly known as: SYNTHROID  Take 1 tablet by mouth once daily     loratadine 10 MG tablet  Commonly known as: CLARITIN     NONFORMULARY     Pacerone 100 MG tablet  Generic drug: amiodarone  Take 1 tablet by mouth once daily     simvastatin 40 MG tablet  Commonly known as: ZOCOR     VITAMIN B-12 PO     Vitamin D3 50 MCG (2000 UT) Tabs     Vitamin E 400

## 2023-03-30 NOTE — PROGRESS NOTES
ANESTHESIA TO BEDSIDE TO EVALUATE PT, ORDERS GIVEN.
CLINICAL PHARMACY NOTE: MEDS TO BEDS    Total # of Prescriptions Filled: 1   The following medications were delivered to the patient:  Amoxicillin 875-125 mg    Additional Documentation:
CLINICAL PHARMACY NOTE: MEDS TO BEDS    Total # of Prescriptions Filled: 1   The following medications were delivered to the patient:  Nicole Wilkerson 5    Additional Documentation:
Call to Hospitalist Sri Muro spoke with her regarding consult, Dr will see patient for evaluation.
Chest XR resulted, called to [facility], no answer at this time.  [phone] returned call see new orders
Hat in toilet for urine specimen.  Unable to obtain sputum as patient is not coughing
Horace PRE-ADMISSION TESTING INSTRUCTIONS    The Preadmission Testing patient is instructed accordingly using the following criteria (check applicable):    ARRIVAL INSTRUCTIONS:  [x] Parking the day of Surgery is located in the Main Entrance lot. Upon entering the door, make an immediate right to the surgery reception desk    [x] Bring photo ID and insurance card    [x] Bring in a copy of Living will or Durable Power of  papers. [x] Please be sure to arrange transportation to and from the hospital    [x] Please arrange for someone to be with you the remainder of the day due to having anesthesia      GENERAL INSTRUCTIONS:    [x] Nothing by mouth after midnight, including gum, candy, mints or water    [x] You may brush your teeth, but do not swallow any water    [x] Take medications as instructed with 1-2 oz of water    [x] Stop herbal supplements and vitamins 5 days prior to procedure    [x] Follow preop dosing of blood thinners per physician instructions    [] Do not take insulin or oral diabetic medications    [] If diabetic and have low blood sugar or feel symptomatic, take 1-2oz apple juice or glucose tablets    [] Bring inhalers day of surgery    [] Bring C-PAP/ Bi-Pap day of surgery    [] Bring urine specimen day of surgery    [x] Antibacterial Soap shower or bath AM of Surgery, no lotion, powders or creams to surgical site    [] Follow bowel prep as instructed per surgeon    [x] No tobacco products within 24 hours of surgery     [x] No alcohol or illegal drug use within 24 hours of surgery.     [x] Jewelry, body piercing's, eyeglasses, contact lenses and dentures are not permitted into surgery (bring cases)      [] Please do not wear any nail polish or make up on the day of surgery    [] If not already done, you can expect a call from registration    [x] If surgeon requests a time change you will be notified the day prior to surgery    [] If you receive a survey after
Notified anesthesia of pt SpO2, will be to bedside to evaluate
Pt admitted to PACU 1 from OR. Placed on monitor, 3L NC, VSS.
Pt difficulty waking from general anesthesia, unable to wean from O2, and heart rate upper 90's to low 100's. Anesthesia notified, new orders given.
Pt rolled herself to her side and vomited.
Pulse ox was 96% on room air at rest.  Ambulated patient on room air. Oxygen saturation was 95% on room air while ambulating.   Oxygen not required
SPOKE [facility].  INSTRUCTED HER TO BRING LIVING WILL AND DNR PAPERS PRE-OP
Unsuccessful attempt at peripheral IV on RFA, with resultant ecchymotic area. Manual pressure held for 5 minutes.  3/29/2023 8:36 PM MAURA Narvaez RN [facility]
improve independence with functional mobility   [x] Balance Training to improve static/dynamic balance and to reduce fall risk  [x] Endurance Training to improve activity tolerance during functional mobility   [x] Transfer Training to improve safety and independence with all functional transfers   [x] Gait Training to improve gait mechanics, endurance and assess need for appropriate assistive device  [x] Stair Training in preparation for safe discharge home and/or into the community   [] Positioning to prevent skin breakdown and contractures  [x] Safety and Education Training   [x] Patient/Caregiver Education   [] HEP  [] Other     PT long term treatment goals are located in above grid    Frequency of treatments: 2-5x/week x 1-2 weeks. Time in  1045  Time out  1101    Evaluation Time includes thorough review of current medical information, gathering information on past medical history/social history and prior level of function, completion of standardized testing/informal observation of tasks, assessment of data and education on plan of care and goals.     CPT codes:  [x] Low Complexity PT evaluation 10241  [] Moderate Complexity PT evaluation 22904  [] High Complexity PT evaluation 91661  [] PT Re-evaluation 20193  [] Therapeutic activities 70998 __minutes  [] Therapeutic exercises 62129 __ minutes      Ellie Fish, PT, DPT  PT [id number]

## 2023-03-31 ENCOUNTER — TELEPHONE (OUTPATIENT)
Dept: FAMILY MEDICINE CLINIC | Age: 88
End: 2023-03-31

## 2023-03-31 LAB
BACTERIA BLD CULT ORG #2: NORMAL
BACTERIA BLD CULT: NORMAL

## 2023-03-31 NOTE — TELEPHONE ENCOUNTER
Care Transitions Initial Follow Up Call    Outreach made within 2 business days of discharge: Yes    Patient: Rodolfo Almonte Patient : 1934   MRN: [id number]  Reason for Admission: There are no discharge diagnoses documented for the most recent discharge. Discharge Date: 3/30/23       Spoke with: Rosibel Salazar     Discharge department/facility: [facility]    TCM Interactive Patient Contact:  Was patient able to fill all prescriptions: Yes  Was patient instructed to bring all medications to the follow-up visit: Yes  Is patient taking all medications as directed in the discharge summary?  Yes  Does patient understand their discharge instructions: Yes  Does patient have questions or concerns that need addressed prior to 7-14 day follow up office visit: no    Scheduled appointment with PCP within 7-14 days    Follow Up  Future Appointments   Date Time Provider Yuki Mayfield   2023 10:40 AM Sameer Medina MD [facility]   2023 11:00 AM JESUS MANUEL [facility]   2023  3:30 PM Makenzie Liz DO [facility]   6/15/2023  9:45 AM Sharon Spivey MD [facility]   2023 10:30 AM Makenzie Liz DO [address] [place] [address]   2023  2:30 PM Mary Jane Gentle, MD [facility]       Benito Show

## 2023-04-04 LAB
BACTERIA BLD CULT ORG #2: NORMAL
BACTERIA BLD CULT: NORMAL

## 2023-04-05 ENCOUNTER — TELEPHONE (OUTPATIENT)
Dept: ORTHOPEDIC SURGERY | Age: 88
End: 2023-04-05

## 2023-04-05 DIAGNOSIS — M18.12 ARTHRITIS OF CARPOMETACARPAL (CMC) JOINT OF LEFT THUMB: Primary | ICD-10-CM

## 2023-04-06 ENCOUNTER — OFFICE VISIT (OUTPATIENT)
Dept: ORTHOPEDIC SURGERY | Age: 88
End: 2023-04-06

## 2023-04-06 ENCOUNTER — TREATMENT (OUTPATIENT)
Dept: OCCUPATIONAL THERAPY | Age: 88
End: 2023-04-06

## 2023-04-06 VITALS — BODY MASS INDEX: 24.25 KG/M2 | WEIGHT: 160 LBS | HEIGHT: 68 IN

## 2023-04-06 DIAGNOSIS — M18.12 ARTHRITIS OF CARPOMETACARPAL (CMC) JOINT OF LEFT THUMB: Primary | ICD-10-CM

## 2023-04-06 PROCEDURE — 99024 POSTOP FOLLOW-UP VISIT: CPT | Performed by: ORTHOPAEDIC SURGERY

## 2023-04-06 RX ORDER — DOXYCYCLINE HYCLATE 100 MG
100 TABLET ORAL 2 TIMES DAILY
Qty: 20 TABLET | Refills: 0 | Status: SHIPPED | OUTPATIENT
Start: 2023-04-06 | End: 2023-04-16

## 2023-04-06 NOTE — PROGRESS NOTES
HPI:   Patient follows up today s/p POD 8 for left CTR, median nerve decompression at elbow, left thumb trapeziectomy ligament reconstruction tendon interposition with fiber tack. Patient is doing well however states postoperative swelling at the hand. There is tenderness reported about the dorsal incision of the thumb. She currently utilizes brace for left hand and unable to drive. She is homebound due to surgery. Denies acute fever, chills, nausea, vomiting , chest pain and shortness of breath. Physical Exam:  left proximal and distal forearm incision along with dorsal thumb incision well-healed. Mild erythema noted over the dorsal incision however no acute signs of infection. Sutures are intact with well skin approximation. There is post operative soft tissue edema at the hand secondary to splint  Tenderness to palpation about the dorsal thumb incision. NVI  Radial, median, and ulnar sensation intact to light touch  Brisk capillary refill  Muscle strength 5/5 grossly      Imaging: X-ray left hand 3 views AP, lateral and oblique demonstrating status post trapeziectomy. Radiograph appears appropriate at this stage of postoperative experience. Pression: Status post left thumb trapeziectomy. Assessment:  Post-op POD 8 left carpal tunnel release with median nerve decompression at the elbow, left thumb trapeziectomy ligament reconstruction tendon interposition with fiber tack    Plan:  Plan was taken down for evaluation today. Patient will be provided a removable Velcro wrist brace. Advised for strict elevation to assist with postoperative swelling. Current pain regimen as needed. Doxycycline ordered for preemptive avoidance of postoperative infection and dorsal thumb incision. Formal home health care ordered. Formal outpatient occupational therapy ordered. Patient will follow-up next week for suture removal otherwise follow-up in 4 weeks for repeat x-rays and evaluation. .    4/6/2023  [facility]

## 2023-04-06 NOTE — PROGRESS NOTES
OT  Goal Formulation: Patient  Requires OT Follow Up: Yes    Plan   Plan: Plan of care initiated. Pt was seen today for splint only. Pt may need an additional visit for splint modification. 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 Goals (1 session):  1. Patient will verbalize and demo ability to don/doff splint appropriately in 1 session with good accuracy   Goal Met.  2. Patient will verbalize understanding of splint precautions, splint care, and wear schedule in 1 session   Goal Met.  3. Patient will demonstrate understand of stretching and/or exercise provided in 1 session. Goal Met. Fist pumping of digits 2-5 and IP flexion of thumb while in splint. Time In: 11:40 am            Time Out: 12:15 pm            Total Time: 35 minutes  CODE  Minutes  Units   97695 Manual     80250 Therapeutic Ex     81188 Therapeutic Activity 15 1   36200 OrthoManagementTraining 20 1           By co-signing this document, I demonstrate that I have reviewed the Plan of Care established for skilled therapy services and certify that the services are required and that they will be provided while the patient is under my care. If I have any comments or revisions to make to the POC, I will notify the evaluating therapist at the earliest convenience.        Annika Ty OTR/L, [facility], #[id number]

## 2023-04-12 PROBLEM — J69.0 ASPIRATION PNEUMONIA (HCC): Status: RESOLVED | Noted: 2023-03-29 | Resolved: 2023-04-12

## 2023-04-12 PROBLEM — Z92.89 HISTORY OF ADVERSE REACTION TO ANESTHESIA: Status: RESOLVED | Noted: 2023-03-30 | Resolved: 2023-04-12

## 2023-04-12 PROBLEM — J96.01 ACUTE RESPIRATORY FAILURE WITH HYPOXIA (HCC): Status: RESOLVED | Noted: 2023-03-30 | Resolved: 2023-04-12

## 2023-04-12 PROBLEM — J69.0 ASPIRATION PNEUMONITIS (HCC): Status: RESOLVED | Noted: 2023-03-30 | Resolved: 2023-04-12

## 2023-05-08 ENCOUNTER — TELEPHONE (OUTPATIENT)
Dept: ORTHOPEDIC SURGERY | Age: 88
End: 2023-05-08

## 2023-05-08 ENCOUNTER — OFFICE VISIT (OUTPATIENT)
Dept: ORTHOPEDIC SURGERY | Age: 88
End: 2023-05-08

## 2023-05-08 DIAGNOSIS — M18.12 ARTHRITIS OF CARPOMETACARPAL (CMC) JOINT OF LEFT THUMB: Primary | ICD-10-CM

## 2023-05-08 PROCEDURE — 99024 POSTOP FOLLOW-UP VISIT: CPT | Performed by: ORTHOPAEDIC SURGERY

## 2023-05-08 NOTE — PROGRESS NOTES
Doing well. She is seen at today's visit with her daughter. She reports some scar sensitivity but otherwise feels her hands improving.    's exam: Negative [facility] grind test.  Positive scar sensitivity. Opposition to the little finger. Neuro vas intact. X-rays the office today: AP lateral obliques demonstrate mild subsidence of the metacarpal.  Maintained suspension plastic. Status post trapeziectomy. Impression office x-rays: Stable suspensioplasty left thumb    Assessment 6 weeks postop    Plan    Continue therapy and increase strengthening.   Resume normal activities after completion of therapy

## 2023-06-05 RX ORDER — DILTIAZEM HYDROCHLORIDE 180 MG/1
CAPSULE, COATED, EXTENDED RELEASE ORAL
Qty: 180 CAPSULE | Refills: 1 | Status: SHIPPED | OUTPATIENT
Start: 2023-06-05

## 2023-06-07 ENCOUNTER — HOSPITAL ENCOUNTER (OUTPATIENT)
Dept: MAMMOGRAPHY | Age: 88
Discharge: HOME | End: 2023-06-09
Payer: MEDICARE

## 2023-06-07 VITALS — HEIGHT: 67 IN | WEIGHT: 167 LBS | BODY MASS INDEX: 26.21 KG/M2

## 2023-06-07 DIAGNOSIS — Z12.31 BREAST CANCER SCREENING BY MAMMOGRAM: ICD-10-CM

## 2023-06-07 PROCEDURE — 77063 BREAST TOMOSYNTHESIS BI: CPT

## 2023-06-15 PROBLEM — T88.59XA COMPLICATION OF ANESTHESIA: Status: RESOLVED | Noted: 2023-03-30 | Resolved: 2023-06-15

## 2023-06-15 PROBLEM — M25.569 CHRONIC KNEE PAIN: Status: RESOLVED | Noted: 2023-03-30 | Resolved: 2023-06-15

## 2023-06-15 PROBLEM — G89.29 CHRONIC KNEE PAIN: Status: RESOLVED | Noted: 2023-03-30 | Resolved: 2023-06-15

## 2023-06-15 PROBLEM — Z79.01 ANTICOAGULATED: Status: RESOLVED | Noted: 2023-03-30 | Resolved: 2023-06-15

## 2023-06-15 PROBLEM — Z98.890 HISTORY OF CARPAL TUNNEL SURGERY OF LEFT WRIST: Status: RESOLVED | Noted: 2023-03-30 | Resolved: 2023-06-15

## 2023-06-15 PROBLEM — R09.02 HYPOXIA: Status: RESOLVED | Noted: 2023-03-29 | Resolved: 2023-06-15

## 2023-06-15 PROBLEM — G89.18 POST-OPERATIVE PAIN: Status: RESOLVED | Noted: 2023-03-29 | Resolved: 2023-06-15

## 2023-06-21 ENCOUNTER — OFFICE VISIT (OUTPATIENT)
Dept: FAMILY MEDICINE CLINIC | Age: 88
End: 2023-06-21
Payer: MEDICARE

## 2023-06-21 VITALS
DIASTOLIC BLOOD PRESSURE: 66 MMHG | BODY MASS INDEX: 25.94 KG/M2 | WEIGHT: 171.2 LBS | SYSTOLIC BLOOD PRESSURE: 118 MMHG | HEART RATE: 84 BPM | HEIGHT: 68 IN | RESPIRATION RATE: 16 BRPM | TEMPERATURE: 97.5 F | OXYGEN SATURATION: 96 %

## 2023-06-21 DIAGNOSIS — G89.29 CHRONIC BILATERAL LOW BACK PAIN WITHOUT SCIATICA: Primary | ICD-10-CM

## 2023-06-21 DIAGNOSIS — E03.9 HYPOTHYROIDISM, UNSPECIFIED TYPE: ICD-10-CM

## 2023-06-21 DIAGNOSIS — F34.1 PERSISTENT DEPRESSIVE DISORDER: ICD-10-CM

## 2023-06-21 DIAGNOSIS — M54.50 CHRONIC BILATERAL LOW BACK PAIN WITHOUT SCIATICA: Primary | ICD-10-CM

## 2023-06-21 PROCEDURE — G8417 CALC BMI ABV UP PARAM F/U: HCPCS | Performed by: INTERNAL MEDICINE

## 2023-06-21 PROCEDURE — 1090F PRES/ABSN URINE INCON ASSESS: CPT | Performed by: INTERNAL MEDICINE

## 2023-06-21 PROCEDURE — 1123F ACP DISCUSS/DSCN MKR DOCD: CPT | Performed by: INTERNAL MEDICINE

## 2023-06-21 PROCEDURE — 1036F TOBACCO NON-USER: CPT | Performed by: INTERNAL MEDICINE

## 2023-06-21 PROCEDURE — G8427 DOCREV CUR MEDS BY ELIG CLIN: HCPCS | Performed by: INTERNAL MEDICINE

## 2023-06-21 PROCEDURE — 99214 OFFICE O/P EST MOD 30 MIN: CPT | Performed by: INTERNAL MEDICINE

## 2023-06-21 NOTE — PROGRESS NOTES
disorder  - stable, does have increased stress recently, but handing it well, continues on celexa      Educational materials and/or home exercises printed for patient's review and were included in patient instructions on his/her After Visit Summary and given to patient at the end of visit. Counseled regarding above diagnosis, including possible risks and complications, especially if left uncontrolled. Counseled regarding the possible side effects, risks, benefits and alternatives to treatment; patient and/or guardian verbalizes understanding, agrees, feels comfortable with and wishes to proceed with above treatment plan. Advised patient to call Gosia Puentes new medication issues, and read all Rx info from pharmacy to assure aware of all possible risks and side effects of any medication before taking. Patient verbalizes understanding and agrees with above counseling, assessment and plan. All questions answered.     Miguel Kincaid, DO

## 2023-08-29 ENCOUNTER — APPOINTMENT (OUTPATIENT)
Dept: GENERAL RADIOLOGY | Age: 88
End: 2023-08-29
Payer: MEDICARE

## 2023-08-29 ENCOUNTER — HOSPITAL ENCOUNTER (EMERGENCY)
Age: 88
Discharge: HOME | End: 2023-08-29
Payer: MEDICARE

## 2023-08-29 VITALS
TEMPERATURE: 98 F | HEART RATE: 85 BPM | DIASTOLIC BLOOD PRESSURE: 75 MMHG | SYSTOLIC BLOOD PRESSURE: 126 MMHG | OXYGEN SATURATION: 98 %

## 2023-08-29 DIAGNOSIS — S20.212A RIB CONTUSION, LEFT, INITIAL ENCOUNTER: Primary | ICD-10-CM

## 2023-08-29 PROCEDURE — 6370000000 HC RX 637 (ALT 250 FOR IP): Performed by: NURSE PRACTITIONER

## 2023-08-29 PROCEDURE — 99283 EMERGENCY DEPT VISIT LOW MDM: CPT

## 2023-08-29 PROCEDURE — 71046 X-RAY EXAM CHEST 2 VIEWS: CPT

## 2023-08-29 RX ORDER — ACETAMINOPHEN 500 MG
1000 TABLET ORAL ONCE
Status: COMPLETED | OUTPATIENT
Start: 2023-08-29 | End: 2023-08-29

## 2023-08-29 RX ADMIN — ACETAMINOPHEN 1000 MG: 500 TABLET ORAL at 17:07

## 2023-09-11 DIAGNOSIS — F34.1 DYSTHYMIA: ICD-10-CM

## 2023-09-12 NOTE — TELEPHONE ENCOUNTER
Last Appointment:  6/21/2023  Future Appointments   Date Time Provider [address]   9/21/2023 10:30 AM Roddy Tripp [facility]   12/13/2023  2:30 PM Dang Collado MD [facility]   12/14/2023 10:15 AM Diane Robin MD [address] [facility]

## 2023-09-13 RX ORDER — CITALOPRAM 20 MG/1
TABLET ORAL
Qty: 30 TABLET | Refills: 5 | Status: SHIPPED | OUTPATIENT
Start: 2023-09-13

## 2023-09-21 ENCOUNTER — OFFICE VISIT (OUTPATIENT)
Dept: FAMILY MEDICINE CLINIC | Age: 88
End: 2023-09-21
Payer: MEDICARE

## 2023-09-21 VITALS
TEMPERATURE: 98 F | HEIGHT: 68 IN | OXYGEN SATURATION: 95 % | HEART RATE: 92 BPM | SYSTOLIC BLOOD PRESSURE: 122 MMHG | WEIGHT: 172.6 LBS | BODY MASS INDEX: 26.16 KG/M2 | DIASTOLIC BLOOD PRESSURE: 74 MMHG

## 2023-09-21 DIAGNOSIS — Z00.00 MEDICARE ANNUAL WELLNESS VISIT, SUBSEQUENT: Primary | ICD-10-CM

## 2023-09-21 DIAGNOSIS — I48.19 PERSISTENT ATRIAL FIBRILLATION (HCC): ICD-10-CM

## 2023-09-21 DIAGNOSIS — S20.212D CONTUSION OF RIB ON LEFT SIDE, SUBSEQUENT ENCOUNTER: ICD-10-CM

## 2023-09-21 PROCEDURE — G0439 PPPS, SUBSEQ VISIT: HCPCS | Performed by: INTERNAL MEDICINE

## 2023-09-21 PROCEDURE — 1123F ACP DISCUSS/DSCN MKR DOCD: CPT | Performed by: INTERNAL MEDICINE

## 2023-09-21 RX ORDER — TIZANIDINE 2 MG/1
2 TABLET ORAL EVERY 8 HOURS PRN
Qty: 30 TABLET | Refills: 0 | Status: SHIPPED | OUTPATIENT
Start: 2023-09-21 | End: 2023-10-01

## 2023-09-21 ASSESSMENT — PATIENT HEALTH QUESTIONNAIRE - PHQ9
SUM OF ALL RESPONSES TO PHQ9 QUESTIONS 1 & 2: 1
SUM OF ALL RESPONSES TO PHQ QUESTIONS 1-9: 5
8. MOVING OR SPEAKING SO SLOWLY THAT OTHER PEOPLE COULD HAVE NOTICED. OR THE OPPOSITE, BEING SO FIGETY OR RESTLESS THAT YOU HAVE BEEN MOVING AROUND A LOT MORE THAN USUAL: 0
SUM OF ALL RESPONSES TO PHQ QUESTIONS 1-9: 5
SUM OF ALL RESPONSES TO PHQ QUESTIONS 1-9: 5
10. IF YOU CHECKED OFF ANY PROBLEMS, HOW DIFFICULT HAVE THESE PROBLEMS MADE IT FOR YOU TO DO YOUR WORK, TAKE CARE OF THINGS AT HOME, OR GET ALONG WITH OTHER PEOPLE: 0
2. FEELING DOWN, DEPRESSED OR HOPELESS: 1
9. THOUGHTS THAT YOU WOULD BE BETTER OFF DEAD, OR OF HURTING YOURSELF: 0
SUM OF ALL RESPONSES TO PHQ QUESTIONS 1-9: 5
3. TROUBLE FALLING OR STAYING ASLEEP: 1
5. POOR APPETITE OR OVEREATING: 0
6. FEELING BAD ABOUT YOURSELF - OR THAT YOU ARE A FAILURE OR HAVE LET YOURSELF OR YOUR FAMILY DOWN: 1
4. FEELING TIRED OR HAVING LITTLE ENERGY: 2
1. LITTLE INTEREST OR PLEASURE IN DOING THINGS: 0

## 2023-09-21 ASSESSMENT — LIFESTYLE VARIABLES
HOW MANY STANDARD DRINKS CONTAINING ALCOHOL DO YOU HAVE ON A TYPICAL DAY: PATIENT DOES NOT DRINK
HOW OFTEN DO YOU HAVE A DRINK CONTAINING ALCOHOL: NEVER
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
## 2023-10-23 DIAGNOSIS — E03.9 HYPOTHYROIDISM, UNSPECIFIED TYPE: ICD-10-CM

## 2023-10-23 RX ORDER — LEVOTHYROXINE SODIUM 0.12 MG/1
125 TABLET ORAL DAILY
Qty: 90 TABLET | Refills: 3 | Status: SHIPPED | OUTPATIENT
Start: 2023-10-23

## 2023-10-23 NOTE — TELEPHONE ENCOUNTER
Last Appointment:  9/21/2023  Future Appointments   Date Time Provider [address]   12/13/2023  2:30 PM Vahe Kay MD [facility]   12/14/2023 10:15 AM Anayeli Ram MD [address] [place] [address]   3/22/2024 10:30 AM Saige Mariscal, DO [facility]   9/23/2024 10:[place] [address] [place] [address]      \

## 2023-12-01 RX ORDER — DILTIAZEM HYDROCHLORIDE 180 MG/1
CAPSULE, COATED, EXTENDED RELEASE ORAL
Qty: 180 CAPSULE | Refills: 0 | Status: SHIPPED | OUTPATIENT
Start: 2023-12-01

## 2023-12-14 ENCOUNTER — OFFICE VISIT (OUTPATIENT)
Dept: CARDIOLOGY CLINIC | Age: 88
End: 2023-12-14
Payer: MEDICARE

## 2023-12-14 ENCOUNTER — HOSPITAL ENCOUNTER (OUTPATIENT)
Age: 88
Discharge: HOME | End: 2023-12-14
Payer: MEDICARE

## 2023-12-14 VITALS
BODY MASS INDEX: 26.28 KG/M2 | RESPIRATION RATE: 18 BRPM | SYSTOLIC BLOOD PRESSURE: 126 MMHG | WEIGHT: 173.4 LBS | HEIGHT: 68 IN | DIASTOLIC BLOOD PRESSURE: 94 MMHG | HEART RATE: 100 BPM

## 2023-12-14 DIAGNOSIS — I48.19 PERSISTENT ATRIAL FIBRILLATION (HCC): ICD-10-CM

## 2023-12-14 DIAGNOSIS — I48.19 PERSISTENT ATRIAL FIBRILLATION (HCC): Primary | ICD-10-CM

## 2023-12-14 LAB
ALBUMIN SERPL-MCNC: 4 G/DL (ref 3.5–5.2)
ALP SERPL-CCNC: 99 U/L (ref 35–104)
ALT SERPL-CCNC: 11 U/L (ref 0–32)
ANION GAP SERPL CALCULATED.3IONS-SCNC: 13 MMOL/L (ref 7–16)
AST SERPL-CCNC: 19 U/L (ref 0–31)
BILIRUB SERPL-MCNC: 0.4 MG/DL (ref 0–1.2)
BUN SERPL-MCNC: 15 MG/DL (ref 6–23)
CALCIUM SERPL-MCNC: 9.1 MG/DL (ref 8.6–10.2)
CHLORIDE SERPL-SCNC: 105 MMOL/L (ref 98–107)
CO2 SERPL-SCNC: 23 MMOL/L (ref 22–29)
CREAT SERPL-MCNC: 1 MG/DL (ref 0.5–1)
ERYTHROCYTE [DISTWIDTH] IN BLOOD BY AUTOMATED COUNT: 15 % (ref 11.5–15)
GFR SERPL CREATININE-BSD FRML MDRD: 56 ML/MIN/1.73M2
GLUCOSE SERPL-MCNC: 105 MG/DL (ref 74–99)
HCT VFR BLD AUTO: 38.1 % (ref 34–48)
HGB BLD-MCNC: 11.6 G/DL (ref 11.5–15.5)
MAGNESIUM SERPL-MCNC: 1.4 MG/DL (ref 1.6–2.6)
MCH RBC QN AUTO: 25.4 PG (ref 26–35)
MCHC RBC AUTO-ENTMCNC: 30.4 G/DL (ref 32–34.5)
MCV RBC AUTO: 83.6 FL (ref 80–99.9)
PLATELET # BLD AUTO: 212 K/UL (ref 130–450)
PMV BLD AUTO: 12.4 FL (ref 7–12)
POTASSIUM SERPL-SCNC: 3.5 MMOL/L (ref 3.5–5)
PROT SERPL-MCNC: 6.2 G/DL (ref 6.4–8.3)
RBC # BLD AUTO: 4.56 M/UL (ref 3.5–5.5)
SODIUM SERPL-SCNC: 141 MMOL/L (ref 132–146)
TSH SERPL DL<=0.05 MIU/L-ACNC: 0.33 UIU/ML (ref 0.27–4.2)
WBC OTHER # BLD: 7.7 K/UL (ref 4.5–11.5)

## 2023-12-14 PROCEDURE — 84443 ASSAY THYROID STIM HORMONE: CPT

## 2023-12-14 PROCEDURE — 93000 ELECTROCARDIOGRAM COMPLETE: CPT | Performed by: INTERNAL MEDICINE

## 2023-12-14 PROCEDURE — 99214 OFFICE O/P EST MOD 30 MIN: CPT | Performed by: INTERNAL MEDICINE

## 2023-12-14 PROCEDURE — G8427 DOCREV CUR MEDS BY ELIG CLIN: HCPCS | Performed by: INTERNAL MEDICINE

## 2023-12-14 PROCEDURE — 80053 COMPREHEN METABOLIC PANEL: CPT

## 2023-12-14 PROCEDURE — 1036F TOBACCO NON-USER: CPT | Performed by: INTERNAL MEDICINE

## 2023-12-14 PROCEDURE — 83735 ASSAY OF MAGNESIUM: CPT

## 2023-12-14 PROCEDURE — 85027 COMPLETE CBC AUTOMATED: CPT

## 2023-12-14 PROCEDURE — G8484 FLU IMMUNIZE NO ADMIN: HCPCS | Performed by: INTERNAL MEDICINE

## 2023-12-14 PROCEDURE — 1123F ACP DISCUSS/DSCN MKR DOCD: CPT | Performed by: INTERNAL MEDICINE

## 2023-12-14 PROCEDURE — 1090F PRES/ABSN URINE INCON ASSESS: CPT | Performed by: INTERNAL MEDICINE

## 2023-12-14 PROCEDURE — G8417 CALC BMI ABV UP PARAM F/U: HCPCS | Performed by: INTERNAL MEDICINE

## 2023-12-14 NOTE — PROGRESS NOTES
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 Initial presentation June 2018. CHADS-VASC = 3  B. CONSTANTINO guided DC cardioversion  6/15/2018.    No significant structural disesae on CONSTANTINO  C. DCCV 9/2019  D. DCCV 2/3/2021  E. DCCV 8/2/21 on amidarone  F. ERAF flutter: 8/6/21      Hypothyroidism 09/12/2006       Current Outpatient Medications   Medication Sig Dispense Refill    dilTIAZem (CARDIZEM CD) 180 MG extended release capsule Take 1 capsule by mouth twice daily 180 capsule 0    levothyroxine (SYNTHROID) 125 MCG tablet Take 1 tablet by mouth daily 90 tablet 3    rivaroxaban (XARELTO) 20 MG TABS tablet Take 1 tablet by mouth daily (with breakfast) 30 tablet 5    citalopram (CELEXA) 20 MG tablet Take 1 tablet by mouth once daily 30 tablet 5    fluticasone-salmeterol (ADVAIR) 250-50 MCG/ACT AEPB diskus inhaler Inhale 1 puff into the lungs 2 times daily 60 each 5    fluticasone (FLONASE) 50 MCG/ACT nasal spray 1 spray by Each Nostril route as needed for Rhinitis      NONFORMULARY Take 1 capsule by mouth daily FOCUS FACTOR      loratadine (CLARITIN) 10 MG tablet Take 1 tablet by mouth daily      PACERONE 100 MG tablet Take 1 tablet by mouth once daily 90 tablet 5    albuterol sulfate HFA (PROVENTIL HFA) 108 (90 Base) MCG/ACT inhaler Inhale 2 puffs into the lungs every 4 hours as needed for Wheezing or Shortness of Breath 1 each 1    simvastatin (ZOCOR) 40 MG tablet Take 1 tablet by mouth nightly  3    Cyanocobalamin (VITAMIN B-12 PO) Take 6,000 mcg by mouth daily       Cholecalciferol (VITAMIN D3) 2000 units TABS Take 1 tablet by mouth daily      Vitamin E 400

## 2023-12-15 ENCOUNTER — TELEPHONE (OUTPATIENT)
Dept: CARDIOLOGY CLINIC | Age: 88
End: 2023-12-15

## 2023-12-15 RX ORDER — MAGNESIUM OXIDE 400 MG/1
400 TABLET ORAL DAILY
Qty: 90 TABLET | Refills: 1 | Status: SHIPPED | OUTPATIENT
Start: 2023-12-15

## 2023-12-15 RX ORDER — POTASSIUM CHLORIDE 20 MEQ/1
20 TABLET, EXTENDED RELEASE ORAL DAILY
Qty: 90 TABLET | Refills: 1 | Status: SHIPPED | OUTPATIENT
Start: 2023-12-15

## 2023-12-15 NOTE — TELEPHONE ENCOUNTER
----- Message from Lamberto Soriano MD sent at 12/15/2023  4:07 PM EST -----  Cxr clear  Magnesium and potassium both low  She should start magnesium oxide 400 mg qd and Kcl 20 meq qd    ----- Message -----  From: Carmelita Charles Incoming Lab Results From Dipak Wong  Sent: 12/14/2023   5:01 PM EST  To: Lamberto Soriano MD

## 2024-01-05 RX ORDER — AMIODARONE HYDROCHLORIDE 100 MG/1
100 TABLET ORAL DAILY
Qty: 90 TABLET | Refills: 1 | Status: SHIPPED | OUTPATIENT
Start: 2024-01-05

## 2024-02-29 RX ORDER — DILTIAZEM HYDROCHLORIDE 180 MG/1
CAPSULE, COATED, EXTENDED RELEASE ORAL
Qty: 180 CAPSULE | Refills: 1 | Status: SHIPPED | OUTPATIENT
Start: 2024-02-29

## 2024-03-12 DIAGNOSIS — F34.1 DYSTHYMIA: ICD-10-CM

## 2024-03-12 RX ORDER — CITALOPRAM 20 MG/1
TABLET ORAL
Qty: 90 TABLET | Refills: 3 | Status: SHIPPED | OUTPATIENT
Start: 2024-03-12

## 2024-03-12 RX ORDER — DILTIAZEM HYDROCHLORIDE 180 MG/1
CAPSULE, COATED, EXTENDED RELEASE ORAL
Qty: 180 CAPSULE | Refills: 0 | OUTPATIENT
Start: 2024-03-12

## 2024-03-12 NOTE — TELEPHONE ENCOUNTER
Last Appointment:  9/21/2023  Future Appointments   Date Time Provider Department Center   6/17/2024 10:45 AM Theodore Ledesma MD [facility]   9/23/2024 10:30 AM Jodi Ruby DO [place] [facility]

## 2024-04-09 ENCOUNTER — OFFICE VISIT (OUTPATIENT)
Dept: FAMILY MEDICINE CLINIC | Age: 89
End: 2024-04-09
Payer: MEDICARE

## 2024-04-09 VITALS
DIASTOLIC BLOOD PRESSURE: 60 MMHG | WEIGHT: 166.6 LBS | RESPIRATION RATE: 18 BRPM | HEIGHT: 68 IN | SYSTOLIC BLOOD PRESSURE: 118 MMHG | HEART RATE: 117 BPM | BODY MASS INDEX: 25.25 KG/M2 | OXYGEN SATURATION: 97 % | TEMPERATURE: 97.3 F

## 2024-04-09 DIAGNOSIS — I48.19 PERSISTENT ATRIAL FIBRILLATION (HCC): ICD-10-CM

## 2024-04-09 DIAGNOSIS — J44.9 CHRONIC OBSTRUCTIVE PULMONARY DISEASE, UNSPECIFIED COPD TYPE (HCC): ICD-10-CM

## 2024-04-09 DIAGNOSIS — J06.9 VIRAL URI: ICD-10-CM

## 2024-04-09 DIAGNOSIS — I48.19 PERSISTENT ATRIAL FIBRILLATION (HCC): Primary | ICD-10-CM

## 2024-04-09 DIAGNOSIS — F32.89 OTHER DEPRESSION: ICD-10-CM

## 2024-04-09 DIAGNOSIS — E03.9 HYPOTHYROIDISM, UNSPECIFIED TYPE: ICD-10-CM

## 2024-04-09 DIAGNOSIS — E55.9 VITAMIN D DEFICIENCY: ICD-10-CM

## 2024-04-09 DIAGNOSIS — R53.83 OTHER FATIGUE: ICD-10-CM

## 2024-04-09 DIAGNOSIS — H91.93 BILATERAL HEARING LOSS, UNSPECIFIED HEARING LOSS TYPE: ICD-10-CM

## 2024-04-09 LAB
BASOPHILS ABSOLUTE: 0 K/UL (ref 0–0.2)
BASOPHILS RELATIVE PERCENT: 0 % (ref 0–2)
EOSINOPHILS ABSOLUTE: 0.19 K/UL (ref 0.05–0.5)
EOSINOPHILS RELATIVE PERCENT: 2 % (ref 0–6)
HCT VFR BLD CALC: 41.3 % (ref 34–48)
HEMOGLOBIN: 11.9 G/DL (ref 11.5–15.5)
LYMPHOCYTES ABSOLUTE: 0.88 K/UL (ref 1.5–4)
LYMPHOCYTES RELATIVE PERCENT: 8 % (ref 20–42)
MCH RBC QN AUTO: 23.8 PG (ref 26–35)
MCHC RBC AUTO-ENTMCNC: 28.8 G/DL (ref 32–34.5)
MCV RBC AUTO: 82.4 FL (ref 80–99.9)
MONOCYTES ABSOLUTE: 0.97 K/UL (ref 0.1–0.95)
MONOCYTES RELATIVE PERCENT: 9 % (ref 2–12)
NEUTROPHILS ABSOLUTE: 9.15 K/UL (ref 1.8–7.3)
NEUTROPHILS RELATIVE PERCENT: 82 % (ref 43–80)
NUCLEATED RED BLOOD CELLS: 1 PER 100 WBC
PDW BLD-RTO: 17.2 % (ref 11.5–15)
PLATELET # BLD: 230 K/UL (ref 130–450)
PMV BLD AUTO: 12.1 FL (ref 7–12)
RBC # BLD: 5.01 M/UL (ref 3.5–5.5)
RBC # BLD: ABNORMAL 10*6/UL
VITAMIN D 25-HYDROXY: 34.1 NG/ML (ref 30–100)
WBC # BLD: 11.2 K/UL (ref 4.5–11.5)

## 2024-04-09 PROCEDURE — 1090F PRES/ABSN URINE INCON ASSESS: CPT | Performed by: INTERNAL MEDICINE

## 2024-04-09 PROCEDURE — 99215 OFFICE O/P EST HI 40 MIN: CPT | Performed by: INTERNAL MEDICINE

## 2024-04-09 PROCEDURE — 3023F SPIROM DOC REV: CPT | Performed by: INTERNAL MEDICINE

## 2024-04-09 PROCEDURE — G8417 CALC BMI ABV UP PARAM F/U: HCPCS | Performed by: INTERNAL MEDICINE

## 2024-04-09 PROCEDURE — G8427 DOCREV CUR MEDS BY ELIG CLIN: HCPCS | Performed by: INTERNAL MEDICINE

## 2024-04-09 PROCEDURE — 1036F TOBACCO NON-USER: CPT | Performed by: INTERNAL MEDICINE

## 2024-04-09 PROCEDURE — 1123F ACP DISCUSS/DSCN MKR DOCD: CPT | Performed by: INTERNAL MEDICINE

## 2024-04-09 RX ORDER — METHYLPREDNISOLONE 4 MG/1
TABLET ORAL
Qty: 1 KIT | Refills: 1 | Status: SHIPPED | OUTPATIENT
Start: 2024-04-09 | End: 2024-04-15

## 2024-04-09 RX ORDER — CITALOPRAM HYDROBROMIDE 10 MG/1
10 TABLET ORAL DAILY
Qty: 90 TABLET | Refills: 1 | Status: SHIPPED | OUTPATIENT
Start: 2024-04-09

## 2024-04-09 RX ORDER — GUAIFENESIN/DEXTROMETHORPHAN 100-10MG/5
5 SYRUP ORAL 3 TIMES DAILY PRN
Qty: 120 ML | Refills: 1 | Status: SHIPPED | OUTPATIENT
Start: 2024-04-09 | End: 2024-04-25

## 2024-04-09 RX ORDER — AMOXICILLIN 500 MG/1
CAPSULE ORAL
COMMUNITY
Start: 2024-02-20 | End: 2024-04-09

## 2024-04-09 RX ORDER — TRAMADOL HYDROCHLORIDE 50 MG/1
TABLET ORAL
COMMUNITY
Start: 2024-02-20

## 2024-04-09 RX ORDER — DILTIAZEM HYDROCHLORIDE 180 MG/1
180 CAPSULE, EXTENDED RELEASE ORAL 2 TIMES DAILY
COMMUNITY
Start: 2024-03-04 | End: 2024-04-09

## 2024-04-09 SDOH — ECONOMIC STABILITY: FOOD INSECURITY: WITHIN THE PAST 12 MONTHS, THE FOOD YOU BOUGHT JUST DIDN'T LAST AND YOU DIDN'T HAVE MONEY TO GET MORE.: NEVER TRUE

## 2024-04-09 SDOH — ECONOMIC STABILITY: FOOD INSECURITY: WITHIN THE PAST 12 MONTHS, YOU WORRIED THAT YOUR FOOD WOULD RUN OUT BEFORE YOU GOT MONEY TO BUY MORE.: NEVER TRUE

## 2024-04-09 SDOH — ECONOMIC STABILITY: INCOME INSECURITY: HOW HARD IS IT FOR YOU TO PAY FOR THE VERY BASICS LIKE FOOD, HOUSING, MEDICAL CARE, AND HEATING?: NOT HARD AT ALL

## 2024-04-09 ASSESSMENT — PATIENT HEALTH QUESTIONNAIRE - PHQ9
6. FEELING BAD ABOUT YOURSELF - OR THAT YOU ARE A FAILURE OR HAVE LET YOURSELF OR YOUR FAMILY DOWN: NOT AT ALL
SUM OF ALL RESPONSES TO PHQ QUESTIONS 1-9: 4
7. TROUBLE CONCENTRATING ON THINGS, SUCH AS READING THE NEWSPAPER OR WATCHING TELEVISION: NOT AT ALL
SUM OF ALL RESPONSES TO PHQ QUESTIONS 1-9: 4
1. LITTLE INTEREST OR PLEASURE IN DOING THINGS: NOT AT ALL
9. THOUGHTS THAT YOU WOULD BE BETTER OFF DEAD, OR OF HURTING YOURSELF: NOT AT ALL
SUM OF ALL RESPONSES TO PHQ QUESTIONS 1-9: 4
8. MOVING OR SPEAKING SO SLOWLY THAT OTHER PEOPLE COULD HAVE NOTICED. OR THE OPPOSITE, BEING SO FIGETY OR RESTLESS THAT YOU HAVE BEEN MOVING AROUND A LOT MORE THAN USUAL: NOT AT ALL
4. FEELING TIRED OR HAVING LITTLE ENERGY: NEARLY EVERY DAY
SUM OF ALL RESPONSES TO PHQ9 QUESTIONS 1 & 2: 1
2. FEELING DOWN, DEPRESSED OR HOPELESS: SEVERAL DAYS
10. IF YOU CHECKED OFF ANY PROBLEMS, HOW DIFFICULT HAVE THESE PROBLEMS MADE IT FOR YOU TO DO YOUR WORK, TAKE CARE OF THINGS AT HOME, OR GET ALONG WITH OTHER PEOPLE: SOMEWHAT DIFFICULT
5. POOR APPETITE OR OVEREATING: NOT AT ALL
SUM OF ALL RESPONSES TO PHQ QUESTIONS 1-9: 4
3. TROUBLE FALLING OR STAYING ASLEEP: NOT AT ALL

## 2024-04-09 NOTE — PROGRESS NOTES
possible side effects, risks, benefits and alternatives to treatment; patient and/or guardian verbalizes understanding, agrees, feels comfortable with and wishes to proceed with above treatment plan.     Advised patient to call withany new medication issues, and read all Rx info from pharmacy to assure aware of all possible risks and side effects of any medication before taking.     Patient verbalizes understanding and agrees with above counseling, assessment and plan.     All questions answered.    Jodi Ruby, DO

## 2024-04-10 ENCOUNTER — TELEPHONE (OUTPATIENT)
Dept: CARDIOLOGY CLINIC | Age: 89
End: 2024-04-10

## 2024-04-10 NOTE — TELEPHONE ENCOUNTER
Patient called in to let us know she has to have a tooth extraction on 5/8/24 and the dentist would like her to be off Xarelto x 48 hours.  Please advise

## 2024-04-16 ENCOUNTER — HOSPITAL ENCOUNTER (EMERGENCY)
Age: 89
Discharge: HOME | End: 2024-04-16
Attending: EMERGENCY MEDICINE
Payer: MEDICARE

## 2024-04-16 ENCOUNTER — APPOINTMENT (OUTPATIENT)
Dept: CT IMAGING | Age: 89
End: 2024-04-16
Payer: MEDICARE

## 2024-04-16 ENCOUNTER — APPOINTMENT (OUTPATIENT)
Dept: GENERAL RADIOLOGY | Age: 89
End: 2024-04-16
Payer: MEDICARE

## 2024-04-16 VITALS
TEMPERATURE: 97.9 F | OXYGEN SATURATION: 95 % | RESPIRATION RATE: 16 BRPM | HEART RATE: 99 BPM | HEIGHT: 68 IN | BODY MASS INDEX: 25.01 KG/M2 | DIASTOLIC BLOOD PRESSURE: 73 MMHG | WEIGHT: 165 LBS | SYSTOLIC BLOOD PRESSURE: 110 MMHG

## 2024-04-16 DIAGNOSIS — N39.0 URINARY TRACT INFECTION WITH HEMATURIA, SITE UNSPECIFIED: ICD-10-CM

## 2024-04-16 DIAGNOSIS — R31.9 URINARY TRACT INFECTION WITH HEMATURIA, SITE UNSPECIFIED: ICD-10-CM

## 2024-04-16 DIAGNOSIS — R53.1 GENERALIZED WEAKNESS: Primary | ICD-10-CM

## 2024-04-16 LAB
ALBUMIN SERPL-MCNC: 4.1 G/DL (ref 3.5–5.2)
ALP SERPL-CCNC: 75 U/L (ref 35–104)
ALT SERPL-CCNC: 14 U/L (ref 0–32)
ANION GAP SERPL CALCULATED.3IONS-SCNC: 12 MMOL/L (ref 7–16)
AST SERPL-CCNC: 17 U/L (ref 0–31)
BASOPHILS # BLD: 0.02 K/UL (ref 0–0.2)
BASOPHILS NFR BLD: 0 % (ref 0–2)
BILIRUB SERPL-MCNC: 0.4 MG/DL (ref 0–1.2)
BILIRUB UR QL STRIP: ABNORMAL
BUN SERPL-MCNC: 37 MG/DL (ref 6–23)
CALCIUM SERPL-MCNC: 9.8 MG/DL (ref 8.6–10.2)
CHLORIDE SERPL-SCNC: 100 MMOL/L (ref 98–107)
CLARITY UR: ABNORMAL
CO2 SERPL-SCNC: 25 MMOL/L (ref 22–29)
COLOR UR: ABNORMAL
CREAT SERPL-MCNC: 1.4 MG/DL (ref 0.5–1)
EOSINOPHIL # BLD: 0.05 K/UL (ref 0.05–0.5)
EOSINOPHILS RELATIVE PERCENT: 0 % (ref 0–6)
ERYTHROCYTE [DISTWIDTH] IN BLOOD BY AUTOMATED COUNT: 17.2 % (ref 11.5–15)
GFR SERPL CREATININE-BSD FRML MDRD: 37 ML/MIN/1.73M2
GLUCOSE SERPL-MCNC: 91 MG/DL (ref 74–99)
GLUCOSE UR STRIP-MCNC: 100 MG/DL
HCT VFR BLD AUTO: 42.8 % (ref 34–48)
HGB BLD-MCNC: 12.9 G/DL (ref 11.5–15.5)
HGB UR QL STRIP.AUTO: ABNORMAL
IMM GRANULOCYTES # BLD AUTO: 0.12 K/UL (ref 0–0.58)
IMM GRANULOCYTES NFR BLD: 1 % (ref 0–5)
INR PPP: 3.2
KETONES UR STRIP-MCNC: ABNORMAL MG/DL
LACTATE BLDV-SCNC: 1.8 MMOL/L (ref 0.5–2.2)
LEUKOCYTE ESTERASE UR QL STRIP: ABNORMAL
LYMPHOCYTES NFR BLD: 1.33 K/UL (ref 1.5–4)
LYMPHOCYTES RELATIVE PERCENT: 11 % (ref 20–42)
MAGNESIUM SERPL-MCNC: 1.7 MG/DL (ref 1.6–2.6)
MCH RBC QN AUTO: 24 PG (ref 26–35)
MCHC RBC AUTO-ENTMCNC: 30.1 G/DL (ref 32–34.5)
MCV RBC AUTO: 79.7 FL (ref 80–99.9)
MONOCYTES NFR BLD: 1.27 K/UL (ref 0.1–0.95)
MONOCYTES NFR BLD: 10 % (ref 2–12)
NEUTROPHILS NFR BLD: 78 % (ref 43–80)
NEUTS SEG NFR BLD: 9.84 K/UL (ref 1.8–7.3)
NITRITE UR QL STRIP: NEGATIVE
PH UR STRIP: 5 [PH] (ref 5–9)
PLATELET # BLD AUTO: 252 K/UL (ref 130–450)
PMV BLD AUTO: 11.2 FL (ref 7–12)
POTASSIUM SERPL-SCNC: 3.8 MMOL/L (ref 3.5–5)
PROT SERPL-MCNC: 6.3 G/DL (ref 6.4–8.3)
PROT UR STRIP-MCNC: 100 MG/DL
PROTHROMBIN TIME: 34.3 SEC (ref 9.3–12.4)
RBC # BLD AUTO: 5.37 M/UL (ref 3.5–5.5)
RBC #/AREA URNS HPF: ABNORMAL /HPF
SODIUM SERPL-SCNC: 137 MMOL/L (ref 132–146)
SP GR UR STRIP: >1.03 (ref 1–1.03)
TROPONIN I SERPL HS-MCNC: 26 NG/L (ref 0–9)
TROPONIN I SERPL HS-MCNC: 32 NG/L (ref 0–9)
UROBILINOGEN UR STRIP-ACNC: 1 EU/DL (ref 0–1)
WBC #/AREA URNS HPF: ABNORMAL /HPF
WBC OTHER # BLD: 12.6 K/UL (ref 4.5–11.5)

## 2024-04-16 PROCEDURE — 85610 PROTHROMBIN TIME: CPT

## 2024-04-16 PROCEDURE — 6370000000 HC RX 637 (ALT 250 FOR IP): Performed by: EMERGENCY MEDICINE

## 2024-04-16 PROCEDURE — 2580000003 HC RX 258

## 2024-04-16 PROCEDURE — 85025 COMPLETE CBC W/AUTO DIFF WBC: CPT

## 2024-04-16 PROCEDURE — 83605 ASSAY OF LACTIC ACID: CPT

## 2024-04-16 PROCEDURE — 99285 EMERGENCY DEPT VISIT HI MDM: CPT

## 2024-04-16 PROCEDURE — 71045 X-RAY EXAM CHEST 1 VIEW: CPT

## 2024-04-16 PROCEDURE — 6370000000 HC RX 637 (ALT 250 FOR IP)

## 2024-04-16 PROCEDURE — 80053 COMPREHEN METABOLIC PANEL: CPT

## 2024-04-16 PROCEDURE — 84484 ASSAY OF TROPONIN QUANT: CPT

## 2024-04-16 PROCEDURE — 83735 ASSAY OF MAGNESIUM: CPT

## 2024-04-16 PROCEDURE — 81001 URINALYSIS AUTO W/SCOPE: CPT

## 2024-04-16 PROCEDURE — 72131 CT LUMBAR SPINE W/O DYE: CPT

## 2024-04-16 PROCEDURE — 93005 ELECTROCARDIOGRAM TRACING: CPT

## 2024-04-16 PROCEDURE — 74176 CT ABD & PELVIS W/O CONTRAST: CPT

## 2024-04-16 RX ORDER — 0.9 % SODIUM CHLORIDE 0.9 %
1000 INTRAVENOUS SOLUTION INTRAVENOUS ONCE
Status: COMPLETED | OUTPATIENT
Start: 2024-04-16 | End: 2024-04-16

## 2024-04-16 RX ORDER — CEFDINIR 300 MG/1
300 CAPSULE ORAL ONCE
Status: COMPLETED | OUTPATIENT
Start: 2024-04-16 | End: 2024-04-16

## 2024-04-16 RX ORDER — CEFDINIR 300 MG/1
300 CAPSULE ORAL 2 TIMES DAILY
Qty: 14 CAPSULE | Refills: 0 | Status: SHIPPED | OUTPATIENT
Start: 2024-04-16 | End: 2024-04-23

## 2024-04-16 RX ORDER — LANOLIN ALCOHOL/MO/W.PET/CERES
400 CREAM (GRAM) TOPICAL DAILY
Status: DISCONTINUED | OUTPATIENT
Start: 2024-04-16 | End: 2024-04-17 | Stop reason: HOSPADM

## 2024-04-16 RX ADMIN — SODIUM CHLORIDE 1000 ML: 9 INJECTION, SOLUTION INTRAVENOUS at 18:37

## 2024-04-16 RX ADMIN — CEFDINIR 300 MG: 300 CAPSULE ORAL at 22:51

## 2024-04-16 RX ADMIN — Medication 400 MG: at 18:58

## 2024-04-16 ASSESSMENT — PAIN - FUNCTIONAL ASSESSMENT: PAIN_FUNCTIONAL_ASSESSMENT: NONE - DENIES PAIN

## 2024-04-16 NOTE — ED PROVIDER NOTES
[facility] EMERGENCY DEPARTMENT  EMERGENCY DEPARTMENT ENCOUNTER        Pt Name: Radha Dixon  MRN: [id number]  Birthdate 7/11/1934  Date of evaluation: 4/16/2024  Provider: Isaac Conde DO  PCP: Jodi Ruby DO  Note Started: 3:46 PM EDT 4/16/24    CHIEF COMPLAINT       Chief Complaint   Patient presents with    Fatigue     Was in [employer] when she became to feel weak, diaphoretic and dizzy. In triage patient states \"I just want to lay down.\"       HISTORY OF PRESENT ILLNESS: 1 or more Elements   History From: Patient    Limitations to history : None    Radha Dixon is a 89 y.o. female who presents with a chief complaint of bilateral lower extremity weakness.  Patient states that she had a URI and recently completed a steroid taper.  She states that she felt very well while she was taking steroids.  Her last dose was yesterday.  Patient states that day she was out shopping with her daughter at the store when she suddenly felt weakness and pain in her bilateral lower extremities.  Patient states that it is difficult to describe what she felt, however she had to immediately sit down and call EMS.  Patient does state that she had some lightheadedness and shortness of breath during the time of the episode.  Patient states that her symptoms resolved while she was in route to the hospital.  Patient states she has never had this happen before.    Nursing Notes were all reviewed and agreed with or any disagreements were addressed in the HPI.        REVIEW OF EXTERNAL NOTE :       Patient follows with Dr. Ruby for her PCP.  She was last seen on 4/10 for routine follow up.  Patient c/o URI at this time and was prescribe a Medrol Dose Pack.  Patient has a history of atrial fibrillation and follows with Dr. Ledesma.  She currently takes Cardizem 180mg extended release capsule daily, xarelto 20mg daily and amiodarone 100mg daily.  Patient also has a hx of asthma

## 2024-04-17 LAB
EKG ATRIAL RATE: 312 BPM
EKG Q-T INTERVAL: 418 MS
EKG QRS DURATION: 78 MS
EKG QTC CALCULATION (BAZETT): 497 MS
EKG R AXIS: 84 DEGREES
EKG T AXIS: 13 DEGREES
EKG VENTRICULAR RATE: 85 BPM

## 2024-04-17 PROCEDURE — 93010 ELECTROCARDIOGRAM REPORT: CPT | Performed by: INTERNAL MEDICINE

## 2024-04-26 ENCOUNTER — PROCEDURE VISIT (OUTPATIENT)
Dept: AUDIOLOGY | Age: 89
End: 2024-04-26
Payer: MEDICARE

## 2024-04-26 DIAGNOSIS — H93.13 TINNITUS OF BOTH EARS: ICD-10-CM

## 2024-04-26 DIAGNOSIS — H90.3 SENSORINEURAL HEARING LOSS, BILATERAL: Primary | ICD-10-CM

## 2024-04-26 PROCEDURE — 92557 COMPREHENSIVE HEARING TEST: CPT | Performed by: AUDIOLOGIST

## 2024-04-29 NOTE — PROGRESS NOTES
This patient was referred for audiometric testing by a bilateral decrease in hearing sensitivity, per PCP and patient report.  Patient is also experiencing intermittent tinnitus, but denied vertigo, ear pain/pressure and fullness.    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 Reliability was good. Speech reception thresholds were in good agreement with the pure tone averages, bilaterally. Speech discrimination scores were 88%, bilaterally at 80-85dBHL.    The results were reviewed with the patient.   The benefits and limitations of amplification were discussed with the patient.  It is recommended that the patient be fit with binaural hearing aids.  Patient was given the Outside Hearing Benefits letter to contact her insurance provider, regarding hearing aid benefits.     Recommendations for follow up will be made pending physician consult.    Chao Hutchison CCC/JUAN CARLOS  Audiologist  [id number]  NPI#:  [id number]      Electronically signed by Zulema Whitney on 4/29/2024 at 2:14 PM

## 2024-05-28 RX ORDER — DILTIAZEM HYDROCHLORIDE 180 MG/1
CAPSULE, COATED, EXTENDED RELEASE ORAL
Qty: 180 CAPSULE | Refills: 3 | Status: SHIPPED | OUTPATIENT
Start: 2024-05-28

## 2024-06-03 ENCOUNTER — PROCEDURE VISIT (OUTPATIENT)
Dept: AUDIOLOGY | Age: 89
End: 2024-06-03

## 2024-06-03 DIAGNOSIS — H90.3 SENSORINEURAL HEARING LOSS, BILATERAL: Primary | ICD-10-CM

## 2024-06-03 PROCEDURE — 99024 POSTOP FOLLOW-UP VISIT: CPT | Performed by: AUDIOLOGIST

## 2024-06-03 NOTE — PROGRESS NOTES
Patient seen for hearing aid check and re-sync to phone.  No other issues noted.  Patient to return PRN.    Chao Hutchison CCC/JUAN CARLOS  Audiologist  [id number]  NPI#:  [id number]

## 2024-06-17 ENCOUNTER — OFFICE VISIT (OUTPATIENT)
Dept: CARDIOLOGY CLINIC | Age: 89
End: 2024-06-17
Payer: MEDICARE

## 2024-06-17 VITALS
DIASTOLIC BLOOD PRESSURE: 71 MMHG | RESPIRATION RATE: 18 BRPM | HEART RATE: 98 BPM | SYSTOLIC BLOOD PRESSURE: 107 MMHG | WEIGHT: 172.8 LBS | HEIGHT: 68 IN | BODY MASS INDEX: 26.19 KG/M2

## 2024-06-17 DIAGNOSIS — I48.19 PERSISTENT ATRIAL FIBRILLATION (HCC): Primary | ICD-10-CM

## 2024-06-17 PROCEDURE — 99214 OFFICE O/P EST MOD 30 MIN: CPT | Performed by: INTERNAL MEDICINE

## 2024-06-17 PROCEDURE — 93000 ELECTROCARDIOGRAM COMPLETE: CPT | Performed by: INTERNAL MEDICINE

## 2024-06-17 PROCEDURE — G8427 DOCREV CUR MEDS BY ELIG CLIN: HCPCS | Performed by: INTERNAL MEDICINE

## 2024-06-17 PROCEDURE — 1090F PRES/ABSN URINE INCON ASSESS: CPT | Performed by: INTERNAL MEDICINE

## 2024-06-17 PROCEDURE — G8417 CALC BMI ABV UP PARAM F/U: HCPCS | Performed by: INTERNAL MEDICINE

## 2024-06-17 PROCEDURE — 1036F TOBACCO NON-USER: CPT | Performed by: INTERNAL MEDICINE

## 2024-06-17 PROCEDURE — 1123F ACP DISCUSS/DSCN MKR DOCD: CPT | Performed by: INTERNAL MEDICINE

## 2024-06-17 RX ORDER — POTASSIUM CHLORIDE 20 MEQ/1
20 TABLET, EXTENDED RELEASE ORAL DAILY
Qty: 90 TABLET | Refills: 1 | Status: SHIPPED | OUTPATIENT
Start: 2024-06-17

## 2024-06-17 NOTE — PROGRESS NOTES
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 Initial presentation June 2018.  CHADS-VASC = 3  B. CONSTANTINO guided DC cardioversion  6/15/2018.   No significant structural disesae on CONSTANTINO  C. DCCV 9/2019  D. DCCV 2/3/2021  E. DCCV 8/2/21 on amidarone  F. ERAF flutter: 8/6/21      Hypothyroidism 09/12/2006       Current Outpatient Medications   Medication Sig Dispense Refill    potassium chloride (KLOR-CON M) 20 MEQ extended release tablet Take 1 tablet by mouth daily 90 tablet 1    rivaroxaban (XARELTO) 15 MG TABS tablet Take 1 tablet by mouth daily (with breakfast) 90 tablet 3    dilTIAZem (CARDIZEM CD) 180 MG extended release capsule Take 1 capsule by mouth twice daily 180 capsule 3    rivaroxaban (XARELTO) 20 MG TABS tablet Take 1 tablet by mouth daily (with breakfast) 30 tablet 5    citalopram (CELEXA) 10 MG tablet Take 1 tablet by mouth daily (Patient taking differently: Take 1 tablet by mouth 2 times daily) 90 tablet 1    amiodarone (PACERONE) 100 MG tablet Take 1 tablet by mouth daily 90 tablet 1    magnesium oxide (MAG-OX) 400 MG tablet Take 1 tablet by mouth daily 90 tablet 1    montelukast (SINGULAIR) 10 MG tablet Take 1 tablet by mouth nightly 30 tablet 3    albuterol (PROVENTIL) (2.5 MG/3ML) 0.083% nebulizer solution Take 3 mLs by nebulization every 6 hours as needed for Wheezing 360 mL 3    fluticasone-salmeterol (ADVAIR DISKUS) 250-50 MCG/ACT AEPB diskus inhaler Inhale 1 puff into the lungs in the morning and 1 puff in the evening. 60 each 3    albuterol sulfate HFA

## 2024-07-08 RX ORDER — AMIODARONE HYDROCHLORIDE 100 MG/1
100 TABLET ORAL DAILY
Qty: 90 TABLET | Refills: 1 | Status: SHIPPED | OUTPATIENT
Start: 2024-07-08

## 2024-08-09 ENCOUNTER — TELEPHONE (OUTPATIENT)
Dept: CARDIOLOGY CLINIC | Age: 89
End: 2024-08-09

## 2024-08-09 NOTE — TELEPHONE ENCOUNTER
Patient called and stated that she has been getting sob when even walking across the room.  She feels like she needs to see you.  Please advise

## 2024-10-07 ENCOUNTER — APPOINTMENT (OUTPATIENT)
Dept: GENERAL RADIOLOGY | Age: 89
End: 2024-10-07
Payer: MEDICARE

## 2024-10-07 ENCOUNTER — HOSPITAL ENCOUNTER (INPATIENT)
Age: 89
LOS: 5 days | Discharge: HOME | End: 2024-10-12
Attending: EMERGENCY MEDICINE | Admitting: INTERNAL MEDICINE
Payer: MEDICARE

## 2024-10-07 DIAGNOSIS — J98.01 ACUTE BRONCHOSPASM: ICD-10-CM

## 2024-10-07 DIAGNOSIS — R06.02 SHORTNESS OF BREATH: ICD-10-CM

## 2024-10-07 DIAGNOSIS — E03.9 HYPOTHYROIDISM, UNSPECIFIED TYPE: ICD-10-CM

## 2024-10-07 DIAGNOSIS — I50.9 ACUTE ON CHRONIC CONGESTIVE HEART FAILURE, UNSPECIFIED HEART FAILURE TYPE (HCC): ICD-10-CM

## 2024-10-07 PROBLEM — J96.91 HYPOXIC RESPIRATORY FAILURE: Status: ACTIVE | Noted: 2024-10-07

## 2024-10-07 LAB
ALBUMIN SERPL-MCNC: 3.9 G/DL (ref 3.5–5.2)
ALP SERPL-CCNC: 100 U/L (ref 35–104)
ALT SERPL-CCNC: 12 U/L (ref 0–32)
ANION GAP SERPL CALCULATED.3IONS-SCNC: 13 MMOL/L (ref 7–16)
AST SERPL-CCNC: 21 U/L (ref 0–31)
B PARAP IS1001 DNA NPH QL NAA+NON-PROBE: NOT DETECTED
B PERT DNA SPEC QL NAA+PROBE: NOT DETECTED
BASOPHILS # BLD: 0 K/UL (ref 0–0.2)
BASOPHILS NFR BLD: 0 % (ref 0–2)
BILIRUB SERPL-MCNC: 0.9 MG/DL (ref 0–1.2)
BNP SERPL-MCNC: 2445 PG/ML (ref 0–450)
BUN SERPL-MCNC: 14 MG/DL (ref 6–23)
C PNEUM DNA NPH QL NAA+NON-PROBE: NOT DETECTED
CALCIUM SERPL-MCNC: 9.4 MG/DL (ref 8.6–10.2)
CHLORIDE SERPL-SCNC: 101 MMOL/L (ref 98–107)
CO2 SERPL-SCNC: 26 MMOL/L (ref 22–29)
CREAT SERPL-MCNC: 0.9 MG/DL (ref 0.5–1)
EOSINOPHIL # BLD: 0 K/UL (ref 0.05–0.5)
EOSINOPHILS RELATIVE PERCENT: 0 % (ref 0–6)
ERYTHROCYTE [DISTWIDTH] IN BLOOD BY AUTOMATED COUNT: 19.1 % (ref 11.5–15)
FERRITIN SERPL-MCNC: 28 NG/ML
FLUAV RNA NPH QL NAA+NON-PROBE: NOT DETECTED
FLUBV RNA NPH QL NAA+NON-PROBE: NOT DETECTED
GFR, ESTIMATED: 61 ML/MIN/1.73M2
GLUCOSE SERPL-MCNC: 97 MG/DL (ref 74–99)
HADV DNA NPH QL NAA+NON-PROBE: NOT DETECTED
HCOV 229E RNA NPH QL NAA+NON-PROBE: NOT DETECTED
HCOV HKU1 RNA NPH QL NAA+NON-PROBE: NOT DETECTED
HCOV NL63 RNA NPH QL NAA+NON-PROBE: NOT DETECTED
HCOV OC43 RNA NPH QL NAA+NON-PROBE: NOT DETECTED
HCT VFR BLD AUTO: 29.9 % (ref 34–48)
HCT VFR BLD AUTO: 30 % (ref 34–48)
HGB BLD-MCNC: 8.2 G/DL (ref 11.5–15.5)
HGB BLD-MCNC: 8.3 G/DL (ref 11.5–15.5)
HMPV RNA NPH QL NAA+NON-PROBE: NOT DETECTED
HPIV1 RNA NPH QL NAA+NON-PROBE: NOT DETECTED
HPIV2 RNA NPH QL NAA+NON-PROBE: NOT DETECTED
HPIV3 RNA NPH QL NAA+NON-PROBE: NOT DETECTED
HPIV4 RNA NPH QL NAA+NON-PROBE: NOT DETECTED
IMM RETICS NFR: 27.9 % (ref 3–15.9)
LYMPHOCYTES NFR BLD: 0.69 K/UL (ref 1.5–4)
LYMPHOCYTES RELATIVE PERCENT: 7 % (ref 20–42)
M PNEUMO DNA NPH QL NAA+NON-PROBE: NOT DETECTED
MCH RBC QN AUTO: 19.9 PG (ref 26–35)
MCHC RBC AUTO-ENTMCNC: 27.7 G/DL (ref 32–34.5)
MCV RBC AUTO: 71.8 FL (ref 80–99.9)
MONOCYTES NFR BLD: 0.69 K/UL (ref 0.1–0.95)
MONOCYTES NFR BLD: 7 % (ref 2–12)
NEUTROPHILS NFR BLD: 86 % (ref 43–80)
NEUTS SEG NFR BLD: 8.52 K/UL (ref 1.8–7.3)
PLATELET # BLD AUTO: 288 K/UL (ref 130–450)
PMV BLD AUTO: 10.3 FL (ref 7–12)
POTASSIUM SERPL-SCNC: 3.7 MMOL/L (ref 3.5–5)
PROCALCITONIN SERPL-MCNC: 0.1 NG/ML (ref 0–0.08)
PROT SERPL-MCNC: 6.3 G/DL (ref 6.4–8.3)
RBC # BLD AUTO: 4.18 M/UL (ref 3.5–5.5)
RBC # BLD: ABNORMAL 10*6/UL
RETIC HEMOGLOBIN: 17.9 PG (ref 28.2–36.6)
RETICS # AUTO: 0.08 M/UL
RETICS/RBC NFR AUTO: 1.9 % (ref 0.4–1.9)
RSV RNA NPH QL NAA+NON-PROBE: NOT DETECTED
RV+EV RNA NPH QL NAA+NON-PROBE: NOT DETECTED
SARS-COV-2 RNA NPH QL NAA+NON-PROBE: NOT DETECTED
SODIUM SERPL-SCNC: 140 MMOL/L (ref 132–146)
SPECIMEN DESCRIPTION: NORMAL
TRANSFERRIN SERPL-MCNC: 362 MG/DL (ref 200–360)
TROPONIN I SERPL HS-MCNC: 25 NG/L (ref 0–9)
TROPONIN I SERPL HS-MCNC: 27 NG/L (ref 0–9)
WBC OTHER # BLD: 9.9 K/UL (ref 4.5–11.5)

## 2024-10-07 PROCEDURE — 94640 AIRWAY INHALATION TREATMENT: CPT

## 2024-10-07 PROCEDURE — 6370000000 HC RX 637 (ALT 250 FOR IP): Performed by: NURSE PRACTITIONER

## 2024-10-07 PROCEDURE — 6360000002 HC RX W HCPCS: Performed by: EMERGENCY MEDICINE

## 2024-10-07 PROCEDURE — 82728 ASSAY OF FERRITIN: CPT

## 2024-10-07 PROCEDURE — 93005 ELECTROCARDIOGRAM TRACING: CPT | Performed by: EMERGENCY MEDICINE

## 2024-10-07 PROCEDURE — 84484 ASSAY OF TROPONIN QUANT: CPT

## 2024-10-07 PROCEDURE — 83880 ASSAY OF NATRIURETIC PEPTIDE: CPT

## 2024-10-07 PROCEDURE — 84466 ASSAY OF TRANSFERRIN: CPT

## 2024-10-07 PROCEDURE — 80053 COMPREHEN METABOLIC PANEL: CPT

## 2024-10-07 PROCEDURE — 84145 PROCALCITONIN (PCT): CPT

## 2024-10-07 PROCEDURE — 85018 HEMOGLOBIN: CPT

## 2024-10-07 PROCEDURE — 96374 THER/PROPH/DIAG INJ IV PUSH: CPT

## 2024-10-07 PROCEDURE — 6360000002 HC RX W HCPCS: Performed by: NURSE PRACTITIONER

## 2024-10-07 PROCEDURE — 99222 1ST HOSP IP/OBS MODERATE 55: CPT | Performed by: NURSE PRACTITIONER

## 2024-10-07 PROCEDURE — 2580000003 HC RX 258: Performed by: EMERGENCY MEDICINE

## 2024-10-07 PROCEDURE — 2580000003 HC RX 258: Performed by: NURSE PRACTITIONER

## 2024-10-07 PROCEDURE — 85014 HEMATOCRIT: CPT

## 2024-10-07 PROCEDURE — 85045 AUTOMATED RETICULOCYTE COUNT: CPT

## 2024-10-07 PROCEDURE — 2060000000 HC ICU INTERMEDIATE R&B

## 2024-10-07 PROCEDURE — 99285 EMERGENCY DEPT VISIT HI MDM: CPT

## 2024-10-07 PROCEDURE — 85025 COMPLETE CBC W/AUTO DIFF WBC: CPT

## 2024-10-07 PROCEDURE — 0202U NFCT DS 22 TRGT SARS-COV-2: CPT

## 2024-10-07 PROCEDURE — 36415 COLL VENOUS BLD VENIPUNCTURE: CPT

## 2024-10-07 PROCEDURE — 71045 X-RAY EXAM CHEST 1 VIEW: CPT

## 2024-10-07 PROCEDURE — 94664 DEMO&/EVAL PT USE INHALER: CPT

## 2024-10-07 RX ORDER — LEVOTHYROXINE SODIUM 125 UG/1
125 TABLET ORAL
Status: DISCONTINUED | OUTPATIENT
Start: 2024-10-08 | End: 2024-10-10

## 2024-10-07 RX ORDER — DILTIAZEM HYDROCHLORIDE 180 MG/1
180 CAPSULE, COATED, EXTENDED RELEASE ORAL 2 TIMES DAILY
Status: DISCONTINUED | OUTPATIENT
Start: 2024-10-07 | End: 2024-10-11

## 2024-10-07 RX ORDER — ATORVASTATIN CALCIUM 10 MG/1
20 TABLET, FILM COATED ORAL DAILY
Status: DISCONTINUED | OUTPATIENT
Start: 2024-10-07 | End: 2024-10-12 | Stop reason: HOSPADM

## 2024-10-07 RX ORDER — AZITHROMYCIN 250 MG/1
500 TABLET, FILM COATED ORAL DAILY
Status: COMPLETED | OUTPATIENT
Start: 2024-10-07 | End: 2024-10-09

## 2024-10-07 RX ORDER — MAGNESIUM SULFATE IN WATER 40 MG/ML
2000 INJECTION, SOLUTION INTRAVENOUS PRN
Status: DISCONTINUED | OUTPATIENT
Start: 2024-10-07 | End: 2024-10-12 | Stop reason: HOSPADM

## 2024-10-07 RX ORDER — LEVALBUTEROL INHALATION SOLUTION 0.63 MG/3ML
0.63 SOLUTION RESPIRATORY (INHALATION) EVERY 8 HOURS PRN
Status: DISCONTINUED | OUTPATIENT
Start: 2024-10-07 | End: 2024-10-12 | Stop reason: HOSPADM

## 2024-10-07 RX ORDER — AMIODARONE HYDROCHLORIDE 100 MG/1
100 TABLET ORAL DAILY
COMMUNITY

## 2024-10-07 RX ORDER — LANOLIN ALCOHOL/MO/W.PET/CERES
3 CREAM (GRAM) TOPICAL NIGHTLY PRN
Status: DISCONTINUED | OUTPATIENT
Start: 2024-10-07 | End: 2024-10-12 | Stop reason: HOSPADM

## 2024-10-07 RX ORDER — ALBUTEROL SULFATE 0.83 MG/ML
7.5 SOLUTION RESPIRATORY (INHALATION) ONCE
Status: COMPLETED | OUTPATIENT
Start: 2024-10-07 | End: 2024-10-07

## 2024-10-07 RX ORDER — ACETAMINOPHEN 325 MG/1
650 TABLET ORAL EVERY 6 HOURS PRN
Status: DISCONTINUED | OUTPATIENT
Start: 2024-10-07 | End: 2024-10-12 | Stop reason: HOSPADM

## 2024-10-07 RX ORDER — POTASSIUM CHLORIDE 7.45 MG/ML
10 INJECTION INTRAVENOUS PRN
Status: DISCONTINUED | OUTPATIENT
Start: 2024-10-07 | End: 2024-10-12 | Stop reason: HOSPADM

## 2024-10-07 RX ORDER — HYDRALAZINE HYDROCHLORIDE 20 MG/ML
10 INJECTION INTRAMUSCULAR; INTRAVENOUS EVERY 6 HOURS PRN
Status: DISCONTINUED | OUTPATIENT
Start: 2024-10-07 | End: 2024-10-12 | Stop reason: HOSPADM

## 2024-10-07 RX ORDER — SODIUM CHLORIDE 0.9 % (FLUSH) 0.9 %
5-40 SYRINGE (ML) INJECTION PRN
Status: DISCONTINUED | OUTPATIENT
Start: 2024-10-07 | End: 2024-10-12 | Stop reason: HOSPADM

## 2024-10-07 RX ORDER — CALCIUM CARBONATE 500 MG/1
500 TABLET, CHEWABLE ORAL 3 TIMES DAILY PRN
Status: DISCONTINUED | OUTPATIENT
Start: 2024-10-07 | End: 2024-10-12 | Stop reason: HOSPADM

## 2024-10-07 RX ORDER — FUROSEMIDE 10 MG/ML
20 INJECTION INTRAMUSCULAR; INTRAVENOUS ONCE
Status: COMPLETED | OUTPATIENT
Start: 2024-10-07 | End: 2024-10-07

## 2024-10-07 RX ORDER — SODIUM CHLORIDE 9 MG/ML
INJECTION, SOLUTION INTRAVENOUS PRN
Status: DISCONTINUED | OUTPATIENT
Start: 2024-10-07 | End: 2024-10-12 | Stop reason: HOSPADM

## 2024-10-07 RX ORDER — CETIRIZINE HYDROCHLORIDE 10 MG/1
10 TABLET ORAL DAILY
Status: DISCONTINUED | OUTPATIENT
Start: 2024-10-07 | End: 2024-10-12 | Stop reason: HOSPADM

## 2024-10-07 RX ORDER — BENZONATATE 100 MG/1
100 CAPSULE ORAL 3 TIMES DAILY PRN
Status: DISCONTINUED | OUTPATIENT
Start: 2024-10-07 | End: 2024-10-12 | Stop reason: HOSPADM

## 2024-10-07 RX ORDER — ONDANSETRON 4 MG/1
4 TABLET, ORALLY DISINTEGRATING ORAL EVERY 8 HOURS PRN
Status: DISCONTINUED | OUTPATIENT
Start: 2024-10-07 | End: 2024-10-12 | Stop reason: HOSPADM

## 2024-10-07 RX ORDER — POTASSIUM CHLORIDE 1500 MG/1
40 TABLET, EXTENDED RELEASE ORAL PRN
Status: DISCONTINUED | OUTPATIENT
Start: 2024-10-07 | End: 2024-10-12 | Stop reason: HOSPADM

## 2024-10-07 RX ORDER — SODIUM CHLORIDE 0.9 % (FLUSH) 0.9 %
5-40 SYRINGE (ML) INJECTION EVERY 12 HOURS SCHEDULED
Status: DISCONTINUED | OUTPATIENT
Start: 2024-10-07 | End: 2024-10-12 | Stop reason: HOSPADM

## 2024-10-07 RX ORDER — DILTIAZEM HYDROCHLORIDE 180 MG/1
180 CAPSULE, COATED, EXTENDED RELEASE ORAL 2 TIMES DAILY
Status: ON HOLD | COMMUNITY
End: 2024-10-12 | Stop reason: HOSPADM

## 2024-10-07 RX ORDER — POLYETHYLENE GLYCOL 3350 17 G/17G
17 POWDER, FOR SOLUTION ORAL DAILY PRN
Status: DISCONTINUED | OUTPATIENT
Start: 2024-10-07 | End: 2024-10-12 | Stop reason: HOSPADM

## 2024-10-07 RX ORDER — ONDANSETRON 2 MG/ML
4 INJECTION INTRAMUSCULAR; INTRAVENOUS EVERY 6 HOURS PRN
Status: DISCONTINUED | OUTPATIENT
Start: 2024-10-07 | End: 2024-10-12 | Stop reason: HOSPADM

## 2024-10-07 RX ORDER — CHOLECALCIFEROL (VITAMIN D3) 50 MCG
2000 TABLET ORAL DAILY
Status: DISCONTINUED | OUTPATIENT
Start: 2024-10-07 | End: 2024-10-12 | Stop reason: HOSPADM

## 2024-10-07 RX ORDER — AMIODARONE HYDROCHLORIDE 200 MG/1
100 TABLET ORAL DAILY
Status: DISCONTINUED | OUTPATIENT
Start: 2024-10-07 | End: 2024-10-12 | Stop reason: HOSPADM

## 2024-10-07 RX ORDER — ACETAMINOPHEN 650 MG/1
650 SUPPOSITORY RECTAL EVERY 6 HOURS PRN
Status: DISCONTINUED | OUTPATIENT
Start: 2024-10-07 | End: 2024-10-12 | Stop reason: HOSPADM

## 2024-10-07 RX ORDER — FLUTICASONE PROPIONATE 50 MCG
1 SPRAY, SUSPENSION (ML) NASAL PRN
Status: DISCONTINUED | OUTPATIENT
Start: 2024-10-07 | End: 2024-10-12 | Stop reason: HOSPADM

## 2024-10-07 RX ADMIN — WATER 125 MG: 1 INJECTION INTRAMUSCULAR; INTRAVENOUS; SUBCUTANEOUS at 11:04

## 2024-10-07 RX ADMIN — FUROSEMIDE 20 MG: 10 INJECTION, SOLUTION INTRAMUSCULAR; INTRAVENOUS at 13:31

## 2024-10-07 RX ADMIN — ALBUTEROL SULFATE 7.5 MG: 2.5 SOLUTION RESPIRATORY (INHALATION) at 10:55

## 2024-10-07 RX ADMIN — SODIUM CHLORIDE, PRESERVATIVE FREE 10 ML: 5 INJECTION INTRAVENOUS at 19:59

## 2024-10-07 RX ADMIN — AZITHROMYCIN DIHYDRATE 500 MG: 250 TABLET ORAL at 14:44

## 2024-10-07 RX ADMIN — AMIODARONE HYDROCHLORIDE 100 MG: 200 TABLET ORAL at 14:44

## 2024-10-07 RX ADMIN — ATORVASTATIN CALCIUM 20 MG: 10 TABLET, FILM COATED ORAL at 19:59

## 2024-10-07 RX ADMIN — DILTIAZEM HYDROCHLORIDE 180 MG: 180 CAPSULE, COATED, EXTENDED RELEASE ORAL at 20:02

## 2024-10-07 ASSESSMENT — PAIN - FUNCTIONAL ASSESSMENT
PAIN_FUNCTIONAL_ASSESSMENT: NONE - DENIES PAIN
PAIN_FUNCTIONAL_ASSESSMENT: NONE - DENIES PAIN

## 2024-10-07 ASSESSMENT — LIFESTYLE VARIABLES
HOW OFTEN DO YOU HAVE A DRINK CONTAINING ALCOHOL: NEVER
HOW MANY STANDARD DRINKS CONTAINING ALCOHOL DO YOU HAVE ON A TYPICAL DAY: PATIENT DOES NOT DRINK
HOW OFTEN DO YOU HAVE A DRINK CONTAINING ALCOHOL: NEVER
HOW MANY STANDARD DRINKS CONTAINING ALCOHOL DO YOU HAVE ON A TYPICAL DAY: PATIENT DOES NOT DRINK

## 2024-10-07 NOTE — PROGRESS NOTES
Renal Dose Adjustment Policy (Generic)     This patient is on medication that requires renal, weight, and/or indication dose adjustment.      Date Body Weight IBW  Adjusted BW SCr  CrCl Dialysis status   10/7/2024 74.8 kg (165 lb) Ideal body weight: 63.9 kg (140 lb 14 oz)  Adjusted ideal body weight: 68.3 kg (150 lb 8.4 oz) Serum creatinine: 0.9 mg/dL 10/07/24 1042  Estimated creatinine clearance: 42 mL/min N/a       Pharmacy has dose-adjusted the following medication(s):    Date Previous Order Adjusted Order   10/7/2024 Xarelto 20mg Xarelto 15mg       These changes were made per protocol according to the [facility]   Automatic Renal Dose Adjustment Policy.     *Please note this dose may need readjusted if patient's condition changes.    Please contact pharmacy with any questions regarding these changes.    Mendel Zamora RPH  10/7/2024  4:25 PM

## 2024-10-07 NOTE — ED NOTES
Assisted patient to bedpan. Patient is unable to urinate at this time. Told patient to let us know when she feels the urge again.

## 2024-10-07 NOTE — PROGRESS NOTES
Admission database complete. Patient verified home medications with daughter at the bedside.     Orin Santana RN, BSN

## 2024-10-07 NOTE — H&P
[facility] Hospitalist Group History and Physical      CHIEF COMPLAINT: Shortness of breath    History of Present Illness: This is a 89-year-old female with a past medical history of asthma, atrial fibrillation, depression, OA, HLD, and hypothyroidism.  Presented to the ED with complaints of shortness of breath and generalized fatigue.  Patient states she started having trouble breathing while she was sleeping last night.  Patient states she has noticed some progressive shortness of breath over the past few days.  Patient has a history of COPD and per patient's daughter has breathing treatments at home but is very hesitant to use them.  She has denied any recent illness.  Reports no fever, chills, sinus congestion, runny nose.  She does report a dry cough.  Patient states she received a breathing treatment en route to the hospital with EMS and did have some improvement to her breathing.  Patient is currently on 4 L nasal cannula by her baseline oxygen is room air.  She follows outpatient with Dr. Lam.      ED course is as follows: Vital signs-T98, , /69, SpO2 93% on 5 L nasal cannula.  BNP 2445, troponin 27> 25.  CXR shows patchy bibasilar airspace disease could represent pneumonia or CHF.  Trace bilateral pleural effusions.    Patient will be admitted for further management.    Informant(s) for H&P:Patient    REVIEW OF SYSTEMS:  A comprehensive review of systems was negative except for: what is in the HPI      PMH:  Past Medical History:   Diagnosis Date    Asthma     Atrial fibrillation, persistent (HCC)     Depression     Generalized osteoarthritis 09/12/2006    Hyperlipidemia     Knee pain     Sees Dr. Manrique for b/l knee pain / gel injections    Pinched nerve in neck     Prolonged emergence from general anesthesia     Thyroid disease     Vitamin D deficiency        Surgical History:  Past Surgical History:   Procedure Laterality Date    APPENDECTOMY  58 YEARS AGO    ARTHROPLASTY Left  soft, non-tender, non-distended, normal bowel sounds  Extremities: no cyanosis, no clubbing and no edema  Neurologic: speech normal        LABS:  Recent Labs     10/07/24  1042      K 3.7      CO2 26   BUN 14   CREATININE 0.9   GLUCOSE 97   CALCIUM 9.4       Recent Labs     10/07/24  1042   WBC 9.9   RBC 4.18   HGB 8.3*   HCT 30.0*   MCV 71.8*   MCH 19.9*   MCHC 27.7*   RDW 19.1*      MPV 10.3       No results for input(s): \"POCGLU\" in the last 72 hours.        Radiology:   XR CHEST PORTABLE   Final Result   1. Patchy bibasilar airspace disease could represent pneumonia or CHF   2. Trace bilateral pleural effusions.             ASSESSMENT:      Principal Problem:    Hypoxic respiratory failure  Active Problems:    Atrial fibrillation, persistent (HCC)    HLD (hyperlipidemia)    Thyroid disease  Resolved Problems:    * No resolved hospital problems. *      PLAN:    Acute hypoxic respiratory failure: Hx asthma.  CXR shows patchy bibasilar airspace disease.  Get procalcitonin.  Get RVP.  BNP slightly elevated -give Lasix 20 mg x 1.  Start Zithromax 500 mg for 3 days.  Xopenex treatments every 8 hours as needed.  Baseline oxygen is room air.  Wean oxygen to maintain saturation greater than 92%.  Atrial fibrillation: Resume home dose of Cardizem 180 mg, Pacerone 100 mg and Xarelto 20 mg daily.  HLD: Resume home dose of Zocor 40 mg daily  Hypothyroidism: Resume home dose of Synthroid 125 mcg daily.    Code Status: Full Code  DVT prophylaxis:     In review of EMR, evaluation, management, and diagnosis. Care plan has been discussed with attending. Time spent 55 minutes.     NOTE: This report was transcribed using voice recognition software. Every effort was made to ensure accuracy; however, inadvertent computerized transcription errors may be present.  Electronically signed by SONY Tomlinson CNP on 10/7/2024 at 12:55 PM

## 2024-10-07 NOTE — ED PROVIDER NOTES
[facility]  EMERGENCY DEPARTMENT ENCOUNTER        Pt Name: Radha Dixon  MRN: [id number]  Birthdate 7/11/1934  Date of evaluation: 10/7/2024  Provider: Mino Cobb MD  PCP: No primary care provider on file.  Note Started: 11:11 AM EDT 10/7/24    CHIEF COMPLAINT       Chief Complaint   Patient presents with    Shortness of Breath     PT REPORTS INCREASED WEAKNESS AND SOB SINCE LAST NIGHT. PT RECEIVED ONE DUONEB PTA       HISTORY OF PRESENT ILLNESS: 1 or more Elements        Radha Dixon is a 89 y.o. female who presents for shortness of.  Increasing shortness of breath with generalized fatigue since last evening.  She says she feels like she is not moving much air.  She reports that she was having trouble sleeping all night.  She reports EMS was called and she was given breathing treatments which helped and she presents still feeling short of breath.  She denies fever or chills.  She reports nonproductive cough.  She denies any infectious symptoms.  She denies chest pain.  She denies abdominal pain.  No orthopnea.  No peripheral edema.  She has a history of asthma.  She has a history of A-fib, she takes Xarelto.  She denies any history of DVT or PE.  She denies any bleeding.  She denies any other complaints.    Nursing Notes were all reviewed and agreed with or any disagreements were addressed in the HPI.      REVIEW OF EXTERNAL NOTE :       EMS report from [facility]  6/17-24 outpatient cardiology notes    Chart Review/External Note Review    Last Echo reviewed by Me:  Lab Results   Component Value Date    LVEF 55 08/07/2021             Controlled Substance Monitoring:    Acute and Chronic Pain Monitoring:        No data to display                    REVIEW OF SYSTEMS :      Positives and Pertinent negatives as per HPI.     SURGICAL HISTORY     Past Surgical History:   Procedure Laterality Date    APPENDECTOMY  58 YEARS AGO    ARTHROPLASTY Left 3/29/2023    LEFT CARPAL TUNNEL RELEASE LEFT MEDIAN

## 2024-10-07 NOTE — PROGRESS NOTES
4 Eyes Skin Assessment     NAME:  Radha Dixon  YOB: 1934  MEDICAL RECORD NUMBER:  [id number]    The patient is being assessed for  Admission    I agree that at least one RN has performed a thorough Head to Toe Skin Assessment on the patient. ALL assessment sites listed below have been assessed.      Areas assessed by both nurses:    Head, Face, Ears, Shoulders, Back, Chest, Arms, Elbows, Hands, Sacrum. Buttock, Coccyx, Ischium, Legs. Feet and Heels, and Under Medical Devices         Does the Patient have a Wound? No noted wound(s)       Broderick Prevention initiated by RN: Yes  Wound Care Orders initiated by RN: No    Pressure Injury (Stage 3,4, Unstageable, DTI, NWPT, and Complex wounds) if present, place Wound referral order by RN under : No    New Ostomies, if present place, Ostomy referral order under : No     Nurse 1 eSignature: Electronically signed by Tara M Wilms, RN on 10/7/24 at 4:41 PM EDT    **SHARE this note so that the co-signing nurse can place an eSignature**    Nurse 2 eSignature: Electronically signed by Desi Vasquez RN on 10/7/24 at 6:06 PM EDT

## 2024-10-08 ENCOUNTER — APPOINTMENT (OUTPATIENT)
Age: 89
End: 2024-10-08
Payer: MEDICARE

## 2024-10-08 LAB
ANION GAP SERPL CALCULATED.3IONS-SCNC: 13 MMOL/L (ref 7–16)
BASOPHILS # BLD: 0 K/UL (ref 0–0.2)
BASOPHILS NFR BLD: 0 % (ref 0–2)
BUN SERPL-MCNC: 20 MG/DL (ref 6–23)
CALCIUM SERPL-MCNC: 9.5 MG/DL (ref 8.6–10.2)
CHLORIDE SERPL-SCNC: 101 MMOL/L (ref 98–107)
CO2 SERPL-SCNC: 25 MMOL/L (ref 22–29)
CREAT SERPL-MCNC: 1 MG/DL (ref 0.5–1)
EOSINOPHIL # BLD: 0 K/UL (ref 0.05–0.5)
EOSINOPHILS RELATIVE PERCENT: 0 % (ref 0–6)
ERYTHROCYTE [DISTWIDTH] IN BLOOD BY AUTOMATED COUNT: 19.2 % (ref 11.5–15)
GFR, ESTIMATED: 54 ML/MIN/1.73M2
GLUCOSE SERPL-MCNC: 142 MG/DL (ref 74–99)
HCT VFR BLD AUTO: 30.5 % (ref 34–48)
HGB BLD-MCNC: 8.4 G/DL (ref 11.5–15.5)
LYMPHOCYTES NFR BLD: 0.06 K/UL (ref 1.5–4)
LYMPHOCYTES RELATIVE PERCENT: 1 % (ref 20–42)
MCH RBC QN AUTO: 19.8 PG (ref 26–35)
MCHC RBC AUTO-ENTMCNC: 27.5 G/DL (ref 32–34.5)
MCV RBC AUTO: 71.9 FL (ref 80–99.9)
MONOCYTES NFR BLD: 0 % (ref 2–12)
MONOCYTES NFR BLD: 0 K/UL (ref 0.1–0.95)
NEUTROPHILS NFR BLD: 99 % (ref 43–80)
NEUTS SEG NFR BLD: 7.14 K/UL (ref 1.8–7.3)
NUCLEATED RED BLOOD CELLS: 1 PER 100 WBC
PLATELET # BLD AUTO: 310 K/UL (ref 130–450)
PMV BLD AUTO: 10.8 FL (ref 7–12)
POTASSIUM SERPL-SCNC: 4.5 MMOL/L (ref 3.5–5)
RBC # BLD AUTO: 4.24 M/UL (ref 3.5–5.5)
RBC # BLD: ABNORMAL 10*6/UL
SODIUM SERPL-SCNC: 139 MMOL/L (ref 132–146)
WBC OTHER # BLD: 7.2 K/UL (ref 4.5–11.5)

## 2024-10-08 PROCEDURE — 99232 SBSQ HOSP IP/OBS MODERATE 35: CPT | Performed by: INTERNAL MEDICINE

## 2024-10-08 PROCEDURE — 93306 TTE W/DOPPLER COMPLETE: CPT

## 2024-10-08 PROCEDURE — 6360000002 HC RX W HCPCS: Performed by: INTERNAL MEDICINE

## 2024-10-08 PROCEDURE — 97530 THERAPEUTIC ACTIVITIES: CPT

## 2024-10-08 PROCEDURE — 97165 OT EVAL LOW COMPLEX 30 MIN: CPT

## 2024-10-08 PROCEDURE — 80048 BASIC METABOLIC PNL TOTAL CA: CPT

## 2024-10-08 PROCEDURE — 2580000003 HC RX 258: Performed by: NURSE PRACTITIONER

## 2024-10-08 PROCEDURE — 97535 SELF CARE MNGMENT TRAINING: CPT

## 2024-10-08 PROCEDURE — 2060000000 HC ICU INTERMEDIATE R&B

## 2024-10-08 PROCEDURE — 36415 COLL VENOUS BLD VENIPUNCTURE: CPT

## 2024-10-08 PROCEDURE — 85025 COMPLETE CBC W/AUTO DIFF WBC: CPT

## 2024-10-08 PROCEDURE — 97161 PT EVAL LOW COMPLEX 20 MIN: CPT

## 2024-10-08 PROCEDURE — 6370000000 HC RX 637 (ALT 250 FOR IP): Performed by: NURSE PRACTITIONER

## 2024-10-08 RX ORDER — FUROSEMIDE 10 MG/ML
20 INJECTION INTRAMUSCULAR; INTRAVENOUS ONCE
Status: COMPLETED | OUTPATIENT
Start: 2024-10-08 | End: 2024-10-08

## 2024-10-08 RX ADMIN — DILTIAZEM HYDROCHLORIDE 180 MG: 180 CAPSULE, COATED, EXTENDED RELEASE ORAL at 17:54

## 2024-10-08 RX ADMIN — SODIUM CHLORIDE, PRESERVATIVE FREE 10 ML: 5 INJECTION INTRAVENOUS at 09:31

## 2024-10-08 RX ADMIN — SODIUM CHLORIDE, PRESERVATIVE FREE 10 ML: 5 INJECTION INTRAVENOUS at 20:01

## 2024-10-08 RX ADMIN — DILTIAZEM HYDROCHLORIDE 180 MG: 180 CAPSULE, COATED, EXTENDED RELEASE ORAL at 09:26

## 2024-10-08 RX ADMIN — Medication 2000 UNITS: at 09:26

## 2024-10-08 RX ADMIN — AZITHROMYCIN DIHYDRATE 500 MG: 250 TABLET ORAL at 09:26

## 2024-10-08 RX ADMIN — CETIRIZINE HYDROCHLORIDE 10 MG: 10 TABLET, FILM COATED ORAL at 09:26

## 2024-10-08 RX ADMIN — LEVOTHYROXINE SODIUM 125 MCG: 0.12 TABLET ORAL at 06:23

## 2024-10-08 RX ADMIN — RIVAROXABAN 15 MG: 15 TABLET, FILM COATED ORAL at 09:26

## 2024-10-08 RX ADMIN — AMIODARONE HYDROCHLORIDE 100 MG: 200 TABLET ORAL at 09:25

## 2024-10-08 RX ADMIN — ATORVASTATIN CALCIUM 20 MG: 10 TABLET, FILM COATED ORAL at 09:25

## 2024-10-08 RX ADMIN — FUROSEMIDE 20 MG: 10 INJECTION, SOLUTION INTRAMUSCULAR; INTRAVENOUS at 14:58

## 2024-10-08 NOTE — PROGRESS NOTES
Physical Therapy  Physical Therapy Initial Assessment    Name: Radha Dixon  : 1934  MRN: [id number]      Date of Service: 10/8/2024    Evaluating PT:  Russ Ruggiero PT, DPT [id number]    Room #:  8503/8503-B  Diagnosis:  Acute bronchospasm [J98.01]  Acute on chronic congestive heart failure, unspecified heart failure type (HCC) [I50.9]  Hypoxic respiratory failure [J96.91]  PMHx/PSHx:   has a past medical history of Asthma, Atrial fibrillation, persistent (HCC), Depression, Generalized osteoarthritis, Hyperlipidemia, Knee pain, Pinched nerve in neck, Prolonged emergence from general anesthesia, Thyroid disease, and Vitamin D deficiency.   has a past surgical history that includes Tonsillectomy; Hysterectomy; Thyroidectomy (17 YEARS AGO); Appendectomy (58 YEARS AGO); eye surgery; Neck surgery (2011); Cholecystectomy; Cardioversion (2019); Cardioversion (2021); and arthroplasty (Left, 3/29/2023).  Procedure/Surgery:  None  Precautions:  Falls, O2, Ione, monitor HR, alarms  Equipment Owned:  FWW, rollator, SPC  Equipment Needs:  TBD    SUBJECTIVE:    Pt lives with daughter in a 1 story home with 4 stair(s) to enter and 1 rail(s).  Bed is on first floor and bath is on first floor.  Pt ambulated with SPC as needed prior to admission.    OBJECTIVE:   Initial Evaluation  Date: 10/08/2024 Treatment Short Term/ Long Term   Goals   AM-PAC 6 Clicks      Was pt agreeable to Eval/treatment? Yes     Does pt have pain? None reported     Bed Mobility  Rolling: NT  Supine to sit: Marco  Sit to supine: NT  Scooting: Marco  Rolling: Independent  Supine to sit: Independent  Sit to supine: Independent  Scooting: Independent   Transfers Sit to stand: Marco   Stand to sit: Marco  Stand pivot: Marco with no AD, SBA with FWW  Sit to stand: Mod I  Stand to sit: Mod I  Stand pivot: Mod I with AAD   Ambulation    10' with no AD Marco, 40' x2 with FWW SBA  150 feet with AAD Mod I   Stair negotiation: ascended and

## 2024-10-08 NOTE — CONSULTS
Pulmonary Consultation    Admit Date: 10/7/2024  Requesting Physician: No primary care provider on file.      Reason for consultation:  COPD exacerbation       HPI:  Patient is a 89 year old female who presents to emergency with complaints of shortness of breath and fatigue for a couple months. She was hypoxic and placed on supplemental oxygen. Patient chest imaging showed bilateral pleural effusions, suggesting CHF versus pneumonia.. Patient has no leukocytosis and pro calcitonin was negligible. She did have an elevated Pro-BNP with atrial flutter on EKG. Patient received albuterol nebulizer, one dose of solumedrol, and one dose intravenous lasix.     Patient does have a history of asthma. She is compliant with her ICS/LABA inhaler at home. She quit smoking over 40 years ago. Patient does have a history of atrial fibrillation. She does not wear oxygen at baseline.     Patient is seen on 2L nasal cannula. She admits to chest heaviness in the mornings and her legs feel weak in the morning. Significant dyspnea on exertion. He denies any wheezing. She admits to lower extremity edema intermittently.       PMH:    Past Medical History:   Diagnosis Date    Asthma     Atrial fibrillation, persistent (HCC)     Depression     Generalized osteoarthritis 09/12/2006    Hyperlipidemia     Knee pain     Sees Dr. Manrique for b/l knee pain / gel injections    Pinched nerve in neck     Prolonged emergence from general anesthesia     Thyroid disease     Vitamin D deficiency      PSH:   Past Surgical History:   Procedure Laterality Date    APPENDECTOMY  58 YEARS AGO    ARTHROPLASTY Left 3/29/2023    LEFT CARPAL TUNNEL RELEASE LEFT MEDIAN NERVE DECOMPRESSION AT ELBOW LEFT THUMB TRAPEZIECTOMY LIGAMENT RECONSTRUCTION TENDON INTERPOSITION WITH FIBERTAK performed by Chino Handley MD at [facility] OR    CARDIOVERSION  09/20/2019    Dr. Ledesma 1 shock 200 joules Successful    CARDIOVERSION  08/02/2021    Dr. Matias. 200J x1 converted to NSR  \"O2CON\", \"HHB\", \"THB\" in the last 72 hours.          Films:     XR CHEST PORTABLE   Final Result   1. Patchy bibasilar airspace disease could represent pneumonia or CHF   2. Trace bilateral pleural effusions.         .        Assessment:  Bilateral pleural effusions. Possibly underlying fluid overload.   Acute hypoxic respiratory failure due to #1.   Atrial flutter.   Asthma well controlled with Advair.      Plan:  Echocardiogram   Recommend diuretics  Strict intake and output  Wean supplemental oxygen. No supplemental oxygen at baseline.   Continue ICS/LABA inhaler substitute.       Thanks for letting us see this patient in consultation.  Total time in reviewing the previous admissions and records, reviewing the current x-rays, labs, and discussing with clinical staff including nursing and physicians, exceeded 50 minutes.      Please contact us with any questions. Office [phone] or after hours through [employer], x [phone].    Please note that voice recognition technology was used (while wearing a Covid mandated mask) in the preparation of this note and make therefore it may contain inadvertent transcription errors.  If the patient is a COVID 19 isolation patient, the above physical exam reflects that of the examining physician for the day.    [facility]-BLAYNE  Attestation        I have discussed the case, including pertinent history and exam findings with the nurse practitioner.  I have reviewed meds and pertinent labs and the key elements of the encounter have been supervised by me.  I participated in the formulation and agree with the assessment, plan and orders as documented by the nurse practitioner.      Additions and corrections are reflected in the signed note.    Blas Iraheta M.D., F.C.C.P.  [facility]    [facility], [address] [place] [address], [address], [place] [address]  Office Visits:  [address], [place]

## 2024-10-08 NOTE — CARE COORDINATION
Here for SOB and weakness. CXR-  Patchy bibasilar airspace disease could represent pneumonia or CHF Trace bilateral pleural effusions.currently using 02  2 liters. Pulmonology consult. PT OT. Met with pt to discuss role/transition of care, She lives in a condo  and her daughter Tracie lives wit her. Her PCP is thru [facility] and she uses [facility] [address].  DME cane, walker,rollators and shower chair.no home oxygen. No hhc or rich history. Her plan is home and her family will  transport.Electronically signed by Katie Baker RN on 10/8/2024 at 10:14 AM

## 2024-10-08 NOTE — PROGRESS NOTES
[facility] Hospitalist Progress Note    Admitting Date and Time: 10/7/2024 10:13 AM  Admit Dx: Acute bronchospasm [J98.01]  Acute on chronic congestive heart failure, unspecified heart failure type (HCC) [I50.9]  Hypoxic respiratory failure [J96.91]    Synopsis: Patient is a 89-year-old lady with past medical history of asthma, atrial fibrillation, depression, hyperlipidemia and hypothyroidism.  She presented to ED with shortness of breath and generalized fatigue, started with shortness of breath while laying down and then progressed to where she is short of breath on minimal ambulation.  No recent illness, denies any fever or chills.  She called EMS due to shortness of breath, received a breathing treatment from EMS with improvement in symptoms.  She was admitted on 4 L of supplemental oxygen, at baseline she needs no supplemental oxygen.  Chest x-ray shows by 2 bilateral airspace disease pneumonia versus CHF, proBNP elevated at 2445.      Subjective:  Patient is being followed for Acute bronchospasm [J98.01]  Acute on chronic congestive heart failure, unspecified heart failure type (HCC) [I50.9]  Hypoxic respiratory failure [J96.91]     Remains on supplemental oxygen.  No baseline oxygen needs.      ROS: denies fever, chills, cp, sob, n/v, HA unless stated above.      sodium chloride flush  5-40 mL IntraVENous 2 times per day    azithromycin  500 mg Oral Daily    amiodarone  100 mg Oral Daily    vitamin D  2,000 Units Oral Daily    dilTIAZem  180 mg Oral BID    arformoterol 15 mcg-budesonide 0.5 mg neb solution   Nebulization BID RT    levothyroxine  125 mcg Oral QAM AC    cetirizine  10 mg Oral Daily    rivaroxaban  15 mg Oral Daily with breakfast    atorvastatin  20 mg Oral Daily     sodium chloride flush, 5-40 mL, PRN  sodium chloride, , PRN  potassium chloride, 40 mEq, PRN   Or  potassium alternative oral replacement, 40 mEq, PRN   Or  potassium chloride, 10 mEq, PRN  magnesium sulfate, 2,000 mg,

## 2024-10-08 NOTE — PROGRESS NOTES
OCCUPATIONAL THERAPY INITIAL EVALUATION    [facility]  [address], [place]          Date:10/8/2024                                                   Patient Name: Radha Dixon     MRN: [id number]     : 1934     Room: [facility]       Evaluating OT: Yadira Angelo OTD, OTR/L, [id number]      Referring Provider: Hollie Funk MD     Specific Provider Orders/Date: OT eval and treat (10/8/24)    Diagnosis: Acute bronchospasm [J98.01]  Acute on chronic congestive heart failure, unspecified heart failure type (HCC) [I50.9]  Hypoxic respiratory failure [J96.91]    Surgeries/Procedures: None this admission       Pt admitted with respiratory failure, weakness, SOB.      Pertinent Medical History:       has a past medical history of Asthma, Atrial fibrillation, persistent (HCC), Depression, Generalized osteoarthritis, Hyperlipidemia, Knee pain, Pinched nerve in neck, Prolonged emergence from general anesthesia, Thyroid disease, and Vitamin D deficiency.         Precautions:  Fall Risk, O2 2L, monitor HR, alarms+     Assessment of current deficits    [x] Functional mobility  [x]ADLs  [x] Strength               [x]Cognition    [x] Functional transfers   [x] IADLs         [x] Safety Awareness   [x]Endurance    [] Fine Coordination              [x] Balance      [] Vision/perception   [x]Sensation     []Gross Motor Coordination  [x] ROM  [] Delirium                   [] Motor Control     OT PLAN OF CARE   OT POC based on physician orders, patient diagnosis and results of clinical assessment    Frequency/Duration 1-3 days/wk for 2 weeks PRN   Specific OT Treatment Interventions to include:   * Instruction/training on adapted ADL techniques and AE recommendations to increase functional independence within precautions       * Training on energy conservation strategies, correct breathing pattern and techniques to improve independence/tolerance for  self-care routine  * Functional transfer/mobility training/DME recommendations for increased independence, safety, and fall prevention  * Patient/Family education to increase follow through with safety techniques and functional independence  * Recommendation of environmental modifications for increased safety with functional transfers/mobility and ADLs  * Cognitive retraining/development of therapeutic activities to improve problem solving, judgement, memory, and attention for increased safety/participation in ADL/IADL tasks  * Sensory re-education to improve body/limb awareness, maintain/improve skin integrity, and improve hand/UE motor function  * Visual-perceptual training to improve environmental scanning, visual attention/focus, and oculomotor skills for increased safety/independence with functional transfers/mobility and ADLs  * Therapeutic exercise to improve motor endurance, ROM, and functional strength for ADLs/functional transfers  * Therapeutic activities to facilitate/challenge dynamic balance, stand tolerance for increased safety and independence with ADLs  * Therapeutic activities to facilitate gross/fine motor skills for increased independence with ADLs  * Neuro-muscular re-education: facilitation of righting/equilibrium reactions, midline orientation, scapular stability/mobility, normalization of muscle tone, and facilitation of volitional active controled movement  * Positioning to improve skin integrity, interaction with environment and functional independence  * Delirium prevention/treatment  * Manual techniques for edema management    Recommended Adaptive Equipment/DME: shower stool, grab bar TBD     Home Living: Pt lives with daughter in 1 story home with 4 ANITRA and bed and bath on main level.   Bathroom setup: tub/shower, walk-in shower   Equipment owned: shower chair- does not use d/t size.    Prior Level of Function: Ind with ADLs , Ind with IADLs; Used SPC for functional mobility.  Driving:

## 2024-10-08 NOTE — ACP (ADVANCE CARE PLANNING)
Advance Care Planning   Healthcare Decision Maker:    Primary Decision Maker: Tracie Camargo - Child - [phone]    Secondary Decision Maker: Genny Dexter - Child - [phone]    Click here to complete Healthcare Decision Makers including selection of the Healthcare Decision Maker Relationship (ie \"Primary\").

## 2024-10-09 ENCOUNTER — APPOINTMENT (OUTPATIENT)
Dept: GENERAL RADIOLOGY | Age: 89
End: 2024-10-09
Payer: MEDICARE

## 2024-10-09 PROBLEM — J98.01 ACUTE BRONCHOSPASM: Status: ACTIVE | Noted: 2024-10-09

## 2024-10-09 LAB
ANION GAP SERPL CALCULATED.3IONS-SCNC: 11 MMOL/L (ref 7–16)
BACTERIA URNS QL MICRO: ABNORMAL
BASOPHILS # BLD: 0 K/UL (ref 0–0.2)
BASOPHILS NFR BLD: 0 % (ref 0–2)
BILIRUB UR QL STRIP: NEGATIVE
BNP SERPL-MCNC: 3329 PG/ML (ref 0–450)
BUN SERPL-MCNC: 30 MG/DL (ref 6–23)
CALCIUM SERPL-MCNC: 9.2 MG/DL (ref 8.6–10.2)
CHLORIDE SERPL-SCNC: 97 MMOL/L (ref 98–107)
CLARITY UR: CLEAR
CO2 SERPL-SCNC: 28 MMOL/L (ref 22–29)
COLOR UR: YELLOW
CREAT SERPL-MCNC: 1.1 MG/DL (ref 0.5–1)
ECHO AO ASC DIAM: 3.3 CM
ECHO AO ASCENDING AORTA INDEX: 1.76 CM/M2
ECHO AV AREA PEAK VELOCITY: 1.2 CM2
ECHO AV AREA VTI: 1.2 CM2
ECHO AV AREA/BSA PEAK VELOCITY: 0.6 CM2/M2
ECHO AV AREA/BSA VTI: 0.6 CM2/M2
ECHO AV CUSP MM: 1 CM
ECHO AV MEAN GRADIENT: 13 MMHG
ECHO AV MEAN VELOCITY: 1.8 M/S
ECHO AV PEAK GRADIENT: 20 MMHG
ECHO AV PEAK VELOCITY: 2.2 M/S
ECHO AV VELOCITY RATIO: 0.41
ECHO AV VTI: 44.5 CM
ECHO BSA: 1.89 M2
ECHO EST RA PRESSURE: 3 MMHG
ECHO LA DIAMETER INDEX: 1.91 CM/M2
ECHO LA DIAMETER: 3.6 CM
ECHO LA VOL A-L A2C: 111 ML (ref 22–52)
ECHO LA VOL A-L A4C: 119 ML (ref 22–52)
ECHO LA VOL BP: 108 ML (ref 22–52)
ECHO LA VOL MOD A2C: 106 ML (ref 22–52)
ECHO LA VOL MOD A4C: 107 ML (ref 22–52)
ECHO LA VOL/BSA BIPLANE: 57 ML/M2 (ref 16–34)
ECHO LA VOLUME AREA LENGTH: 118 ML
ECHO LA VOLUME INDEX A-L A2C: 59 ML/M2 (ref 16–34)
ECHO LA VOLUME INDEX A-L A4C: 63 ML/M2 (ref 16–34)
ECHO LA VOLUME INDEX AREA LENGTH: 63 ML/M2 (ref 16–34)
ECHO LA VOLUME INDEX MOD A2C: 56 ML/M2 (ref 16–34)
ECHO LA VOLUME INDEX MOD A4C: 57 ML/M2 (ref 16–34)
ECHO LV EJECTION FRACTION BIPLANE: 35 % (ref 55–100)
ECHO LV FRACTIONAL SHORTENING: 20 % (ref 28–44)
ECHO LV INTERNAL DIMENSION DIASTOLE INDEX: 2.13 CM/M2
ECHO LV INTERNAL DIMENSION DIASTOLIC: 4 CM (ref 3.9–5.3)
ECHO LV INTERNAL DIMENSION SYSTOLIC INDEX: 1.7 CM/M2
ECHO LV INTERNAL DIMENSION SYSTOLIC: 3.2 CM
ECHO LV IVSD: 1.5 CM (ref 0.6–0.9)
ECHO LV MASS 2D: 232.7 G (ref 67–162)
ECHO LV MASS INDEX 2D: 123.8 G/M2 (ref 43–95)
ECHO LV POSTERIOR WALL DIASTOLIC: 1.5 CM (ref 0.6–0.9)
ECHO LV RELATIVE WALL THICKNESS RATIO: 0.75
ECHO LVOT AREA: 3.1 CM2
ECHO LVOT AV VTI INDEX: 0.38
ECHO LVOT DIAM: 2 CM
ECHO LVOT MEAN GRADIENT: 2 MMHG
ECHO LVOT PEAK GRADIENT: 3 MMHG
ECHO LVOT PEAK VELOCITY: 0.9 M/S
ECHO LVOT STROKE VOLUME INDEX: 28.4 ML/M2
ECHO LVOT SV: 53.4 ML
ECHO LVOT VTI: 17 CM
ECHO MV AREA VTI: 2.9 CM2
ECHO MV LVOT VTI INDEX: 1.09
ECHO MV MAX VELOCITY: 1.3 M/S
ECHO MV MEAN GRADIENT: 3 MMHG
ECHO MV MEAN VELOCITY: 0.9 M/S
ECHO MV PEAK GRADIENT: 6 MMHG
ECHO MV VTI: 18.6 CM
ECHO PULMONARY ARTERY END DIASTOLIC PRESSURE: 5 MMHG
ECHO PV MAX VELOCITY: 1.1 M/S
ECHO PV MEAN GRADIENT: 2 MMHG
ECHO PV MEAN VELOCITY: 0.7 M/S
ECHO PV PEAK GRADIENT: 4 MMHG
ECHO PV REGURGITANT MAX VELOCITY: 1.2 M/S
ECHO PV VTI: 15 CM
ECHO RIGHT VENTRICULAR SYSTOLIC PRESSURE (RVSP): 43 MMHG
ECHO RV INTERNAL DIMENSION: 3.5 CM
ECHO RV LONGITUDINAL DIMENSION: 7 CM
ECHO RV MID DIMENSION: 2.8 CM
ECHO TV REGURGITANT MAX VELOCITY: 3.18 M/S
ECHO TV REGURGITANT PEAK GRADIENT: 40 MMHG
EKG ATRIAL RATE: 279 BPM
EKG Q-T INTERVAL: 304 MS
EKG QRS DURATION: 82 MS
EKG QTC CALCULATION (BAZETT): 377 MS
EKG R AXIS: 83 DEGREES
EKG T AXIS: -119 DEGREES
EKG VENTRICULAR RATE: 93 BPM
EOSINOPHIL # BLD: 0 K/UL (ref 0.05–0.5)
EOSINOPHILS RELATIVE PERCENT: 0 % (ref 0–6)
EPI CELLS #/AREA URNS HPF: ABNORMAL /HPF
ERYTHROCYTE [DISTWIDTH] IN BLOOD BY AUTOMATED COUNT: 18.8 % (ref 11.5–15)
GFR, ESTIMATED: 46 ML/MIN/1.73M2
GLUCOSE SERPL-MCNC: 101 MG/DL (ref 74–99)
GLUCOSE UR STRIP-MCNC: NEGATIVE MG/DL
HCT VFR BLD AUTO: 30.8 % (ref 34–48)
HGB BLD-MCNC: 8.5 G/DL (ref 11.5–15.5)
HGB UR QL STRIP.AUTO: ABNORMAL
KETONES UR STRIP-MCNC: NEGATIVE MG/DL
LEUKOCYTE ESTERASE UR QL STRIP: NEGATIVE
LYMPHOCYTES NFR BLD: 0.9 K/UL (ref 1.5–4)
LYMPHOCYTES RELATIVE PERCENT: 7 % (ref 20–42)
MCH RBC QN AUTO: 20 PG (ref 26–35)
MCHC RBC AUTO-ENTMCNC: 27.6 G/DL (ref 32–34.5)
MCV RBC AUTO: 72.5 FL (ref 80–99.9)
MONOCYTES NFR BLD: 0.79 K/UL (ref 0.1–0.95)
MONOCYTES NFR BLD: 6 % (ref 2–12)
NEUTROPHILS NFR BLD: 87 % (ref 43–80)
NEUTS SEG NFR BLD: 11.3 K/UL (ref 1.8–7.3)
NITRITE UR QL STRIP: NEGATIVE
PH UR STRIP: 5.5 [PH] (ref 5–9)
PLATELET # BLD AUTO: 335 K/UL (ref 130–450)
PMV BLD AUTO: 10.6 FL (ref 7–12)
POTASSIUM SERPL-SCNC: 4.2 MMOL/L (ref 3.5–5)
PROT UR STRIP-MCNC: NEGATIVE MG/DL
RBC # BLD AUTO: 4.25 M/UL (ref 3.5–5.5)
RBC # BLD: ABNORMAL 10*6/UL
RBC #/AREA URNS HPF: ABNORMAL /HPF
SODIUM SERPL-SCNC: 136 MMOL/L (ref 132–146)
SP GR UR STRIP: 1.01 (ref 1–1.03)
T4 FREE SERPL-MCNC: 1.9 NG/DL (ref 0.9–1.7)
TSH SERPL DL<=0.05 MIU/L-ACNC: 0.3 UIU/ML (ref 0.27–4.2)
UROBILINOGEN UR STRIP-ACNC: 0.2 EU/DL (ref 0–1)
WBC #/AREA URNS HPF: ABNORMAL /HPF
WBC OTHER # BLD: 13 K/UL (ref 4.5–11.5)

## 2024-10-09 PROCEDURE — 84443 ASSAY THYROID STIM HORMONE: CPT

## 2024-10-09 PROCEDURE — 93306 TTE W/DOPPLER COMPLETE: CPT | Performed by: INTERNAL MEDICINE

## 2024-10-09 PROCEDURE — 83880 ASSAY OF NATRIURETIC PEPTIDE: CPT

## 2024-10-09 PROCEDURE — 85025 COMPLETE CBC W/AUTO DIFF WBC: CPT

## 2024-10-09 PROCEDURE — 99223 1ST HOSP IP/OBS HIGH 75: CPT | Performed by: INTERNAL MEDICINE

## 2024-10-09 PROCEDURE — 2580000003 HC RX 258: Performed by: NURSE PRACTITIONER

## 2024-10-09 PROCEDURE — 2700000000 HC OXYGEN THERAPY PER DAY

## 2024-10-09 PROCEDURE — 99232 SBSQ HOSP IP/OBS MODERATE 35: CPT | Performed by: STUDENT IN AN ORGANIZED HEALTH CARE EDUCATION/TRAINING PROGRAM

## 2024-10-09 PROCEDURE — 80048 BASIC METABOLIC PNL TOTAL CA: CPT

## 2024-10-09 PROCEDURE — 2060000000 HC ICU INTERMEDIATE R&B

## 2024-10-09 PROCEDURE — 93010 ELECTROCARDIOGRAM REPORT: CPT | Performed by: INTERNAL MEDICINE

## 2024-10-09 PROCEDURE — 36415 COLL VENOUS BLD VENIPUNCTURE: CPT

## 2024-10-09 PROCEDURE — 81001 URINALYSIS AUTO W/SCOPE: CPT

## 2024-10-09 PROCEDURE — 71046 X-RAY EXAM CHEST 2 VIEWS: CPT

## 2024-10-09 PROCEDURE — 6370000000 HC RX 637 (ALT 250 FOR IP): Performed by: NURSE PRACTITIONER

## 2024-10-09 PROCEDURE — 6360000002 HC RX W HCPCS: Performed by: STUDENT IN AN ORGANIZED HEALTH CARE EDUCATION/TRAINING PROGRAM

## 2024-10-09 PROCEDURE — 84439 ASSAY OF FREE THYROXINE: CPT

## 2024-10-09 RX ORDER — FUROSEMIDE 10 MG/ML
20 INJECTION INTRAMUSCULAR; INTRAVENOUS DAILY
Status: DISCONTINUED | OUTPATIENT
Start: 2024-10-09 | End: 2024-10-11

## 2024-10-09 RX ORDER — ARFORMOTEROL TARTRATE 15 UG/2ML
15 SOLUTION RESPIRATORY (INHALATION)
Status: DISCONTINUED | OUTPATIENT
Start: 2024-10-09 | End: 2024-10-12 | Stop reason: HOSPADM

## 2024-10-09 RX ORDER — BUDESONIDE 0.5 MG/2ML
0.5 INHALANT ORAL
Status: DISCONTINUED | OUTPATIENT
Start: 2024-10-09 | End: 2024-10-12 | Stop reason: HOSPADM

## 2024-10-09 RX ADMIN — SODIUM CHLORIDE, PRESERVATIVE FREE 10 ML: 5 INJECTION INTRAVENOUS at 08:58

## 2024-10-09 RX ADMIN — RIVAROXABAN 15 MG: 15 TABLET, FILM COATED ORAL at 08:54

## 2024-10-09 RX ADMIN — FUROSEMIDE 20 MG: 10 INJECTION, SOLUTION INTRAMUSCULAR; INTRAVENOUS at 11:53

## 2024-10-09 RX ADMIN — AZITHROMYCIN DIHYDRATE 500 MG: 250 TABLET ORAL at 08:54

## 2024-10-09 RX ADMIN — Medication 2000 UNITS: at 08:54

## 2024-10-09 RX ADMIN — AMIODARONE HYDROCHLORIDE 100 MG: 200 TABLET ORAL at 08:54

## 2024-10-09 RX ADMIN — DILTIAZEM HYDROCHLORIDE 180 MG: 180 CAPSULE, COATED, EXTENDED RELEASE ORAL at 08:54

## 2024-10-09 RX ADMIN — LEVOTHYROXINE SODIUM 125 MCG: 0.12 TABLET ORAL at 06:42

## 2024-10-09 RX ADMIN — ATORVASTATIN CALCIUM 20 MG: 10 TABLET, FILM COATED ORAL at 08:54

## 2024-10-09 RX ADMIN — DILTIAZEM HYDROCHLORIDE 180 MG: 180 CAPSULE, COATED, EXTENDED RELEASE ORAL at 17:38

## 2024-10-09 RX ADMIN — SODIUM CHLORIDE, PRESERVATIVE FREE 10 ML: 5 INJECTION INTRAVENOUS at 21:30

## 2024-10-09 RX ADMIN — CETIRIZINE HYDROCHLORIDE 10 MG: 10 TABLET, FILM COATED ORAL at 08:54

## 2024-10-09 NOTE — PROGRESS NOTES
Hospitalist Progress Note      Chief Complaint:  had concerns including Shortness of Breath (PT REPORTS INCREASED WEAKNESS AND SOB SINCE LAST NIGHT. PT RECEIVED ONE DUONEB PTA).    Admission Date: 10/7/2024     SYNOPSIS:Patient is a 89-year-old lady with past medical history of asthma, atrial fibrillation, depression, hyperlipidemia and hypothyroidism.  She presented to ED with shortness of breath and generalized fatigue, started with shortness of breath while laying down and then progressed to where she is short of breath on minimal ambulation.  No recent illness, denies any fever or chills.  She called EMS due to shortness of breath, received a breathing treatment from EMS with improvement in symptoms.  She was admitted on 4 L of supplemental oxygen, at baseline she needs no supplemental oxygen.  Chest x-ray shows by 2 bilateral airspace disease pneumonia versus CHF, proBNP elevated at 2445.    10/9: Echo with Reduced EF and Elevated troponin, Cardiology consulted    SUBJECTIVE:    Patient seen and examined at bedside, not in acute distress.   Complains of SOB but improving  Records reviewed.   Stable overnight. No overnight issues reported     Temp (24hrs), Av.9 °F (36.6 °C), Min:97.6 °F (36.4 °C), Max:98.1 °F (36.7 °C)    DIET: ADULT DIET; Regular; No Added Salt (3-4 gm)  CODE: Full Code  No intake or output data in the 24 hours ending 10/09/24 1342    OBJECTIVE:    /70   Pulse 90   Temp 98.1 °F (36.7 °C) (Oral)   Resp 20   Ht 1.727 m (5' 8\")   Wt 74.8 kg (165 lb)   SpO2 93%   BMI 25.09 kg/m²     General appearance: No apparent distress, appears stated age and cooperative.   HEENT:  Normocephalic. Conjunctivae/corneas clear. Moist mucosa   Neck: Supple. No jugular venous distention. Thyroid not visible, non tender   Respiratory: Normal respiratory effort. Clear to auscultation bilaterally. No stridor/wheezing/rhonchi/crackles   Cardiovascular: Regular heart beats, normal S1-S2. No

## 2024-10-09 NOTE — PROGRESS NOTES
B Olaf notified of [facility] Cardio consult via [employer].  Pt added to census

## 2024-10-09 NOTE — PROGRESS NOTES
Pulmonary Progress Note    Admit Date: 10/7/2024  Hospital day                               PCP: No primary care provider on file.    Chief Complaint (s):  Patient Active Problem List   Diagnosis    Atrial fibrillation, persistent (HCC)    History of nuclear stress test    HLD (hyperlipidemia)    Depression    Asthma    Vitamin D deficiency    Thyroid disease    Combined hyperlipidemia    Chronic obstructive pulmonary disease, unspecified COPD type (HCC)    Hypoxic respiratory failure       Subjective:  Patient seen eating breakfast. She does have dyspnea on exertion. She remains on supplemental oxygen, feels like she can't take a deep breath. Creatinine elevated today.       Vitals:  VITALS:  /70   Pulse 90   Temp 98.1 °F (36.7 °C) (Oral)   Resp 20   Ht 1.727 m (5' 8\")   Wt 74.8 kg (165 lb)   SpO2 93%   BMI 25.09 kg/m²     24HR INTAKE/OUTPUT:    No intake or output data in the 24 hours ending 10/09/24 1216    24HR PULSE OXIMETRY RANGE:    SpO2  Av.7 %  Min: 90 %  Max: 95 %    Medications:  IV:   sodium chloride         Scheduled Meds:   furosemide  20 mg IntraVENous Daily    [START ON 10/10/2024] rivaroxaban  20 mg Oral Daily with breakfast    sodium chloride flush  5-40 mL IntraVENous 2 times per day    amiodarone  100 mg Oral Daily    vitamin D  2,000 Units Oral Daily    dilTIAZem  180 mg Oral BID    arformoterol 15 mcg-budesonide 0.5 mg neb solution   Nebulization BID RT    levothyroxine  125 mcg Oral QAM AC    cetirizine  10 mg Oral Daily    atorvastatin  20 mg Oral Daily       Diet:   ADULT DIET; Regular; No Added Salt (3-4 gm)     EXAM:  General: No distress. Alert.  Eyes: PERRL. No sclera icterus. No conjunctival injection.  ENT: No discharge. Pharynx clear.  Neck: Trachea midline. Normal thyroid.  Resp: No accessory muscle use. no rales. no wheezing. no rhonchi. Very diminished right base.   CV: Regular rate. Regular rhythm. No murmur or rub.   Abd: Non-tender.  Non-distended. No masses. No organomegaly. Normal bowel sounds.   Skin: Warm and dry. No nodule on exposed extremities. No rash on exposed extremities.  Ext: No cyanosis, clubbing, edema  Lymph: No cervical LAD. No supraclavicular LAD.   M/S: No cyanosis. No joint deformity. No clubbing.   Neuro: Awake. Follows commands. Positive pupils/gag/corneals. Normal pain response.       Results:  CBC:   Recent Labs     10/07/24  1042 10/07/24  2024 10/08/24  0434 10/09/24  0429   WBC 9.9  --  7.2 13.0*   HGB 8.3* 8.2* 8.4* 8.5*   HCT 30.0* 29.9* 30.5* 30.8*   MCV 71.8*  --  71.9* 72.5*     --  310 335     BMP:   Recent Labs     10/07/24  1042 10/08/24  0434 10/09/24  0429    139 136   K 3.7 4.5 4.2    101 97*   CO2 26 25 28   BUN 14 20 30*   CREATININE 0.9 1.0 1.1*     LIVER PROFILE:   Recent Labs     10/07/24  1042   AST 21   ALT 12   BILITOT 0.9   ALKPHOS 100     PT/INR: No results for input(s): \"PROTIME\", \"INR\" in the last 72 hours.  APTT: No results for input(s): \"APTT\" in the last 72 hours.      Pathology:  N/A      Microbiology:  None    Recent ABG:   No results for input(s): \"PH\", \"PO2\", \"PCO2\", \"HCO3\", \"BE\", \"O2SAT\", \"METHB\", \"O2HB\", \"COHB\", \"O2CON\", \"HHB\", \"THB\" in the last 72 hours.          Recent Films:  XR CHEST PORTABLE   Final Result   1. Patchy bibasilar airspace disease could represent pneumonia or CHF   2. Trace bilateral pleural effusions.             Assessment:  Bilateral pleural effusions. Possibly underlying fluid overload.   Acute hypoxic respiratory failure due to #1.   Atrial flutter.   Asthma well controlled with Advair.    Plan:  Cardiology consult.  Echo pending.  Repeat chest xray  Wean supplemental oxygen.  Change from ICS/LABA inhaler to ICS/LABA in nebulizer form.   Incentive spirometer.    Time at the bedside, reviewing labs and radiographs, reviewing updated notes and consultations, discussing with staff and family was more than 50 minutes.    Please note that voice

## 2024-10-09 NOTE — CONSULTS
Inpatient Cardiology Consultation      Reason for Consult:  Volume overload, SOB, HFrEF patient sees Dr. Ledesma as outpatient     Consulting Physician: Dr Mccarthy    Requesting Physician:  Dr JUAN CARLOS Hameed    Date of Consultation: 10/9/2024    HISTORY OF PRESENT ILLNESS: 89 yo  female known to Dr Ledesma last seen in office     Patient has been experiencing intermittent hematuria.    For at least a week has noticed exertional CP and SOB but patient states she does @ times feel SOB when she first wakes up in the morning. Denies any orthopnea/PND.  Sleeps in bed with one pillow  Wears BLE compression hose.   Does not wear O2 @ home  Drives, shops, cleans, cooks, and does laundry normally without any CP or SOB until one week ago.   Currently complaining of a scratchy throat.     [facility] 10/7/2024 /87 HR 94 SR, afebrile, and O2 saturation 97% on 3 liters NC.    Troponin 27>25, NT p-BNP 2445, Bun/Cr 14/0.9, K+ 3.7, LFT's WNL, WBC 9.9, Hgb 8.3(12.9 in 4/2024), Plt 288, respiratory panel including COVID-19 NEGATIVE    ED Medications: PO ATB, Duo-Neb Lasix 20 mg IV x 1 dose, Solu-Medrol     Amiodarone 100 mg QD, Lipitor 20 mg QD, Cardizem  mg QD, Synthroid, Xarelto 15 mg QD     10/8/2024 Lasix 20 mg IV x 1 dose    10/8/2024 TTE EF 35-40%. Moderate CLVH. Mildly dilated RV with mildly reduced function. Moderate AS. MG 13 mmHg. AV Velocity Ratio is 0.41. LVOT:AV VTI Index is 0.38. AV area by continuity VTI is 1.2 cm2. AV area by peak velocity is 1.2 cm2. Mild MR. No MS. MV area by continuity equation is 2.9 cm2. Moderate to severe TR. Mild PI. Severe ISMAEL.. Medium sized L pleural effusion.     10/9/2024 Lasix 20 mg IV QD (not on home diuretics)    Currently /70 HR 80-90's, remains afebrile, and O2 saturation 93% on 3 liters NC     NT p-BNP 3329, Bun/Cr 30/1.1, K+ 4.2, WBC 13.0, Hgb 8.5, Plt 335    Please note: past medical records were reviewed per electronic medical record (EMR) - see detailed reports

## 2024-10-09 NOTE — CARE COORDINATION
Care Coordination  The patient was admitted  with sob and weakness. Chest film shows pneumonia or chf trace bilateral pleural effusions. She is on 3 liters n/c  and will need an ambulatory pulse ox test on the day of discharge to define home 02. Pulmonology was consulted and cardiology as well. 2decho pending.  She was npo for a nuclear stress test today. Pt [facility]  16/24 and she walked 50 total feet min assist with a front wheeled walker. She owns a front wheeled walker  a Rolator and a SPC at home. Ot [facility] 16/24. Her plan is to return home with her daughter once medically stable. Will follow for any needs.

## 2024-10-09 NOTE — PLAN OF CARE
Patient's chart updated to reflect:      .    - HF care plan, HF education points and HF discharge instructions.  -Orders: 2 gram sodium diet, daily weights, I/O.  -PCP and cardiology follow up appointments to be scheduled within 7 days of hospital discharge.  -CHF education session will be provided to the patient prior to hospital discharge.    Kaitlin Flores RN   Heart Failure Navigator

## 2024-10-10 ENCOUNTER — APPOINTMENT (OUTPATIENT)
Age: 89
End: 2024-10-10
Payer: MEDICARE

## 2024-10-10 ENCOUNTER — APPOINTMENT (OUTPATIENT)
Dept: NUCLEAR MEDICINE | Age: 89
End: 2024-10-10
Payer: MEDICARE

## 2024-10-10 LAB
ANION GAP SERPL CALCULATED.3IONS-SCNC: 10 MMOL/L (ref 7–16)
BASOPHILS # BLD: 0.03 K/UL (ref 0–0.2)
BASOPHILS NFR BLD: 0 % (ref 0–2)
BUN SERPL-MCNC: 26 MG/DL (ref 6–23)
CALCIUM SERPL-MCNC: 8.9 MG/DL (ref 8.6–10.2)
CHLORIDE SERPL-SCNC: 99 MMOL/L (ref 98–107)
CO2 SERPL-SCNC: 30 MMOL/L (ref 22–29)
CREAT SERPL-MCNC: 1 MG/DL (ref 0.5–1)
ECHO BSA: 1.91 M2
EOSINOPHIL # BLD: 0.09 K/UL (ref 0.05–0.5)
EOSINOPHILS RELATIVE PERCENT: 1 % (ref 0–6)
ERYTHROCYTE [DISTWIDTH] IN BLOOD BY AUTOMATED COUNT: 19 % (ref 11.5–15)
GFR, ESTIMATED: 52 ML/MIN/1.73M2
GLUCOSE SERPL-MCNC: 84 MG/DL (ref 74–99)
HCT VFR BLD AUTO: 30.3 % (ref 34–48)
HGB BLD-MCNC: 8.3 G/DL (ref 11.5–15.5)
IMM GRANULOCYTES # BLD AUTO: 0.04 K/UL (ref 0–0.58)
IMM GRANULOCYTES NFR BLD: 1 % (ref 0–5)
LYMPHOCYTES NFR BLD: 1.32 K/UL (ref 1.5–4)
LYMPHOCYTES RELATIVE PERCENT: 15 % (ref 20–42)
MCH RBC QN AUTO: 19.9 PG (ref 26–35)
MCHC RBC AUTO-ENTMCNC: 27.4 G/DL (ref 32–34.5)
MCV RBC AUTO: 72.5 FL (ref 80–99.9)
MONOCYTES NFR BLD: 0.97 K/UL (ref 0.1–0.95)
MONOCYTES NFR BLD: 11 % (ref 2–12)
NEUTROPHILS NFR BLD: 72 % (ref 43–80)
NEUTS SEG NFR BLD: 6.16 K/UL (ref 1.8–7.3)
NUC STRESS EJECTION FRACTION: 65 %
PLATELET # BLD AUTO: 320 K/UL (ref 130–450)
PMV BLD AUTO: 11.4 FL (ref 7–12)
POTASSIUM SERPL-SCNC: 3.6 MMOL/L (ref 3.5–5)
RBC # BLD AUTO: 4.18 M/UL (ref 3.5–5.5)
RBC # BLD: ABNORMAL 10*6/UL
SODIUM SERPL-SCNC: 139 MMOL/L (ref 132–146)
STRESS BASELINE DIAS BP: 84 MMHG
STRESS BASELINE HR: 96 BPM
STRESS BASELINE SYS BP: 142 MMHG
STRESS ESTIMATED WORKLOAD: 1 METS
STRESS PEAK DIAS BP: 84 MMHG
STRESS PEAK SYS BP: 142 MMHG
STRESS PERCENT HR ACHIEVED: 86 %
STRESS POST PEAK HR: 112 BPM
STRESS RATE PRESSURE PRODUCT: NORMAL BPM*MMHG
STRESS TARGET HR: 130 BPM
WBC OTHER # BLD: 8.6 K/UL (ref 4.5–11.5)

## 2024-10-10 PROCEDURE — 6370000000 HC RX 637 (ALT 250 FOR IP): Performed by: NURSE PRACTITIONER

## 2024-10-10 PROCEDURE — 99232 SBSQ HOSP IP/OBS MODERATE 35: CPT | Performed by: INTERNAL MEDICINE

## 2024-10-10 PROCEDURE — 2580000003 HC RX 258: Performed by: NURSE PRACTITIONER

## 2024-10-10 PROCEDURE — 94640 AIRWAY INHALATION TREATMENT: CPT

## 2024-10-10 PROCEDURE — 93016 CV STRESS TEST SUPVJ ONLY: CPT | Performed by: INTERNAL MEDICINE

## 2024-10-10 PROCEDURE — A9500 TC99M SESTAMIBI: HCPCS | Performed by: RADIOLOGY

## 2024-10-10 PROCEDURE — 36415 COLL VENOUS BLD VENIPUNCTURE: CPT

## 2024-10-10 PROCEDURE — 6360000002 HC RX W HCPCS

## 2024-10-10 PROCEDURE — 85025 COMPLETE CBC W/AUTO DIFF WBC: CPT

## 2024-10-10 PROCEDURE — 2060000000 HC ICU INTERMEDIATE R&B

## 2024-10-10 PROCEDURE — 80048 BASIC METABOLIC PNL TOTAL CA: CPT

## 2024-10-10 PROCEDURE — 93018 CV STRESS TEST I&R ONLY: CPT | Performed by: INTERNAL MEDICINE

## 2024-10-10 PROCEDURE — 97530 THERAPEUTIC ACTIVITIES: CPT

## 2024-10-10 PROCEDURE — 93017 CV STRESS TEST TRACING ONLY: CPT

## 2024-10-10 PROCEDURE — 6360000002 HC RX W HCPCS: Performed by: STUDENT IN AN ORGANIZED HEALTH CARE EDUCATION/TRAINING PROGRAM

## 2024-10-10 PROCEDURE — 78452 HT MUSCLE IMAGE SPECT MULT: CPT

## 2024-10-10 PROCEDURE — 2700000000 HC OXYGEN THERAPY PER DAY

## 2024-10-10 PROCEDURE — 99232 SBSQ HOSP IP/OBS MODERATE 35: CPT | Performed by: STUDENT IN AN ORGANIZED HEALTH CARE EDUCATION/TRAINING PROGRAM

## 2024-10-10 PROCEDURE — 3430000000 HC RX DIAGNOSTIC RADIOPHARMACEUTICAL: Performed by: RADIOLOGY

## 2024-10-10 PROCEDURE — 78452 HT MUSCLE IMAGE SPECT MULT: CPT | Performed by: INTERNAL MEDICINE

## 2024-10-10 PROCEDURE — 6360000002 HC RX W HCPCS: Performed by: NURSE PRACTITIONER

## 2024-10-10 RX ORDER — REGADENOSON 0.08 MG/ML
0.4 INJECTION, SOLUTION INTRAVENOUS
Status: COMPLETED | OUTPATIENT
Start: 2024-10-10 | End: 2024-10-10

## 2024-10-10 RX ORDER — LEVOTHYROXINE SODIUM 25 UG/1
100 TABLET ORAL
Status: DISCONTINUED | OUTPATIENT
Start: 2024-10-11 | End: 2024-10-12 | Stop reason: HOSPADM

## 2024-10-10 RX ORDER — TETRAKIS(2-METHOXYISOBUTYLISOCYANIDE)COPPER(I) TETRAFLUOROBORATE 1 MG/ML
13 INJECTION, POWDER, LYOPHILIZED, FOR SOLUTION INTRAVENOUS
Status: COMPLETED | OUTPATIENT
Start: 2024-10-10 | End: 2024-10-10

## 2024-10-10 RX ORDER — TETRAKIS(2-METHOXYISOBUTYLISOCYANIDE)COPPER(I) TETRAFLUOROBORATE 1 MG/ML
30 INJECTION, POWDER, LYOPHILIZED, FOR SOLUTION INTRAVENOUS
Status: COMPLETED | OUTPATIENT
Start: 2024-10-10 | End: 2024-10-10

## 2024-10-10 RX ADMIN — SODIUM CHLORIDE, PRESERVATIVE FREE 10 ML: 5 INJECTION INTRAVENOUS at 20:32

## 2024-10-10 RX ADMIN — Medication 13 MILLICURIE: at 09:55

## 2024-10-10 RX ADMIN — AMIODARONE HYDROCHLORIDE 100 MG: 200 TABLET ORAL at 09:12

## 2024-10-10 RX ADMIN — Medication 35 MILLICURIE: at 12:21

## 2024-10-10 RX ADMIN — DILTIAZEM HYDROCHLORIDE 180 MG: 180 CAPSULE, COATED, EXTENDED RELEASE ORAL at 17:28

## 2024-10-10 RX ADMIN — ARFORMOTEROL TARTRATE 15 MCG: 15 SOLUTION RESPIRATORY (INHALATION) at 20:52

## 2024-10-10 RX ADMIN — FUROSEMIDE 20 MG: 10 INJECTION, SOLUTION INTRAMUSCULAR; INTRAVENOUS at 12:50

## 2024-10-10 RX ADMIN — ATORVASTATIN CALCIUM 20 MG: 10 TABLET, FILM COATED ORAL at 09:13

## 2024-10-10 RX ADMIN — REGADENOSON 0.4 MG: 0.08 INJECTION, SOLUTION INTRAVENOUS at 11:13

## 2024-10-10 RX ADMIN — Medication 2000 UNITS: at 09:13

## 2024-10-10 RX ADMIN — POLYETHYLENE GLYCOL 3350 17 GRAM ORAL POWDER PACKET 17 G: at 12:50

## 2024-10-10 RX ADMIN — BUDESONIDE 500 MCG: 0.5 SUSPENSION RESPIRATORY (INHALATION) at 20:52

## 2024-10-10 RX ADMIN — CETIRIZINE HYDROCHLORIDE 10 MG: 10 TABLET, FILM COATED ORAL at 09:13

## 2024-10-10 RX ADMIN — DILTIAZEM HYDROCHLORIDE 180 MG: 180 CAPSULE, COATED, EXTENDED RELEASE ORAL at 09:13

## 2024-10-10 NOTE — CONSULTS
[facility]   Inpatient CHF Nurse Navigator Consult      Cardiologist: Dr. Baltazar HI Destiny is a 89 y.o. (7/11/1934) female with a history of HFrEF, most recent EF:  Lab Results   Component Value Date    LVEF 55 08/07/2021       Patient was awake and alert, laying in bed during the consultation and is agreeable to heart failure education. She was engaged and asked appropriate questions throughout the education session. I spoke with the patient and her daughter Tracie in re: to CHF education. They are interested in the CHF clinic but due to her daughter's appointments she would prefer to schedule an appointment in the clinic.     Barriers identified during consult contributing to HF Hospitalization:  [] Limited medication adherence   [] Poor health literacy, education regarding HF medications provided   [] Pill box provided to patient  [] Difficulty affording medications  [] Difficulty obtaining/ managing medications  [] Prescription assistance information given     [] Not weighing themselves daily  [x] Weight log provided for easy monitoring  [] Scale provided     [] Not following low sodium diet  [] Food insecurity   [x] 2 gram sodium diet education provided   [] Low sodium recipes provided  [] Sodium free seasoning provided   [] Low sodium meal delivery options given to patient  [] Dietician consulted     [] Lack of transportation to appointments     [] Depression, given chronic illness  [] Primary team notified     [] Goals of care need addressed  [] Palliative care consulted     [] CHF CHW consulted, to assist with N/A.         Chart Reviewed:  Diet: Diet NPO Exceptions are: Sips of Water with Meds  Diet NPO Exceptions are: Sips of Water with Meds   Daily Weights: Patient Vitals for the past 96 hrs (Last 3 readings):   Weight   10/10/24 0957 76.2 kg (168 lb)   10/09/24 1400 76.6 kg (168 lb 12.8 oz)   10/07/24 1419 74.8 kg (165 lb)     I/O:   Intake/Output Summary

## 2024-10-10 NOTE — PROCEDURES
PROCEDURE NOTE  Date: 10/10/2024   Name: Radha Dixon  YOB: 1934    Procedures: Right Thoracentesis    Spoke with bedside RN. Patient had Xarelto on 10/9/2024, so thoracentesis cannot be performed until 10/11/2024. Please continue to hold Xarelto, NPO after midnight and obtain INR.

## 2024-10-10 NOTE — PROGRESS NOTES
Physical Therapy  Physical Therapy Treatment    Name: Radha Dixon  : 1934  MRN: [id number]      Date of Service: 10/10/2024    Evaluating PT:  Russ Ruggiero PT, DPT [id number]    Room #:  8503/8503-B  Diagnosis:  Acute bronchospasm [J98.01]  Acute on chronic congestive heart failure, unspecified heart failure type (HCC) [I50.9]  Hypoxic respiratory failure [J96.91]  PMHx/PSHx:   has a past medical history of Asthma, Atrial fibrillation, persistent (HCC), Depression, Generalized osteoarthritis, Hyperlipidemia, Knee pain, Pinched nerve in neck, Prolonged emergence from general anesthesia, Thyroid disease, and Vitamin D deficiency.   has a past surgical history that includes Tonsillectomy; Hysterectomy; Thyroidectomy (17 YEARS AGO); Appendectomy (58 YEARS AGO); eye surgery; Neck surgery (2011); Cholecystectomy; Cardioversion (2019); Cardioversion (2021); and arthroplasty (Left, 3/29/2023).  Procedure/Surgery:  None  Precautions:  Falls, , [place], monitor HR, alarms  Equipment Owned:  FWW, rollator, SPC  Equipment Needs:  TBD    SUBJECTIVE:    Pt lives with daughter in a 1 story home with 4 stair(s) to enter and 1 rail(s).  Bed is on first floor and bath is on first floor.  Pt ambulated with SPC as needed prior to admission.    OBJECTIVE:   Initial Evaluation  Date: 10/08/2024 Treatment 10/10/2024 Short Term/ Long Term   Goals   AM-PAC 6 Clicks     Was pt agreeable to Eval/treatment? Yes Yes    Does pt have pain? None reported None reported    Bed Mobility  Rolling: NT  Supine to sit: Marco  Sit to supine: NT  Scooting: Marco Rolling: NT  Supine to sit: NT  Sit to supine: SBA  Scooting: SBA Rolling: Independent  Supine to sit: Independent  Sit to supine: Independent  Scooting: Independent   Transfers Sit to stand: Marco   Stand to sit: Marco  Stand pivot: Marco with no AD, SBA with FWW Sit to stand: SBA   Stand to sit: SBA  Stand pivot: SBA with no AD and FWW Sit to stand: Mod I  Stand to

## 2024-10-10 NOTE — PROGRESS NOTES
AC    furosemide  20 mg IntraVENous Daily    [Held by provider] rivaroxaban  20 mg Oral Daily with breakfast    budesonide  0.5 mg Nebulization BID RT    arformoterol tartrate  15 mcg Nebulization BID RT    sodium chloride flush  5-40 mL IntraVENous 2 times per day    amiodarone  100 mg Oral Daily    vitamin D  2,000 Units Oral Daily    dilTIAZem  180 mg Oral BID    cetirizine  10 mg Oral Daily    atorvastatin  20 mg Oral Daily     PRN Meds: sodium chloride flush, sodium chloride, potassium chloride **OR** potassium alternative oral replacement **OR** potassium chloride, magnesium sulfate, ondansetron **OR** ondansetron, polyethylene glycol, acetaminophen **OR** acetaminophen, levalbuterol, fluticasone, benzocaine-menthol, benzonatate, calcium carbonate, hydrALAZINE, melatonin, Polyvinyl Alcohol-Povidone PF, sodium chloride    Labs:     Recent Labs     10/08/24  0434 10/09/24  0429 10/10/24  0512   WBC 7.2 13.0* 8.6   HGB 8.4* 8.5* 8.3*   HCT 30.5* 30.8* 30.3*    335 320       Recent Labs     10/08/24  0434 10/09/24  0429 10/10/24  0512    136 139   K 4.5 4.2 3.6    97* 99   CO2 25 28 30*   BUN 20 30* 26*   CREATININE 1.0 1.1* 1.0   CALCIUM 9.5 9.2 8.9       No results for input(s): \"ALKPHOS\", \"ALT\", \"AST\", \"BILITOT\", \"AMYLASE\", \"LIPASE\" in the last 72 hours.    Invalid input(s): \"PROT\", \"ALB\"      No results for input(s): \"INR\" in the last 72 hours.    No results for input(s): \"CKTOTAL\", \"TROPONINI\" in the last 72 hours.    Chronic labs:    Lab Results   Component Value Date    CHOL 136 08/06/2021    TRIG 115 08/06/2021    HDL 75 08/06/2021    TSH 0.30 10/09/2024    INR 3.2 04/16/2024    LABA1C 5.6 08/06/2021       Radiology: REVIEWED DAILY    +++++++++++++++++++++++++++++++++++++++++++++++++  Geneva Hameed MD  [facility]   [place], [place]  +++++++++++++++++++++++++++++++++++++++++++++++++  NOTE: This report was transcribed using voice

## 2024-10-10 NOTE — PROGRESS NOTES
Attempted to see patient for CHF education. Patient is currently off the unit for testing. Educational folder left at bedside. Will follow up with patient.

## 2024-10-10 NOTE — PROGRESS NOTES
Pulmonary Progress Note    Admit Date: 10/7/2024  Hospital day                               PCP: No primary care provider on file.    Chief Complaint (s):  Patient Active Problem List   Diagnosis    Atrial fibrillation, persistent (HCC)    History of nuclear stress test    HLD (hyperlipidemia)    Depression    Asthma    Vitamin D deficiency    Thyroid disease    Combined hyperlipidemia    Chronic obstructive pulmonary disease, unspecified COPD type (HCC)    Hypoxic respiratory failure    Acute bronchospasm       Subjective:  Patient seen up in room. She is having shortness of breath with exertion. Oxygenation not improving 90% on 2 L. She admits her chest heaviness has decreased. Discussed imaging with Dr. Iraheta.       Vitals:  VITALS:  /79   Pulse (!) 101   Temp 97.2 °F (36.2 °C) (Temporal)   Resp 16   Ht 1.727 m (5' 8\")   Wt 76.6 kg (168 lb 12.8 oz)   SpO2 90%   BMI 25.67 kg/m²     24HR INTAKE/OUTPUT:      Intake/Output Summary (Last 24 hours) at 10/10/2024 0825  Last data filed at 10/10/2024 0608  Gross per 24 hour   Intake 360 ml   Output 1000 ml   Net -640 ml       24HR PULSE OXIMETRY RANGE:    SpO2  Av %  Min: 90 %  Max: 97 %    Medications:  IV:   sodium chloride         Scheduled Meds:   furosemide  20 mg IntraVENous Daily    [Held by provider] rivaroxaban  20 mg Oral Daily with breakfast    budesonide  0.5 mg Nebulization BID RT    arformoterol tartrate  15 mcg Nebulization BID RT    sodium chloride flush  5-40 mL IntraVENous 2 times per day    amiodarone  100 mg Oral Daily    vitamin D  2,000 Units Oral Daily    dilTIAZem  180 mg Oral BID    levothyroxine  125 mcg Oral QAM AC    cetirizine  10 mg Oral Daily    atorvastatin  20 mg Oral Daily       Diet:   Diet NPO Exceptions are: Sips of Water with Meds     EXAM:  General: No distress. Alert.  Eyes: PERRL. No sclera icterus. No conjunctival injection.  ENT: No discharge. Pharynx clear.  Neck: Trachea midline. Normal  thyroid.  Resp: No accessory muscle use. no rales. no wheezing. no rhonchi. Very diminished right base.   CV: Regular rate. Regular rhythm. No murmur or rub.   Abd: Non-tender. Non-distended. No masses. No organomegaly. Normal bowel sounds.   Skin: Warm and dry. No nodule on exposed extremities. No rash on exposed extremities.  Ext: No cyanosis, clubbing, edema  Lymph: No cervical LAD. No supraclavicular LAD.   M/S: No cyanosis. No joint deformity. No clubbing.   Neuro: Awake. Follows commands. Positive pupils/gag/corneals. Normal pain response.       Results:  CBC:   Recent Labs     10/08/24  0434 10/09/24  0429 10/10/24  0512   WBC 7.2 13.0* 8.6   HGB 8.4* 8.5* 8.3*   HCT 30.5* 30.8* 30.3*   MCV 71.9* 72.5* 72.5*    335 320     BMP:   Recent Labs     10/08/24  0434 10/09/24  0429 10/10/24  0512    136 139   K 4.5 4.2 3.6    97* 99   CO2 25 28 30*   BUN 20 30* 26*   CREATININE 1.0 1.1* 1.0     LIVER PROFILE:   Recent Labs     10/07/24  1042   AST 21   ALT 12   BILITOT 0.9   ALKPHOS 100     PT/INR: No results for input(s): \"PROTIME\", \"INR\" in the last 72 hours.  APTT: No results for input(s): \"APTT\" in the last 72 hours.      Pathology:  N/A      Microbiology:  None    Recent ABG:   No results for input(s): \"PH\", \"PO2\", \"PCO2\", \"HCO3\", \"BE\", \"O2SAT\", \"METHB\", \"O2HB\", \"COHB\", \"O2CON\", \"HHB\", \"THB\" in the last 72 hours.          Recent Films:  XR CHEST (2 VW)   Final Result   1. Moderate right pleural effusion, small left effusion.   2. Mild pulmonary venous hypertension.         XR CHEST PORTABLE   Final Result   1. Patchy bibasilar airspace disease could represent pneumonia or CHF   2. Trace bilateral pleural effusions.             Assessment:  Bilateral pleural effusions. Congestive heart failure.   Acute hypoxic respiratory failure due to #1.   Atrial flutter.   Cardiomyopathy with moderate aortic stenosis.   Asthma well controlled with Advair.    Plan:  Consult IR for right thoracentesis   Wean

## 2024-10-10 NOTE — CARE COORDINATION
Here for SOB.Per IR today Patient had  Xarelto on 10/9/2024, so thoracentesis cannot be performed until 10/11/2024. Please continue to hold Xarelto, NPO after midnight and obtain INR. Currently on 3 liters oxygen - spoke with patient regarding if oxygen needed for home and she asked that I call her daughter  Tracie. Did speak with  Tracie and ok for referral to [facility] for oxygen of needed home. Referral to Maria T with [facility]. Await amb pulse ox if unabke to wean oxygen.Daughter is declining c for her as she is with her 24/7Electronically signed by Katie Baker RN on 10/10/2024 at 11:06 AM

## 2024-10-11 ENCOUNTER — APPOINTMENT (OUTPATIENT)
Dept: INTERVENTIONAL RADIOLOGY/VASCULAR | Age: 89
End: 2024-10-11
Payer: MEDICARE

## 2024-10-11 ENCOUNTER — APPOINTMENT (OUTPATIENT)
Dept: GENERAL RADIOLOGY | Age: 89
End: 2024-10-11
Payer: MEDICARE

## 2024-10-11 PROBLEM — I48.20 CHRONIC ATRIAL FIBRILLATION (HCC): Status: ACTIVE | Noted: 2024-10-11

## 2024-10-11 PROBLEM — R06.02 SHORTNESS OF BREATH: Status: ACTIVE | Noted: 2024-10-11

## 2024-10-11 PROBLEM — I50.9 ACUTE ON CHRONIC CONGESTIVE HEART FAILURE (HCC): Status: ACTIVE | Noted: 2024-10-11

## 2024-10-11 LAB
ALBUMIN FLD-MCNC: 1.7 G/DL
ANION GAP SERPL CALCULATED.3IONS-SCNC: 13 MMOL/L (ref 7–16)
BASOPHILS # BLD: 0.03 K/UL (ref 0–0.2)
BASOPHILS NFR BLD: 0 % (ref 0–2)
BUN SERPL-MCNC: 21 MG/DL (ref 6–23)
CALCIUM SERPL-MCNC: 8.7 MG/DL (ref 8.6–10.2)
CARCINOEMBRYONIC ANTIGEN: 1.3 NG/ML
CHLORIDE SERPL-SCNC: 101 MMOL/L (ref 98–107)
CHOLESTEROL FLUID: 32 MG/DL
CO2 SERPL-SCNC: 27 MMOL/L (ref 22–29)
CREAT SERPL-MCNC: 1 MG/DL (ref 0.5–1)
EOSINOPHIL # BLD: 0.08 K/UL (ref 0.05–0.5)
EOSINOPHILS RELATIVE PERCENT: 1 % (ref 0–6)
ERYTHROCYTE [DISTWIDTH] IN BLOOD BY AUTOMATED COUNT: 18.7 % (ref 11.5–15)
GFR, ESTIMATED: 52 ML/MIN/1.73M2
GLUCOSE FLD-MCNC: 111 MG/DL
GLUCOSE SERPL-MCNC: 92 MG/DL (ref 74–99)
HCT VFR BLD AUTO: 32 % (ref 34–48)
HGB BLD-MCNC: 8.8 G/DL (ref 11.5–15.5)
IMM GRANULOCYTES # BLD AUTO: 0.04 K/UL (ref 0–0.58)
IMM GRANULOCYTES NFR BLD: 1 % (ref 0–5)
INR PPP: 1.3
LDH FLD L TO P-CCNC: 135 U/L
LYMPHOCYTES NFR BLD: 1.26 K/UL (ref 1.5–4)
LYMPHOCYTES RELATIVE PERCENT: 16 % (ref 20–42)
MCH RBC QN AUTO: 20 PG (ref 26–35)
MCHC RBC AUTO-ENTMCNC: 27.5 G/DL (ref 32–34.5)
MCV RBC AUTO: 72.7 FL (ref 80–99.9)
MONOCYTES NFR BLD: 0.97 K/UL (ref 0.1–0.95)
MONOCYTES NFR BLD: 12 % (ref 2–12)
NEUTROPHILS NFR BLD: 70 % (ref 43–80)
NEUTS SEG NFR BLD: 5.45 K/UL (ref 1.8–7.3)
PLATELET # BLD AUTO: 318 K/UL (ref 130–450)
PMV BLD AUTO: 10.7 FL (ref 7–12)
POTASSIUM SERPL-SCNC: 3.9 MMOL/L (ref 3.5–5)
PROTHROMBIN TIME: 13.9 SEC (ref 9.3–12.4)
RBC # BLD AUTO: 4.4 M/UL (ref 3.5–5.5)
RBC # BLD: ABNORMAL 10*6/UL
SODIUM SERPL-SCNC: 141 MMOL/L (ref 132–146)
SPECIMEN TYPE: NORMAL
WBC OTHER # BLD: 7.8 K/UL (ref 4.5–11.5)

## 2024-10-11 PROCEDURE — 85025 COMPLETE CBC W/AUTO DIFF WBC: CPT

## 2024-10-11 PROCEDURE — 82042 OTHER SOURCE ALBUMIN QUAN EA: CPT

## 2024-10-11 PROCEDURE — 2580000003 HC RX 258: Performed by: NURSE PRACTITIONER

## 2024-10-11 PROCEDURE — 0W993ZZ DRAINAGE OF RIGHT PLEURAL CAVITY, PERCUTANEOUS APPROACH: ICD-10-PCS | Performed by: RADIOLOGY

## 2024-10-11 PROCEDURE — 82378 CARCINOEMBRYONIC ANTIGEN: CPT

## 2024-10-11 PROCEDURE — C1729 CATH, DRAINAGE: HCPCS

## 2024-10-11 PROCEDURE — 97530 THERAPEUTIC ACTIVITIES: CPT

## 2024-10-11 PROCEDURE — 71045 X-RAY EXAM CHEST 1 VIEW: CPT

## 2024-10-11 PROCEDURE — 2060000000 HC ICU INTERMEDIATE R&B

## 2024-10-11 PROCEDURE — 80048 BASIC METABOLIC PNL TOTAL CA: CPT

## 2024-10-11 PROCEDURE — 94640 AIRWAY INHALATION TREATMENT: CPT

## 2024-10-11 PROCEDURE — 85610 PROTHROMBIN TIME: CPT

## 2024-10-11 PROCEDURE — 6370000000 HC RX 637 (ALT 250 FOR IP): Performed by: INTERNAL MEDICINE

## 2024-10-11 PROCEDURE — 6360000002 HC RX W HCPCS: Performed by: STUDENT IN AN ORGANIZED HEALTH CARE EDUCATION/TRAINING PROGRAM

## 2024-10-11 PROCEDURE — 6370000000 HC RX 637 (ALT 250 FOR IP): Performed by: STUDENT IN AN ORGANIZED HEALTH CARE EDUCATION/TRAINING PROGRAM

## 2024-10-11 PROCEDURE — 32555 ASPIRATE PLEURA W/ IMAGING: CPT

## 2024-10-11 PROCEDURE — 6360000002 HC RX W HCPCS

## 2024-10-11 PROCEDURE — 97535 SELF CARE MNGMENT TRAINING: CPT

## 2024-10-11 PROCEDURE — 36415 COLL VENOUS BLD VENIPUNCTURE: CPT

## 2024-10-11 PROCEDURE — 83615 LACTATE (LD) (LDH) ENZYME: CPT

## 2024-10-11 PROCEDURE — 99232 SBSQ HOSP IP/OBS MODERATE 35: CPT | Performed by: STUDENT IN AN ORGANIZED HEALTH CARE EDUCATION/TRAINING PROGRAM

## 2024-10-11 PROCEDURE — 6370000000 HC RX 637 (ALT 250 FOR IP): Performed by: NURSE PRACTITIONER

## 2024-10-11 PROCEDURE — 82465 ASSAY BLD/SERUM CHOLESTEROL: CPT

## 2024-10-11 PROCEDURE — 2500000003 HC RX 250 WO HCPCS: Performed by: PHYSICIAN ASSISTANT

## 2024-10-11 PROCEDURE — 82945 GLUCOSE OTHER FLUID: CPT

## 2024-10-11 PROCEDURE — 99231 SBSQ HOSP IP/OBS SF/LOW 25: CPT | Performed by: INTERNAL MEDICINE

## 2024-10-11 RX ORDER — LIDOCAINE HYDROCHLORIDE 20 MG/ML
INJECTION, SOLUTION INFILTRATION; PERINEURAL PRN
Status: COMPLETED | OUTPATIENT
Start: 2024-10-11 | End: 2024-10-11

## 2024-10-11 RX ORDER — FUROSEMIDE 10 MG/ML
20 INJECTION INTRAMUSCULAR; INTRAVENOUS 2 TIMES DAILY
Status: DISCONTINUED | OUTPATIENT
Start: 2024-10-11 | End: 2024-10-11

## 2024-10-11 RX ORDER — METOPROLOL SUCCINATE 50 MG/1
50 TABLET, EXTENDED RELEASE ORAL DAILY
Status: DISCONTINUED | OUTPATIENT
Start: 2024-10-11 | End: 2024-10-12 | Stop reason: HOSPADM

## 2024-10-11 RX ORDER — FUROSEMIDE 10 MG/ML
40 INJECTION INTRAMUSCULAR; INTRAVENOUS 2 TIMES DAILY
Status: DISCONTINUED | OUTPATIENT
Start: 2024-10-11 | End: 2024-10-11

## 2024-10-11 RX ORDER — FUROSEMIDE 20 MG
20 TABLET ORAL DAILY
Status: DISCONTINUED | OUTPATIENT
Start: 2024-10-11 | End: 2024-10-12 | Stop reason: HOSPADM

## 2024-10-11 RX ADMIN — Medication 2000 UNITS: at 08:23

## 2024-10-11 RX ADMIN — SODIUM CHLORIDE, PRESERVATIVE FREE 10 ML: 5 INJECTION INTRAVENOUS at 08:26

## 2024-10-11 RX ADMIN — CETIRIZINE HYDROCHLORIDE 10 MG: 10 TABLET, FILM COATED ORAL at 08:23

## 2024-10-11 RX ADMIN — LEVOTHYROXINE SODIUM 100 MCG: 0.03 TABLET ORAL at 06:32

## 2024-10-11 RX ADMIN — ACETAMINOPHEN 650 MG: 325 TABLET ORAL at 16:48

## 2024-10-11 RX ADMIN — SODIUM CHLORIDE, PRESERVATIVE FREE 10 ML: 5 INJECTION INTRAVENOUS at 19:42

## 2024-10-11 RX ADMIN — DILTIAZEM HYDROCHLORIDE 180 MG: 180 CAPSULE, COATED, EXTENDED RELEASE ORAL at 08:24

## 2024-10-11 RX ADMIN — METOPROLOL SUCCINATE 50 MG: 50 TABLET, EXTENDED RELEASE ORAL at 14:56

## 2024-10-11 RX ADMIN — ARFORMOTEROL TARTRATE 15 MCG: 15 SOLUTION RESPIRATORY (INHALATION) at 09:57

## 2024-10-11 RX ADMIN — FUROSEMIDE 20 MG: 10 INJECTION, SOLUTION INTRAMUSCULAR; INTRAVENOUS at 08:23

## 2024-10-11 RX ADMIN — AMIODARONE HYDROCHLORIDE 100 MG: 200 TABLET ORAL at 08:23

## 2024-10-11 RX ADMIN — BUDESONIDE 500 MCG: 0.5 SUSPENSION RESPIRATORY (INHALATION) at 09:57

## 2024-10-11 RX ADMIN — LIDOCAINE HYDROCHLORIDE 10 ML: 20 INJECTION, SOLUTION INFILTRATION; PERINEURAL at 13:58

## 2024-10-11 RX ADMIN — ATORVASTATIN CALCIUM 20 MG: 10 TABLET, FILM COATED ORAL at 08:23

## 2024-10-11 NOTE — PROGRESS NOTES
[facility]  [address], [address]  [place] [address]      Pulmonary Progress Note      SUBJECTIVE:  just had thoracentesis done, drained 650 cc right pleural effusion looking transudative. Sitting up at side of bed with visitors, on RA, claims similar/slightly better with breathing, not much cough/congestion.    OBJECTIVE    Medications    Continuous Infusions:   sodium chloride         Scheduled Meds:   furosemide  20 mg Oral Daily    metoprolol succinate  50 mg Oral Daily    levothyroxine  100 mcg Oral QAM AC    [Held by provider] rivaroxaban  20 mg Oral Daily with breakfast    budesonide  0.5 mg Nebulization BID RT    arformoterol tartrate  15 mcg Nebulization BID RT    sodium chloride flush  5-40 mL IntraVENous 2 times per day    amiodarone  100 mg Oral Daily    vitamin D  2,000 Units Oral Daily    cetirizine  10 mg Oral Daily    atorvastatin  20 mg Oral Daily       PRN Meds:sodium chloride flush, sodium chloride, potassium chloride **OR** potassium alternative oral replacement **OR** potassium chloride, magnesium sulfate, ondansetron **OR** ondansetron, polyethylene glycol, acetaminophen **OR** acetaminophen, levalbuterol, fluticasone, benzocaine-menthol, benzonatate, calcium carbonate, hydrALAZINE, melatonin, Polyvinyl Alcohol-Povidone PF, sodium chloride    Physical    VITALS:  /69   Pulse 88   Temp 97.9 °F (36.6 °C)   Resp 17   Ht 1.727 m (5' 8\")   Wt 79.5 kg (175 lb 4.8 oz)   SpO2 98%   BMI 26.65 kg/m²     24HR INTAKE/OUTPUT:    No intake or output data in the 24 hours ending 10/11/24 1531    24HR PULSE OXIMETRY RANGE:    SpO2  Av.7 %  Min: 90 %  Max: 98 %    General appearance: alert, appears stated age, and cooperative  Lungs: rhonchi bibasilar  Heart: regular rate and rhythm, S1, S2 normal, no murmur, click, rub or gallop  Abdomen: soft, non-tender; bowel sounds normal; no masses,  no organomegaly  Extremities:

## 2024-10-11 NOTE — CARE COORDINATION
Care Coordination  The patient was admitted  for sob.  Thoracentesis to be done today and 650 cc drained. Pt [facility] 18/24 and he walked 150 fett with a fww SBA, ot [facility] 17/24. His plan is to return home with  with his daughter once medically stable. He is currently on room air and his 02 sat is 98 %.  The daughter is declining hhc.

## 2024-10-11 NOTE — PROGRESS NOTES
I  Stand pivot: Mod I with AAD   Ambulation    10' with no AD Marco, 40' x2 with FWW SBA 75 feet x 2 reps with FWW  feet with AAD Mod I   Stair negotiation: ascended and descended NT 4 steps with 1 rail SBA 4 step(s) with 1 rail(s) Mod I   ROM BUE:  See OT note  BLE:  WFL BLE:  WFL    Strength BUE:  See OT note  BLE:  Grossly 5/5 BLE:  WFL    Balance Sitting EOB:  SBA  Dynamic Standing:  Marco with no AD, SBA with FWW Sitting EOB:  Independent  Dynamic Standing:  SBA with FWW Sitting EOB:  Independent  Dynamic Standing:  Mod I with AAD     Pt is A & O x 4  Sensation:  No reports of numbness/tingling to extremities  Edema:  Unremarkable    Therapeutic Exercises/Activities:  STS x2  Stairs 1x4    Patient education  Pt educated on continued FWW use    Patient response to education:   Pt expressed understanding Pt demonstrated skill Pt requires further education in this area   Yes Yes Yes     ASSESSMENT:    Comments: Patient seated EOB upon arrival; agreeable to PT session.   Pt is pleasant and motivated.  Sleeping on entry but agreeable to session.  Pt completed STS and ambulation in hallway using FWW.  Pt completed stair negotiation using handrail.  Ambulated back to room and sat EOB. Vitals stable.  Pt declined chair, requesting to sit at EOB. Patient left in bed with alarm on with call light in reach.    Treatment:  Patient practiced and was instructed in the following treatment:    Gait training- pt was given verbal and tactile cues to facilitate safety/balance, FWW usage during ambulation as well as provided with physical assistance.   Stair training - Pt educated on proper safety and sequencing during stair ascension and descension.  Verbal cues were given to facilitate safety/balance, sequencing and hands on assistance provided for balance and fall prevention.       PLAN:    Patient is making good progress towards established goals.  Will continue with current POC.      Time in  0745  Time out   0810    Total Treatment Time  25 minutes     CPT codes:  [] Gait training 41553 0 minutes  [] Manual therapy 47291 0 minutes  [x] Therapeutic activities 60059 25 minutes  [] Therapeutic exercises 50344 0 minutes  [] Neuromuscular reeducation 29995 0 minutes    Tomi Montes, PT, DPT  [id number]

## 2024-10-11 NOTE — OR NURSING
Sterile petroleum dressing, sterile gauze and clear transparent dressing applied to right upper back.    The dressing application occurred in a well-lit room with strict adherence to aseptic technique. Procedure site revealed no signs of hematoma or complications.    The next dressing change and wound assessment should occur in 24 hours. No submersion in water for 72 hours post procedure.

## 2024-10-11 NOTE — BRIEF OP NOTE
Brief-Op Note  ____________________________________________________________      IR  U/S GUIDED THORACENTESIS  SEYZ 8SE IMCU/NEURO    Patient Name: Radha Dixon   YOB: 1934  Medical Record Number: [id number]  Date of Procedure: 10/11/24  Room/Bed: [facility]    Preoperative diagnosis: right Pleural Effusion    Postoperative diagnosis: Same    Consent: The patient was counseled regarding the procedure, it's indications, risks, potential complications and alternatives and any questions were answered.     Procedure:  Image Guided right Thoracentesis.    Anesthesia: Local anesthesia.    Performed by: EV Taylor under on-site supervision by Lizzie Brown MD.    Estimated blood loss: Less than 10 mL    Complications: None    Specimen Obtained: A total volume of  650mL was withdrawn    Electronically signed by EV Taylor   DD: 10/11/24  2:01 PM

## 2024-10-11 NOTE — PROGRESS NOTES
OCCUPATIONAL THERAPY TREATMENT SESSION  [facility]  [address], [place]          Date:10/11/2024                                                   Patient Name: Radha Dixon     MRN: [id number]     : 1934     Room: [facility]       Evaluating OT: Yadira Angelo OTD, OTR/L, [id number]      Referring Provider: Hollie Funk MD     Specific Provider Orders/Date: OT eval and treat (10/8/24)    Diagnosis: Acute bronchospasm [J98.01]  Acute on chronic congestive heart failure, unspecified heart failure type (HCC) [I50.9]  Hypoxic respiratory failure [J96.91]    Surgeries/Procedures: None this admission       Pt admitted with respiratory failure, weakness, SOB.      Pertinent Medical History:       has a past medical history of Asthma, Atrial fibrillation, persistent (HCC), Depression, Generalized osteoarthritis, Hyperlipidemia, Knee pain, Pinched nerve in neck, Prolonged emergence from general anesthesia, Thyroid disease, and Vitamin D deficiency.         Precautions:  Fall Risk, O2 2L, monitor HR, alarms+     Assessment of current deficits    [x] Functional mobility  [x]ADLs  [x] Strength               [x]Cognition    [x] Functional transfers   [x] IADLs         [x] Safety Awareness   [x]Endurance    [] Fine Coordination              [x] Balance      [] Vision/perception   [x]Sensation     []Gross Motor Coordination  [x] ROM  [] Delirium                   [] Motor Control     OT PLAN OF CARE   OT POC based on physician orders, patient diagnosis and results of clinical assessment    Frequency/Duration 1-3 days/wk for 2 weeks PRN   Specific OT Treatment Interventions to include:   * Instruction/training on adapted ADL techniques and AE recommendations to increase functional independence within precautions       * Training on energy conservation strategies, correct breathing pattern and techniques to improve independence/tolerance for self-care

## 2024-10-11 NOTE — OR NURSING
Post-Procedure SBAR Report    Patient Name: Radha Dioxn  MRN: [id number]  : 1934  Date of Procedure: 10/11/24  Completed Procedure: Right thoracentesis    Telephone SBAR report provided to KADE Gar.     Information from the following report(s) Nurse Handoff Report and Procedure Verification was reviewed.    Opportunity for questions and clarification was provided.

## 2024-10-11 NOTE — PROGRESS NOTES
Hospitalist Progress Note      Chief Complaint:  had concerns including Shortness of Breath (PT REPORTS INCREASED WEAKNESS AND SOB SINCE LAST NIGHT. PT RECEIVED ONE DUONEB PTA).    Admission Date: 10/7/2024     SYNOPSIS:Patient is a 89-year-old lady with past medical history of asthma, atrial fibrillation, depression, hyperlipidemia and hypothyroidism.  She presented to ED with shortness of breath and generalized fatigue, started with shortness of breath while laying down and then progressed to where she is short of breath on minimal ambulation.  No recent illness, denies any fever or chills.  She called EMS due to shortness of breath, received a breathing treatment from EMS with improvement in symptoms.  She was admitted on 4 L of supplemental oxygen, at baseline she needs no supplemental oxygen.  Chest x-ray shows by 2 bilateral airspace disease pneumonia versus CHF, proBNP elevated at 2445.    10/9: Echo with Reduced EF and Elevated troponin, Cardiology consulted, Xarelto on hold     10/10: Stress test ordered results pending, IR consulted for Right thoracentesis possibly tomorrow morning, Xarelto on hold for now      ASSESSMENT PLAN:    Acute hypoxic respiratory failure   COPD exacerbation.  Chest x-ray with patchy bibasilar airspace disease.Xopenex treatments.  Has been started on Zithromax on admission, continue.  Wean oxygen as tolerated    Bilateral pleural effusion: Right more than left, IR consulted for thoracentesis, patient to go for thoracentesis on 10/11.    CHF exacerbation:  Newly diagnosed cardiomyopathy HFrEF: Echocardiogram 35-40 %, Lasix 20 Mg IV daily started, BNP elevated, cardiology consulted, heart failure coordinator consulted, monitor daily weights, strict intake and output    Chest pain: Cardiology following  Stress test was reviewed, no evidence of ischemia, low risk of cardiac events.    Atrial fibrillation.  Continue amiodarone, Cardizem,  Xarelto on hold since 10/9    Hospitalist   [facility] [place], [place]  +++++++++++++++++++++++++++++++++++++++++++++++++  NOTE: This report was transcribed using voice recognition software. Every effort was made to ensure accuracy; however, inadvertent computerized transcription errors may be present.

## 2024-10-11 NOTE — OP NOTE
Operative Note  ____________________________________________________________      IR U/S GUIDED THORACENTESIS  SEYZ 8SE IMCU/NEURO    Patient Name: Radha Dixon   YOB: 1934  Medical Record Number: [id number]  Date of Procedure: 10/11/24  Room/Bed: [facility]850-    Preoperative diagnosis: right Pleural Effusion    Postoperative diagnosis: Same    Consent: The patient was counseled regarding the procedure, it's indications, risks, potential complications and alternatives and any questions were answered. Consent was obtained for image guided right thoracentesis for Pleural effusion.     Procedure:  Image Guided right Thoracentesis.    Performed by: EV Taylor under on-site supervision by Lizzie Brown MD.    Anesthesia: Local anesthesia with approximately 10 mL of 2% Lidocaine without epinephrine administered subcutaneously.    Estimated blood loss: Less than 10 mL    Complications: None    Specimen Obtained: Pleural Fluid    Procedure: Prior to start of procedure, routine scanning of the posterior thorax was performed using real-time ultrasound and revealed sufficient amount of pleural fluid present. Decision was made to proceed with procedure.  After obtaining consent, a \"Time-out\" was called to verify the correct patient, procedure/location, allergies, relevant medications held for procedure and that all equipment is functioning and available. The patient was then situated in a seated upright position and the appropriate landmarks were identified using ultrasound. The site of maximum fluid collection was marked. The skin over the puncture site in the right lower posterior hemithorax region was prepped with surgical skin prep and draped in a sterile fashion. Local anesthesia was administered by infiltration using 2% Lidocaine without epinephrine. A 6 Malay safe-t-centesis catheter was then advanced into the pleural cavity and the needle was withdrawn.  Pleural fluid return was clear straw

## 2024-10-12 VITALS
OXYGEN SATURATION: 96 % | SYSTOLIC BLOOD PRESSURE: 94 MMHG | RESPIRATION RATE: 17 BRPM | HEIGHT: 68 IN | HEART RATE: 78 BPM | WEIGHT: 175.3 LBS | DIASTOLIC BLOOD PRESSURE: 65 MMHG | TEMPERATURE: 97.6 F | BODY MASS INDEX: 26.57 KG/M2

## 2024-10-12 LAB
ANION GAP SERPL CALCULATED.3IONS-SCNC: 11 MMOL/L (ref 7–16)
BASOPHILS # BLD: 0.02 K/UL (ref 0–0.2)
BASOPHILS NFR BLD: 0 % (ref 0–2)
BUN SERPL-MCNC: 19 MG/DL (ref 6–23)
CALCIUM SERPL-MCNC: 8.9 MG/DL (ref 8.6–10.2)
CHLORIDE SERPL-SCNC: 100 MMOL/L (ref 98–107)
CO2 SERPL-SCNC: 28 MMOL/L (ref 22–29)
CREAT SERPL-MCNC: 1.1 MG/DL (ref 0.5–1)
EOSINOPHIL # BLD: 0.13 K/UL (ref 0.05–0.5)
EOSINOPHILS RELATIVE PERCENT: 2 % (ref 0–6)
ERYTHROCYTE [DISTWIDTH] IN BLOOD BY AUTOMATED COUNT: 19.1 % (ref 11.5–15)
GFR, ESTIMATED: 48 ML/MIN/1.73M2
GLUCOSE SERPL-MCNC: 93 MG/DL (ref 74–99)
HCT VFR BLD AUTO: 32.6 % (ref 34–48)
HGB BLD-MCNC: 8.9 G/DL (ref 11.5–15.5)
IMM GRANULOCYTES # BLD AUTO: 0.05 K/UL (ref 0–0.58)
IMM GRANULOCYTES NFR BLD: 1 % (ref 0–5)
LYMPHOCYTES NFR BLD: 1.3 K/UL (ref 1.5–4)
LYMPHOCYTES RELATIVE PERCENT: 21 % (ref 20–42)
MCH RBC QN AUTO: 19.6 PG (ref 26–35)
MCHC RBC AUTO-ENTMCNC: 27.3 G/DL (ref 32–34.5)
MCV RBC AUTO: 71.6 FL (ref 80–99.9)
MONOCYTES NFR BLD: 0.71 K/UL (ref 0.1–0.95)
MONOCYTES NFR BLD: 11 % (ref 2–12)
NEUTROPHILS NFR BLD: 65 % (ref 43–80)
NEUTS SEG NFR BLD: 4.13 K/UL (ref 1.8–7.3)
PLATELET # BLD AUTO: 333 K/UL (ref 130–450)
PMV BLD AUTO: 10.8 FL (ref 7–12)
POTASSIUM SERPL-SCNC: 3.6 MMOL/L (ref 3.5–5)
RBC # BLD AUTO: 4.55 M/UL (ref 3.5–5.5)
RBC # BLD: ABNORMAL 10*6/UL
SODIUM SERPL-SCNC: 139 MMOL/L (ref 132–146)
WBC OTHER # BLD: 6.3 K/UL (ref 4.5–11.5)

## 2024-10-12 PROCEDURE — 6370000000 HC RX 637 (ALT 250 FOR IP): Performed by: INTERNAL MEDICINE

## 2024-10-12 PROCEDURE — 6370000000 HC RX 637 (ALT 250 FOR IP): Performed by: STUDENT IN AN ORGANIZED HEALTH CARE EDUCATION/TRAINING PROGRAM

## 2024-10-12 PROCEDURE — 6370000000 HC RX 637 (ALT 250 FOR IP): Performed by: NURSE PRACTITIONER

## 2024-10-12 PROCEDURE — 99239 HOSP IP/OBS DSCHRG MGMT >30: CPT | Performed by: STUDENT IN AN ORGANIZED HEALTH CARE EDUCATION/TRAINING PROGRAM

## 2024-10-12 PROCEDURE — 80048 BASIC METABOLIC PNL TOTAL CA: CPT

## 2024-10-12 PROCEDURE — 85025 COMPLETE CBC W/AUTO DIFF WBC: CPT

## 2024-10-12 PROCEDURE — 36415 COLL VENOUS BLD VENIPUNCTURE: CPT

## 2024-10-12 RX ORDER — METOPROLOL SUCCINATE 50 MG/1
50 TABLET, EXTENDED RELEASE ORAL DAILY
Qty: 30 TABLET | Refills: 3 | Status: SHIPPED | OUTPATIENT
Start: 2024-10-13

## 2024-10-12 RX ORDER — LEVOTHYROXINE SODIUM 100 UG/1
100 TABLET ORAL DAILY
Qty: 30 TABLET | Refills: 2 | Status: SHIPPED | OUTPATIENT
Start: 2024-10-12 | End: 2025-01-10

## 2024-10-12 RX ORDER — FUROSEMIDE 20 MG
20 TABLET ORAL DAILY
Qty: 60 TABLET | Refills: 3 | Status: SHIPPED | OUTPATIENT
Start: 2024-10-13

## 2024-10-12 RX ADMIN — AMIODARONE HYDROCHLORIDE 100 MG: 200 TABLET ORAL at 08:50

## 2024-10-12 RX ADMIN — METOPROLOL SUCCINATE 50 MG: 50 TABLET, EXTENDED RELEASE ORAL at 08:51

## 2024-10-12 RX ADMIN — LEVOTHYROXINE SODIUM 100 MCG: 0.03 TABLET ORAL at 05:43

## 2024-10-12 RX ADMIN — ATORVASTATIN CALCIUM 20 MG: 10 TABLET, FILM COATED ORAL at 08:51

## 2024-10-12 RX ADMIN — Medication 2000 UNITS: at 08:50

## 2024-10-12 RX ADMIN — FUROSEMIDE 20 MG: 20 TABLET ORAL at 08:50

## 2024-10-12 RX ADMIN — CETIRIZINE HYDROCHLORIDE 10 MG: 10 TABLET, FILM COATED ORAL at 08:50

## 2024-10-12 NOTE — DISCHARGE SUMMARY
[facility] Hospitalist Discharge Summary         Radha Dixon  MRN: [id number]  Account Number: [id number]  Admitted: 10/7/2024  Discharge Date: 10/12/24    PCP Handoff    Recommended Outpatient Testing  None    Results Pending At Discharge  None        Clinical Summary  Patient is a 89-year-old lady with past medical history of asthma, atrial fibrillation, depression, hyperlipidemia and hypothyroidism.  She presented to ED with shortness of breath and generalized fatigue, started with shortness of breath while laying down and then progressed to where she is short of breath on minimal ambulation.  No recent illness, denies any fever or chills.  She called EMS due to shortness of breath, received a breathing treatment from EMS with improvement in symptoms.  She was admitted on 4 L of supplemental oxygen, at baseline she needs no supplemental oxygen.  Chest x-ray shows by 2 bilateral airspace disease pneumonia versus CHF, proBNP elevated at 2445.     10/9: Echo with Reduced EF and Elevated troponin, Cardiology consulted, Xarelto on hold      10/10: Stress test ordered results pending, IR consulted for Right thoracentesis possibly tomorrow morning, Xarelto on hold for now      10/11 patient had thoracentesis, remains on room air    10/12: IV Lasix transition to oral, patient is being discharged with advised to follow-up with cardiology as outpatient.    Acute hypoxic respiratory failure   COPD exacerbation.  Chest x-ray with patchy bibasilar airspace disease.Xopenex treatments.  Completed azithromycin treatment in the hospital.     Bilateral pleural effusion: Right more than left, IR consulted for thoracentesis, patient to go for thoracentesis on 10/11.  S/p thoracentesis with 650 cc drained.     CHF exacerbation:  Newly diagnosed cardiomyopathy HFrEF: Echocardiogram 35-40 %,   Will discharge on oral Lasix, Cardizem was changed to metoprolol XL per cardiology recommendations.  Patient had stress test on  tablet  Commonly known as: ZOCOR     VITAMIN B-12 PO     Vitamin D3 50 MCG (2000 UT) Tabs     Vitamin E 400 units Tabs           * This list has 2 medication(s) that are the same as other medications prescribed for you. Read the directions carefully, and ask your doctor or other care provider to review them with you.                STOP taking these medications      dilTIAZem 180 MG extended release capsule  Commonly known as: CARDIZEM CD               Where to Get Your Medications        These medications were sent to [facility] - [place] [address] [facility]. - P [phone] - F [phone]  [facility] [facility], [facility] [address]      Phone: [phone]   furosemide 20 MG tablet  levothyroxine 100 MCG tablet  metoprolol succinate 50 MG extended release tablet             Physician(s) Follow Up:  [facility]  [address] [id number] [phone]  Call        Condition at Discharge: Stable    Disposition:  Home      The patient's condition is stable.  At this time the patient is without objective evidence of an acute process requiring continuing hospitalization or inpatient management.  They are stable for discharge with outpatient follow-up.     I have spoken with the patient and discussed the results of the current hospitalization, in addition to providing specific details for the plan of care and counseling regarding the diagnosis and prognosis.  The plan has been discussed in detail and they are aware of the specific conditions for emergent return, as well as the importance of follow-up.  Their questions are answered at this time and they are agreeable with the plan for discharge    I reviewed discharge recommendations with the patient in person/family.      On day of discharge I saw Radha Dixon and Total time spent on day of encounter: 35 minutes on discharge.    Completed by: Ravinder Del Rio MD on 10/12/24, 10:16 AM

## 2024-10-12 NOTE — PROGRESS NOTES
Patient is seen in follow-up for shortness of breath, heart failure    Subjective:     Ms. Dixon feels okay,  Sitting up in bed no apparent distress    ROS:  CONSTITUTIONAL:  negative for  fevers, chills  HEENT:  negative for earaches, nasal congestion and epistaxis  RESPIRATORY:  negative for  dry cough, cough with sputum,wheezing and hemoptysis  GASTROINTESTINAL:  negative for nausea, vomiting  MUSCULOSKELETAL:  negative for  myalgias, arthralgias  NEUROLOGICAL:  negative for visual disturbance, dysphagia    Medication side effects: none    Scheduled Meds:   furosemide  20 mg Oral Daily    metoprolol succinate  50 mg Oral Daily    levothyroxine  100 mcg Oral QAM AC    [Held by provider] rivaroxaban  20 mg Oral Daily with breakfast    budesonide  0.5 mg Nebulization BID RT    arformoterol tartrate  15 mcg Nebulization BID RT    sodium chloride flush  5-40 mL IntraVENous 2 times per day    amiodarone  100 mg Oral Daily    vitamin D  2,000 Units Oral Daily    cetirizine  10 mg Oral Daily    atorvastatin  20 mg Oral Daily     Continuous Infusions:   sodium chloride       PRN Meds:sodium chloride flush, sodium chloride, potassium chloride **OR** potassium alternative oral replacement **OR** potassium chloride, magnesium sulfate, ondansetron **OR** ondansetron, polyethylene glycol, acetaminophen **OR** acetaminophen, levalbuterol, fluticasone, benzocaine-menthol, benzonatate, calcium carbonate, hydrALAZINE, melatonin, Polyvinyl Alcohol-Povidone PF, sodium chloride    I/O last 3 completed shifts:  In: 240 [P.O.:240]  Out: -   No intake/output data recorded.      Objective:      Physical Exam:   BP 97/72   Pulse 84   Temp 97.7 °F (36.5 °C) (Oral)   Resp 20   Ht 1.727 m (5' 8\")   Wt 79.5 kg (175 lb 4.8 oz)   SpO2 91%   BMI 26.65 kg/m²   CONSTITUTIONAL:  awake, alert, cooperative, no apparent distress, and appears stated age  HEAD:  normocepalic, without obvious abnormality, atraumatic  NECK:  Supple, symmetrical,  towards the base.      No pneumothorax.         IR GUIDED THORACENTESIS PLEURAL   Final Result   Successful ultrasound guided diagnostic and therapeutic right thoracentesis.         XR CHEST (2 VW)   Final Result   1. Moderate right pleural effusion, small left effusion.   2. Mild pulmonary venous hypertension.         XR CHEST PORTABLE   Final Result   1. Patchy bibasilar airspace disease could represent pneumonia or CHF   2. Trace bilateral pleural effusions.             Lab Review   Lab Results   Component Value Date/Time     10/11/2024 04:57 AM    K 3.9 10/11/2024 04:57 AM    K 4.3 03/30/2023 02:36 AM     10/11/2024 04:57 AM    CO2 27 10/11/2024 04:57 AM    BUN 21 10/11/2024 04:57 AM    CREATININE 1.0 10/11/2024 04:57 AM    GLUCOSE 92 10/11/2024 04:57 AM    CALCIUM 8.7 10/11/2024 04:57 AM     Lab Results   Component Value Date/Time    WBC 7.8 10/11/2024 04:57 AM    HGB 8.8 10/11/2024 04:57 AM    HCT 32.0 10/11/2024 04:57 AM    MCV 72.7 10/11/2024 04:57 AM     10/11/2024 04:57 AM     I have personally reviewed the laboratory, cardiac diagnostic and radiographic testing as outlined above:    Assessment:   Chest pain: Somewhat typical, for Lexiscan stress test cardiomyopathy: Newly diagnosed, as above  Aortic valve stenosis: Moderate  Tricuspid valve regurgitation: Moderate to severe  Atrial fibrillation: Paroxysmal, controlled ventricular response  Pleural effusion s/p thoracentesis    Recommendations:     1.  Will change diltiazem to Toprol in view of her newly diagnosed cardiomyopathy  2.  Will transition to oral diuretics  3.  Basic metabolic panel in a.m.    Discussed with patient and daughter at bedside  Electronically signed by Miguel Mccarthy MD on 10/11/2024 at 9:10 PM  NOTE: This report was transcribed using voice recognition software. Every effort was made to ensure accuracy; however, inadvertent computerized transcription errors may be present   (4) no limitation

## 2024-10-12 NOTE — PROGRESS NOTES
CLINICAL PHARMACY NOTE: MEDS TO BEDS    Total # of Prescriptions Filled: 3   The following medications were delivered to the patient:  Metoprolol succ er 50mg  Lasix 20mg  Levothyroxine 100mcg    Additional Documentation:  Patient's daughter and patient picked up in pharmacy 10-12-24

## 2024-10-12 NOTE — CARE COORDINATION
Social Work Discharge Planning:  Discharge order noted. SW spoke to nursing patient has no needs.   Electronically signed by ALIYA Neal on 10/12/2024 at 10:22 AM

## 2024-10-12 NOTE — PROGRESS NOTES
re-expansion of the right lung towards the base.      No pneumothorax.         IR GUIDED THORACENTESIS PLEURAL   Final Result   Successful ultrasound guided diagnostic and therapeutic right thoracentesis.         XR CHEST (2 VW)   Final Result   1. Moderate right pleural effusion, small left effusion.   2. Mild pulmonary venous hypertension.         XR CHEST PORTABLE   Final Result   1. Patchy bibasilar airspace disease could represent pneumonia or CHF   2. Trace bilateral pleural effusions.             Lab Review   Lab Results   Component Value Date/Time     10/11/2024 04:57 AM    K 3.9 10/11/2024 04:57 AM    K 4.3 03/30/2023 02:36 AM     10/11/2024 04:57 AM    CO2 27 10/11/2024 04:57 AM    BUN 21 10/11/2024 04:57 AM    CREATININE 1.0 10/11/2024 04:57 AM    GLUCOSE 92 10/11/2024 04:57 AM    CALCIUM 8.7 10/11/2024 04:57 AM     Lab Results   Component Value Date/Time    WBC 7.8 10/11/2024 04:57 AM    HGB 8.8 10/11/2024 04:57 AM    HCT 32.0 10/11/2024 04:57 AM    MCV 72.7 10/11/2024 04:57 AM     10/11/2024 04:57 AM     I have personally reviewed the laboratory, cardiac diagnostic and radiographic testing as outlined above:    Assessment:   Chest pain: Negative Lexiscan stress test  Cardiomyopathy: Nonischemic  Aortic valve stenosis: Moderate  Tricuspid valve regurgitation: Moderate to severe  Atrial fibrillation: Paroxysmal, controlled ventricular response  Pleural effusion s/p thoracentesis    Recommendations:     1.  Continue current treatment  2.  Will resume Xarelto when okay with others   3.  Increase ambulation as tolerated  4.  No active general cardiology problems, will see as needed, thank    Discussed with patient and daughter at bedside  Electronically signed by Miguel Mccarthy MD on 10/11/2024 at 9:17 PM  NOTE: This report was transcribed using voice recognition software. Every effort was made to ensure accuracy; however, inadvertent computerized transcription errors may be present

## 2024-10-18 ENCOUNTER — TELEPHONE (OUTPATIENT)
Dept: CARDIOLOGY CLINIC | Age: 89
End: 2024-10-18

## 2024-10-18 NOTE — TELEPHONE ENCOUNTER
She is calling for hfu prior to her leaving for [place].  She would like you to check and make sure she is ok.

## 2024-10-23 ENCOUNTER — HOSPITAL ENCOUNTER (OUTPATIENT)
Dept: OTHER | Age: 89
Setting detail: THERAPIES SERIES
Discharge: HOME | End: 2024-10-23
Payer: MEDICARE

## 2024-10-23 VITALS
SYSTOLIC BLOOD PRESSURE: 119 MMHG | DIASTOLIC BLOOD PRESSURE: 73 MMHG | WEIGHT: 164 LBS | RESPIRATION RATE: 18 BRPM | HEART RATE: 84 BPM | BODY MASS INDEX: 24.94 KG/M2 | OXYGEN SATURATION: 95 %

## 2024-10-23 LAB
ANION GAP SERPL CALCULATED.3IONS-SCNC: 12 MMOL/L (ref 7–16)
BNP SERPL-MCNC: 2973 PG/ML (ref 0–450)
BUN SERPL-MCNC: 16 MG/DL (ref 6–23)
CALCIUM SERPL-MCNC: 8.9 MG/DL (ref 8.6–10.2)
CHLORIDE SERPL-SCNC: 102 MMOL/L (ref 98–107)
CO2 SERPL-SCNC: 25 MMOL/L (ref 22–29)
CREAT SERPL-MCNC: 1 MG/DL (ref 0.5–1)
GFR, ESTIMATED: 52 ML/MIN/1.73M2
GLUCOSE SERPL-MCNC: 99 MG/DL (ref 74–99)
POTASSIUM SERPL-SCNC: 3 MMOL/L (ref 3.5–5)
SODIUM SERPL-SCNC: 139 MMOL/L (ref 132–146)

## 2024-10-23 PROCEDURE — 99204 OFFICE O/P NEW MOD 45 MIN: CPT

## 2024-10-23 PROCEDURE — 80048 BASIC METABOLIC PNL TOTAL CA: CPT

## 2024-10-23 PROCEDURE — 83880 ASSAY OF NATRIURETIC PEPTIDE: CPT

## 2024-10-23 RX ORDER — POTASSIUM CHLORIDE 1500 MG/1
20 TABLET, EXTENDED RELEASE ORAL DAILY
Qty: 30 TABLET | Refills: 1 | Status: SHIPPED | OUTPATIENT
Start: 2024-10-23

## 2024-10-23 RX ORDER — METOPROLOL SUCCINATE 50 MG/1
25 TABLET, EXTENDED RELEASE ORAL DAILY
Qty: 30 TABLET | Refills: 3 | Status: SHIPPED | OUTPATIENT
Start: 2024-10-23

## 2024-10-23 ASSESSMENT — PATIENT HEALTH QUESTIONNAIRE - PHQ9
SUM OF ALL RESPONSES TO PHQ QUESTIONS 1-9: 0
SUM OF ALL RESPONSES TO PHQ QUESTIONS 1-9: 0
SUM OF ALL RESPONSES TO PHQ9 QUESTIONS 1 & 2: 0
1. LITTLE INTEREST OR PLEASURE IN DOING THINGS: NOT AT ALL
SUM OF ALL RESPONSES TO PHQ QUESTIONS 1-9: 0
2. FEELING DOWN, DEPRESSED OR HOPELESS: NOT AT ALL
SUM OF ALL RESPONSES TO PHQ QUESTIONS 1-9: 0

## 2024-10-23 NOTE — PLAN OF CARE
Problem: Chronic Conditions and Co-morbidities  Goal: Patient's chronic conditions and co-morbidity symptoms are monitored and maintained or improved  Flowsheets (Taken 10/23/2024 0919)  Care Plan - Patient's Chronic Conditions and Co-Morbidity Symptoms are Monitored and Maintained or Improved: Monitor and assess patient's chronic conditions and comorbid symptoms for stability, deterioration, or improvement      No artificial nutrition/Antibiotic trial/IV fluid trial/DNI/DNR Order

## 2024-10-23 NOTE — PROGRESS NOTES
Laure Morocho, APRN - CNS   Nurse Practitioner  Specialty: Cardiology     Result Encounter Note     Signed     Date of Service: 10/23/2024  8:30 AM     Signed         CHF clinic visit and labs reviewed      Potassium 3.0  Patient reports positional dizziness   Orthostatic vital signs: 119/73, 86>>94/55, 84        History of fatigue with beta blocker: decrease Toprol XL to 25 mg daily at bedtime for now     Add Klorcon 40 meq today, then 20 meq daily starting tomorrow     Please remind her to change positions gradually      Follow up as scheduled             ABOVE ORDERS GIVEN TO PT.  PT REPEATED ORDERS AND VERBALIZED UNDERSTANDING.  
  Date Value   10/23/2024 25   10/12/2024 28   10/11/2024 27     BUN (mg/dL)   Date Value   10/23/2024 16   10/12/2024 19   10/11/2024 21     Creatinine (mg/dL)   Date Value   10/23/2024 1.0   10/12/2024 1.1 (H)   10/11/2024 1.0     Glucose (mg/dL)   Date Value   10/23/2024 99   10/12/2024 93   10/11/2024 92     Calcium (mg/dL)   Date Value   10/23/2024 8.9   10/12/2024 8.9   10/11/2024 8.7     BNP:  NT Pro-BNP (pg/mL)   Date Value   10/23/2024 2,973 (H)   10/09/2024 3,329 (H)   10/07/2024 2,445 (H)      CBC:  WBC (k/uL)   Date Value   10/12/2024 6.3     Hemoglobin (g/dL)   Date Value   10/12/2024 8.9 (L)     Hematocrit (%)   Date Value   10/12/2024 32.6 (L)     Platelets (k/uL)   Date Value   10/12/2024 333     Iron Studies:  Ferritin (ng/mL)   Date Value   10/07/2024 28     Hepatic:  AST (U/L)   Date Value   10/07/2024 21     ALT (U/L)   Date Value   10/07/2024 12     Total Bilirubin (mg/dL)   Date Value   10/07/2024 0.9     Alkaline Phosphatase (U/L)   Date Value   10/07/2024 100     INR:  INR (no units)   Date Value   10/11/2024 1.3         Wt Readings from Last 3 Encounters:   10/23/24 74.4 kg (164 lb)   10/11/24 79.5 kg (175 lb 4.8 oz)   06/17/24 78.4 kg (172 lb 12.8 oz)           ASSESSMENT/PLAN:    [x] Euvolemic, NO JVD.  PT. C/O FEELING DIZZY WHEN GETTING UP ..  INSTRUCTED TO GET UP SLOWLY TO PREVENT FALLING, PT VERBALIZED UNDERSTANDING.  PT ADMITS EATS OUT A LOT, INSTRUCTED ON LOW SODIUM DIET AND FOLLOWING SAME.  GAVE PT AND DAUGHTER FOOD OPTIONS, BOTH VERBALIZED UNDERSTANDING.  FIRST VISIT TODAY, CHF TEACHING GIVEN REVIEWED CARING FOR YOUR HEART, ZONE PAMPHLET, DAILY WEIGHTS, LOW SODIUM DIET, BOTH PT. AND DAUGHTER VERBALIZED UNDERSTANDING          [] Hypervolemic, with increase from baseline:  [] Shortness of breath/VASQUES  [] JVD  [] HJR  [] Abnormal lung assessment:   [] Orthopnea  [] PND  [] Decreased urinary response to oral diuretic   [] Scrotal swelling   [] Lower extremity edema  [] Compression

## 2024-10-23 NOTE — RESULT ENCOUNTER NOTE
CHF clinic visit and labs reviewed     Potassium 3.0  Patient reports positional dizziness   Orthostatic vital signs: 119/73, 86>>94/55, 84      History of fatigue with beta blocker: decrease Toprol XL to 25 mg daily at bedtime for now    Add Klorcon 40 meq today, then 20 meq daily starting tomorrow    Please remind her to change positions gradually     Follow up as scheduled

## 2024-10-30 PROBLEM — R06.02 SHORTNESS OF BREATH: Status: RESOLVED | Noted: 2024-10-11 | Resolved: 2024-10-30

## 2024-10-30 PROBLEM — I50.9 ACUTE ON CHRONIC CONGESTIVE HEART FAILURE (HCC): Status: RESOLVED | Noted: 2024-10-11 | Resolved: 2024-10-30

## 2024-10-30 PROBLEM — J98.01 ACUTE BRONCHOSPASM: Status: RESOLVED | Noted: 2024-10-09 | Resolved: 2024-10-30

## 2024-10-30 PROBLEM — J96.91 HYPOXIC RESPIRATORY FAILURE: Status: RESOLVED | Noted: 2024-10-07 | Resolved: 2024-10-30

## 2024-10-30 PROBLEM — I48.20 CHRONIC ATRIAL FIBRILLATION (HCC): Status: RESOLVED | Noted: 2024-10-11 | Resolved: 2024-10-30

## 2024-10-31 ENCOUNTER — OFFICE VISIT (OUTPATIENT)
Dept: CARDIOLOGY CLINIC | Age: 89
End: 2024-10-31

## 2024-10-31 VITALS
DIASTOLIC BLOOD PRESSURE: 66 MMHG | HEIGHT: 68 IN | RESPIRATION RATE: 16 BRPM | SYSTOLIC BLOOD PRESSURE: 110 MMHG | HEART RATE: 83 BPM | WEIGHT: 166 LBS | BODY MASS INDEX: 25.16 KG/M2

## 2024-10-31 DIAGNOSIS — I50.20 HFREF (HEART FAILURE WITH REDUCED EJECTION FRACTION) (HCC): Primary | ICD-10-CM

## 2024-10-31 DIAGNOSIS — I48.19 ATRIAL FIBRILLATION, PERSISTENT (HCC): ICD-10-CM

## 2024-10-31 PROBLEM — Z92.89 HISTORY OF NUCLEAR STRESS TEST: Status: RESOLVED | Noted: 2018-09-24 | Resolved: 2024-10-31

## 2024-10-31 RX ORDER — DILTIAZEM HYDROCHLORIDE 180 MG/1
180 CAPSULE, EXTENDED RELEASE ORAL DAILY
COMMUNITY

## 2024-10-31 RX ORDER — FERROUS SULFATE 325(65) MG
325 TABLET ORAL
COMMUNITY

## 2024-10-31 RX ORDER — RIVAROXABAN 15 MG/1
15 TABLET, FILM COATED ORAL
COMMUNITY
Start: 2024-10-20

## 2024-10-31 RX ORDER — CITALOPRAM HYDROBROMIDE 20 MG/1
20 TABLET ORAL DAILY
COMMUNITY
Start: 2024-09-05

## 2024-10-31 NOTE — PROGRESS NOTES
(PROVENTIL;VENTOLIN;PROAIR) 108 (90 Base) MCG/ACT inhaler Inhale 2 puffs into the lungs 4 times daily as needed for Wheezing 54 g 1    fluticasone (FLONASE) 50 MCG/ACT nasal spray 1 spray by Nasal route as needed for Rhinitis      loratadine (CLARITIN) 10 MG tablet Take 1 tablet by mouth daily      simvastatin (ZOCOR) 40 MG tablet Take 1 tablet by mouth nightly  3    Cyanocobalamin (VITAMIN B-12 PO) Take 6,000 mcg by mouth daily       Cholecalciferol (VITAMIN D3) 50 MCG (2000 UT) TABS Take 1 tablet by mouth daily      Vitamin E 400 UNIT TABS Take 400 Units by mouth daily       No current facility-administered medications for this visit.        Allergies   Allergen Reactions    Codeine Nausea And Vomiting    Codimal-A [Brompheniramine] Other (See Comments)     Altered mental state and confusion    Lipitor Other (See Comments)     GENERALIZED ACHING, DIFFICULT TO FUNCTION    Spiriva Handihaler [Tiotropium Bromide Monohydrate]     Tudorza Pressair [Aclidinium Bromide]     Ciprofloxacin Nausea And Vomiting       Vitals:    10/31/24 1155   BP: 110/66   Pulse: 83   Resp: 16   Weight: 75.3 kg (166 lb)   Height: 1.727 m (5' 8\")           SUBJECTIVE: Radha Dixon presents to the office today for re-evaluation of cardiac diagnoses and hfu for ADHF  I reviewed the hospital records, and actual echo images   presented with sob.  BNP and CXR revealed pulmonary congestion, had thoracentesis, diuresed  Meds limited by low BP              Physical Exam   /66   Pulse 83   Resp 16   Ht 1.727 m (5' 8\")   Wt 75.3 kg (166 lb)   BMI 25.24 kg/m²   Constitutional: Oriented to person, place, and time. Well-developed and well-nourished. No distress.      Neck:  no JVD present. Carotid bruit is not present.   Cardiovascular:  Normal  rate, irregular rhythm, normal heart sounds and intact distal pulses.  Exam reveals no gallop and no friction rub. Grade I/VI short SULEMAN heard throughout precordium  Pulmonary/Chest: Effort normal

## 2024-11-08 ENCOUNTER — HOSPITAL ENCOUNTER (OUTPATIENT)
Dept: OTHER | Age: 89
Setting detail: THERAPIES SERIES
Discharge: HOME | End: 2024-11-08
Payer: MEDICARE

## 2024-11-08 ENCOUNTER — TELEPHONE (OUTPATIENT)
Dept: OTHER | Age: 89
End: 2024-11-08

## 2024-11-08 VITALS
DIASTOLIC BLOOD PRESSURE: 59 MMHG | OXYGEN SATURATION: 94 % | HEART RATE: 88 BPM | SYSTOLIC BLOOD PRESSURE: 100 MMHG | WEIGHT: 164 LBS | RESPIRATION RATE: 18 BRPM | BODY MASS INDEX: 24.94 KG/M2

## 2024-11-08 LAB
ANION GAP SERPL CALCULATED.3IONS-SCNC: 10 MMOL/L (ref 7–16)
BNP SERPL-MCNC: 3111 PG/ML (ref 0–450)
BUN SERPL-MCNC: 21 MG/DL (ref 6–23)
CALCIUM SERPL-MCNC: 9.4 MG/DL (ref 8.6–10.2)
CHLORIDE SERPL-SCNC: 102 MMOL/L (ref 98–107)
CO2 SERPL-SCNC: 27 MMOL/L (ref 22–29)
CREAT SERPL-MCNC: 1.2 MG/DL (ref 0.5–1)
GFR, ESTIMATED: 43 ML/MIN/1.73M2
GLUCOSE SERPL-MCNC: 84 MG/DL (ref 74–99)
POTASSIUM SERPL-SCNC: 3.9 MMOL/L (ref 3.5–5)
SODIUM SERPL-SCNC: 139 MMOL/L (ref 132–146)

## 2024-11-08 PROCEDURE — 99214 OFFICE O/P EST MOD 30 MIN: CPT

## 2024-11-08 PROCEDURE — 83880 ASSAY OF NATRIURETIC PEPTIDE: CPT

## 2024-11-08 PROCEDURE — 80048 BASIC METABOLIC PNL TOTAL CA: CPT

## 2024-11-08 NOTE — RESULT ENCOUNTER NOTE
Labs and CHF clinic note reviewed  Use extra lasix PRN for weight gain, shortness of breath, swelling  Follow up as scheduled

## 2024-11-08 NOTE — TELEPHONE ENCOUNTER
2:28 PM Spoke with pt regarding labs and prn lasix Per SONY Zamudio-CNP    Labs and CHF clinic note reviewed  Use extra lasix PRN for weight gain, shortness of breath, swelling  Follow up as scheduled     Electronically signed by Brian Ornelas RN on 11/8/2024 at 2:29 PM

## 2024-11-08 NOTE — PROGRESS NOTES
patient to help bridge gap until affordability N/A          Scheduled to follow up in CHF clinic on:   Future Appointments   Date Time Provider Department Center   12/2/2024 10:00 AM [facility] CHF ROOM 3 [facility] [place]   4/29/2025 11:45 AM Theodore Ledesma MD [facility]

## 2024-12-02 ENCOUNTER — HOSPITAL ENCOUNTER (OUTPATIENT)
Dept: OTHER | Age: 88
Setting detail: THERAPIES SERIES
Discharge: HOME | End: 2024-12-02
Payer: MEDICARE

## 2024-12-02 VITALS
WEIGHT: 161 LBS | DIASTOLIC BLOOD PRESSURE: 62 MMHG | RESPIRATION RATE: 18 BRPM | BODY MASS INDEX: 24.48 KG/M2 | HEART RATE: 85 BPM | SYSTOLIC BLOOD PRESSURE: 98 MMHG | OXYGEN SATURATION: 97 %

## 2024-12-02 LAB
ANION GAP SERPL CALCULATED.3IONS-SCNC: 12 MMOL/L (ref 7–16)
BNP SERPL-MCNC: 2586 PG/ML (ref 0–450)
BUN SERPL-MCNC: 16 MG/DL (ref 6–23)
CALCIUM SERPL-MCNC: 9.4 MG/DL (ref 8.6–10.2)
CHLORIDE SERPL-SCNC: 103 MMOL/L (ref 98–107)
CO2 SERPL-SCNC: 25 MMOL/L (ref 22–29)
CREAT SERPL-MCNC: 1.2 MG/DL (ref 0.5–1)
GFR, ESTIMATED: 42 ML/MIN/1.73M2
GLUCOSE SERPL-MCNC: 106 MG/DL (ref 74–99)
POTASSIUM SERPL-SCNC: 3.9 MMOL/L (ref 3.5–5)
SODIUM SERPL-SCNC: 140 MMOL/L (ref 132–146)

## 2024-12-02 PROCEDURE — 83880 ASSAY OF NATRIURETIC PEPTIDE: CPT

## 2024-12-02 PROCEDURE — 99214 OFFICE O/P EST MOD 30 MIN: CPT

## 2024-12-02 PROCEDURE — 80048 BASIC METABOLIC PNL TOTAL CA: CPT

## 2024-12-02 NOTE — PLAN OF CARE
Problem: Chronic Conditions and Co-morbidities  Goal: Patient's chronic conditions and co-morbidity symptoms are monitored and maintained or improved  Flowsheets (Taken 12/2/2024 1001)  Care Plan - Patient's Chronic Conditions and Co-Morbidity Symptoms are Monitored and Maintained or Improved: Monitor and assess patient's chronic conditions and comorbid symptoms for stability, deterioration, or improvement

## 2024-12-02 NOTE — PROGRESS NOTES
swelling   [] Lower extremity edema  [] Compression stockings provided  [] Decline in functional capacity (unable to perform activities they had previously been able to do)  [] Weight gain     [] IV diuretics given No  [] Provider notified of recurrent IV diuretic use    Additional Notes:      Patient presents for follow up today. No complaints of sob, LE edema, or abdominal distention/bloating. No JVD on assessment. Follow up scheduled for 1 month, encouraged patient to call the clinic if they need an appointment sooner.       [x]Lab work obtained    [x] Patient/Family Educated On:  [x] HF zones (Green, Yellow, Red) and aware of when to take action   [x] Daily weights  [] Scale provided   [x] Low sodium diet (2000 mg)  Barriers to compliance  [] Refuses to monitor diet  [] Socioeconomic difficulties  [] Unable to cook for self (use of frozen meals, can goods, etc)  [] CHF CHW consulted  [] Low sodium meal delivery options given to patient  [] Dietician consulted   [] Low sodium recipes provided  [] Sodium free seasoning provided   [x] Fluid intake 6-8 cups (around 64 oz)  [x] Reviewed currently prescribed medications with patient, educated on importance of compliance and answered any questions regarding their medication  [] Pill box provided to patient  [] Patient using pill packing pharmacy   [] CPAP/BiPAP use  [] Low impact exercise / cardiac rehab   [] LifeVest use  [x] Patient aware of signs and symptoms of worsening HF, CHF clinic phone number provided and made aware to call clinic for sooner if evaluation if needed     [] Difficulty affording medications  [] CHF CHW consulted  [] Prescription assistance information given   [] [facility] medication assistance program information given   [] Sample medications provided to patient to help bridge gap until affordability N/A    Scheduled to follow up in CHF clinic on:   Future Appointments   Date Time Provider Department Center   1/6/2025 10:00 AM [facility]

## 2024-12-16 ENCOUNTER — APPOINTMENT (OUTPATIENT)
Dept: GENERAL RADIOLOGY | Age: 88
End: 2024-12-16
Payer: MEDICARE

## 2024-12-16 ENCOUNTER — HOSPITAL ENCOUNTER (EMERGENCY)
Age: 88
Discharge: HOME | End: 2024-12-16
Attending: EMERGENCY MEDICINE
Payer: MEDICARE

## 2024-12-16 VITALS
OXYGEN SATURATION: 96 % | TEMPERATURE: 98 F | HEART RATE: 79 BPM | DIASTOLIC BLOOD PRESSURE: 65 MMHG | RESPIRATION RATE: 18 BRPM | SYSTOLIC BLOOD PRESSURE: 108 MMHG

## 2024-12-16 DIAGNOSIS — E87.6 HYPOKALEMIA: ICD-10-CM

## 2024-12-16 DIAGNOSIS — I48.91 ATRIAL FIBRILLATION, UNSPECIFIED TYPE (HCC): ICD-10-CM

## 2024-12-16 DIAGNOSIS — M79.601 RIGHT ARM PAIN: Primary | ICD-10-CM

## 2024-12-16 LAB
ALBUMIN SERPL-MCNC: 3.6 G/DL (ref 3.5–5.2)
ALP SERPL-CCNC: 84 U/L (ref 35–104)
ALT SERPL-CCNC: 7 U/L (ref 0–32)
ANION GAP SERPL CALCULATED.3IONS-SCNC: 12 MMOL/L (ref 7–16)
AST SERPL-CCNC: 19 U/L (ref 0–31)
BASOPHILS # BLD: 0 K/UL (ref 0–0.2)
BASOPHILS NFR BLD: 0 % (ref 0–2)
BILIRUB SERPL-MCNC: 0.3 MG/DL (ref 0–1.2)
BNP SERPL-MCNC: 2568 PG/ML (ref 0–450)
BUN SERPL-MCNC: 20 MG/DL (ref 6–23)
CALCIUM SERPL-MCNC: 9 MG/DL (ref 8.6–10.2)
CHLORIDE SERPL-SCNC: 102 MMOL/L (ref 98–107)
CO2 SERPL-SCNC: 25 MMOL/L (ref 22–29)
CREAT SERPL-MCNC: 1.3 MG/DL (ref 0.5–1)
EOSINOPHIL # BLD: 0 K/UL (ref 0.05–0.5)
EOSINOPHILS RELATIVE PERCENT: 0 % (ref 0–6)
ERYTHROCYTE [DISTWIDTH] IN BLOOD BY AUTOMATED COUNT: 25.2 % (ref 11.5–15)
GFR, ESTIMATED: 39 ML/MIN/1.73M2
GLUCOSE SERPL-MCNC: 121 MG/DL (ref 74–99)
HCT VFR BLD AUTO: 41.2 % (ref 34–48)
HGB BLD-MCNC: 11.9 G/DL (ref 11.5–15.5)
LYMPHOCYTES NFR BLD: 0.65 K/UL (ref 1.5–4)
LYMPHOCYTES RELATIVE PERCENT: 7 % (ref 20–42)
MAGNESIUM SERPL-MCNC: 1.6 MG/DL (ref 1.6–2.6)
MCH RBC QN AUTO: 22.8 PG (ref 26–35)
MCHC RBC AUTO-ENTMCNC: 28.9 G/DL (ref 32–34.5)
MCV RBC AUTO: 79.1 FL (ref 80–99.9)
MONOCYTES NFR BLD: 0.4 K/UL (ref 0.1–0.95)
MONOCYTES NFR BLD: 5 % (ref 2–12)
NEUTROPHILS NFR BLD: 88 % (ref 43–80)
NEUTS SEG NFR BLD: 7.85 K/UL (ref 1.8–7.3)
PLATELET, FLUORESCENCE: 245 K/UL (ref 130–450)
PMV BLD AUTO: ABNORMAL FL (ref 7–12)
POTASSIUM SERPL-SCNC: 3 MMOL/L (ref 3.5–5)
PROT SERPL-MCNC: 5.9 G/DL (ref 6.4–8.3)
RBC # BLD AUTO: 5.21 M/UL (ref 3.5–5.5)
RBC # BLD: ABNORMAL 10*6/UL
SODIUM SERPL-SCNC: 139 MMOL/L (ref 132–146)
TROPONIN I SERPL HS-MCNC: 38 NG/L (ref 0–9)
TROPONIN I SERPL HS-MCNC: 38 NG/L (ref 0–9)
WBC OTHER # BLD: 8.9 K/UL (ref 4.5–11.5)

## 2024-12-16 PROCEDURE — 36415 COLL VENOUS BLD VENIPUNCTURE: CPT

## 2024-12-16 PROCEDURE — 83735 ASSAY OF MAGNESIUM: CPT

## 2024-12-16 PROCEDURE — 71045 X-RAY EXAM CHEST 1 VIEW: CPT

## 2024-12-16 PROCEDURE — 84484 ASSAY OF TROPONIN QUANT: CPT

## 2024-12-16 PROCEDURE — 6370000000 HC RX 637 (ALT 250 FOR IP): Performed by: EMERGENCY MEDICINE

## 2024-12-16 PROCEDURE — 99285 EMERGENCY DEPT VISIT HI MDM: CPT

## 2024-12-16 PROCEDURE — 85025 COMPLETE CBC W/AUTO DIFF WBC: CPT

## 2024-12-16 PROCEDURE — 93005 ELECTROCARDIOGRAM TRACING: CPT | Performed by: EMERGENCY MEDICINE

## 2024-12-16 PROCEDURE — 80053 COMPREHEN METABOLIC PANEL: CPT

## 2024-12-16 PROCEDURE — 83880 ASSAY OF NATRIURETIC PEPTIDE: CPT

## 2024-12-16 RX ORDER — POTASSIUM CHLORIDE 7.45 MG/ML
10 INJECTION INTRAVENOUS
Status: DISCONTINUED | OUTPATIENT
Start: 2024-12-16 | End: 2024-12-16

## 2024-12-16 RX ADMIN — POTASSIUM BICARBONATE 40 MEQ: 782 TABLET, EFFERVESCENT ORAL at 17:27

## 2024-12-16 NOTE — ED PROVIDER NOTES
the lungs 4 times daily as needed for Wheezing, Disp-54 g, R-1Normal      fluticasone (FLONASE) 50 MCG/ACT nasal spray 1 spray by Nasal route as needed for RhinitisHistorical Med      loratadine (CLARITIN) 10 MG tablet Take 1 tablet by mouth dailyHistorical Med      simvastatin (ZOCOR) 40 MG tablet Take 1 tablet by mouth nightly, R-3Historical Med      Cyanocobalamin (VITAMIN B-12 PO) Take 6,000 mcg by mouth daily Historical Med      Cholecalciferol (VITAMIN D3) 50 MCG (2000) TABS Take 1 tablet by mouth dailyHistorical Med      Vitamin E 400 UNIT TABS Take 400 Units by mouth dailyHistorical Med             ALLERGIES     Codeine, Codimal-a [brompheniramine], Lipitor, Spiriva handihaler [tiotropium bromide monohydrate], Tudorza pressair [aclidinium bromide], and Ciprofloxacin    FAMILYHISTORY       Family History   Problem Relation Age of Onset    Melanoma Mother 60    Breast Cancer Daughter 40        invasive, recurrence age 58        SOCIAL HISTORY       Social History     Tobacco Use    Smoking status: Former     Current packs/day: 0.00     Types: Cigarettes     Start date: 1954     Quit date: 1994     Years since quittin.2    Smokeless tobacco: Never   Vaping Use    Vaping status: Never Used   Substance Use Topics    Alcohol use: No     Comment: RARE    Drug use: No       SCREENINGS                         CIWA Assessment  BP: 108/65  Pulse: 79           PHYSICAL EXAM  1 or more Elements     ED Triage Vitals   BP Systolic BP Percentile Diastolic BP Percentile Temp Temp src Pulse Respirations SpO2   24 1346 -- -- 24 1348 -- 24 1346 24 1346 24 1346   98/60   98 °F (36.7 °C)  85 18 96 %      Height Weight         -- --                              Constitutional/General: Alert and oriented x3  Head: Normocephalic and atraumatic  Eyes: PERRL, EOMI, conjunctiva normal, sclera non icteric  ENT:  Oropharynx clear, handling secretions, no trismus, no asymmetry of the

## 2024-12-16 NOTE — ED NOTES
Unable to locate a portable monitor for K admin. Charge notified, no available beds at this time to admin IV K

## 2024-12-17 RX ORDER — POTASSIUM CHLORIDE 1500 MG/1
20 TABLET, EXTENDED RELEASE ORAL DAILY
Qty: 30 TABLET | Refills: 5 | Status: SHIPPED | OUTPATIENT
Start: 2024-12-17

## 2024-12-18 LAB
EKG ATRIAL RATE: 288 BPM
EKG Q-T INTERVAL: 412 MS
EKG QRS DURATION: 80 MS
EKG QTC CALCULATION (BAZETT): 495 MS
EKG R AXIS: -32 DEGREES
EKG T AXIS: -148 DEGREES
EKG VENTRICULAR RATE: 87 BPM

## 2024-12-18 PROCEDURE — 93010 ELECTROCARDIOGRAM REPORT: CPT | Performed by: INTERNAL MEDICINE

## 2024-12-19 NOTE — H&P
HISTORY AND PHYSICAL EXAM    Radha Dixon is a 89 y.o. female with a recent history of gross hematuria.  CT scan showed right hydronephrosis but no obvious stone.    Past Medical History:   Diagnosis Date    Asthma     Atrial fibrillation, persistent (HCC)     Depression     Generalized osteoarthritis 09/12/2006    Hyperlipidemia     Knee pain     Sees Dr. Manrique for b/l knee pain / gel injections    Pinched nerve in neck     Prolonged emergence from general anesthesia     Thyroid disease     Vitamin D deficiency        Past Surgical History:   Procedure Laterality Date    APPENDECTOMY  58 YEARS AGO    ARTHROPLASTY Left 03/29/2023    LEFT CARPAL TUNNEL RELEASE LEFT MEDIAN NERVE DECOMPRESSION AT ELBOW LEFT THUMB TRAPEZIECTOMY LIGAMENT RECONSTRUCTION TENDON INTERPOSITION WITH FIBERTAK performed by Chino Handley MD at [facility] OR    CARDIOVERSION  09/20/2019    Dr. Ledesma 1 shock 200 joules Successful    CARDIOVERSION  08/02/2021    Dr. Matias. 200J x1 converted to NSR    CHOLECYSTECTOMY      EYE SURGERY      CATARACT RIGHT AND LEFT    HYSTERECTOMY (CERVIX STATUS UNKNOWN)      NECK SURGERY  09/09/2011    THORACENTESIS Right 10/11/2024    650mL clear yellow    THYROIDECTOMY  17 YEARS AGO    TONSILLECTOMY         Family History   Problem Relation Age of Onset    Melanoma Mother 60    Breast Cancer Daughter 40        invasive, recurrence age 58       Prior to Admission medications    Medication Sig Start Date End Date Taking? Authorizing Provider   potassium chloride (KLOR-CON M) 20 MEQ extended release tablet Take 1 tablet by mouth daily 12/17/24   Eve Ramirez APRN - CNP   fluticasone-salmeterol (ADVAIR DISKUS) 250-50 MCG/ACT AEPB diskus inhaler Inhale 1 puff into the lungs in the morning and 1 puff in the evening. 11/6/24   Marjan Roberts APRN - NP   XARELTO 15 MG TABS tablet Take 1 tablet by mouth daily (with breakfast) 10/20/24   Provider, MD [place]   citalopram (CELEXA) 20 MG tablet Take 1 tablet by

## 2024-12-26 NOTE — PROGRESS NOTES
[facility]   PRE-ADMISSION TESTING GENERAL INSTRUCTIONS  PAT Phone Number: [phone]      GENERAL INSTRUCTIONS:    [x] Antibacterial Soap Shower Night before AND the Morning of procedure.    [x] Do not wear makeup, lotions, powders, deodorant the morning of surgery.  [x] No solid food after midnight. You may have SIPS of clear liquids up until 2 hours before your arrival time to the hospital.   [x] You may brush your teeth, gargle, but do not swallow water.   [x] No tobacco products, illegal drugs, or alcohol within 24 hours of your surgery.  [x] Jewelry or valuables should not be brought to the hospital. All body and/or tongue piercing's must be removed prior to arriving to hospital. No contact lens or hair pins.   [x] Arrange transportation with a responsible adult  to and from the hospital. Arrange for someone to be with you for the remainder of the day and for 24 hours after your procedure due to having had anesthesia.          -Who will be your  for transportation? Tracie, daughter        -Who will be staying with you for 24 hrs after your procedure? daughter  [x] Bring insurance card and photo ID.  [x] Bring copy of living will or healthcare power of  papers to be placed in your electronic record.       PARKING INSTRUCTIONS:     [x] ARRIVAL DATE & TIME: 12/31 8:45 am  [x] Times are subject to change. We will contact you the business day before surgery if that were to occur.  [x] Enter into the [facility] Entrance. Two people may accompany you. Masks are not required.  [x] Parking Lot \"I\" is where you will park. It is located on the corner of [address] and [address]. The entrance is on [address].   Only one vehicle - per patient, is permitted in parking lot.   Upon entering the parking lot, a voucher ticket will print.    EDUCATION INSTRUCTIONS:             [x] Pain: Post-op pain is normal and to be expected. You will be asked to rate

## 2024-12-30 ENCOUNTER — PREP FOR PROCEDURE (OUTPATIENT)
Dept: UROLOGY | Age: 88
End: 2024-12-30

## 2024-12-30 NOTE — PROGRESS NOTES
Called and spoke to patient to notify her of time change for surgery scheduled on 12/31.  Scheduled at 10:15 am.  Arrival time 8:15 am.  Patient verbalized understanding.

## 2024-12-31 ENCOUNTER — APPOINTMENT (OUTPATIENT)
Dept: GENERAL RADIOLOGY | Age: 88
End: 2024-12-31
Attending: UROLOGY
Payer: MEDICARE

## 2024-12-31 ENCOUNTER — ANESTHESIA EVENT (OUTPATIENT)
Dept: OPERATING ROOM | Age: 88
End: 2024-12-31
Payer: MEDICARE

## 2024-12-31 ENCOUNTER — ANESTHESIA (OUTPATIENT)
Dept: OPERATING ROOM | Age: 88
End: 2024-12-31
Payer: MEDICARE

## 2024-12-31 ENCOUNTER — HOSPITAL ENCOUNTER (OUTPATIENT)
Age: 88
Setting detail: OUTPATIENT SURGERY
Discharge: HOME | End: 2024-12-31
Attending: UROLOGY | Admitting: UROLOGY
Payer: MEDICARE

## 2024-12-31 VITALS
DIASTOLIC BLOOD PRESSURE: 72 MMHG | BODY MASS INDEX: 24.4 KG/M2 | WEIGHT: 161 LBS | OXYGEN SATURATION: 94 % | HEART RATE: 74 BPM | TEMPERATURE: 97.7 F | SYSTOLIC BLOOD PRESSURE: 123 MMHG | HEIGHT: 68 IN | RESPIRATION RATE: 18 BRPM

## 2024-12-31 DIAGNOSIS — N39.3 FEMALE STRESS INCONTINENCE: ICD-10-CM

## 2024-12-31 DIAGNOSIS — N13.30 HYDRONEPHROSIS, UNSPECIFIED HYDRONEPHROSIS TYPE: ICD-10-CM

## 2024-12-31 LAB
ANION GAP SERPL CALCULATED.3IONS-SCNC: 12 MMOL/L (ref 7–16)
BUN SERPL-MCNC: 36 MG/DL (ref 6–23)
CALCIUM SERPL-MCNC: 9.5 MG/DL (ref 8.6–10.2)
CHLORIDE SERPL-SCNC: 102 MMOL/L (ref 98–107)
CO2 SERPL-SCNC: 22 MMOL/L (ref 22–29)
CREAT SERPL-MCNC: 1.4 MG/DL (ref 0.5–1)
GFR, ESTIMATED: 35 ML/MIN/1.73M2
GLUCOSE SERPL-MCNC: 132 MG/DL (ref 74–99)
POTASSIUM SERPL-SCNC: 4.5 MMOL/L (ref 3.5–5)
SODIUM SERPL-SCNC: 136 MMOL/L (ref 132–146)

## 2024-12-31 PROCEDURE — 3700000000 HC ANESTHESIA ATTENDED CARE: Performed by: UROLOGY

## 2024-12-31 PROCEDURE — C2617 STENT, NON-COR, TEM W/O DEL: HCPCS | Performed by: UROLOGY

## 2024-12-31 PROCEDURE — C1758 CATHETER, URETERAL: HCPCS | Performed by: UROLOGY

## 2024-12-31 PROCEDURE — 74420 UROGRAPHY RTRGR +-KUB: CPT

## 2024-12-31 PROCEDURE — 7100000010 HC PHASE II RECOVERY - FIRST 15 MIN: Performed by: UROLOGY

## 2024-12-31 PROCEDURE — 3600000012 HC SURGERY LEVEL 2 ADDTL 15MIN: Performed by: UROLOGY

## 2024-12-31 PROCEDURE — 6360000002 HC RX W HCPCS

## 2024-12-31 PROCEDURE — 80048 BASIC METABOLIC PNL TOTAL CA: CPT

## 2024-12-31 PROCEDURE — 3600000002 HC SURGERY LEVEL 2 BASE: Performed by: UROLOGY

## 2024-12-31 PROCEDURE — 2580000003 HC RX 258

## 2024-12-31 PROCEDURE — 88305 TISSUE EXAM BY PATHOLOGIST: CPT

## 2024-12-31 PROCEDURE — 2709999900 HC NON-CHARGEABLE SUPPLY: Performed by: UROLOGY

## 2024-12-31 PROCEDURE — 7100000011 HC PHASE II RECOVERY - ADDTL 15 MIN: Performed by: UROLOGY

## 2024-12-31 PROCEDURE — 2720000010 HC SURG SUPPLY STERILE: Performed by: UROLOGY

## 2024-12-31 PROCEDURE — C1769 GUIDE WIRE: HCPCS | Performed by: UROLOGY

## 2024-12-31 PROCEDURE — 3700000001 HC ADD 15 MINUTES (ANESTHESIA): Performed by: UROLOGY

## 2024-12-31 DEVICE — URETERAL STENT
Type: IMPLANTABLE DEVICE | Site: URETER | Status: FUNCTIONAL
Brand: PERCUFLEX™

## 2024-12-31 RX ORDER — SODIUM CHLORIDE 0.9 % (FLUSH) 0.9 %
5-40 SYRINGE (ML) INJECTION PRN
Status: DISCONTINUED | OUTPATIENT
Start: 2024-12-31 | End: 2024-12-31 | Stop reason: HOSPADM

## 2024-12-31 RX ORDER — SODIUM CHLORIDE 0.9 % (FLUSH) 0.9 %
5-40 SYRINGE (ML) INJECTION EVERY 12 HOURS SCHEDULED
Status: DISCONTINUED | OUTPATIENT
Start: 2024-12-31 | End: 2024-12-31 | Stop reason: HOSPADM

## 2024-12-31 RX ORDER — SODIUM CHLORIDE 9 MG/ML
INJECTION, SOLUTION INTRAVENOUS CONTINUOUS
Status: DISCONTINUED | OUTPATIENT
Start: 2024-12-31 | End: 2024-12-31 | Stop reason: HOSPADM

## 2024-12-31 RX ORDER — ONDANSETRON 2 MG/ML
4 INJECTION INTRAMUSCULAR; INTRAVENOUS EVERY 6 HOURS PRN
Status: DISCONTINUED | OUTPATIENT
Start: 2024-12-31 | End: 2024-12-31 | Stop reason: HOSPADM

## 2024-12-31 RX ORDER — CEFAZOLIN SODIUM 1 G/3ML
INJECTION, POWDER, FOR SOLUTION INTRAMUSCULAR; INTRAVENOUS
Status: DISCONTINUED | OUTPATIENT
Start: 2024-12-31 | End: 2024-12-31 | Stop reason: SDUPTHER

## 2024-12-31 RX ORDER — PROPOFOL 10 MG/ML
INJECTION, EMULSION INTRAVENOUS
Status: DISCONTINUED | OUTPATIENT
Start: 2024-12-31 | End: 2024-12-31 | Stop reason: SDUPTHER

## 2024-12-31 RX ORDER — ACETAMINOPHEN 325 MG/1
650 TABLET ORAL EVERY 6 HOURS PRN
Status: DISCONTINUED | OUTPATIENT
Start: 2024-12-31 | End: 2024-12-31 | Stop reason: HOSPADM

## 2024-12-31 RX ORDER — SODIUM CHLORIDE 9 MG/ML
INJECTION, SOLUTION INTRAVENOUS PRN
Status: DISCONTINUED | OUTPATIENT
Start: 2024-12-31 | End: 2024-12-31 | Stop reason: HOSPADM

## 2024-12-31 RX ORDER — IBUPROFEN 200 MG
200 TABLET ORAL EVERY 8 HOURS PRN
Qty: 15 TABLET | Refills: 0 | Status: SHIPPED | OUTPATIENT
Start: 2024-12-31

## 2024-12-31 RX ORDER — FENTANYL CITRATE 50 UG/ML
INJECTION, SOLUTION INTRAMUSCULAR; INTRAVENOUS
Status: DISCONTINUED | OUTPATIENT
Start: 2024-12-31 | End: 2024-12-31 | Stop reason: SDUPTHER

## 2024-12-31 RX ORDER — IBUPROFEN 400 MG/1
200 TABLET, FILM COATED ORAL EVERY 8 HOURS PRN
Status: DISCONTINUED | OUTPATIENT
Start: 2024-12-31 | End: 2024-12-31 | Stop reason: HOSPADM

## 2024-12-31 RX ORDER — ONDANSETRON 2 MG/ML
INJECTION INTRAMUSCULAR; INTRAVENOUS
Status: DISCONTINUED | OUTPATIENT
Start: 2024-12-31 | End: 2024-12-31 | Stop reason: SDUPTHER

## 2024-12-31 RX ORDER — SODIUM CHLORIDE 9 MG/ML
INJECTION, SOLUTION INTRAVENOUS
Status: DISCONTINUED | OUTPATIENT
Start: 2024-12-31 | End: 2024-12-31 | Stop reason: SDUPTHER

## 2024-12-31 RX ORDER — PHENAZOPYRIDINE HYDROCHLORIDE 100 MG/1
100 TABLET, FILM COATED ORAL 3 TIMES DAILY PRN
Status: DISCONTINUED | OUTPATIENT
Start: 2024-12-31 | End: 2024-12-31 | Stop reason: HOSPADM

## 2024-12-31 RX ORDER — PHENAZOPYRIDINE HYDROCHLORIDE 100 MG/1
100 TABLET, FILM COATED ORAL 3 TIMES DAILY PRN
Qty: 20 TABLET | Refills: 0 | Status: SHIPPED | OUTPATIENT
Start: 2024-12-31 | End: 2025-01-07

## 2024-12-31 RX ADMIN — PROPOFOL 130 MCG/KG/MIN: 10 INJECTION, EMULSION INTRAVENOUS at 12:16

## 2024-12-31 RX ADMIN — ONDANSETRON HYDROCHLORIDE 4 MG: 2 SOLUTION INTRAMUSCULAR; INTRAVENOUS at 12:16

## 2024-12-31 RX ADMIN — FENTANYL CITRATE 25 MCG: 50 INJECTION, SOLUTION INTRAMUSCULAR; INTRAVENOUS at 12:19

## 2024-12-31 RX ADMIN — CEFAZOLIN 2 G: 1 INJECTION, POWDER, FOR SOLUTION INTRAMUSCULAR; INTRAVENOUS at 12:26

## 2024-12-31 RX ADMIN — SODIUM CHLORIDE: 9 INJECTION, SOLUTION INTRAVENOUS at 12:13

## 2024-12-31 RX ADMIN — FENTANYL CITRATE 25 MCG: 50 INJECTION, SOLUTION INTRAMUSCULAR; INTRAVENOUS at 12:26

## 2024-12-31 ASSESSMENT — PAIN - FUNCTIONAL ASSESSMENT
PAIN_FUNCTIONAL_ASSESSMENT: NONE - DENIES PAIN
PAIN_FUNCTIONAL_ASSESSMENT: NONE - DENIES PAIN

## 2024-12-31 NOTE — OP NOTE
[facility]  Urology Post-operative Note    Radha Dixon  YOB: 1934  [id number]    Pre-operative Diagnosis: Gross hematuria, right hydronephrosis    Post-operative Diagnosis: Right mid ureteral tumor    Procedure: Cystourethroscopy, bilateral retrograde pyelography, right semirigid ureteroscopy, right mid ureteral tumor biopsy, right JJ ureteral stent insertion, pelvic exam    Surgeon: Georgi Shrestha MD    Anesthesia:   LMAC    Estimated Blood Loss:   Minimal    Complications: None    Drains: Right 6 Danish by 28 cm JJ ureteral stent    Specimen(s): Right mid ureteral tumor biopsy    Clinical Note:   89-year-old female with recent gross hematuria.  CT imaging showed right hydronephrosis.  She has been off Xarelto for a few days.  She presents for the above listed procedure.  The risks and benefits of the procedure including but not limited to infection, bleeding, possible need for stent with stent irritation etc. were discussed in detail with she and her family and they elected to proceed    Operative Description:   She was taken the operating room and placed on the OR table in the supine position.  She received IV Ancef preoperatively.  Following induction of anesthesia, she was repositioned into the dorsolithotomy position.  Pelvic exam reveals normal female external genitalia.  There is no vaginal mass, discharge, drip.  Her cervix is surgically absent.  There is no urethral diverticulum, cystocele, rectocele.  Her external genitalia and abdomen were then prepped and draped in routine surgical sterile fashion.  A 21 Danish cystoscope with a 30 degree lens was inserted per urethra and into the bladder.  There were no urethral strictures false passages or tumors.  Panendoscopic evaluation of the bladder showed no clots, stones, tumors, foreign bodies.  Both ureteral openings were in usual position on the trigone with clear urine from each.  A 5 Danish opening catheter was

## 2024-12-31 NOTE — ANESTHESIA POSTPROCEDURE EVALUATION
Department of Anesthesiology  Postprocedure Note    Patient: Radha Dixon  MRN: [id number]  YOB: 1934  Date of evaluation: 12/31/2024    Procedure Summary       Date: 12/31/24 Room / Location: [address]    Anesthesia Start: 1213 Anesthesia Stop: 1300    Procedure: CYSTOSCOPY BILATERAL RETROGRADE PYELOGRAM, RIGHT URETEROSCOPY, RIGHT URETERAL BIOPSIES, RIGHT URETERAL STENT INSERTION, PELVIC EXAM (Bladder) Diagnosis:       Hydronephrosis, unspecified hydronephrosis type      Female stress incontinence      (Hydronephrosis, unspecified hydronephrosis type [N13.30])      (Female stress incontinence [N39.3])    Surgeons: Georgi Shrestha MD Responsible Provider: Kelli Plunkett MD    Anesthesia Type: MAC, general ASA Status: 3            Anesthesia Type: No value filed.    Lottie Phase I: Lottie Score: 10    Lottie Phase II: Lottie Score: 9    Anesthesia Post Evaluation    Patient location during evaluation: PACU  Patient participation: complete - patient participated  Level of consciousness: awake and alert  Airway patency: patent  Nausea & Vomiting: no nausea and no vomiting  Cardiovascular status: blood pressure returned to baseline and hemodynamically stable  Respiratory status: acceptable and spontaneous ventilation  Hydration status: euvolemic  Multimodal analgesia pain management approach  Pain management: adequate    No notable events documented.

## 2024-12-31 NOTE — H&P
HISTORY AND PHYSICAL EXAM     Radha Dixon is a 89 y.o. female with a recent history of gross hematuria.  CT scan showed right hydronephrosis but no obvious stone.     Past Medical History        Past Medical History:   Diagnosis Date    Asthma      Atrial fibrillation, persistent (HCC)      Depression      Generalized osteoarthritis 09/12/2006    Hyperlipidemia      Knee pain       Sees Dr. Manrique for b/l knee pain / gel injections    Pinched nerve in neck      Prolonged emergence from general anesthesia      Thyroid disease      Vitamin D deficiency              Past Surgical History         Past Surgical History:   Procedure Laterality Date    APPENDECTOMY   58 YEARS AGO    ARTHROPLASTY Left 03/29/2023     LEFT CARPAL TUNNEL RELEASE LEFT MEDIAN NERVE DECOMPRESSION AT ELBOW LEFT THUMB TRAPEZIECTOMY LIGAMENT RECONSTRUCTION TENDON INTERPOSITION WITH FIBERTAK performed by Chino Handley MD at [facility] OR    CARDIOVERSION   09/20/2019     Dr. Ledesma 1 shock 200 joules Successful    CARDIOVERSION   08/02/2021     Dr. Matias. 200J x1 converted to NSR    CHOLECYSTECTOMY        EYE SURGERY         CATARACT RIGHT AND LEFT    HYSTERECTOMY (CERVIX STATUS UNKNOWN)        NECK SURGERY   09/09/2011    THORACENTESIS Right 10/11/2024     650mL clear yellow    THYROIDECTOMY   17 YEARS AGO    TONSILLECTOMY                Family History         Family History   Problem Relation Age of Onset    Melanoma Mother 60    Breast Cancer Daughter 40         invasive, recurrence age 58            Home Medications           Prior to Admission medications    Medication Sig Start Date End Date Taking? Authorizing Provider   potassium chloride (KLOR-CON M) 20 MEQ extended release tablet Take 1 tablet by mouth daily 12/17/24     Eve Ramirez, APRN - CNP   fluticasone-salmeterol (ADVAIR DISKUS) 250-50 MCG/ACT AEPB diskus inhaler Inhale 1 puff into the lungs in the morning and 1 puff in the evening. 11/6/24     Marjan Roberts, APRN - NP  Spiriva handihaler [tiotropium bromide monohydrate], Tudorza pressair [aclidinium bromide], and Ciprofloxacin     Social History            Tobacco Use    Smoking status: Former       Current packs/day: 0.00       Types: Cigarettes       Start date: 1954       Quit date: 1994       Years since quittin.2    Smokeless tobacco: Never   Substance Use Topics    Alcohol use: No       Comment: RARE         She is a  with 7 children.  She is retired from [employer]     Review of Systems:  Respiratory: negative for cough and hemoptysis  Cardiovascular: negative for chest pain and dyspnea  Gastrointestinal: negative for abdominal pain, diarrhea, nausea and vomiting  Genitourinary: as per HPI, otherwise negative.  Derm: negative for rash and skin lesion(s)  Neurological: negative for seizures and tremors  Endocrine: negative for diabetic symptoms including polydipsia and polyuria     Physical Exam:     Skin:  Warm and dry.  No rash or bruises  HEENT:  PERRLA, EOMI  Neck:  No JVD, No thyromegaly  Cardiac:  RRR  Lungs:  No audible wheezes, symmetric respirations, non-labored  Abdomen: Soft, non-tender, non-distended  Extremities:  No clubbing, edema or cyanosis  Neurological:  Moves all extremities, normal DTR           Lab Results   Component Value Date     WBC 8.9 2024     HGB 11.9 2024     HCT 41.2 2024     MCV 79.1 (L) 2024      10/12/2024               Lab Results   Component Value Date     BUN 20 2024     CREATININE 1.3 (H) 2024         Assessment and Plan:  Gross hematuria/right hydronephrosis  NPO.  On 2024 and undergo cystoscopy with retrograde pyelography and possible right ureteroscopy with possible right ureteral stent insertion.  She may need a bladder biopsy and fulguration as well.  IV Ancef will be administered for infection prophylaxis.  This will be done under anesthesia as an outpatient.  Risk and benefits of the procedure including

## 2024-12-31 NOTE — DISCHARGE INSTRUCTIONS
Blood in the urine is to be expected  Increase oral fluid intake for 2-3 days  Call the [facility] (Dr. Figueredo/Tomer/Mckay/Fady) office for a follow up appointment--[phone]  Resume a normal diet  No heavy lifting or physical activity for 2 days  OK to shower  Resume Xarelto on 1/3/2025 if there is no sign of active bleeding

## 2024-12-31 NOTE — ANESTHESIA PRE PROCEDURE
ROS comment: EKG 12/16/2024    Atrial flutter with variable AV block  Left axis deviation  Low voltage QRS  Septal infarct (cited on or before 06-AUG-2021)  ST & T wave abnormality, consider inferior ischemia  ST & T wave abnormality, consider anterolateral ischemia  Abnormal ECG  When compared with ECG of 10-OCT-2024 10:58,  Significant changes have occurred  Confirmed by Garcia Bowser ([id number]) on 12/18/2024 6:31:34 PM    Specimen Collected: 12/16/24 14:32 EST    TTE 10/12/2024      Left Ventricle: The EF by visual approximation is 35 - 40%. Findings consistent with moderate concentric hypertrophy.  ·  Right Ventricle: Right ventricle is mildly dilated. Mildly reduced systolic function.  ·  Aortic Valve: Moderate stenosis of the aortic valve. AV mean gradient is 13 mmHg. AV Velocity Ratio is 0.41. LVOT:AV VTI Index is 0.38. AV area by continuity VTI is 1.2 cm2. AV area by peak velocity is 1.2 cm2.  ·  Mitral Valve: Mild regurgitation. No stenosis noted. MV area by continuity equation is 2.9 cm2.  ·  Tricuspid Valve: Moderate to severe regurgitation.  ·  Pulmonic Valve: Mild regurgitation.  ·  Left Atrium: Left atrium is severely dilated.  ·  Right Atrium: Right atrium is severely dilated.  ·  Extracardiac: Medium sized left pleural effusion.  ·  Image quality is adequate.                Neuro/Psych:   (+) psychiatric history: stable with treatmentdepression/anxiety             GI/Hepatic/Renal:             Endo/Other:    (+) hypothyroidism: arthritis: OA., electrolyte abnormalities (K 3.0 on 12/16/2024, redraw in process).                  ROS comment: Bilateral knee pain. Abdominal:       Abdomen: soft.      Vascular:          Other Findings:             Anesthesia Plan      MAC and general     ASA 3       Induction: intravenous.    MIPS: Postoperative opioids intended and Prophylactic antiemetics administered.  Anesthetic plan and risks discussed with patient.    Use of blood products discussed with

## 2024-12-31 NOTE — BRIEF OP NOTE
Brief Postoperative Note      Patient: Radha Dixon  YOB: 1934  MRN: [id number]    Date of Procedure: 12/31/2024    Pre-Op Diagnosis Codes:      * Hydronephrosis, unspecified hydronephrosis type [N13.30]     * Female stress incontinence [N39.3]    Post-Op Diagnosis: Same       Procedure(s):  CYSTOSCOPY BILATERAL RETROGRADE PYELOGRAM, RIGHT URETEROSCOPY, RIGHT URETERAL BIOPSIES, RIGHT URETERAL STENT INSERTION, PELVIC EXAM    Surgeon(s):  Georgi Shrestha MD    Assistant:  * No surgical staff found *    Anesthesia: Monitor Anesthesia Care    Estimated Blood Loss (mL): Minimal    Complications: None    Specimens:   ID Type Source Tests Collected by Time Destination   A : RIGHT URETERAL TUMOR BIOPSY Tissue Tissue SURGICAL PATHOLOGY Georgi Shrestha MD 12/31/2024 1240        Implants:  Implant Name Type Inv. Item Serial No.  Lot No. LRB No. Used Action   STENT URET 6FR L28CM PERCFLX FIRM DUROMETER DBL PGTL TAPR - [id number]  STENT URET 6FR L28CM PERCFLX FIRM DUROMETER DBL PGTL TAPR  [employer] [id number] Right 1 Implanted         Drains:   Ureteral Drain/Stent 12/31/24 Right Ureter (Active)       Findings:  Infection Present At Time Of Surgery (PATOS) (choose all levels that have infection present):  No infection present  Other Findings: Right mid ureteral tumor    Electronically signed by Georgi Shrestha MD on 12/31/2024 at 1:08 PM

## 2025-01-06 ENCOUNTER — HOSPITAL ENCOUNTER (OUTPATIENT)
Dept: OTHER | Age: 89
Setting detail: THERAPIES SERIES
End: 2025-01-06
Payer: MEDICARE

## 2025-01-06 LAB — SURGICAL PATHOLOGY REPORT: NORMAL

## 2025-01-07 ENCOUNTER — HOSPITAL ENCOUNTER (INPATIENT)
Age: 89
LOS: 4 days | Discharge: HOME | DRG: 689 | End: 2025-01-12
Attending: STUDENT IN AN ORGANIZED HEALTH CARE EDUCATION/TRAINING PROGRAM | Admitting: INTERNAL MEDICINE
Payer: MEDICARE

## 2025-01-07 DIAGNOSIS — N12 PYELONEPHRITIS: Primary | ICD-10-CM

## 2025-01-07 LAB
BACTERIA URNS QL MICRO: ABNORMAL
BASOPHILS # BLD: 0.03 K/UL (ref 0–0.2)
BASOPHILS NFR BLD: 0 % (ref 0–2)
BILIRUB UR QL STRIP: NEGATIVE
CLARITY UR: CLEAR
COLOR UR: YELLOW
EOSINOPHIL # BLD: 0.04 K/UL (ref 0.05–0.5)
EOSINOPHILS RELATIVE PERCENT: 0 % (ref 0–6)
ERYTHROCYTE [DISTWIDTH] IN BLOOD BY AUTOMATED COUNT: 22.3 % (ref 11.5–15)
GLUCOSE UR STRIP-MCNC: NEGATIVE MG/DL
HCT VFR BLD AUTO: 36.6 % (ref 34–48)
HGB BLD-MCNC: 11.4 G/DL (ref 11.5–15.5)
HGB UR QL STRIP.AUTO: ABNORMAL
IMM GRANULOCYTES # BLD AUTO: 0.11 K/UL (ref 0–0.58)
IMM GRANULOCYTES NFR BLD: 1 % (ref 0–5)
KETONES UR STRIP-MCNC: NEGATIVE MG/DL
LACTATE BLDV-SCNC: 1.5 MMOL/L (ref 0.5–1.9)
LEUKOCYTE ESTERASE UR QL STRIP: ABNORMAL
LYMPHOCYTES NFR BLD: 0.55 K/UL (ref 1.5–4)
LYMPHOCYTES RELATIVE PERCENT: 4 % (ref 20–42)
MCH RBC QN AUTO: 24.2 PG (ref 26–35)
MCHC RBC AUTO-ENTMCNC: 31.1 G/DL (ref 32–34.5)
MCV RBC AUTO: 77.5 FL (ref 80–99.9)
MONOCYTES NFR BLD: 1.48 K/UL (ref 0.1–0.95)
MONOCYTES NFR BLD: 10 % (ref 2–12)
NEUTROPHILS NFR BLD: 86 % (ref 43–80)
NEUTS SEG NFR BLD: 13.06 K/UL (ref 1.8–7.3)
NITRITE UR QL STRIP: POSITIVE
PH UR STRIP: 6 [PH] (ref 5–9)
PLATELET, FLUORESCENCE: 211 K/UL (ref 130–450)
PMV BLD AUTO: ABNORMAL FL (ref 7–12)
PROCALCITONIN SERPL-MCNC: 0.15 NG/ML (ref 0–0.08)
PROT UR STRIP-MCNC: 30 MG/DL
RBC # BLD AUTO: 4.72 M/UL (ref 3.5–5.5)
RBC # BLD: ABNORMAL 10*6/UL
RBC #/AREA URNS HPF: ABNORMAL /HPF
SP GR UR STRIP: 1.02 (ref 1–1.03)
UROBILINOGEN UR STRIP-ACNC: 0.2 EU/DL (ref 0–1)
WBC #/AREA URNS HPF: ABNORMAL /HPF
WBC OTHER # BLD: 15.3 K/UL (ref 4.5–11.5)

## 2025-01-07 PROCEDURE — 83605 ASSAY OF LACTIC ACID: CPT

## 2025-01-07 PROCEDURE — 81001 URINALYSIS AUTO W/SCOPE: CPT

## 2025-01-07 PROCEDURE — 2580000003 HC RX 258

## 2025-01-07 PROCEDURE — 84145 PROCALCITONIN (PCT): CPT

## 2025-01-07 PROCEDURE — 87040 BLOOD CULTURE FOR BACTERIA: CPT

## 2025-01-07 PROCEDURE — 80053 COMPREHEN METABOLIC PANEL: CPT

## 2025-01-07 PROCEDURE — 83036 HEMOGLOBIN GLYCOSYLATED A1C: CPT

## 2025-01-07 PROCEDURE — 99285 EMERGENCY DEPT VISIT HI MDM: CPT

## 2025-01-07 PROCEDURE — 85025 COMPLETE CBC W/AUTO DIFF WBC: CPT

## 2025-01-07 PROCEDURE — 87086 URINE CULTURE/COLONY COUNT: CPT

## 2025-01-07 RX ORDER — 0.9 % SODIUM CHLORIDE 0.9 %
2000 INTRAVENOUS SOLUTION INTRAVENOUS ONCE
Status: COMPLETED | OUTPATIENT
Start: 2025-01-07 | End: 2025-01-08

## 2025-01-07 RX ADMIN — SODIUM CHLORIDE 2000 ML: 9 INJECTION, SOLUTION INTRAVENOUS at 22:48

## 2025-01-07 ASSESSMENT — PAIN DESCRIPTION - LOCATION: LOCATION: BACK

## 2025-01-07 ASSESSMENT — LIFESTYLE VARIABLES
HOW OFTEN DO YOU HAVE A DRINK CONTAINING ALCOHOL: NEVER
HOW MANY STANDARD DRINKS CONTAINING ALCOHOL DO YOU HAVE ON A TYPICAL DAY: PATIENT DOES NOT DRINK

## 2025-01-07 ASSESSMENT — PAIN - FUNCTIONAL ASSESSMENT: PAIN_FUNCTIONAL_ASSESSMENT: 0-10

## 2025-01-07 ASSESSMENT — PAIN SCALES - GENERAL: PAINLEVEL_OUTOF10: 7

## 2025-01-07 ASSESSMENT — PAIN DESCRIPTION - DESCRIPTORS: DESCRIPTORS: ACHING

## 2025-01-08 ENCOUNTER — APPOINTMENT (OUTPATIENT)
Dept: CT IMAGING | Age: 89
DRG: 689 | End: 2025-01-08
Payer: MEDICARE

## 2025-01-08 ENCOUNTER — APPOINTMENT (OUTPATIENT)
Dept: GENERAL RADIOLOGY | Age: 89
DRG: 689 | End: 2025-01-08
Payer: MEDICARE

## 2025-01-08 PROBLEM — E86.1 HYPOTENSION DUE TO HYPOVOLEMIA: Status: ACTIVE | Noted: 2025-01-08

## 2025-01-08 PROBLEM — N12 PYELONEPHRITIS: Status: ACTIVE | Noted: 2025-01-08

## 2025-01-08 PROBLEM — R31.29 OTHER MICROSCOPIC HEMATURIA: Status: ACTIVE | Noted: 2025-01-08

## 2025-01-08 LAB
ALBUMIN SERPL-MCNC: 3.4 G/DL (ref 3.5–5.2)
ALBUMIN SERPL-MCNC: 4 G/DL (ref 3.5–5.2)
ALP SERPL-CCNC: 79 U/L (ref 35–104)
ALP SERPL-CCNC: 98 U/L (ref 35–104)
ALT SERPL-CCNC: 10 U/L (ref 0–32)
ALT SERPL-CCNC: 14 U/L (ref 0–32)
ANION GAP SERPL CALCULATED.3IONS-SCNC: 12 MMOL/L (ref 7–16)
ANION GAP SERPL CALCULATED.3IONS-SCNC: 15 MMOL/L (ref 7–16)
AST SERPL-CCNC: 21 U/L (ref 0–31)
AST SERPL-CCNC: 25 U/L (ref 0–31)
B PARAP IS1001 DNA NPH QL NAA+NON-PROBE: NOT DETECTED
B PERT DNA SPEC QL NAA+PROBE: NOT DETECTED
BASOPHILS # BLD: 0.04 K/UL (ref 0–0.2)
BASOPHILS NFR BLD: 0 % (ref 0–2)
BILIRUB SERPL-MCNC: 0.4 MG/DL (ref 0–1.2)
BILIRUB SERPL-MCNC: 0.5 MG/DL (ref 0–1.2)
BUN SERPL-MCNC: 27 MG/DL (ref 6–23)
BUN SERPL-MCNC: 32 MG/DL (ref 6–23)
C PNEUM DNA NPH QL NAA+NON-PROBE: NOT DETECTED
CALCIUM SERPL-MCNC: 8.3 MG/DL (ref 8.6–10.2)
CALCIUM SERPL-MCNC: 9.1 MG/DL (ref 8.6–10.2)
CHLORIDE SERPL-SCNC: 100 MMOL/L (ref 98–107)
CHLORIDE SERPL-SCNC: 95 MMOL/L (ref 98–107)
CHLORIDE UR-SCNC: 133 MMOL/L
CHOLEST SERPL-MCNC: 181 MG/DL
CO2 SERPL-SCNC: 21 MMOL/L (ref 22–29)
CO2 SERPL-SCNC: 24 MMOL/L (ref 22–29)
CREAT SERPL-MCNC: 1.4 MG/DL (ref 0.5–1)
CREAT SERPL-MCNC: 1.5 MG/DL (ref 0.5–1)
CREAT UR-MCNC: 17.6 MG/DL (ref 29–226)
EOSINOPHIL # BLD: 0.1 K/UL (ref 0.05–0.5)
EOSINOPHILS RELATIVE PERCENT: 1 % (ref 0–6)
ERYTHROCYTE [DISTWIDTH] IN BLOOD BY AUTOMATED COUNT: 22.5 % (ref 11.5–15)
FLUAV RNA NPH QL NAA+NON-PROBE: NOT DETECTED
FLUBV RNA NPH QL NAA+NON-PROBE: NOT DETECTED
FOLATE SERPL-MCNC: 15.9 NG/ML (ref 4.8–24.2)
GFR, ESTIMATED: 32 ML/MIN/1.73M2
GFR, ESTIMATED: 35 ML/MIN/1.73M2
GLUCOSE SERPL-MCNC: 109 MG/DL (ref 74–99)
GLUCOSE SERPL-MCNC: 89 MG/DL (ref 74–99)
HADV DNA NPH QL NAA+NON-PROBE: NOT DETECTED
HBA1C MFR BLD: 5.1 % (ref 4–5.6)
HCOV 229E RNA NPH QL NAA+NON-PROBE: NOT DETECTED
HCOV HKU1 RNA NPH QL NAA+NON-PROBE: NOT DETECTED
HCOV NL63 RNA NPH QL NAA+NON-PROBE: NOT DETECTED
HCOV OC43 RNA NPH QL NAA+NON-PROBE: NOT DETECTED
HCT VFR BLD AUTO: 37.5 % (ref 34–48)
HDLC SERPL-MCNC: 67 MG/DL
HGB BLD-MCNC: 10.7 G/DL (ref 11.5–15.5)
HMPV RNA NPH QL NAA+NON-PROBE: NOT DETECTED
HPIV1 RNA NPH QL NAA+NON-PROBE: NOT DETECTED
HPIV2 RNA NPH QL NAA+NON-PROBE: NOT DETECTED
HPIV3 RNA NPH QL NAA+NON-PROBE: NOT DETECTED
HPIV4 RNA NPH QL NAA+NON-PROBE: NOT DETECTED
IMM GRANULOCYTES # BLD AUTO: 0.11 K/UL (ref 0–0.58)
IMM GRANULOCYTES NFR BLD: 1 % (ref 0–5)
IRON SATN MFR SERPL: 8 % (ref 15–50)
IRON SERPL-MCNC: 26 UG/DL (ref 37–145)
LACTATE BLDV-SCNC: 1 MMOL/L (ref 0.5–1.9)
LDLC SERPL CALC-MCNC: 98 MG/DL
LYMPHOCYTES NFR BLD: 0.59 K/UL (ref 1.5–4)
LYMPHOCYTES RELATIVE PERCENT: 5 % (ref 20–42)
M PNEUMO DNA NPH QL NAA+NON-PROBE: NOT DETECTED
MAGNESIUM SERPL-MCNC: 1.6 MG/DL (ref 1.6–2.6)
MCH RBC QN AUTO: 23.9 PG (ref 26–35)
MCHC RBC AUTO-ENTMCNC: 28.5 G/DL (ref 32–34.5)
MCV RBC AUTO: 83.9 FL (ref 80–99.9)
MONOCYTES NFR BLD: 1.23 K/UL (ref 0.1–0.95)
MONOCYTES NFR BLD: 10 % (ref 2–12)
NEUTROPHILS NFR BLD: 83 % (ref 43–80)
NEUTS SEG NFR BLD: 9.83 K/UL (ref 1.8–7.3)
OSMOLALITY UR: 347 MOSM/KG (ref 300–900)
PLATELET, FLUORESCENCE: 193 K/UL (ref 130–450)
PMV BLD AUTO: ABNORMAL FL (ref 7–12)
POTASSIUM SERPL-SCNC: 3.4 MMOL/L (ref 3.5–5)
POTASSIUM SERPL-SCNC: 3.6 MMOL/L (ref 3.5–5)
POTASSIUM, UR: 15.7 MMOL/L
PROT SERPL-MCNC: 5.8 G/DL (ref 6.4–8.3)
PROT SERPL-MCNC: 6.6 G/DL (ref 6.4–8.3)
RBC # BLD AUTO: 4.47 M/UL (ref 3.5–5.5)
RSV RNA NPH QL NAA+NON-PROBE: NOT DETECTED
RV+EV RNA NPH QL NAA+NON-PROBE: NOT DETECTED
SARS-COV-2 RNA NPH QL NAA+NON-PROBE: DETECTED
SODIUM SERPL-SCNC: 133 MMOL/L (ref 132–146)
SODIUM SERPL-SCNC: 134 MMOL/L (ref 132–146)
SODIUM UR-SCNC: 126 MMOL/L
SPECIMEN DESCRIPTION: ABNORMAL
TIBC SERPL-MCNC: 318 UG/DL (ref 250–450)
TRIGL SERPL-MCNC: 79 MG/DL
TSH SERPL DL<=0.05 MIU/L-ACNC: 11.75 UIU/ML (ref 0.27–4.2)
VIT B12 SERPL-MCNC: 1070 PG/ML (ref 211–946)
VLDLC SERPL CALC-MCNC: 16 MG/DL
WBC OTHER # BLD: 11.9 K/UL (ref 4.5–11.5)

## 2025-01-08 PROCEDURE — 6370000000 HC RX 637 (ALT 250 FOR IP): Performed by: NURSE PRACTITIONER

## 2025-01-08 PROCEDURE — 83605 ASSAY OF LACTIC ACID: CPT

## 2025-01-08 PROCEDURE — 82436 ASSAY OF URINE CHLORIDE: CPT

## 2025-01-08 PROCEDURE — 83550 IRON BINDING TEST: CPT

## 2025-01-08 PROCEDURE — 6360000002 HC RX W HCPCS: Performed by: NURSE PRACTITIONER

## 2025-01-08 PROCEDURE — 94640 AIRWAY INHALATION TREATMENT: CPT

## 2025-01-08 PROCEDURE — 2500000003 HC RX 250 WO HCPCS

## 2025-01-08 PROCEDURE — 6370000000 HC RX 637 (ALT 250 FOR IP): Performed by: INTERNAL MEDICINE

## 2025-01-08 PROCEDURE — 97165 OT EVAL LOW COMPLEX 30 MIN: CPT

## 2025-01-08 PROCEDURE — 84300 ASSAY OF URINE SODIUM: CPT

## 2025-01-08 PROCEDURE — 6360000002 HC RX W HCPCS

## 2025-01-08 PROCEDURE — 83540 ASSAY OF IRON: CPT

## 2025-01-08 PROCEDURE — 84133 ASSAY OF URINE POTASSIUM: CPT

## 2025-01-08 PROCEDURE — 2140000000 HC CCU INTERMEDIATE R&B

## 2025-01-08 PROCEDURE — 84145 PROCALCITONIN (PCT): CPT

## 2025-01-08 PROCEDURE — 85025 COMPLETE CBC W/AUTO DIFF WBC: CPT

## 2025-01-08 PROCEDURE — 96365 THER/PROPH/DIAG IV INF INIT: CPT

## 2025-01-08 PROCEDURE — 2580000003 HC RX 258

## 2025-01-08 PROCEDURE — 83935 ASSAY OF URINE OSMOLALITY: CPT

## 2025-01-08 PROCEDURE — 36415 COLL VENOUS BLD VENIPUNCTURE: CPT

## 2025-01-08 PROCEDURE — 82746 ASSAY OF FOLIC ACID SERUM: CPT

## 2025-01-08 PROCEDURE — 82570 ASSAY OF URINE CREATININE: CPT

## 2025-01-08 PROCEDURE — 82607 VITAMIN B-12: CPT

## 2025-01-08 PROCEDURE — 97161 PT EVAL LOW COMPLEX 20 MIN: CPT

## 2025-01-08 PROCEDURE — 83735 ASSAY OF MAGNESIUM: CPT

## 2025-01-08 PROCEDURE — 71045 X-RAY EXAM CHEST 1 VIEW: CPT

## 2025-01-08 PROCEDURE — 80053 COMPREHEN METABOLIC PANEL: CPT

## 2025-01-08 PROCEDURE — 80061 LIPID PANEL: CPT

## 2025-01-08 PROCEDURE — 84443 ASSAY THYROID STIM HORMONE: CPT

## 2025-01-08 PROCEDURE — 97530 THERAPEUTIC ACTIVITIES: CPT

## 2025-01-08 PROCEDURE — 99222 1ST HOSP IP/OBS MODERATE 55: CPT

## 2025-01-08 PROCEDURE — 82306 VITAMIN D 25 HYDROXY: CPT

## 2025-01-08 PROCEDURE — 0202U NFCT DS 22 TRGT SARS-COV-2: CPT

## 2025-01-08 PROCEDURE — 74176 CT ABD & PELVIS W/O CONTRAST: CPT

## 2025-01-08 PROCEDURE — 99223 1ST HOSP IP/OBS HIGH 75: CPT | Performed by: NURSE PRACTITIONER

## 2025-01-08 PROCEDURE — 2580000003 HC RX 258: Performed by: NURSE PRACTITIONER

## 2025-01-08 RX ORDER — LEVOTHYROXINE SODIUM 112 UG/1
112 TABLET ORAL DAILY
Status: ON HOLD | COMMUNITY
End: 2025-01-12 | Stop reason: HOSPADM

## 2025-01-08 RX ORDER — SODIUM CHLORIDE 9 MG/ML
INJECTION, SOLUTION INTRAVENOUS CONTINUOUS
Status: DISCONTINUED | OUTPATIENT
Start: 2025-01-08 | End: 2025-01-09

## 2025-01-08 RX ORDER — SODIUM CHLORIDE 9 MG/ML
INJECTION, SOLUTION INTRAVENOUS PRN
Status: DISCONTINUED | OUTPATIENT
Start: 2025-01-08 | End: 2025-01-12 | Stop reason: HOSPADM

## 2025-01-08 RX ORDER — GLUCAGON 1 MG/ML
1 KIT INJECTION PRN
Status: DISCONTINUED | OUTPATIENT
Start: 2025-01-08 | End: 2025-01-12 | Stop reason: HOSPADM

## 2025-01-08 RX ORDER — DILTIAZEM HYDROCHLORIDE 180 MG/1
180 CAPSULE, COATED, EXTENDED RELEASE ORAL 2 TIMES DAILY
Status: DISCONTINUED | OUTPATIENT
Start: 2025-01-08 | End: 2025-01-12 | Stop reason: HOSPADM

## 2025-01-08 RX ORDER — POTASSIUM CHLORIDE 1500 MG/1
20 TABLET, EXTENDED RELEASE ORAL DAILY
Status: DISCONTINUED | OUTPATIENT
Start: 2025-01-08 | End: 2025-01-12 | Stop reason: HOSPADM

## 2025-01-08 RX ORDER — ONDANSETRON 2 MG/ML
4 INJECTION INTRAMUSCULAR; INTRAVENOUS EVERY 6 HOURS PRN
Status: DISCONTINUED | OUTPATIENT
Start: 2025-01-08 | End: 2025-01-12 | Stop reason: HOSPADM

## 2025-01-08 RX ORDER — SODIUM CHLORIDE 0.9 % (FLUSH) 0.9 %
5-40 SYRINGE (ML) INJECTION EVERY 12 HOURS SCHEDULED
Status: DISCONTINUED | OUTPATIENT
Start: 2025-01-08 | End: 2025-01-12 | Stop reason: HOSPADM

## 2025-01-08 RX ORDER — SODIUM CHLORIDE 0.9 % (FLUSH) 0.9 %
5-40 SYRINGE (ML) INJECTION PRN
Status: DISCONTINUED | OUTPATIENT
Start: 2025-01-08 | End: 2025-01-12 | Stop reason: HOSPADM

## 2025-01-08 RX ORDER — CETIRIZINE HYDROCHLORIDE 10 MG/1
10 TABLET ORAL DAILY PRN
Status: DISCONTINUED | OUTPATIENT
Start: 2025-01-08 | End: 2025-01-12 | Stop reason: HOSPADM

## 2025-01-08 RX ORDER — 0.9 % SODIUM CHLORIDE 0.9 %
500 INTRAVENOUS SOLUTION INTRAVENOUS ONCE
Status: COMPLETED | OUTPATIENT
Start: 2025-01-08 | End: 2025-01-08

## 2025-01-08 RX ORDER — MECOBALAMIN 5000 MCG
5 TABLET,DISINTEGRATING ORAL EVERY EVENING
Status: DISCONTINUED | OUTPATIENT
Start: 2025-01-08 | End: 2025-01-12 | Stop reason: HOSPADM

## 2025-01-08 RX ORDER — ACETAMINOPHEN 650 MG/1
650 SUPPOSITORY RECTAL EVERY 6 HOURS PRN
Status: DISCONTINUED | OUTPATIENT
Start: 2025-01-08 | End: 2025-01-12 | Stop reason: HOSPADM

## 2025-01-08 RX ORDER — CALCIUM CARBONATE 500 MG/1
500 TABLET, CHEWABLE ORAL 3 TIMES DAILY PRN
Status: DISCONTINUED | OUTPATIENT
Start: 2025-01-08 | End: 2025-01-12 | Stop reason: HOSPADM

## 2025-01-08 RX ORDER — BENZONATATE 100 MG/1
100 CAPSULE ORAL 3 TIMES DAILY PRN
Status: DISCONTINUED | OUTPATIENT
Start: 2025-01-08 | End: 2025-01-12 | Stop reason: HOSPADM

## 2025-01-08 RX ORDER — HEPARIN SODIUM 5000 [USP'U]/ML
5000 INJECTION, SOLUTION INTRAVENOUS; SUBCUTANEOUS EVERY 8 HOURS SCHEDULED
Status: DISCONTINUED | OUTPATIENT
Start: 2025-01-08 | End: 2025-01-11

## 2025-01-08 RX ORDER — ONDANSETRON 4 MG/1
4 TABLET, ORALLY DISINTEGRATING ORAL EVERY 8 HOURS PRN
Status: DISCONTINUED | OUTPATIENT
Start: 2025-01-08 | End: 2025-01-12 | Stop reason: HOSPADM

## 2025-01-08 RX ORDER — LANOLIN ALCOHOL/MO/W.PET/CERES
1000 CREAM (GRAM) TOPICAL DAILY
Status: DISCONTINUED | OUTPATIENT
Start: 2025-01-08 | End: 2025-01-12 | Stop reason: HOSPADM

## 2025-01-08 RX ORDER — CITALOPRAM HYDROBROMIDE 10 MG/1
10 TABLET ORAL 2 TIMES DAILY
Status: DISCONTINUED | OUTPATIENT
Start: 2025-01-08 | End: 2025-01-12 | Stop reason: HOSPADM

## 2025-01-08 RX ORDER — ACETAMINOPHEN 325 MG/1
650 TABLET ORAL EVERY 6 HOURS PRN
Status: DISCONTINUED | OUTPATIENT
Start: 2025-01-08 | End: 2025-01-12 | Stop reason: HOSPADM

## 2025-01-08 RX ORDER — AMIODARONE HYDROCHLORIDE 200 MG/1
100 TABLET ORAL EVERY MORNING
Status: DISCONTINUED | OUTPATIENT
Start: 2025-01-08 | End: 2025-01-12 | Stop reason: HOSPADM

## 2025-01-08 RX ORDER — MECOBALAMIN 5000 MCG
5 TABLET,DISINTEGRATING ORAL NIGHTLY PRN
Status: DISCONTINUED | OUTPATIENT
Start: 2025-01-08 | End: 2025-01-12 | Stop reason: HOSPADM

## 2025-01-08 RX ORDER — FERROUS SULFATE 325(65) MG
325 TABLET ORAL
Status: DISCONTINUED | OUTPATIENT
Start: 2025-01-09 | End: 2025-01-12 | Stop reason: HOSPADM

## 2025-01-08 RX ORDER — POLYVINYL ALCOHOL 14 MG/ML
1 SOLUTION/ DROPS OPHTHALMIC PRN
Status: DISCONTINUED | OUTPATIENT
Start: 2025-01-08 | End: 2025-01-12 | Stop reason: HOSPADM

## 2025-01-08 RX ORDER — METOPROLOL SUCCINATE 50 MG/1
50 TABLET, EXTENDED RELEASE ORAL DAILY
COMMUNITY
End: 2025-01-24 | Stop reason: SDUPTHER

## 2025-01-08 RX ORDER — FERROUS SULFATE 325(65) MG
325 TABLET ORAL EVERY OTHER DAY
Status: DISCONTINUED | OUTPATIENT
Start: 2025-01-08 | End: 2025-01-08

## 2025-01-08 RX ORDER — METOPROLOL SUCCINATE 50 MG/1
50 TABLET, EXTENDED RELEASE ORAL DAILY
Status: DISCONTINUED | OUTPATIENT
Start: 2025-01-08 | End: 2025-01-12 | Stop reason: HOSPADM

## 2025-01-08 RX ORDER — POTASSIUM CHLORIDE 1500 MG/1
20 TABLET, EXTENDED RELEASE ORAL ONCE
Status: COMPLETED | OUTPATIENT
Start: 2025-01-08 | End: 2025-01-08

## 2025-01-08 RX ORDER — MAGNESIUM GLUCONATE 27 MG(500)
500 TABLET ORAL DAILY
COMMUNITY

## 2025-01-08 RX ORDER — DEXTROSE MONOHYDRATE 100 MG/ML
INJECTION, SOLUTION INTRAVENOUS CONTINUOUS PRN
Status: DISCONTINUED | OUTPATIENT
Start: 2025-01-08 | End: 2025-01-12 | Stop reason: HOSPADM

## 2025-01-08 RX ORDER — CHOLECALCIFEROL (VITAMIN D3) 50 MCG
2000 TABLET ORAL DAILY
Status: DISCONTINUED | OUTPATIENT
Start: 2025-01-08 | End: 2025-01-12 | Stop reason: HOSPADM

## 2025-01-08 RX ORDER — SODIUM CHLORIDE 9 MG/ML
INJECTION, SOLUTION INTRAVENOUS CONTINUOUS
Status: DISCONTINUED | OUTPATIENT
Start: 2025-01-08 | End: 2025-01-08

## 2025-01-08 RX ORDER — HYDROXYZINE HYDROCHLORIDE 25 MG/1
25 TABLET, FILM COATED ORAL 3 TIMES DAILY PRN
Status: DISCONTINUED | OUTPATIENT
Start: 2025-01-08 | End: 2025-01-12 | Stop reason: HOSPADM

## 2025-01-08 RX ORDER — FENTANYL CITRATE 50 UG/ML
25 INJECTION, SOLUTION INTRAMUSCULAR; INTRAVENOUS ONCE
Status: COMPLETED | OUTPATIENT
Start: 2025-01-08 | End: 2025-01-08

## 2025-01-08 RX ORDER — LEVOTHYROXINE SODIUM 112 UG/1
112 TABLET ORAL DAILY
Status: DISCONTINUED | OUTPATIENT
Start: 2025-01-08 | End: 2025-01-09

## 2025-01-08 RX ADMIN — FENTANYL CITRATE 25 MCG: 50 INJECTION INTRAMUSCULAR; INTRAVENOUS at 05:15

## 2025-01-08 RX ADMIN — SODIUM CHLORIDE: 9 INJECTION, SOLUTION INTRAVENOUS at 05:19

## 2025-01-08 RX ADMIN — FERROUS SULFATE TAB 325 MG (65 MG ELEMENTAL FE) 325 MG: 325 (65 FE) TAB at 11:52

## 2025-01-08 RX ADMIN — CITALOPRAM HYDROBROMIDE 10 MG: 20 TABLET ORAL at 20:34

## 2025-01-08 RX ADMIN — MEROPENEM 500 MG: 500 INJECTION INTRAVENOUS at 16:44

## 2025-01-08 RX ADMIN — CITALOPRAM HYDROBROMIDE 10 MG: 20 TABLET ORAL at 11:52

## 2025-01-08 RX ADMIN — DILTIAZEM HYDROCHLORIDE 180 MG: 180 CAPSULE, COATED, EXTENDED RELEASE ORAL at 20:34

## 2025-01-08 RX ADMIN — SODIUM CHLORIDE, PRESERVATIVE FREE 10 ML: 5 INJECTION INTRAVENOUS at 20:34

## 2025-01-08 RX ADMIN — POTASSIUM CHLORIDE 20 MEQ: 1500 TABLET, EXTENDED RELEASE ORAL at 13:39

## 2025-01-08 RX ADMIN — SODIUM CHLORIDE, PRESERVATIVE FREE 10 ML: 5 INJECTION INTRAVENOUS at 11:53

## 2025-01-08 RX ADMIN — SODIUM CHLORIDE 500 ML: 9 INJECTION, SOLUTION INTRAVENOUS at 05:34

## 2025-01-08 RX ADMIN — POTASSIUM CHLORIDE 20 MEQ: 1500 TABLET, EXTENDED RELEASE ORAL at 11:52

## 2025-01-08 RX ADMIN — MEROPENEM 1000 MG: 1 INJECTION INTRAVENOUS at 05:27

## 2025-01-08 RX ADMIN — Medication 2000 UNITS: at 11:51

## 2025-01-08 RX ADMIN — CYANOCOBALAMIN TAB 1000 MCG 1000 MCG: 1000 TAB at 11:51

## 2025-01-08 RX ADMIN — Medication 5 MG: at 20:34

## 2025-01-08 RX ADMIN — AMIODARONE HYDROCHLORIDE 100 MG: 200 TABLET ORAL at 13:40

## 2025-01-08 RX ADMIN — CEFEPIME 2000 MG: 2 INJECTION, POWDER, FOR SOLUTION INTRAVENOUS at 02:20

## 2025-01-08 RX ADMIN — VANCOMYCIN HYDROCHLORIDE 1250 MG: 1.25 INJECTION, POWDER, LYOPHILIZED, FOR SOLUTION INTRAVENOUS at 11:51

## 2025-01-08 RX ADMIN — SODIUM CHLORIDE: 9 INJECTION, SOLUTION INTRAVENOUS at 11:50

## 2025-01-08 RX ADMIN — ARFORMOTEROL TARTRATE: 15 SOLUTION RESPIRATORY (INHALATION) at 21:18

## 2025-01-08 RX ADMIN — Medication 500 MG: at 11:53

## 2025-01-08 RX ADMIN — HEPARIN SODIUM 5000 UNITS: 5000 INJECTION INTRAVENOUS; SUBCUTANEOUS at 16:51

## 2025-01-08 RX ADMIN — HEPARIN SODIUM 5000 UNITS: 5000 INJECTION INTRAVENOUS; SUBCUTANEOUS at 20:34

## 2025-01-08 RX ADMIN — LEVOTHYROXINE SODIUM 112 MCG: 0.11 TABLET ORAL at 11:52

## 2025-01-08 ASSESSMENT — PAIN SCALES - GENERAL
PAINLEVEL_OUTOF10: 0
PAINLEVEL_OUTOF10: 8

## 2025-01-08 NOTE — ED PROVIDER NOTES
[facility] EMERGENCY DEPARTMENT  EMERGENCY DEPARTMENT ENCOUNTER        Pt Name: Radha Dixon  MRN: [id number]  Birthdate 7/11/1934  Date of evaluation: 1/7/2025  Provider: Rashaad Gonzales DO  PCP: No primary care provider on file.  Note Started: 4:25 AM EST 1/8/25    CHIEF COMPLAINT       Chief Complaint   Patient presents with    Flank Pain     Patient complaining of pain in her back.  States that she thinks that she may have a kidney stone.  Tachycardic during triage.       HISTORY OF PRESENT ILLNESS: 1 or more Elements   History From: Patient    Limitations to history : None    Radha Dixon is a 89 y.o. female who presents to the emergency department for right flank pain that started earlier today.  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  Patient reports no difficulty urinating but does have burning with urination.  She had a temperature of 100.2 earlier today, took Tylenol just prior to arrival. Patient denies fever, headache, shortness of breath, chest pain, nausea, vomiting, diarrhea.    Nursing Notes were all reviewed and agreed with or any disagreements were addressed in the HPI.        REVIEW OF SYSTEMS :           Positives and Pertinent negatives as per HPI.     SURGICAL HISTORY     Past Surgical History:   Procedure Laterality Date    APPENDECTOMY  58 YEARS AGO    ARTHROPLASTY Left 03/29/2023    LEFT CARPAL TUNNEL RELEASE LEFT MEDIAN NERVE DECOMPRESSION AT ELBOW LEFT THUMB TRAPEZIECTOMY LIGAMENT RECONSTRUCTION TENDON INTERPOSITION WITH FIBERTAK performed by Chino Handley MD at [facility] OR    CARDIOVERSION  09/20/2019    Dr. Ledesma 1 shock 200 joules Successful    CARDIOVERSION  08/02/2021    Dr. Matias. 200J x1 converted to NSR    CHOLECYSTECTOMY      CYSTOSCOPY N/A 12/31/2024    CYSTOSCOPY BILATERAL RETROGRADE PYELOGRAM, RIGHT URETEROSCOPY, RIGHT URETERAL BIOPSIES, RIGHT URETERAL STENT INSERTION, PELVIC EXAM performed by

## 2025-01-08 NOTE — CARE COORDINATION
Spoke with patient, she is from home, her daughter, Tracie lives with her. Daughter assists with anything needed. Patient typically independent with bathing/dressing, etc. She does have both walker and rollator, mostly uses rollator but also has wheelchair if needed. PCP through [facility]. Initial plan is home with daughter support when released. She said she was considering hiring cleaning person one time a month. Offered list of aide agencies but patient already has some individuals in mind.     Case Management Assessment  Initial Evaluation    Date/Time of Evaluation: 1/8/2025 3:20 PM  Assessment Completed by: ALIYA Benz    If patient is discharged prior to next notation, then this note serves as note for discharge by case management.    Patient Name: Radha Dixon                   YOB: 1934  Diagnosis: Pyelonephritis [N12]                   Date / Time: 1/7/2025  8:50 PM    Patient Admission Status: Inpatient   Readmission Risk (Low < 19, Mod (19-27), High > 27): Readmission Risk Score: 19.1    Current PCP: No primary care provider on file.  PCP verified by CM? Yes    Chart Reviewed: Yes      History Provided by: Patient  Patient Orientation: Alert and Oriented    Patient Cognition: Alert    Hospitalization in the last 30 days (Readmission):  No    If yes, Readmission Assessment in CM Navigator will be completed.    Advance Directives:      Code Status: Full Code   Patient's Primary Decision Maker is: Legal Next of Kin    Primary Decision Maker: Tracie Camargo - Child - [phone]    Secondary Decision Maker: Genny Dexter - Child - [phone]    Discharge Planning:    Patient lives with: Children Type of Home: House  Primary Care Giver: Self  Patient Support Systems include: Children   Current Financial resources:    Current community resources:    Current services prior to admission: None            Current DME:              Type of Home Care services:   None    ADLS  Prior functional level: Assistance with the following:, Mobility, Shopping, Housework, Cooking  Current functional level: Assistance with the following:, Mobility, Shopping, Housework, Cooking    PT AM-PAC: 15 /24  OT AM-PAC: 19 /24    Family can provide assistance at DC: Yes  Would you like Case Management to discuss the discharge plan with any other family members/significant others, and if so, who? No  Plans to Return to Present Housing: Yes  Other Identified Issues/Barriers to RETURNING to current housing:   Potential Assistance needed at discharge: N/A            Potential DME:    Patient expects to discharge to: House  Plan for transportation at discharge:      Financial    Payor: [employer] MEDICARE / Plan: [employer] CHOICE-PPO MEDICARE / Product Type: *No Product type* /     Does insurance require precert for SNF: Yes    Potential assistance Purchasing Medications:    Meds-to-Beds request: Yes      [id number] - [place], [place] - [address] - P [phone] - F [phone]  [address] [address]  Phone: [phone] Fax: [phone]    [facility] [address] [place], [place] - [address] - P [phone] - F [phone]  [address]  [facility] [address]  Phone: [phone] Fax: [phone]    [id number] - [place], [place] - [address] - P [phone] - F [phone]  [address] [place] [address]  Phone: [phone] Fax: [phone]      Notes:    Factors facilitating achievement of predicted outcomes: Family support, Motivated, Cooperative, Pleasant, and Has needed Durable Medical Equipment at home    Barriers to discharge: Upper extremity weakness and Lower extremity weakness    Additional Case Management Notes:     The Plan for Transition of Care is related to the following treatment goals of Pyelonephritis [N12]    IF APPLICABLE: The Patient and/or patient representative Radha and her family were provided with a choice

## 2025-01-08 NOTE — PLAN OF CARE
Problem: Chronic Conditions and Co-morbidities  Goal: Patient's chronic conditions and co-morbidity symptoms are monitored and maintained or improved  Outcome: Progressing     Problem: Discharge Planning  Goal: Discharge to home or other facility with appropriate resources  Outcome: Progressing     Problem: Pain  Goal: Verbalizes/displays adequate comfort level or baseline comfort level  Outcome: Progressing     Problem: ABCDS Injury Assessment  Goal: Absence of physical injury  Outcome: Progressing     Problem: Genitourinary - Adult  Goal: Absence of urinary retention  Outcome: Progressing  Goal: Urinary catheter remains patent  Outcome: Progressing     Problem: Infection - Adult  Goal: Absence of infection at discharge  Outcome: Progressing  Goal: Absence of infection during hospitalization  Outcome: Progressing  Goal: Absence of fever/infection during anticipated neutropenic period  Outcome: Progressing     Problem: Hematologic - Adult  Goal: Maintains hematologic stability  Outcome: Progressing

## 2025-01-08 NOTE — PROGRESS NOTES
Pharmacy Note - Extended Infusion Beta-Lactam Adjustment    Meropenem1 g load followed by 1g Q8h for treatment of Urinary tract infection. Per [facility] Extended Infusion Beta-Lactam Policy, meropenem will be changed to 1000mg load followed by 500mg Q12h extended infusion x 5 days    Estimated Creatinine Clearance: Estimated Creatinine Clearance: 25 mL/min (A) (based on SCr of 1.5 mg/dL (H)).    BMI: Body mass index is 24.33 kg/m².    Please call with any questions.    Thank you,    Hawa Morrow, [facility]

## 2025-01-08 NOTE — PROGRESS NOTES
Occupational Therapy  OCCUPATIONAL THERAPY INITIAL EVALUATION    [facility]  [address] [place], [place]      Date:2025                                                Patient Name: Radha Dixon  MRN: [id number]  : 1934  Room: [address] [place] [address]    Evaluating OT: Merlin Bennett OTR/L #[id number]     Referring Provider: Clementine Arreola APRN - NP   Specific Provider Orders/Date: OT eval and treat 24    Diagnosis: Pyelonephritis [N12]   Pt admitted to hospital with flank pain; BLE weakness. Pt with recent ureter stent by Dr. Comer     Pertinent Medical History:  has a past medical history of Asthma, Atrial fibrillation, persistent (HCC), Depression, Generalized osteoarthritis, Hyperlipidemia, Knee pain, Pinched nerve in neck, Prolonged emergence from general anesthesia, Thyroid disease, and Vitamin D deficiency.       Precautions:  Fall Risk, hx orthostatic hypotension     Assessment of current deficits    [x] Functional mobility  [x]ADLs  [x] Strength               []Cognition    [x] Functional transfers   [x] IADLs         [x] Safety Awareness   [x]Endurance    [] Fine Coordination              [x] Balance      [] Vision/perception   []Sensation     []Gross Motor Coordination  [] ROM  [] Delirium                   [] Motor Control     OT PLAN OF CARE   OT POC based on physician orders, patient diagnosis and results of clinical assessment    Frequency/Duration 1-5 days/wk for 2 weeks PRN   Specific OT Treatment Interventions to include:   * Instruction/training on adapted ADL techniques and AE recommendations to increase functional independence within precautions       * Training on energy conservation strategies, correct breathing pattern and techniques to improve independence/tolerance for self-care routine  * Functional transfer/mobility training/DME recommendations for increased independence, safety, and fall prevention  * Patient/Family

## 2025-01-08 NOTE — H&P
Hospitalist History & Physical      PCP: No primary care provider on file.    Date of Service: Pt seen/examined on 1/8/2025     Chief Complaint:  had concerns including Flank Pain (Patient complaining of pain in her back.  States that she thinks that she may have a kidney stone.  Tachycardic during triage.).    History of Present Illness:    Ms. Radha Dixon, a 89 y.o. year old female  who  has a past medical history of Asthma, Atrial fibrillation, persistent (HCC), Depression, Generalized osteoarthritis, Hyperlipidemia, Knee pain, Pinched nerve in neck, Prolonged emergence from general anesthesia, Thyroid disease, and Vitamin D deficiency.     Patient presented to the ED with complaints of right flank pain beginning hours prior to arrival.  She is a difficult historian but reports this has been ongoing x 1 year but much worse today.   She also endorses suprapubic discomfort, a low-grade fever of 100.2 for which she took Tylenol, an episode of vomiting 1 day ago, BLE weakness x 2 days, and sinus congestion and cough that she's \"not worried about.\"   She reports Dr. Comer put a stent in her kidney 1-2 weeks ago and found a tumor which he biopsied. She was supposed to follow up with him this week but ended up sick. She denies any chest pain, palpitations, shortness of breath, unintentional weight loss.      ER COURSE:  On arrival to ED she was tachycardic and intermittently hypotensive.  Laboratory workup significant for renal insufficiency, leukocytosis, microcytic anemia.  UA consistent with hematuria.  Urine and blood cultures collected and pending.  CTAP with mild right hydronephrosis and perinephritic/periureteral stranding concerning for pyelonephritis she was given 2.5 L IV fluid, meropenem, and cefepime in ED.    PREVIOUS HOSPITALIZATION:  Admission from 10/7 - 10/12 reviewed.  Patient admitted and treated for acute hypoxic respiratory failure secondary to COPD and CHF exacerbation.    She also had  UNIT TABS Take 400 Units by mouth daily    Provider, MD [place]       Allergies:  Codeine, Codimal-a [brompheniramine], Lipitor, Spiriva handihaler [tiotropium bromide monohydrate], Tudorza pressair [aclidinium bromide], and Ciprofloxacin    Social History:    RESIDENCE: Private   TOBACCO:   reports that she quit smoking about 30 years ago. Her smoking use included cigarettes. She started smoking about 70 years ago. She has never used smokeless tobacco.  ETOH:   reports no history of alcohol use.    Family History:          Problem Relation Age of Onset    Melanoma Mother 60    Breast Cancer Daughter 40        invasive, recurrence age 58       Review of Systems:  All bolded are positive; please see HPI  General:  Fever, chills, diaphoresis, fatigue, malaise, night sweats, weight loss  Psychological:  Anxiety, disorientation, hallucinations.  ENT:  Epistaxis, headaches, vertigo, visual changes, sinus congestion.  Cardiovascular:  Chest pain, irregular heartbeats, palpitations, orthopnea, VASQUES  Respiratory:  Shortness of breath, coughing, sputum production, hemoptysis, wheezing  Gastrointestinal:  Nausea, vomiting, diarrhea, heartburn, constipation, abdominal pain, hematemesis, hematochezia, melena  Genito-Urinary:  Dysuria, urgency, frequency, hematuria, flank pain  Musculoskeletal:  Joint pain, joint stiffness, joint swelling, muscle pain, back pain  Neurology:  Headache, focal neurological deficits, weakness, numbness, paresthesia, dizziness, lightheadedness   Derm:  Rashes, ulcers, excoriations, bruising  Extremities:  Decreased ROM, peripheral edema, mottling    Physical Exam:  BP 94/63   Pulse 58   Temp 98.3 °F (36.8 °C) (Temporal)   Resp 16   Ht 1.727 m (5' 8\")   Wt 72.6 kg (160 lb)   SpO2 95%   BMI 24.33 kg/m²   General appearance: Loquacious elderly female in no apparent distress, appears stated age and cooperative.  HEENT: Conjunctivae/corneas clear. Mucous membranes moist.  Respiratory:  Fine

## 2025-01-08 NOTE — CONSULTS
Palliative Care Department  [phone]  Palliative Care Initial Consult  Provider Delia Geiger, APRN - CNP      PATIENT: Radha Dixon  : 1934  MRN: [id number]  ADMISSION DATE: 2025  8:50 PM  Referring Provider:  Adryan Almanzar MD    Palliative Medicine was consulted on hospital day 0 for assistance with Goals of care, Code Status Discussion  HPI:     Clinical Summary:Rahda Dixon is a 89 y.o. y/o female with a history of asthma, persistent A-fib, osteoarthritis, hyperlipidemia, thyroid disease, vitamin D deficiency, recent ureteral stent, recent hospitalization in 2020 for after she was treated for acute respiratory failure secondary to COPD, CHF exacerbation, and bilateral pleural effusion s/p thoracentesis who presented to [facility] on 2025 with complaints of back and flank pain.  Significant laboratory finding in the emergency room showed renal insufficiency, leukocytosis, macrocytic anemia, UA positive for hematuria.  CTAP showed mild right hydronephrosis and perinephritic/periureteral stranding concerning for pyelonephritis    ASSESSMENT/PLAN:     Pertinent Hospital Diagnoses     Pyelonephritis  Hematuria  Leukocytosis  SP ureteral stent      Palliative Care Encounter / Counseling Regarding Goals of Care  Please see detailed goals of care discussion as below  At this time, Radha Dixon, Does have capacity for medical decision-making.  Capacity is time limited and situation/question specific  Outcome of goals of care meeting:  Continue with current medical treatment  CODE STATUS discussed and changed to limited no chest compression, ventilation, no defibrillation, no meds  Living will and general POA documents noted in system  Goal at discharge to return home with daughter Lisa.    Code status Limited DNI  Advanced Directives: General POA and living will in [facility]  Surrogate/Legal NOK:  Tracie Camargo (Daugther) [phone]  Genny Dexter (Daugther)

## 2025-01-08 NOTE — CONSULTS
31.1* 28.5*   RDW 22.3* 22.5*   MPV Can not be calculated Can not be calculated         Recent Labs     01/07/25  2145 01/08/25  0948   CREATININE 1.5* 1.4*       No results found for: \"PSA\"        Path Number: [id number]    -- Diagnosis --  A.          Right Ureteral Tumor Biopsy:         NONINVASIVE PAPILLARY UROTHELIAL CARCINOMA, LOW GRADE.  Muscularis propria is not present.  No evidence of lymphovascular or perineural invasions.    Comment:  Intradepartmental consultation obtained.         Jean Isabel MD  **Electronically Signed Out**        zw/1/6/2025     Clinical Information  Procedure:  Cystoscopy bilateral retrograde pyelogram, right  ureteroscopy, right uretal biopsy, right ureteral stent insertion,  pelvic exam    Pre-Operative Diagnosis  Hydronephrosis, unspecified hydronephrosis type, female stress  incontinence    Post Operative Diagnosis  Hydronephrosis, unspecified hydronephrosis type, female stress  incontinence    Source of Specimen  A: RIGHT URETERAL TUMOR BIOPSY      Gross Description  Time of fixation is greater than 6 hours and less than 72 hours for  all specimen(s) submitted. Cold ischemic time is less than 1 hour for  all specimen(s) submitted.    The specimen is received in buffered formalin labeled \"Silvia Dixon,  right ureteral tumor biopsy\" and consists of two minute tissue specks,  less than 0.1 and 0.2 cm. Entirely submitted in A1.    Ladarius George/ks:1/2/2025  Microscopic Description  Was performed.    Processing Lab:  [facility], [address] [place] [id number]  Interpretation Performed at [address] [id number]    SURGICAL PATHOLOGY CONSULTATION       Patient Name: SILVIA DIXON  [facility] Rec: [id number]  [address] [address] [phone]             Imaging:   XAMINATION:  CT OF THE ABDOMEN AND PELVIS WITHOUT CONTRAST 1/8/2025 1:54  vascular  calcification.     PELVIS: Distended urinary bladder.  Uterus is absent.     BONES: Degenerative changes of the hips and spine.  Narrowing of the thecal  sac.     ADDITIONAL FINDINGS: None.     IMPRESSION:  Right ureteral stent extending from extrarenal pelvis into the urinary  bladder. Mild right hydronephrosis and perinephric/periureteral stranding.  Cannot exclude possibility of ascending urinary tract infection.     Tiny 2 mm calcific density adjacent to the stent in the distal ureter could  reflect a tiny calculus within the ureter or could reflect an adjacent  vascular calcification.     Extensive colonic diverticulosis.     Additional observations as above.            Assessment/plan:  POD #8--cystoscopy, bilateral retrograde pyelogram, right semirigid ureteroscopy, right mid ureteral tumor biopsy, right stent insertion  Right pyelonephritis  UTI    Continue to watch the creatinine  UA reviewed  Urine and blood cultures pending  Antibiotics per primary  Check PVR  CT abdomen pelvis reviewed shows right-sided hydronephrosis with a right ureteral stent extending from the renal pelvis into the bladder  Pathology from her right ureteral tumor biopsy that was done about 8 days ago showed low-grade noninvasive papillary urothelial carcinoma  May benefit from ureteroscopy with laser tumor debulking in the near future and every 2 to 3 months with possible Jelmyto instillation versus potential right radical nephrectomy which may be difficult given her age and comorbidities  We will continue to follow      Electronically signed by SONY Wei CNP on 1/8/2025 at 4:16 PM  [facility]     I saw and examined the patient with the Advanced Practice Provider, and verified all elements of the note. Any corrections have been made as necessary.    Patient is doing well today. She is concerned her daughter, who is her primary help at home, may get admitted with COVID. She expresses desire to remain inpatient

## 2025-01-08 NOTE — PROGRESS NOTES
Urology consult sent to [facility] urology's answering service. Marjorie Scott NP is taking calls

## 2025-01-08 NOTE — PROGRESS NOTES
Physical Therapy  Initial Assessment     Name: Radha Dixon  : 1934  MRN: [id number]      Date of Service: 2025    Evaluating PT: Kaleb Bran, PT, DPT [id number]      Room #:  6417/6417-A  Diagnosis:  Pyelonephritis [N12]  PMHx/PSHx:   has a past medical history of Asthma, Atrial fibrillation, persistent (HCC), Depression, Generalized osteoarthritis, Hyperlipidemia, Knee pain, Pinched nerve in neck, Prolonged emergence from general anesthesia, Thyroid disease, and Vitamin D deficiency.  Procedure/Surgery:  NA  Precautions:  Fall risk  Equipment Needs:  NA    SUBJECTIVE:    Pt lives with daughter in a 1 story house with 4 stair(s) and 1 rail(s) to enter. Pt ambulated with rollator prior to admission.    OBJECTIVE:   Initial Evaluation  Date: 25 Treatment Date: Short Term/ Long Term   Goals   AM-PAC 6 Clicks 15/24     Was pt agreeable to Eval/treatment? Yes     Does pt have pain? No complaints of pain     Bed Mobility  Rolling: NT  Supine to sit: SBA  Sit to supine: SBA  Scooting: SBA  Rolling: Independent   Supine to sit: Independent   Sit to supine: Independent   Scooting: Independent    Transfers Sit to stand: Marco  Stand to sit: Marco  Stand pivot: NT  Sit to stand: Independent   Stand to sit: Independent   Stand pivot: Mod Independent with WW   Ambulation   Sidestepped 3 feet with WW with Marco -- declined further ambulation due to weakness  >100 feet with WW Mod Independent    Stair negotiation: ascended and descended NT  4 step(s) with 1 rail(s) Mod Independent    ROM BUE: Refer to OT note  BLE: WFL     Strength BUE: Refer to OT note  BLE: WFL     Balance Sitting EOB: SBA  Dynamic Standing: Marco with WW  Sitting EOB: Independent   Dynamic Standing: Mod Independent with WW     Pt is A & O x: 4 to person, place, month/year, and situation.   Sensation: Denies numbness and tingling of extremities.   Edema: Unremarkable     Patient education  Pt educated on PT role in acute care setting.    Patient  instructions for next treatment:  Progress activity    Current Treatment Recommendations:     [x] Strengthening to improve independence with functional mobility   [] ROM to improve independence with functional mobility   [x] Balance Training to improve static/dynamic balance and to reduce fall risk  [x] Endurance Training to improve activity tolerance during functional mobility   [x] Transfer Training to improve safety and independence with all functional transfers   [x] Gait Training to improve gait mechanics, endurance and assess need for appropriate assistive device  [x] Stair Training in preparation for safe discharge home and/or into the community   [] Positioning to prevent skin breakdown and contractures  [x] Safety and Education Training   [x] Patient/Caregiver Education   [] HEP  [] Other     PT long term treatment goals are located in above grid    Frequency of treatments: 2-5x/week x 1-2 weeks.    Time in  1400  Time out  1425    Total Treatment Time  8 minutes     Evaluation Time includes thorough review of current medical information, gathering information on past medical history/social history and prior level of function, completion of standardized testing/informal observation of tasks, assessment of data and education on plan of care and goals.    CPT codes:  [x] Low Complexity PT evaluation 37535  [] Moderate Complexity PT evaluation 24957  [] High Complexity PT evaluation 99314  [] PT Re-evaluation 88563  [] Gait training 57130 0 minutes  [] Manual therapy 63615 0 minutes  [x] Therapeutic activities 74151 8 minutes  [] Therapeutic exercises 01421 0 minutes  [] Neuromuscular reeducation 22716 0 minutes     Kaleb Bran, PT, DPT  [id number]

## 2025-01-08 NOTE — PROGRESS NOTES
4 Eyes Skin Assessment     NAME:  Radha Dixon  YOB: 1934  MEDICAL RECORD NUMBER:  [id number]    The patient is being assessed for  Admission    I agree that at least one RN has performed a thorough Head to Toe Skin Assessment on the patient. ALL assessment sites listed below have been assessed.      Areas assessed by both nurses:    Head, Face, Ears, Shoulders, Back, Chest, Arms, Elbows, Hands, Sacrum. Buttock, Coccyx, Ischium, Legs. Feet and Heels, and Under Medical Devices         Does the Patient have a Wound? No noted wound(s)       Broderick Prevention initiated by RN: Yes  Wound Care Orders initiated by RN: No    Pressure Injury (Stage 3,4, Unstageable, DTI, NWPT, and Complex wounds) if present, place Wound referral order by RN under : No    New Ostomies, if present place, Ostomy referral order under : No     Nurse 1 eSignature: Electronically signed by Brigida Espinoza RN on 1/8/25 at 1:04 PM EST    **SHARE this note so that the co-signing nurse can place an eSignature**    Nurse 2 eSignature: Electronically signed by Jodee Cordero RN on 1/8/25 at 1:12 PM EST   No

## 2025-01-08 NOTE — ACP (ADVANCE CARE PLANNING)
Advance Care Planning   Healthcare Decision Maker:    Primary Decision Maker: Tracie Camargo - Child - [phone]    Secondary Decision Maker: Genny Dexter - Child - [phone]    Click here to complete Healthcare Decision Makers including selection of the Healthcare Decision Maker Relationship (ie \"Primary\").

## 2025-01-08 NOTE — PROGRESS NOTES
New consult sent via [facility] [place] 2[place] serve to palliative care to discuss code status.  Emerald Corey RN

## 2025-01-08 NOTE — PROGRESS NOTES
Pharmacy Consultation Note  (Antibiotic Dosing and Monitoring)    Initial consult date: 1/8/25  Consulting physician/provider: SONY Arreola  Drug: Vancomycin  Indication: UTI    Age/  Gender Height Weight IBW  Allergy Information   89 y.o./female 172.7 cm (5' 8\") 72.6 kg (160 lb)     Ideal body weight: 63.9 kg (140 lb 14 oz)  Adjusted ideal body weight: 67.4 kg (148 lb 8.4 oz)   Codeine, Codimal-a [brompheniramine], Lipitor, Spiriva handihaler [tiotropium bromide monohydrate], Tudorza pressair [aclidinium bromide], and Ciprofloxacin      Renal Function:  Recent Labs     01/07/25 2145   BUN 32*   CREATININE 1.5*     No intake or output data in the 24 hours ending 01/08/25 0929    CBC:  Lab Results   Component Value Date/Time    WBC 15.3 01/07/2025 09:45 PM       Vitals:    01/08/25 0903   BP: 95/72   Pulse: 71   Resp: 20   Temp: 98 °F (36.7 °C)   SpO2: 98%       Vancomycin Monitoring:  Trough:  No results for input(s): \"VANCOTROUGH\" in the last 72 hours.  Random:  No results for input(s): \"VANCORANDOM\" in the last 72 hours.    Microbiology:  Results       Procedure Component Value Units Date/Time    Blood Culture 1 [[id number]] Collected: 01/07/25 2145    Order Status: Completed Specimen: Blood Updated: 01/07/25 2313     Specimen Description .BLOOD     Special Requests          Culture NO GROWTH <24 HRS    Culture, Blood 2 [[id number]] Collected: 01/07/25 2145    Order Status: Completed Specimen: Blood Updated: 01/07/25 2324     Specimen Description .BLOOD     Special Requests          Culture NO GROWTH <24 HRS    Culture, Urine [[id number]] Collected: 01/07/25 2145    Order Status: Sent Specimen: Urine, clean catch Updated: 01/07/25 2247            Vancomycin Administration Times:  Recent vancomycin administrations        No vancomycin IV orders with administrations found.            Orders not given:            vancomycin (VANCOCIN) 1,250 mg in sodium chloride 0.9 % 250 mL IVPB ([id number])    vancomycin (VANCOCIN)

## 2025-01-09 LAB
25(OH)D3 SERPL-MCNC: 26.8 NG/ML (ref 30–100)
ANION GAP SERPL CALCULATED.3IONS-SCNC: 13 MMOL/L (ref 7–16)
BASOPHILS # BLD: 0.02 K/UL (ref 0–0.2)
BASOPHILS NFR BLD: 0 % (ref 0–2)
BUN SERPL-MCNC: 21 MG/DL (ref 6–23)
CALCIUM SERPL-MCNC: 8.5 MG/DL (ref 8.6–10.2)
CHLORIDE SERPL-SCNC: 104 MMOL/L (ref 98–107)
CO2 SERPL-SCNC: 20 MMOL/L (ref 22–29)
CREAT SERPL-MCNC: 1.2 MG/DL (ref 0.5–1)
DATE LAST DOSE: NORMAL
EOSINOPHIL # BLD: 0.11 K/UL (ref 0.05–0.5)
EOSINOPHILS RELATIVE PERCENT: 1 % (ref 0–6)
ERYTHROCYTE [DISTWIDTH] IN BLOOD BY AUTOMATED COUNT: 22.4 % (ref 11.5–15)
GFR, ESTIMATED: 43 ML/MIN/1.73M2
GLUCOSE SERPL-MCNC: 89 MG/DL (ref 74–99)
HCT VFR BLD AUTO: 33.3 % (ref 34–48)
HGB BLD-MCNC: 9.7 G/DL (ref 11.5–15.5)
IMM GRANULOCYTES # BLD AUTO: 0.05 K/UL (ref 0–0.58)
IMM GRANULOCYTES NFR BLD: 1 % (ref 0–5)
LYMPHOCYTES NFR BLD: 0.56 K/UL (ref 1.5–4)
LYMPHOCYTES RELATIVE PERCENT: 7 % (ref 20–42)
MCH RBC QN AUTO: 24.2 PG (ref 26–35)
MCHC RBC AUTO-ENTMCNC: 29.1 G/DL (ref 32–34.5)
MCV RBC AUTO: 83 FL (ref 80–99.9)
MICROORGANISM SPEC CULT: NO GROWTH
MONOCYTES NFR BLD: 0.71 K/UL (ref 0.1–0.95)
MONOCYTES NFR BLD: 9 % (ref 2–12)
NEUTROPHILS NFR BLD: 81 % (ref 43–80)
NEUTS SEG NFR BLD: 6.27 K/UL (ref 1.8–7.3)
PLATELET, FLUORESCENCE: 171 K/UL (ref 130–450)
PMV BLD AUTO: 10.8 FL (ref 7–12)
POTASSIUM SERPL-SCNC: 3.5 MMOL/L (ref 3.5–5)
PROCALCITONIN SERPL-MCNC: 0.09 NG/ML (ref 0–0.08)
PROCALCITONIN SERPL-MCNC: 0.14 NG/ML (ref 0–0.08)
RBC # BLD AUTO: 4.01 M/UL (ref 3.5–5.5)
RBC # BLD: ABNORMAL 10*6/UL
SERVICE CMNT-IMP: NORMAL
SODIUM SERPL-SCNC: 137 MMOL/L (ref 132–146)
SPECIMEN DESCRIPTION: NORMAL
TME LAST DOSE: NORMAL H
VANCOMYCIN DOSE: NORMAL MG
VANCOMYCIN SERPL-MCNC: 7.3 UG/ML (ref 5–40)
WBC OTHER # BLD: 7.7 K/UL (ref 4.5–11.5)

## 2025-01-09 PROCEDURE — 6360000002 HC RX W HCPCS: Performed by: NURSE PRACTITIONER

## 2025-01-09 PROCEDURE — 2500000003 HC RX 250 WO HCPCS: Performed by: INTERNAL MEDICINE

## 2025-01-09 PROCEDURE — 80202 ASSAY OF VANCOMYCIN: CPT

## 2025-01-09 PROCEDURE — 2500000003 HC RX 250 WO HCPCS

## 2025-01-09 PROCEDURE — 84145 PROCALCITONIN (PCT): CPT

## 2025-01-09 PROCEDURE — 99232 SBSQ HOSP IP/OBS MODERATE 35: CPT | Performed by: INTERNAL MEDICINE

## 2025-01-09 PROCEDURE — 6360000002 HC RX W HCPCS

## 2025-01-09 PROCEDURE — 6360000002 HC RX W HCPCS: Performed by: INTERNAL MEDICINE

## 2025-01-09 PROCEDURE — 94640 AIRWAY INHALATION TREATMENT: CPT

## 2025-01-09 PROCEDURE — 2580000003 HC RX 258: Performed by: NURSE PRACTITIONER

## 2025-01-09 PROCEDURE — 97530 THERAPEUTIC ACTIVITIES: CPT

## 2025-01-09 PROCEDURE — 2140000000 HC CCU INTERMEDIATE R&B

## 2025-01-09 PROCEDURE — 85025 COMPLETE CBC W/AUTO DIFF WBC: CPT

## 2025-01-09 PROCEDURE — 80048 BASIC METABOLIC PNL TOTAL CA: CPT

## 2025-01-09 PROCEDURE — 6370000000 HC RX 637 (ALT 250 FOR IP): Performed by: NURSE PRACTITIONER

## 2025-01-09 PROCEDURE — 6370000000 HC RX 637 (ALT 250 FOR IP): Performed by: INTERNAL MEDICINE

## 2025-01-09 PROCEDURE — 6370000000 HC RX 637 (ALT 250 FOR IP)

## 2025-01-09 PROCEDURE — 36415 COLL VENOUS BLD VENIPUNCTURE: CPT

## 2025-01-09 PROCEDURE — 97535 SELF CARE MNGMENT TRAINING: CPT

## 2025-01-09 RX ORDER — GUAIFENESIN/DEXTROMETHORPHAN 100-10MG/5
5 SYRUP ORAL EVERY 4 HOURS PRN
Status: DISCONTINUED | OUTPATIENT
Start: 2025-01-09 | End: 2025-01-12 | Stop reason: HOSPADM

## 2025-01-09 RX ADMIN — POTASSIUM CHLORIDE 20 MEQ: 1500 TABLET, EXTENDED RELEASE ORAL at 08:56

## 2025-01-09 RX ADMIN — DILTIAZEM HYDROCHLORIDE 180 MG: 180 CAPSULE, COATED, EXTENDED RELEASE ORAL at 09:04

## 2025-01-09 RX ADMIN — AMIODARONE HYDROCHLORIDE 100 MG: 200 TABLET ORAL at 08:56

## 2025-01-09 RX ADMIN — HEPARIN SODIUM 5000 UNITS: 5000 INJECTION INTRAVENOUS; SUBCUTANEOUS at 14:15

## 2025-01-09 RX ADMIN — Medication 500 MG: at 09:05

## 2025-01-09 RX ADMIN — METOPROLOL SUCCINATE 50 MG: 50 TABLET, EXTENDED RELEASE ORAL at 08:57

## 2025-01-09 RX ADMIN — WATER 1000 MG: 1 INJECTION INTRAMUSCULAR; INTRAVENOUS; SUBCUTANEOUS at 12:27

## 2025-01-09 RX ADMIN — CYANOCOBALAMIN TAB 1000 MCG 1000 MCG: 1000 TAB at 08:55

## 2025-01-09 RX ADMIN — LEVOTHYROXINE SODIUM 112 MCG: 0.11 TABLET ORAL at 09:05

## 2025-01-09 RX ADMIN — HEPARIN SODIUM 5000 UNITS: 5000 INJECTION INTRAVENOUS; SUBCUTANEOUS at 09:04

## 2025-01-09 RX ADMIN — HEPARIN SODIUM 5000 UNITS: 5000 INJECTION INTRAVENOUS; SUBCUTANEOUS at 20:10

## 2025-01-09 RX ADMIN — DILTIAZEM HYDROCHLORIDE 180 MG: 180 CAPSULE, COATED, EXTENDED RELEASE ORAL at 20:09

## 2025-01-09 RX ADMIN — MEROPENEM 500 MG: 500 INJECTION INTRAVENOUS at 03:55

## 2025-01-09 RX ADMIN — FERROUS SULFATE TAB 325 MG (65 MG ELEMENTAL FE) 325 MG: 325 (65 FE) TAB at 08:57

## 2025-01-09 RX ADMIN — Medication 2000 UNITS: at 08:57

## 2025-01-09 RX ADMIN — CITALOPRAM HYDROBROMIDE 10 MG: 20 TABLET ORAL at 20:09

## 2025-01-09 RX ADMIN — ACETAMINOPHEN 650 MG: 325 TABLET ORAL at 20:09

## 2025-01-09 RX ADMIN — Medication 5 MG: at 19:22

## 2025-01-09 RX ADMIN — CITALOPRAM HYDROBROMIDE 10 MG: 20 TABLET ORAL at 08:57

## 2025-01-09 RX ADMIN — ARFORMOTEROL TARTRATE: 15 SOLUTION RESPIRATORY (INHALATION) at 20:36

## 2025-01-09 RX ADMIN — SODIUM CHLORIDE, PRESERVATIVE FREE 10 ML: 5 INJECTION INTRAVENOUS at 22:01

## 2025-01-09 RX ADMIN — ARFORMOTEROL TARTRATE: 15 SOLUTION RESPIRATORY (INHALATION) at 08:19

## 2025-01-09 ASSESSMENT — PAIN DESCRIPTION - ORIENTATION: ORIENTATION: OTHER (COMMENT)

## 2025-01-09 ASSESSMENT — PAIN DESCRIPTION - DESCRIPTORS: DESCRIPTORS: ACHING;DISCOMFORT;DULL

## 2025-01-09 ASSESSMENT — PAIN SCALES - GENERAL
PAINLEVEL_OUTOF10: 0
PAINLEVEL_OUTOF10: 3
PAINLEVEL_OUTOF10: 0

## 2025-01-09 ASSESSMENT — PAIN DESCRIPTION - LOCATION: LOCATION: GENERALIZED

## 2025-01-09 ASSESSMENT — PAIN - FUNCTIONAL ASSESSMENT: PAIN_FUNCTIONAL_ASSESSMENT: ACTIVITIES ARE NOT PREVENTED

## 2025-01-09 NOTE — PROGRESS NOTES
Occupational Therapy  OCCUPATIONAL THERAPY BEDSIDE TREATMENT NOTE   [facility] [address] [facility]     Date:2025  Patient Name: Radha Dixon  MRN: [id number]  : 1934  Room: [address] [place] [address]     Evaluating OT: Merlin Bennett OTR/L #[id number]      Referring Provider: Clementine Arreola APRN - NP   Specific Provider Orders/Date: OT eval and treat 24     Diagnosis: Pyelonephritis [N12]   Pt admitted to hospital with flank pain; BLE weakness. Pt with recent ureter stent by Dr. Comer      Pertinent Medical History:  has a past medical history of Asthma, Atrial fibrillation, persistent (HCC), Depression, Generalized osteoarthritis, Hyperlipidemia, Knee pain, Pinched nerve in neck, Prolonged emergence from general anesthesia, Thyroid disease, and Vitamin D deficiency.      Precautions:  Fall Risk, hx orthostatic hypotension      Assessment of current deficits    [x] Functional mobility          [x]ADLs           [x] Strength                  []Cognition    [x] Functional transfers        [x] IADLs         [x] Safety Awareness   [x]Endurance    [] Fine Coordination                        [x] Balance      [] Vision/perception   []Sensation      []Gross Motor Coordination            [] ROM           [] Delirium                   [] Motor Control      OT PLAN OF CARE   OT POC based on physician orders, patient diagnosis and results of clinical assessment     Frequency/Duration 1-5 days/wk for 2 weeks PRN   Specific OT Treatment Interventions to include:   * Instruction/training on adapted ADL techniques and AE recommendations to increase functional independence within precautions       * Training on energy conservation strategies, correct breathing pattern and techniques to improve independence/tolerance for self-care routine  * Functional transfer/mobility training/DME recommendations for increased independence, safety, and fall prevention  *  while in sitting.    Modified [place]    LB Dressing Minimal Assist   SBA with increased time    To atiya/doff socks sitting up in the chair.    Modified [place]    Bathing Minimal Assist SBA    Simulated with  increased time to complete.     Modified [place]    Toileting Minimal Assist  Sup   Modified [place]    Bed Mobility  Supine to sit: Stand by Assist   Sit to supine: Stand by Assist   SBA;    Increased time due to light headed.  Supine to sit: Modified [place]   Sit to supine: Modified [place]    Functional Transfers Minimal Assist   SBA;    To transfer on/off surfaces with verbal prompting for safety.   Modified [place]    Functional Mobility Minimal Assist      Several steps with w/w (limited due to reported weakness)  SBA with w/w    Pt showing improvement in ambulation however c/o pain/weakness in thighs when walking and needing to pause before returning to chair at bedside.    Modified [place]    Balance Sitting:     Static:  SBA    Dynamic:SBA  Standing: min A  Sitting: Sup;    Pt able to sit up  on the EOB and in chair with sup and good sitting balance.        Activity Tolerance F-  Fair-    Limited by endurance   F+   Visual/  Perceptual wfl     WFL                    Hand Dominance right    Strength ROM Additional Info:    RUE   4/5 wfl good  and wfl FMC/dexterity noted during ADL tasks      LUE 4/5 wfl good  and wfl FMC/dexterity noted during ADL tasks      Hearing: wfl  Sensation:wfl  Tone: wfl  Edema:none noted     Comments: Upon arrival patient supine in bed and agreeable to therapy session. Therapist educated pt on role of OT. At the end of session, patient sitting up in the chair with call light and phone within reach, all lines and tubes intact, and tray table in front of her.  Overall patient demonstrated decreased independence and safety during completion of ADL/functional transfer/mobility tasks.  Pt would benefit from continued skilled OT to

## 2025-01-09 NOTE — PLAN OF CARE
Problem: Chronic Conditions and Co-morbidities  Goal: Patient's chronic conditions and co-morbidity symptoms are monitored and maintained or improved  1/8/2025 2328 by Kenna Haddad RN  Outcome: Progressing  Flowsheets (Taken 1/8/2025 2020)  Care Plan - Patient's Chronic Conditions and Co-Morbidity Symptoms are Monitored and Maintained or Improved: Monitor and assess patient's chronic conditions and comorbid symptoms for stability, deterioration, or improvement  1/8/2025 1319 by Brigida Espinoza RN  Outcome: Progressing     Problem: Discharge Planning  Goal: Discharge to home or other facility with appropriate resources  1/8/2025 2328 by Kenna Haddad RN  Outcome: Progressing  Flowsheets (Taken 1/8/2025 2020)  Discharge to home or other facility with appropriate resources: Identify barriers to discharge with patient and caregiver  1/8/2025 1319 by Brigida Espinoza RN  Outcome: Progressing     Problem: Pain  Goal: Verbalizes/displays adequate comfort level or baseline comfort level  1/8/2025 2328 by Kenna Haddad RN  Outcome: Progressing  Flowsheets (Taken 1/8/2025 2015)  Verbalizes/displays adequate comfort level or baseline comfort level: Encourage patient to monitor pain and request assistance  1/8/2025 1319 by Brigida Espinoza RN  Outcome: Progressing     Problem: ABCDS Injury Assessment  Goal: Absence of physical injury  1/8/2025 2328 by Kenna Haddad RN  Outcome: Progressing  1/8/2025 1319 by Brigida Espinoza RN  Outcome: Progressing     Problem: Genitourinary - Adult  Goal: Absence of urinary retention  1/8/2025 2328 by Kenna Haddad RN  Outcome: Progressing  Flowsheets (Taken 1/8/2025 2020)  Absence of urinary retention: Assess patient’s ability to void and empty bladder  1/8/2025 1319 by rBigida Espinoza RN  Outcome: Progressing  Goal: Urinary catheter remains patent  1/8/2025 2328 by Kenna Haddad RN  Outcome: Progressing  1/8/2025 1319 by Brigida Espinoza RN  Outcome: Progressing     Problem: Infection - Adult  Goal: Absence of

## 2025-01-09 NOTE — PROGRESS NOTES
Vancomycin has been discontinued   Clinical Pharmacy to sign-off  Physician to re-consult pharmacy if future dosing is needed    Thank you for the consult,  Joan Gloria, PharmD, BCPS 1/9/2025 11:22 AM              Pharmacy Consultation Note  (Antibiotic Dosing and Monitoring)    Initial consult date: 1/8/25  Consulting physician/provider: SONY Arreola  Drug: Vancomycin  Indication: Pyelonephritis     Age/  Gender Height Weight IBW  Allergy Information   89 y.o./female 172.7 cm (5' 8\") 72.6 kg (160 lb)     Ideal body weight: 63.9 kg (140 lb 14 oz)  Adjusted ideal body weight: 67.7 kg (149 lb 5.2 oz)   Codeine, Codimal-a [brompheniramine], Lipitor, Spiriva handihaler [tiotropium bromide monohydrate], Tudorza pressair [aclidinium bromide], and Ciprofloxacin      Renal Function:  Recent Labs     01/07/25  2145 01/08/25  0948 01/09/25  0504   BUN 32* 27* 21   CREATININE 1.5* 1.4* 1.2*     No intake or output data in the 24 hours ending 01/09/25 0824    CBC:  Lab Results   Component Value Date/Time    WBC 7.7 01/09/2025 05:04 AM       Vitals:    01/09/25 0803   BP: 111/86   Pulse: 92   Resp: 21   Temp: 96.8 °F (36 °C)   SpO2: 97%       Vancomycin Monitoring:  Trough:  No results for input(s): \"VANCOTROUGH\" in the last 72 hours.  Random:    Recent Labs     01/09/25  0504   VANCORANDOM 7.3     Vancomycin Administration Times:  Recent vancomycin administrations                     vancomycin (VANCOCIN) 1,250 mg in sodium chloride 0.9 % 250 mL IVPB ([id number]) (mg) 1,250 mg New Bag 01/08/25 1151         Assessment:  Patient is a 89 y.o. female who has been initiated on vancomycin  Estimated Creatinine Clearance: 31 mL/min (A) (based on SCr of 1.2 mg/dL (H)).  To dose vancomycin, pharmacy will be utilizing dosing based off of levels because of patient's renal impairment/insufficiency  Vitals WNL, WBC WNL, BUN & Scr improving, cultures no growth     Plan:  Vancomycin 1500 mg IV x1 dose  Will check vancomycin levels when  appropriate. Check AM random level tomorrow.   Will continue to monitor renal function   Pharmacy to follow    Diego Mendel [employer] Pharm[employer] Student     Joan Gloria, PharmD, BCPS 1/9/2025 9:55 AM

## 2025-01-09 NOTE — PROGRESS NOTES
Physical Therapy  Treatment    Name: Radha Dixon  : 1934  MRN: [id number]      Date of Service: 2025    Evaluating PT: Kaleb Bran, PT, DPT [id number]      Room #:  6417/6417-A  Diagnosis:  Pyelonephritis [N12]  PMHx/PSHx:   has a past medical history of Asthma, Atrial fibrillation, persistent (HCC), Depression, Generalized osteoarthritis, Hyperlipidemia, Knee pain, Pinched nerve in neck, Prolonged emergence from general anesthesia, Thyroid disease, and Vitamin D deficiency.  Procedure/Surgery:  NA  Precautions:  Fall risk  Equipment Needs:  NA    SUBJECTIVE:    Pt lives with daughter in a 1 story house with 4 stair(s) and 1 rail(s) to enter. Pt ambulated with rollator prior to admission.    OBJECTIVE:   Initial Evaluation  Date: 25 Treatment Date:  25 Short Term/ Long Term   Goals   AM-PAC 6 Clicks 15/24 16/24    Was pt agreeable to Eval/treatment? Yes yes    Does pt have pain? No complaints of pain No c/o pain    Bed Mobility  Rolling: NT  Supine to sit: SBA  Sit to supine: SBA  Scooting: SBA Rolling: NT  Supine to sit: SBA  Sit to supine: SBA  Scooting: SBA Rolling: Independent   Supine to sit: Independent   Sit to supine: Independent   Scooting: Independent    Transfers Sit to stand: Marco  Stand to sit: Marco  Stand pivot: NT Sit to stand: SBA  Stand to sit: SBA  Stand pivot: SBA Sit to stand: Independent   Stand to sit: Independent   Stand pivot: Mod Independent with WW   Ambulation   Sidestepped 3 feet with WW with Marco -- declined further ambulation due to weakness 30 feet with WW SBA >100 feet with WW Mod Independent    Stair negotiation: ascended and descended NT NT 4 step(s) with 1 rail(s) Mod Independent    ROM BUE: Refer to OT note  BLE: WFL     Strength BUE: Refer to OT note  BLE: WFL     Balance Sitting EOB: SBA  Dynamic Standing: Marco with WW Sitting EOB: Marco  Dynamic Standing: SBA with WW Sitting EOB: Independent   Dynamic Standing: Mod Independent with WW     Pt is A & O x  4  Sensation:  NT  Edema:  none noted    Patient education  Pt educated on role of PT, safety during mobility    Patient response to education:   Pt verbalized understanding Pt demonstrated skill Pt requires further education in this area   yes yes yes     ASSESSMENT:    Comments:  pt semi-supine in bed upon entry and agreeable to PT treatment. Pt able to complete all mobility with no assist. Pt sat EOB for approximately 15 minutes during session with no assist for sitting balance. Ambulation in room with WW and slow, but steady gait. Distance limited by fatigue. Pt positioned in chair at end of session.   All needs met and call light in reach. All lines remained intact.     Treatment:  Patient practiced and was instructed in the following treatment:    Bed Mobility: VCs provided for sequencing and safety during mobility.   Transfer Training: Verbal and tactile cueing provided for sequencing and safety during mobility.   Gait Training: Ambulation with WW and verbal cues for proper technique and safety.   Therapeutic Activities: pt sat EOB for approximately 15 minutes during session with no assist for static and dynamic sitting balance.     PLAN:    Patient is making good progress towards established goals.  Will continue with current POC.      Time in  1145  Time out  1215    Total Treatment Time  30 minutes     CPT codes:  [] Gait training 53043 - minutes  [] Manual therapy 09857 - minutes  [x] Therapeutic activities 99107 30 minutes  [] Therapeutic exercises 69562 - minutes  [] Neuromuscular reeducation 97382 - minutes    Evelin Lopez PT, DPT  [id number]

## 2025-01-09 NOTE — PROGRESS NOTES
HOSPITALIST PROGRESS NOTES     ADMITTING DATE AND TIME: 1/7/2025  8:50 PM  had concerns including Flank Pain (Patient complaining of pain in her back.  States that she thinks that she may have a kidney stone.  Tachycardic during triage.).    ADMIT DX: Pyelonephritis [N12]      SUBJECTIVE:  Patient is being followed for Pyelonephritis [N12]     Patient was seen examined and evaluated  Recent lab results, charts and pertinent diagnostic imaging reviewed   Ancillary service notes reviewed   NAD    Care reviewed with nursing staff, medical and surgical specialty care, primary care and the patient's family as available.   ROS: denies fever, chills, cp, sob, n/v, HA unless stated above.      vancomycin  1,500 mg IntraVENous Once    sodium chloride flush  5-40 mL IntraVENous 2 times per day    heparin (porcine)  5,000 Units SubCUTAneous 3 times per day    amiodarone  100 mg Oral QAM    vitamin D  2,000 Units Oral Daily    citalopram  10 mg Oral BID    dilTIAZem  180 mg Oral BID    arformoterol 15 mcg-budesonide 0.5 mg neb solution   Nebulization BID RT    levothyroxine  112 mcg Oral Daily    Magnesium Gluconate  500 mg Oral Daily    metoprolol succinate  50 mg Oral Daily    potassium chloride  20 mEq Oral Daily    vitamin B-12  1,000 mcg Oral Daily    [Held by provider] rivaroxaban  15 mg Oral Dinner    meropenem  500 mg IntraVENous Q12H    melatonin  5 mg Oral QPM    vancomycin (VANCOCIN) intermittent dosing (placeholder)   Other RX Placeholder    ferrous sulfate  325 mg Oral Daily with breakfast     sodium chloride flush, 5-40 mL, PRN  sodium chloride, , PRN  ondansetron, 4 mg, Q8H PRN   Or  ondansetron, 4 mg, Q6H PRN  acetaminophen, 650 mg, Q6H PRN   Or  acetaminophen, 650 mg, Q6H PRN  cetirizine, 10 mg, Daily PRN  melatonin, 5 mg, Nightly PRN  calcium carbonate, 500 mg, TID PRN  polyvinyl  hematuria    Hypotension due to hypovolemia  Resolved Problems:    * No resolved hospital problems. 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  Continue ceftriaxone  Follow urine culture, de-escalate antibiotics as appropriate    Asymptomatic COVID-19 pneumonia  Underlying COPD  Supportive care  Continue to monitor  Breathing treatment    Anemia, multifactorial  Iron deficiency anemia, CKD  UA with hematuria, recent renal biopsy   Continue iron supplementation  Continue to monitor hemoglobin    Hypothyroidism, TSH is 11.75  On Synthroid of 112, increased to 125  Repeat thyroid function test in 4 to 6 weeks    Goal of care  Wishes to be limited code, DNR/DNI    Chronic comorbidities  A-fib, resume amiodarone Xarelto, close telemetry monitoring  CKD, creatinine baseline  HFrEF, resume GDMT as tolerated      DISPOSITION: Pending the above                                                                 This note was generated by the epic EMR system/Dragon speech recognition and may contain inherent errors or omissions not intended by the user. Grammatical errors, random word insertions, deletions, pronoun errors and incomplete sentences are occasional consequences of this technology due to software limitations. Not all errors are caught and corrected. If there are questions or concerns about the content of this note or information contained within the body of this dictation they should be addressed directly with the author for clarification.    Electronically signed by Adryan Almanzar MD on 1/9/2025 at 10:57 AM

## 2025-01-09 NOTE — PROGRESS NOTES
Spiritual Health History and Assessment/Progress Note  [facility]    (P) Palliative Care, Initial Encounter, Spiritual/Emotional Needs,  , (P) New Diagnosis, Life Adjustments,      Name: Radha Dixon MRN: [id number]    Age: 89 y.o.     Sex: female   Language: English   Jehovah's witness: Advent   Pyelonephritis     Date: 1/9/2025                           Spiritual Assessment began in [facility]        Referral/Consult From: (P) Palliative Care, Rounding   Encounter Overview/Reason: (P) Palliative Care, Initial Encounter, Spiritual/Emotional Needs  Service Provided For: (P) Patient    The patient asked for prayer concerning her newly scheduled surgery. The patient is a member of [facility].     Esha, Belief, Meaning:   Patient identifies as spiritual, is connected with a esha tradition or spiritual practice, and has beliefs or practices that help with coping during difficult times  Family/Friends No family/friends present      Importance and Influence:  Patient unable to assess at this time  Family/Friends No family present  Community:  Patient is connected with a spiritual community and feels well-supported. Support system includes: Children and [employer] Community  Family/Friends No family/friends present    Assessment and Plan of Care:     Patient Interventions include: Facilitated expression of thoughts and feelings, Explored spiritual coping/struggle/distress, Engaged in theological reflection, Affirmed coping skills/support systems, and Facilitated life review and/ or legacy  Family/Friends Interventions include: No family/friends present    Patient Plan of Care: Other: The  will follow as needed.  Family/Friends Plan of Care: No family/friends present    Electronically signed by Chaplain Joe on 1/9/2025 at 10:53 AM         If additional support is needed please reach out to Spiritual [facility] (ext [phone]).    Rev ELY Benton MDIV,

## 2025-01-09 NOTE — CARE COORDINATION
1/9:  Update CM Note:  Pt presented tyo the ER for flank pain from home.  Pt is on room air at 90%, Iv Rocephin til 1/16, Po Xarelto & Sq heparin.  PT 16/24 OT 19/24.  Urology is following.  CM spoke with pt who states her dc plan is home & her family can transport.  She may be interested in [facility] but doesn't want to think about it right this minute will check back.  Sw/KAROL will continue to follow.  Electronically signed by Ivette Childers RN on 1/9/2025 at 3:56 PM

## 2025-01-10 ENCOUNTER — TELEPHONE (OUTPATIENT)
Dept: FAMILY MEDICINE CLINIC | Age: 89
End: 2025-01-10

## 2025-01-10 PROCEDURE — 6360000002 HC RX W HCPCS: Performed by: NURSE PRACTITIONER

## 2025-01-10 PROCEDURE — 2500000003 HC RX 250 WO HCPCS

## 2025-01-10 PROCEDURE — 94640 AIRWAY INHALATION TREATMENT: CPT

## 2025-01-10 PROCEDURE — 6370000000 HC RX 637 (ALT 250 FOR IP): Performed by: NURSE PRACTITIONER

## 2025-01-10 PROCEDURE — 6370000000 HC RX 637 (ALT 250 FOR IP): Performed by: INTERNAL MEDICINE

## 2025-01-10 PROCEDURE — 6360000002 HC RX W HCPCS

## 2025-01-10 PROCEDURE — 6360000002 HC RX W HCPCS: Performed by: INTERNAL MEDICINE

## 2025-01-10 PROCEDURE — 2500000003 HC RX 250 WO HCPCS: Performed by: INTERNAL MEDICINE

## 2025-01-10 PROCEDURE — 99232 SBSQ HOSP IP/OBS MODERATE 35: CPT | Performed by: INTERNAL MEDICINE

## 2025-01-10 PROCEDURE — 2140000000 HC CCU INTERMEDIATE R&B

## 2025-01-10 RX ADMIN — CITALOPRAM HYDROBROMIDE 10 MG: 20 TABLET ORAL at 08:28

## 2025-01-10 RX ADMIN — DILTIAZEM HYDROCHLORIDE 180 MG: 180 CAPSULE, COATED, EXTENDED RELEASE ORAL at 21:37

## 2025-01-10 RX ADMIN — CITALOPRAM HYDROBROMIDE 10 MG: 20 TABLET ORAL at 21:36

## 2025-01-10 RX ADMIN — HYDROXYZINE HYDROCHLORIDE 25 MG: 25 TABLET ORAL at 08:27

## 2025-01-10 RX ADMIN — METOPROLOL SUCCINATE 50 MG: 50 TABLET, EXTENDED RELEASE ORAL at 08:28

## 2025-01-10 RX ADMIN — FERROUS SULFATE TAB 325 MG (65 MG ELEMENTAL FE) 325 MG: 325 (65 FE) TAB at 08:28

## 2025-01-10 RX ADMIN — WATER 1000 MG: 1 INJECTION INTRAMUSCULAR; INTRAVENOUS; SUBCUTANEOUS at 12:38

## 2025-01-10 RX ADMIN — CYANOCOBALAMIN TAB 1000 MCG 1000 MCG: 1000 TAB at 08:27

## 2025-01-10 RX ADMIN — Medication 2000 UNITS: at 08:28

## 2025-01-10 RX ADMIN — HEPARIN SODIUM 5000 UNITS: 5000 INJECTION INTRAVENOUS; SUBCUTANEOUS at 21:36

## 2025-01-10 RX ADMIN — Medication 500 MG: at 08:27

## 2025-01-10 RX ADMIN — DILTIAZEM HYDROCHLORIDE 180 MG: 180 CAPSULE, COATED, EXTENDED RELEASE ORAL at 08:27

## 2025-01-10 RX ADMIN — ARFORMOTEROL TARTRATE: 15 SOLUTION RESPIRATORY (INHALATION) at 20:21

## 2025-01-10 RX ADMIN — AMIODARONE HYDROCHLORIDE 100 MG: 200 TABLET ORAL at 08:27

## 2025-01-10 RX ADMIN — ARFORMOTEROL TARTRATE: 15 SOLUTION RESPIRATORY (INHALATION) at 10:36

## 2025-01-10 RX ADMIN — Medication 5 MG: at 18:32

## 2025-01-10 RX ADMIN — SODIUM CHLORIDE, PRESERVATIVE FREE 10 ML: 5 INJECTION INTRAVENOUS at 21:36

## 2025-01-10 RX ADMIN — HEPARIN SODIUM 5000 UNITS: 5000 INJECTION INTRAVENOUS; SUBCUTANEOUS at 06:45

## 2025-01-10 RX ADMIN — LEVOTHYROXINE SODIUM 125 MCG: 0.05 TABLET ORAL at 06:45

## 2025-01-10 RX ADMIN — POTASSIUM CHLORIDE 20 MEQ: 1500 TABLET, EXTENDED RELEASE ORAL at 08:28

## 2025-01-10 ASSESSMENT — PAIN SCALES - GENERAL
PAINLEVEL_OUTOF10: 0

## 2025-01-10 NOTE — PROGRESS NOTES
HOSPITALIST PROGRESS NOTES     ADMITTING DATE AND TIME: 1/7/2025  8:50 PM  had concerns including Flank Pain (Patient complaining of pain in her back.  States that she thinks that she may have a kidney stone.  Tachycardic during triage.).    ADMIT DX: Pyelonephritis [N12]      SUBJECTIVE:  Patient is being followed for Pyelonephritis [N12]     Patient was seen examined and evaluated  Recent lab results, charts and pertinent diagnostic imaging reviewed   Ancillary service notes reviewed   NAD    Care reviewed with nursing staff, medical and surgical specialty care, primary care and the patient's family as available.   ROS: denies fever, chills, cp, sob, n/v, HA unless stated above.      cefTRIAXone (ROCEPHIN) IV  1,000 mg IntraVENous Q24H    levothyroxine  125 mcg Oral Daily    sodium chloride flush  5-40 mL IntraVENous 2 times per day    heparin (porcine)  5,000 Units SubCUTAneous 3 times per day    [Held by provider] amiodarone  100 mg Oral QAM    vitamin D  2,000 Units Oral Daily    citalopram  10 mg Oral BID    dilTIAZem  180 mg Oral BID    arformoterol 15 mcg-budesonide 0.5 mg neb solution   Nebulization BID RT    Magnesium Gluconate  500 mg Oral Daily    metoprolol succinate  50 mg Oral Daily    potassium chloride  20 mEq Oral Daily    vitamin B-12  1,000 mcg Oral Daily    [Held by provider] rivaroxaban  15 mg Oral Dinner    melatonin  5 mg Oral QPM    ferrous sulfate  325 mg Oral Daily with breakfast     guaiFENesin-dextromethorphan, 5 mL, Q4H PRN  sodium chloride flush, 5-40 mL, PRN  sodium chloride, , PRN  ondansetron, 4 mg, Q8H PRN   Or  ondansetron, 4 mg, Q6H PRN  acetaminophen, 650 mg, Q6H PRN   Or  acetaminophen, 650 mg, Q6H PRN  cetirizine, 10 mg, Daily PRN  melatonin, 5 mg, Nightly PRN  calcium carbonate, 500 mg, TID PRN  polyvinyl alcohol, 1 drop, PRN  sodium chloride, 1

## 2025-01-10 NOTE — PROGRESS NOTES
1/10/2025 4:22 PM  Radha Dixon  [id number]    Subjective:    Ambulating to the restroom with her walker  She is coughing  Found to be COVID-positive  Flank pain improving    Review of Systems  Constitutional: No fever or chills   Respiratory: Positive for cough  Cardiovascular: negative for chest pain and dyspnea  Gastrointestinal: negative for abdominal pain, diarrhea, nausea and vomiting   : See above  Derm: negative for rash and skin lesion(s)  Neurological: negative for seizures and tremors  Musculoskeletal: Negative    Psychiatric: Negative   All other reviews are negative      Scheduled Meds:   cefTRIAXone (ROCEPHIN) IV  1,000 mg IntraVENous Q24H    levothyroxine  125 mcg Oral Daily    sodium chloride flush  5-40 mL IntraVENous 2 times per day    heparin (porcine)  5,000 Units SubCUTAneous 3 times per day    [Held by provider] amiodarone  100 mg Oral QAM    vitamin D  2,000 Units Oral Daily    citalopram  10 mg Oral BID    dilTIAZem  180 mg Oral BID    arformoterol 15 mcg-budesonide 0.5 mg neb solution   Nebulization BID RT    Magnesium Gluconate  500 mg Oral Daily    metoprolol succinate  50 mg Oral Daily    potassium chloride  20 mEq Oral Daily    vitamin B-12  1,000 mcg Oral Daily    [Held by provider] rivaroxaban  15 mg Oral Dinner    melatonin  5 mg Oral QPM    ferrous sulfate  325 mg Oral Daily with breakfast       Objective:  Vitals:    01/10/25 1207   BP: 107/74   Pulse: 55   Resp: 20   Temp: 97.3 °F (36.3 °C)   SpO2: 92%         Allergies: Codeine, Codimal-a [brompheniramine], Lipitor, Spiriva handihaler [tiotropium bromide monohydrate], Tudorza pressair [aclidinium bromide], and Ciprofloxacin    General Appearance: alert and oriented to person, place and time and in no acute distress  Skin: no rash or erythema  Head: normocephalic and atraumatic  Pulmonary/Chest: normal air movement, no respiratory distress  Abdomen: soft, non-tender, non-distended  Genitourinary: No Flores  Extremities: no

## 2025-01-10 NOTE — CARE COORDINATION
reached out to patients PCP office for Jason Shearer CNP at [phone] to schedule follow up D/C appointment.  spoke to office staff who plans to have a call out to provider and once appointment is scheduled patient will be contacted directly.

## 2025-01-10 NOTE — CARE COORDINATION
Social Work/ Case Management Transition of Care Planning (Ya Rucker, -[phone]):     Per report and chart review Pt is on room air. Pt is on IV Rocephin q24 until 1/16. SubQ Heparin 3x daily. PT 16/24, OT 19/24.  Pt stated she does not think she needs HHC as she did better on therapy. Urology following. Pt plans to discharge home with no needs and her family can transport. INDU/KAROL to follow.   Ya Rucker, ALIYA  1/10/2025

## 2025-01-10 NOTE — TELEPHONE ENCOUNTER
I took a call from a nurse from [facility] trying to make a hospital follow up for this patient.    She was admitted for pyelonephritis on Jan 7th.  She is discharging home today, but needs to see you for a follow up.     Please advise when you can see her?

## 2025-01-10 NOTE — PROGRESS NOTES
Occupational Therapy  OT SESSION ATTEMPT     Date:1/10/2025  Patient Name: Radha Dixon  MRN: [id number]  : 1934  Room: [facility]     Attempted OT session this date:    [] unavailable due to other medical staff currently with pt   [] on hold per nursing staff   [] on hold per nursing staff secondary to lab / radiology results    [x] Patient politely declined treatment this date due to not having enough strength. Educated patient on working on exercises and patient still declined.    [] off unit    [] Other:     Will reattempt OT session at a later time.      Claudia SCHAFER, CLT  [id number]

## 2025-01-10 NOTE — PLAN OF CARE
Problem: Chronic Conditions and Co-morbidities  Goal: Patient's chronic conditions and co-morbidity symptoms are monitored and maintained or improved  Outcome: Progressing     Problem: Discharge Planning  Goal: Discharge to home or other facility with appropriate resources  Outcome: Progressing     Problem: Pain  Goal: Verbalizes/displays adequate comfort level or baseline comfort level  Outcome: Progressing     Problem: ABCDS Injury Assessment  Goal: Absence of physical injury  Outcome: Progressing     Problem: Genitourinary - Adult  Goal: Absence of urinary retention  Outcome: Progressing  Goal: Urinary catheter remains patent  Outcome: Progressing     Problem: Infection - Adult  Goal: Absence of infection at discharge  Outcome: Progressing  Goal: Absence of infection during hospitalization  Outcome: Progressing  Goal: Absence of fever/infection during anticipated neutropenic period  Outcome: Progressing     Problem: Hematologic - Adult  Goal: Maintains hematologic stability  Outcome: Progressing     Problem: Safety - Adult  Goal: Free from fall injury  Outcome: Progressing

## 2025-01-10 NOTE — PROGRESS NOTES
Physical Therapy    Pt on PT caseload for follow up. Met with pt in room who was seated EOB. Pt declined participation in PT treatment this date d/t fatigue. Attempted education on importance of continued mobility for improved strength, but was unsuccessful.  Will follow and reattempt as able.     Evelin Lopez PT, DPT  [id number]

## 2025-01-10 NOTE — PLAN OF CARE
Problem: Chronic Conditions and Co-morbidities  Goal: Patient's chronic conditions and co-morbidity symptoms are monitored and maintained or improved  Outcome: Progressing  Flowsheets (Taken 1/9/2025 1251 by Emerald Corey RN)  Care Plan - Patient's Chronic Conditions and Co-Morbidity Symptoms are Monitored and Maintained or Improved: Monitor and assess patient's chronic conditions and comorbid symptoms for stability, deterioration, or improvement     Problem: Discharge Planning  Goal: Discharge to home or other facility with appropriate resources  Outcome: Progressing  Flowsheets (Taken 1/9/2025 1251 by Emerald Corey RN)  Discharge to home or other facility with appropriate resources: Identify barriers to discharge with patient and caregiver     Problem: Pain  Goal: Verbalizes/displays adequate comfort level or baseline comfort level  Outcome: Progressing     Problem: ABCDS Injury Assessment  Goal: Absence of physical injury  Outcome: Progressing     Problem: Genitourinary - Adult  Goal: Absence of urinary retention  Outcome: Progressing  Flowsheets (Taken 1/9/2025 1251 by Emerald Corey RN)  Absence of urinary retention: Assess patient’s ability to void and empty bladder     Problem: Infection - Adult  Goal: Absence of infection at discharge  Outcome: Progressing  Flowsheets (Taken 1/9/2025 1251 by Emerald Corey RN)  Absence of infection at discharge: Assess and monitor for signs and symptoms of infection     Problem: Infection - Adult  Goal: Absence of infection during hospitalization  Outcome: Progressing  Flowsheets (Taken 1/9/2025 1251 by Emerald Corey RN)  Absence of infection during hospitalization: Assess and monitor for signs and symptoms of infection     Problem: Infection - Adult  Goal: Absence of fever/infection during anticipated neutropenic period  Outcome: Progressing  Flowsheets (Taken 1/9/2025 1251 by Emerald Corey, RN)  Absence of

## 2025-01-11 LAB
ANION GAP SERPL CALCULATED.3IONS-SCNC: 11 MMOL/L (ref 7–16)
BASOPHILS # BLD: 0.02 K/UL (ref 0–0.2)
BASOPHILS NFR BLD: 0 % (ref 0–2)
BUN SERPL-MCNC: 13 MG/DL (ref 6–23)
CALCIUM SERPL-MCNC: 8.8 MG/DL (ref 8.6–10.2)
CHLORIDE SERPL-SCNC: 98 MMOL/L (ref 98–107)
CO2 SERPL-SCNC: 26 MMOL/L (ref 22–29)
CREAT SERPL-MCNC: 1 MG/DL (ref 0.5–1)
EOSINOPHIL # BLD: 0.07 K/UL (ref 0.05–0.5)
EOSINOPHILS RELATIVE PERCENT: 1 % (ref 0–6)
ERYTHROCYTE [DISTWIDTH] IN BLOOD BY AUTOMATED COUNT: 22.1 % (ref 11.5–15)
GFR, ESTIMATED: 56 ML/MIN/1.73M2
GLUCOSE SERPL-MCNC: 104 MG/DL (ref 74–99)
HCT VFR BLD AUTO: 34 % (ref 34–48)
HGB BLD-MCNC: 10.1 G/DL (ref 11.5–15.5)
IMM GRANULOCYTES # BLD AUTO: 0.07 K/UL (ref 0–0.58)
IMM GRANULOCYTES NFR BLD: 1 % (ref 0–5)
LYMPHOCYTES NFR BLD: 0.65 K/UL (ref 1.5–4)
LYMPHOCYTES RELATIVE PERCENT: 6 % (ref 20–42)
MAGNESIUM SERPL-MCNC: 1.5 MG/DL (ref 1.6–2.6)
MCH RBC QN AUTO: 24.3 PG (ref 26–35)
MCHC RBC AUTO-ENTMCNC: 29.7 G/DL (ref 32–34.5)
MCV RBC AUTO: 81.9 FL (ref 80–99.9)
MONOCYTES NFR BLD: 0.63 K/UL (ref 0.1–0.95)
MONOCYTES NFR BLD: 6 % (ref 2–12)
NEUTROPHILS NFR BLD: 86 % (ref 43–80)
NEUTS SEG NFR BLD: 9.1 K/UL (ref 1.8–7.3)
PLATELET # BLD AUTO: 202 K/UL (ref 130–450)
PMV BLD AUTO: 10.6 FL (ref 7–12)
POTASSIUM SERPL-SCNC: 3.5 MMOL/L (ref 3.5–5)
RBC # BLD AUTO: 4.15 M/UL (ref 3.5–5.5)
RBC # BLD: ABNORMAL 10*6/UL
SODIUM SERPL-SCNC: 135 MMOL/L (ref 132–146)
WBC # BLD: ABNORMAL 10*3/UL
WBC OTHER # BLD: 10.5 K/UL (ref 4.5–11.5)

## 2025-01-11 PROCEDURE — 80048 BASIC METABOLIC PNL TOTAL CA: CPT

## 2025-01-11 PROCEDURE — 6370000000 HC RX 637 (ALT 250 FOR IP): Performed by: NURSE PRACTITIONER

## 2025-01-11 PROCEDURE — 36415 COLL VENOUS BLD VENIPUNCTURE: CPT

## 2025-01-11 PROCEDURE — 6360000002 HC RX W HCPCS

## 2025-01-11 PROCEDURE — 6360000002 HC RX W HCPCS: Performed by: INTERNAL MEDICINE

## 2025-01-11 PROCEDURE — 6360000002 HC RX W HCPCS: Performed by: NURSE PRACTITIONER

## 2025-01-11 PROCEDURE — 85025 COMPLETE CBC W/AUTO DIFF WBC: CPT

## 2025-01-11 PROCEDURE — 94640 AIRWAY INHALATION TREATMENT: CPT

## 2025-01-11 PROCEDURE — 2500000003 HC RX 250 WO HCPCS: Performed by: INTERNAL MEDICINE

## 2025-01-11 PROCEDURE — 83735 ASSAY OF MAGNESIUM: CPT

## 2025-01-11 PROCEDURE — 2500000003 HC RX 250 WO HCPCS

## 2025-01-11 PROCEDURE — 6370000000 HC RX 637 (ALT 250 FOR IP): Performed by: INTERNAL MEDICINE

## 2025-01-11 PROCEDURE — 99232 SBSQ HOSP IP/OBS MODERATE 35: CPT | Performed by: INTERNAL MEDICINE

## 2025-01-11 PROCEDURE — 2140000000 HC CCU INTERMEDIATE R&B

## 2025-01-11 RX ADMIN — ARFORMOTEROL TARTRATE: 15 SOLUTION RESPIRATORY (INHALATION) at 20:34

## 2025-01-11 RX ADMIN — CYANOCOBALAMIN TAB 1000 MCG 1000 MCG: 1000 TAB at 09:36

## 2025-01-11 RX ADMIN — POTASSIUM CHLORIDE 20 MEQ: 1500 TABLET, EXTENDED RELEASE ORAL at 09:36

## 2025-01-11 RX ADMIN — CITALOPRAM HYDROBROMIDE 10 MG: 20 TABLET ORAL at 20:35

## 2025-01-11 RX ADMIN — HEPARIN SODIUM 5000 UNITS: 5000 INJECTION INTRAVENOUS; SUBCUTANEOUS at 05:30

## 2025-01-11 RX ADMIN — CITALOPRAM HYDROBROMIDE 10 MG: 20 TABLET ORAL at 09:37

## 2025-01-11 RX ADMIN — LEVOTHYROXINE SODIUM 125 MCG: 0.05 TABLET ORAL at 05:30

## 2025-01-11 RX ADMIN — DILTIAZEM HYDROCHLORIDE 180 MG: 180 CAPSULE, COATED, EXTENDED RELEASE ORAL at 20:25

## 2025-01-11 RX ADMIN — Medication 2000 UNITS: at 09:37

## 2025-01-11 RX ADMIN — Medication 500 MG: at 09:56

## 2025-01-11 RX ADMIN — RIVAROXABAN 15 MG: 15 TABLET, FILM COATED ORAL at 18:19

## 2025-01-11 RX ADMIN — SODIUM CHLORIDE, PRESERVATIVE FREE 10 ML: 5 INJECTION INTRAVENOUS at 09:37

## 2025-01-11 RX ADMIN — WATER 1000 MG: 1 INJECTION INTRAMUSCULAR; INTRAVENOUS; SUBCUTANEOUS at 13:01

## 2025-01-11 RX ADMIN — ARFORMOTEROL TARTRATE: 15 SOLUTION RESPIRATORY (INHALATION) at 08:49

## 2025-01-11 RX ADMIN — DILTIAZEM HYDROCHLORIDE 180 MG: 180 CAPSULE, COATED, EXTENDED RELEASE ORAL at 09:56

## 2025-01-11 RX ADMIN — SODIUM CHLORIDE, PRESERVATIVE FREE 10 ML: 5 INJECTION INTRAVENOUS at 20:25

## 2025-01-11 RX ADMIN — FERROUS SULFATE TAB 325 MG (65 MG ELEMENTAL FE) 325 MG: 325 (65 FE) TAB at 09:37

## 2025-01-11 RX ADMIN — Medication 5 MG: at 20:26

## 2025-01-11 RX ADMIN — METOPROLOL SUCCINATE 50 MG: 50 TABLET, EXTENDED RELEASE ORAL at 09:36

## 2025-01-11 NOTE — PROGRESS NOTES
HOSPITALIST PROGRESS NOTES     ADMITTING DATE AND TIME: 1/7/2025  8:50 PM  had concerns including Flank Pain (Patient complaining of pain in her back.  States that she thinks that she may have a kidney stone.  Tachycardic during triage.).    ADMIT DX: Pyelonephritis [N12]      SUBJECTIVE:  Patient is being followed for Pyelonephritis [N12]     Patient was seen examined and evaluated  Recent lab results, charts and pertinent diagnostic imaging reviewed   Ancillary service notes reviewed   NAD    Care reviewed with nursing staff, medical and surgical specialty care, primary care and the patient's family as available.   ROS: denies fever, chills, cp, sob, n/v, HA unless stated above.      cefTRIAXone (ROCEPHIN) IV  1,000 mg IntraVENous Q24H    levothyroxine  125 mcg Oral Daily    sodium chloride flush  5-40 mL IntraVENous 2 times per day    heparin (porcine)  5,000 Units SubCUTAneous 3 times per day    [Held by provider] amiodarone  100 mg Oral QAM    vitamin D  2,000 Units Oral Daily    citalopram  10 mg Oral BID    dilTIAZem  180 mg Oral BID    arformoterol 15 mcg-budesonide 0.5 mg neb solution   Nebulization BID RT    Magnesium Gluconate  500 mg Oral Daily    metoprolol succinate  50 mg Oral Daily    potassium chloride  20 mEq Oral Daily    vitamin B-12  1,000 mcg Oral Daily    [Held by provider] rivaroxaban  15 mg Oral Dinner    melatonin  5 mg Oral QPM    ferrous sulfate  325 mg Oral Daily with breakfast     guaiFENesin-dextromethorphan, 5 mL, Q4H PRN  sodium chloride flush, 5-40 mL, PRN  sodium chloride, , PRN  ondansetron, 4 mg, Q8H PRN   Or  ondansetron, 4 mg, Q6H PRN  acetaminophen, 650 mg, Q6H PRN   Or  acetaminophen, 650 mg, Q6H PRN  cetirizine, 10 mg, Daily PRN  melatonin, 5 mg, Nightly PRN  calcium carbonate, 500 mg, TID PRN  polyvinyl alcohol, 1 drop, PRN  sodium chloride, 1  mean gradient is 13 mmHg. AV Velocity Ratio is 0.41. LVOT:AV VTI Index is 0.38. AV area by continuity VTI is 1.2 cm2. AV area by peak velocity is 1.2 cm2.    Mitral Valve: Mild regurgitation. No stenosis noted. MV area by continuity equation is 2.9 cm2.    Tricuspid Valve: Moderate to severe regurgitation.    Pulmonic Valve: Mild regurgitation.    Left Atrium: Left atrium is severely dilated.    Right Atrium: Right atrium is severely dilated.    Extracardiac: Medium sized left pleural effusion.    Image quality is adequate.    Signed by: Tapan Calderón MD on 10/9/2024 11:01 AM      SYNOPSIS:  89 y.o. year old female with Asthma, Atrial fibrillation, persistent, Depression, Generalized osteoarthritis, Hyperlipidemia, Knee pain, Pinched nerve in neck, Prolonged emergence from general anesthesia, Thyroid disease, and Vitamin D deficiency presented to the ED with complaints of right flank pain beginning hours prior to arrival.  She also endorses suprapubic discomfort, a low-grade fever of 100.2 for which she took Tylenol, an episode of vomiting 1 day ago, she follows with urology for right-sided hydronephrosis status post stents and biopsy which is significant low-grade noninvasive papillary urothelial carcinoma.  Workup in the ED noted for tachycardia, intermittently hypotension UA with hematuria, bacteriuria, pyuria, leukocytosis 15.3.  CT of the abdomen noted for perinephric/periureteral stranding.  She received IV fluid resuscitation, broad-spectrum antibiotic and admitted to the hospital for further care                                                 ASSESSMENT AND PLAN:    Principal Problem:    Pyelonephritis  Active Problems:    Other microscopic hematuria    Hypotension due to hypovolemia  Resolved Problems:    * No resolved hospital problems. *    Right-sided pyelonephritis  P/W right flank pain, leukocytosis, hematuria, pyuria  CT with right-sided perinephritic, periureteral stranding  Known right-sided

## 2025-01-11 NOTE — PLAN OF CARE
Problem: Chronic Conditions and Co-morbidities  Goal: Patient's chronic conditions and co-morbidity symptoms are monitored and maintained or improved  1/11/2025 0325 by Marcelle Steen RN  Outcome: Progressing  1/10/2025 1833 by Alex Mendoza RN  Outcome: Progressing     Problem: Discharge Planning  Goal: Discharge to home or other facility with appropriate resources  1/11/2025 0325 by Marcelle Steen RN  Outcome: Progressing  1/10/2025 1833 by Alex Mendoza RN  Outcome: Progressing     Problem: Pain  Goal: Verbalizes/displays adequate comfort level or baseline comfort level  1/11/2025 0325 by Marcelle Steen RN  Outcome: Progressing  1/10/2025 1833 by Alex Mendoza RN  Outcome: Progressing     Problem: ABCDS Injury Assessment  Goal: Absence of physical injury  1/11/2025 0325 by Marcelle Steen RN  Outcome: Progressing  1/10/2025 1833 by Alex Mendoza RN  Outcome: Progressing     Problem: Genitourinary - Adult  Goal: Absence of urinary retention  1/11/2025 0325 by Marcelle Steen RN  Outcome: Progressing  1/10/2025 1833 by Alex Mendoza RN  Outcome: Progressing  Goal: Urinary catheter remains patent  1/11/2025 0325 by Marcelle Steen RN  Outcome: Progressing  1/10/2025 1833 by Alex Mendoza RN  Outcome: Progressing     Problem: Infection - Adult  Goal: Absence of infection at discharge  1/11/2025 0325 by Marcelle Steen RN  Outcome: Progressing  1/10/2025 1833 by Alex Mendoza RN  Outcome: Progressing  Goal: Absence of infection during hospitalization  1/11/2025 0325 by Marcelle Steen RN  Outcome: Progressing  1/10/2025 1833 by Alex Mendoza RN  Outcome: Progressing  Goal: Absence of fever/infection during anticipated neutropenic period  1/11/2025 0325 by Marcelle Steen RN  Outcome: Progressing  1/10/2025 1833 by Alex Mendoza RN  Outcome: Progressing     Problem: Hematologic - Adult  Goal: Maintains hematologic stability  1/11/2025 0325 by Marcelle Steen RN  Outcome:

## 2025-01-11 NOTE — PLAN OF CARE
Problem: Chronic Conditions and Co-morbidities  Goal: Patient's chronic conditions and co-morbidity symptoms are monitored and maintained or improved  Outcome: Progressing  Flowsheets (Taken 1/11/2025 0759)  Care Plan - Patient's Chronic Conditions and Co-Morbidity Symptoms are Monitored and Maintained or Improved: Monitor and assess patient's chronic conditions and comorbid symptoms for stability, deterioration, or improvement     Problem: Discharge Planning  Goal: Discharge to home or other facility with appropriate resources  Outcome: Progressing  Flowsheets (Taken 1/11/2025 0759)  Discharge to home or other facility with appropriate resources: Identify barriers to discharge with patient and caregiver     Problem: Pain  Goal: Verbalizes/displays adequate comfort level or baseline comfort level  Outcome: Progressing     Problem: ABCDS Injury Assessment  Goal: Absence of physical injury  Outcome: Progressing     Problem: Genitourinary - Adult  Goal: Absence of urinary retention  Outcome: Progressing  Flowsheets (Taken 1/11/2025 0759)  Absence of urinary retention: Assess patient’s ability to void and empty bladder  Goal: Urinary catheter remains patent  Outcome: Progressing  Flowsheets (Taken 1/11/2025 0759)  Urinary catheter remains patent: Assess patency of urinary catheter     Problem: Infection - Adult  Goal: Absence of infection at discharge  Outcome: Progressing  Flowsheets (Taken 1/11/2025 0759)  Absence of infection at discharge: Assess and monitor for signs and symptoms of infection  Goal: Absence of infection during hospitalization  Outcome: Progressing  Flowsheets (Taken 1/11/2025 0759)  Absence of infection during hospitalization: Assess and monitor for signs and symptoms of infection  Goal: Absence of fever/infection during anticipated neutropenic period  Outcome: Progressing  Flowsheets (Taken 1/11/2025 0759)  Absence of fever/infection during anticipated neutropenic period: Monitor white blood cell

## 2025-01-12 VITALS
SYSTOLIC BLOOD PRESSURE: 120 MMHG | RESPIRATION RATE: 18 BRPM | OXYGEN SATURATION: 96 % | WEIGHT: 162.98 LBS | HEART RATE: 93 BPM | HEIGHT: 68 IN | DIASTOLIC BLOOD PRESSURE: 73 MMHG | BODY MASS INDEX: 24.7 KG/M2 | TEMPERATURE: 98.2 F

## 2025-01-12 LAB
ANION GAP SERPL CALCULATED.3IONS-SCNC: 11 MMOL/L (ref 7–16)
BASOPHILS # BLD: 0.02 K/UL (ref 0–0.2)
BASOPHILS NFR BLD: 0 % (ref 0–2)
BUN SERPL-MCNC: 14 MG/DL (ref 6–23)
CALCIUM SERPL-MCNC: 8.6 MG/DL (ref 8.6–10.2)
CHLORIDE SERPL-SCNC: 100 MMOL/L (ref 98–107)
CO2 SERPL-SCNC: 23 MMOL/L (ref 22–29)
CREAT SERPL-MCNC: 1 MG/DL (ref 0.5–1)
EOSINOPHIL # BLD: 0.21 K/UL (ref 0.05–0.5)
EOSINOPHILS RELATIVE PERCENT: 2 % (ref 0–6)
ERYTHROCYTE [DISTWIDTH] IN BLOOD BY AUTOMATED COUNT: 22.5 % (ref 11.5–15)
GFR, ESTIMATED: 54 ML/MIN/1.73M2
GLUCOSE SERPL-MCNC: 84 MG/DL (ref 74–99)
HCT VFR BLD AUTO: 34.7 % (ref 34–48)
HGB BLD-MCNC: 10.2 G/DL (ref 11.5–15.5)
IMM GRANULOCYTES # BLD AUTO: 0.08 K/UL (ref 0–0.58)
IMM GRANULOCYTES NFR BLD: 1 % (ref 0–5)
LYMPHOCYTES NFR BLD: 1.05 K/UL (ref 1.5–4)
LYMPHOCYTES RELATIVE PERCENT: 12 % (ref 20–42)
MAGNESIUM SERPL-MCNC: 1.6 MG/DL (ref 1.6–2.6)
MCH RBC QN AUTO: 24.3 PG (ref 26–35)
MCHC RBC AUTO-ENTMCNC: 29.4 G/DL (ref 32–34.5)
MCV RBC AUTO: 82.8 FL (ref 80–99.9)
MICROORGANISM SPEC CULT: NORMAL
MICROORGANISM SPEC CULT: NORMAL
MONOCYTES NFR BLD: 0.55 K/UL (ref 0.1–0.95)
MONOCYTES NFR BLD: 6 % (ref 2–12)
NEUTROPHILS NFR BLD: 79 % (ref 43–80)
NEUTS SEG NFR BLD: 6.99 K/UL (ref 1.8–7.3)
PLATELET # BLD AUTO: 210 K/UL (ref 130–450)
PMV BLD AUTO: 10.7 FL (ref 7–12)
POTASSIUM SERPL-SCNC: 3.6 MMOL/L (ref 3.5–5)
RBC # BLD AUTO: 4.19 M/UL (ref 3.5–5.5)
RBC # BLD: ABNORMAL 10*6/UL
SERVICE CMNT-IMP: NORMAL
SERVICE CMNT-IMP: NORMAL
SODIUM SERPL-SCNC: 134 MMOL/L (ref 132–146)
SPECIMEN DESCRIPTION: NORMAL
SPECIMEN DESCRIPTION: NORMAL
WBC OTHER # BLD: 8.9 K/UL (ref 4.5–11.5)

## 2025-01-12 PROCEDURE — 94640 AIRWAY INHALATION TREATMENT: CPT

## 2025-01-12 PROCEDURE — 6360000002 HC RX W HCPCS: Performed by: INTERNAL MEDICINE

## 2025-01-12 PROCEDURE — 2500000003 HC RX 250 WO HCPCS

## 2025-01-12 PROCEDURE — 6370000000 HC RX 637 (ALT 250 FOR IP): Performed by: INTERNAL MEDICINE

## 2025-01-12 PROCEDURE — 83735 ASSAY OF MAGNESIUM: CPT

## 2025-01-12 PROCEDURE — 6370000000 HC RX 637 (ALT 250 FOR IP): Performed by: NURSE PRACTITIONER

## 2025-01-12 PROCEDURE — 6360000002 HC RX W HCPCS: Performed by: NURSE PRACTITIONER

## 2025-01-12 PROCEDURE — 36415 COLL VENOUS BLD VENIPUNCTURE: CPT

## 2025-01-12 PROCEDURE — 80048 BASIC METABOLIC PNL TOTAL CA: CPT

## 2025-01-12 PROCEDURE — 2500000003 HC RX 250 WO HCPCS: Performed by: INTERNAL MEDICINE

## 2025-01-12 PROCEDURE — 85025 COMPLETE CBC W/AUTO DIFF WBC: CPT

## 2025-01-12 PROCEDURE — 99239 HOSP IP/OBS DSCHRG MGMT >30: CPT | Performed by: INTERNAL MEDICINE

## 2025-01-12 RX ORDER — GUAIFENESIN/DEXTROMETHORPHAN 100-10MG/5
5 SYRUP ORAL EVERY 4 HOURS PRN
Qty: 120 ML | Refills: 1 | Status: SHIPPED | OUTPATIENT
Start: 2025-01-12 | End: 2025-01-22

## 2025-01-12 RX ORDER — LEVOTHYROXINE SODIUM 125 UG/1
125 TABLET ORAL DAILY
Qty: 30 TABLET | Refills: 3 | Status: SHIPPED | OUTPATIENT
Start: 2025-01-13

## 2025-01-12 RX ADMIN — ARFORMOTEROL TARTRATE: 15 SOLUTION RESPIRATORY (INHALATION) at 08:23

## 2025-01-12 RX ADMIN — POTASSIUM CHLORIDE 20 MEQ: 1500 TABLET, EXTENDED RELEASE ORAL at 09:35

## 2025-01-12 RX ADMIN — SODIUM CHLORIDE, PRESERVATIVE FREE 10 ML: 5 INJECTION INTRAVENOUS at 09:36

## 2025-01-12 RX ADMIN — METOPROLOL SUCCINATE 50 MG: 50 TABLET, EXTENDED RELEASE ORAL at 09:35

## 2025-01-12 RX ADMIN — WATER 1000 MG: 1 INJECTION INTRAMUSCULAR; INTRAVENOUS; SUBCUTANEOUS at 13:19

## 2025-01-12 RX ADMIN — DILTIAZEM HYDROCHLORIDE 180 MG: 180 CAPSULE, COATED, EXTENDED RELEASE ORAL at 09:36

## 2025-01-12 RX ADMIN — LEVOTHYROXINE SODIUM 125 MCG: 0.05 TABLET ORAL at 05:33

## 2025-01-12 RX ADMIN — CITALOPRAM HYDROBROMIDE 10 MG: 20 TABLET ORAL at 09:36

## 2025-01-12 RX ADMIN — FERROUS SULFATE TAB 325 MG (65 MG ELEMENTAL FE) 325 MG: 325 (65 FE) TAB at 09:35

## 2025-01-12 RX ADMIN — CYANOCOBALAMIN TAB 1000 MCG 1000 MCG: 1000 TAB at 09:35

## 2025-01-12 RX ADMIN — Medication 500 MG: at 09:36

## 2025-01-12 RX ADMIN — Medication 2000 UNITS: at 09:35

## 2025-01-12 NOTE — PROGRESS NOTES
Call placed to [facility] to verify discharge from their perspective. Also for direction if Xarelto will need held upcoming planned cystoscopy and ablation with Dr. Shrestha on 1/16.    Awaiting response from on call provider.  Dorene Perla RN

## 2025-01-12 NOTE — PLAN OF CARE
Problem: Chronic Conditions and Co-morbidities  Goal: Patient's chronic conditions and co-morbidity symptoms are monitored and maintained or improved  1/11/2025 1857 by Dorene Perla RN  Outcome: Progressing  Flowsheets (Taken 1/11/2025 0759)  Care Plan - Patient's Chronic Conditions and Co-Morbidity Symptoms are Monitored and Maintained or Improved: Monitor and assess patient's chronic conditions and comorbid symptoms for stability, deterioration, or improvement     Problem: Discharge Planning  Goal: Discharge to home or other facility with appropriate resources  1/12/2025 0034 by Fernanda Lambert RN  Outcome: Progressing  1/11/2025 1857 by Dorene Perla RN  Outcome: Progressing  Flowsheets (Taken 1/11/2025 0759)  Discharge to home or other facility with appropriate resources: Identify barriers to discharge with patient and caregiver     Problem: Pain  Goal: Verbalizes/displays adequate comfort level or baseline comfort level  1/12/2025 0034 by Fernanda Lambert RN  Outcome: Progressing  1/11/2025 1857 by Dorene Perla RN  Outcome: Progressing     Problem: ABCDS Injury Assessment  Goal: Absence of physical injury  1/12/2025 0034 by Fernanda Lambert RN  Outcome: Progressing  1/11/2025 1857 by Dorene Perla RN  Outcome: Progressing     Problem: Genitourinary - Adult  Goal: Urinary catheter remains patent  1/11/2025 1857 by Dorene Perla RN  Outcome: Progressing  Flowsheets (Taken 1/11/2025 0759)  Urinary catheter remains patent: Assess patency of urinary catheter     Problem: Genitourinary - Adult  Goal: Absence of urinary retention  1/12/2025 0034 by Fernanda Lambert RN  Outcome: Progressing  1/11/2025 1857 by Dorene Perla RN  Outcome: Progressing  Flowsheets (Taken 1/11/2025 0759)  Absence of urinary retention: Assess patient’s ability to void and empty bladder     Problem: Infection - Adult  Goal: Absence of infection at discharge  1/11/2025 1857 by Dorene Perla RN  Outcome:

## 2025-01-12 NOTE — DISCHARGE SUMMARY
[facility] Hospitalist Physician Discharge Summary       No follow-up provider specified.    Activity level: As tolerated     Dispo: Home      Condition on discharge: Stable     Patient ID:  Radha Dixon  [id number]  89 y.o.  7/11/1934    Admit date: 1/7/2025    Discharge date and time:  1/12/2025  11:23 AM    Admission Diagnoses: Principal Problem:    Pyelonephritis  Active Problems:    Other microscopic hematuria    Hypotension due to hypovolemia  Resolved Problems:    * No resolved hospital problems. *      Discharge Diagnoses: Principal Problem:    Pyelonephritis  Active Problems:    Other microscopic hematuria    Hypotension due to hypovolemia  Resolved Problems:    * No resolved hospital problems. *      Consults:  IP CONSULT TO UROLOGY  IP CONSULT TO HOSPITALIST  IP CONSULT TO PHARMACY  IP CONSULT TO PALLIATIVE CARE    Hospital Course:   Patient Radha Dixon is a 89 y.o. presented with Pyelonephritis [N12]       89 y.o. year old female with Asthma, Atrial fibrillation, persistent, Depression, Generalized osteoarthritis, Hyperlipidemia, Knee pain, Pinched nerve in neck, Prolonged emergence from general anesthesia, Thyroid disease, and Vitamin D deficiency presented to the ED with complaints of right flank pain beginning hours prior to arrival.  She also endorses suprapubic discomfort, a low-grade fever of 100.2 for which she took Tylenol, an episode of vomiting 1 day ago, she follows with urology for right-sided hydronephrosis status post stents and biopsy which is significant low-grade noninvasive papillary urothelial carcinoma.  Workup in the ED noted for tachycardia, intermittently hypotension UA with hematuria, bacteriuria, pyuria, leukocytosis 15.3.  CT of the abdomen noted for perinephric/periureteral stranding.  She received IV fluid resuscitation, broad-spectrum antibiotic and admitted to the hospital for further care. She was treated and evaluated for the following:                     with PCP  Please review results of labs and imaging studies with PCP  Follow-up with consultants as directed by them   If recurrence or worsening of symptoms please call primary care physician or return to the ER immediately  Diet: No diet orders on file    36 minutes was spent in preparing discharge papers, discussing discharge with patient, medication review, etc.    This note was generated by the epic EMR system/Dragon speech recognition and may contain inherent errors or omissions not intended by the user. Grammatical errors, random word insertions, deletions, pronoun errors and incomplete sentences are occasional consequences of this technology due to software limitations. Not all errors are caught and corrected. If there are questions or concerns about the content of this note or information contained within the body of this dictation they should be addressed directly with the author for clarification.     Signed:  Electronically signed by Adryan Almanzar MD on 1/12/2025 at 11:23 AM

## 2025-01-12 NOTE — PLAN OF CARE
Problem: Chronic Conditions and Co-morbidities  Goal: Patient's chronic conditions and co-morbidity symptoms are monitored and maintained or improved  Outcome: Completed  Flowsheets (Taken 1/12/2025 0854)  Care Plan - Patient's Chronic Conditions and Co-Morbidity Symptoms are Monitored and Maintained or Improved: Monitor and assess patient's chronic conditions and comorbid symptoms for stability, deterioration, or improvement     Problem: Discharge Planning  Goal: Discharge to home or other facility with appropriate resources  Outcome: Completed  Flowsheets (Taken 1/12/2025 0854)  Discharge to home or other facility with appropriate resources: Identify barriers to discharge with patient and caregiver     Problem: Pain  Goal: Verbalizes/displays adequate comfort level or baseline comfort level  Outcome: Completed     Problem: ABCDS Injury Assessment  Goal: Absence of physical injury  Outcome: Completed     Problem: Genitourinary - Adult  Goal: Absence of urinary retention  Outcome: Completed  Flowsheets (Taken 1/12/2025 0854)  Absence of urinary retention: Assess patient’s ability to void and empty bladder  Goal: Urinary catheter remains patent  Outcome: Completed  Flowsheets (Taken 1/12/2025 0854)  Urinary catheter remains patent: Assess patency of urinary catheter     Problem: Infection - Adult  Goal: Absence of infection at discharge  Outcome: Completed  Flowsheets (Taken 1/12/2025 0854)  Absence of infection at discharge: Assess and monitor for signs and symptoms of infection  Goal: Absence of infection during hospitalization  Outcome: Completed  Flowsheets (Taken 1/12/2025 0854)  Absence of infection during hospitalization: Assess and monitor for signs and symptoms of infection  Goal: Absence of fever/infection during anticipated neutropenic period  Outcome: Completed  Flowsheets (Taken 1/12/2025 0854)  Absence of fever/infection during anticipated neutropenic period: Monitor white blood cell count      Problem: Hematologic - Adult  Goal: Maintains hematologic stability  Outcome: Completed  Flowsheets (Taken 1/12/2025 5522)  Maintains hematologic stability: Assess for signs and symptoms of bleeding or hemorrhage     Problem: Safety - Adult  Goal: Free from fall injury  Outcome: Completed

## 2025-01-13 NOTE — PROGRESS NOTES
Spoke with Peyton at Dr. RIO Shrestha's office noted in EMR patient is on droplet plus isolation, patient had COVID-19 respiratory panel on 1/8.  Discussed with her PAT protocol for COVID.  She states will follow up with Cassandra.

## 2025-01-15 NOTE — H&P
HISTORY AND PHYSICAL EXAM    Radha Dixon is a 89 y.o. female with Intermittent gross hematuria.  She was recently found to have a right mid ureteral urothelial cell carcinoma.  She has a right ureteral stent in place.    Past Medical History:   Diagnosis Date    Asthma     Atrial fibrillation, persistent (HCC)     Depression     Generalized osteoarthritis 09/12/2006    Hyperlipidemia     Knee pain     Sees Dr. Manrique for b/l knee pain / gel injections    Pinched nerve in neck     Prolonged emergence from general anesthesia     Thyroid disease     Vitamin D deficiency        Past Surgical History:   Procedure Laterality Date    APPENDECTOMY  58 YEARS AGO    ARTHROPLASTY Left 03/29/2023    LEFT CARPAL TUNNEL RELEASE LEFT MEDIAN NERVE DECOMPRESSION AT ELBOW LEFT THUMB TRAPEZIECTOMY LIGAMENT RECONSTRUCTION TENDON INTERPOSITION WITH FIBERTAK performed by Chino Handley MD at [facility] OR    CARDIOVERSION  09/20/2019    Dr. Ledesma 1 shock 200 joules Successful    CARDIOVERSION  08/02/2021    Dr. Matias. 200J x1 converted to NSR    CHOLECYSTECTOMY      CYSTOSCOPY N/A 12/31/2024    CYSTOSCOPY BILATERAL RETROGRADE PYELOGRAM, RIGHT URETEROSCOPY, RIGHT URETERAL BIOPSIES, RIGHT URETERAL STENT INSERTION, PELVIC EXAM performed [place] [facility] [place]    EYE SURGERY      CATARACT RIGHT AND LEFT    HYSTERECTOMY (CERVIX STATUS UNKNOWN)      NECK SURGERY  09/09/2011    THORACENTESIS Right 10/11/2024    650mL clear yellow    THYROIDECTOMY  17 YEARS AGO    TONSILLECTOMY         Family History   Problem Relation Age of Onset    Melanoma Mother 60    Breast Cancer Daughter 40        invasive, recurrence age 58       Prior to Admission medications    Medication Sig Start Date End Date Taking? Authorizing Provider   levothyroxine (SYNTHROID) 125 MCG tablet Take 1 tablet by mouth Daily 1/13/25   Adryan Almanzar MD   amoxicillin-clavulanate (AUGMENTIN) 875-125 MG per tablet Take 1 tablet by mouth 2 times daily for 7 days

## 2025-01-17 ENCOUNTER — TELEPHONE (OUTPATIENT)
Dept: ADMINISTRATIVE | Age: 89
End: 2025-01-17

## 2025-01-17 NOTE — TELEPHONE ENCOUNTER
Andreas called to inform Dr. Ledesma that pt was taken off of Amiodarone while she was in the hospital.  She wanted to make sure he was aware of this.  Pt and Dr. Caro are concerned and wondering if she needs to resume taking it.  Please contact Andreas.  When calling back, please say it is urgent so that the office will get Andreas on the line.

## 2025-01-17 NOTE — TELEPHONE ENCOUNTER
I am a Walk In [facility] provider, not her PCP. The patient will need to follow up with her PCP. If she does not have a PCP she will need to establish with one. Thank You

## 2025-01-20 NOTE — PROGRESS NOTES
Physician Progress Note      PATIENT:               SIVLIA YU  CSN #:                  [id number]  :                       1934  ADMIT DATE:       2025 8:50 PM  DISCH DATE:        2025 4:00 PM  RESPONDING  PROVIDER #:        Adryan Almanzar MD          QUERY TEXT:    Pt admitted with UTI.  Pt noted to have right ureteral Stent placed the week   prior.   If possible, please document in the progress notes and discharge   summary if you are evaluating and/or treating any of the following:    The medical record reflects the following:  Risk Factors: StentPlacement  Clinical Indicators:  Presents with right flank pain, CT Noted: right ureteral   stent extending from extrarenal pelvis into the urinary bladder, mild right   hydronephrosis. DC summary states Status post ureteral stent and biopsy was   positive for noninvasive urothelial carcinoma.  Treatment: Rocephin, IV, CT Abd.  Options provided:  -- UTI due to ureteral stent  -- UTI not due to ureteral stent  -- Other - I will add my own diagnosis  -- Disagree - Not applicable / Not valid  -- Disagree - Clinically unable to determine / Unknown  -- Refer to Clinical Documentation Reviewer    PROVIDER RESPONSE TEXT:    UTI is due to ureteral stent.    Query created by: Peace Wetzel on 1/15/2025 9:54 AM      Electronically signed by:  Adryan Almanzar MD 2025 12:04 PM

## 2025-01-24 ENCOUNTER — APPOINTMENT (OUTPATIENT)
Dept: OTHER | Age: 89
End: 2025-01-24
Payer: MEDICARE

## 2025-01-24 ENCOUNTER — HOSPITAL ENCOUNTER (OUTPATIENT)
Dept: OTHER | Age: 89
Setting detail: THERAPIES SERIES
Discharge: HOME | End: 2025-01-24
Payer: MEDICARE

## 2025-01-24 VITALS
SYSTOLIC BLOOD PRESSURE: 99 MMHG | RESPIRATION RATE: 18 BRPM | DIASTOLIC BLOOD PRESSURE: 67 MMHG | OXYGEN SATURATION: 98 % | WEIGHT: 155 LBS | BODY MASS INDEX: 23.57 KG/M2 | HEART RATE: 100 BPM

## 2025-01-24 LAB
ANION GAP SERPL CALCULATED.3IONS-SCNC: 14 MMOL/L (ref 7–16)
BNP SERPL-MCNC: 4751 PG/ML (ref 0–450)
BUN SERPL-MCNC: 18 MG/DL (ref 6–23)
CALCIUM SERPL-MCNC: 9.2 MG/DL (ref 8.6–10.2)
CHLORIDE SERPL-SCNC: 98 MMOL/L (ref 98–107)
CO2 SERPL-SCNC: 22 MMOL/L (ref 22–29)
CREAT SERPL-MCNC: 1.2 MG/DL (ref 0.5–1)
GFR, ESTIMATED: 45 ML/MIN/1.73M2
GLUCOSE SERPL-MCNC: 114 MG/DL (ref 74–99)
POTASSIUM SERPL-SCNC: 4.4 MMOL/L (ref 3.5–5)
SODIUM SERPL-SCNC: 134 MMOL/L (ref 132–146)

## 2025-01-24 PROCEDURE — 80048 BASIC METABOLIC PNL TOTAL CA: CPT

## 2025-01-24 PROCEDURE — 83880 ASSAY OF NATRIURETIC PEPTIDE: CPT

## 2025-01-24 PROCEDURE — 99214 OFFICE O/P EST MOD 30 MIN: CPT

## 2025-01-24 RX ORDER — METOPROLOL SUCCINATE 50 MG/1
75 TABLET, EXTENDED RELEASE ORAL DAILY
Qty: 45 TABLET | Refills: 3 | Status: SHIPPED | OUTPATIENT
Start: 2025-01-24

## 2025-01-24 ASSESSMENT — PATIENT HEALTH QUESTIONNAIRE - PHQ9
2. FEELING DOWN, DEPRESSED OR HOPELESS: NOT AT ALL
SUM OF ALL RESPONSES TO PHQ QUESTIONS 1-9: 0
SUM OF ALL RESPONSES TO PHQ QUESTIONS 1-9: 0
SUM OF ALL RESPONSES TO PHQ9 QUESTIONS 1 & 2: 0
1. LITTLE INTEREST OR PLEASURE IN DOING THINGS: NOT AT ALL
SUM OF ALL RESPONSES TO PHQ QUESTIONS 1-9: 0
SUM OF ALL RESPONSES TO PHQ QUESTIONS 1-9: 0

## 2025-01-24 NOTE — PLAN OF CARE
Problem: Chronic Conditions and Co-morbidities  Goal: Patient's chronic conditions and co-morbidity symptoms are monitored and maintained or improved  Flowsheets (Taken 1/24/2025 1243)  Care Plan - Patient's Chronic Conditions and Co-Morbidity Symptoms are Monitored and Maintained or Improved: Monitor and assess patient's chronic conditions and comorbid symptoms for stability, deterioration, or improvement

## 2025-01-24 NOTE — PROGRESS NOTES
Eve Ramirez, APRN - Peace Penaloza, RN  Labs and CHF clinic note reviewed  Increase Toprol to 75 mg daily  Follow up in 2 weeks   Above orders given to pt.  Pt. repeated INSTRUCTIONS and verbalized understanding.

## 2025-01-24 NOTE — PROGRESS NOTES
Congestive Heart Failure Clinic   [facility]      Referring Provider: DR. JASSO  Primary Care Physician: [facility]  Cardiologist: DR. JASSO  Nephrologist: N/A      HISTORY OF PRESENT ILLNESS:     Radha Dixon is a 89 y.o. (7/11/1934) female with a history of HFrEF (EF < 40%)  Pre Cupid:     Lab Results   Component Value Date    LVEF 55 08/07/2021     Post Cupid:    Lab Results   Component Value Date    EFBP 35 (A) 10/08/2024         She presents to the CHF clinic for ongoing evaluation and monitoring of heart failure.    In the CHF clinic today she denies any adverse symptoms except:  [x] Dizziness or lightheadedness--improved  [] Syncope or near syncope  [] Recent Fall  [] Shortness of breath at rest   [x] Dyspnea with exertion  [] Decline in functional capacity (unable to perform activities they had previously been able to do)  [] Fatigue   [] Orthopnea  [] PND  [] Nocturnal cough  [] Hemoptysis  [] Chest pain, pressure, or discomfort  [] Palpitations  [] Abdominal distention  [] Abdominal bloating  [] Early satiety  [] Blood in stool   [] Diarrhea  [] Constipation  [] Nausea/Vomiting  [] Decreased urinary response to oral diuretic   [] Scrotal swelling   [] Lower extremity edema  [] Used PRN doses of oral diuretic   [] Weight gain    Wt Readings from Last 3 Encounters:   01/24/25 70.3 kg (155 lb)   01/12/25 73.9 kg (162 lb 15.7 oz)   12/31/24 73 kg (161 lb)           SOCIAL HISTORY:  [x] Denies tobacco, alcohol or illicit drug abuse  [] Tobacco use:  [] ETOH use:  [] Illicit drug use:        MEDICATIONS:    Allergies   Allergen Reactions    Codeine Nausea And Vomiting    Codimal-A [Brompheniramine] Other (See Comments)     Altered mental state and confusion    Lipitor Other (See Comments)     GENERALIZED ACHING, DIFFICULT TO FUNCTION    Spiriva Handihaler [Tiotropium Bromide Monohydrate]     Tudorza Pressair [Aclidinium Bromide]

## 2025-01-27 NOTE — PROGRESS NOTES
[facility]   PRE-ADMISSION TESTING GENERAL INSTRUCTIONS  PAT Phone Number: [phone]      GENERAL INSTRUCTIONS:    [x] Antibacterial Soap Shower Night before AND the Morning of procedure.    [x] Do not wear makeup, lotions, powders, deodorant the morning of surgery.  [x] No solid food after midnight. You may have SIPS of clear liquids up until 2 hours before your arrival time to the hospital.   [x] You may brush your teeth, gargle, but do not swallow water.   [x] No tobacco products, illegal drugs, or alcohol within 24 hours of your surgery.  [x] Jewelry or valuables should not be brought to the hospital. All body and/or tongue piercing's must be removed prior to arriving to hospital. No contact lens or hair pins.   [x] Arrange transportation with a responsible adult  to and from the hospital. Arrange for someone to be with you for the remainder of the day and for 24 hours after your procedure due to having had anesthesia.          -Who will be your  for transportation? daughter        -Who will be staying with you for 24 hrs after your procedure? daughter  [x] Bring insurance card and photo ID.         PARKING INSTRUCTIONS:     [x] ARRIVAL DATE & TIME: 1/30 8:30 am   [x] Times are subject to change. We will contact you the business day before surgery if that were to occur.  [x] Enter into the [facility] Entrance. Two people may accompany you. Masks are not required.  [x] Parking Lot \"I\" is where you will park. It is located on the corner of [address] and [address]. The entrance is on [address].   Only one vehicle - per patient, is permitted in parking lot.   Upon entering the parking lot, a voucher ticket will print.    EDUCATION INSTRUCTIONS:           .  [x] Pain: Post-op pain is normal and to be expected. You will be asked to rate your pain from 0-10.    MEDICATION INSTRUCTIONS:    [x] Bring a complete list of your medications, please write the

## 2025-01-30 ENCOUNTER — APPOINTMENT (OUTPATIENT)
Dept: GENERAL RADIOLOGY | Age: 89
End: 2025-01-30
Attending: UROLOGY
Payer: MEDICARE

## 2025-01-30 ENCOUNTER — ANESTHESIA EVENT (OUTPATIENT)
Dept: OPERATING ROOM | Age: 89
End: 2025-01-30
Payer: MEDICARE

## 2025-01-30 ENCOUNTER — HOSPITAL ENCOUNTER (OUTPATIENT)
Age: 89
Setting detail: OUTPATIENT SURGERY
Discharge: HOME | End: 2025-01-30
Attending: UROLOGY | Admitting: UROLOGY
Payer: MEDICARE

## 2025-01-30 ENCOUNTER — ANESTHESIA (OUTPATIENT)
Dept: OPERATING ROOM | Age: 89
End: 2025-01-30
Payer: MEDICARE

## 2025-01-30 VITALS
HEIGHT: 68 IN | SYSTOLIC BLOOD PRESSURE: 138 MMHG | HEART RATE: 109 BPM | TEMPERATURE: 97.1 F | RESPIRATION RATE: 15 BRPM | DIASTOLIC BLOOD PRESSURE: 107 MMHG | WEIGHT: 155 LBS | OXYGEN SATURATION: 84 % | BODY MASS INDEX: 23.49 KG/M2

## 2025-01-30 DIAGNOSIS — C66.1 MALIGNANT NEOPLASM OF RIGHT URETER (HCC): ICD-10-CM

## 2025-01-30 LAB — GLUCOSE BLD-MCNC: 90 MG/DL (ref 74–99)

## 2025-01-30 PROCEDURE — 3600000013 HC SURGERY LEVEL 3 ADDTL 15MIN: Performed by: UROLOGY

## 2025-01-30 PROCEDURE — 74420 UROGRAPHY RTRGR +-KUB: CPT

## 2025-01-30 PROCEDURE — 88305 TISSUE EXAM BY PATHOLOGIST: CPT

## 2025-01-30 PROCEDURE — 3700000001 HC ADD 15 MINUTES (ANESTHESIA): Performed by: UROLOGY

## 2025-01-30 PROCEDURE — 2709999900 HC NON-CHARGEABLE SUPPLY: Performed by: UROLOGY

## 2025-01-30 PROCEDURE — 2720000010 HC SURG SUPPLY STERILE: Performed by: UROLOGY

## 2025-01-30 PROCEDURE — 3600000003 HC SURGERY LEVEL 3 BASE: Performed by: UROLOGY

## 2025-01-30 PROCEDURE — 7100000000 HC PACU RECOVERY - FIRST 15 MIN: Performed by: UROLOGY

## 2025-01-30 PROCEDURE — 6360000002 HC RX W HCPCS: Performed by: NURSE ANESTHETIST, CERTIFIED REGISTERED

## 2025-01-30 PROCEDURE — 7100000001 HC PACU RECOVERY - ADDTL 15 MIN: Performed by: UROLOGY

## 2025-01-30 PROCEDURE — 82962 GLUCOSE BLOOD TEST: CPT

## 2025-01-30 PROCEDURE — 2580000003 HC RX 258: Performed by: UROLOGY

## 2025-01-30 PROCEDURE — C1769 GUIDE WIRE: HCPCS | Performed by: UROLOGY

## 2025-01-30 PROCEDURE — C2617 STENT, NON-COR, TEM W/O DEL: HCPCS | Performed by: UROLOGY

## 2025-01-30 PROCEDURE — 3700000000 HC ANESTHESIA ATTENDED CARE: Performed by: UROLOGY

## 2025-01-30 PROCEDURE — 7100000011 HC PHASE II RECOVERY - ADDTL 15 MIN: Performed by: UROLOGY

## 2025-01-30 PROCEDURE — 7100000010 HC PHASE II RECOVERY - FIRST 15 MIN: Performed by: UROLOGY

## 2025-01-30 DEVICE — URETERAL STENT
Type: IMPLANTABLE DEVICE | Site: URETER | Status: FUNCTIONAL
Brand: PERCUFLEX™

## 2025-01-30 RX ORDER — ONDANSETRON 2 MG/ML
4 INJECTION INTRAMUSCULAR; INTRAVENOUS
Status: DISCONTINUED | OUTPATIENT
Start: 2025-01-30 | End: 2025-01-30 | Stop reason: HOSPADM

## 2025-01-30 RX ORDER — SODIUM CHLORIDE 0.9 % (FLUSH) 0.9 %
5-40 SYRINGE (ML) INJECTION EVERY 12 HOURS SCHEDULED
Status: DISCONTINUED | OUTPATIENT
Start: 2025-01-30 | End: 2025-01-30 | Stop reason: HOSPADM

## 2025-01-30 RX ORDER — MIDAZOLAM HYDROCHLORIDE 2 MG/2ML
2 INJECTION, SOLUTION INTRAMUSCULAR; INTRAVENOUS
Status: DISCONTINUED | OUTPATIENT
Start: 2025-01-30 | End: 2025-01-30 | Stop reason: HOSPADM

## 2025-01-30 RX ORDER — METOPROLOL SUCCINATE 50 MG/1
50 TABLET, EXTENDED RELEASE ORAL ONCE
Status: DISCONTINUED | OUTPATIENT
Start: 2025-01-30 | End: 2025-01-30

## 2025-01-30 RX ORDER — SODIUM CHLORIDE 0.9 % (FLUSH) 0.9 %
5-40 SYRINGE (ML) INJECTION PRN
Status: DISCONTINUED | OUTPATIENT
Start: 2025-01-30 | End: 2025-01-30 | Stop reason: HOSPADM

## 2025-01-30 RX ORDER — SODIUM CHLORIDE 9 MG/ML
INJECTION, SOLUTION INTRAVENOUS PRN
Status: DISCONTINUED | OUTPATIENT
Start: 2025-01-30 | End: 2025-01-30 | Stop reason: HOSPADM

## 2025-01-30 RX ORDER — NALOXONE HYDROCHLORIDE 0.4 MG/ML
INJECTION, SOLUTION INTRAMUSCULAR; INTRAVENOUS; SUBCUTANEOUS PRN
Status: DISCONTINUED | OUTPATIENT
Start: 2025-01-30 | End: 2025-01-30 | Stop reason: HOSPADM

## 2025-01-30 RX ORDER — PROPOFOL 10 MG/ML
INJECTION, EMULSION INTRAVENOUS
Status: DISCONTINUED | OUTPATIENT
Start: 2025-01-30 | End: 2025-01-30 | Stop reason: SDUPTHER

## 2025-01-30 RX ORDER — DIPHENHYDRAMINE HYDROCHLORIDE 50 MG/ML
12.5 INJECTION INTRAMUSCULAR; INTRAVENOUS
Status: DISCONTINUED | OUTPATIENT
Start: 2025-01-30 | End: 2025-01-30 | Stop reason: HOSPADM

## 2025-01-30 RX ORDER — IBUPROFEN 400 MG/1
800 TABLET, FILM COATED ORAL EVERY 8 HOURS PRN
Status: DISCONTINUED | OUTPATIENT
Start: 2025-01-30 | End: 2025-01-30 | Stop reason: HOSPADM

## 2025-01-30 RX ORDER — LABETALOL HYDROCHLORIDE 5 MG/ML
5 INJECTION, SOLUTION INTRAVENOUS
Status: DISCONTINUED | OUTPATIENT
Start: 2025-01-30 | End: 2025-01-30 | Stop reason: HOSPADM

## 2025-01-30 RX ORDER — SODIUM CHLORIDE 9 MG/ML
INJECTION, SOLUTION INTRAVENOUS CONTINUOUS
Status: DISCONTINUED | OUTPATIENT
Start: 2025-01-30 | End: 2025-01-30 | Stop reason: HOSPADM

## 2025-01-30 RX ORDER — PHENAZOPYRIDINE HYDROCHLORIDE 100 MG/1
100 TABLET, FILM COATED ORAL 3 TIMES DAILY PRN
Qty: 20 TABLET | Refills: 0 | Status: SHIPPED | OUTPATIENT
Start: 2025-01-30 | End: 2025-02-06

## 2025-01-30 RX ORDER — HYDROMORPHONE HYDROCHLORIDE 1 MG/ML
0.25 INJECTION, SOLUTION INTRAMUSCULAR; INTRAVENOUS; SUBCUTANEOUS EVERY 5 MIN PRN
Status: DISCONTINUED | OUTPATIENT
Start: 2025-01-30 | End: 2025-01-30 | Stop reason: HOSPADM

## 2025-01-30 RX ORDER — IBUPROFEN 800 MG/1
800 TABLET, FILM COATED ORAL EVERY 8 HOURS PRN
Qty: 20 TABLET | Refills: 0 | Status: SHIPPED | OUTPATIENT
Start: 2025-01-30

## 2025-01-30 RX ORDER — PHENYLEPHRINE HCL IN 0.9% NACL 1 MG/10 ML
SYRINGE (ML) INTRAVENOUS
Status: DISCONTINUED | OUTPATIENT
Start: 2025-01-30 | End: 2025-01-30 | Stop reason: SDUPTHER

## 2025-01-30 RX ORDER — CEFAZOLIN SODIUM 1 G/3ML
INJECTION, POWDER, FOR SOLUTION INTRAMUSCULAR; INTRAVENOUS
Status: DISCONTINUED | OUTPATIENT
Start: 2025-01-30 | End: 2025-01-30 | Stop reason: SDUPTHER

## 2025-01-30 RX ORDER — ACETAMINOPHEN 325 MG/1
650 TABLET ORAL
Status: DISCONTINUED | OUTPATIENT
Start: 2025-01-30 | End: 2025-01-30 | Stop reason: HOSPADM

## 2025-01-30 RX ORDER — HYDRALAZINE HYDROCHLORIDE 20 MG/ML
5 INJECTION INTRAMUSCULAR; INTRAVENOUS
Status: DISCONTINUED | OUTPATIENT
Start: 2025-01-30 | End: 2025-01-30 | Stop reason: HOSPADM

## 2025-01-30 RX ORDER — PHENAZOPYRIDINE HYDROCHLORIDE 100 MG/1
100 TABLET, FILM COATED ORAL 3 TIMES DAILY PRN
Status: DISCONTINUED | OUTPATIENT
Start: 2025-01-30 | End: 2025-01-30 | Stop reason: HOSPADM

## 2025-01-30 RX ORDER — DROPERIDOL 2.5 MG/ML
0.62 INJECTION, SOLUTION INTRAMUSCULAR; INTRAVENOUS
Status: DISCONTINUED | OUTPATIENT
Start: 2025-01-30 | End: 2025-01-30 | Stop reason: HOSPADM

## 2025-01-30 RX ORDER — FENTANYL CITRATE 50 UG/ML
INJECTION, SOLUTION INTRAMUSCULAR; INTRAVENOUS
Status: DISCONTINUED | OUTPATIENT
Start: 2025-01-30 | End: 2025-01-30 | Stop reason: SDUPTHER

## 2025-01-30 RX ORDER — ONDANSETRON 2 MG/ML
4 INJECTION INTRAMUSCULAR; INTRAVENOUS EVERY 6 HOURS PRN
Status: DISCONTINUED | OUTPATIENT
Start: 2025-01-30 | End: 2025-01-30 | Stop reason: HOSPADM

## 2025-01-30 RX ORDER — HYDROMORPHONE HYDROCHLORIDE 1 MG/ML
0.5 INJECTION, SOLUTION INTRAMUSCULAR; INTRAVENOUS; SUBCUTANEOUS EVERY 5 MIN PRN
Status: DISCONTINUED | OUTPATIENT
Start: 2025-01-30 | End: 2025-01-30 | Stop reason: HOSPADM

## 2025-01-30 RX ORDER — METOPROLOL SUCCINATE 50 MG/1
75 TABLET, EXTENDED RELEASE ORAL ONCE
Status: DISCONTINUED | OUTPATIENT
Start: 2025-01-30 | End: 2025-01-30

## 2025-01-30 RX ADMIN — Medication 100 MCG: at 10:39

## 2025-01-30 RX ADMIN — Medication 50 MCG: at 10:33

## 2025-01-30 RX ADMIN — CEFAZOLIN 2 G: 1 INJECTION, POWDER, FOR SOLUTION INTRAMUSCULAR; INTRAVENOUS at 10:02

## 2025-01-30 RX ADMIN — SODIUM CHLORIDE: 9 INJECTION, SOLUTION INTRAVENOUS at 09:51

## 2025-01-30 RX ADMIN — Medication 50 MCG: at 10:51

## 2025-01-30 RX ADMIN — FENTANYL CITRATE 25 MCG: 50 INJECTION, SOLUTION INTRAMUSCULAR; INTRAVENOUS at 10:20

## 2025-01-30 RX ADMIN — FENTANYL CITRATE 25 MCG: 50 INJECTION, SOLUTION INTRAMUSCULAR; INTRAVENOUS at 10:39

## 2025-01-30 RX ADMIN — Medication 50 MCG: at 10:24

## 2025-01-30 RX ADMIN — FENTANYL CITRATE 25 MCG: 50 INJECTION, SOLUTION INTRAMUSCULAR; INTRAVENOUS at 10:10

## 2025-01-30 RX ADMIN — FENTANYL CITRATE 25 MCG: 50 INJECTION, SOLUTION INTRAMUSCULAR; INTRAVENOUS at 09:58

## 2025-01-30 RX ADMIN — PROPOFOL 100 MCG/KG/MIN: 10 INJECTION, EMULSION INTRAVENOUS at 09:58

## 2025-01-30 RX ADMIN — Medication 50 MCG: at 10:28

## 2025-01-30 ASSESSMENT — PAIN - FUNCTIONAL ASSESSMENT: PAIN_FUNCTIONAL_ASSESSMENT: NONE - DENIES PAIN

## 2025-01-30 NOTE — H&P
HISTORY AND PHYSICAL EXAM     Radha Dixon is a 89 y.o. female with Intermittent gross hematuria.  She was recently found to have a right mid ureteral urothelial cell carcinoma.  She has a right ureteral stent in place.     Past Medical History        Past Medical History:   Diagnosis Date    Asthma      Atrial fibrillation, persistent (HCC)      Depression      Generalized osteoarthritis 09/12/2006    Hyperlipidemia      Knee pain       Sees Dr. Manrique for b/l knee pain / gel injections    Pinched nerve in neck      Prolonged emergence from general anesthesia      Thyroid disease      Vitamin D deficiency              Past Surgical History         Past Surgical History:   Procedure Laterality Date    APPENDECTOMY   58 YEARS AGO    ARTHROPLASTY Left 03/29/2023     LEFT CARPAL TUNNEL RELEASE LEFT MEDIAN NERVE DECOMPRESSION AT ELBOW LEFT THUMB TRAPEZIECTOMY LIGAMENT RECONSTRUCTION TENDON INTERPOSITION WITH FIBERTAK performed by Chino Handley MD at [facility] OR    CARDIOVERSION   09/20/2019     Dr. Ledesma 1 shock 200 joules Successful    CARDIOVERSION   08/02/2021     Dr. Matias. 200J x1 converted to NSR    CHOLECYSTECTOMY        CYSTOSCOPY N/A 12/31/2024     CYSTOSCOPY BILATERAL RETROGRADE PYELOGRAM, RIGHT URETEROSCOPY, RIGHT URETERAL BIOPSIES, RIGHT URETERAL STENT INSERTION, PELVIC EXAM performed by Georgi Shrestha MD at [facility] [place]    EYE SURGERY         CATARACT RIGHT AND LEFT    HYSTERECTOMY (CERVIX STATUS UNKNOWN)        NECK SURGERY   09/09/2011    THORACENTESIS Right 10/11/2024     650mL clear yellow    THYROIDECTOMY   17 YEARS AGO    TONSILLECTOMY                Family History         Family History   Problem Relation Age of Onset    Melanoma Mother 60    Breast Cancer Daughter 40         invasive, recurrence age 58            Home Medications           Prior to Admission medications    Medication Sig Start Date End Date Taking? Authorizing Provider   levothyroxine (SYNTHROID) 125 MCG tablet Take 1 tablet

## 2025-01-30 NOTE — PROGRESS NOTES
Patient has not taken metoprolol since Saturday.  Dr. Plunkett aware.  OK for patient to not have metoprolol this AM per Dr. Plunkett.

## 2025-01-30 NOTE — ANESTHESIA POSTPROCEDURE EVALUATION
Department of Anesthesiology  Postprocedure Note    Patient: Radha Dixon  MRN: [id number]  YOB: 1934  Date of evaluation: 1/30/2025    Procedure Summary       Date: 01/30/25 Room / Location: [address]    Anesthesia Start: 0951 Anesthesia Stop: 1110    Procedure: CYSTOSCOPY RIGHT RETROGRADE PYELOGRAM, RIGHT URETEROSCOPY, BASKET EXTRACTION RIGHT URETERAL TUMOR, LASER ABLATION OF TUMOR, RIGHT URETERAL STENT EXCHANGE (Bladder) Diagnosis:       Malignant neoplasm of right ureter (HCC)      (Malignant neoplasm of right ureter (HCC) [C66.1])    Surgeons: Georgi Shrestha MD Responsible Provider: Kelli Plunkett MD    Anesthesia Type: MAC ASA Status: 3            Anesthesia Type: No value filed.    Lottie Phase I: Lottie Score: 8    Lottie Phase II: Lottie Score: 10    Anesthesia Post Evaluation    Patient location during evaluation: PACU  Patient participation: complete - patient participated  Level of consciousness: awake and alert  Airway patency: patent  Nausea & Vomiting: no nausea and no vomiting  Cardiovascular status: blood pressure returned to baseline and hemodynamically stable  Respiratory status: acceptable and spontaneous ventilation  Hydration status: euvolemic  Multimodal analgesia pain management approach  Pain management: adequate    No notable events documented.

## 2025-01-30 NOTE — BRIEF OP NOTE
Brief Postoperative Note      Patient: Radha Dixon  YOB: 1934  MRN: [id number]    Date of Procedure: 1/30/2025    Pre-Op Diagnosis Codes:      * Malignant neoplasm of right ureter (HCC) [C66.1]    Post-Op Diagnosis: Same       Procedure(s):  CYSTOSCOPY RIGHT RETROGRADE PYELOGRAM, RIGHT URETEROSCOPY, BASKET EXTRACTION RIGHT URETERAL TUMOR, LASER ABLATION OF TUMOR, RIGHT URETERAL STENT EXCHANGE    Surgeon(s):  Georgi Shrestha MD    Assistant:  * No surgical staff found *    Anesthesia: Monitor Anesthesia Care    Estimated Blood Loss (mL): Minimal    Complications: None    Specimens:   ID Type Source Tests Collected by Time Destination   A : RIGHT URETERAL TUMOR Tissue Tissue SURGICAL PATHOLOGY Georgi Shrestha MD 1/30/2025 1036        Implants:  Implant Name Type Inv. Item Serial No.  Lot No. LRB No. Used Action   STENT URET 7FR L28CM PERCFLX FIRM DUROMETER DBL PGTL TAPR - [id number]  STENT URET 7FR L28CM PERCFLX FIRM DUROMETER DBL PGTL TAPR  [employer]- [id number] Right 1 Implanted         Drains:   Ureteral Drain/Stent 01/30/25 Right Ureter (Active)       [REMOVED] Ureteral Drain/Stent 12/31/24 Right Ureter (Removed)       Findings:  Infection Present At Time Of Surgery (PATOS) (choose all levels that have infection present):  No infection present  Other Findings: Tumor    Electronically signed by Georgi Shrestha MD on 1/30/2025 at 11:31 AM

## 2025-01-30 NOTE — OP NOTE
[facility]  Urology Post-operative Note    Radha Dixon  YOB: 1934  [id number]    Pre-operative Diagnosis: Right hydronephrosis, low-grade right ureteral urothelial cell carcinoma, gross hematuria    Post-operative Diagnosis:  Same    Procedure: Cystourethroscopy, right retrograde pyelography, right semirigid and flexible ureteroscopy, right ureteral tumor biopsy and holmium laser tumor ablation, right JJ ureteral stent exchange    Surgeon: Georgi Shrestha MD    Anesthesia:   LMAC    Estimated Blood Loss:   Minimal    Complications: None    Drains: Right 7 Surinamese by 28 cm JJ ureteral stent    Specimen(s): Right ureteral tumor    Clinical Note:   89-year-old female with intermittent gross hematuria.  She was found recently to have a low-grade right mid to proximal urothelial carcinoma of the ureter.  A stent was placed.  She is not a candidate for a right radical nephro ureterectomy given her age and comorbidities.  She presents for the above listed procedure.  The risks and benefits of the procedure including but not limited to infection, bleeding, possible need for stent with stent irritation, possible inability to remove all the tumor requiring further procedures etc. were discussed in detail and she elected to proceed    Operative Description:   She was taken the operating room placed on the OR table in the supine position.  She received IV Ancef preoperatively.  Following induction of anesthesia, she was repositioned into the dorsolithotomy position.  A  from KUB showed a right ureteral stent.  There were no stones overlying the urinary tract.  Her external genitalia and abdomen were prepped and draped sterilely.  A 21 Surinamese cystoscope with 30 degree lens was inserted per urethra and into the bladder.  There were no urethral strictures false passages or tumors.  Panendoscopic evaluation of the bladder showed inflammation around the right trigone but no clots,  deployed.  The tumor bed and surrounding urothelium as well as any visible satellite lesions were then ablated with laser at low power settings.  There was minimal bleeding and no perforation.  The ureteroscope was removed under direct vision.  The cystoscope was reassembled and advanced per urethra and into the bladder over the safety wire.  A 7 Bruneian by 28 cm JJ ureteral stent was passed over the wire and into the right renal pelvis.  The wire was removed.  Fluoroscopy confirmed coiling of the stent proximally in the right kidney and was visualized to coil distally in the bladder.  The bladder was emptied and the cystoscope was removed.  She tolerated the procedure well and was taken to the PACU in stable condition.  She was discharged home with prescriptions for ibuprofen, Pyridium, Keflex.  I will see her back to discuss the pathology report and further steps next week.  Options include intermittent ureteroscopy with tumor debulking and laser ablation every 2 to 3 months.  She is not a candidate for a radical nephro ureterectomy in my opinion.  If the tumor remains low-grade, Jelmyto either through a retrograde procedure or antegrade nephrostomy tube will be considered.  If the tumor is high-grade, referral to medical oncology for chemotherapy will be considered.  She is likely to require a chronic stent.      Georgi Shrestha MD  1/30/2025  11:35 AM

## 2025-01-30 NOTE — DISCHARGE INSTRUCTIONS
Blood in the urine is to be expected  Increase oral fluid intake for 2-3 days  Call the [facility] (Dr. Figueredo/Tomer/Mckay/Fady) office for a follow up appointment in roughly 1 week--[phone]  Resume a normal diet  No heavy lifting or physical activity for 2 days  Resume anticoagulation in 2 days if there is no sign of active bleeding  OK to shower

## 2025-01-30 NOTE — ANESTHESIA PRE PROCEDURE
into the lungs 4 times daily as needed for Wheezing 12/14/23  Yes Marjan Roberts APRN - NP   fluticasone (FLONASE) 50 MCG/ACT nasal spray 1 spray by Nasal route daily as needed for Rhinitis   Yes Bolivar Anaya MD   loratadine (CLARITIN) 10 MG tablet Take 1 tablet by mouth daily as needed   Yes Bolivar Anaya MD   vitamin B-12 (CYANOCOBALAMIN) 1000 MCG tablet Take 1 tablet by mouth daily   Yes Bolivar Anaya MD   Cholecalciferol (VITAMIN D3) 50 MCG (2000 UT) TABS Take 1 tablet by mouth daily   Yes Bolivar Anaya MD   XARELTO 15 MG TABS tablet Take 1 tablet by mouth Daily with supper    ProviderBolivar MD   Vitamin E 400 UNIT TABS Take 400 Units by mouth daily    ProviderBolivar MD       Current medications:    Current Facility-Administered Medications   Medication Dose Route Frequency Provider Last Rate Last Admin    0.9 % sodium chloride infusion   IntraVENous Continuous Georgi Shrestha MD        sodium chloride flush 0.9 % injection 5-40 mL  5-40 mL IntraVENous 2 times per day Georgi Shrestha MD        sodium chloride flush 0.9 % injection 5-40 mL  5-40 mL IntraVENous PRN Georgi Shrestha MD        0.9 % sodium chloride infusion   IntraVENous PRN Georgi Shrestha MD        ceFAZolin (ANCEF) 2,000 mg in sterile water 20 mL IV syringe  2,000 mg IntraVENous On Call to OR Georgi Shrestha MD           Allergies:    Allergies   Allergen Reactions    Codeine Nausea And Vomiting    Codimal-A [Brompheniramine] Other (See Comments)     Altered mental state and confusion    Lipitor Other (See Comments)     GENERALIZED ACHING, DIFFICULT TO FUNCTION    Spiriva Handihaler [Tiotropium Bromide Monohydrate]     Tudorza Pressair [Aclidinium Bromide]     Ciprofloxacin Nausea And Vomiting       Problem List:    Patient Active Problem List   Diagnosis Code    Atrial fibrillation, persistent (HCC) I48.19    HLD (hyperlipidemia) E78.5    Depression F32.A    Asthma

## 2025-02-04 LAB — SURGICAL PATHOLOGY REPORT: NORMAL

## 2025-02-05 RX ORDER — METOPROLOL SUCCINATE 50 MG/1
75 TABLET, EXTENDED RELEASE ORAL DAILY
Qty: 45 TABLET | Refills: 5 | Status: SHIPPED | OUTPATIENT
Start: 2025-02-05

## 2025-02-05 RX ORDER — DILTIAZEM HYDROCHLORIDE 180 MG/1
180 CAPSULE, EXTENDED RELEASE ORAL DAILY
Qty: 30 CAPSULE | Refills: 5 | Status: SHIPPED | OUTPATIENT
Start: 2025-02-05

## 2025-02-05 RX ORDER — AMIODARONE HYDROCHLORIDE 100 MG/1
100 TABLET ORAL DAILY
Qty: 30 TABLET | Refills: 5 | Status: SHIPPED | OUTPATIENT
Start: 2025-02-05

## 2025-02-07 ENCOUNTER — HOSPITAL ENCOUNTER (OUTPATIENT)
Dept: OTHER | Age: 89
Setting detail: THERAPIES SERIES
Discharge: HOME | End: 2025-02-07
Payer: MEDICARE

## 2025-02-07 VITALS
RESPIRATION RATE: 18 BRPM | WEIGHT: 160 LBS | DIASTOLIC BLOOD PRESSURE: 69 MMHG | OXYGEN SATURATION: 96 % | SYSTOLIC BLOOD PRESSURE: 112 MMHG | BODY MASS INDEX: 24.33 KG/M2 | HEART RATE: 72 BPM

## 2025-02-07 LAB
ANION GAP SERPL CALCULATED.3IONS-SCNC: 12 MMOL/L (ref 7–16)
BNP SERPL-MCNC: 4318 PG/ML (ref 0–450)
BUN SERPL-MCNC: 17 MG/DL (ref 6–23)
CALCIUM SERPL-MCNC: 9.1 MG/DL (ref 8.6–10.2)
CHLORIDE SERPL-SCNC: 102 MMOL/L (ref 98–107)
CO2 SERPL-SCNC: 27 MMOL/L (ref 22–29)
CREAT SERPL-MCNC: 1.2 MG/DL (ref 0.5–1)
GFR, ESTIMATED: 41 ML/MIN/1.73M2
GLUCOSE SERPL-MCNC: 96 MG/DL (ref 74–99)
POTASSIUM SERPL-SCNC: 4.2 MMOL/L (ref 3.5–5)
SODIUM SERPL-SCNC: 141 MMOL/L (ref 132–146)

## 2025-02-07 PROCEDURE — 99214 OFFICE O/P EST MOD 30 MIN: CPT

## 2025-02-07 PROCEDURE — 83880 ASSAY OF NATRIURETIC PEPTIDE: CPT

## 2025-02-07 PROCEDURE — 80048 BASIC METABOLIC PNL TOTAL CA: CPT

## 2025-02-07 RX ORDER — VITAMIN B COMPLEX
1 CAPSULE ORAL DAILY
COMMUNITY

## 2025-02-07 NOTE — PLAN OF CARE
Problem: Chronic Conditions and Co-morbidities  Goal: Patient's chronic conditions and co-morbidity symptoms are monitored and maintained or improved  Flowsheets (Taken 2/7/2025 1440)  Care Plan - Patient's Chronic Conditions and Co-Morbidity Symptoms are Monitored and Maintained or Improved: Monitor and assess patient's chronic conditions and comorbid symptoms for stability, deterioration, or improvement

## 2025-02-07 NOTE — PROGRESS NOTES
Congestive Heart Failure Clinic   [facility]      Referring Provider: DR. JASSO  Primary Care Physician: [facility]  Cardiologist: DR. JASSO  Nephrologist: N/A      HISTORY OF PRESENT ILLNESS:     Radha Dixon is a 89 y.o. (7/11/1934) female with a history of HFrEF (EF < 40%)  Pre Cupid:     Lab Results   Component Value Date    LVEF 55 08/07/2021     Post Cupid:    Lab Results   Component Value Date    EFBP 35 (A) 10/08/2024         She presents to the CHF clinic for ongoing evaluation and monitoring of heart failure.    In the CHF clinic today she denies any adverse symptoms except:  [x] Dizziness or lightheadedness--improved  [] Syncope or near syncope  [] Recent Fall  [] Shortness of breath at rest   [x] Dyspnea with exertion  [] Decline in functional capacity (unable to perform activities they had previously been able to do)  [] Fatigue   [] Orthopnea  [] PND  [] Nocturnal cough  [] Hemoptysis  [] Chest pain, pressure, or discomfort  [] Palpitations  [] Abdominal distention  [] Abdominal bloating  [] Early satiety  [] Blood in stool   [] Diarrhea  [] Constipation  [] Nausea/Vomiting  [] Decreased urinary response to oral diuretic   [] Scrotal swelling   [] Lower extremity edema  [] Used PRN doses of oral diuretic   [] Weight gain    Wt Readings from Last 3 Encounters:   02/07/25 72.6 kg (160 lb)   01/30/25 70.3 kg (155 lb)   01/24/25 70.3 kg (155 lb)           SOCIAL HISTORY:  [x] Denies tobacco, alcohol or illicit drug abuse  [] Tobacco use:  [] ETOH use:  [] Illicit drug use:        MEDICATIONS:    Allergies   Allergen Reactions    Codeine Nausea And Vomiting    Codimal-A [Brompheniramine] Other (See Comments)     Altered mental state and confusion    Lipitor Other (See Comments)     GENERALIZED ACHING, DIFFICULT TO FUNCTION    Spiriva Handihaler [Tiotropium Bromide Monohydrate]     Tudorza Pressair [Aclidinium Bromide]     Ciprofloxacin

## 2025-02-12 NOTE — TELEPHONE ENCOUNTER
Patient notified and instructed on recommendations.     Responded to same question via [employer].  Kassandra Castellon RN

## 2025-02-20 ENCOUNTER — HOSPITAL ENCOUNTER (OUTPATIENT)
Dept: OTHER | Age: 89
Setting detail: THERAPIES SERIES
End: 2025-02-20
Payer: MEDICARE

## 2025-02-23 ENCOUNTER — APPOINTMENT (OUTPATIENT)
Dept: GENERAL RADIOLOGY | Age: 89
End: 2025-02-23
Payer: MEDICARE

## 2025-02-23 ENCOUNTER — HOSPITAL ENCOUNTER (INPATIENT)
Age: 89
LOS: 4 days | Discharge: HOME | End: 2025-02-28
Attending: STUDENT IN AN ORGANIZED HEALTH CARE EDUCATION/TRAINING PROGRAM | Admitting: HOSPITALIST
Payer: MEDICARE

## 2025-02-23 ENCOUNTER — APPOINTMENT (OUTPATIENT)
Dept: CT IMAGING | Age: 89
End: 2025-02-23
Payer: MEDICARE

## 2025-02-23 DIAGNOSIS — N39.0 URINARY TRACT INFECTION WITH HEMATURIA, SITE UNSPECIFIED: ICD-10-CM

## 2025-02-23 DIAGNOSIS — A41.9 SEPTICEMIA (HCC): ICD-10-CM

## 2025-02-23 DIAGNOSIS — E87.6 HYPOKALEMIA: ICD-10-CM

## 2025-02-23 DIAGNOSIS — E83.42 HYPOMAGNESEMIA: ICD-10-CM

## 2025-02-23 DIAGNOSIS — R31.9 URINARY TRACT INFECTION WITH HEMATURIA, SITE UNSPECIFIED: ICD-10-CM

## 2025-02-23 DIAGNOSIS — I48.92 ATRIAL FLUTTER WITH RAPID VENTRICULAR RESPONSE (HCC): ICD-10-CM

## 2025-02-23 DIAGNOSIS — J10.1 INFLUENZA A: Primary | ICD-10-CM

## 2025-02-23 LAB
ALBUMIN SERPL-MCNC: 2.9 G/DL (ref 3.5–5.2)
ALP SERPL-CCNC: 63 U/L (ref 35–104)
ALT SERPL-CCNC: 7 U/L (ref 0–32)
ANION GAP SERPL CALCULATED.3IONS-SCNC: 10 MMOL/L (ref 7–16)
AST SERPL-CCNC: 14 U/L (ref 0–31)
B PARAP IS1001 DNA NPH QL NAA+NON-PROBE: NOT DETECTED
B PERT DNA SPEC QL NAA+PROBE: NOT DETECTED
BACTERIA URNS QL MICRO: ABNORMAL
BASOPHILS # BLD: 0.03 K/UL (ref 0–0.2)
BASOPHILS NFR BLD: 0 % (ref 0–2)
BILIRUB SERPL-MCNC: 0.5 MG/DL (ref 0–1.2)
BILIRUB UR QL STRIP: NEGATIVE
BUN SERPL-MCNC: 19 MG/DL (ref 6–23)
C PNEUM DNA NPH QL NAA+NON-PROBE: NOT DETECTED
CA-I BLD-SCNC: 1.16 MMOL/L (ref 1.15–1.33)
CALCIUM SERPL-MCNC: 6.2 MG/DL (ref 8.6–10.2)
CHLORIDE SERPL-SCNC: 110 MMOL/L (ref 98–107)
CLARITY UR: ABNORMAL
CO2 SERPL-SCNC: 19 MMOL/L (ref 22–29)
COLOR UR: YELLOW
CREAT SERPL-MCNC: 0.8 MG/DL (ref 0.5–1)
EOSINOPHIL # BLD: 0 K/UL (ref 0.05–0.5)
EOSINOPHILS RELATIVE PERCENT: 0 % (ref 0–6)
ERYTHROCYTE [DISTWIDTH] IN BLOOD BY AUTOMATED COUNT: 17.1 % (ref 11.5–15)
FLUAV H3 RNA NPH QL NAA+NON-PROBE: DETECTED
FLUAV RNA NPH QL NAA+NON-PROBE: DETECTED
FLUBV RNA NPH QL NAA+NON-PROBE: NOT DETECTED
GFR, ESTIMATED: 67 ML/MIN/1.73M2
GLUCOSE SERPL-MCNC: 86 MG/DL (ref 74–99)
GLUCOSE UR STRIP-MCNC: NEGATIVE MG/DL
HADV DNA NPH QL NAA+NON-PROBE: NOT DETECTED
HCOV 229E RNA NPH QL NAA+NON-PROBE: NOT DETECTED
HCOV HKU1 RNA NPH QL NAA+NON-PROBE: NOT DETECTED
HCOV NL63 RNA NPH QL NAA+NON-PROBE: NOT DETECTED
HCOV OC43 RNA NPH QL NAA+NON-PROBE: NOT DETECTED
HCT VFR BLD AUTO: 32.2 % (ref 34–48)
HGB BLD-MCNC: 9.5 G/DL (ref 11.5–15.5)
HGB UR QL STRIP.AUTO: ABNORMAL
HMPV RNA NPH QL NAA+NON-PROBE: NOT DETECTED
HPIV1 RNA NPH QL NAA+NON-PROBE: NOT DETECTED
HPIV2 RNA NPH QL NAA+NON-PROBE: NOT DETECTED
HPIV3 RNA NPH QL NAA+NON-PROBE: NOT DETECTED
HPIV4 RNA NPH QL NAA+NON-PROBE: NOT DETECTED
IMM GRANULOCYTES # BLD AUTO: 0.06 K/UL (ref 0–0.58)
IMM GRANULOCYTES NFR BLD: 1 % (ref 0–5)
KETONES UR STRIP-MCNC: NEGATIVE MG/DL
LACTATE BLDV-SCNC: 1.7 MMOL/L (ref 0.5–1.9)
LEUKOCYTE ESTERASE UR QL STRIP: ABNORMAL
LIPASE SERPL-CCNC: 16 U/L (ref 13–60)
LYMPHOCYTES NFR BLD: 0.27 K/UL (ref 1.5–4)
LYMPHOCYTES RELATIVE PERCENT: 3 % (ref 20–42)
M PNEUMO DNA NPH QL NAA+NON-PROBE: NOT DETECTED
MAGNESIUM SERPL-MCNC: 1.2 MG/DL (ref 1.6–2.6)
MCH RBC QN AUTO: 24.4 PG (ref 26–35)
MCHC RBC AUTO-ENTMCNC: 29.5 G/DL (ref 32–34.5)
MCV RBC AUTO: 82.8 FL (ref 80–99.9)
MONOCYTES NFR BLD: 0.48 K/UL (ref 0.1–0.95)
MONOCYTES NFR BLD: 5 % (ref 2–12)
NEUTROPHILS NFR BLD: 92 % (ref 43–80)
NEUTS SEG NFR BLD: 9.4 K/UL (ref 1.8–7.3)
NITRITE UR QL STRIP: POSITIVE
PH UR STRIP: 5.5 [PH] (ref 5–8)
PLATELET # BLD AUTO: 202 K/UL (ref 130–450)
PMV BLD AUTO: 10.8 FL (ref 7–12)
POTASSIUM SERPL-SCNC: 3 MMOL/L (ref 3.5–5)
PROT SERPL-MCNC: 4.8 G/DL (ref 6.4–8.3)
PROT UR STRIP-MCNC: 100 MG/DL
RBC # BLD AUTO: 3.89 M/UL (ref 3.5–5.5)
RBC # BLD: ABNORMAL 10*6/UL
RBC #/AREA URNS HPF: ABNORMAL /HPF
RSV RNA NPH QL NAA+NON-PROBE: NOT DETECTED
RV+EV RNA NPH QL NAA+NON-PROBE: NOT DETECTED
SARS-COV-2 RNA NPH QL NAA+NON-PROBE: NOT DETECTED
SODIUM SERPL-SCNC: 139 MMOL/L (ref 132–146)
SP GR UR STRIP: 1.02 (ref 1–1.03)
SPECIMEN DESCRIPTION: ABNORMAL
TROPONIN I SERPL HS-MCNC: 21 NG/L (ref 0–9)
TROPONIN I SERPL HS-MCNC: 26 NG/L (ref 0–9)
TROPONIN I SERPL HS-MCNC: 30 NG/L (ref 0–9)
UROBILINOGEN UR STRIP-ACNC: 0.2 EU/DL (ref 0–1)
WBC #/AREA URNS HPF: ABNORMAL /HPF
WBC OTHER # BLD: 10.2 K/UL (ref 4.5–11.5)

## 2025-02-23 PROCEDURE — 6370000000 HC RX 637 (ALT 250 FOR IP)

## 2025-02-23 PROCEDURE — 82330 ASSAY OF CALCIUM: CPT

## 2025-02-23 PROCEDURE — 2580000003 HC RX 258

## 2025-02-23 PROCEDURE — 2500000003 HC RX 250 WO HCPCS: Performed by: RADIOLOGY

## 2025-02-23 PROCEDURE — 85025 COMPLETE CBC W/AUTO DIFF WBC: CPT

## 2025-02-23 PROCEDURE — 83605 ASSAY OF LACTIC ACID: CPT

## 2025-02-23 PROCEDURE — 84484 ASSAY OF TROPONIN QUANT: CPT

## 2025-02-23 PROCEDURE — 96361 HYDRATE IV INFUSION ADD-ON: CPT

## 2025-02-23 PROCEDURE — 96366 THER/PROPH/DIAG IV INF ADDON: CPT

## 2025-02-23 PROCEDURE — 96368 THER/DIAG CONCURRENT INF: CPT

## 2025-02-23 PROCEDURE — 87086 URINE CULTURE/COLONY COUNT: CPT

## 2025-02-23 PROCEDURE — 6360000002 HC RX W HCPCS

## 2025-02-23 PROCEDURE — 71045 X-RAY EXAM CHEST 1 VIEW: CPT

## 2025-02-23 PROCEDURE — 93005 ELECTROCARDIOGRAM TRACING: CPT

## 2025-02-23 PROCEDURE — 81001 URINALYSIS AUTO W/SCOPE: CPT

## 2025-02-23 PROCEDURE — 6360000004 HC RX CONTRAST MEDICATION: Performed by: RADIOLOGY

## 2025-02-23 PROCEDURE — 87040 BLOOD CULTURE FOR BACTERIA: CPT

## 2025-02-23 PROCEDURE — 96365 THER/PROPH/DIAG IV INF INIT: CPT

## 2025-02-23 PROCEDURE — 0202U NFCT DS 22 TRGT SARS-COV-2: CPT

## 2025-02-23 PROCEDURE — 74177 CT ABD & PELVIS W/CONTRAST: CPT

## 2025-02-23 PROCEDURE — 83735 ASSAY OF MAGNESIUM: CPT

## 2025-02-23 PROCEDURE — 2500000003 HC RX 250 WO HCPCS

## 2025-02-23 PROCEDURE — 83690 ASSAY OF LIPASE: CPT

## 2025-02-23 PROCEDURE — 99285 EMERGENCY DEPT VISIT HI MDM: CPT

## 2025-02-23 PROCEDURE — 80053 COMPREHEN METABOLIC PANEL: CPT

## 2025-02-23 RX ORDER — ACETAMINOPHEN 500 MG
1000 TABLET ORAL ONCE
Status: COMPLETED | OUTPATIENT
Start: 2025-02-23 | End: 2025-02-23

## 2025-02-23 RX ORDER — DILTIAZEM HYDROCHLORIDE 5 MG/ML
5 INJECTION INTRAVENOUS ONCE
Status: COMPLETED | OUTPATIENT
Start: 2025-02-23 | End: 2025-02-23

## 2025-02-23 RX ORDER — OSELTAMIVIR PHOSPHATE 75 MG/1
75 CAPSULE ORAL ONCE
Status: COMPLETED | OUTPATIENT
Start: 2025-02-23 | End: 2025-02-23

## 2025-02-23 RX ORDER — IOPAMIDOL 755 MG/ML
75 INJECTION, SOLUTION INTRAVASCULAR
Status: COMPLETED | OUTPATIENT
Start: 2025-02-23 | End: 2025-02-23

## 2025-02-23 RX ORDER — MAGNESIUM SULFATE IN WATER 40 MG/ML
2000 INJECTION, SOLUTION INTRAVENOUS ONCE
Status: COMPLETED | OUTPATIENT
Start: 2025-02-23 | End: 2025-02-23

## 2025-02-23 RX ORDER — 0.9 % SODIUM CHLORIDE 0.9 %
30 INTRAVENOUS SOLUTION INTRAVENOUS ONCE
Status: COMPLETED | OUTPATIENT
Start: 2025-02-23 | End: 2025-02-23

## 2025-02-23 RX ORDER — SODIUM CHLORIDE 0.9 % (FLUSH) 0.9 %
10 SYRINGE (ML) INJECTION PRN
Status: COMPLETED | OUTPATIENT
Start: 2025-02-23 | End: 2025-02-23

## 2025-02-23 RX ADMIN — POTASSIUM BICARBONATE 40 MEQ: 782 TABLET, EFFERVESCENT ORAL at 20:29

## 2025-02-23 RX ADMIN — IOPAMIDOL 75 ML: 755 INJECTION, SOLUTION INTRAVENOUS at 21:09

## 2025-02-23 RX ADMIN — DILTIAZEM HYDROCHLORIDE 5 MG: 5 INJECTION, SOLUTION INTRAVENOUS at 21:51

## 2025-02-23 RX ADMIN — SODIUM CHLORIDE 1500 MG: 0.9 INJECTION, SOLUTION INTRAVENOUS at 20:29

## 2025-02-23 RX ADMIN — MAGNESIUM SULFATE HEPTAHYDRATE 2000 MG: 40 INJECTION, SOLUTION INTRAVENOUS at 20:27

## 2025-02-23 RX ADMIN — ACETAMINOPHEN 1000 MG: 500 TABLET ORAL at 19:32

## 2025-02-23 RX ADMIN — SODIUM CHLORIDE, PRESERVATIVE FREE 10 ML: 5 INJECTION INTRAVENOUS at 21:09

## 2025-02-23 RX ADMIN — PIPERACILLIN AND TAZOBACTAM 4500 MG: 4; .5 INJECTION, POWDER, FOR SOLUTION INTRAVENOUS at 19:28

## 2025-02-23 RX ADMIN — SODIUM CHLORIDE 2.5 MG/HR: 900 INJECTION, SOLUTION INTRAVENOUS at 21:56

## 2025-02-23 RX ADMIN — OSELTAMIVIR PHOSPHATE 75 MG: 75 CAPSULE ORAL at 23:09

## 2025-02-23 RX ADMIN — SODIUM CHLORIDE 2000 ML: 0.9 INJECTION, SOLUTION INTRAVENOUS at 19:30

## 2025-02-23 ASSESSMENT — PAIN SCALES - GENERAL: PAINLEVEL_OUTOF10: 8

## 2025-02-23 ASSESSMENT — PAIN DESCRIPTION - LOCATION: LOCATION: ARM

## 2025-02-24 PROBLEM — A41.9 SEPSIS (HCC): Status: ACTIVE | Noted: 2025-02-24

## 2025-02-24 PROBLEM — E87.6 HYPOKALEMIA: Status: ACTIVE | Noted: 2025-02-24

## 2025-02-24 PROBLEM — E83.42 HYPOMAGNESEMIA: Status: ACTIVE | Noted: 2025-02-24

## 2025-02-24 PROBLEM — I48.92 ATRIAL FLUTTER WITH RAPID VENTRICULAR RESPONSE (HCC): Status: ACTIVE | Noted: 2025-02-24

## 2025-02-24 PROBLEM — C64.1 UROTHELIAL CARCINOMA OF KIDNEY, RIGHT (HCC): Status: ACTIVE | Noted: 2025-02-24

## 2025-02-24 PROBLEM — N30.00 ACUTE CYSTITIS WITHOUT HEMATURIA: Status: ACTIVE | Noted: 2025-02-24

## 2025-02-24 PROBLEM — J10.1 INFLUENZA A: Status: ACTIVE | Noted: 2025-02-24

## 2025-02-24 LAB
ALBUMIN SERPL-MCNC: 3.3 G/DL (ref 3.5–5.2)
ALP SERPL-CCNC: 69 U/L (ref 35–104)
ALT SERPL-CCNC: 8 U/L (ref 0–32)
ANION GAP SERPL CALCULATED.3IONS-SCNC: 6 MMOL/L (ref 7–16)
ANION GAP SERPL CALCULATED.3IONS-SCNC: 7 MMOL/L (ref 7–16)
AST SERPL-CCNC: 17 U/L (ref 0–31)
BILIRUB SERPL-MCNC: 0.6 MG/DL (ref 0–1.2)
BUN SERPL-MCNC: 21 MG/DL (ref 6–23)
BUN SERPL-MCNC: 22 MG/DL (ref 6–23)
CALCIUM SERPL-MCNC: 7.3 MG/DL (ref 8.6–10.2)
CALCIUM SERPL-MCNC: 8 MG/DL (ref 8.6–10.2)
CHLORIDE SERPL-SCNC: 105 MMOL/L (ref 98–107)
CHLORIDE SERPL-SCNC: 110 MMOL/L (ref 98–107)
CO2 SERPL-SCNC: 18 MMOL/L (ref 22–29)
CO2 SERPL-SCNC: 23 MMOL/L (ref 22–29)
CREAT SERPL-MCNC: 1 MG/DL (ref 0.5–1)
CREAT SERPL-MCNC: 1.1 MG/DL (ref 0.5–1)
EKG ATRIAL RATE: 300 BPM
EKG ATRIAL RATE: 312 BPM
EKG Q-T INTERVAL: 296 MS
EKG Q-T INTERVAL: 310 MS
EKG QRS DURATION: 74 MS
EKG QRS DURATION: 76 MS
EKG QTC CALCULATION (BAZETT): 411 MS
EKG QTC CALCULATION (BAZETT): 454 MS
EKG R AXIS: 17 DEGREES
EKG R AXIS: 7 DEGREES
EKG T AXIS: -116 DEGREES
EKG T AXIS: -125 DEGREES
EKG VENTRICULAR RATE: 116 BPM
EKG VENTRICULAR RATE: 129 BPM
GFR, ESTIMATED: 49 ML/MIN/1.73M2
GFR, ESTIMATED: 56 ML/MIN/1.73M2
GLUCOSE SERPL-MCNC: 84 MG/DL (ref 74–99)
GLUCOSE SERPL-MCNC: 87 MG/DL (ref 74–99)
MAGNESIUM SERPL-MCNC: 1.8 MG/DL (ref 1.6–2.6)
POTASSIUM SERPL-SCNC: 4.4 MMOL/L (ref 3.5–5)
POTASSIUM SERPL-SCNC: 4.4 MMOL/L (ref 3.5–5)
PROT SERPL-MCNC: 5.5 G/DL (ref 6.4–8.3)
SODIUM SERPL-SCNC: 134 MMOL/L (ref 132–146)
SODIUM SERPL-SCNC: 135 MMOL/L (ref 132–146)
TROPONIN I SERPL HS-MCNC: 28 NG/L (ref 0–9)

## 2025-02-24 PROCEDURE — 93010 ELECTROCARDIOGRAM REPORT: CPT | Performed by: INTERNAL MEDICINE

## 2025-02-24 PROCEDURE — 6360000002 HC RX W HCPCS: Performed by: HOSPITALIST

## 2025-02-24 PROCEDURE — 6370000000 HC RX 637 (ALT 250 FOR IP): Performed by: HOSPITALIST

## 2025-02-24 PROCEDURE — 83735 ASSAY OF MAGNESIUM: CPT

## 2025-02-24 PROCEDURE — 2580000003 HC RX 258: Performed by: HOSPITALIST

## 2025-02-24 PROCEDURE — 84484 ASSAY OF TROPONIN QUANT: CPT

## 2025-02-24 PROCEDURE — APPSS60 APP SPLIT SHARED TIME 46-60 MINUTES

## 2025-02-24 PROCEDURE — 2060000000 HC ICU INTERMEDIATE R&B

## 2025-02-24 PROCEDURE — 80048 BASIC METABOLIC PNL TOTAL CA: CPT

## 2025-02-24 PROCEDURE — 80053 COMPREHEN METABOLIC PANEL: CPT

## 2025-02-24 PROCEDURE — 99221 1ST HOSP IP/OBS SF/LOW 40: CPT | Performed by: INTERNAL MEDICINE

## 2025-02-24 PROCEDURE — 99223 1ST HOSP IP/OBS HIGH 75: CPT | Performed by: HOSPITALIST

## 2025-02-24 PROCEDURE — 6370000000 HC RX 637 (ALT 250 FOR IP)

## 2025-02-24 PROCEDURE — 2500000003 HC RX 250 WO HCPCS: Performed by: HOSPITALIST

## 2025-02-24 RX ORDER — CHOLECALCIFEROL (VITAMIN D3) 50 MCG
2000 TABLET ORAL DAILY
Status: DISCONTINUED | OUTPATIENT
Start: 2025-02-24 | End: 2025-02-28 | Stop reason: HOSPADM

## 2025-02-24 RX ORDER — ALBUTEROL SULFATE 0.83 MG/ML
2.5 SOLUTION RESPIRATORY (INHALATION) EVERY 4 HOURS PRN
Status: DISCONTINUED | OUTPATIENT
Start: 2025-02-24 | End: 2025-02-28 | Stop reason: HOSPADM

## 2025-02-24 RX ORDER — SODIUM CHLORIDE 9 MG/ML
INJECTION, SOLUTION INTRAVENOUS PRN
Status: DISCONTINUED | OUTPATIENT
Start: 2025-02-24 | End: 2025-02-28 | Stop reason: HOSPADM

## 2025-02-24 RX ORDER — SODIUM CHLORIDE 0.9 % (FLUSH) 0.9 %
5-40 SYRINGE (ML) INJECTION EVERY 12 HOURS SCHEDULED
Status: DISCONTINUED | OUTPATIENT
Start: 2025-02-24 | End: 2025-02-28 | Stop reason: HOSPADM

## 2025-02-24 RX ORDER — LIDOCAINE HYDROCHLORIDE 10 MG/ML
50 INJECTION, SOLUTION EPIDURAL; INFILTRATION; INTRACAUDAL; PERINEURAL ONCE
Status: DISCONTINUED | OUTPATIENT
Start: 2025-02-24 | End: 2025-02-28 | Stop reason: HOSPADM

## 2025-02-24 RX ORDER — ACETAMINOPHEN 325 MG/1
650 TABLET ORAL EVERY 6 HOURS PRN
Status: DISCONTINUED | OUTPATIENT
Start: 2025-02-24 | End: 2025-02-28 | Stop reason: HOSPADM

## 2025-02-24 RX ORDER — AMIODARONE HYDROCHLORIDE 200 MG/1
100 TABLET ORAL DAILY
Status: DISCONTINUED | OUTPATIENT
Start: 2025-02-24 | End: 2025-02-28 | Stop reason: HOSPADM

## 2025-02-24 RX ORDER — DILTIAZEM HYDROCHLORIDE 60 MG/1
180 CAPSULE, EXTENDED RELEASE ORAL DAILY
Status: DISCONTINUED | OUTPATIENT
Start: 2025-02-24 | End: 2025-02-24

## 2025-02-24 RX ORDER — OSELTAMIVIR PHOSPHATE 30 MG/1
30 CAPSULE ORAL 2 TIMES DAILY
Status: COMPLETED | OUTPATIENT
Start: 2025-02-24 | End: 2025-02-28

## 2025-02-24 RX ORDER — CITALOPRAM HYDROBROMIDE 10 MG/1
10 TABLET ORAL 2 TIMES DAILY
Status: DISCONTINUED | OUTPATIENT
Start: 2025-02-24 | End: 2025-02-28 | Stop reason: HOSPADM

## 2025-02-24 RX ORDER — SODIUM CHLORIDE 0.9 % (FLUSH) 0.9 %
5-40 SYRINGE (ML) INJECTION PRN
Status: DISCONTINUED | OUTPATIENT
Start: 2025-02-24 | End: 2025-02-28 | Stop reason: HOSPADM

## 2025-02-24 RX ORDER — FERROUS SULFATE 325(65) MG
325 TABLET ORAL EVERY OTHER DAY
Status: DISCONTINUED | OUTPATIENT
Start: 2025-02-25 | End: 2025-02-28 | Stop reason: HOSPADM

## 2025-02-24 RX ORDER — NEPHROCAP 1 MG
1 CAP ORAL DAILY
Status: DISCONTINUED | OUTPATIENT
Start: 2025-02-24 | End: 2025-02-28 | Stop reason: HOSPADM

## 2025-02-24 RX ORDER — METOPROLOL SUCCINATE 25 MG/1
75 TABLET, EXTENDED RELEASE ORAL DAILY
Status: DISCONTINUED | OUTPATIENT
Start: 2025-02-24 | End: 2025-02-24

## 2025-02-24 RX ORDER — METOPROLOL SUCCINATE 50 MG/1
50 TABLET, EXTENDED RELEASE ORAL 2 TIMES DAILY
Status: DISCONTINUED | OUTPATIENT
Start: 2025-02-24 | End: 2025-02-28 | Stop reason: HOSPADM

## 2025-02-24 RX ORDER — FUROSEMIDE 20 MG/1
20 TABLET ORAL DAILY
Status: DISCONTINUED | OUTPATIENT
Start: 2025-02-24 | End: 2025-02-28 | Stop reason: HOSPADM

## 2025-02-24 RX ORDER — ACETAMINOPHEN 650 MG/1
650 SUPPOSITORY RECTAL EVERY 6 HOURS PRN
Status: DISCONTINUED | OUTPATIENT
Start: 2025-02-24 | End: 2025-02-28 | Stop reason: HOSPADM

## 2025-02-24 RX ADMIN — METOPROLOL SUCCINATE 50 MG: 50 TABLET, EXTENDED RELEASE ORAL at 19:34

## 2025-02-24 RX ADMIN — METOPROLOL SUCCINATE 75 MG: 25 TABLET, EXTENDED RELEASE ORAL at 09:55

## 2025-02-24 RX ADMIN — AMIODARONE HYDROCHLORIDE 100 MG: 200 TABLET ORAL at 09:58

## 2025-02-24 RX ADMIN — DILTIAZEM HYDROCHLORIDE 180 MG: 60 CAPSULE, EXTENDED RELEASE ORAL at 10:27

## 2025-02-24 RX ADMIN — SODIUM CHLORIDE 1500 MG: 0.9 INJECTION, SOLUTION INTRAVENOUS at 10:01

## 2025-02-24 RX ADMIN — OSELTAMIVIR PHOSPHATE 30 MG: 30 CAPSULE ORAL at 15:53

## 2025-02-24 RX ADMIN — PIPERACILLIN AND TAZOBACTAM 4500 MG: 4; .5 INJECTION, POWDER, FOR SOLUTION INTRAVENOUS at 15:06

## 2025-02-24 RX ADMIN — Medication 2000 UNITS: at 09:58

## 2025-02-24 RX ADMIN — Medication 1 MG: at 15:53

## 2025-02-24 RX ADMIN — SODIUM CHLORIDE, PRESERVATIVE FREE 10 ML: 5 INJECTION INTRAVENOUS at 10:08

## 2025-02-24 RX ADMIN — CITALOPRAM HYDROBROMIDE 10 MG: 10 TABLET ORAL at 09:58

## 2025-02-24 RX ADMIN — OSELTAMIVIR PHOSPHATE 30 MG: 30 CAPSULE ORAL at 22:23

## 2025-02-24 RX ADMIN — PIPERACILLIN AND TAZOBACTAM 4500 MG: 4; .5 INJECTION, POWDER, FOR SOLUTION INTRAVENOUS at 21:46

## 2025-02-24 RX ADMIN — CITALOPRAM HYDROBROMIDE 10 MG: 10 TABLET ORAL at 19:34

## 2025-02-24 RX ADMIN — FUROSEMIDE 20 MG: 20 TABLET ORAL at 09:58

## 2025-02-24 RX ADMIN — RIVAROXABAN 15 MG: 15 TABLET, FILM COATED ORAL at 19:33

## 2025-02-24 RX ADMIN — LEVOTHYROXINE SODIUM 125 MCG: 0.05 TABLET ORAL at 05:16

## 2025-02-24 ASSESSMENT — PAIN SCALES - GENERAL
PAINLEVEL_OUTOF10: 0

## 2025-02-24 NOTE — CONSULTS
Inpatient Cardiology Consultation      Reason for Consult: A-fib, elevated troponin    Consulting Physician: Dr. Michele    Requesting Physician: Dr. Chapin    Date of Consultation: 2/24/2025    History of Present Illness:   Radha Dixon  is a 89 y.o. year old female known to [facility] cardiology and follows with Dr. Ledesma.  Last OV 10/31/2024.    Patient presented to the ED on 2/23/2025 with complaints of back pain, nausea, vomiting.  On arrival blood pressure 1 115/73, heart rate 141, 96% on room air and febrile with a temp of 100.5 °F.  Labs include sodium 139, potassium 3.0 > 4.4, BUN/creatinine 19/0.8 > 22/1.1, calcium 6.2, magnesium 1.2 > 1.8, troponin 21 > 30 > 26 > 28, WBC 10.2, Hgb 9.5.  Respiratory panel positive for influenza A.  UA grossly abnormal.  CT abdomen/pelvis reveals right-sided nephroureteral stent and hydronephrosis similar to previous imaging, cardiomegaly with coronary calcifications.  Chest XR reveals no acute process.  In the ED she received Cardizem bolus plus infusion, Tamiflu, Zosyn, potassium, vancomycin, 2 L bolus and magnesium.     Patient is currently asleep in the emergency department.  She is lying flat in bed and appears to be in no acute distress.  Patient is easily arousable.  She reports that on 2/21/2025 she had an appointment at the [facility] and after returning home she started having symptoms of back pain, nausea and vomiting.  Symptoms persisted into the following day and on 2/23/2025 her daughter suggested emergency evaluation as last time she had the symptoms she had a kidney infection.  Patient states that she feels much better at this time and is denying any chest pain, palpitations, nausea, vomiting or pain.    Please note: past medical records were reviewed per electronic medical record (EMR) - see detailed reports under Past Medical/ Surgical History.     Past Medical History:    Persistent versus permanent atrial fibrillation  Initially diagnosed June

## 2025-02-24 NOTE — PROGRESS NOTES
Pharmacy Consultation Note  (Antibiotic Dosing and Monitoring)    Initial consult date: 2/24/2025  Consulting physician/provider: Alex Chapin MD   Drug: Vancomycin  Indication: Sepsis of Unknown Etiology/Urinary Tract Infection    Age/  Gender Height Weight IBW  Allergy Information   89 y.o./female 172.7 cm (5' 8\") 72.6 kg (160 lb)     Ideal body weight: 63.9 kg (140 lb 14 oz)  Adjusted ideal body weight: 67.4 kg (148 lb 8.4 oz)   Codeine, Codimal-a [brompheniramine], Lipitor, Spiriva handihaler [tiotropium bromide monohydrate], Tudorza pressair [aclidinium bromide], and Ciprofloxacin      Renal Function:  Recent Labs     02/23/25  1913   BUN 19   CREATININE 0.8       Intake/Output Summary (Last 24 hours) at 2/24/2025 0142  Last data filed at 2/24/2025 0012  Gross per 24 hour   Intake 110 ml   Output --   Net 110 ml       Vancomycin Monitoring:  Trough:  No results for input(s): \"VANCOTROUGH\" in the last 72 hours.  Random:  No results for input(s): \"VANCORANDOM\" in the last 72 hours.    Vancomycin Administration Times:  Recent vancomycin administrations                     vancomycin (VANCOCIN) 1,500 mg in sodium chloride 0.9 % 250 mL IVPB ([id number]) (mg) 1,500 mg New Bag 02/23/25 2029                    Assessment:  Patient is a 89 y.o. female who has been initiated on vancomycin  Estimated Creatinine Clearance: 47 mL/min (based on SCr of 0.8 mg/dL).    Plan:  Will continue vancomycin 1500 mg IV every 24 hours (scheduled to start 12hrs from 1st dose)  Will check vancomycin levels when appropriate  Will continue to monitor renal function   Pharmacy to follow      Jazmine Rucker, PharmD, RP, BCPS 2/24/2025 1:42 AM    RICCO: [phone]  SEY: [phone]  SJW: [phone]

## 2025-02-24 NOTE — ED PROVIDER NOTES
[facility] EMERGENCY DEPARTMENT  EMERGENCY DEPARTMENT ENCOUNTER        Pt Name: Radha Dixon  MRN: [id number]  Birthdate 7/11/1934  Date of evaluation: 2/23/2025  Provider: Flakito Flanagan MD  PCP: No primary care provider on file.  Note Started: 8:09 PM EST 2/23/25    CHIEF COMPLAINT       Chief Complaint   Patient presents with    Nausea     Nausea vomiting today. Hx afib. +xarelto    Back Pain     Hx kidney CA, hx kidney infection, mid/right sided back pain. Needs nephrectomy       HISTORY OF PRESENT ILLNESS: 1 or more Elements   History From: Patient    Limitations to history : None    Radha Dixon is a 89 y.o. female with past medical history of atrial fibrillation on Xarelto, hyperlipidemia, depression, asthma, hypothyroidism, CHF, COPD, pyelonephritis, renal cancer, osteoarthritis who presents from home with complaints of right-sided back pain.  Patient states she has a history of right renal cancer and kidney infection and states back pain began today to the mid/right lower back.  Patient's pain will get to the lumbar spine, none rating, has no alleviating factors, sharp in quality, is currently moderate in intensity.  Patient does states she had some nausea with vomiting today.  Patient states she does have a history of A-fib and has been on Xarelto and has been compliant at this time.  Patient arrives febrile and tachycardic.  Patient denies any chest pain, chest breath, abdominal pain, dysuria, hematuria, known fevers or chills, diarrhea, constipation.  Patient denies any tobacco, EtOH, illicit drug use.    Nursing Notes were all reviewed and agreed with or any disagreements were addressed in the HPI.    ROS:   Pertinent positives and negatives are stated within HPI, all other systems reviewed and are negative.    --------------------------------------------- PAST HISTORY ---------------------------------------------  Past Medical History:  has a past medical  []   2119 Influenza A by PCR(!): DETECTED []   Mon Feb 24, 2025   0007 Spoke to [facility] urology, discussed case including imaging results and lab work at this time with vitals. All questions were answered at this time.  Had no further recommendations.  Patient will be admitted for further evaluation at this time. []   0023 [facility] []      ED Course User Index  [] Flakito Flanagan MD  [] Armond Mitchell MD     This patient's ED course included: History, physical examination, reevaluation prior to disposition    This patient has remained hemodynamically stable during their ED course.    Counseling:   The emergency provider has spoken with the patient and discussed today’s results, in addition to providing specific details for the plan of care and counseling regarding the diagnosis and prognosis.  Questions are answered at this time and they are agreeable with the plan.     --------------------------------- IMPRESSION AND DISPOSITION ---------------------------------    IMPRESSION  1. Influenza A    2. Septicemia (HCC)    3. Atrial flutter with rapid ventricular response (HCC)    4. Urinary tract infection with hematuria, site unspecified    5. Hypomagnesemia    6. Hypokalemia        DISPOSITION  Disposition: Admit to telemetry  Patient condition is stable    NOTE: This report was transcribed using voice recognition software. Every effort was made to ensure accuracy; however, inadvertent computerized transcription errors may be present

## 2025-02-24 NOTE — PROGRESS NOTES
IV team consulted. Orders obtained for midline    Spoke with pt re: pap result. Pap +HPV, neg cytology, neg HPV 16/18. Same pap result in July 2020. Given persistence of HPV, recommend RTO for colposcopy. Answered questions about potential findings and tx course.

## 2025-02-24 NOTE — CONSULTS
2/24/2025 9:27 AM  Service: Urology  Group: [facility] ([facility]/F[facility])    Radha Dixon  [id number]     Chief Complaint: right mid ureteral tumor post laser    History of Present Illness:  The patient is a 89 y.o. female patient who presents with nausea   She is know to our service  She did have a procedure done last month  She did well with procedure  She does have advanced age  She does have a stent  She is taking Xaretlo  She is going to be admitted  She did have a CT done and the stent looks to have good position  She does have some LUTS with voiding      Past Medical History:   Diagnosis Date    Asthma     Atrial fibrillation, persistent (HCC)     Cancer (HCC)     Depression     Generalized osteoarthritis 09/12/2006    Hyperlipidemia     Knee pain     Sees Dr. Manrique for b/l knee pain / gel injections    Pinched nerve in neck     Prolonged emergence from general anesthesia     Thyroid disease     Vitamin D deficiency        Past Surgical History:   Procedure Laterality Date    APPENDECTOMY  58 YEARS AGO    ARTHROPLASTY Left 03/29/2023    LEFT CARPAL TUNNEL RELEASE LEFT MEDIAN NERVE DECOMPRESSION AT ELBOW LEFT THUMB TRAPEZIECTOMY LIGAMENT RECONSTRUCTION TENDON INTERPOSITION WITH FIBERTAK performed by Chino Handley MD at [facility] OR    CARDIOVERSION  09/20/2019    Dr. Ledesma 1 shock 200 joules Successful    CARDIOVERSION  08/02/2021    Dr. Matias. 200J x1 converted to NSR    CHOLECYSTECTOMY      CYSTOSCOPY N/A 12/31/2024    CYSTOSCOPY BILATERAL RETROGRADE PYELOGRAM, RIGHT URETEROSCOPY, RIGHT URETERAL BIOPSIES, RIGHT URETERAL STENT INSERTION, PELVIC EXAM performed by Georgi Shrestha MD at [facility] OR    CYSTOSCOPY N/A 1/30/2025    CYSTOSCOPY RIGHT RETROGRADE PYELOGRAM, RIGHT URETEROSCOPY, BASKET EXTRACTION RIGHT URETERAL TUMOR, LASER ABLATION OF TUMOR, RIGHT URETERAL STENT EXCHANGE performed by Georgi Shrestha MD at [facility] OR    EYE SURGERY      CATARACT RIGHT AND LEFT    HYSTERECTOMY (CERVIX STATUS

## 2025-02-24 NOTE — CONSULTS
EYE SURGERY      CATARACT RIGHT AND LEFT    HYSTERECTOMY (CERVIX STATUS UNKNOWN)      NECK SURGERY  2011    THORACENTESIS Right 10/11/2024    650mL clear yellow    THYROIDECTOMY  17 YEARS AGO    TONSILLECTOMY       Current Medications:   Scheduled Meds:   amiodarone  100 mg Oral Daily    Virt-Caps  1 capsule Oral Daily    vitamin D  2,000 Units Oral Daily    citalopram  10 mg Oral BID    [START ON 2025] ferrous sulfate  325 mg Oral Every Other Day    furosemide  20 mg Oral Daily    levothyroxine  125 mcg Oral Daily    rivaroxaban  15 mg Oral Dinner    sodium chloride flush  5-40 mL IntraVENous 2 times per day    oseltamivir  30 mg Oral BID    piperacillin-tazobactam  4,500 mg IntraVENous Q8H    vancomycin  1,500 mg IntraVENous Q24H    metoprolol succinate  50 mg Oral BID     Continuous Infusions:   sodium chloride       PRN Meds:albuterol, sodium chloride flush, sodium chloride, acetaminophen **OR** acetaminophen    Allergies:  Codeine, Codimal-a [brompheniramine], Lipitor, Spiriva handihaler [tiotropium bromide monohydrate], Tudorza pressair [aclidinium bromide], and Ciprofloxacin    Social History:   Social History     Socioeconomic History    Marital status:      Spouse name: None    Number of children: None    Years of education: None    Highest education level: None   Tobacco Use    Smoking status: Former     Current packs/day: 0.00     Types: Cigarettes     Start date: 1954     Quit date: 1994     Years since quittin.4    Smokeless tobacco: Never   Vaping Use    Vaping status: Never Used   Substance and Sexual Activity    Alcohol use: No    Drug use: No     Social Determinants of Health     Financial Resource Strain: Low Risk  (2024)    Overall Financial Resource Strain (CARDIA)     Difficulty of Paying Living Expenses: Not hard at all   Food Insecurity: No Food Insecurity (2025)    Hunger Vital Sign     Worried About Running Out of Food in the Last Year: Never true  culture  Blood cultures no growth so far  CT abdomen is consistent with right sided hydronephrosis with possible pyelonephritis  Urology consult  ID will continue to follow    Thank you for having us see this patient in consultation. I will be discussing this case with the treating physicians.      Electronically signed by Jorge Alberto Wilkinson MD on 2/24/2025 at 1:07 PM

## 2025-02-24 NOTE — H&P
Hospital Medicine History & Physical      PCP: No primary care provider on file.    Date of Admission: 2/23/2025    Date of Service: Pt seen/examined on 2/23/2025 and is  admitted to Inpatient with expected LOS greater than two midnights due to medical therapy.      Chief Complaint:  had concerns including Nausea (Nausea vomiting today. Hx afib. +xarelto) and Back Pain (Hx kidney CA, hx kidney infection, mid/right sided back pain. Needs nephrectomy).    History Of Present Illness:    Ms. Radha Dixon, a 89 y.o. year old female  with PMH of persistent a fib on xarelto, asthma, osteoarthritis, HLD, hypothyroidism, HFrEF, and urothelial carcinoma,     Pt presented to ED for evaluation of nausea and flank pain. Symptoms started in the past 24hrs.  Pt was recently admitted 1/7 to 1/12 for pyelonephritis. Pt has established care with urology with right sided ureter stent placed, and recent biopsy of right renal mass showed low grade noninvasive papillary urothelial carcinoma.  Plan for nephrectomy in the near future.  Tested positive Influenza A in ED.    Pt med Sepsis criteria with fever and tachycardia and U/A showed UTI. Pt was in a fib with RVR I ED, cardizem gtt was started.  Pt was seen on room air, she has difficulty hearing, but she denies fever, chills, cough, chest pain/pressure.     I have personally reviewed and interpreted the Initial workups as summarized below:     WBC normal, H/H stable anemia, platelet normal range  Renal function stable Scr,  K 3.0, Mg 1.2  Hepatic function   stable. Albumin 2.9, total protein 4.8.     CXR  reviewed,with no acute cardiopulmonary process.   Troponin 21--30--26  EKG reviewed a fib /a flutter with Ventricular rate 129bpm with no acute ST/T wave ischemic change.    Last ECHO 10/8/2024 with LVEF 35-40% with moderate AS and mode to severe tricuspid regurgitation.     CT abd pelvi  IMPRESSION:  1. Right-sided nephroureteral stent and hydronephrosis similar to last  tamiflu  -hx of HFrEF, pt is not volume overloaded on exam, resumed home dose oral lasix 20mg daily   - K and Mg repleted in ED, continue to monitor and treat as needed.   - urothelial carcnoma and ureter stent status, follow up with urology outpatient  - PT/OT    DVT Prophylaxis: []Lovenox []Heparin []PCD [x] Warfarin/NOAC []Encouraged ambulation    Diet: ADULT DIET; Regular  Code Status: Full Code  Surrogate decision maker confirmed with patient:   Extended Emergency Contact Information  Primary Emergency Contact: LandysuzannaGenny  Address: [address] [place] [address]  Home Phone: [phone]  Work Phone: [phone]  Relation: Child  Secondary Emergency Contact: Tracie Camargo   [facility]  Home Phone: [phone]  Mobile Phone: [phone]  Relation: Child    Admit status: [] Observation [x] Inpatient   Disposition: []Med/Surg [x] Intermediate [] ICU/CCU    +++++++++++++++++++++++++++++++++++++++++++++++++  Alex Chapin MD PhD  [facility]  +++++++++++++++++++++++++++++++++++++++++++++++++  NOTE: Report could be transcribed using voice recognition software. Every effort was made to ensure accuracy; however, inadvertent computerized transcription errors may be present.

## 2025-02-24 NOTE — PROGRESS NOTES
Renal Dose Adjustment Policy (Generic)     This patient is on medication that requires renal, weight, and/or indication dose adjustment.      Date Body Weight IBW  Adjusted BW SCr  CrCl Dialysis status   2/24/2025 72.6 kg (160 lb) Ideal body weight: 63.9 kg (140 lb 14 oz)  Adjusted ideal body weight: 67.4 kg (148 lb 8.4 oz) Serum creatinine: 0.8 mg/dL 02/23/25 1913  Estimated creatinine clearance: 47 mL/min N/a       Pharmacy has dose-adjusted the following medication(s):    Date Previous Order Adjusted Order   2/24/2025 Oseltamivir 75 mg PO BID Oseltamivir 75 mg PO x 1 (given), then 30 mg PO BID       These changes were made per protocol according to the [facility]   Automatic Renal Dose Adjustment Policy.     *Please note this dose may need readjusted if patient's condition changes.    Please contact pharmacy with any questions regarding these changes.    Jazmine Rucker, PharmD, RP, BCPS 2/24/2025 1:38 AM

## 2025-02-24 NOTE — PROGRESS NOTES
Patient seen and examined at the bedside, she came here due to nausea and vomiting found to have flu positive.  She is now on oral Tamiflu.  Patient is diagnosed case of atrial fibrillation and on oral anticoagulation.  Her heart rate was high and Cardizem drip started.  Cardiology consult if needed medication reconciliation.  Otherwise patient is stable she does not require any oxygen.

## 2025-02-25 LAB
ANION GAP SERPL CALCULATED.3IONS-SCNC: 8 MMOL/L (ref 7–16)
BASOPHILS # BLD: 0.03 K/UL (ref 0–0.2)
BASOPHILS NFR BLD: 0 % (ref 0–2)
BUN SERPL-MCNC: 22 MG/DL (ref 6–23)
CALCIUM SERPL-MCNC: 7.9 MG/DL (ref 8.6–10.2)
CHLORIDE SERPL-SCNC: 109 MMOL/L (ref 98–107)
CO2 SERPL-SCNC: 20 MMOL/L (ref 22–29)
CREAT SERPL-MCNC: 1.1 MG/DL (ref 0.5–1)
EOSINOPHIL # BLD: 0.13 K/UL (ref 0.05–0.5)
EOSINOPHILS RELATIVE PERCENT: 2 % (ref 0–6)
ERYTHROCYTE [DISTWIDTH] IN BLOOD BY AUTOMATED COUNT: 17.2 % (ref 11.5–15)
GFR, ESTIMATED: 46 ML/MIN/1.73M2
GLUCOSE SERPL-MCNC: 82 MG/DL (ref 74–99)
HCT VFR BLD AUTO: 36.3 % (ref 34–48)
HGB BLD-MCNC: 10.4 G/DL (ref 11.5–15.5)
IMM GRANULOCYTES # BLD AUTO: 0.04 K/UL (ref 0–0.58)
IMM GRANULOCYTES NFR BLD: 1 % (ref 0–5)
LYMPHOCYTES NFR BLD: 0.83 K/UL (ref 1.5–4)
LYMPHOCYTES RELATIVE PERCENT: 11 % (ref 20–42)
MCH RBC QN AUTO: 24.6 PG (ref 26–35)
MCHC RBC AUTO-ENTMCNC: 28.7 G/DL (ref 32–34.5)
MCV RBC AUTO: 86 FL (ref 80–99.9)
MICROORGANISM SPEC CULT: ABNORMAL
MONOCYTES NFR BLD: 0.9 K/UL (ref 0.1–0.95)
MONOCYTES NFR BLD: 11 % (ref 2–12)
NEUTROPHILS NFR BLD: 76 % (ref 43–80)
NEUTS SEG NFR BLD: 6 K/UL (ref 1.8–7.3)
PLATELET # BLD AUTO: 223 K/UL (ref 130–450)
PMV BLD AUTO: 11.5 FL (ref 7–12)
POTASSIUM SERPL-SCNC: 4 MMOL/L (ref 3.5–5)
PROCALCITONIN SERPL-MCNC: 0.12 NG/ML (ref 0–0.08)
RBC # BLD AUTO: 4.22 M/UL (ref 3.5–5.5)
RBC # BLD: ABNORMAL 10*6/UL
SERVICE CMNT-IMP: ABNORMAL
SODIUM SERPL-SCNC: 137 MMOL/L (ref 132–146)
SPECIMEN DESCRIPTION: ABNORMAL
WBC OTHER # BLD: 7.9 K/UL (ref 4.5–11.5)

## 2025-02-25 PROCEDURE — 2580000003 HC RX 258: Performed by: HOSPITALIST

## 2025-02-25 PROCEDURE — 2500000003 HC RX 250 WO HCPCS: Performed by: INTERNAL MEDICINE

## 2025-02-25 PROCEDURE — 6370000000 HC RX 637 (ALT 250 FOR IP)

## 2025-02-25 PROCEDURE — 6360000002 HC RX W HCPCS: Performed by: HOSPITALIST

## 2025-02-25 PROCEDURE — 84145 PROCALCITONIN (PCT): CPT

## 2025-02-25 PROCEDURE — 2500000003 HC RX 250 WO HCPCS: Performed by: HOSPITALIST

## 2025-02-25 PROCEDURE — 85025 COMPLETE CBC W/AUTO DIFF WBC: CPT

## 2025-02-25 PROCEDURE — 80048 BASIC METABOLIC PNL TOTAL CA: CPT

## 2025-02-25 PROCEDURE — 2060000000 HC ICU INTERMEDIATE R&B

## 2025-02-25 PROCEDURE — 6370000000 HC RX 637 (ALT 250 FOR IP): Performed by: HOSPITALIST

## 2025-02-25 PROCEDURE — 99232 SBSQ HOSP IP/OBS MODERATE 35: CPT | Performed by: INTERNAL MEDICINE

## 2025-02-25 RX ADMIN — AMIODARONE HYDROCHLORIDE 100 MG: 200 TABLET ORAL at 10:04

## 2025-02-25 RX ADMIN — METOPROLOL SUCCINATE 50 MG: 50 TABLET, EXTENDED RELEASE ORAL at 10:01

## 2025-02-25 RX ADMIN — METOPROLOL SUCCINATE 50 MG: 50 TABLET, EXTENDED RELEASE ORAL at 21:20

## 2025-02-25 RX ADMIN — FUROSEMIDE 20 MG: 20 TABLET ORAL at 10:01

## 2025-02-25 RX ADMIN — LEVOTHYROXINE SODIUM 125 MCG: 0.05 TABLET ORAL at 05:31

## 2025-02-25 RX ADMIN — RIVAROXABAN 15 MG: 15 TABLET, FILM COATED ORAL at 21:20

## 2025-02-25 RX ADMIN — SODIUM CHLORIDE 1500 MG: 0.9 INJECTION, SOLUTION INTRAVENOUS at 10:12

## 2025-02-25 RX ADMIN — FERROUS SULFATE TAB 325 MG (65 MG ELEMENTAL FE) 325 MG: 325 (65 FE) TAB at 10:04

## 2025-02-25 RX ADMIN — CITALOPRAM HYDROBROMIDE 10 MG: 10 TABLET ORAL at 21:20

## 2025-02-25 RX ADMIN — OSELTAMIVIR PHOSPHATE 30 MG: 30 CAPSULE ORAL at 21:20

## 2025-02-25 RX ADMIN — Medication 2000 UNITS: at 10:04

## 2025-02-25 RX ADMIN — SODIUM CHLORIDE, PRESERVATIVE FREE 10 ML: 5 INJECTION INTRAVENOUS at 10:04

## 2025-02-25 RX ADMIN — OSELTAMIVIR PHOSPHATE 30 MG: 30 CAPSULE ORAL at 10:04

## 2025-02-25 RX ADMIN — PIPERACILLIN AND TAZOBACTAM 4500 MG: 4; .5 INJECTION, POWDER, FOR SOLUTION INTRAVENOUS at 05:30

## 2025-02-25 RX ADMIN — ACETAMINOPHEN 650 MG: 325 TABLET ORAL at 11:21

## 2025-02-25 RX ADMIN — CITALOPRAM HYDROBROMIDE 10 MG: 10 TABLET ORAL at 10:04

## 2025-02-25 RX ADMIN — SODIUM CHLORIDE, PRESERVATIVE FREE 10 ML: 5 INJECTION INTRAVENOUS at 21:20

## 2025-02-25 RX ADMIN — PIPERACILLIN AND TAZOBACTAM 4500 MG: 4; .5 INJECTION, POWDER, FOR SOLUTION INTRAVENOUS at 16:03

## 2025-02-25 ASSESSMENT — PAIN SCALES - GENERAL
PAINLEVEL_OUTOF10: 0
PAINLEVEL_OUTOF10: 0

## 2025-02-25 ASSESSMENT — PAIN - FUNCTIONAL ASSESSMENT: PAIN_FUNCTIONAL_ASSESSMENT: ACTIVITIES ARE NOT PREVENTED

## 2025-02-25 ASSESSMENT — PAIN DESCRIPTION - DESCRIPTORS: DESCRIPTORS: ACHING

## 2025-02-25 NOTE — PROGRESS NOTES
Arrived in ED to place midline catheter - pt nurse stated she had a call out to Dr Wilkinson to see if pt needed a midline. Pt has working PIV site at present. Nurse will send a message to IV therapy if line still needed.

## 2025-02-25 NOTE — PROGRESS NOTES
or rhonchi.   Cardiovascular: S1 and S2 are rhythmic and regular. No murmurs appreciated.   Abdomen: Positive bowel sounds to auscultation. Benign to palpation. No masses felt. No hepatosplenomegaly.  Genitourinary: Tenderness noted in the lower abdomen  Extremities: No clubbing, no cyanosis, no edema.  Musculoskeletal: No pain in range of motion of any joints  Neurological: Following commands, no focal neurodeficit  Lines: peripheral    Laboratory and Tests Review:  Lab Results   Component Value Date    WBC 7.9 02/25/2025    WBC 10.2 02/23/2025    WBC 8.9 01/12/2025    HGB 10.4 (L) 02/25/2025    HCT 36.3 02/25/2025    MCV 86.0 02/25/2025     02/25/2025     Lab Results   Component Value Date    NEUTROABS 6.00 02/25/2025    NEUTROABS 9.40 (H) 02/23/2025    NEUTROABS 6.99 01/12/2025     No results found for: \"CRPHS\"  Lab Results   Component Value Date    ALT 8 02/24/2025    AST 17 02/24/2025    ALKPHOS 69 02/24/2025    BILITOT 0.6 02/24/2025     Lab Results   Component Value Date/Time     02/25/2025 05:19 AM    K 4.0 02/25/2025 05:19 AM    K 4.3 03/30/2023 02:36 AM     02/25/2025 05:19 AM    CO2 20 02/25/2025 05:19 AM    BUN 22 02/25/2025 05:19 AM    CREATININE 1.1 02/25/2025 05:19 AM    CREATININE 1.1 02/24/2025 04:51 AM    CREATININE 1.0 02/24/2025 03:46 AM    GFRAA 57 01/27/2022 06:25 PM    LABGLOM 46 02/25/2025 05:19 AM    LABGLOM 37 04/16/2024 04:03 PM    GLUCOSE 82 02/25/2025 05:19 AM    CALCIUM 7.9 02/25/2025 05:19 AM    BILITOT 0.6 02/24/2025 04:51 AM    ALKPHOS 69 02/24/2025 04:51 AM    AST 17 02/24/2025 04:51 AM    ALT 8 02/24/2025 04:51 AM     No results found for: \"CRP\"  No results found for: \"SEDRATE\"  Radiology:      Microbiology:   Lab Results   Component Value Date/Time    BC 5 Days no growth 03/29/2023 07:55 PM    BC 5 Days no growth 01/27/2022 05:30 PM    BC 5 Days- no growth 06/16/2019 09:25 PM    ORG Staphylococcus coagulase-negative 06/10/2021 12:00 PM     Lab Results    Component Value Date/Time    BLOODCULT2 5 Days no growth 03/29/2023 07:55 PM    BLOODCULT2 5 Days no growth 01/27/2022 05:30 PM    BLOODCULT2 5 Days- no growth 06/16/2019 09:32 PM    ORG Staphylococcus coagulase-negative 06/10/2021 12:00 PM     WOUND/ABSCESS   Date Value Ref Range Status   06/10/2021 One colony  Final     No results found for: \"RESPSMEAR\"      Component Value Date/Time    MPNEUMO Not Detected 02/23/2025 1913     No results found for: \"CULTRESP\"  No results found for: \"CXCATHTIP\"  No results found for: \"BFCS\"  No results found for: \"CXSURG\"  Urine Culture, Routine   Date Value Ref Range Status   09/09/2022   Final    <10,000 CFU/mL  Mixed gram positive organisms  Mixed gram negative rods     01/27/2022 Growth not present  Final   06/16/2019   Final    ,000 CFU/mL  Mixed kyaw isolated. Further workup and sensitivity testing  is not routinely indicated and will not be performed.  Mixed kyaw isolated includes:  Mixed gram negative rods  Mixed gram positive organisms       No results found for: \"MRSAC\"    Assessment:  Noninvasive papillary urothelial CA  Status post right ureteral stent by urology  Flank pain  Fever Tmax 100.5  UA has pyuria and bacteriuria  Gram-negative rods UTI  Influenza A infection     Plan:    Continue Zosyn, DC vancomycin.    Blood cultures no growth so far  Blood cultures no growth so far  CT abdomen is consistent with right sided hydronephrosis with possible pyelonephritis  Continue Tamiflu  Urology consult  ID will continue to follow    Jorge Alberto Wilkinson MD  12:47 PM  2/25/2025

## 2025-02-25 NOTE — PROGRESS NOTES
4 Eyes Skin Assessment     NAME:  Radha Dixon  YOB: 1934  MEDICAL RECORD NUMBER:  [id number]    The patient is being assessed for  Admission    I agree that at least one RN has performed a thorough Head to Toe Skin Assessment on the patient. ALL assessment sites listed below have been assessed.      Areas assessed by both nurses:    Head, Face, Ears, Shoulders, Back, Chest, Arms, Elbows, Hands, Sacrum. Buttock, Coccyx, Ischium, Legs. Feet and Heels, and Under Medical Devices         Does the Patient have a Wound? No noted wound(s)       Broderick Prevention initiated by RN: No  Wound Care Orders initiated by RN: No    Pressure Injury (Stage 3,4, Unstageable, DTI, NWPT, and Complex wounds) if present, place Wound referral order by RN under : No    New Ostomies, if present place, Ostomy referral order under : No     Nurse 1 eSignature: Electronically signed by Sheldon Galloway RN on 2/25/25 at 6:33 PM EST    **SHARE this note so that the co-signing nurse can place an eSignature**    Nurse 2 eSignature: Electronically signed by Sachin Johnson RN on 2/25/25 at 9:33 PM EST

## 2025-02-25 NOTE — PROGRESS NOTES
2/25/2025 1:40 PM  Radha Dixon  [id number]    Subjective:    Remains in the ER awaiting bed assignment   States had some right flank pain but improved now  Voiding comfortably, denies dysuria     Review of Systems  Constitutional: No fever or chills   Respiratory: negative for cough and hemoptysis  Cardiovascular: negative for chest pain and dyspnea  Gastrointestinal: negative for abdominal pain, diarrhea, nausea and vomiting   : See above  Derm: negative for rash and skin lesion(s)  Neurological: negative for seizures and tremors  Musculoskeletal: Negative    Psychiatric: Negative   All other reviews are negative      Scheduled Meds:   amiodarone  100 mg Oral Daily    Virt-Caps  1 capsule Oral Daily    vitamin D  2,000 Units Oral Daily    citalopram  10 mg Oral BID    ferrous sulfate  325 mg Oral Every Other Day    furosemide  20 mg Oral Daily    levothyroxine  125 mcg Oral Daily    rivaroxaban  15 mg Oral Dinner    sodium chloride flush  5-40 mL IntraVENous 2 times per day    oseltamivir  30 mg Oral BID    piperacillin-tazobactam  4,500 mg IntraVENous Q8H    metoprolol succinate  50 mg Oral BID    sodium chloride flush  5-40 mL IntraVENous 2 times per day    lidocaine 1 % injection  50 mg IntraDERmal Once       Objective:  Vitals:    02/25/25 1121   BP: 93/62   Pulse: 87   Resp: 18   Temp: 97.8 °F (36.6 °C)   SpO2: 94%         Allergies: Codeine, Codimal-a [brompheniramine], Lipitor, Spiriva handihaler [tiotropium bromide monohydrate], Tudorza pressair [aclidinium bromide], and Ciprofloxacin    General Appearance: alert and oriented to person, place and time and in no acute distress  Skin: no rash or erythema  Head: normocephalic and atraumatic  Pulmonary/Chest: normal air movement, no respiratory distress  Abdomen: soft, non-tender, non-distended  Genitourinary: no abbasi   Extremities: no cyanosis, clubbing or edema         Labs:     Recent Labs     02/25/25  0519      K 4.0   *   CO2 20*

## 2025-02-25 NOTE — PROGRESS NOTES
Perfect serve message sent to Dr. Wilkinson to see if patient is able to switch to PO antibiotics or if she needs to stay on IV per primary request as they are looking to discharge if they can be switched.

## 2025-02-25 NOTE — PROGRESS NOTES
Pharmacy Consultation Note  (Antibiotic Dosing and Monitoring)    Initial consult date: 2/25/2025  Consulting physician/provider: Alex Chapin MD   Drug: Vancomycin  Indication: Sepsis of Unknown Etiology/Urinary Tract Infection    Age/  Gender Height Weight IBW  Allergy Information   89 y.o./female 172.7 cm (5' 8\") 72.6 kg (160 lb)     Ideal body weight: 63.9 kg (140 lb 14 oz)  Adjusted ideal body weight: 67.4 kg (148 lb 8.4 oz)   Codeine, Codimal-a [brompheniramine], Lipitor, Spiriva handihaler [tiotropium bromide monohydrate], Tudorza pressair [aclidinium bromide], and Ciprofloxacin      Renal Function:  Recent Labs     02/24/25  0346 02/24/25  0451 02/25/25  0519   BUN 21 22 22   CREATININE 1.0 1.1* 1.1*       Intake/Output Summary (Last 24 hours) at 2/25/2025 1254  Last data filed at 2/25/2025 0651  Gross per 24 hour   Intake 454.75 ml   Output 300 ml   Net 154.75 ml       Vancomycin Monitoring:  Trough:  No results for input(s): \"VANCOTROUGH\" in the last 72 hours.  Random:  No results for input(s): \"VANCORANDOM\" in the last 72 hours.    Vancomycin Administration Times:  Recent vancomycin administrations                     vancomycin (VANCOCIN) 1,500 mg in sodium chloride 0.9 % 250 mL IVPB ([id number]) (mg) 1,500 mg New Bag 02/25/25 1012     1,500 mg New Bag 02/24/25 1001    vancomycin (VANCOCIN) 1,500 mg in sodium chloride 0.9 % 250 mL IVPB ([id number]) (mg) 1,500 mg New Bag 02/23/25 2029        Assessment:  89 yo/F, admitted for Right flank pain.  Recent admission (early Jan 2025) and Right uretal stent placed d/t obstruction from Bx-proven urothelial papillary carcinoma.    Empiric ATBs (vanco and pip-tazo) started for suspected complicated UTI.  Also Flu A positive on this admission and started PO oseltamivir.     ID has been consulted.  Estimated Creatinine Clearance: 34 mL/min (A) (based on SCr of 1.1 mg/dL (H)).  2/25: ID (Dr. Wilkinson) just stopped vancomycin and canceled the Pharmacy vanco consult.

## 2025-02-25 NOTE — PROGRESS NOTES
[facility] Hospitalist Progress Note    Admitting Date and Time: 2/23/2025  6:51 PM  Admit Dx: Atrial flutter with rapid ventricular response (HCC) [I48.92]    Subjective:  Patient is being followed for Atrial flutter with rapid ventricular response (HCC) [I48.92]   Pt feels  good, denies any nausea and vomiting.      ROS: denies fever, chills, cp, sob, n/v, HA unless stated above.      amiodarone  100 mg Oral Daily    Virt-Caps  1 capsule Oral Daily    vitamin D  2,000 Units Oral Daily    citalopram  10 mg Oral BID    ferrous sulfate  325 mg Oral Every Other Day    furosemide  20 mg Oral Daily    levothyroxine  125 mcg Oral Daily    rivaroxaban  15 mg Oral Dinner    sodium chloride flush  5-40 mL IntraVENous 2 times per day    oseltamivir  30 mg Oral BID    piperacillin-tazobactam  4,500 mg IntraVENous Q8H    metoprolol succinate  50 mg Oral BID    sodium chloride flush  5-40 mL IntraVENous 2 times per day    lidocaine 1 % injection  50 mg IntraDERmal Once     albuterol, 2.5 mg, Q4H PRN  sodium chloride flush, 5-40 mL, PRN  sodium chloride, , PRN  acetaminophen, 650 mg, Q6H PRN   Or  acetaminophen, 650 mg, Q6H PRN  sodium chloride flush, 5-40 mL, PRN  sodium chloride, , PRN         Objective:    BP 93/62   Pulse 87   Temp 97.8 °F (36.6 °C) (Oral)   Resp 18   Ht 1.727 m (5' 8\")   Wt 72.6 kg (160 lb)   SpO2 94%   BMI 24.33 kg/m²     General Appearance: alert and oriented to person, place and time and in no acute distress  Skin: warm and dry  Head: normocephalic and atraumatic  Eyes: pupils equal, round, and reactive to light, extraocular eye movements intact, conjunctivae normal  Neck: neck supple and non tender without mass   Pulmonary/Chest: clear to auscultation bilaterally- no wheezes, rales or rhonchi, normal air movement, no respiratory distress  Cardiovascular: normal rate, normal S1 and S2 and no carotid bruits  Abdomen: soft, non-tender, non-distended, normal bowel sounds, no masses or  organomegaly  Extremities: no cyanosis, no clubbing and no edema  Neurologic: no cranial nerve deficit and speech normal        Recent Labs     02/24/25  0346 02/24/25  0451 02/25/25  0519    135 137   K 4.4 4.4 4.0   * 105 109*   CO2 18* 23 20*   BUN 21 22 22   CREATININE 1.0 1.1* 1.1*   GLUCOSE 84 87 82   CALCIUM 7.3* 8.0* 7.9*       Recent Labs     02/23/25  1913 02/25/25  0519   WBC 10.2 7.9   RBC 3.89 4.22   HGB 9.5* 10.4*   HCT 32.2* 36.3   MCV 82.8 86.0   MCH 24.4* 24.6*   MCHC 29.5* 28.7*   RDW 17.1* 17.2*    223   MPV 10.8 11.5           Assessment:    Principal Problem:    Acute cystitis without hematuria  Active Problems:    HLD (hyperlipidemia)    HFrEF (heart failure with reduced ejection fraction) (HCC)    Atrial flutter with rapid ventricular response (HCC)    Urothelial carcinoma of kidney, right (HCC)    Sepsis (HCC)    Hypokalemia    Hypomagnesemia    Influenza A  Resolved Problems:    * No resolved hospital problems. *      Plan:  1.  UTI: Patient have history of urothelial cancer she had a stent placed in right ureter, urology consulted and appreciated.  ID consulted and patient on Zosyn now.  Follow-up urinary culture.  2.  A-fib with RVR: Patient heart rate was high more than 100 and Cardizem drip were started.  Cardiology consult appreciated and medication reconciled.  Continue Xarelto, metoprolol, continue telemetry monitoring  3.  Influenza A: She does not require oxygen and on Tamiflu.      NOTE: This report was transcribed using voice recognition software. Every effort was made to ensure accuracy; however, inadvertent computerized transcription errors may be present.  Electronically signed by TODD STEVE MD on 2/25/2025 at 1:56 PM

## 2025-02-25 NOTE — PROGRESS NOTES
Chart accessed pending admission to 4500.    Electronically signed by Orin Villarreal RN on 2/25/25 at 4:32 PM EST

## 2025-02-26 LAB
ANION GAP SERPL CALCULATED.3IONS-SCNC: 10 MMOL/L (ref 7–16)
BASOPHILS # BLD: 0.03 K/UL (ref 0–0.2)
BASOPHILS NFR BLD: 0 % (ref 0–2)
BUN SERPL-MCNC: 23 MG/DL (ref 6–23)
CALCIUM SERPL-MCNC: 8 MG/DL (ref 8.6–10.2)
CHLORIDE SERPL-SCNC: 105 MMOL/L (ref 98–107)
CO2 SERPL-SCNC: 20 MMOL/L (ref 22–29)
CREAT SERPL-MCNC: 1.3 MG/DL (ref 0.5–1)
EOSINOPHIL # BLD: 0.17 K/UL (ref 0.05–0.5)
EOSINOPHILS RELATIVE PERCENT: 2 % (ref 0–6)
ERYTHROCYTE [DISTWIDTH] IN BLOOD BY AUTOMATED COUNT: 17.1 % (ref 11.5–15)
GFR, ESTIMATED: 41 ML/MIN/1.73M2
GLUCOSE SERPL-MCNC: 78 MG/DL (ref 74–99)
HCT VFR BLD AUTO: 34.1 % (ref 34–48)
HGB BLD-MCNC: 10 G/DL (ref 11.5–15.5)
IMM GRANULOCYTES # BLD AUTO: 0.03 K/UL (ref 0–0.58)
IMM GRANULOCYTES NFR BLD: 0 % (ref 0–5)
LYMPHOCYTES NFR BLD: 1.11 K/UL (ref 1.5–4)
LYMPHOCYTES RELATIVE PERCENT: 16 % (ref 20–42)
MAGNESIUM SERPL-MCNC: 1.4 MG/DL (ref 1.6–2.6)
MCH RBC QN AUTO: 24.6 PG (ref 26–35)
MCHC RBC AUTO-ENTMCNC: 29.3 G/DL (ref 32–34.5)
MCV RBC AUTO: 84 FL (ref 80–99.9)
MONOCYTES NFR BLD: 0.8 K/UL (ref 0.1–0.95)
MONOCYTES NFR BLD: 11 % (ref 2–12)
NEUTROPHILS NFR BLD: 70 % (ref 43–80)
NEUTS SEG NFR BLD: 4.88 K/UL (ref 1.8–7.3)
PLATELET # BLD AUTO: 220 K/UL (ref 130–450)
PMV BLD AUTO: 12 FL (ref 7–12)
POTASSIUM SERPL-SCNC: 3.4 MMOL/L (ref 3.5–5)
RBC # BLD AUTO: 4.06 M/UL (ref 3.5–5.5)
SODIUM SERPL-SCNC: 135 MMOL/L (ref 132–146)
WBC OTHER # BLD: 7 K/UL (ref 4.5–11.5)

## 2025-02-26 PROCEDURE — 97535 SELF CARE MNGMENT TRAINING: CPT

## 2025-02-26 PROCEDURE — 97165 OT EVAL LOW COMPLEX 30 MIN: CPT

## 2025-02-26 PROCEDURE — 97161 PT EVAL LOW COMPLEX 20 MIN: CPT

## 2025-02-26 PROCEDURE — 83735 ASSAY OF MAGNESIUM: CPT

## 2025-02-26 PROCEDURE — 6370000000 HC RX 637 (ALT 250 FOR IP)

## 2025-02-26 PROCEDURE — 6360000002 HC RX W HCPCS: Performed by: HOSPITALIST

## 2025-02-26 PROCEDURE — 6370000000 HC RX 637 (ALT 250 FOR IP): Performed by: INTERNAL MEDICINE

## 2025-02-26 PROCEDURE — 2500000003 HC RX 250 WO HCPCS: Performed by: INTERNAL MEDICINE

## 2025-02-26 PROCEDURE — 2060000000 HC ICU INTERMEDIATE R&B

## 2025-02-26 PROCEDURE — 36415 COLL VENOUS BLD VENIPUNCTURE: CPT

## 2025-02-26 PROCEDURE — 97530 THERAPEUTIC ACTIVITIES: CPT

## 2025-02-26 PROCEDURE — 99232 SBSQ HOSP IP/OBS MODERATE 35: CPT | Performed by: INTERNAL MEDICINE

## 2025-02-26 PROCEDURE — 85025 COMPLETE CBC W/AUTO DIFF WBC: CPT

## 2025-02-26 PROCEDURE — 80048 BASIC METABOLIC PNL TOTAL CA: CPT

## 2025-02-26 PROCEDURE — 6370000000 HC RX 637 (ALT 250 FOR IP): Performed by: HOSPITALIST

## 2025-02-26 PROCEDURE — 2580000003 HC RX 258: Performed by: HOSPITALIST

## 2025-02-26 RX ORDER — POTASSIUM CHLORIDE 1500 MG/1
40 TABLET, EXTENDED RELEASE ORAL ONCE
Status: COMPLETED | OUTPATIENT
Start: 2025-02-26 | End: 2025-02-26

## 2025-02-26 RX ADMIN — Medication 1 MG: at 07:51

## 2025-02-26 RX ADMIN — CITALOPRAM HYDROBROMIDE 10 MG: 10 TABLET ORAL at 19:39

## 2025-02-26 RX ADMIN — SODIUM CHLORIDE, PRESERVATIVE FREE 10 ML: 5 INJECTION INTRAVENOUS at 07:52

## 2025-02-26 RX ADMIN — OSELTAMIVIR PHOSPHATE 30 MG: 30 CAPSULE ORAL at 07:51

## 2025-02-26 RX ADMIN — Medication 2000 UNITS: at 07:51

## 2025-02-26 RX ADMIN — POTASSIUM CHLORIDE 40 MEQ: 1500 TABLET, EXTENDED RELEASE ORAL at 16:19

## 2025-02-26 RX ADMIN — METOPROLOL SUCCINATE 50 MG: 50 TABLET, EXTENDED RELEASE ORAL at 07:50

## 2025-02-26 RX ADMIN — PIPERACILLIN AND TAZOBACTAM 4500 MG: 4; .5 INJECTION, POWDER, FOR SOLUTION INTRAVENOUS at 00:00

## 2025-02-26 RX ADMIN — CITALOPRAM HYDROBROMIDE 10 MG: 10 TABLET ORAL at 07:50

## 2025-02-26 RX ADMIN — AMIODARONE HYDROCHLORIDE 100 MG: 200 TABLET ORAL at 07:50

## 2025-02-26 RX ADMIN — SODIUM CHLORIDE, PRESERVATIVE FREE 10 ML: 5 INJECTION INTRAVENOUS at 19:39

## 2025-02-26 RX ADMIN — METOPROLOL SUCCINATE 50 MG: 50 TABLET, EXTENDED RELEASE ORAL at 19:39

## 2025-02-26 RX ADMIN — PIPERACILLIN AND TAZOBACTAM 4500 MG: 4; .5 INJECTION, POWDER, FOR SOLUTION INTRAVENOUS at 16:22

## 2025-02-26 RX ADMIN — RIVAROXABAN 15 MG: 15 TABLET, FILM COATED ORAL at 17:17

## 2025-02-26 RX ADMIN — LEVOTHYROXINE SODIUM 125 MCG: 0.05 TABLET ORAL at 06:15

## 2025-02-26 RX ADMIN — FUROSEMIDE 20 MG: 20 TABLET ORAL at 07:50

## 2025-02-26 RX ADMIN — PIPERACILLIN AND TAZOBACTAM 4500 MG: 4; .5 INJECTION, POWDER, FOR SOLUTION INTRAVENOUS at 07:58

## 2025-02-26 RX ADMIN — OSELTAMIVIR PHOSPHATE 30 MG: 30 CAPSULE ORAL at 19:39

## 2025-02-26 ASSESSMENT — PAIN SCALES - GENERAL
PAINLEVEL_OUTOF10: 0

## 2025-02-26 NOTE — PROGRESS NOTES
Physical Therapy  Physical Therapy Initial Assessment    Name: Radha Dixon  : 1934  MRN: [id number]      Date of Service: 2025    Evaluating PT:  Russ Ruggiero PT, DPT [id number]    Room #:  4503/4503-A  Diagnosis:  Hypokalemia [E87.6]  Hypomagnesemia [E83.42]  Septicemia (HCC) [A41.9]  Influenza A [J10.1]  Atrial flutter with rapid ventricular response (HCC) [I48.92]  Urinary tract infection with hematuria, site unspecified [N39.0, R31.9]  PMHx/PSHx:   has a past surgical history that includes Tonsillectomy; Hysterectomy; Thyroidectomy (17 YEARS AGO); Appendectomy (58 YEARS AGO); eye surgery; Neck surgery (2011); Cholecystectomy; Cardioversion (2019); Cardioversion (2021); arthroplasty (Left, 2023); thoracentesis (Right, 10/11/2024); Cystoscopy (N/A, 2024); and Cystoscopy (N/A, 2025).   has a past surgical history that includes Tonsillectomy; Hysterectomy; Thyroidectomy (17 YEARS AGO); Appendectomy (58 YEARS AGO); eye surgery; Neck surgery (2011); Cholecystectomy; Cardioversion (2019); Cardioversion (2021); arthroplasty (Left, 2023); thoracentesis (Right, 10/11/2024); Cystoscopy (N/A, 2024); and Cystoscopy (N/A, 2025).  Procedure/Surgery:  None  Precautions:  Falls, droplet isolation  Equipment Needs:  TBD    SUBJECTIVE:    Pt lives with daughter in a 1 story home with 4 stair(s) to enter and 1 rail(s).  Pt ambulated with rollator prior to admission.    OBJECTIVE:   Initial Evaluation  Date: 2025 Treatment Short Term/ Long Term   Goals   AM-PAC 6 Clicks      Was pt agreeable to Eval/treatment? Yes     Does pt have pain? No c/o pain     Bed Mobility  Rolling: NT  Supine to sit: SBA  Sit to supine: NT  Scooting: NT  Rolling: Independent  Supine to sit: Independent  Sit to supine: Independent  Scooting: Independent   Transfers Sit to stand: SBA  Stand to sit: SBA  Stand pivot: NT  Sit to stand: Mod I  Stand to sit: Mod

## 2025-02-26 NOTE — PROGRESS NOTES
[facility] Hospitalist Progress Note    Admitting Date and Time: 2/23/2025  6:51 PM  Admit Dx: Hypokalemia [E87.6]  Hypomagnesemia [E83.42]  Septicemia (HCC) [A41.9]  Influenza A [J10.1]  Atrial flutter with rapid ventricular response (HCC) [I48.92]  Urinary tract infection with hematuria, site unspecified [N39.0, R31.9]    Subjective:  Patient is being followed for Hypokalemia [E87.6]  Hypomagnesemia [E83.42]  Septicemia (HCC) [A41.9]  Influenza A [J10.1]  Atrial flutter with rapid ventricular response (HCC) [I48.92]  Urinary tract infection with hematuria, site unspecified [N39.0, R31.9]   Pt feels  good, denies any nausea and vomiting.      ROS: denies fever, chills, cp, sob, n/v, HA unless stated above.      potassium chloride  40 mEq Oral Once    amiodarone  100 mg Oral Daily    Virt-Caps  1 capsule Oral Daily    vitamin D  2,000 Units Oral Daily    citalopram  10 mg Oral BID    ferrous sulfate  325 mg Oral Every Other Day    furosemide  20 mg Oral Daily    levothyroxine  125 mcg Oral Daily    rivaroxaban  15 mg Oral Dinner    sodium chloride flush  5-40 mL IntraVENous 2 times per day    oseltamivir  30 mg Oral BID    piperacillin-tazobactam  4,500 mg IntraVENous Q8H    metoprolol succinate  50 mg Oral BID    sodium chloride flush  5-40 mL IntraVENous 2 times per day    lidocaine 1 % injection  50 mg IntraDERmal Once     albuterol, 2.5 mg, Q4H PRN  sodium chloride flush, 5-40 mL, PRN  sodium chloride, , PRN  acetaminophen, 650 mg, Q6H PRN   Or  acetaminophen, 650 mg, Q6H PRN  sodium chloride flush, 5-40 mL, PRN  sodium chloride, , PRN         Objective:    /72   Pulse 86   Temp 97.4 °F (36.3 °C) (Temporal)   Resp 20   Ht 1.727 m (5' 8\")   Wt 72.6 kg (160 lb)   SpO2 97%   BMI 24.33 kg/m²     General Appearance: alert and oriented to person, place and time and in no acute distress  Skin: warm and dry  Head: normocephalic and atraumatic  Eyes: pupils equal, round, and reactive to light,

## 2025-02-26 NOTE — PROGRESS NOTES
Physician Progress Note      PATIENT:               SILVIA YU  CSN #:                  [id number]  :                       1934  ADMIT DATE:       2025 6:51 PM  DISCH DATE:  RESPONDING  PROVIDER #:        ELEANOR UMANZOR        QUERY TEXT:    Stage of Chronic Kidney Disease: Please provide further specificity, if known.    Clinical indicators include: bun/creatinine, ckd, scr, bun, creatinine, probnp  Options provided:  -- Chronic kidney disease stage 1  -- Chronic kidney disease stage 2  -- Chronic kidney disease stage 3  -- Chronic kidney disease stage 3a  -- Chronic kidney disease stage 3b  -- Chronic kidney disease stage 4  -- Chronic kidney disease stage 5  -- Chronic kidney disease stage 5, requiring dialysis  -- End stage renal disease  -- Other - I will add my own diagnosis  -- Disagree - Not applicable / Not valid  -- Disagree - Clinically Unable to determine / Unknown        PROVIDER RESPONSE TEXT:    The patient has chronic kidney disease stage 3a.      Electronically signed by:  ELEANOR UMANZOR 2025 7:46 AM

## 2025-02-26 NOTE — CARE COORDINATION
Spoke with patient. She is from home, currently living alone as her daughter is having a rehab stay at [facility]. She normally uses a rollator for ambulation. She does drives. PCP is NP Jason Jeffrey with [facility].  Discussed possible ALFONSO at [facility] with daughter, vs. Homecare for additional support at home. She is not agreeable to either at this point. She plans to discuss with her daughter that is not in rehab. Plan is home at discharge. Will confirm after therapy evals.     Case Management Assessment  Initial Evaluation    Date/Time of Evaluation: 2/26/2025 11:17 AM  Assessment Completed by: ALIYA Benz    If patient is discharged prior to next notation, then this note serves as note for discharge by case management.    Patient Name: Radha Dixon                   YOB: 1934  Diagnosis: Hypokalemia [E87.6]  Hypomagnesemia [E83.42]  Septicemia (HCC) [A41.9]  Influenza A [J10.1]  Atrial flutter with rapid ventricular response (HCC) [I48.92]  Urinary tract infection with hematuria, site unspecified [N39.0, R31.9]                   Date / Time: 2/23/2025  6:51 PM    Patient Admission Status: Inpatient   Readmission Risk (Low < 19, Mod (19-27), High > 27): Readmission Risk Score: 22    Current PCP: No primary care provider on file.  PCP verified by ? Yes    Chart Reviewed: Yes      History Provided by: Patient  Patient Orientation: Alert and Oriented    Patient Cognition: Alert    Hospitalization in the last 30 days (Readmission):  No    If yes, Readmission Assessment in CM Navigator will be completed.    Advance Directives:      Code Status: Full Code   Patient's Primary Decision Maker is: Legal Next of Kin    Primary Decision Maker: Tracie Camargo - Child - [phone]    Secondary Decision Maker: Genny Dexter - Child - [phone]    Discharge Planning:    Patient lives with: Alone Type of Home: [place]  Primary Care Giver: Self  Patient Support Systems include:

## 2025-02-26 NOTE — PLAN OF CARE
Problem: ABCDS Injury Assessment  Goal: Absence of physical injury  2/26/2025 1002 by Christian Washburn RN  Outcome: Progressing  2/25/2025 2138 by Sachin Johnson RN  Outcome: Progressing     Problem: Chronic Conditions and Co-morbidities  Goal: Patient's chronic conditions and co-morbidity symptoms are monitored and maintained or improved  2/26/2025 1002 by Christian Washburn RN  Outcome: Progressing  2/25/2025 2138 by Sachin Johnson RN  Outcome: Progressing     Problem: Discharge Planning  Goal: Discharge to home or other facility with appropriate resources  2/26/2025 1002 by Christian Washburn RN  Outcome: Progressing  2/25/2025 2138 by Sachin Johnson RN  Outcome: Progressing     Problem: Respiratory - Adult  Goal: Achieves optimal ventilation and oxygenation  2/26/2025 1002 by Christian Washburn RN  Outcome: Progressing  2/25/2025 2138 by Sachin Johnson RN  Outcome: Progressing     Problem: Cardiovascular - Adult  Goal: Maintains optimal cardiac output and hemodynamic stability  2/26/2025 1002 by Chrsitian Washburn RN  Outcome: Progressing  2/25/2025 2138 by Sachin Johnson RN  Outcome: Progressing  Goal: Absence of cardiac dysrhythmias or at baseline  2/26/2025 1002 by Christian Washburn RN  Outcome: Progressing  2/25/2025 2138 by Sachin Johnson RN  Outcome: Progressing     Problem: Skin/Tissue Integrity - Adult  Goal: Skin integrity remains intact  2/26/2025 1002 by Christian Washburn RN  Outcome: Progressing  2/25/2025 2138 by Sachin Johnson RN  Outcome: Progressing     Problem: Infection - Adult  Goal: Absence of infection at discharge  2/26/2025 1002 by Christian Washburn RN  Outcome: Progressing  2/25/2025 2138 by Sachin Johnson RN  Outcome: Progressing     Problem: Safety - Adult  Goal: Free from fall injury  Outcome: Progressing

## 2025-02-26 NOTE — PLAN OF CARE
Problem: ABCDS Injury Assessment  Goal: Absence of physical injury  Outcome: Progressing     Problem: Chronic Conditions and Co-morbidities  Goal: Patient's chronic conditions and co-morbidity symptoms are monitored and maintained or improved  Outcome: Progressing     Problem: Discharge Planning  Goal: Discharge to home or other facility with appropriate resources  Outcome: Progressing     Problem: Respiratory - Adult  Goal: Achieves optimal ventilation and oxygenation  Outcome: Progressing     Problem: Cardiovascular - Adult  Goal: Maintains optimal cardiac output and hemodynamic stability  Outcome: Progressing  Goal: Absence of cardiac dysrhythmias or at baseline  Outcome: Progressing     Problem: Skin/Tissue Integrity - Adult  Goal: Skin integrity remains intact  Outcome: Progressing     Problem: Infection - Adult  Goal: Absence of infection at discharge  Outcome: Progressing

## 2025-02-26 NOTE — ACP (ADVANCE CARE PLANNING)
Advance Care Planning   Healthcare Decision Maker:    Primary Decision Maker: Tracie Camargo - Child - [phone]    Secondary Decision Maker: Genny Dexter - Child - [phone]    Click here to complete Healthcare Decision Makers including selection of the Healthcare Decision Maker Relationship (ie \"Primary\").

## 2025-02-26 NOTE — PROGRESS NOTES
[facility]  [facility]  Progress Note      Chief Complaint   Patient presents with    Nausea     Nausea vomiting today. Hx afib. +xarelto    Back Pain     Hx kidney CA, hx kidney infection, mid/right sided back pain. Needs nephrectomy       SUBJECTIVE:    Patient is tolerating medications. No reported adverse drug reactions.  No nausea, vomiting, diarrhea.    Review of systems:  As stated above in the chief complaint, otherwise negative.    Medications:  Scheduled Meds:   potassium chloride  40 mEq Oral Once    amiodarone  100 mg Oral Daily    Virt-Caps  1 capsule Oral Daily    vitamin D  2,000 Units Oral Daily    citalopram  10 mg Oral BID    ferrous sulfate  325 mg Oral Every Other Day    furosemide  20 mg Oral Daily    levothyroxine  125 mcg Oral Daily    rivaroxaban  15 mg Oral Dinner    sodium chloride flush  5-40 mL IntraVENous 2 times per day    oseltamivir  30 mg Oral BID    piperacillin-tazobactam  4,500 mg IntraVENous Q8H    metoprolol succinate  50 mg Oral BID    sodium chloride flush  5-40 mL IntraVENous 2 times per day    lidocaine 1 % injection  50 mg IntraDERmal Once     Continuous Infusions:   sodium chloride      sodium chloride       PRN Meds:albuterol, sodium chloride flush, sodium chloride, acetaminophen **OR** acetaminophen, sodium chloride flush, sodium chloride    OBJECTIVE:  /72   Pulse 86   Temp 97.4 °F (36.3 °C) (Temporal)   Resp 20   Ht 1.727 m (5' 8\")   Wt 72.6 kg (160 lb)   SpO2 97%   BMI 24.33 kg/m²   Temp  Av.7 °F (36.5 °C)  Min: 97.4 °F (36.3 °C)  Max: 98 °F (36.7 °C)  Constitutional: The patient is awake, alert, and oriented.   Skin: Warm and dry. No rashes were noted. No jaundice.  HEENT: Eyes show round, and reactive pupils. Moist mucous membranes, no ulcerations, no thrush.   Neck: Supple to movements. No lymphadenopathy.   Chest: No use of accessory muscles to breathe. Symmetrical expansion. Auscultation reveals no wheezing,    Component Value Date/Time    BLOODCULT2 5 Days no growth 03/29/2023 07:55 PM    BLOODCULT2 5 Days no growth 01/27/2022 05:30 PM    BLOODCULT2 5 Days- no growth 06/16/2019 09:32 PM    ORG Staphylococcus coagulase-negative 06/10/2021 12:00 PM     WOUND/ABSCESS   Date Value Ref Range Status   06/10/2021 One colony  Final     No results found for: \"RESPSMEAR\"      Component Value Date/Time    MPNEUMO Not Detected 02/23/2025 1913     No results found for: \"CULTRESP\"  No results found for: \"CXCATHTIP\"  No results found for: \"BFCS\"  No results found for: \"CXSURG\"  Urine Culture, Routine   Date Value Ref Range Status   09/09/2022   Final    <10,000 CFU/mL  Mixed gram positive organisms  Mixed gram negative rods     01/27/2022 Growth not present  Final   06/16/2019   Final    ,000 CFU/mL  Mixed kyaw isolated. Further workup and sensitivity testing  is not routinely indicated and will not be performed.  Mixed kyaw isolated includes:  Mixed gram negative rods  Mixed gram positive organisms       No results found for: \"MRSAC\"    Assessment:  Noninvasive papillary urothelial CA  Status post right ureteral stent by urology  Flank pain  Fever Tmax 100.5  UA has pyuria and bacteriuria  Gram-negative rods UTI, colony count less than 10,000  Influenza A infection     Plan:    Continue Zosyn day 4/7  Blood cultures no growth so far  Blood cultures no growth so far  CT abdomen is consistent with right sided hydronephrosis with possible pyelonephritis  Continue Tamiflu  Urology consult appreciated, plan for right nephroureterectomy on 03/20/2025  ID will continue to follow    Jorge Alberto Wilkinson MD  2:46 PM  2/26/2025

## 2025-02-26 NOTE — PROGRESS NOTES
Perfect serve message sent to Adalgisa Gonzales NP, regarding pt BP 93/59, HR 83 with 50mg metoprolol ordered.    Electronically signed by Sachin Johnson RN on 2/25/25 at 8:36 PM EST    Message read at 2038 with no response. Follow up message sent as this time.    Electronically signed by Sachin Johnson RN on 2/25/25 at 9:06 PM EST

## 2025-02-26 NOTE — PROGRESS NOTES
OCCUPATIONAL THERAPY INITIAL EVALUATION    [facility]  [address], [place]      Date:2025                                                  Patient Name: Radha Dixon  MRN: [id number]  : 1934  Room: [address] [place] [address]    Evaluating OT: Gilda Samson, MOT, OTR/L  # [id number]    Referring Provider:  Alex Chapin MD   Specific Provider Orders:  \"OT Eval and Treat\"  25    Diagnosis: Hypokalemia [E87.6]  Hypomagnesemia [E83.42]  Septicemia (HCC) [A41.9]  Influenza A [J10.1]  Atrial flutter with rapid ventricular response (HCC) [I48.92]  Urinary tract infection with hematuria, site unspecified [N39.0, R31.9]    Pt was admitted w/ Nausea, Vomiting, Right Back pain, hypotension    Pertinent Medical History:  Pt has a past medical history of Asthma, Atrial fibrillation, persistent (HCC), Cancer (HCC), Depression, Generalized osteoarthritis, Hyperlipidemia, Knee pain, Pinched nerve in neck, Prolonged emergence from general anesthesia, Thyroid disease, and Vitamin D deficiency.,  has a past surgical history that includes Tonsillectomy; Hysterectomy; Thyroidectomy (17 YEARS AGO); Appendectomy (58 YEARS AGO); eye surgery; Neck surgery (2011); Cholecystectomy; Cardioversion (2019); Cardioversion (2021); arthroplasty (Left, 2023); thoracentesis (Right, 10/11/2024); Cystoscopy (N/A, 2024); and Cystoscopy (N/A, 2025).    Surgeries this admission: None     Precautions:  Fall Risk  Droplet Precautions (+) Influenza    Assessment of current deficits   [x] Functional mobility  [x]ADLs  [x] Strength               []Cognition   [x] Functional transfers   [x] IADLs         [x] Safety Awareness   [x]Endurance   [] Fine Coordination              [x] Balance     [] Vision/perception   []Sensation    []Gross Motor Coordination  [] ROM  [] Delirium                  [] Motor Control       OT PLAN OF CARE   OT POC based on  Transfers and Mobility using Rollator PRN for ambulation.   Driving:  Yes  Occupation:  Not reported    Pain Level:  Denied pain    Additional Complaints:  General mild Debility    Cognition: A & O x 4   Able to Follow Multi-Step Commands INDly   Memory:  good    Sequencing:  good    Problem solving:  good    Judgement/safety:  good    Communication:  good    Insight:  good     Additional Comments:  Pt was pleasant and cooperative.      Vitals/Lab Values:  O2 sats remained > 90% on room air           Functional Assessment:  AM-PAC Daily Activity Raw Score: 18/24     Initial Eval Status  Date: 2-26-25   Treatment Status  Date: STGs = LTGs  Time frame: 10-14 days   Feeding IND      NA   Grooming SUP/set up    Washing hands - standing at sink    Mod I  Standing at the Sink   UB Dressing SUP/set up    Donning robe - seated    Mod I     LB Dressing Min A/set up    Footwear seated, pants seated/standing  Pt ed for safe/adaptive techs, use of adaptive equip    Mod I     Bathing Min A/set up    Sponge bathing   Pt ed re: Benefits of Shower chair, resources for obtaining equip; for safe/adaptive techs    Mod I      Toileting Min A    SUP - Hygiene - seated  Min A - Clothing adjustment - standing  Pt ed for safe/adaptive techs    Mod I     Bed Mobility  Supine to sit: SUP   Sit to supine:  NT     VCs for safe/adaptive techs    Supine to sit: IND  Sit to supine: IND     Functional Transfers SBA    EOB, Commode, Chair  Pt ed for safety/hand placement    Mod I     Functional Mobility SBA w/ WW    Household distances in room, bathroom  Pt ed for safety/improved safety awareness, walker safety    Mod I     Balance Sitting:     Static:  SUP    Dynamic:  SUP w/ functional ax      Standing:     Static:  SBA w/ WW    Dynamic:  SBA w/ functional ax/mobility w/ WW    Sitting:     Static:  IND    Dynamic:  IND w/ functional ax    Standing:     Static:  Mod I w/ AD PRN    Dynamic:  Mod I w/ functional ax/mobility w/ AD PRN   Activity

## 2025-02-27 LAB
ANION GAP SERPL CALCULATED.3IONS-SCNC: 10 MMOL/L (ref 7–16)
BASOPHILS # BLD: 0 K/UL (ref 0–0.2)
BASOPHILS NFR BLD: 0 % (ref 0–2)
BUN SERPL-MCNC: 19 MG/DL (ref 6–23)
CALCIUM SERPL-MCNC: 8 MG/DL (ref 8.6–10.2)
CHLORIDE SERPL-SCNC: 106 MMOL/L (ref 98–107)
CO2 SERPL-SCNC: 19 MMOL/L (ref 22–29)
CREAT SERPL-MCNC: 1.1 MG/DL (ref 0.5–1)
EOSINOPHIL # BLD: 0.06 K/UL (ref 0.05–0.5)
EOSINOPHILS RELATIVE PERCENT: 1 % (ref 0–6)
ERYTHROCYTE [DISTWIDTH] IN BLOOD BY AUTOMATED COUNT: 17.1 % (ref 11.5–15)
GFR, ESTIMATED: 48 ML/MIN/1.73M2
GLUCOSE SERPL-MCNC: 89 MG/DL (ref 74–99)
HCT VFR BLD AUTO: 36.6 % (ref 34–48)
HGB BLD-MCNC: 10.2 G/DL (ref 11.5–15.5)
LYMPHOCYTES NFR BLD: 1.23 K/UL (ref 1.5–4)
LYMPHOCYTES RELATIVE PERCENT: 20 % (ref 20–42)
MCH RBC QN AUTO: 24.9 PG (ref 26–35)
MCHC RBC AUTO-ENTMCNC: 27.9 G/DL (ref 32–34.5)
MCV RBC AUTO: 89.5 FL (ref 80–99.9)
MONOCYTES NFR BLD: 0.56 K/UL (ref 0.1–0.95)
MONOCYTES NFR BLD: 9 % (ref 2–12)
NEUTROPHILS NFR BLD: 71 % (ref 43–80)
NEUTS SEG NFR BLD: 4.46 K/UL (ref 1.8–7.3)
PLATELET # BLD AUTO: 208 K/UL (ref 130–450)
PMV BLD AUTO: 11.2 FL (ref 7–12)
POTASSIUM SERPL-SCNC: 3.8 MMOL/L (ref 3.5–5)
PROCALCITONIN SERPL-MCNC: 0.08 NG/ML (ref 0–0.08)
RBC # BLD AUTO: 4.09 M/UL (ref 3.5–5.5)
RBC # BLD: ABNORMAL 10*6/UL
SODIUM SERPL-SCNC: 135 MMOL/L (ref 132–146)
WBC OTHER # BLD: 6.3 K/UL (ref 4.5–11.5)

## 2025-02-27 PROCEDURE — 80048 BASIC METABOLIC PNL TOTAL CA: CPT

## 2025-02-27 PROCEDURE — 2060000000 HC ICU INTERMEDIATE R&B

## 2025-02-27 PROCEDURE — 85025 COMPLETE CBC W/AUTO DIFF WBC: CPT

## 2025-02-27 PROCEDURE — 6370000000 HC RX 637 (ALT 250 FOR IP): Performed by: HOSPITALIST

## 2025-02-27 PROCEDURE — 99233 SBSQ HOSP IP/OBS HIGH 50: CPT | Performed by: INTERNAL MEDICINE

## 2025-02-27 PROCEDURE — 2580000003 HC RX 258: Performed by: HOSPITALIST

## 2025-02-27 PROCEDURE — 6360000002 HC RX W HCPCS: Performed by: HOSPITALIST

## 2025-02-27 PROCEDURE — 84145 PROCALCITONIN (PCT): CPT

## 2025-02-27 PROCEDURE — 2500000003 HC RX 250 WO HCPCS: Performed by: INTERNAL MEDICINE

## 2025-02-27 PROCEDURE — 36415 COLL VENOUS BLD VENIPUNCTURE: CPT

## 2025-02-27 PROCEDURE — 6370000000 HC RX 637 (ALT 250 FOR IP)

## 2025-02-27 RX ORDER — OSELTAMIVIR PHOSPHATE 30 MG/1
30 CAPSULE ORAL 2 TIMES DAILY
Qty: 2 CAPSULE | Refills: 0 | Status: SHIPPED | OUTPATIENT
Start: 2025-02-27 | End: 2025-02-28

## 2025-02-27 RX ADMIN — Medication 2000 UNITS: at 08:02

## 2025-02-27 RX ADMIN — Medication 1 MG: at 08:02

## 2025-02-27 RX ADMIN — SODIUM CHLORIDE, PRESERVATIVE FREE 10 ML: 5 INJECTION INTRAVENOUS at 19:55

## 2025-02-27 RX ADMIN — SODIUM CHLORIDE, PRESERVATIVE FREE 10 ML: 5 INJECTION INTRAVENOUS at 08:02

## 2025-02-27 RX ADMIN — PIPERACILLIN AND TAZOBACTAM 4500 MG: 4; .5 INJECTION, POWDER, FOR SOLUTION INTRAVENOUS at 08:06

## 2025-02-27 RX ADMIN — FUROSEMIDE 20 MG: 20 TABLET ORAL at 08:02

## 2025-02-27 RX ADMIN — PIPERACILLIN AND TAZOBACTAM 4500 MG: 4; .5 INJECTION, POWDER, FOR SOLUTION INTRAVENOUS at 22:55

## 2025-02-27 RX ADMIN — OSELTAMIVIR PHOSPHATE 30 MG: 30 CAPSULE ORAL at 08:02

## 2025-02-27 RX ADMIN — PIPERACILLIN AND TAZOBACTAM 4500 MG: 4; .5 INJECTION, POWDER, FOR SOLUTION INTRAVENOUS at 17:05

## 2025-02-27 RX ADMIN — LEVOTHYROXINE SODIUM 125 MCG: 0.05 TABLET ORAL at 06:14

## 2025-02-27 RX ADMIN — AMIODARONE HYDROCHLORIDE 100 MG: 200 TABLET ORAL at 08:02

## 2025-02-27 RX ADMIN — PIPERACILLIN AND TAZOBACTAM 4500 MG: 4; .5 INJECTION, POWDER, FOR SOLUTION INTRAVENOUS at 00:17

## 2025-02-27 RX ADMIN — CITALOPRAM HYDROBROMIDE 10 MG: 10 TABLET ORAL at 19:55

## 2025-02-27 RX ADMIN — METOPROLOL SUCCINATE 50 MG: 50 TABLET, EXTENDED RELEASE ORAL at 19:55

## 2025-02-27 RX ADMIN — OSELTAMIVIR PHOSPHATE 30 MG: 30 CAPSULE ORAL at 19:55

## 2025-02-27 RX ADMIN — FERROUS SULFATE TAB 325 MG (65 MG ELEMENTAL FE) 325 MG: 325 (65 FE) TAB at 08:02

## 2025-02-27 RX ADMIN — CITALOPRAM HYDROBROMIDE 10 MG: 10 TABLET ORAL at 08:02

## 2025-02-27 RX ADMIN — RIVAROXABAN 15 MG: 15 TABLET, FILM COATED ORAL at 17:03

## 2025-02-27 ASSESSMENT — PAIN SCALES - GENERAL
PAINLEVEL_OUTOF10: 0
PAINLEVEL_OUTOF10: 0

## 2025-02-27 NOTE — PROGRESS NOTES
[facility] Hospitalist Progress Note    Admitting Date and Time: 2/23/2025  6:51 PM  Admit Dx: Hypokalemia [E87.6]  Hypomagnesemia [E83.42]  Septicemia (HCC) [A41.9]  Influenza A [J10.1]  Atrial flutter with rapid ventricular response (HCC) [I48.92]  Urinary tract infection with hematuria, site unspecified [N39.0, R31.9]    Subjective:  Patient is being followed for Hypokalemia [E87.6]  Hypomagnesemia [E83.42]  Septicemia (HCC) [A41.9]  Influenza A [J10.1]  Atrial flutter with rapid ventricular response (HCC) [I48.92]  Urinary tract infection with hematuria, site unspecified [N39.0, R31.9]   Pt feels  good, denies any nausea and vomiting.      ROS: denies fever, chills, cp, sob, n/v, HA unless stated above.      amiodarone  100 mg Oral Daily    Virt-Caps  1 capsule Oral Daily    vitamin D  2,000 Units Oral Daily    citalopram  10 mg Oral BID    ferrous sulfate  325 mg Oral Every Other Day    furosemide  20 mg Oral Daily    levothyroxine  125 mcg Oral Daily    rivaroxaban  15 mg Oral Dinner    sodium chloride flush  5-40 mL IntraVENous 2 times per day    oseltamivir  30 mg Oral BID    piperacillin-tazobactam  4,500 mg IntraVENous Q8H    metoprolol succinate  50 mg Oral BID    sodium chloride flush  5-40 mL IntraVENous 2 times per day    lidocaine 1 % injection  50 mg IntraDERmal Once     albuterol, 2.5 mg, Q4H PRN  sodium chloride flush, 5-40 mL, PRN  sodium chloride, , PRN  acetaminophen, 650 mg, Q6H PRN   Or  acetaminophen, 650 mg, Q6H PRN  sodium chloride flush, 5-40 mL, PRN  sodium chloride, , PRN         Objective:    /81   Pulse (!) 108   Temp 97.6 °F (36.4 °C) (Temporal)   Resp 18   Ht 1.727 m (5' 8\")   Wt 72.6 kg (160 lb)   SpO2 97%   BMI 24.33 kg/m²     General Appearance: alert and oriented to person, place and time and in no acute distress  Skin: warm and dry  Head: normocephalic and atraumatic  Eyes: pupils equal, round, and reactive to light, extraocular eye movements intact,   Ongoing SW/CM Assessment/Plan of Care Note     See SW/CM flowsheets for goals and other objective data.    Patient/Family discharge goal (s):  Goal #1: Communication facilitated          PT Recommendation:  Recommendation for Discharge: PT WI: Sub-acute nursing home    OT Recommendation:  Recommendations for Discharge: OT WI: Sub-acute nursing home    SLP Recommendation:       Disposition:       Progress note:   Patient discussed in OFTs this am.  Patient requiring IV antibiotics.  Extremely hard of hearing and has a sitter due to being impulsive and getting up without assistance.  CM spoke with RN who states this appears to be improving and she will see how patient does today and discuss with MD about potential to stop the sitter if the patient is more appropriate with calling.    Referrals sent to 1. [facility] 2. [facility] 3. [facility]    CM will await results of referrals.    Addendum 1132:  Call received from Linda at [facility].  They are out of network with the patient's insurance and the insurance does not offer any out of network benefits so the JORDAN stay would be private pay.  Daughter already told CM that this would not be an option so CM told Linda at [facility] to discard the referral and thank you.  Cm will await a  response from the [facility] and [facility].    Addendum 1421:  Call received from Nolvia ORELLANA.  Sitter was dc'd this am at 1100.    Linda BAKER, RN-[facility]   Cell   [phone]     Weekends: [phone]

## 2025-02-27 NOTE — PROGRESS NOTES
Message sent to Dr. Wilkinson regarding possible antibiotic de-escalation and discharge. Electronically signed by Leyla Carcamo RN on 2/27/2025 at 9:52 AM

## 2025-02-27 NOTE — PROGRESS NOTES
Spiritual Health History and Assessment/Progress Note  [facility]    (P) Initial Encounter,  ,  ,      Name: Radha Dixon MRN: [id number]    Age: 89 y.o.     Sex: female   Language: English   Spiritism: Gnosticism   Acute cystitis without hematuria     Date: 2/26/2025                           Spiritual Assessment began in [address] PICU            Encounter Overview/Reason: (P) Initial Encounter  Spoke with dtr who reports mother is doing better, [employer] [employer] priest came in and gave her communion, she was scheduled to possibly go home but due to possible influenza she will have to stay for a while. Dtr states Elise has excepted the changes. I asked if there was anything she may need, dtr reported no. I asked if I could pray with her and she said yes. We prayed and ended the conversation.        Esha, Belief, Meaning:   Patient identifies as spiritual and has beliefs or practices that help with coping during difficult times  Family/Friends identify as spiritual and have beliefs or practices that help with coping during difficult times      Importance and Influence:  Patient has spiritual/personal beliefs that influence decisions regarding their health  Family/Friends have spiritual/personal beliefs that influence decisions regarding the patient's health    Community:  Patient feels well-supported. Support system includes: [facility] Community and Extended family  Family/Friends feel well-supported. Support system includes: [facility] and Extended family    Assessment and Plan of Care:     Patient Interventions include: unable to speak with at the time.  Family/Friends Interventions include: Facilitated expression of thoughts and feelings, Explored spiritual coping/struggle/distress, and Affirmed coping skills/support systems    Patient Plan of Care: No spiritual needs identified for follow-up  Family/Friends Plan of Care: No future visits per patient/family request    Electronically signed  by Chaplain Lopez on 2/26/2025 at 8:03 PM

## 2025-02-27 NOTE — DISCHARGE INSTRUCTIONS
Call the [facility] (Dr. Figueredo/Tomer/Mckay/Fady) office for a follow up and cystoscopy in about June 2025--[phone]

## 2025-02-27 NOTE — PLAN OF CARE
Problem: ABCDS Injury Assessment  Goal: Absence of physical injury  Outcome: Progressing     Problem: Chronic Conditions and Co-morbidities  Goal: Patient's chronic conditions and co-morbidity symptoms are monitored and maintained or improved  Outcome: Progressing     Problem: Discharge Planning  Goal: Discharge to home or other facility with appropriate resources  Outcome: Progressing     Problem: Respiratory - Adult  Goal: Achieves optimal ventilation and oxygenation  Outcome: Progressing     Problem: Skin/Tissue Integrity - Adult  Goal: Skin integrity remains intact  Recent Flowsheet Documentation  Taken 2/27/2025 1032 by Jolanta Arreola RN  Skin Integrity Remains Intact: Monitor for areas of redness and/or skin breakdown     Problem: Safety - Adult  Goal: Free from fall injury  Recent Flowsheet Documentation  Taken 2/27/2025 1032 by Jolanta Arreola RN  Free From Fall Injury: Instruct family/caregiver on patient safety

## 2025-02-27 NOTE — PLAN OF CARE
Problem: ABCDS Injury Assessment  Goal: Absence of physical injury  2/26/2025 2027 by Sachin Johnson RN  Outcome: Progressing  2/26/2025 1002 by Christian Washburn RN  Outcome: Progressing     Problem: Chronic Conditions and Co-morbidities  Goal: Patient's chronic conditions and co-morbidity symptoms are monitored and maintained or improved  2/26/2025 2027 by Sachin Johnson RN  Outcome: Progressing  2/26/2025 1002 by Christian Washburn RN  Outcome: Progressing     Problem: Discharge Planning  Goal: Discharge to home or other facility with appropriate resources  2/26/2025 2027 by Sachin Johnson RN  Outcome: Progressing  2/26/2025 1002 by Christian Washburn RN  Outcome: Progressing     Problem: Respiratory - Adult  Goal: Achieves optimal ventilation and oxygenation  2/26/2025 2027 by Sachin Johnson RN  Outcome: Progressing  2/26/2025 1002 by Christian Washburn RN  Outcome: Progressing     Problem: Cardiovascular - Adult  Goal: Maintains optimal cardiac output and hemodynamic stability  2/26/2025 2027 by Sachin Johnson RN  Outcome: Progressing  2/26/2025 1002 by Christian Washburn RN  Outcome: Progressing  Goal: Absence of cardiac dysrhythmias or at baseline  2/26/2025 2027 by Sachin Johnson RN  Outcome: Progressing  2/26/2025 1002 by Christian Washburn RN  Outcome: Progressing     Problem: Skin/Tissue Integrity - Adult  Goal: Skin integrity remains intact  2/26/2025 2027 by Sachin Johnson RN  Outcome: Progressing  2/26/2025 1002 by Christian Washburn RN  Outcome: Progressing     Problem: Infection - Adult  Goal: Absence of infection at discharge  2/26/2025 2027 by Sachin Johnson RN  Outcome: Progressing  2/26/2025 1002 by Christian Washburn RN  Outcome: Progressing     Problem: Safety - Adult  Goal: Free from fall injury  2/26/2025 2027 by Sachin Johnson RN  Outcome: Progressing  2/26/2025 1002 by Christian Washburn RN  Outcome: Progressing

## 2025-02-27 NOTE — PROGRESS NOTES
[facility]  [facility]  Progress Note      Chief Complaint   Patient presents with    Nausea     Nausea vomiting today. Hx afib. +xarelto    Back Pain     Hx kidney CA, hx kidney infection, mid/right sided back pain. Needs nephrectomy       SUBJECTIVE:    Patient is tolerating medications. No reported adverse drug reactions.  No nausea, vomiting, diarrhea.    Review of systems:  As stated above in the chief complaint, otherwise negative.    Medications:  Scheduled Meds:   amiodarone  100 mg Oral Daily    Virt-Caps  1 capsule Oral Daily    vitamin D  2,000 Units Oral Daily    citalopram  10 mg Oral BID    ferrous sulfate  325 mg Oral Every Other Day    furosemide  20 mg Oral Daily    levothyroxine  125 mcg Oral Daily    rivaroxaban  15 mg Oral Dinner    sodium chloride flush  5-40 mL IntraVENous 2 times per day    oseltamivir  30 mg Oral BID    piperacillin-tazobactam  4,500 mg IntraVENous Q8H    metoprolol succinate  50 mg Oral BID    sodium chloride flush  5-40 mL IntraVENous 2 times per day    lidocaine 1 % injection  50 mg IntraDERmal Once     Continuous Infusions:   sodium chloride      sodium chloride       PRN Meds:albuterol, sodium chloride flush, sodium chloride, acetaminophen **OR** acetaminophen, sodium chloride flush, sodium chloride    OBJECTIVE:  /81   Pulse (!) 108   Temp 97.6 °F (36.4 °C) (Temporal)   Resp 18   Ht 1.727 m (5' 8\")   Wt 72.6 kg (160 lb)   SpO2 97%   BMI 24.33 kg/m²   Temp  Av.6 °F (36.4 °C)  Min: 97.2 °F (36.2 °C)  Max: 97.9 °F (36.6 °C)  Constitutional: The patient is awake, alert, and oriented.   Skin: Warm and dry. No rashes were noted. No jaundice.  HEENT: Eyes show round, and reactive pupils. Moist mucous membranes, no ulcerations, no thrush.   Neck: Supple to movements. No lymphadenopathy.   Chest: No use of accessory muscles to breathe. Symmetrical expansion. Auscultation reveals no wheezing, crackles, or rhonchi.   Cardiovascular:  Days no growth 03/29/2023 07:55 PM    BLOODCULT2 5 Days no growth 01/27/2022 05:30 PM    BLOODCULT2 5 Days- no growth 06/16/2019 09:32 PM    ORG Staphylococcus coagulase-negative 06/10/2021 12:00 PM     WOUND/ABSCESS   Date Value Ref Range Status   06/10/2021 One colony  Final     No results found for: \"RESPSMEAR\"      Component Value Date/Time    MPNEUMO Not Detected 02/23/2025 1913     No results found for: \"CULTRESP\"  No results found for: \"CXCATHTIP\"  No results found for: \"BFCS\"  No results found for: \"CXSURG\"  Urine Culture, Routine   Date Value Ref Range Status   09/09/2022   Final    <10,000 CFU/mL  Mixed gram positive organisms  Mixed gram negative rods     01/27/2022 Growth not present  Final   06/16/2019   Final    ,000 CFU/mL  Mixed kyaw isolated. Further workup and sensitivity testing  is not routinely indicated and will not be performed.  Mixed kyaw isolated includes:  Mixed gram negative rods  Mixed gram positive organisms       No results found for: \"MRSAC\"    Assessment:  Noninvasive papillary urothelial CA  Status post right ureteral stent by urology  Flank pain  Fever Tmax 100.5  UA has pyuria and bacteriuria  Gram-negative rods UTI, colony count less than 10,000  Influenza A infection     Plan:    Continue Zosyn day 5/7  Blood cultures no growth so far  Blood cultures no growth so far  CT abdomen is consistent with right sided hydronephrosis with possible pyelonephritis  Continue Tamiflu  Urology consult appreciated, plan for right nephroureterectomy on 03/20/2025  ID will continue to follow    Jorge Alberto Wilkinson MD  3:00 PM  2/27/2025

## 2025-02-27 NOTE — CARE COORDINATION
Patient sitting up in bed unassisted today. 18/24 with PT yesterday. Dr. Wilkinson would like zosyn for 7 days. Today day 5/7. Plan is home with no needs when released.     For questions I can be reached at [phone]. Anastasia Parisi, ALIYA

## 2025-02-27 NOTE — PROGRESS NOTES
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  She had hematuria yesterday which is improving.  She denies any pain.  She hopes to go home    Vitals:    02/27/25 1445   BP: 101/81   Pulse: (!) 108   Resp: 18   Temp: 97.6 °F (36.4 °C)   SpO2: 97%       Allergies: Codeine, Codimal-a [brompheniramine], Lipitor, Spiriva handihaler [tiotropium bromide monohydrate], Tudorza pressair [aclidinium bromide], and Ciprofloxacin    PAST MEDICAL HISTORY:   Past Medical History:   Diagnosis Date    Asthma     Atrial fibrillation, persistent (HCC)     Cancer (HCC)     Depression     Generalized osteoarthritis 09/12/2006    Hyperlipidemia     Knee pain     Sees Dr. Manrique for b/l knee pain / gel injections    Pinched nerve in neck     Prolonged emergence from general anesthesia     Thyroid disease     Vitamin D deficiency        PAST SURGICAL HISTORY:   Past Surgical History:   Procedure Laterality Date    APPENDECTOMY  58 YEARS AGO    ARTHROPLASTY Left 03/29/2023    LEFT CARPAL TUNNEL RELEASE LEFT MEDIAN NERVE DECOMPRESSION AT ELBOW LEFT THUMB TRAPEZIECTOMY LIGAMENT RECONSTRUCTION TENDON INTERPOSITION WITH FIBERTAK performed by Chino Handley MD at [facility]    CARDIOVERSION  09/20/2019    Dr. Ledesma 1 shock 200 joules Successful    CARDIOVERSION  08/02/2021    Dr. Matias. 200J x1 converted to NSR    CHOLECYSTECTOMY      CYSTOSCOPY N/A 12/31/2024    CYSTOSCOPY BILATERAL RETROGRADE PYELOGRAM, RIGHT URETEROSCOPY, RIGHT URETERAL BIOPSIES, RIGHT URETERAL STENT INSERTION, PELVIC EXAM performed by Georgi Shrestha MD at [facility] [place]    CYSTOSCOPY N/A 1/30/2025    CYSTOSCOPY RIGHT RETROGRADE PYELOGRAM, RIGHT URETEROSCOPY, BASKET EXTRACTION RIGHT URETERAL TUMOR, LASER ABLATION OF TUMOR, RIGHT URETERAL STENT EXCHANGE performed by Georgi Shrestha MD at [facility] [place]    EYE SURGERY      CATARACT RIGHT AND  nephroureterectomy with Dr. Bah at the [facility] on 3/20/2025.  She will have surveillance cystoscopy in roughly June 2025.  -Stable for discharge from urology standpoint    Georgi Shrestha MD  2/27/2025  2:56 PM

## 2025-02-28 VITALS
HEART RATE: 83 BPM | DIASTOLIC BLOOD PRESSURE: 88 MMHG | SYSTOLIC BLOOD PRESSURE: 119 MMHG | HEIGHT: 68 IN | BODY MASS INDEX: 24.22 KG/M2 | OXYGEN SATURATION: 95 % | RESPIRATION RATE: 18 BRPM | WEIGHT: 159.8 LBS | TEMPERATURE: 97.5 F

## 2025-02-28 LAB
ANION GAP SERPL CALCULATED.3IONS-SCNC: 14 MMOL/L (ref 7–16)
BASOPHILS # BLD: 0.03 K/UL (ref 0–0.2)
BASOPHILS NFR BLD: 1 % (ref 0–2)
BUN SERPL-MCNC: 16 MG/DL (ref 6–23)
CALCIUM SERPL-MCNC: 8.6 MG/DL (ref 8.6–10.2)
CHLORIDE SERPL-SCNC: 106 MMOL/L (ref 98–107)
CO2 SERPL-SCNC: 19 MMOL/L (ref 22–29)
CREAT SERPL-MCNC: 1.2 MG/DL (ref 0.5–1)
EOSINOPHIL # BLD: 0.16 K/UL (ref 0.05–0.5)
EOSINOPHILS RELATIVE PERCENT: 3 % (ref 0–6)
ERYTHROCYTE [DISTWIDTH] IN BLOOD BY AUTOMATED COUNT: 17 % (ref 11.5–15)
GFR, ESTIMATED: 43 ML/MIN/1.73M2
GLUCOSE SERPL-MCNC: 82 MG/DL (ref 74–99)
HCT VFR BLD AUTO: 35.5 % (ref 34–48)
HGB BLD-MCNC: 10.4 G/DL (ref 11.5–15.5)
IMM GRANULOCYTES # BLD AUTO: 0.03 K/UL (ref 0–0.58)
IMM GRANULOCYTES NFR BLD: 1 % (ref 0–5)
LYMPHOCYTES NFR BLD: 1.33 K/UL (ref 1.5–4)
LYMPHOCYTES RELATIVE PERCENT: 21 % (ref 20–42)
MCH RBC QN AUTO: 24.3 PG (ref 26–35)
MCHC RBC AUTO-ENTMCNC: 29.3 G/DL (ref 32–34.5)
MCV RBC AUTO: 82.9 FL (ref 80–99.9)
MICROORGANISM SPEC CULT: NORMAL
MICROORGANISM SPEC CULT: NORMAL
MONOCYTES NFR BLD: 0.68 K/UL (ref 0.1–0.95)
MONOCYTES NFR BLD: 11 % (ref 2–12)
NEUTROPHILS NFR BLD: 65 % (ref 43–80)
NEUTS SEG NFR BLD: 4.22 K/UL (ref 1.8–7.3)
PLATELET # BLD AUTO: 250 K/UL (ref 130–450)
PMV BLD AUTO: 11.4 FL (ref 7–12)
POTASSIUM SERPL-SCNC: 3.5 MMOL/L (ref 3.5–5)
RBC # BLD AUTO: 4.28 M/UL (ref 3.5–5.5)
SERVICE CMNT-IMP: NORMAL
SERVICE CMNT-IMP: NORMAL
SODIUM SERPL-SCNC: 139 MMOL/L (ref 132–146)
SPECIMEN DESCRIPTION: NORMAL
SPECIMEN DESCRIPTION: NORMAL
WBC OTHER # BLD: 6.5 K/UL (ref 4.5–11.5)

## 2025-02-28 PROCEDURE — 6370000000 HC RX 637 (ALT 250 FOR IP)

## 2025-02-28 PROCEDURE — 6370000000 HC RX 637 (ALT 250 FOR IP): Performed by: HOSPITALIST

## 2025-02-28 PROCEDURE — 2500000003 HC RX 250 WO HCPCS: Performed by: INTERNAL MEDICINE

## 2025-02-28 PROCEDURE — 80048 BASIC METABOLIC PNL TOTAL CA: CPT

## 2025-02-28 PROCEDURE — 99239 HOSP IP/OBS DSCHRG MGMT >30: CPT | Performed by: INTERNAL MEDICINE

## 2025-02-28 PROCEDURE — 2580000003 HC RX 258: Performed by: HOSPITALIST

## 2025-02-28 PROCEDURE — 85025 COMPLETE CBC W/AUTO DIFF WBC: CPT

## 2025-02-28 PROCEDURE — 6360000002 HC RX W HCPCS: Performed by: HOSPITALIST

## 2025-02-28 PROCEDURE — 36415 COLL VENOUS BLD VENIPUNCTURE: CPT

## 2025-02-28 RX ADMIN — FUROSEMIDE 20 MG: 20 TABLET ORAL at 09:13

## 2025-02-28 RX ADMIN — LEVOTHYROXINE SODIUM 125 MCG: 0.05 TABLET ORAL at 05:39

## 2025-02-28 RX ADMIN — AMIODARONE HYDROCHLORIDE 100 MG: 200 TABLET ORAL at 09:14

## 2025-02-28 RX ADMIN — METOPROLOL SUCCINATE 50 MG: 50 TABLET, EXTENDED RELEASE ORAL at 09:14

## 2025-02-28 RX ADMIN — OSELTAMIVIR PHOSPHATE 30 MG: 30 CAPSULE ORAL at 09:13

## 2025-02-28 RX ADMIN — CITALOPRAM HYDROBROMIDE 10 MG: 10 TABLET ORAL at 09:14

## 2025-02-28 RX ADMIN — Medication 2000 UNITS: at 09:14

## 2025-02-28 RX ADMIN — PIPERACILLIN AND TAZOBACTAM 4500 MG: 4; .5 INJECTION, POWDER, FOR SOLUTION INTRAVENOUS at 09:20

## 2025-02-28 RX ADMIN — Medication 1 MG: at 09:13

## 2025-02-28 RX ADMIN — SODIUM CHLORIDE, PRESERVATIVE FREE 10 ML: 5 INJECTION INTRAVENOUS at 09:14

## 2025-02-28 NOTE — PLAN OF CARE
Problem: ABCDS Injury Assessment  Goal: Absence of physical injury  2/28/2025 1058 by Galilea Gonzales RN  Outcome: Progressing  2/27/2025 2122 by Mirtha Aquino RN  Outcome: Progressing     Problem: Chronic Conditions and Co-morbidities  Goal: Patient's chronic conditions and co-morbidity symptoms are monitored and maintained or improved  2/28/2025 1058 by Galilea Gonzales RN  Outcome: Progressing  2/27/2025 2122 by Mirtha Aquino RN  Outcome: Progressing     Problem: Discharge Planning  Goal: Discharge to home or other facility with appropriate resources  2/28/2025 1058 by Galilea Gonzales RN  Outcome: Progressing  2/27/2025 2122 by Mirtha Aquino RN  Outcome: Progressing     Problem: Respiratory - Adult  Goal: Achieves optimal ventilation and oxygenation  2/28/2025 1058 by Galilea Gonzales RN  Outcome: Progressing  2/27/2025 2122 by Mirtha Aquino RN  Outcome: Progressing     Problem: Cardiovascular - Adult  Goal: Maintains optimal cardiac output and hemodynamic stability  2/28/2025 1058 by Galilea Gonzales RN  Outcome: Progressing  2/27/2025 2122 by Mirtha Aquino RN  Outcome: Progressing  Goal: Absence of cardiac dysrhythmias or at baseline  2/28/2025 1058 by Galilea Gonzales RN  Outcome: Progressing  2/27/2025 2122 by Mirtha Aquino RN  Outcome: Progressing     Problem: Skin/Tissue Integrity - Adult  Goal: Skin integrity remains intact  2/28/2025 1058 by Galilea Gonzales RN  Outcome: Progressing  2/27/2025 2122 by Mirtha Aquino RN  Outcome: Progressing  Flowsheets (Taken 2/27/2025 1032 by Jolanta Arreola RN)  Skin Integrity Remains Intact: Monitor for areas of redness and/or skin breakdown     Problem: Infection - Adult  Goal: Absence of infection at discharge  2/28/2025 1058 by Galilea Gonzales RN  Outcome: Progressing  2/27/2025 2122 by Mirtha Aquino RN  Outcome: Progressing     Problem: Safety - Adult  Goal: Free from fall  injury  2/28/2025 1058 by Galilea Gonzales, RN  Outcome: Progressing  2/27/2025 2122 by Mirtha Aquino, RN  Outcome: Progressing  Flowsheets (Taken 2/27/2025 1032 by Jolanta Arreola, RN)  Free From Fall Injury: Instruct family/caregiver on patient safety

## 2025-02-28 NOTE — DISCHARGE SUMMARY
[facility] Hospitalist Physician Discharge Summary       No follow-up provider specified.    Activity level: As tolerated    Dispo: Home      Condition on discharge:  Stable    Patient ID:  Radha Dixon  [id number]  89 y.o.  7/11/1934    Admit date: 2/23/2025    Discharge date and time:  2/28/2025  11:09 AM    Admission Diagnoses: Principal Problem:    Acute cystitis without hematuria  Active Problems:    HLD (hyperlipidemia)    HFrEF (heart failure with reduced ejection fraction) (HCC)    Atrial flutter with rapid ventricular response (HCC)    Urothelial carcinoma of kidney, right (HCC)    Sepsis (HCC)    Hypokalemia    Hypomagnesemia    Influenza A  Resolved Problems:    * No resolved hospital problems. *      Discharge Diagnoses: Principal Problem:    Acute cystitis without hematuria  Active Problems:    HLD (hyperlipidemia)    HFrEF (heart failure with reduced ejection fraction) (HCC)    Atrial flutter with rapid ventricular response (HCC)    Urothelial carcinoma of kidney, right (HCC)    Sepsis (HCC)    Hypokalemia    Hypomagnesemia    Influenza A  Resolved Problems:    * No resolved hospital problems. *      Consults:  IP CONSULT TO UROLOGY  IP CONSULT TO HOSPITALIST  IP CONSULT TO INFECTIOUS DISEASES  IP CONSULT TO CARDIOLOGY  IP CONSULT TO VASCULAR ACCESS TEAM    Procedures:  none    Hospital Course:   Patient Radha Dixon is a 89 y.o. presented with Hypokalemia [E87.6]  Hypomagnesemia [E83.42]  Septicemia (HCC) [A41.9]  Influenza A [J10.1]  Atrial flutter with rapid ventricular response (HCC) [I48.92]  Urinary tract infection with hematuria, site unspecified [N39.0, R31.9]  Ms. Radha Dixon, a 89 y.o. year old female  with PMH of persistent a fib on xarelto, asthma, osteoarthritis, HLD, hypothyroidism, HFrEF, and urothelial carcinoma,    Pt presented to ED for evaluation of nausea and flank pain. Symptoms started in the past 24hrs.  Pt was recently admitted 1/7 to 1/12 for  aneurysm, ascites, or bulky lymphadenopathy. Bones/Soft Tissues: No acute osseous findings or destructive bone lesions. Small fat filled umbilical hernia.     1. Right-sided nephroureteral stent and hydronephrosis similar to last time. Some urothelial thickening on the right and surrounding fat stranding could indicate superimposed urinary tract infection. 2. Diverticulosis. Slightly thickened appearance of the sigmoid colon in the pelvis, cannot exclude component of diverticulitis in the appropriate clinical scenario. 3. Trace pelvic fluid. 4. Small hiatal hernia. 5. Cardiomegaly with coronary calcifications.     XR CHEST PORTABLE    Result Date: 2/23/2025  EXAMINATION: ONE XRAY VIEW OF THE CHEST 2/23/2025 7:57 pm COMPARISON: 01/08/2025 HISTORY: ORDERING SYSTEM PROVIDED HISTORY: Sepsis TECHNOLOGIST PROVIDED HISTORY: Reason for exam:->Sepsis FINDINGS: The lungs are without acute focal process.  There is no effusion or pneumothorax. The cardiomediastinal silhouette is without acute process. The osseous structures are without acute process.     No acute process.       Patient Instructions:      Medication List        START taking these medications      amoxicillin-clavulanate 875-125 MG per tablet  Commonly known as: AUGMENTIN  Take 1 tablet by mouth 2 times daily for 7 days     oseltamivir 30 MG capsule  Commonly known as: TAMIFLU  Take 1 capsule by mouth 2 times daily for 2 doses            CONTINUE taking these medications      * albuterol (2.5 MG/3ML) 0.083% nebulizer solution  Commonly known as: PROVENTIL  Take 3 mLs by nebulization every 6 hours as needed for Wheezing     * albuterol sulfate  (90 Base) MCG/ACT inhaler  Commonly known as: PROVENTIL;VENTOLIN;PROAIR  Inhale 2 puffs into the lungs 4 times daily as needed for Wheezing     amiodarone 100 MG tablet  Commonly known as: PACERONE  Take 1 tablet by mouth daily     b complex vitamins capsule     citalopram 20 MG tablet  Commonly known as: CELEXA

## 2025-02-28 NOTE — PROGRESS NOTES
Peripheral IV removed without issue, site asymptomatic. Pt tolerated well. Discharge instructions provided to pt and dtg. No further questions or concerns.   Electronically signed by Nany Wolf RN on 2/28/2025 at 3:17 PM

## 2025-02-28 NOTE — PROGRESS NOTES
Patient changed to PO abx today, ID and urology have signed off. Dr. Vickers messaged regarding possible discharge today. Electronically signed by Leyla Carcamo RN on 2/28/2025 at 10:48 AM

## 2025-02-28 NOTE — PLAN OF CARE
Problem: ABCDS Injury Assessment  Goal: Absence of physical injury  2/28/2025 1458 by Nany Wolf RN  Outcome: Progressing  2/28/2025 1058 by Galilea Gonzales RN  Outcome: Progressing     Problem: Chronic Conditions and Co-morbidities  Goal: Patient's chronic conditions and co-morbidity symptoms are monitored and maintained or improved  2/28/2025 1458 by Nany Wolf RN  Outcome: Progressing  Flowsheets (Taken 2/28/2025 1245)  Care Plan - Patient's Chronic Conditions and Co-Morbidity Symptoms are Monitored and Maintained or Improved: Monitor and assess patient's chronic conditions and comorbid symptoms for stability, deterioration, or improvement  2/28/2025 1058 by Galilea Gonzales RN  Outcome: Progressing     Problem: Discharge Planning  Goal: Discharge to home or other facility with appropriate resources  2/28/2025 1458 by Nany Wolf RN  Outcome: Progressing  Flowsheets (Taken 2/28/2025 1245)  Discharge to home or other facility with appropriate resources: Identify barriers to discharge with patient and caregiver  2/28/2025 1058 by Galilea Gonzales RN  Outcome: Progressing     Problem: Respiratory - Adult  Goal: Achieves optimal ventilation and oxygenation  2/28/2025 1458 by Nany Wolf RN  Outcome: Progressing  Flowsheets (Taken 2/28/2025 1245)  Achieves optimal ventilation and oxygenation: Assess for changes in respiratory status  2/28/2025 1058 by Galilea Gonzales RN  Outcome: Progressing     Problem: Cardiovascular - Adult  Goal: Maintains optimal cardiac output and hemodynamic stability  2/28/2025 1458 by Nany Wolf RN  Outcome: Progressing  Flowsheets (Taken 2/28/2025 1245)  Maintains optimal cardiac output and hemodynamic stability: Monitor blood pressure and heart rate  2/28/2025 1058 by Galilea Gonzales RN  Outcome: Progressing  Goal: Absence of cardiac dysrhythmias or at baseline  2/28/2025 1458 by Nany Wolf RN  Outcome: Progressing  Flowsheets (Taken 2/28/2025 1245)  Absence of cardiac  dysrhythmias or at baseline: Monitor cardiac rate and rhythm  2/28/2025 1058 by Galilea Gonzales RN  Outcome: Progressing     Problem: Skin/Tissue Integrity - Adult  Goal: Skin integrity remains intact  2/28/2025 1458 by Nany Wolf RN  Outcome: Progressing  Flowsheets (Taken 2/28/2025 1245)  Skin Integrity Remains Intact: Monitor for areas of redness and/or skin breakdown  2/28/2025 1058 by Galilea Gonzales RN  Outcome: Progressing     Problem: Infection - Adult  Goal: Absence of infection at discharge  2/28/2025 1458 by Nany Wolf RN  Outcome: Progressing  Flowsheets (Taken 2/28/2025 1245)  Absence of infection at discharge: Assess and monitor for signs and symptoms of infection  2/28/2025 1058 by Galilea Gonzales RN  Outcome: Progressing     Problem: Safety - Adult  Goal: Free from fall injury  2/28/2025 1458 by Nany Wolf RN  Outcome: Progressing  Flowsheets (Taken 2/28/2025 1245)  Free From Fall Injury: Instruct family/caregiver on patient safety  2/28/2025 1058 by Galilea Gonzales RN  Outcome: Progressing

## 2025-02-28 NOTE — PLAN OF CARE
Problem: ABCDS Injury Assessment  Goal: Absence of physical injury  2/27/2025 2122 by Mirtha Aquino RN  Outcome: Progressing  2/27/2025 1038 by Jolanta Arreola RN  Outcome: Progressing     Problem: Chronic Conditions and Co-morbidities  Goal: Patient's chronic conditions and co-morbidity symptoms are monitored and maintained or improved  2/27/2025 2122 by Mirtha Aquino RN  Outcome: Progressing  2/27/2025 1038 by Jolanta Arreola RN  Outcome: Progressing     Problem: Discharge Planning  Goal: Discharge to home or other facility with appropriate resources  2/27/2025 2122 by Mirtha Aquino RN  Outcome: Progressing  2/27/2025 1038 by Jolanta Arreola RN  Outcome: Progressing     Problem: Respiratory - Adult  Goal: Achieves optimal ventilation and oxygenation  2/27/2025 2122 by Mirtha Aquino RN  Outcome: Progressing  2/27/2025 1038 by Jolatna Arreola RN  Outcome: Progressing     Problem: Cardiovascular - Adult  Goal: Maintains optimal cardiac output and hemodynamic stability  Outcome: Progressing  Goal: Absence of cardiac dysrhythmias or at baseline  Outcome: Progressing     Problem: Skin/Tissue Integrity - Adult  Goal: Skin integrity remains intact  Outcome: Progressing  Flowsheets (Taken 2/27/2025 1032 by Jolanta Arreola RN)  Skin Integrity Remains Intact: Monitor for areas of redness and/or skin breakdown     Problem: Infection - Adult  Goal: Absence of infection at discharge  Outcome: Progressing     Problem: Safety - Adult  Goal: Free from fall injury  Outcome: Progressing  Flowsheets (Taken 2/27/2025 1032 by Jolanta Arreola RN)  Free From Fall Injury: Instruct family/caregiver on patient safety

## 2025-02-28 NOTE — PROGRESS NOTES
appreciated.   Abdomen: Positive bowel sounds to auscultation. Benign to palpation. No masses felt. No hepatosplenomegaly.  Genitourinary: Tenderness noted in the lower abdomen  Extremities: No clubbing, no cyanosis, no edema.  Musculoskeletal: No pain in range of motion of any joints  Neurological: Following commands, no focal neurodeficit  Lines: peripheral    Laboratory and Tests Review:  Lab Results   Component Value Date    WBC 6.5 02/28/2025    WBC 6.3 02/27/2025    WBC 7.0 02/26/2025    HGB 10.4 (L) 02/28/2025    HCT 35.5 02/28/2025    MCV 82.9 02/28/2025     02/28/2025     Lab Results   Component Value Date    NEUTROABS 4.22 02/28/2025    NEUTROABS 4.46 02/27/2025    NEUTROABS 4.88 02/26/2025     No results found for: \"CRPHS\"  Lab Results   Component Value Date    ALT 8 02/24/2025    AST 17 02/24/2025    ALKPHOS 69 02/24/2025    BILITOT 0.6 02/24/2025     Lab Results   Component Value Date/Time     02/28/2025 04:52 AM    K 3.5 02/28/2025 04:52 AM    K 4.3 03/30/2023 02:36 AM     02/28/2025 04:52 AM    CO2 19 02/28/2025 04:52 AM    BUN 16 02/28/2025 04:52 AM    CREATININE 1.2 02/28/2025 04:52 AM    CREATININE 1.1 02/27/2025 04:23 AM    CREATININE 1.3 02/26/2025 03:56 AM    GFRAA 57 01/27/2022 06:25 PM    LABGLOM 43 02/28/2025 04:52 AM    LABGLOM 37 04/16/2024 04:03 PM    GLUCOSE 82 02/28/2025 04:52 AM    CALCIUM 8.6 02/28/2025 04:52 AM    BILITOT 0.6 02/24/2025 04:51 AM    ALKPHOS 69 02/24/2025 04:51 AM    AST 17 02/24/2025 04:51 AM    ALT 8 02/24/2025 04:51 AM     No results found for: \"CRP\"  No results found for: \"SEDRATE\"  Radiology:      Microbiology:   Lab Results   Component Value Date/Time    BC 5 Days no growth 03/29/2023 07:55 PM    BC 5 Days no growth 01/27/2022 05:30 PM    BC 5 Days- no growth 06/16/2019 09:25 PM    ORG Staphylococcus coagulase-negative 06/10/2021 12:00 PM     Lab Results   Component Value Date/Time    BLOODCULT2 5 Days no growth 03/29/2023 07:55 PM    BLOODCULT2  5 Days no growth 01/27/2022 05:30 PM    BLOODCULT2 5 Days- no growth 06/16/2019 09:32 PM    ORG Staphylococcus coagulase-negative 06/10/2021 12:00 PM     WOUND/ABSCESS   Date Value Ref Range Status   06/10/2021 One colony  Final     No results found for: \"RESPSMEAR\"      Component Value Date/Time    MPNEUMO Not Detected 02/23/2025 1913     No results found for: \"CULTRESP\"  No results found for: \"CXCATHTIP\"  No results found for: \"BFCS\"  No results found for: \"CXSURG\"  Urine Culture, Routine   Date Value Ref Range Status   09/09/2022   Final    <10,000 CFU/mL  Mixed gram positive organisms  Mixed gram negative rods     01/27/2022 Growth not present  Final   06/16/2019   Final    ,000 CFU/mL  Mixed kyaw isolated. Further workup and sensitivity testing  is not routinely indicated and will not be performed.  Mixed kyaw isolated includes:  Mixed gram negative rods  Mixed gram positive organisms       No results found for: \"MRSAC\"    Assessment:  Noninvasive papillary urothelial CA  Status post right ureteral stent by urology  Flank pain  Fever Tmax 100.5  UA has pyuria and bacteriuria  Gram-negative rods UTI, colony count less than 10,000  Influenza A infection     Plan:    Continue Zosyn day 6, will give Augmentin for discharge  Med rec complete  Blood cultures no growth so far  Blood cultures no growth so far  CT abdomen is consistent with right sided hydronephrosis with possible pyelonephritis  Continue Tamiflu  Urology consult appreciated, plan for right nephroureterectomy on 03/20/2025  Okay to discharge from ID POV  ID will continue to follow    SONY Ryan - CNP  9:44 AM  2/28/2025    Pt seen and examined. Above discussed agree with advanced practice nurse. Labs, cultures, and radiographs reviewed.  Face to Face encounter occurred. Changes made as necessary.     Afshin Shaw MD

## 2025-03-01 NOTE — PROGRESS NOTES
Physician Progress Note      PATIENT:               SILVIA YU  [facility] #:                  [id number]  :                       1934  ADMIT DATE:       2025 6:51 PM  DISCH DATE:        2025 3:50 PM  RESPONDING  PROVIDER #:        Isabel Vickers MD          QUERY TEXT:    Pt admitted with UTI.  Pt noted to have recent ureteral stent placement   25. If possible, please document in the progress notes and discharge   summary if you are evaluating and/or treating any of the following:    The medical record reflects the following:  Risk Factors: Kidney Cancer  Clinical Indicators: Stent exchange 25. UTIA, Temp 100.5.  Treatment: Urology consult. Zosyn 4,500 IVPB. Labs.    Thank you  Lexy Haddad RN, BSN, CDI  [phone]  Options provided:  -- UTI due to ureteral stent  -- UTI not due to ureteral stent  -- UTI is related to right mid ureteral tumor post laser.  -- UTI is not related to right mid tumor post laser.  -- Other - I will add my own diagnosis  -- Disagree - Not applicable / Not valid  -- Disagree - Clinically unable to determine / Unknown  -- Refer to Clinical Documentation Reviewer    PROVIDER RESPONSE TEXT:    UTI is related to right mid ureteral tumor post laser..    Query created by: Lexy Haddad on 2025 11:35 AM      QUERY TEXT:    Patient admitted with UTI. Noted documentation of Sepsis in H & P. In order to   support the diagnosis of ***, please include additional clinical indicators   in your documentation.  Or please document if the diagnosis of Sepsis has been   ruled out after further study.    The medical record reflects the following:  Risk Factors: Afib, Kidney Cancer,  Clinical Indicators: Lactic acid 1.7. WBC 7.9, Pulse 129, 141, 127. UTI. only   one temp of 100.5. B/P 93/57.No BC sent. Back  pain, nausea and vomiting.  Treatment: Sepsis bolus of 30/cc/kg given in ED. Labs,    Thank you  Lexy Haddad RN, BSN, CDI  [phone]  Options provided:  -- Sepsis present  as evidenced by, Please document evidence.  -- Sepsis was ruled out  -- Other - I will add my own diagnosis  -- Disagree - Not applicable / Not valid  -- Disagree - Clinically unable to determine / Unknown  -- Refer to Clinical Documentation Reviewer    PROVIDER RESPONSE TEXT:    Sepsis is present as evidenced by low BP and tachycardia with UTI.    Query created by: Lexy Haddad on 2/27/2025 11:38 AM      Electronically signed by:  Isabel Vickers MD 3/1/2025 1:27 PM

## 2025-03-13 ENCOUNTER — OFFICE VISIT (OUTPATIENT)
Dept: CARDIOLOGY CLINIC | Age: 89
End: 2025-03-13
Payer: MEDICARE

## 2025-03-13 VITALS
SYSTOLIC BLOOD PRESSURE: 98 MMHG | HEIGHT: 68 IN | WEIGHT: 155.8 LBS | BODY MASS INDEX: 23.61 KG/M2 | HEART RATE: 111 BPM | RESPIRATION RATE: 14 BRPM | DIASTOLIC BLOOD PRESSURE: 66 MMHG

## 2025-03-13 DIAGNOSIS — Z01.810 PREOP CARDIOVASCULAR EXAM: ICD-10-CM

## 2025-03-13 DIAGNOSIS — I48.19 ATRIAL FIBRILLATION, PERSISTENT (HCC): ICD-10-CM

## 2025-03-13 DIAGNOSIS — I50.20 HFREF (HEART FAILURE WITH REDUCED EJECTION FRACTION) (HCC): Primary | ICD-10-CM

## 2025-03-13 PROBLEM — A41.9 SEPSIS (HCC): Status: RESOLVED | Noted: 2025-02-24 | Resolved: 2025-03-13

## 2025-03-13 PROBLEM — I48.92 ATRIAL FLUTTER WITH RAPID VENTRICULAR RESPONSE (HCC): Status: RESOLVED | Noted: 2025-02-24 | Resolved: 2025-03-13

## 2025-03-13 PROBLEM — N12 PYELONEPHRITIS: Status: RESOLVED | Noted: 2025-01-08 | Resolved: 2025-03-13

## 2025-03-13 PROBLEM — E83.42 HYPOMAGNESEMIA: Status: RESOLVED | Noted: 2025-02-24 | Resolved: 2025-03-13

## 2025-03-13 PROBLEM — E87.6 HYPOKALEMIA: Status: RESOLVED | Noted: 2025-02-24 | Resolved: 2025-03-13

## 2025-03-13 PROBLEM — N30.00 ACUTE CYSTITIS WITHOUT HEMATURIA: Status: RESOLVED | Noted: 2025-02-24 | Resolved: 2025-03-13

## 2025-03-13 PROBLEM — J10.1 INFLUENZA A: Status: RESOLVED | Noted: 2025-02-24 | Resolved: 2025-03-13

## 2025-03-13 PROBLEM — R31.29 OTHER MICROSCOPIC HEMATURIA: Status: RESOLVED | Noted: 2025-01-08 | Resolved: 2025-03-13

## 2025-03-13 PROBLEM — E86.1 HYPOTENSION DUE TO HYPOVOLEMIA: Status: RESOLVED | Noted: 2025-01-08 | Resolved: 2025-03-13

## 2025-03-13 PROCEDURE — 1090F PRES/ABSN URINE INCON ASSESS: CPT | Performed by: INTERNAL MEDICINE

## 2025-03-13 PROCEDURE — 1036F TOBACCO NON-USER: CPT | Performed by: INTERNAL MEDICINE

## 2025-03-13 PROCEDURE — 1160F RVW MEDS BY RX/DR IN RCRD: CPT | Performed by: INTERNAL MEDICINE

## 2025-03-13 PROCEDURE — 1111F DSCHRG MED/CURRENT MED MERGE: CPT | Performed by: INTERNAL MEDICINE

## 2025-03-13 PROCEDURE — G8427 DOCREV CUR MEDS BY ELIG CLIN: HCPCS | Performed by: INTERNAL MEDICINE

## 2025-03-13 PROCEDURE — 93000 ELECTROCARDIOGRAM COMPLETE: CPT | Performed by: INTERNAL MEDICINE

## 2025-03-13 PROCEDURE — 1159F MED LIST DOCD IN RCRD: CPT | Performed by: INTERNAL MEDICINE

## 2025-03-13 PROCEDURE — G8420 CALC BMI NORM PARAMETERS: HCPCS | Performed by: INTERNAL MEDICINE

## 2025-03-13 PROCEDURE — 99214 OFFICE O/P EST MOD 30 MIN: CPT | Performed by: INTERNAL MEDICINE

## 2025-03-13 PROCEDURE — 1123F ACP DISCUSS/DSCN MKR DOCD: CPT | Performed by: INTERNAL MEDICINE

## 2025-03-13 RX ORDER — POTASSIUM CHLORIDE 1500 MG/1
20 TABLET, EXTENDED RELEASE ORAL ONCE
COMMUNITY
Start: 2025-02-27

## 2025-03-13 RX ORDER — SIMVASTATIN 40 MG
40 TABLET ORAL NIGHTLY
COMMUNITY
Start: 2025-02-27

## 2025-03-13 RX ORDER — PHENAZOPYRIDINE HYDROCHLORIDE 100 MG/1
100 TABLET, FILM COATED ORAL 3 TIMES DAILY PRN
COMMUNITY
Start: 2025-03-10

## 2025-03-13 NOTE — PROGRESS NOTES
and time. Well-developed and well-nourished. No distress.      Neck:  no JVD present. Carotid bruit is not present.   Cardiovascular:   rapid  rate, irregular rhythm, normal heart sounds and intact distal pulses.  Exam reveals no gallop and no friction rub. Grade I/VI short SULEMAN heard throughout precordium  Pulmonary/Chest: Effort normal and breath sounds normal. No respiratory distress. No wheezes. No rales.   Abdominal: Soft. Bowel sounds are normal.  Musculoskeletal: No edema   Neurological: Alert and oriented to person, place, and time.   Skin: Skin is warm and dry.   Psychiatric: Normal mood and affect. Behavior is normal.     EKG: afib, rate 110, nonspecific ST and T waves changes.    ASSESSMENT AND PLAN:  Patient Active Problem List   Diagnosis    Persistent atrial fibrillation: s/p DC cardio 6/2018 and 9/2019 and 2/2021 with elevated chads vasc .   Recurrent afib despite AAD with dronedarone and amiodarone trial attempts.  Now with rate control strategy. I think digoxin in 89 year old with CKD is unwise.  Continue  amiodarone 100 mg qd for rate control in combo with BB    HLD (hyperlipidemia) on statin   HFrEF: Stage C, NYHA II, euvolemic.  Non ischemic stress 2024.   Cannot utilized GDMT because of soft BPs continue with CHF clinic visits.  Drop lasix to qod dosing       OK for  surgery at intermediate risk   OV 3 months        Theodore Ledesma M.D  [facility]

## 2025-03-17 ENCOUNTER — HOSPITAL ENCOUNTER (OUTPATIENT)
Dept: OTHER | Age: 89
Setting detail: THERAPIES SERIES
Discharge: HOME | End: 2025-03-17
Payer: MEDICARE

## 2025-03-17 VITALS
HEART RATE: 98 BPM | SYSTOLIC BLOOD PRESSURE: 102 MMHG | BODY MASS INDEX: 23.43 KG/M2 | RESPIRATION RATE: 18 BRPM | WEIGHT: 154.1 LBS | DIASTOLIC BLOOD PRESSURE: 66 MMHG | OXYGEN SATURATION: 96 %

## 2025-03-17 LAB
ANION GAP SERPL CALCULATED.3IONS-SCNC: 14 MMOL/L (ref 7–16)
BNP SERPL-MCNC: 2484 PG/ML (ref 0–450)
BUN SERPL-MCNC: 17 MG/DL (ref 6–23)
CALCIUM SERPL-MCNC: 9 MG/DL (ref 8.6–10.2)
CHLORIDE SERPL-SCNC: 102 MMOL/L (ref 98–107)
CO2 SERPL-SCNC: 20 MMOL/L (ref 22–29)
CREAT SERPL-MCNC: 1.1 MG/DL (ref 0.5–1)
GFR, ESTIMATED: 46 ML/MIN/1.73M2
GLUCOSE SERPL-MCNC: 96 MG/DL (ref 74–99)
POTASSIUM SERPL-SCNC: 4.1 MMOL/L (ref 3.5–5)
SODIUM SERPL-SCNC: 136 MMOL/L (ref 132–146)

## 2025-03-17 PROCEDURE — 83880 ASSAY OF NATRIURETIC PEPTIDE: CPT

## 2025-03-17 PROCEDURE — 99214 OFFICE O/P EST MOD 30 MIN: CPT

## 2025-03-17 PROCEDURE — 80048 BASIC METABOLIC PNL TOTAL CA: CPT

## 2025-03-17 NOTE — PROGRESS NOTES
Department Center   5/12/2025 12:45 PM [facility] CHF ROOM 2 [facility] CHF [facility]   6/16/2025 12:00 PM Theodore Ledesma MD [facility]

## 2025-04-01 ENCOUNTER — APPOINTMENT (OUTPATIENT)
Dept: GENERAL RADIOLOGY | Age: 89
End: 2025-04-01
Payer: MEDICARE

## 2025-04-01 ENCOUNTER — APPOINTMENT (OUTPATIENT)
Dept: NUCLEAR MEDICINE | Age: 89
End: 2025-04-01
Payer: MEDICARE

## 2025-04-01 ENCOUNTER — HOSPITAL ENCOUNTER (INPATIENT)
Age: 89
LOS: 3 days | Discharge: HOME | End: 2025-04-04
Attending: FAMILY MEDICINE | Admitting: FAMILY MEDICINE
Payer: MEDICARE

## 2025-04-01 ENCOUNTER — APPOINTMENT (OUTPATIENT)
Age: 89
End: 2025-04-01
Payer: MEDICARE

## 2025-04-01 DIAGNOSIS — I50.9 CONGESTIVE HEART FAILURE, UNSPECIFIED HF CHRONICITY, UNSPECIFIED HEART FAILURE TYPE (HCC): ICD-10-CM

## 2025-04-01 DIAGNOSIS — I48.91 ATRIAL FIBRILLATION, UNSPECIFIED TYPE (HCC): ICD-10-CM

## 2025-04-01 DIAGNOSIS — N18.9 ACUTE KIDNEY INJURY SUPERIMPOSED ON CHRONIC KIDNEY DISEASE: ICD-10-CM

## 2025-04-01 DIAGNOSIS — I50.9 ACUTE CONGESTIVE HEART FAILURE, UNSPECIFIED HEART FAILURE TYPE (HCC): Primary | ICD-10-CM

## 2025-04-01 DIAGNOSIS — N17.9 ACUTE KIDNEY INJURY SUPERIMPOSED ON CHRONIC KIDNEY DISEASE: ICD-10-CM

## 2025-04-01 LAB
ALBUMIN SERPL-MCNC: 3.6 G/DL (ref 3.5–5.2)
ALP SERPL-CCNC: 178 U/L (ref 35–104)
ALT SERPL-CCNC: 25 U/L (ref 0–32)
ANION GAP SERPL CALCULATED.3IONS-SCNC: 16 MMOL/L (ref 7–16)
AST SERPL-CCNC: 34 U/L (ref 0–31)
BASOPHILS # BLD: 0.03 K/UL (ref 0–0.2)
BASOPHILS NFR BLD: 0 % (ref 0–2)
BILIRUB SERPL-MCNC: 0.5 MG/DL (ref 0–1.2)
BNP SERPL-MCNC: 8596 PG/ML (ref 0–450)
BUN SERPL-MCNC: 22 MG/DL (ref 6–23)
CALCIUM SERPL-MCNC: 8.8 MG/DL (ref 8.6–10.2)
CHLORIDE SERPL-SCNC: 97 MMOL/L (ref 98–107)
CO2 SERPL-SCNC: 21 MMOL/L (ref 22–29)
CREAT SERPL-MCNC: 1.7 MG/DL (ref 0.5–1)
D-DIMER QUANTITATIVE: 985 NG/ML DDU (ref 0–230)
ECHO AV AREA PEAK VELOCITY: 0.9 CM2
ECHO AV AREA VTI: 1 CM2
ECHO AV AREA/BSA PEAK VELOCITY: 0.5 CM2/M2
ECHO AV AREA/BSA VTI: 0.5 CM2/M2
ECHO AV MEAN GRADIENT: 13 MMHG
ECHO AV MEAN VELOCITY: 1.8 M/S
ECHO AV PEAK GRADIENT: 20 MMHG
ECHO AV PEAK VELOCITY: 2.3 M/S
ECHO AV VELOCITY RATIO: 0.26
ECHO AV VTI: 49.9 CM
ECHO BSA: 1.83 M2
ECHO EST RA PRESSURE: 8 MMHG
ECHO LA DIAMETER INDEX: 2.24 CM/M2
ECHO LA DIAMETER: 4.1 CM
ECHO LA VOL A-L A2C: 78 ML (ref 22–52)
ECHO LA VOL A-L A4C: 78 ML (ref 22–52)
ECHO LA VOL MOD A2C: 72 ML (ref 22–52)
ECHO LA VOL MOD A4C: 67 ML (ref 22–52)
ECHO LA VOLUME AREA LENGTH: 86 ML
ECHO LA VOLUME INDEX A-L A2C: 43 ML/M2 (ref 16–34)
ECHO LA VOLUME INDEX A-L A4C: 43 ML/M2 (ref 16–34)
ECHO LA VOLUME INDEX AREA LENGTH: 47 ML/M2 (ref 16–34)
ECHO LA VOLUME INDEX MOD A2C: 39 ML/M2 (ref 16–34)
ECHO LA VOLUME INDEX MOD A4C: 37 ML/M2 (ref 16–34)
ECHO LV EDV A2C: 64 ML
ECHO LV EDV A4C: 48 ML
ECHO LV EDV BP: 56 ML (ref 56–104)
ECHO LV EDV INDEX A4C: 26 ML/M2
ECHO LV EDV INDEX BP: 31 ML/M2
ECHO LV EDV NDEX A2C: 35 ML/M2
ECHO LV EF PHYSICIAN: 54 %
ECHO LV EJECTION FRACTION A2C: 54 %
ECHO LV EJECTION FRACTION A4C: 54 %
ECHO LV EJECTION FRACTION BIPLANE: 50 % (ref 55–100)
ECHO LV ESV A2C: 29 ML
ECHO LV ESV A4C: 22 ML
ECHO LV ESV BP: 28 ML (ref 19–49)
ECHO LV ESV INDEX A2C: 16 ML/M2
ECHO LV ESV INDEX A4C: 12 ML/M2
ECHO LV ESV INDEX BP: 15 ML/M2
ECHO LV FRACTIONAL SHORTENING: 22 % (ref 28–44)
ECHO LV INTERNAL DIMENSION DIASTOLE INDEX: 2.02 CM/M2
ECHO LV INTERNAL DIMENSION DIASTOLIC: 3.7 CM (ref 3.9–5.3)
ECHO LV INTERNAL DIMENSION SYSTOLIC INDEX: 1.58 CM/M2
ECHO LV INTERNAL DIMENSION SYSTOLIC: 2.9 CM
ECHO LV IVSD: 1.3 CM (ref 0.6–0.9)
ECHO LV IVSS: 1.5 CM
ECHO LV MASS 2D: 166.5 G (ref 67–162)
ECHO LV MASS INDEX 2D: 91 G/M2 (ref 43–95)
ECHO LV POSTERIOR WALL DIASTOLIC: 1.3 CM (ref 0.6–0.9)
ECHO LV POSTERIOR WALL SYSTOLIC: 1.8 CM
ECHO LV RELATIVE WALL THICKNESS RATIO: 0.7
ECHO LVOT AREA: 3.1 CM2
ECHO LVOT AV VTI INDEX: 0.31
ECHO LVOT DIAM: 2 CM
ECHO LVOT MEAN GRADIENT: 1 MMHG
ECHO LVOT PEAK GRADIENT: 2 MMHG
ECHO LVOT PEAK VELOCITY: 0.6 M/S
ECHO LVOT STROKE VOLUME INDEX: 26.4 ML/M2
ECHO LVOT SV: 48.4 ML
ECHO LVOT VTI: 15.4 CM
ECHO MV A VELOCITY: 0.36 M/S
ECHO MV AREA PHT: 4.5 CM2
ECHO MV AREA VTI: 2.2 CM2
ECHO MV E DECELERATION TIME (DT): 166.4 MS
ECHO MV E VELOCITY: 1.02 M/S
ECHO MV E/A RATIO: 2.83
ECHO MV LVOT VTI INDEX: 1.42
ECHO MV MAX VELOCITY: 1.2 M/S
ECHO MV MEAN GRADIENT: 2 MMHG
ECHO MV MEAN VELOCITY: 0.6 M/S
ECHO MV PEAK GRADIENT: 5 MMHG
ECHO MV PRESSURE HALF TIME (PHT): 48.6 MS
ECHO MV VTI: 21.8 CM
ECHO RIGHT VENTRICULAR SYSTOLIC PRESSURE (RVSP): 41 MMHG
ECHO RV INTERNAL DIMENSION: 3.2 CM
ECHO TV REGURGITANT MAX VELOCITY: 2.87 M/S
ECHO TV REGURGITANT PEAK GRADIENT: 33 MMHG
EKG ATRIAL RATE: 79 BPM
EKG Q-T INTERVAL: 392 MS
EKG QRS DURATION: 78 MS
EKG QTC CALCULATION (BAZETT): 449 MS
EKG R AXIS: -22 DEGREES
EKG T AXIS: -138 DEGREES
EKG VENTRICULAR RATE: 79 BPM
EOSINOPHIL # BLD: 0.11 K/UL (ref 0.05–0.5)
EOSINOPHILS RELATIVE PERCENT: 2 % (ref 0–6)
ERYTHROCYTE [DISTWIDTH] IN BLOOD BY AUTOMATED COUNT: 18 % (ref 11.5–15)
GFR, ESTIMATED: 28 ML/MIN/1.73M2
GLUCOSE SERPL-MCNC: 88 MG/DL (ref 74–99)
HCT VFR BLD AUTO: 30.9 % (ref 34–48)
HGB BLD-MCNC: 9.5 G/DL (ref 11.5–15.5)
IMM GRANULOCYTES # BLD AUTO: 0.04 K/UL (ref 0–0.58)
IMM GRANULOCYTES NFR BLD: 1 % (ref 0–5)
LYMPHOCYTES NFR BLD: 0.92 K/UL (ref 1.5–4)
LYMPHOCYTES RELATIVE PERCENT: 12 % (ref 20–42)
MAGNESIUM SERPL-MCNC: 1.8 MG/DL (ref 1.6–2.6)
MCH RBC QN AUTO: 25.7 PG (ref 26–35)
MCHC RBC AUTO-ENTMCNC: 30.7 G/DL (ref 32–34.5)
MCV RBC AUTO: 83.5 FL (ref 80–99.9)
MONOCYTES NFR BLD: 0.69 K/UL (ref 0.1–0.95)
MONOCYTES NFR BLD: 9 % (ref 2–12)
NEUTROPHILS NFR BLD: 76 % (ref 43–80)
NEUTS SEG NFR BLD: 5.7 K/UL (ref 1.8–7.3)
PLATELET # BLD AUTO: 210 K/UL (ref 130–450)
PMV BLD AUTO: 10.7 FL (ref 7–12)
POTASSIUM SERPL-SCNC: 4.4 MMOL/L (ref 3.5–5)
PROT SERPL-MCNC: 6 G/DL (ref 6.4–8.3)
RBC # BLD AUTO: 3.7 M/UL (ref 3.5–5.5)
SODIUM SERPL-SCNC: 134 MMOL/L (ref 132–146)
T4 FREE SERPL-MCNC: 1.3 NG/DL (ref 0.9–1.7)
TROPONIN I SERPL HS-MCNC: 50 NG/L (ref 0–9)
TROPONIN I SERPL HS-MCNC: 51 NG/L (ref 0–9)
TSH SERPL DL<=0.05 MIU/L-ACNC: 11.23 UIU/ML (ref 0.27–4.2)
WBC OTHER # BLD: 7.5 K/UL (ref 4.5–11.5)

## 2025-04-01 PROCEDURE — 2060000000 HC ICU INTERMEDIATE R&B

## 2025-04-01 PROCEDURE — 6370000000 HC RX 637 (ALT 250 FOR IP): Performed by: FAMILY MEDICINE

## 2025-04-01 PROCEDURE — 99222 1ST HOSP IP/OBS MODERATE 55: CPT | Performed by: FAMILY MEDICINE

## 2025-04-01 PROCEDURE — 93308 TTE F-UP OR LMTD: CPT

## 2025-04-01 PROCEDURE — 6370000000 HC RX 637 (ALT 250 FOR IP): Performed by: CLINICAL NURSE SPECIALIST

## 2025-04-01 PROCEDURE — 93321 DOPPLER ECHO F-UP/LMTD STD: CPT

## 2025-04-01 PROCEDURE — A9539 TC99M PENTETATE: HCPCS | Performed by: RADIOLOGY

## 2025-04-01 PROCEDURE — 84439 ASSAY OF FREE THYROXINE: CPT

## 2025-04-01 PROCEDURE — APPSS180 APP SPLIT SHARED TIME > 60 MINUTES: Performed by: CLINICAL NURSE SPECIALIST

## 2025-04-01 PROCEDURE — 84484 ASSAY OF TROPONIN QUANT: CPT

## 2025-04-01 PROCEDURE — 2580000003 HC RX 258

## 2025-04-01 PROCEDURE — 2500000003 HC RX 250 WO HCPCS: Performed by: FAMILY MEDICINE

## 2025-04-01 PROCEDURE — 6360000002 HC RX W HCPCS

## 2025-04-01 PROCEDURE — 6360000002 HC RX W HCPCS: Performed by: CLINICAL NURSE SPECIALIST

## 2025-04-01 PROCEDURE — 78582 LUNG VENTILAT&PERFUS IMAGING: CPT

## 2025-04-01 PROCEDURE — 6370000000 HC RX 637 (ALT 250 FOR IP): Performed by: INTERNAL MEDICINE

## 2025-04-01 PROCEDURE — 3430000000 HC RX DIAGNOSTIC RADIOPHARMACEUTICAL: Performed by: RADIOLOGY

## 2025-04-01 PROCEDURE — 80053 COMPREHEN METABOLIC PANEL: CPT

## 2025-04-01 PROCEDURE — 71045 X-RAY EXAM CHEST 1 VIEW: CPT

## 2025-04-01 PROCEDURE — 84443 ASSAY THYROID STIM HORMONE: CPT

## 2025-04-01 PROCEDURE — 93010 ELECTROCARDIOGRAM REPORT: CPT | Performed by: INTERNAL MEDICINE

## 2025-04-01 PROCEDURE — 93306 TTE W/DOPPLER COMPLETE: CPT | Performed by: INTERNAL MEDICINE

## 2025-04-01 PROCEDURE — 85025 COMPLETE CBC W/AUTO DIFF WBC: CPT

## 2025-04-01 PROCEDURE — 99223 1ST HOSP IP/OBS HIGH 75: CPT | Performed by: INTERNAL MEDICINE

## 2025-04-01 PROCEDURE — A9540 TC99M MAA: HCPCS | Performed by: RADIOLOGY

## 2025-04-01 PROCEDURE — 93005 ELECTROCARDIOGRAM TRACING: CPT | Performed by: EMERGENCY MEDICINE

## 2025-04-01 PROCEDURE — 85379 FIBRIN DEGRADATION QUANT: CPT

## 2025-04-01 PROCEDURE — 99285 EMERGENCY DEPT VISIT HI MDM: CPT

## 2025-04-01 PROCEDURE — 83880 ASSAY OF NATRIURETIC PEPTIDE: CPT

## 2025-04-01 PROCEDURE — 83735 ASSAY OF MAGNESIUM: CPT

## 2025-04-01 RX ORDER — DILTIAZEM HYDROCHLORIDE 180 MG/1
180 CAPSULE, EXTENDED RELEASE ORAL DAILY
Status: DISCONTINUED | OUTPATIENT
Start: 2025-04-01 | End: 2025-04-01

## 2025-04-01 RX ORDER — CETIRIZINE HYDROCHLORIDE 5 MG/1
5 TABLET ORAL DAILY
Status: DISCONTINUED | OUTPATIENT
Start: 2025-04-01 | End: 2025-04-04 | Stop reason: HOSPADM

## 2025-04-01 RX ORDER — ACETAMINOPHEN 650 MG/1
650 SUPPOSITORY RECTAL EVERY 6 HOURS PRN
Status: DISCONTINUED | OUTPATIENT
Start: 2025-04-01 | End: 2025-04-04 | Stop reason: HOSPADM

## 2025-04-01 RX ORDER — SODIUM CHLORIDE 0.9 % (FLUSH) 0.9 %
5-40 SYRINGE (ML) INJECTION EVERY 12 HOURS SCHEDULED
Status: DISCONTINUED | OUTPATIENT
Start: 2025-04-01 | End: 2025-04-04 | Stop reason: HOSPADM

## 2025-04-01 RX ORDER — CITALOPRAM HYDROBROMIDE 20 MG/1
20 TABLET ORAL DAILY
Status: DISCONTINUED | OUTPATIENT
Start: 2025-04-01 | End: 2025-04-04 | Stop reason: HOSPADM

## 2025-04-01 RX ORDER — BUMETANIDE 0.25 MG/ML
1 INJECTION, SOLUTION INTRAMUSCULAR; INTRAVENOUS DAILY
Status: DISCONTINUED | OUTPATIENT
Start: 2025-04-02 | End: 2025-04-04

## 2025-04-01 RX ORDER — MAGNESIUM SULFATE IN WATER 40 MG/ML
2000 INJECTION, SOLUTION INTRAVENOUS ONCE
Status: COMPLETED | OUTPATIENT
Start: 2025-04-01 | End: 2025-04-01

## 2025-04-01 RX ORDER — FUROSEMIDE 10 MG/ML
40 INJECTION INTRAMUSCULAR; INTRAVENOUS ONCE
Status: DISCONTINUED | OUTPATIENT
Start: 2025-04-01 | End: 2025-04-01

## 2025-04-01 RX ORDER — FUROSEMIDE 40 MG/1
20 TABLET ORAL DAILY
Status: DISCONTINUED | OUTPATIENT
Start: 2025-04-01 | End: 2025-04-04

## 2025-04-01 RX ORDER — SODIUM CHLORIDE 9 MG/ML
INJECTION, SOLUTION INTRAVENOUS PRN
Status: DISCONTINUED | OUTPATIENT
Start: 2025-04-01 | End: 2025-04-04 | Stop reason: HOSPADM

## 2025-04-01 RX ORDER — VITAMIN B COMPLEX
2000 TABLET ORAL DAILY
Status: DISCONTINUED | OUTPATIENT
Start: 2025-04-01 | End: 2025-04-04 | Stop reason: HOSPADM

## 2025-04-01 RX ORDER — ONDANSETRON 2 MG/ML
4 INJECTION INTRAMUSCULAR; INTRAVENOUS EVERY 6 HOURS PRN
Status: DISCONTINUED | OUTPATIENT
Start: 2025-04-01 | End: 2025-04-04 | Stop reason: HOSPADM

## 2025-04-01 RX ORDER — POLYETHYLENE GLYCOL 3350 17 G/17G
17 POWDER, FOR SOLUTION ORAL DAILY PRN
Status: DISCONTINUED | OUTPATIENT
Start: 2025-04-01 | End: 2025-04-04 | Stop reason: HOSPADM

## 2025-04-01 RX ORDER — FLUTICASONE PROPIONATE 50 MCG
1 SPRAY, SUSPENSION (ML) NASAL DAILY PRN
Status: DISCONTINUED | OUTPATIENT
Start: 2025-04-01 | End: 2025-04-04 | Stop reason: HOSPADM

## 2025-04-01 RX ORDER — KIT FOR THE PREPARATION OF TECHNETIUM TC 99M PENTETATE 20 MG/1
35 INJECTION, POWDER, LYOPHILIZED, FOR SOLUTION INTRAVENOUS; RESPIRATORY (INHALATION)
Status: COMPLETED | OUTPATIENT
Start: 2025-04-01 | End: 2025-04-01

## 2025-04-01 RX ORDER — BUMETANIDE 0.25 MG/ML
1 INJECTION, SOLUTION INTRAMUSCULAR; INTRAVENOUS ONCE
Status: COMPLETED | OUTPATIENT
Start: 2025-04-01 | End: 2025-04-01

## 2025-04-01 RX ORDER — PHENAZOPYRIDINE HYDROCHLORIDE 100 MG/1
100 TABLET, FILM COATED ORAL 3 TIMES DAILY PRN
Status: DISCONTINUED | OUTPATIENT
Start: 2025-04-01 | End: 2025-04-04 | Stop reason: HOSPADM

## 2025-04-01 RX ORDER — VITAMIN B COMPLEX
1 CAPSULE ORAL DAILY
COMMUNITY

## 2025-04-01 RX ORDER — LEVOTHYROXINE SODIUM 112 UG/1
112 TABLET ORAL DAILY
Status: DISCONTINUED | OUTPATIENT
Start: 2025-04-01 | End: 2025-04-04 | Stop reason: HOSPADM

## 2025-04-01 RX ORDER — SODIUM CHLORIDE 0.9 % (FLUSH) 0.9 %
5-40 SYRINGE (ML) INJECTION PRN
Status: DISCONTINUED | OUTPATIENT
Start: 2025-04-01 | End: 2025-04-04 | Stop reason: HOSPADM

## 2025-04-01 RX ORDER — 0.9 % SODIUM CHLORIDE 0.9 %
500 INTRAVENOUS SOLUTION INTRAVENOUS ONCE
Status: DISCONTINUED | OUTPATIENT
Start: 2025-04-01 | End: 2025-04-01

## 2025-04-01 RX ORDER — ACETAMINOPHEN 325 MG/1
650 TABLET ORAL EVERY 6 HOURS PRN
Status: DISCONTINUED | OUTPATIENT
Start: 2025-04-01 | End: 2025-04-04 | Stop reason: HOSPADM

## 2025-04-01 RX ORDER — ONDANSETRON 4 MG/1
4 TABLET, ORALLY DISINTEGRATING ORAL EVERY 8 HOURS PRN
Status: DISCONTINUED | OUTPATIENT
Start: 2025-04-01 | End: 2025-04-04 | Stop reason: HOSPADM

## 2025-04-01 RX ORDER — FERROUS SULFATE 325(65) MG
325 TABLET ORAL EVERY OTHER DAY
Status: DISCONTINUED | OUTPATIENT
Start: 2025-04-02 | End: 2025-04-04 | Stop reason: HOSPADM

## 2025-04-01 RX ORDER — DILTIAZEM HYDROCHLORIDE 180 MG/1
180 CAPSULE, EXTENDED RELEASE ORAL DAILY
COMMUNITY

## 2025-04-01 RX ORDER — AMIODARONE HYDROCHLORIDE 200 MG/1
100 TABLET ORAL DAILY
Status: DISCONTINUED | OUTPATIENT
Start: 2025-04-01 | End: 2025-04-04 | Stop reason: HOSPADM

## 2025-04-01 RX ADMIN — RIVAROXABAN 15 MG: 15 TABLET, FILM COATED ORAL at 20:24

## 2025-04-01 RX ADMIN — SODIUM CHLORIDE 500 ML: 9 INJECTION, SOLUTION INTRAVENOUS at 01:34

## 2025-04-01 RX ADMIN — Medication 2000 UNITS: at 20:24

## 2025-04-01 RX ADMIN — CETIRIZINE HYDROCHLORIDE 5 MG: 5 TABLET ORAL at 20:24

## 2025-04-01 RX ADMIN — Medication 6 MILLICURIE: at 11:27

## 2025-04-01 RX ADMIN — METOPROLOL SUCCINATE 75 MG: 50 TABLET, EXTENDED RELEASE ORAL at 10:00

## 2025-04-01 RX ADMIN — SODIUM CHLORIDE, PRESERVATIVE FREE 10 ML: 5 INJECTION INTRAVENOUS at 20:24

## 2025-04-01 RX ADMIN — MAGNESIUM SULFATE HEPTAHYDRATE 2000 MG: 40 INJECTION, SOLUTION INTRAVENOUS at 10:03

## 2025-04-01 RX ADMIN — LEVOTHYROXINE SODIUM 112 MCG: 0.11 TABLET ORAL at 09:59

## 2025-04-01 RX ADMIN — KIT FOR THE PREPARATION OF TECHNETIUM TC 99M PENTETATE 35 MILLICURIE: 20 INJECTION, POWDER, LYOPHILIZED, FOR SOLUTION INTRAVENOUS; RESPIRATORY (INHALATION) at 11:05

## 2025-04-01 RX ADMIN — SODIUM CHLORIDE, PRESERVATIVE FREE 10 ML: 5 INJECTION INTRAVENOUS at 10:00

## 2025-04-01 RX ADMIN — CITALOPRAM HYDROBROMIDE 20 MG: 20 TABLET ORAL at 20:24

## 2025-04-01 RX ADMIN — AMIODARONE HYDROCHLORIDE 100 MG: 200 TABLET ORAL at 10:00

## 2025-04-01 RX ADMIN — BUMETANIDE 1 MG: 0.25 INJECTION INTRAMUSCULAR; INTRAVENOUS at 04:09

## 2025-04-01 NOTE — PLAN OF CARE
Patient's chart updated to reflect:        - HF care plan, HF education points and HF discharge instructions.  -Orders: 2 gram sodium diet, daily weights, I/O.  -PCP and cardiology follow up appointments to be scheduled within 7 days of hospital discharge.  -CHF education session will be provided to the patient prior to hospital discharge.    most recent EF:  Lab Results   Component Value Date    LVEF 55 08/07/2021       Pennie Prado, RN MSN,RN  Heart Failure Navigator    Future Appointments   Date Time Provider Department Center   5/12/2025 12:45 PM [facility] CHF ROOM 2 [facility]   6/16/2025 12:00 PM Theodore Ledesma MD [facility]

## 2025-04-01 NOTE — PROGRESS NOTES
Brief internal medicine progress note:     Patient seen and examined, H&P and billing done on this calendar day by admitting service.     I did also see and examine patient.      Assessment:  Pleuritic chest pain, rule out PE  Bilateral lower extremity edema right greater than left, rule out DVT  HFrEF  CKD likely 2/2 recent right nephrectomy 3/20/2025 at [facility]  Chronic A-fib, on Xarelto      Plan:  Cardiology following:  Bumex 1 mg IV daily, strict I's and O's  Resume Xarelto, Toprol-XL, amiodarone  VQ scan and DVT ultrasound today  Continue Synthroid      Electronically signed by Theodore Sy MD on 4/1/2025 at 10:57 AM

## 2025-04-01 NOTE — H&P
Hospital Medicine History & Physical      PCP: Madeleine Caro NP-C    Date of Admission: (Not on file)    Date of Service: Pt seen/examined on 4/1/2025 and Admitted to Inpatient with expected LOS greater than two midnights due to medical therapy.     Chief Complaint: Chest pain      History Of Present Illness:      89 y.o. female who presented to [facility] with concern for chest pain describes a tightness started earlier in the evening before presentation.  Patient mention she noticed that the chest pain more when she takes deep breath.  She also mentioned that the pain was more persistent when she tried to lay down and when she tried to sit up he did not make any difference.  She also reports that her legs have been swollen. Of note patient status post nephrectomy on 3/20/2025 for renal cancer at [facility].  She has been off of Xarelto since her surgery and resumed Xarelto on 3/29/2025. In the ER presentation vitals heart rate of 104, blood pressure 100/60.  Troponin of 51 and 50.  proBNP of 8596.  Creatinine of 1.7 compared to 1.1 on 3/17/2025.  She was given a dose of Bumex 1 mg push given concern for CHF exacerbation.  EKG with A-fib heart rate of 79.  Patient is currently admitted for pleuritic chest pain and acute on chronic heart failure with reduced ejection fraction EF 35%.    Past Medical History:          Diagnosis Date    Asthma     Atrial fibrillation, persistent (HCC)     Cancer (HCC)     Depression     Generalized osteoarthritis 09/12/2006    Hyperlipidemia     Knee pain     Sees Dr. Manrique for b/l knee pain / gel injections    Pinched nerve in neck     Prolonged emergence from general anesthesia     Thyroid disease     Vitamin D deficiency        Past Surgical History:          Procedure Laterality Date    APPENDECTOMY  58 YEARS AGO    ARTHROPLASTY Left 03/29/2023    LEFT CARPAL TUNNEL RELEASE LEFT MEDIAN NERVE DECOMPRESSION AT ELBOW LEFT THUMB TRAPEZIECTOMY LIGAMENT  \"TROPONINI\" in the last 72 hours.    Urinalysis:      Lab Results   Component Value Date/Time    NITRU POSITIVE 02/23/2025 07:12 PM    WBCUA 21 TO 50 02/23/2025 07:12 PM    BACTERIA 2+ 02/23/2025 07:12 PM    RBCUA 10 TO 20 02/23/2025 07:12 PM    BLOODU LARGE 09/09/2022 12:00 PM    SPECGRAV 1.025 01/27/2022 02:27 PM    GLUCOSEU NEGATIVE 02/23/2025 07:12 PM         ASSESSMENT:  Acute on chronic heart failure with reduced ejection fraction EF of 35%  Pleuritic chest pain  Elevated D-dimer  MALGORZATA on CKD  Chronic A-fib on Xarelto  History of renal cancer status post right nephrectomy 3/20/2025 at [facility]      PLAN:   Bumex 1 mg ordered in the ER.  Hold off on additional diuretics until seen by cardiology.  D-dimers elevated at 985 above age-adjusted of 900.  Given her recent surgery with risk factor of cancer we will check a VQ scan.  She was off her Xarelto for about 9 days recently started on 3/29/2025.  Continue renal dose Xarelto for now.       Frederick Mcgowan MD    Thank you Madeleine Caro, NP-C for the opportunity to be involved in this patient's care. If you have any questions or concerns please feel free to contact me through [employer].

## 2025-04-01 NOTE — PROGRESS NOTES
4 Eyes Skin Assessment     NAME:  Radha Dixon  YOB: 1934  MEDICAL RECORD NUMBER:  [id number]    The patient is being assessed for  Admission    I agree that at least one RN has performed a thorough Head to Toe Skin Assessment on the patient. ALL assessment sites listed below have been assessed.      Areas assessed by both nurses:    Head, Face, Ears, Shoulders, Back, Chest, Arms, Elbows, Hands, Sacrum. Buttock, Coccyx, Ischium, and Legs. Feet and Heels        Does the Patient have a Wound? No noted wound(s)       Broderick Prevention initiated by RN: No  Wound Care Orders initiated by RN: No    Pressure Injury (Stage 3,4, Unstageable, DTI, NWPT, and Complex wounds) if present, place Wound referral order by RN under : No    New Ostomies, if present place, Ostomy referral order under : No     Nurse 1 eSignature: Electronically signed by Chasity Bourne RN on 4/1/25 at 5:50 PM EDT    **SHARE this note so that the co-signing nurse can place an eSignature**    Nurse 2 eSignature: Electronically signed by Arik Loera RN on 4/1/25 at 5:55 PM EDT

## 2025-04-01 NOTE — CONSULTS
Inpatient Cardiology Consultation      Reason for Consult:  HF    Consulting Physician: Dr. Matias     Requesting Physician:  Dr. Sy     Date of Consultation: 4/1/2025    HISTORY OF PRESENT ILLNESS:   Radha Dixon  is a 89 y.o.  female known to [facility] cardiology followed by Dr. Ledesma seen last in office 3/13/2025.    PMH: see below    Admit to [facility] 3/20/25 -  3/29/2025  urothelial carcinoma s/p robotic right nephroureterectomy; Seen by Cardiovascular medicine in consult 3/24/25 for A Fib RVR   Xarelto held by urology resumed 3/29/2025 discharge summary from [facility] is not completed at this time and I am unable to confirm her discharge home medications    ED 4/1/2025 at 0046 via walk-in for Per ER reported \"tightness in chest when a deep breath and worse lying flat and shortness of breath\"  Arrival vitals: /60  SpO2 95% on room air afebrile  Significant Labs: Troponin 51-58 (chronic range 18-32) NT proBNP 8596 (NT proBNP 3/17/24  2484).  D-dimer 985 BUN 22 CR 1.7 (baseline SCr 0.8-1.1) K4.4 mag 1.8 albumin 3.6 alk phos 178 ALT 25 AST 34 WBC 7.5 Hgb 9.5   RAD: NM VQ pending   PCXR  Cardiomegaly with pulmonary venous congestion.  Probable trace to small right   pleural effusion   ED treatment: Normal saline bolus 500 mL, Bumex IV 1 mg,    Patient seen and examined.  Recent vitals: /82 HR 79 SpO2 95% on room air afebrile    Patient is a poor historian   She has 6 daughters that each separate control of her care.  I contacted 2 of her daughters via phone.  The first Genny [phone] -tells me that patient had increased swelling in her legs and seemed uncomfortable last night when trying to sleep and therefore was brought to the hospital.  I contacted daughter Jael at [phone] who is in charge of all of her medications.  She does not know the current medication list after being discharged from the [facility] but is going to investigate this in my chart she cannot confirm if  baseline SCr 0.8-1.1  Chronic Anemia (Hgb baseline 9.5 - 10.4)   Thyroid nodules s/p thyroidectomy with subsequent hypothyroidism, on HRT  TSH 1/8/2025 11.75  Right mid ureteral urothelial cell carcinoma  S/p Cystourethroscopy, right retrograde pyelography, right semirigid and flexible ureteroscopy, right ureteral tumor biopsy and holmium laser tumor ablation, right JJ ureteral stent exchange 1/30/2025  3/20/2025 Robotic right nephroureterectomy [facility]  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  Uterine cancer  OA bilateral knees s/p injections  Depression  Knik wears hearing aides  Influenza A 2/2025  L carpal tunnel release, ANTONIO/BSO (uterine cancer), bilateral cataracts, cholecystomy, tonsillectomy, appendectomy      Past Surgical History:    Past Surgical History:   Procedure Laterality Date    APPENDECTOMY  58 YEARS AGO    ARTHROPLASTY Left 03/29/2023    LEFT CARPAL TUNNEL RELEASE LEFT MEDIAN NERVE DECOMPRESSION AT ELBOW LEFT THUMB TRAPEZIECTOMY LIGAMENT RECONSTRUCTION TENDON INTERPOSITION WITH FIBERTAK performed by Chino Handley MD at [facility] OR    CARDIOVERSION  09/20/2019    Dr. Ledesma 1 shock 200 joules Successful    CARDIOVERSION  08/02/2021    Dr. Matias. 200J x1 converted to NSR    CHOLECYSTECTOMY      CYSTOSCOPY N/A 12/31/2024    CYSTOSCOPY BILATERAL RETROGRADE PYELOGRAM, RIGHT URETEROSCOPY, RIGHT URETERAL BIOPSIES, RIGHT URETERAL STENT INSERTION, PELVIC EXAM performed by Georgi Shrestha [facility] [place]    CYSTOSCOPY N/A 1/30/2025    CYSTOSCOPY RIGHT RETROGRADE PYELOGRAM, RIGHT URETEROSCOPY, BASKET EXTRACTION RIGHT URETERAL TUMOR, LASER ABLATION OF TUMOR, RIGHT URETERAL STENT EXCHANGE performed by Georgi Shrestha MD at [facility] [place]    EYE SURGERY      CATARACT RIGHT AND LEFT    HYSTERECTOMY (CERVIX STATUS UNKNOWN)      NECK SURGERY  09/09/2011    THORACENTESIS Right 10/11/2024    650mL clear yellow    THYROIDECTOMY  17 YEARS AGO    TONSILLECTOMY         Medications Prior to admit: Patient and

## 2025-04-01 NOTE — DISCHARGE INSTRUCTIONS

## 2025-04-01 NOTE — ED PROVIDER NOTES
[facility] EMERGENCY DEPARTMENT  EMERGENCY DEPARTMENT ENCOUNTER        Pt Name: Radha Dixon  MRN: [id number]  Birthdate 7/11/1934  Date of evaluation: 4/1/2025  Provider: Michael Lomeli MD  Attending Provider: Frederick Mcgowan MD  PCP: Madeleine Caro NP-C  Note Started: 12:57 AM EDT 4/1/25    CHIEF COMPLAINT       Chief Complaint   Patient presents with    Chest Pain     Chest tightness that started this evening when she was trying to sleep. Patient states that the tightness is most noticeable when she takes a deep breath       HISTORY OF PRESENT ILLNESS: 1 or more Elements   History From: the patient        Radha Dixon is a 89 y.o. female with a past medical history of COPD, congestive heart failure, atrial fibrillation on Xarelto, hyperlipidemia, kidney cancer status post nephrectomy on 3/20 presenting with shortness of breath and tightness in the chest.  Patient is in the chart of this evening.  She felt her staff go into atrial fibrillation.  She states that she developed chest tightness that is worse when laying down flat.  She tried propping herself up and did not have significant improvement of her symptoms.  She reports ports an associated nonproductive cough.  No fevers or sharp chest pain are reported.  Patient is also reporting some upper abdominal bloating sensation which is ongoing since her surgery.  Denies any nausea vomiting or abdominal pain.  She had some hematuria following her surgery but that is cleared.  No dysuria.  No diarrhea or constipation are reported.  Patient been taking her Xarelto since 3/29.    Nursing Notes were all reviewed and agreed with or any disagreements were addressed in the HPI.      REVIEW OF EXTERNAL NOTE :           PDMP Monitoring:    Last PDMP Negrito as Reviewed:  Review User Review Instant Review Result   YUAN CARDENAS 4/9/2024  1:11 PM Reviewed PDMP [1]       Urine Drug Screenings (1 yr)    No resulted  SULFATE (IRON 325) 325 (65 FE) MG TABLET    Take 1 tablet by mouth every other day    FLUTICASONE (FLONASE) 50 MCG/ACT NASAL SPRAY    1 spray by Nasal route daily as needed for Rhinitis    FLUTICASONE-SALMETEROL (ADVAIR DISKUS) 250-50 MCG/ACT AEPB DISKUS INHALER    Inhale 1 puff into the lungs in the morning and 1 puff in the evening.    FUROSEMIDE (LASIX) 20 MG TABLET    Take 1 tablet by mouth daily    LEVOTHYROXINE (SYNTHROID) 125 MCG TABLET    Take 1 tablet by mouth Daily    LORATADINE (CLARITIN) 10 MG TABLET    Take 1 tablet by mouth daily as needed    MAGNESIUM GLUCONATE (MAGONATE) 500 MG TABLET    Take 1 tablet by mouth daily    METOPROLOL SUCCINATE (TOPROL XL) 50 MG EXTENDED RELEASE TABLET    Take 1.5 tablets by mouth daily    PHENAZOPYRIDINE (PYRIDIUM) 100 MG TABLET    Take 1 tablet by mouth 3 times daily as needed    POTASSIUM CHLORIDE (K-TAB) 20 MEQ TBCR EXTENDED RELEASE TABLET    Take 1 tablet by mouth once    SIMVASTATIN (ZOCOR) 40 MG TABLET    Take 1 tablet by mouth nightly    VITAMIN E 400 UNIT TABS    Take 400 Units by mouth daily    XARELTO 15 MG TABS TABLET    Take 1 tablet by mouth Daily with supper       ALLERGIES     Codeine, Codimal-a [brompheniramine], Lipitor, Spiriva handihaler [tiotropium bromide monohydrate], Tudorza pressair [aclidinium bromide], and Ciprofloxacin    FAMILYHISTORY       Family History   Problem Relation Age of Onset    Melanoma Mother 60    Breast Cancer Daughter 40        invasive, recurrence age 58        SOCIAL HISTORY       Social History     Tobacco Use    Smoking status: Former     Current packs/day: 0.00     Types: Cigarettes     Start date: 1954     Quit date: 1994     Years since quittin.5    Smokeless tobacco: Never   Vaping Use    Vaping status: Never Used   Substance Use Topics    Alcohol use: No    Drug use: No       SCREENINGS        Aram Coma Scale  Eye Opening: Spontaneous  Best Verbal Response: Oriented  Best Motor Response: Obeys

## 2025-04-02 ENCOUNTER — APPOINTMENT (OUTPATIENT)
Dept: CT IMAGING | Age: 89
End: 2025-04-02
Attending: INTERNAL MEDICINE
Payer: MEDICARE

## 2025-04-02 ENCOUNTER — APPOINTMENT (OUTPATIENT)
Dept: ULTRASOUND IMAGING | Age: 89
End: 2025-04-02
Payer: MEDICARE

## 2025-04-02 LAB
ANION GAP SERPL CALCULATED.3IONS-SCNC: 16 MMOL/L (ref 7–16)
BASOPHILS # BLD: 0.03 K/UL (ref 0–0.2)
BASOPHILS NFR BLD: 0 % (ref 0–2)
BUN SERPL-MCNC: 23 MG/DL (ref 6–23)
CALCIUM SERPL-MCNC: 9.1 MG/DL (ref 8.6–10.2)
CHLORIDE SERPL-SCNC: 98 MMOL/L (ref 98–107)
CO2 SERPL-SCNC: 22 MMOL/L (ref 22–29)
CREAT SERPL-MCNC: 1.8 MG/DL (ref 0.5–1)
EKG ATRIAL RATE: 202 BPM
EKG Q-T INTERVAL: 400 MS
EKG QRS DURATION: 84 MS
EKG QTC CALCULATION (BAZETT): 458 MS
EKG R AXIS: -23 DEGREES
EKG T AXIS: -138 DEGREES
EKG VENTRICULAR RATE: 79 BPM
EOSINOPHIL # BLD: 0.19 K/UL (ref 0.05–0.5)
EOSINOPHILS RELATIVE PERCENT: 2 % (ref 0–6)
ERYTHROCYTE [DISTWIDTH] IN BLOOD BY AUTOMATED COUNT: 18.7 % (ref 11.5–15)
GFR, ESTIMATED: 27 ML/MIN/1.73M2
GLUCOSE SERPL-MCNC: 78 MG/DL (ref 74–99)
HCT VFR BLD AUTO: 37.4 % (ref 34–48)
HGB BLD-MCNC: 10.9 G/DL (ref 11.5–15.5)
IMM GRANULOCYTES # BLD AUTO: 0.08 K/UL (ref 0–0.58)
IMM GRANULOCYTES NFR BLD: 1 % (ref 0–5)
LYMPHOCYTES NFR BLD: 0.92 K/UL (ref 1.5–4)
LYMPHOCYTES RELATIVE PERCENT: 10 % (ref 20–42)
MCH RBC QN AUTO: 25.5 PG (ref 26–35)
MCHC RBC AUTO-ENTMCNC: 29.1 G/DL (ref 32–34.5)
MCV RBC AUTO: 87.4 FL (ref 80–99.9)
MONOCYTES NFR BLD: 0.72 K/UL (ref 0.1–0.95)
MONOCYTES NFR BLD: 8 % (ref 2–12)
NEUTROPHILS NFR BLD: 79 % (ref 43–80)
NEUTS SEG NFR BLD: 7.24 K/UL (ref 1.8–7.3)
PLATELET # BLD AUTO: 281 K/UL (ref 130–450)
PMV BLD AUTO: 11.5 FL (ref 7–12)
POTASSIUM SERPL-SCNC: 4.3 MMOL/L (ref 3.5–5)
RBC # BLD AUTO: 4.28 M/UL (ref 3.5–5.5)
SODIUM SERPL-SCNC: 136 MMOL/L (ref 132–146)
TROPONIN I SERPL HS-MCNC: 41 NG/L (ref 0–9)
WBC OTHER # BLD: 9.2 K/UL (ref 4.5–11.5)

## 2025-04-02 PROCEDURE — 75571 CT HRT W/O DYE W/CA TEST: CPT | Performed by: INTERNAL MEDICINE

## 2025-04-02 PROCEDURE — 84484 ASSAY OF TROPONIN QUANT: CPT

## 2025-04-02 PROCEDURE — 6360000002 HC RX W HCPCS: Performed by: CLINICAL NURSE SPECIALIST

## 2025-04-02 PROCEDURE — 36415 COLL VENOUS BLD VENIPUNCTURE: CPT

## 2025-04-02 PROCEDURE — 6370000000 HC RX 637 (ALT 250 FOR IP): Performed by: FAMILY MEDICINE

## 2025-04-02 PROCEDURE — 93010 ELECTROCARDIOGRAM REPORT: CPT | Performed by: INTERNAL MEDICINE

## 2025-04-02 PROCEDURE — 2060000000 HC ICU INTERMEDIATE R&B

## 2025-04-02 PROCEDURE — 6370000000 HC RX 637 (ALT 250 FOR IP): Performed by: CLINICAL NURSE SPECIALIST

## 2025-04-02 PROCEDURE — 75571 CT HRT W/O DYE W/CA TEST: CPT

## 2025-04-02 PROCEDURE — 2500000003 HC RX 250 WO HCPCS: Performed by: FAMILY MEDICINE

## 2025-04-02 PROCEDURE — 6370000000 HC RX 637 (ALT 250 FOR IP): Performed by: INTERNAL MEDICINE

## 2025-04-02 PROCEDURE — 2700000000 HC OXYGEN THERAPY PER DAY

## 2025-04-02 PROCEDURE — 80048 BASIC METABOLIC PNL TOTAL CA: CPT

## 2025-04-02 PROCEDURE — 99232 SBSQ HOSP IP/OBS MODERATE 35: CPT | Performed by: INTERNAL MEDICINE

## 2025-04-02 PROCEDURE — 51798 US URINE CAPACITY MEASURE: CPT

## 2025-04-02 PROCEDURE — 93005 ELECTROCARDIOGRAM TRACING: CPT | Performed by: INTERNAL MEDICINE

## 2025-04-02 PROCEDURE — 85025 COMPLETE CBC W/AUTO DIFF WBC: CPT

## 2025-04-02 PROCEDURE — 76770 US EXAM ABDO BACK WALL COMP: CPT

## 2025-04-02 PROCEDURE — 99233 SBSQ HOSP IP/OBS HIGH 50: CPT | Performed by: INTERNAL MEDICINE

## 2025-04-02 PROCEDURE — 93970 EXTREMITY STUDY: CPT

## 2025-04-02 PROCEDURE — 94640 AIRWAY INHALATION TREATMENT: CPT

## 2025-04-02 PROCEDURE — 6360000002 HC RX W HCPCS: Performed by: NURSE PRACTITIONER

## 2025-04-02 RX ADMIN — CITALOPRAM HYDROBROMIDE 20 MG: 20 TABLET ORAL at 09:34

## 2025-04-02 RX ADMIN — ARFORMOTEROL TARTRATE: 15 SOLUTION RESPIRATORY (INHALATION) at 03:18

## 2025-04-02 RX ADMIN — SODIUM CHLORIDE, PRESERVATIVE FREE 10 ML: 5 INJECTION INTRAVENOUS at 20:50

## 2025-04-02 RX ADMIN — CETIRIZINE HYDROCHLORIDE 5 MG: 5 TABLET ORAL at 09:34

## 2025-04-02 RX ADMIN — FERROUS SULFATE TAB 325 MG (65 MG ELEMENTAL FE) 325 MG: 325 (65 FE) TAB at 09:34

## 2025-04-02 RX ADMIN — BUMETANIDE 1 MG: 0.25 INJECTION INTRAMUSCULAR; INTRAVENOUS at 09:35

## 2025-04-02 RX ADMIN — SODIUM CHLORIDE, PRESERVATIVE FREE 10 ML: 5 INJECTION INTRAVENOUS at 09:35

## 2025-04-02 RX ADMIN — AMIODARONE HYDROCHLORIDE 100 MG: 200 TABLET ORAL at 09:35

## 2025-04-02 RX ADMIN — ARFORMOTEROL TARTRATE: 15 SOLUTION RESPIRATORY (INHALATION) at 20:53

## 2025-04-02 RX ADMIN — RIVAROXABAN 15 MG: 15 TABLET, FILM COATED ORAL at 18:10

## 2025-04-02 RX ADMIN — Medication 3 MG: at 20:50

## 2025-04-02 RX ADMIN — METOPROLOL SUCCINATE 75 MG: 50 TABLET, EXTENDED RELEASE ORAL at 09:34

## 2025-04-02 RX ADMIN — Medication 2000 UNITS: at 09:34

## 2025-04-02 RX ADMIN — LEVOTHYROXINE SODIUM 112 MCG: 0.11 TABLET ORAL at 07:11

## 2025-04-02 NOTE — CARE COORDINATION
Here for chest pain and bilateral  lower ext edea. Currently using oxygen 2 liters iv bumex daily. Cardiology and  Chf navigator consult. Nephrology consult.- rising creatinine in setting of HF, had recent nephrectomy at [facility] 3/20. Us lower exts ordered. Met with patient to discuss role/transition of care. She currently lives alone in 1 story home - her daughter that lives with her is in SNF (had been falling)  Her daughter Jael lives < 1 mile away. Her pcp is thru [facility]  and uses [facility].  SHe has 2 rollators, walker, cane, shower chair and walk in shower. No hhc or snf history. Call from Kelli with [facility] [facility] - they were to see her  post [facility] visit- will need new orders as they did not start her care was admitted to hospital - she will follow.Electronically signed by Katie Baker RN on 4/2/2025 at 11:15 AM    [facility] can accept call from the office.Electronically signed by Katie Baker RN on 4/2/2025 at 12:56 PM    Case Management Assessment  Initial Evaluation    Date/Time of Evaluation: 4/2/2025 11:20 AM  Assessment Completed by: Katie Baker RN    If patient is discharged prior to next notation, then this note serves as note for discharge by case management.    Patient Name: Radha Dixon                   YOB: 1934  Diagnosis: Acute heart failure, unspecified heart failure type (HCC) [I50.9]  Acute kidney injury superimposed on chronic kidney disease [N17.9, N18.9]  Atrial fibrillation, unspecified type (HCC) [I48.91]  Acute congestive heart failure, unspecified heart failure type (HCC) [I50.9]  Congestive heart failure, unspecified HF chronicity, unspecified heart failure type (HCC) [I50.9]                   Date / Time: 4/1/2025  3:55 AM    Patient Admission Status: Inpatient   Readmission Risk (Low < 19, Mod (19-27), High > 27): Readmission Risk Score: 21.2    Current PCP: Madeleine Caro NP-C  PCP verified by CM? Yes    Chart Reviewed: Yes       History Provided by: Patient  Patient Orientation: Alert and Oriented    Patient Cognition: Alert    Hospitalization in the last 30 days (Readmission):  No    If yes, Readmission Assessment in  Navigator will be completed.    Advance Directives:      Code Status: Full Code   Patient's Primary Decision Maker is: Legal Next of Kin    Primary Decision Maker: Genny Dexter - Child - [phone]    Secondary Decision Maker: tony Flores - Child - [phone]    Discharge Planning:    Patient lives with: Other (Comment) (daughters switch on and off) Type of Home: House  Primary Care Giver: Self  Patient Support Systems include: Children   Current Financial resources:    Current community resources:    Current services prior to admission: None            Current DME:              Type of Home Care services:  Nursing Services    ADLS  Prior functional level: Independent in ADLs/IADLs  Current functional level: Independent in ADLs/IADLs    PT AM-PAC:   /24  OT AM-PAC:   /24    Family can provide assistance at DC: Yes  Would you like Case Management to discuss the discharge plan with any other family members/significant others, and if so, who? No  Plans to Return to Present Housing: Unknown at present  Other Identified Issues/Barriers to RETURNING to current housing: medical stability  Potential Assistance needed at discharge: Home Care            Potential DME:    Patient expects to discharge to: House  Plan for transportation at discharge:      Financial    Payor: [employer] MEDICARE / Plan: [employer] CHOICE-PPO MEDICARE / Product Type: *No Product type* /     Does insurance require precert for SNF: Yes    Potential assistance Purchasing Medications:    Meds-to-Beds request:        [id number] - [place], [place] - [address] [phone] - F [phone]  [address]  [facility] [place] [address]  Phone: [phone] Fax: [phone]    [id number] - [place], [place] - [address]  [phone] - F [phone]  [address] [place] [address]  Phone: [phone] Fax: [phone]    [facility] - [place], [place] - [address] - P [phone] - F [phone]  [address]  [address]  [facility] [address]  Phone: [phone] Fax: [phone]      Notes:    Factors facilitating achievement of predicted outcomes: Family support and Caregiver support    Barriers to discharge:     Additional Case Management Notes: see cm notes    The Plan for Transition of Care is related to the following treatment goals of Acute heart failure, unspecified heart failure type (HCC) [I50.9]  Acute kidney injury superimposed on chronic kidney disease [N17.9, N18.9]  Atrial fibrillation, unspecified type (HCC) [I48.91]  Acute congestive heart failure, unspecified heart failure type (HCC) [I50.9]  Congestive heart failure, unspecified HF chronicity, unspecified heart failure type (HCC) [I50.9]    IF APPLICABLE: The Patient and/or patient representative Radha and her family were provided with a choice of provider and agrees with the discharge plan. Freedom of choice list with basic dialogue that supports the patient's individualized plan of care/goals and shares the quality data associated with the providers was provided to:     Patient Representative Name:   daughter Marci    The Patient and/or Patient Representative Agree with the Discharge Plan?  Home     Katie Baker RN  Case Management Department  Ph: [phone]  Fax: [phone]

## 2025-04-02 NOTE — PROGRESS NOTES
INPATIENT CARDIOLOGY FOLLOW-UP    Name: Radha Dixon    Age: 89 y.o.    Date of Admission: 4/1/2025  3:55 AM    Date of Service: 4/2/2025    Chief Complaint: Follow-up for hf    Interim History:  No new overnight cardiac complaints except for 1 episode of chest discomfort felt tightness in her chest last night and also had some difficulty breathing and that last about 20 to 30 minutes and resolved spontaneously.  No further episodes since then.. Currently with no complaints of  palpitations, dizziness, or lightheadedness.  A-fib on telemetry.    Review of Systems:   Cardiac: As per HPI  General: No fever, chills  Pulmonary: As per HPI  HEENT: No visual disturbances, difficult swallowing  GI: No nausea, vomiting  Endocrine: No thyroid disease or DM  Musculoskeletal: TREJO x 4, no focal motor deficits  Skin: Intact, no rashes  Neuro/Psych: No headache or seizures    Problem List:  Patient Active Problem List   Diagnosis    Atrial fibrillation (HCC)    HLD (hyperlipidemia)    Depression    Asthma    Vitamin D deficiency    Thyroid disease    Combined hyperlipidemia    Chronic obstructive pulmonary disease, unspecified COPD type (HCC)    HFrEF (heart failure with reduced ejection fraction) (HCC)    Urothelial carcinoma of kidney, right    Acute congestive heart failure (HCC)       Allergies:  Allergies   Allergen Reactions    Codeine Nausea And Vomiting    Codimal-A [Brompheniramine] Other (See Comments)     Altered mental state and confusion    Lipitor Other (See Comments)     GENERALIZED ACHING, DIFFICULT TO FUNCTION    Spiriva Handihaler [Tiotropium Bromide Monohydrate]     Tudorza Pressair [Aclidinium Bromide]     Ciprofloxacin Nausea And Vomiting       Current Medications:  Current Facility-Administered Medications   Medication Dose Route Frequency Provider Last Rate Last Admin    arformoterol 15 mcg-budesonide 0.5 mg neb solution   Nebulization BID RT Kaitlin Best, SONY - CNP   Given at 04/02/25 4041     mg Oral Daily Theodore Kenny MD        cetirizine (ZYRTEC) tablet 5 mg  5 mg Oral Daily Theodore Kenny MD   5 mg at 04/01/25 2024    phenazopyridine (PYRIDIUM) tablet 100 mg  100 mg Oral TID PRN Theodore Kenny MD          sodium chloride         Physical Exam:  /75   Pulse 91   Temp 97.5 °F (36.4 °C)   Resp 16   Ht 1.73 m (5' 8.11\")   Wt 73.2 kg (161 lb 6.4 oz)   SpO2 96%   BMI 24.46 kg/m²   Wt Readings from Last 3 Encounters:   04/02/25 73.2 kg (161 lb 6.4 oz)   03/17/25 69.9 kg (154 lb 1.6 oz)   03/13/25 70.7 kg (155 lb 12.8 oz)     Appearance: Awake, alert, no acute respiratory distress  Skin: Intact, no rash  Head: Normocephalic, atraumatic  Eyes: EOMI, no conjunctival erythema  ENMT: No pharyngeal erythema, MMM, no rhinorrhea  Neck: Supple, no elevated JVP, no carotid bruits  Lungs: Clear to auscultation bilaterally. No wheezes, rales, or rhonchi.  Cardiac: Irregularly irregular rate and rhythm, +S1S2, ESM 3/6 apparent over right upper sternal border  Abdomen: Soft, nontender, +bowel sounds  Extremities: Moves all extremities x 4, 1+ right lower extremity edema  Neurologic: No focal motor deficits apparent, normal mood and affect  Peripheral Pulses: Intact posterior tibial pulses bilaterally    Intake/Output:    Intake/Output Summary (Last 24 hours) at 4/2/2025 0932  Last data filed at 4/2/2025 0359  Gross per 24 hour   Intake 680 ml   Output 950 ml   Net -270 ml     No intake/output data recorded.    Laboratory Tests:  Recent Labs     04/01/25  0114 04/02/25  0526    136   K 4.4 4.3   CL 97* 98   CO2 21* 22   BUN 22 23   CREATININE 1.7* 1.8*   GLUCOSE 88 78   CALCIUM 8.8 9.1     Lab Results   Component Value Date/Time    MG 1.8 04/01/2025 01:15 AM     Recent Labs     04/01/25  0114   ALKPHOS 178*   ALT 25   AST 34*   BILITOT 0.5     Recent Labs     04/01/25  0114 04/02/25  0526   WBC 7.5 9.2   RBC 3.70 4.28   HGB 9.5* 10.9*   HCT 30.9* 37.4   MCV 83.5 87.4   MCH 25.7* 25.5*    MCHC 30.7* 29.1*   RDW 18.0* 18.7*    281   MPV 10.7 11.5     Lab Results   Component Value Date    CKTOTAL 28 08/27/2018    CKMB 1.6 08/27/2018    TROPONINI <0.01 12/04/2019    TROPONINI <0.01 09/10/2019    TROPONINI <0.01 08/27/2018     Lab Results   Component Value Date    INR 1.3 10/11/2024    INR 3.2 04/16/2024    INR 1.5 09/05/2020    PROTIME 13.9 (H) 10/11/2024    PROTIME 34.3 (H) 04/16/2024    PROTIME 17.0 (H) 09/05/2020     Lab Results   Component Value Date    TSH 11.23 (H) 04/01/2025     Lab Results   Component Value Date    LABA1C 5.1 01/07/2025     No results found for: \"EAG\"  Lab Results   Component Value Date    CHOL 181 01/08/2025    CHOL 136 08/06/2021     Lab Results   Component Value Date    TRIG 79 01/08/2025    TRIG 115 08/06/2021    TRIG 89 06/17/2019     Lab Results   Component Value Date    HDL 67 01/08/2025    HDL 75 08/06/2021     No components found for: \"LDLCALC\", \"LDLCHOLESTEROL\"  Lab Results   Component Value Date    VLDL 16 01/08/2025    VLDL 23 08/06/2021     No results found for: \"CHOLHDLRATIO\"    Cardiac Tests:  Telemetry findings reviewed: AFat rate 70s, no new tachy/bradyarrhythmias overnight     EKG: Atrial fibrillation, low voltage precordial leads, poor R wave progression/anterior infarct age undetermined, ST-T changes suggestive of inferolateral ischemia, abnormal EKG.        Vitals and labs were reviewed: As above     EF 35-40% on TTE 10/9/2024  NM Stress Test 10/10/24 nonischemic  TTE - 8/7/2021:  Technically adequate study. Compared to prior echo, changes noted.   Left ventricle size is normal.   Moderate concentric left ventricular hypertrophy.   Ejection fraction is visually estimated at 55%.   No regional wall motion abnormalities seen.   Indeterminate diastolic function.   No evidence of left ventricular mass or thrombus noted.   False cord noted (normal variant).   Mild mitral regurgitation is present.   Physiologic and/or trace aortic regurgitation is noted

## 2025-04-02 NOTE — PROGRESS NOTES
Attempted to get patients weight on bed side scale she is asleep and does not want to stand at this time. The bedscale is roughly 6kg more than previously documented weight. Bedside standing scale is in room ready for patient gets up for the day. Reported to day shift RN

## 2025-04-02 NOTE — PLAN OF CARE
Problem: Chronic Conditions and Co-morbidities  Goal: Patient's chronic conditions and co-morbidity symptoms are monitored and maintained or improved  4/2/2025 0358 by Alma Friedman RN  Outcome: Progressing  4/1/2025 1737 by Chasity Bourne RN  Outcome: Progressing     Problem: Discharge Planning  Goal: Discharge to home or other facility with appropriate resources  4/2/2025 0358 by Alma Friedman RN  Outcome: Progressing  4/1/2025 1737 by Chasity Bourne RN  Outcome: Progressing     Problem: Safety - Adult  Goal: Free from fall injury  4/2/2025 0358 by Alma Friedman RN  Outcome: Progressing  4/1/2025 1737 by Chasity Bourne RN  Outcome: Progressing     Problem: ABCDS Injury Assessment  Goal: Absence of physical injury  4/2/2025 0358 by Alma Friedman RN  Outcome: Progressing  4/1/2025 1737 by Chasity Bourne RN  Outcome: Progressing     Problem: Respiratory - Adult  Goal: Achieves optimal ventilation and oxygenation  Outcome: Progressing     Problem: Cardiovascular - Adult  Goal: Maintains optimal cardiac output and hemodynamic stability  Outcome: Progressing  Goal: Absence of cardiac dysrhythmias or at baseline  Outcome: Progressing

## 2025-04-02 NOTE — CONSULTS
Department of Internal Medicine  Nephrology Attending Consult Note      Reason for Consult: MALGORZATA  Requesting Physician:  Theodore Kenny MD     CHIEF COMPLAINT: Chest pain    History Obtained From:  patient, electronic medical record    HISTORY OF PRESENT ILLNESS: Briefly Mrs. lAma Dixon is a 89-year-old female with a past medical history of urothelial carcinoma s/p right nephrectomy on 3/20/2025 HFpEF 50-55%, PAF, hyperlipidemia who was admitted on 4/1/2025 for chest pain.  Lab work on admission shows creatinine of 1.7 mg/dL, proBNP 8596, reason for this consultation.    Past Medical History:        Diagnosis Date    Asthma     Atrial fibrillation, persistent (HCC)     Cancer (HCC)     Depression     Generalized osteoarthritis 09/12/2006    Hyperlipidemia     Knee pain     Sees Dr. Manrique for b/l knee pain / gel injections    Pinched nerve in neck     Prolonged emergence from general anesthesia     Thyroid disease     Vitamin D deficiency      Past Surgical History:        Procedure Laterality Date    APPENDECTOMY  58 YEARS AGO    ARTHROPLASTY Left 03/29/2023    LEFT CARPAL TUNNEL RELEASE LEFT MEDIAN NERVE DECOMPRESSION AT ELBOW LEFT THUMB TRAPEZIECTOMY LIGAMENT RECONSTRUCTION TENDON INTERPOSITION WITH FIBERTAK performed by Chino Handley MD at [facility] OR    CARDIOVERSION  09/20/2019    Dr. Ledesma 1 shock 200 joules Successful    CARDIOVERSION  08/02/2021    Dr. Matias. 200J x1 converted to NSR    CHOLECYSTECTOMY      CYSTOSCOPY N/A 12/31/2024    CYSTOSCOPY BILATERAL RETROGRADE PYELOGRAM, RIGHT URETEROSCOPY, RIGHT URETERAL BIOPSIES, RIGHT URETERAL STENT INSERTION, PELVIC EXAM performed by Georgi Shrestha MD at [facility] [place]    CYSTOSCOPY N/A 1/30/2025    CYSTOSCOPY RIGHT RETROGRADE PYELOGRAM, RIGHT URETEROSCOPY, BASKET EXTRACTION RIGHT URETERAL TUMOR, LASER ABLATION OF TUMOR, RIGHT URETERAL STENT EXCHANGE performed by Georgi Shrestha MD at [facility] [place]    EYE SURGERY      CATARACT RIGHT AND LEFT    HYSTERECTOMY     CREATININE 1.8 04/02/2025 05:26 AM    GFRAA 57 01/27/2022 06:25 PM    LABGLOM 27 04/02/2025 05:26 AM    LABGLOM 37 04/16/2024 04:03 PM    GLUCOSE 78 04/02/2025 05:26 AM    CALCIUM 9.1 04/02/2025 05:26 AM    BILITOT 0.5 04/01/2025 01:14 AM    ALKPHOS 178 04/01/2025 01:14 AM    AST 34 04/01/2025 01:14 AM    ALT 25 04/01/2025 01:14 AM     Magnesium:    Lab Results   Component Value Date/Time    MG 1.8 04/01/2025 01:15 AM     Phosphorus:  No results found for: \"PHOS\"  Radiology Review:    XR CHEST PORTABLE 4/1/25    IMPRESSION:  Cardiomegaly with pulmonary venous congestion.  Probable trace to small right  pleural effusion.    IMPRESSION/RECOMMENDATIONS:      Briefly Mrs. Alma Dixon is a 89-year-old female with a past medical history of urothelial carcinoma s/p right nephrectomy on 3/20/2025 HFpEF 50-55%, PAF, hyperlipidemia who was admitted on 4/1/2025 for chest pain.  Lab work on admission shows creatinine of 1.7 mg/dL, proBNP 8596, reason for this consultation.    MALGORZATA stage I likely hemodynamically mediated 2/2 acute decompensated heart failure, creatinine up to 1.8 mg/dL, obtain bladder scan, obtain urine indices, obtain renal ultrasound, continue diuretics  History of urothelial carcinoma s/p right nephrectomy on 3/20/2025  HFpEF 50-55%, proBNP 8596 on Bumex  Normocytic anemia, obtain iron studies  PAF, on metoprolol, amiodarone and Xarelto  Hyperlipidemia    Plan    Continue Bumex 1 mg IV daily  Obtain urine indices  Obtain bladder scan  Obtain renal ultrasound  Obtain iron studies  Monitor renal function  Monitor blood pressure    Thank you Dr. Kenny for allowing us to participate in the care of Radha Dixon.      Electronically signed by SONY Reid CNP on 4/2/2025 at 1:27 PM     MD:  Patient seen.  Case reviewed.  Discussed with CNP.  Recent nehractomy, 3/20.  Creatinine is now, 1.8  IV fluid started. Monitor

## 2025-04-02 NOTE — PROGRESS NOTES
[facility] Hospitalist Progress Note    Admitting Date and Time: 4/1/2025  3:55 AM  Admit Dx: Acute heart failure, unspecified heart failure type (HCC) [I50.9]  Acute kidney injury superimposed on chronic kidney disease [N17.9, N18.9]  Atrial fibrillation, unspecified type (HCC) [I48.91]  Acute congestive heart failure, unspecified heart failure type (HCC) [I50.9]  Congestive heart failure, unspecified HF chronicity, unspecified heart failure type (HCC) [I50.9]    Synopsis:    89 y.o. female who presented to [facility] with concern for chest pain describes a tightness started earlier in the evening before presentation.  Patient mention she noticed that the chest pain more when she takes deep breath.  She also mentioned that the pain was more persistent when she tried to lay down and when she tried to sit up he did not make any difference.  She also reports that her legs have been swollen. Of note patient status post nephrectomy on 3/20/2025 for renal cancer at [facility].  She has been off of Xarelto since her surgery and resumed Xarelto on 3/29/2025. In the ER presentation vitals heart rate of 104, blood pressure 100/60.  Troponin of 51 and 50.  proBNP of 8596.  Creatinine of 1.7 compared to 1.1 on 3/17/2025.  She was given a dose of Bumex 1 mg push given concern for CHF exacerbation.  EKG with A-fib heart rate of 79.  Patient is currently admitted for pleuritic chest pain and acute on chronic heart failure with reduced ejection fraction EF 35%.     4/1: Echo showed recovered EF but concern for severe AS.    4/2: Cardiology following: Undergoing workup for concern for severe aortic stenosis.    Subjective:  Patient is being followed for Acute heart failure, unspecified heart failure type (HCC) [I50.9]  Acute kidney injury superimposed on chronic kidney disease [N17.9, N18.9]  Atrial fibrillation, unspecified type (HCC) [I48.91]  Acute congestive heart failure, unspecified heart failure

## 2025-04-02 NOTE — PROGRESS NOTES
Patient pressed call light stating that she was having difficulty catching her breath after returning to bed from the bathroom. The patient was sat up in bed and educated on slow deep breathes. Pulse ox is 96-98% on RA , patient slowly starting to feel better. States she feels like she could be a little panicky too. Educated to call the nurse if she needs to use restroom again so we can assist.     Patient called Rn back to room for the same issue, there are no changes to patients vital signs, she just asked to sit up. 2L NC was placed for comfort. Pulse ox is now 100% on 2L NC. On call NP Kaitlin Best notified via [employer], to order a breathing treatment and if that does not help she feels patient may be anxious, explained to patient and she is in agreement.

## 2025-04-02 NOTE — ACP (ADVANCE CARE PLANNING)
Advance Care Planning   Healthcare Decision Maker:    Primary Decision Maker: Genny Dexter - Child - [phone]    Secondary Decision Maker: tony Flores - Janessa - [phone]    Click here to complete Healthcare Decision Makers including selection of the Healthcare Decision Maker Relationship (ie \"Primary\").

## 2025-04-03 LAB
ANION GAP SERPL CALCULATED.3IONS-SCNC: 15 MMOL/L (ref 7–16)
BASOPHILS # BLD: 0.04 K/UL (ref 0–0.2)
BASOPHILS NFR BLD: 0 % (ref 0–2)
BUN SERPL-MCNC: 20 MG/DL (ref 6–23)
CALCIUM SERPL-MCNC: 9.2 MG/DL (ref 8.6–10.2)
CHLORIDE SERPL-SCNC: 97 MMOL/L (ref 98–107)
CO2 SERPL-SCNC: 23 MMOL/L (ref 22–29)
CREAT SERPL-MCNC: 1.7 MG/DL (ref 0.5–1)
EOSINOPHIL # BLD: 0.21 K/UL (ref 0.05–0.5)
EOSINOPHILS RELATIVE PERCENT: 2 % (ref 0–6)
ERYTHROCYTE [DISTWIDTH] IN BLOOD BY AUTOMATED COUNT: 18.6 % (ref 11.5–15)
FERRITIN SERPL-MCNC: 170 NG/ML
FOLATE SERPL-MCNC: 8.7 NG/ML (ref 4.8–24.2)
GFR, ESTIMATED: 28 ML/MIN/1.73M2
GLUCOSE SERPL-MCNC: 145 MG/DL (ref 74–99)
HCT VFR BLD AUTO: 37.8 % (ref 34–48)
HGB BLD-MCNC: 11.1 G/DL (ref 11.5–15.5)
IMM GRANULOCYTES # BLD AUTO: 0.07 K/UL (ref 0–0.58)
IMM GRANULOCYTES NFR BLD: 1 % (ref 0–5)
IRON SATN MFR SERPL: 13 % (ref 15–50)
IRON SERPL-MCNC: 43 UG/DL (ref 37–145)
LYMPHOCYTES NFR BLD: 0.86 K/UL (ref 1.5–4)
LYMPHOCYTES RELATIVE PERCENT: 9 % (ref 20–42)
MCH RBC QN AUTO: 25.5 PG (ref 26–35)
MCHC RBC AUTO-ENTMCNC: 29.4 G/DL (ref 32–34.5)
MCV RBC AUTO: 86.9 FL (ref 80–99.9)
MONOCYTES NFR BLD: 0.46 K/UL (ref 0.1–0.95)
MONOCYTES NFR BLD: 5 % (ref 2–12)
NEUTROPHILS NFR BLD: 82 % (ref 43–80)
NEUTS SEG NFR BLD: 7.68 K/UL (ref 1.8–7.3)
PLATELET # BLD AUTO: 401 K/UL (ref 130–450)
PMV BLD AUTO: 11.3 FL (ref 7–12)
POTASSIUM SERPL-SCNC: 4.6 MMOL/L (ref 3.5–5)
RBC # BLD AUTO: 4.35 M/UL (ref 3.5–5.5)
SODIUM SERPL-SCNC: 135 MMOL/L (ref 132–146)
TIBC SERPL-MCNC: 321 UG/DL (ref 250–450)
VIT B12 SERPL-MCNC: 649 PG/ML (ref 211–946)
WBC OTHER # BLD: 9.3 K/UL (ref 4.5–11.5)

## 2025-04-03 PROCEDURE — 99233 SBSQ HOSP IP/OBS HIGH 50: CPT | Performed by: INTERNAL MEDICINE

## 2025-04-03 PROCEDURE — 2060000000 HC ICU INTERMEDIATE R&B

## 2025-04-03 PROCEDURE — 83550 IRON BINDING TEST: CPT

## 2025-04-03 PROCEDURE — 85025 COMPLETE CBC W/AUTO DIFF WBC: CPT

## 2025-04-03 PROCEDURE — 6370000000 HC RX 637 (ALT 250 FOR IP): Performed by: CLINICAL NURSE SPECIALIST

## 2025-04-03 PROCEDURE — 80048 BASIC METABOLIC PNL TOTAL CA: CPT

## 2025-04-03 PROCEDURE — 82746 ASSAY OF FOLIC ACID SERUM: CPT

## 2025-04-03 PROCEDURE — 6360000002 HC RX W HCPCS: Performed by: NURSE PRACTITIONER

## 2025-04-03 PROCEDURE — 97161 PT EVAL LOW COMPLEX 20 MIN: CPT

## 2025-04-03 PROCEDURE — 36415 COLL VENOUS BLD VENIPUNCTURE: CPT

## 2025-04-03 PROCEDURE — 2500000003 HC RX 250 WO HCPCS: Performed by: FAMILY MEDICINE

## 2025-04-03 PROCEDURE — 6360000002 HC RX W HCPCS: Performed by: CLINICAL NURSE SPECIALIST

## 2025-04-03 PROCEDURE — 6370000000 HC RX 637 (ALT 250 FOR IP): Performed by: FAMILY MEDICINE

## 2025-04-03 PROCEDURE — 82728 ASSAY OF FERRITIN: CPT

## 2025-04-03 PROCEDURE — 94640 AIRWAY INHALATION TREATMENT: CPT

## 2025-04-03 PROCEDURE — 83540 ASSAY OF IRON: CPT

## 2025-04-03 PROCEDURE — 97535 SELF CARE MNGMENT TRAINING: CPT

## 2025-04-03 PROCEDURE — 6370000000 HC RX 637 (ALT 250 FOR IP): Performed by: INTERNAL MEDICINE

## 2025-04-03 PROCEDURE — 82607 VITAMIN B-12: CPT

## 2025-04-03 PROCEDURE — 97530 THERAPEUTIC ACTIVITIES: CPT

## 2025-04-03 PROCEDURE — 97165 OT EVAL LOW COMPLEX 30 MIN: CPT

## 2025-04-03 PROCEDURE — 99232 SBSQ HOSP IP/OBS MODERATE 35: CPT | Performed by: INTERNAL MEDICINE

## 2025-04-03 RX ADMIN — SODIUM CHLORIDE, PRESERVATIVE FREE 10 ML: 5 INJECTION INTRAVENOUS at 08:38

## 2025-04-03 RX ADMIN — ARFORMOTEROL TARTRATE: 15 SOLUTION RESPIRATORY (INHALATION) at 09:47

## 2025-04-03 RX ADMIN — METOPROLOL SUCCINATE 75 MG: 50 TABLET, EXTENDED RELEASE ORAL at 08:37

## 2025-04-03 RX ADMIN — CITALOPRAM HYDROBROMIDE 20 MG: 20 TABLET ORAL at 08:37

## 2025-04-03 RX ADMIN — Medication 2000 UNITS: at 08:37

## 2025-04-03 RX ADMIN — LEVOTHYROXINE SODIUM 112 MCG: 0.11 TABLET ORAL at 06:05

## 2025-04-03 RX ADMIN — CETIRIZINE HYDROCHLORIDE 5 MG: 5 TABLET ORAL at 08:37

## 2025-04-03 RX ADMIN — AMIODARONE HYDROCHLORIDE 100 MG: 200 TABLET ORAL at 08:37

## 2025-04-03 RX ADMIN — BUMETANIDE 1 MG: 0.25 INJECTION INTRAMUSCULAR; INTRAVENOUS at 08:37

## 2025-04-03 RX ADMIN — RIVAROXABAN 15 MG: 15 TABLET, FILM COATED ORAL at 17:19

## 2025-04-03 RX ADMIN — Medication 3 MG: at 23:35

## 2025-04-03 RX ADMIN — ARFORMOTEROL TARTRATE: 15 SOLUTION RESPIRATORY (INHALATION) at 20:57

## 2025-04-03 RX ADMIN — SODIUM CHLORIDE, PRESERVATIVE FREE 10 ML: 5 INJECTION INTRAVENOUS at 23:37

## 2025-04-03 NOTE — CONSULTS
[facility]   Inpatient CHF Nurse Navigator Consult      Cardiologist: Dr. Baltazar Garcia SUSSY Dixon is a 89 y.o. (7/11/1934) female with a history of HFimpEF, most recent EF: 50-55% on 4/1/2025, previously was 35-40% on 10/8/2024.      Patient was awake and alert, laying in bed during the consultation and is agreeable to heart failure education. She was engaged and asked appropriate questions throughout the education session.     Barriers identified during consult contributing to HF Hospitalization: none.     [] Limited medication adherence   [] Poor health literacy, education regarding HF medications provided   [] Pill box provided to patient  [] Difficulty affording medications  [] Difficulty obtaining/ managing medications  [] Prescription assistance information given     [] Not weighing themselves daily  [x] Weight log provided for easy monitoring  [] Scale provided     [] Not following low sodium diet  [] Food insecurity   [x] 2 gram sodium diet education provided   [] Low sodium recipes provided  [] Sodium free seasoning provided   [] Low sodium meal delivery options given to patient  [] Dietician consulted     [] Lack of transportation to appointments     [] Depression, given chronic illness  [] Primary team notified     [] Goals of care need addressed  [] Palliative care consulted     [] CHF community health worker Joycelyn Duval consulted [phone], to assist with         Chart Reviewed:  Diet: ADULT DIET; Regular; Low Sodium (2 gm); 2000 ml   Daily Weights: Patient Vitals for the past 96 hrs (Last 3 readings):   Weight   04/02/25 0600 73.2 kg (161 lb 6.4 oz)   04/01/25 1459 70 kg (154 lb 5.2 oz)   04/01/25 0111 70.3 kg (155 lb)     I/O:   Intake/Output Summary (Last 24 hours) at 4/3/2025 1111  Last data filed at 4/3/2025 1023  Gross per 24 hour   Intake 240 ml   Output 800 ml   Net -560 ml       [] Nursing staff/manager notified of inaccurate bobby weights or  how to read food labels, keeping physician follow ups, smoking cessation, follow the “Heart Failure Zones”      Instructed to call 911 if you have any of the following symptoms:  Struggling to breathe unrelieved with rest  Having chest pain  Confusion or can’t think clearly        Discharge Plan:  Above identified barriers reviewed and needs addressed    Patient/family educated on daily monitoring tools for CHF, made aware of signs and symptoms of worsening HF and to notify provider immediately of change in symptoms.     Heart Failure Home Instructions placed in patient's discharge instructions    Per AHA guidelines patient to be closely monitored following discharge with 7 day follow up appointment    Scheduling with the CHF clinic Already follows, post hospital appointment made    Future Appointments   Date Time Provider Department Center   4/21/2025 12:00 PM [facility] CHF ROOM 4 YZ [facility]   5/1/2025 10:30 AM Eve Ramirez APRN - CNP [facility]   5/12/2025 12:45 PM [facility] CHF ROOM 2 SEY[facility]   6/16/2025 12:00 PM Theodore Ledesma MD [facility]           Agreeable to ZOLL HFMS (heart failure monitoring system) which will be mailed to her home as well as close follow ups at CHF clinic.       Patient verbalizes understanding of above.   Greater than 30 minutes was spent educating patient.        Orin Buckner RN, CHFN   Heart Failure Navigator

## 2025-04-03 NOTE — PROGRESS NOTES
Physical Therapy  Initial Assessment     Name: Radha Dixon  : 1934  MRN: [id number]      Date of Service: 4/3/2025    Evaluating PT: Cirilo Logan, PT, DPT, [id number]      Room #:  8507/8507-B  Diagnosis:  Acute heart failure, unspecified heart failure type (HCC) [I50.9]  Acute kidney injury superimposed on chronic kidney disease [N17.9, N18.9]  Atrial fibrillation, unspecified type (HCC) [I48.91]  Acute congestive heart failure, unspecified heart failure type (HCC) [I50.9]  Congestive heart failure, unspecified HF chronicity, unspecified heart failure type (HCC) [I50.9]  PMHx/PSHx:   has a past medical history of Asthma, Atrial fibrillation, persistent (HCC), Cancer (HCC), Depression, Generalized osteoarthritis, Hyperlipidemia, Knee pain, Pinched nerve in neck, Prolonged emergence from general anesthesia, Thyroid disease, and Vitamin D deficiency.  Procedure/Surgery:  n/a  Precautions:  Fall risk, alarm  Equipment Needs:  n/a    SUBJECTIVE:    Pt lives alone in a 1 story condo with 5 stair(s) and 1 rail(s) to enter. Pt independent with all mobility prior. Pt ambulated with a rollator prior to admission.    OBJECTIVE:   Initial Evaluation  Date: 4/3/25 Treatment Date: Short Term/ Long Term   Goals   AM-PAC 6 Clicks      Was pt agreeable to Eval/treatment? yes     Does pt have pain? /10 Bilateral knee pain     Bed Mobility  Rolling: NT  Supine to sit: SBA  Sit to supine: SBA  Scooting: SBA  Rolling: Independent  Supine to sit: Independent  Sit to supine: Independent  Scooting: Independent   Transfers Sit to stand: SBA  Stand to sit: SBA  Stand pivot: SBA with WW  Sit to stand: Independent  Stand to sit: Independent  Stand pivot: Independent   Ambulation   35 x 2 feet with Marco with WW  250 feet with Independent with WW   Stair negotiation: ascended and descended NT  5 step(s) with 1 rail(s) with Independent   ROM BUE: Refer to OT note  BLE: WFL     Strength BUE: Refer to OT note  BLE: WFL     Balance

## 2025-04-03 NOTE — PROGRESS NOTES
Department of Internal Medicine  Nephrology Attending progress Note    Events reviewed.    SUBJECTIVE: We are following Radha HI Destiny for MALGORZATA. Patient reports no complaints.    PHYSICAL EXAM:      Vitals:    VITALS:  /88   Pulse 80   Temp 97.9 °F (36.6 °C) (Temporal)   Resp 20   Ht 1.73 m (5' 8.11\")   Wt 73.2 kg (161 lb 6.4 oz)   SpO2 92%   BMI 24.46 kg/m²   24HR INTAKE/OUTPUT:    Intake/Output Summary (Last 24 hours) at 4/3/2025 0936  Last data filed at 4/3/2025 0910  Gross per 24 hour   Intake 240 ml   Output 400 ml   Net -160 ml     Scheduled Meds:   arformoterol 15 mcg-budesonide 0.5 mg neb solution   Nebulization BID RT    sodium chloride flush  5-40 mL IntraVENous 2 times per day    melatonin  3 mg Oral Nightly    rivaroxaban  15 mg Oral Daily    amiodarone  100 mg Oral Daily    levothyroxine  112 mcg Oral Daily    metoprolol succinate  75 mg Oral Daily    bumetanide  1 mg IntraVENous Daily    Vitamin D  2,000 Units Oral Daily    citalopram  20 mg Oral Daily    ferrous sulfate  325 mg Oral Every Other Day    [Held by provider] furosemide  20 mg Oral Daily    cetirizine  5 mg Oral Daily     Continuous Infusions:   sodium chloride       PRN Meds:.sodium chloride flush, sodium chloride, ondansetron **OR** ondansetron, polyethylene glycol, acetaminophen **OR** acetaminophen, fluticasone, phenazopyridine     Constitutional:  Awake, alert, oriented, in NAD  HEENT:  PERRLA, normocephalic, atraumatic  Respiratory:  CTA  Cardiovascular/Edema:  RRR, normal S1, normal S2  Gastrointestinal:  Soft, flat, non-distended, non-tender  Neurologic:  Nonfocal  Skin:  warm, dry, no rashes, no lesions    DATA:    CBC:   Lab Results   Component Value Date/Time    WBC 9.2 04/02/2025 05:26 AM    RBC 4.28 04/02/2025 05:26 AM    HGB 10.9 04/02/2025 05:26 AM    HCT 37.4 04/02/2025 05:26 AM    MCV 87.4 04/02/2025 05:26 AM    MCH 25.5 04/02/2025 05:26 AM    MCHC 29.1 04/02/2025 05:26 AM    RDW 18.7 04/02/2025 05:26 AM      04/02/2025 05:26 AM    MPV 11.5 04/02/2025 05:26 AM     CMP:    Lab Results   Component Value Date/Time     04/02/2025 05:26 AM    K 4.3 04/02/2025 05:26 AM    K 4.3 03/30/2023 02:36 AM    CL 98 04/02/2025 05:26 AM    CO2 22 04/02/2025 05:26 AM    BUN 23 04/02/2025 05:26 AM    CREATININE 1.8 04/02/2025 05:26 AM    GFRAA 57 01/27/2022 06:25 PM    LABGLOM 27 04/02/2025 05:26 AM    LABGLOM 37 04/16/2024 04:03 PM    GLUCOSE 78 04/02/2025 05:26 AM    CALCIUM 9.1 04/02/2025 05:26 AM    BILITOT 0.5 04/01/2025 01:14 AM    ALKPHOS 178 04/01/2025 01:14 AM    AST 34 04/01/2025 01:14 AM    ALT 25 04/01/2025 01:14 AM     Magnesium:    Lab Results   Component Value Date/Time    MG 1.8 04/01/2025 01:15 AM     Phosphorus:  No results found for: \"PHOS\"  Radiology Review:    XR CHEST PORTABLE 4/1/25    IMPRESSION:  Cardiomegaly with pulmonary venous congestion.  Probable trace to small right  pleural effusion.    BRIEF SUMMARY OF INITIAL CONSULT:    Briefly Mrs. Alma Dixon is a 89-year-old female with a past medical history of urothelial carcinoma s/p right nephrectomy on 3/20/2025 HFpEF 50-55%, PAF, hyperlipidemia who was admitted on 4/1/2025 for chest pain.  Lab work on admission shows creatinine of 1.7 mg/dL, proBNP 8596, reason for this consultation.    IMPRESSION/RECOMMENDATIONS:      MALGORZATA stage I likely hemodynamically mediated 2/2 acute decompensated heart failure, creatinine improved to 1.7 mg/dL, bladder scan negative, renal ultrasound negative continue diuretics  History of urothelial carcinoma s/p right nephrectomy on 3/20/2025  HFpEF 50-55%, proBNP 8596 on Bumex  Normocytic anemia, ferritin 170, iron 43, iron saturation 13%, await vitamin B12 and folate  PAF, on metoprolol, amiodarone and Xarelto  Hyperlipidemia    Plan    Continue Bumex 1 mg IV daily. As suggested by cardiology  Continue to monitor renal function  Continue to monitor blood pressure  Stress echo tomorrow.  My need FU in CHF clinic.  May

## 2025-04-03 NOTE — CARE COORDINATION
Here for chest pain and bilateral  lower ext edema. Currently no oxygen iv bumex daily. Cardiology and Nephrology consult. PT OT Discharge plan is home with [facility]. Family will transport. Awaiting ct cardiac calcium scoring results Electronically signed by Katie Baker RN on 4/3/2025 at 9:54 AM

## 2025-04-03 NOTE — PLAN OF CARE
Problem: Chronic Conditions and Co-morbidities  Goal: Patient's chronic conditions and co-morbidity symptoms are monitored and maintained or improved  4/2/2025 2233 by Camilla Cao RN  Outcome: Progressing  Flowsheets (Taken 4/2/2025 2030)  Care Plan - Patient's Chronic Conditions and Co-Morbidity Symptoms are Monitored and Maintained or Improved: Monitor and assess patient's chronic conditions and comorbid symptoms for stability, deterioration, or improvement  4/2/2025 1305 by Chasity Bourne RN  Outcome: Progressing     Problem: Discharge Planning  Goal: Discharge to home or other facility with appropriate resources  4/2/2025 2233 by Camilla Cao RN  Outcome: Progressing  Flowsheets (Taken 4/2/2025 2030)  Discharge to home or other facility with appropriate resources: Identify barriers to discharge with patient and caregiver  4/2/2025 1305 by Chasity Bourne RN  Outcome: Progressing     Problem: Safety - Adult  Goal: Free from fall injury  4/2/2025 2233 by Camilla Cao RN  Outcome: Progressing  4/2/2025 1305 by Chasity Bourne RN  Outcome: Progressing     Problem: Respiratory - Adult  Goal: Achieves optimal ventilation and oxygenation  4/2/2025 2233 by Camilla Cao RN  Outcome: Progressing  4/2/2025 1305 by Chasity Bourne RN  Outcome: Progressing     Problem: ABCDS Injury Assessment  Goal: Absence of physical injury  4/2/2025 2233 by Camilla aCo RN  Outcome: Progressing  4/2/2025 1305 by Chasity Bourne RN  Outcome: Progressing     Problem: Cardiovascular - Adult  Goal: Maintains optimal cardiac output and hemodynamic stability  4/2/2025 2233 by Camilla Cao RN  Outcome: Progressing  4/2/2025 1305 by Chasity Bourne RN  Outcome: Progressing  Goal: Absence of cardiac dysrhythmias or at baseline  4/2/2025 2233 by Camilla Cao RN  Outcome: Progressing  4/2/2025 1305 by Chasity Bourne RN  Outcome:

## 2025-04-03 NOTE — PROGRESS NOTES
Occupational Therapy  OCCUPATIONAL THERAPY INITIAL EVALUATION    [facility]   [address], [place]       Date:4/3/2025                                                  Patient Name: Radha Dixon  MRN: [id number]  : 1934  Room: [facility]    Evaluating OT: Lesia Terry, OTD, OTR/L; [id number]    Occupational therapy physician order:  Start   Ordering Provider    25  OT eval and treat  Start:  25,   End:  25,   ONE TIME,   Standing Count:  1 Occurrences,   R         Theodore Kenny MD           Pt presents to ED with chest pain     Diagnosis:   1. Acute congestive heart failure, unspecified heart failure type (HCC)    2. Atrial fibrillation, unspecified type (HCC)    3. Acute kidney injury superimposed on chronic kidney disease    4. Congestive heart failure, unspecified HF chronicity, unspecified heart failure type (HCC)       Patient Active Problem List   Diagnosis    Atrial fibrillation (HCC)    HLD (hyperlipidemia)    Depression    Asthma    Vitamin D deficiency    Thyroid disease    Combined hyperlipidemia    Chronic obstructive pulmonary disease, unspecified COPD type (HCC)    HFrEF (heart failure with reduced ejection fraction) (HCC)    Urothelial carcinoma of kidney, right    Acute congestive heart failure (HCC)       Pertinent Medical History:   Past Medical History:   Diagnosis Date    Asthma     Atrial fibrillation, persistent (HCC)     Cancer (HCC)     Depression     Generalized osteoarthritis 2006    Hyperlipidemia     Knee pain     Sees Dr. Manrique for b/l knee pain / gel injections    Pinched nerve in neck     Prolonged emergence from general anesthesia     Thyroid disease     Vitamin D deficiency         Surgery/Procedures: none this admission     Recommended adaptive equipment: TBD     Precautions:  Fall Risk    Assessment of current deficits    [x] Functional mobility  [x]ADLs  +  fatigue  and SOB, SPO2 wfl   Good     Visual/  Perceptual WFL        Safety Fair +  Good during ADL completion    Vitals HR and SPO2 stable throughout session         Hand Dominance right    AROM (PROM) Strength /FMC Goal: (PRN)   RUE   WFL    grossly 4+/5   good  and wfl FMC/dexterity noted during ADL tasks   Improve overall RUE strength to 5/5 for participation in functional tasks       LUE WFL    grossly 4+/5   good  and wfl FMC/dexterity noted during ADL tasks   Improve overall LUE strength to 5/5 for participation in functional tasks         Fine Motor Coordination:  WFL  Hearing: requires increased volume  Sensation:  no c/o numbness or tingling    Tone:  WFL    Edema: unremarkable      Comment: RN cleared patient for OT.  Upon arrival, patient was semi-supine in bed  and agreeable to OT session. no visitors present throughout session . At end of session, patient sitting in chair with arms  and alarm activated ; with call light and phone within reach; all lines and tubes intact.     Patient presents with decreased activity tolerance , balance , and coordination. Pt demonstrated decreased independence during ADLs, bed mobility , functional transfers, and functional mobility. Pt would benefit from continued skilled OT to increase safety and independence with completion of ADL tasks and functional mobility for improved quality of life and return to PLOF.       Treatment: OT treatment provided this date includes:  ADL- Instruction/training on safety and adapted techniques for completion of ADLs: Therapist facilitated & pt educated on activity modifications/adaptations to promote implementation of fall prevention strategies, EC/WS strategies, & safety awareness throughout ADLs.  Mobility- Instruction/training on safety and improved independence with bed mobility, functional transfers, and functional mobility.  Activity tolerance- Instruction/training on energy conservation/work simplification for

## 2025-04-03 NOTE — PROGRESS NOTES
[facility] Hospitalist Progress Note    Admitting Date and Time: 4/1/2025  3:55 AM  Admit Dx: Acute heart failure, unspecified heart failure type (HCC) [I50.9]  Acute kidney injury superimposed on chronic kidney disease [N17.9, N18.9]  Atrial fibrillation, unspecified type (HCC) [I48.91]  Acute congestive heart failure, unspecified heart failure type (HCC) [I50.9]  Congestive heart failure, unspecified HF chronicity, unspecified heart failure type (HCC) [I50.9]    Synopsis:    89 y.o. female who presented to [facility] with concern for chest pain describes a tightness started earlier in the evening before presentation.  Patient mention she noticed that the chest pain more when she takes deep breath.  She also mentioned that the pain was more persistent when she tried to lay down and when she tried to sit up he did not make any difference.  She also reports that her legs have been swollen. Of note patient status post nephrectomy on 3/20/2025 for renal cancer at [facility].  She has been off of Xarelto since her surgery and resumed Xarelto on 3/29/2025. In the ER presentation vitals heart rate of 104, blood pressure 100/60.  Troponin of 51 and 50.  proBNP of 8596.  Creatinine of 1.7 compared to 1.1 on 3/17/2025.  She was given a dose of Bumex 1 mg push given concern for CHF exacerbation.  EKG with A-fib heart rate of 79.  Patient is currently admitted for pleuritic chest pain and acute on chronic heart failure with reduced ejection fraction EF 35%.     4/1: Echo showed recovered EF but concern for severe AS.    4/2: Cardiology following: Undergoing workup for concern for severe aortic stenosis.    Subjective:  Patient is being followed for Acute heart failure, unspecified heart failure type (HCC) [I50.9]  Acute kidney injury superimposed on chronic kidney disease [N17.9, N18.9]  Atrial fibrillation, unspecified type (HCC) [I48.91]  Acute congestive heart failure, unspecified heart failure

## 2025-04-03 NOTE — PROGRESS NOTES
INPATIENT CARDIOLOGY FOLLOW-UP    Name: Radha Dixon    Age: 89 y.o.    Date of Admission: 4/1/2025  3:55 AM    Date of Service: 4/3/2025    Chief Complaint: Follow-up for hf    Interim History:  No new overnight cardiac complaints except except for tired feeling, denies any further episodes of chest pain or shortness of breath.  She would like to go home.. Currently with no complaints of  palpitations, dizziness, or lightheadedness.  A-fib on telemetry.    Review of Systems:   Cardiac: As per HPI  General: No fever, chills  Pulmonary: As per HPI  HEENT: No visual disturbances, difficult swallowing  GI: No nausea, vomiting  Endocrine: No thyroid disease or DM  Musculoskeletal: TREJO x 4, no focal motor deficits  Skin: Intact, no rashes  Neuro/Psych: No headache or seizures    Problem List:  Patient Active Problem List   Diagnosis    Atrial fibrillation (HCC)    HLD (hyperlipidemia)    Depression    Asthma    Vitamin D deficiency    Thyroid disease    Combined hyperlipidemia    Chronic obstructive pulmonary disease, unspecified COPD type (HCC)    HFrEF (heart failure with reduced ejection fraction) (HCC)    Urothelial carcinoma of kidney, right    Acute congestive heart failure (HCC)       Allergies:  Allergies   Allergen Reactions    Codeine Nausea And Vomiting    Codimal-A [Brompheniramine] Other (See Comments)     Altered mental state and confusion    Lipitor Other (See Comments)     GENERALIZED ACHING, DIFFICULT TO FUNCTION    Spiriva Handihaler [Tiotropium Bromide Monohydrate]     Tudorza Pressair [Aclidinium Bromide]     Ciprofloxacin Nausea And Vomiting       Current Medications:  Current Facility-Administered Medications   Medication Dose Route Frequency Provider Last Rate Last Admin    arformoterol 15 mcg-budesonide 0.5 mg neb solution   Nebulization BID RT Kaitlin Best APRN - CNP   Given at 04/03/25 0907    sodium chloride flush 0.9 % injection 5-40 mL  5-40 mL IntraVENous 2 times per day  False cord noted (normal variant).   Mild mitral regurgitation is present.   Physiologic and/or trace aortic regurgitation is noted   Mild tricuspid regurgitation.   RVSP is 35 mmHg.   Pulmonary hypertension is mild .   Mild pulmonic regurgitation present.      Lexiscan nuclear stress test test 10/10/2024 nonischemic    TTE-4/1/2025:    Left Ventricle: Normal left ventricular systolic function with a visually estimated EF of 50 - 55%. Left ventricle size is normal. Normal wall thickness. Ventricular mass is normal. Findings consistent with concentric remodeling. Normal wall motion. Indeterminate diastolic function due to atrial fibrillation.    Right Ventricle: Right ventricle is mildly dilated. Normal systolic function.    Aortic Valve: Trileaflet valve. Severe sclerosis of the aortic valve cusps. Paradoxical low-flow, low gradient moderate to severe stenosis of the aortic valve. AV mean gradient is 13 mmHg. AV peak velocity is 2.3 m/s. AV Velocity Ratio is 0.26. LVOT diameter is 2.0 cm. AV area by peak velocity is 0.9 cm2. AV Stroke Volume index is 26.4 mL/m2.    Mitral Valve: Mild annular calcification.    Tricuspid Valve: Mild regurgitation. Mildly elevated RVSP, consistent with mild pulmonary hypertension. The estimated RVSP is 41 mmHg.    Left Atrium: Left atrium is moderately dilated.    Image quality is fair.           Assessment:  Decompensated heart failure/acute on chronic HFrEF, improving, significantly improved now she is only trace amount of fluid on her right leg.  Nonischemic cardiomyopathy based on stress test done on 10/10/2024  ACC/AHA stage C.,  NYHA functional class II  Chronically elevated troponin  VHD: Moderate to severe AS, mild MR, mild to moderate TR aortic valve calcium score 788 (more than 1200 is considered severe).  Permanent atrial fibrillation with controlled rate, status post DCCV x 3  [id number] score-4 on Xarelto  Hyperlipidemia statins  Intolerance to

## 2025-04-03 NOTE — NURSE NAVIGATOR
Patient's chart updated to reflect:      .    - HF care plan, HF education points and HF discharge instructions.  -Orders: 2 gram sodium diet, daily weights, I/O.  -PCP or cardiology follow up appointments to be scheduled within 7 days of hospital discharge.  -CHF education session will be provided to the patient prior to hospital discharge.     Orin Buckner RN, CHFN   Heart Failure Navigator

## 2025-04-03 NOTE — NURSE NAVIGATOR
Patient is eligible for [employer] Heart Failure Management System (HFMS) and is agreeable to the Heart Failure Management System.     Discussed with patient and patient was given patient HFMS pamphlet and QR code for additional resource.  Demo HFMS device shown and demonstrated.  Patient agreeable and order placed in [employer] [facility] [employer] [facility].      HFMS will be mailed out to patient in 24-48 hours and instructed to call [employer] if assistance is needed for application.

## 2025-04-04 VITALS
WEIGHT: 161.4 LBS | SYSTOLIC BLOOD PRESSURE: 109 MMHG | TEMPERATURE: 98.2 F | DIASTOLIC BLOOD PRESSURE: 68 MMHG | RESPIRATION RATE: 15 BRPM | HEIGHT: 68 IN | HEART RATE: 96 BPM | BODY MASS INDEX: 24.46 KG/M2 | OXYGEN SATURATION: 94 %

## 2025-04-04 LAB
ANION GAP SERPL CALCULATED.3IONS-SCNC: 16 MMOL/L (ref 7–16)
BASOPHILS # BLD: 0.04 K/UL (ref 0–0.2)
BASOPHILS NFR BLD: 1 % (ref 0–2)
BNP SERPL-MCNC: 7323 PG/ML (ref 0–450)
BUN SERPL-MCNC: 21 MG/DL (ref 6–23)
CALCIUM SERPL-MCNC: 8.9 MG/DL (ref 8.6–10.2)
CHLORIDE SERPL-SCNC: 101 MMOL/L (ref 98–107)
CO2 SERPL-SCNC: 22 MMOL/L (ref 22–29)
CREAT SERPL-MCNC: 1.6 MG/DL (ref 0.5–1)
EOSINOPHIL # BLD: 0.23 K/UL (ref 0.05–0.5)
EOSINOPHILS RELATIVE PERCENT: 3 % (ref 0–6)
ERYTHROCYTE [DISTWIDTH] IN BLOOD BY AUTOMATED COUNT: 18.3 % (ref 11.5–15)
GFR, ESTIMATED: 30 ML/MIN/1.73M2
GLUCOSE SERPL-MCNC: 94 MG/DL (ref 74–99)
HCT VFR BLD AUTO: 32.1 % (ref 34–48)
HGB BLD-MCNC: 9.6 G/DL (ref 11.5–15.5)
IMM GRANULOCYTES # BLD AUTO: 0.06 K/UL (ref 0–0.58)
IMM GRANULOCYTES NFR BLD: 1 % (ref 0–5)
LYMPHOCYTES NFR BLD: 0.86 K/UL (ref 1.5–4)
LYMPHOCYTES RELATIVE PERCENT: 10 % (ref 20–42)
MCH RBC QN AUTO: 25.7 PG (ref 26–35)
MCHC RBC AUTO-ENTMCNC: 29.9 G/DL (ref 32–34.5)
MCV RBC AUTO: 85.8 FL (ref 80–99.9)
MONOCYTES NFR BLD: 0.83 K/UL (ref 0.1–0.95)
MONOCYTES NFR BLD: 9 % (ref 2–12)
NEUTROPHILS NFR BLD: 77 % (ref 43–80)
NEUTS SEG NFR BLD: 6.82 K/UL (ref 1.8–7.3)
PLATELET # BLD AUTO: 352 K/UL (ref 130–450)
PMV BLD AUTO: 11.7 FL (ref 7–12)
POTASSIUM SERPL-SCNC: 4.3 MMOL/L (ref 3.5–5)
RBC # BLD AUTO: 3.74 M/UL (ref 3.5–5.5)
SODIUM SERPL-SCNC: 139 MMOL/L (ref 132–146)
WBC OTHER # BLD: 8.8 K/UL (ref 4.5–11.5)

## 2025-04-04 PROCEDURE — 36415 COLL VENOUS BLD VENIPUNCTURE: CPT

## 2025-04-04 PROCEDURE — 80048 BASIC METABOLIC PNL TOTAL CA: CPT

## 2025-04-04 PROCEDURE — 6370000000 HC RX 637 (ALT 250 FOR IP): Performed by: INTERNAL MEDICINE

## 2025-04-04 PROCEDURE — 94640 AIRWAY INHALATION TREATMENT: CPT

## 2025-04-04 PROCEDURE — 6370000000 HC RX 637 (ALT 250 FOR IP): Performed by: CLINICAL NURSE SPECIALIST

## 2025-04-04 PROCEDURE — 83880 ASSAY OF NATRIURETIC PEPTIDE: CPT

## 2025-04-04 PROCEDURE — 99239 HOSP IP/OBS DSCHRG MGMT >30: CPT | Performed by: INTERNAL MEDICINE

## 2025-04-04 PROCEDURE — 6360000002 HC RX W HCPCS: Performed by: NURSE PRACTITIONER

## 2025-04-04 PROCEDURE — 2500000003 HC RX 250 WO HCPCS: Performed by: FAMILY MEDICINE

## 2025-04-04 PROCEDURE — 99232 SBSQ HOSP IP/OBS MODERATE 35: CPT | Performed by: INTERNAL MEDICINE

## 2025-04-04 PROCEDURE — 6360000002 HC RX W HCPCS: Performed by: CLINICAL NURSE SPECIALIST

## 2025-04-04 PROCEDURE — 85025 COMPLETE CBC W/AUTO DIFF WBC: CPT

## 2025-04-04 RX ORDER — BUMETANIDE 1 MG/1
1 TABLET ORAL DAILY
Status: DISCONTINUED | OUTPATIENT
Start: 2025-04-05 | End: 2025-04-04 | Stop reason: HOSPADM

## 2025-04-04 RX ORDER — BUMETANIDE 1 MG/1
1 TABLET ORAL DAILY
Qty: 90 TABLET | Refills: 0 | Status: SHIPPED | OUTPATIENT
Start: 2025-04-05

## 2025-04-04 RX ORDER — LEVOTHYROXINE SODIUM 112 UG/1
112 TABLET ORAL DAILY
Qty: 90 TABLET | Refills: 0 | Status: SHIPPED | OUTPATIENT
Start: 2025-04-04

## 2025-04-04 RX ADMIN — CITALOPRAM HYDROBROMIDE 20 MG: 20 TABLET ORAL at 09:16

## 2025-04-04 RX ADMIN — FERROUS SULFATE TAB 325 MG (65 MG ELEMENTAL FE) 325 MG: 325 (65 FE) TAB at 09:16

## 2025-04-04 RX ADMIN — ARFORMOTEROL TARTRATE: 15 SOLUTION RESPIRATORY (INHALATION) at 08:06

## 2025-04-04 RX ADMIN — METOPROLOL SUCCINATE 75 MG: 50 TABLET, EXTENDED RELEASE ORAL at 09:16

## 2025-04-04 RX ADMIN — AMIODARONE HYDROCHLORIDE 100 MG: 200 TABLET ORAL at 09:16

## 2025-04-04 RX ADMIN — CETIRIZINE HYDROCHLORIDE 5 MG: 5 TABLET ORAL at 09:16

## 2025-04-04 RX ADMIN — Medication 2000 UNITS: at 09:16

## 2025-04-04 RX ADMIN — SODIUM CHLORIDE, PRESERVATIVE FREE 10 ML: 5 INJECTION INTRAVENOUS at 09:16

## 2025-04-04 RX ADMIN — LEVOTHYROXINE SODIUM 112 MCG: 0.11 TABLET ORAL at 06:12

## 2025-04-04 RX ADMIN — BUMETANIDE 1 MG: 0.25 INJECTION INTRAMUSCULAR; INTRAVENOUS at 09:16

## 2025-04-04 NOTE — DISCHARGE SUMMARY
[facility] Hospitalist Physician Discharge Summary     Patient ID:  Radha Dixon  [id number]  89 y.o.  7/11/1934    Admit date: 4/1/2025    Discharge date and time:  4/4/2025  1:16 PM    Hospital Course:   Patient Radha Dixon is a 89 y.o. presented with Acute heart failure, unspecified heart failure type (HCC) [I50.9]  Acute kidney injury superimposed on chronic kidney disease [N17.9, N18.9]  Atrial fibrillation, unspecified type (HCC) [I48.91]  Acute congestive heart failure, unspecified heart failure type (HCC) [I50.9]  Congestive heart failure, unspecified HF chronicity, unspecified heart failure type (HCC) [I50.9]    89 y.o. female who presented to [facility] with concern for chest pain describes a tightness started earlier in the evening before presentation.  Patient mention she noticed that the chest pain more when she takes deep breath.  She also mentioned that the pain was more persistent when she tried to lay down and when she tried to sit up he did not make any difference.  She also reports that her legs have been swollen. Of note patient status post nephrectomy on 3/20/2025 for renal cancer at [facility].  She has been off of Xarelto since her surgery and resumed Xarelto on 3/29/2025. In the ER presentation vitals heart rate of 104, blood pressure 100/60.  Troponin of 51 and 50.  proBNP of 8596.  Creatinine of 1.7 compared to 1.1 on 3/17/2025.  She was given a dose of Bumex 1 mg push given concern for CHF exacerbation.  EKG with A-fib heart rate of 79.  Patient is currently admitted for pleuritic chest pain and acute on chronic heart failure with reduced ejection fraction EF 35%.      4/1: Echo showed recovered EF but concern for severe AS.     4/2: Cardiology following: Undergoing workup for concern for severe aortic stenosis.    4/3: Cleared by cardio for discharge today on Bumex 1 mg PO daily.      Follow Up:    Madeleine Caro, NP-C  [address] [facility]

## 2025-04-04 NOTE — PROGRESS NOTES
Department of Internal Medicine  Nephrology Attending progress Note    Events reviewed.    SUBJECTIVE: We are following Radha HI Destiny for MALGORZATA. Patient reports no complaints.    PHYSICAL EXAM:      Vitals:    VITALS:  BP (!) 150/84   Pulse 85   Temp 97.2 °F (36.2 °C) (Temporal)   Resp 14   Ht 1.73 m (5' 8.11\")   Wt 73.2 kg (161 lb 6.4 oz)   SpO2 94%   BMI 24.46 kg/m²   24HR INTAKE/OUTPUT:    Intake/Output Summary (Last 24 hours) at 4/4/2025 0936  Last data filed at 4/3/2025 1850  Gross per 24 hour   Intake 480 ml   Output 600 ml   Net -120 ml     Scheduled Meds:   arformoterol 15 mcg-budesonide 0.5 mg neb solution   Nebulization BID RT    sodium chloride flush  5-40 mL IntraVENous 2 times per day    melatonin  3 mg Oral Nightly    rivaroxaban  15 mg Oral Daily    amiodarone  100 mg Oral Daily    levothyroxine  112 mcg Oral Daily    metoprolol succinate  75 mg Oral Daily    bumetanide  1 mg IntraVENous Daily    Vitamin D  2,000 Units Oral Daily    citalopram  20 mg Oral Daily    ferrous sulfate  325 mg Oral Every Other Day    [Held by provider] furosemide  20 mg Oral Daily    cetirizine  5 mg Oral Daily     Continuous Infusions:   sodium chloride       PRN Meds:.sodium chloride flush, sodium chloride, ondansetron **OR** ondansetron, polyethylene glycol, acetaminophen **OR** acetaminophen, fluticasone, phenazopyridine     Constitutional:  Awake, alert, oriented, in NAD  HEENT:  PERRLA, normocephalic, atraumatic  Respiratory:  CTA  Cardiovascular/Edema:  RRR, normal S1, normal S2  Gastrointestinal:  Soft, flat, non-distended, non-tender  Neurologic:  Nonfocal  Skin:  warm, dry, no rashes, no lesions    DATA:    CBC:   Lab Results   Component Value Date/Time    WBC 8.8 04/04/2025 03:59 AM    RBC 3.74 04/04/2025 03:59 AM    HGB 9.6 04/04/2025 03:59 AM    HCT 32.1 04/04/2025 03:59 AM    MCV 85.8 04/04/2025 03:59 AM    MCH 25.7 04/04/2025 03:59 AM    MCHC 29.9 04/04/2025 03:59 AM    RDW 18.3 04/04/2025 03:59 AM      04/04/2025 03:59 AM    MPV 11.7 04/04/2025 03:59 AM     CMP:    Lab Results   Component Value Date/Time     04/04/2025 03:59 AM    K 4.3 04/04/2025 03:59 AM    K 4.3 03/30/2023 02:36 AM     04/04/2025 03:59 AM    CO2 22 04/04/2025 03:59 AM    BUN 21 04/04/2025 03:59 AM    CREATININE 1.6 04/04/2025 03:59 AM    GFRAA 57 01/27/2022 06:25 PM    LABGLOM 30 04/04/2025 03:59 AM    LABGLOM 37 04/16/2024 04:03 PM    GLUCOSE 94 04/04/2025 03:59 AM    CALCIUM 8.9 04/04/2025 03:59 AM    BILITOT 0.5 04/01/2025 01:14 AM    ALKPHOS 178 04/01/2025 01:14 AM    AST 34 04/01/2025 01:14 AM    ALT 25 04/01/2025 01:14 AM     Magnesium:    Lab Results   Component Value Date/Time    MG 1.8 04/01/2025 01:15 AM     Phosphorus:  No results found for: \"PHOS\"  Radiology Review:    XR CHEST PORTABLE 4/1/25    IMPRESSION:  Cardiomegaly with pulmonary venous congestion.  Probable trace to small right  pleural effusion.    BRIEF SUMMARY OF INITIAL CONSULT:    Briefly Mrs. Alma Dixon is a 89-year-old female with a past medical history of urothelial carcinoma s/p right nephrectomy on 3/20/2025 HFpEF 50-55%, PAF, hyperlipidemia who was admitted on 4/1/2025 for chest pain.  Lab work on admission shows creatinine of 1.7 mg/dL, proBNP 8596, reason for this consultation.    IMPRESSION/RECOMMENDATIONS:      MALGORZATA stage I likely hemodynamically mediated 2/2 acute decompensated heart failure, creatinine improved to 1.6 mg/dL, bladder scan negative, renal ultrasound negative continue diuretics  History of urothelial carcinoma s/p right nephrectomy on 3/20/2025  HFpEF 50-55%, proBNP 8596 on Bumex  Normocytic anemia, ferritin 170, iron 43, iron saturation 13%, await vitamin B12 and folate  PAF, on metoprolol, amiodarone and Xarelto  Hyperlipidemia    Plan    Continue Bumex 1 mg IV daily, transition to p.o. tomorrow  Continue to monitor renal function  Continue to monitor blood pressure  Okay to discharge from renal point of view, follow-up

## 2025-04-04 NOTE — PLAN OF CARE
Problem: Chronic Conditions and Co-morbidities  Goal: Patient's chronic conditions and co-morbidity symptoms are monitored and maintained or improved  Outcome: Progressing  Flowsheets (Taken 4/3/2025 1952)  Care Plan - Patient's Chronic Conditions and Co-Morbidity Symptoms are Monitored and Maintained or Improved: Monitor and assess patient's chronic conditions and comorbid symptoms for stability, deterioration, or improvement     Problem: Discharge Planning  Goal: Discharge to home or other facility with appropriate resources  Outcome: Progressing  Flowsheets (Taken 4/3/2025 1952)  Discharge to home or other facility with appropriate resources: Identify barriers to discharge with patient and caregiver     Problem: Safety - Adult  Goal: Free from fall injury  Outcome: Progressing     Problem: ABCDS Injury Assessment  Goal: Absence of physical injury  Outcome: Progressing     Problem: ABCDS Injury Assessment  Goal: Absence of physical injury  Outcome: Progressing     Problem: Respiratory - Adult  Goal: Achieves optimal ventilation and oxygenation  Outcome: Progressing  Flowsheets (Taken 4/3/2025 1952)  Achieves optimal ventilation and oxygenation: Assess for changes in respiratory status     Problem: Cardiovascular - Adult  Goal: Maintains optimal cardiac output and hemodynamic stability  Outcome: Progressing  Flowsheets (Taken 4/3/2025 1952)  Maintains optimal cardiac output and hemodynamic stability: Monitor blood pressure and heart rate  Goal: Absence of cardiac dysrhythmias or at baseline  Outcome: Progressing  Flowsheets (Taken 4/3/2025 1952)  Absence of cardiac dysrhythmias or at baseline: Monitor cardiac rate and rhythm

## 2025-04-04 NOTE — CARE COORDINATION
Discharge order noted. Change Bumex to 1 mg p.o. daily Discharge plan is home with [facility]. Family will transport. [facility] with [facility] of discharge.Electronically signed by Katie Baker RN on 4/4/2025 at 1:23 PM

## 2025-04-04 NOTE — PROGRESS NOTES
INPATIENT CARDIOLOGY FOLLOW-UP    Name: Radha Dixon    Age: 89 y.o.    Date of Admission: 4/1/2025  3:55 AM    Date of Service: 4/4/2025    Chief Complaint: Follow-up for hf    Interim History:  No new overnight cardiac complaints feeling good and wants to go home.  Denies any more fatigue shortness of breath or chest pain. Currently with no complaints of  palpitations, dizziness, or lightheadedness.  A-fib on telemetry.    Review of Systems:   Cardiac: As per HPI  General: No fever, chills  Pulmonary: As per HPI  HEENT: No visual disturbances, difficult swallowing  GI: No nausea, vomiting  Endocrine: No thyroid disease or DM  Musculoskeletal: TREJO x 4, no focal motor deficits  Skin: Intact, no rashes  Neuro/Psych: No headache or seizures    Problem List:  Patient Active Problem List   Diagnosis    Atrial fibrillation (HCC)    HLD (hyperlipidemia)    Depression    Asthma    Vitamin D deficiency    Thyroid disease    Combined hyperlipidemia    Chronic obstructive pulmonary disease, unspecified COPD type (HCC)    HFrEF (heart failure with reduced ejection fraction) (HCC)    Urothelial carcinoma of kidney, right    Acute congestive heart failure (HCC)       Allergies:  Allergies   Allergen Reactions    Codeine Nausea And Vomiting    Codimal-A [Brompheniramine] Other (See Comments)     Altered mental state and confusion    Lipitor Other (See Comments)     GENERALIZED ACHING, DIFFICULT TO FUNCTION    Spiriva Handihaler [Tiotropium Bromide Monohydrate]     Tudorza Pressair [Aclidinium Bromide]     Ciprofloxacin Nausea And Vomiting       Current Medications:  Current Facility-Administered Medications   Medication Dose Route Frequency Provider Last Rate Last Admin    arformoterol 15 mcg-budesonide 0.5 mg neb solution   Nebulization BID RT Kaitlin Best APRN - CNP   Given at 04/04/25 0806    sodium chloride flush 0.9 % injection 5-40 mL  5-40 mL IntraVENous 2 times per day Frederick Mcgowan MD   10 mL at

## 2025-04-18 ENCOUNTER — APPOINTMENT (OUTPATIENT)
Dept: CT IMAGING | Age: 89
DRG: 872 | End: 2025-04-18
Attending: STUDENT IN AN ORGANIZED HEALTH CARE EDUCATION/TRAINING PROGRAM
Payer: MEDICARE

## 2025-04-18 ENCOUNTER — HOSPITAL ENCOUNTER (INPATIENT)
Age: 89
LOS: 2 days | Discharge: HOME | DRG: 872 | End: 2025-04-21
Attending: STUDENT IN AN ORGANIZED HEALTH CARE EDUCATION/TRAINING PROGRAM | Admitting: INTERNAL MEDICINE
Payer: MEDICARE

## 2025-04-18 DIAGNOSIS — K57.32 DIVERTICULITIS OF COLON: ICD-10-CM

## 2025-04-18 DIAGNOSIS — I50.20 HFREF (HEART FAILURE WITH REDUCED EJECTION FRACTION) (HCC): ICD-10-CM

## 2025-04-18 DIAGNOSIS — E87.5 HYPERKALEMIA: ICD-10-CM

## 2025-04-18 DIAGNOSIS — I50.9 ACUTE CONGESTIVE HEART FAILURE, UNSPECIFIED HEART FAILURE TYPE (HCC): ICD-10-CM

## 2025-04-18 DIAGNOSIS — N17.9 AKI (ACUTE KIDNEY INJURY): ICD-10-CM

## 2025-04-18 DIAGNOSIS — I48.19 PERSISTENT ATRIAL FIBRILLATION (HCC): Primary | ICD-10-CM

## 2025-04-18 PROBLEM — Z85.528 HISTORY OF KIDNEY CANCER: Status: ACTIVE | Noted: 2025-04-18

## 2025-04-18 PROBLEM — K57.92 DIVERTICULITIS: Status: ACTIVE | Noted: 2025-04-18

## 2025-04-18 PROBLEM — E87.1 HYPONATREMIA: Status: ACTIVE | Noted: 2025-04-18

## 2025-04-18 PROBLEM — N18.9 ACUTE KIDNEY INJURY SUPERIMPOSED ON CKD: Status: ACTIVE | Noted: 2025-04-18

## 2025-04-18 PROBLEM — I50.30 (HFPEF) HEART FAILURE WITH PRESERVED EJECTION FRACTION (HCC): Status: ACTIVE | Noted: 2025-04-18

## 2025-04-18 LAB
ALBUMIN SERPL-MCNC: 4 G/DL (ref 3.5–5.2)
ALP SERPL-CCNC: 118 U/L (ref 35–104)
ALT SERPL-CCNC: 16 U/L (ref 0–35)
ANION GAP SERPL CALCULATED.3IONS-SCNC: 11 MMOL/L (ref 7–16)
ANION GAP SERPL CALCULATED.3IONS-SCNC: 12 MMOL/L (ref 7–16)
AST SERPL-CCNC: 26 U/L (ref 0–35)
BASOPHILS # BLD: 0.09 K/UL (ref 0–0.2)
BASOPHILS NFR BLD: 1 % (ref 0–2)
BILIRUB SERPL-MCNC: 0.4 MG/DL (ref 0–1.2)
BILIRUB UR QL STRIP: NEGATIVE
BNP SERPL-MCNC: 3258 PG/ML (ref 0–450)
BUN SERPL-MCNC: 38 MG/DL (ref 8–23)
BUN SERPL-MCNC: 50 MG/DL (ref 8–23)
CALCIUM SERPL-MCNC: 8.5 MG/DL (ref 8.8–10.2)
CALCIUM SERPL-MCNC: 9.7 MG/DL (ref 8.8–10.2)
CHLORIDE SERPL-SCNC: 100 MMOL/L (ref 98–107)
CHLORIDE SERPL-SCNC: 108 MMOL/L (ref 98–107)
CLARITY UR: CLEAR
CO2 SERPL-SCNC: 16 MMOL/L (ref 22–29)
CO2 SERPL-SCNC: 21 MMOL/L (ref 22–29)
COLOR UR: YELLOW
CREAT SERPL-MCNC: 1.9 MG/DL (ref 0.5–1)
CREAT SERPL-MCNC: 2.5 MG/DL (ref 0.5–1)
CREAT UR-MCNC: 64.2 MG/DL (ref 29–226)
EOSINOPHIL # BLD: 0.12 K/UL (ref 0.05–0.5)
EOSINOPHILS RELATIVE PERCENT: 1 % (ref 0–6)
ERYTHROCYTE [DISTWIDTH] IN BLOOD BY AUTOMATED COUNT: 19.9 % (ref 11.5–15)
GFR, ESTIMATED: 18 ML/MIN/1.73M2
GFR, ESTIMATED: 24 ML/MIN/1.73M2
GLUCOSE BLD-MCNC: 102 MG/DL (ref 74–99)
GLUCOSE BLD-MCNC: 129 MG/DL (ref 74–99)
GLUCOSE BLD-MCNC: 164 MG/DL (ref 74–99)
GLUCOSE SERPL-MCNC: 102 MG/DL (ref 74–99)
GLUCOSE SERPL-MCNC: 88 MG/DL (ref 74–99)
GLUCOSE UR STRIP-MCNC: NEGATIVE MG/DL
HCT VFR BLD AUTO: 43 % (ref 34–48)
HGB BLD-MCNC: 13.1 G/DL (ref 11.5–15.5)
HGB UR QL STRIP.AUTO: ABNORMAL
IMM GRANULOCYTES # BLD AUTO: 0.13 K/UL (ref 0–0.58)
IMM GRANULOCYTES NFR BLD: 1 % (ref 0–5)
KETONES UR STRIP-MCNC: NEGATIVE MG/DL
LACTATE BLDV-SCNC: 2.3 MMOL/L (ref 0.5–2.2)
LEUKOCYTE ESTERASE UR QL STRIP: ABNORMAL
LYMPHOCYTES NFR BLD: 1.11 K/UL (ref 1.5–4)
LYMPHOCYTES RELATIVE PERCENT: 6 % (ref 20–42)
MCH RBC QN AUTO: 26.4 PG (ref 26–35)
MCHC RBC AUTO-ENTMCNC: 30.5 G/DL (ref 32–34.5)
MCV RBC AUTO: 86.5 FL (ref 80–99.9)
MONOCYTES NFR BLD: 1.34 K/UL (ref 0.1–0.95)
MONOCYTES NFR BLD: 7 % (ref 2–12)
NEUTROPHILS NFR BLD: 85 % (ref 43–80)
NEUTS SEG NFR BLD: 15.76 K/UL (ref 1.8–7.3)
NITRITE UR QL STRIP: NEGATIVE
PH UR STRIP: 5.5 [PH] (ref 5–8)
PLATELET # BLD AUTO: 286 K/UL (ref 130–450)
PMV BLD AUTO: 12 FL (ref 7–12)
POTASSIUM SERPL-SCNC: 5 MMOL/L (ref 3.5–5.1)
POTASSIUM SERPL-SCNC: 5.6 MMOL/L (ref 3.4–4.5)
PROT SERPL-MCNC: 6.7 G/DL (ref 6.4–8.3)
PROT UR STRIP-MCNC: NEGATIVE MG/DL
RBC # BLD AUTO: 4.97 M/UL (ref 3.5–5.5)
RBC #/AREA URNS HPF: ABNORMAL /HPF
SODIUM SERPL-SCNC: 133 MMOL/L (ref 136–145)
SODIUM SERPL-SCNC: 135 MMOL/L (ref 136–145)
SODIUM UR-SCNC: 100 MMOL/L
SP GR UR STRIP: 1.02 (ref 1–1.03)
UROBILINOGEN UR STRIP-ACNC: 0.2 EU/DL (ref 0–1)
WBC #/AREA URNS HPF: ABNORMAL /HPF
WBC OTHER # BLD: 18.6 K/UL (ref 4.5–11.5)

## 2025-04-18 PROCEDURE — 80048 BASIC METABOLIC PNL TOTAL CA: CPT

## 2025-04-18 PROCEDURE — 2500000003 HC RX 250 WO HCPCS

## 2025-04-18 PROCEDURE — 6360000002 HC RX W HCPCS: Performed by: NURSE PRACTITIONER

## 2025-04-18 PROCEDURE — 2580000003 HC RX 258

## 2025-04-18 PROCEDURE — 96366 THER/PROPH/DIAG IV INF ADDON: CPT

## 2025-04-18 PROCEDURE — 6370000000 HC RX 637 (ALT 250 FOR IP): Performed by: NURSE PRACTITIONER

## 2025-04-18 PROCEDURE — 83605 ASSAY OF LACTIC ACID: CPT

## 2025-04-18 PROCEDURE — 6360000002 HC RX W HCPCS: Performed by: STUDENT IN AN ORGANIZED HEALTH CARE EDUCATION/TRAINING PROGRAM

## 2025-04-18 PROCEDURE — 85025 COMPLETE CBC W/AUTO DIFF WBC: CPT

## 2025-04-18 PROCEDURE — 93005 ELECTROCARDIOGRAM TRACING: CPT | Performed by: STUDENT IN AN ORGANIZED HEALTH CARE EDUCATION/TRAINING PROGRAM

## 2025-04-18 PROCEDURE — 74176 CT ABD & PELVIS W/O CONTRAST: CPT

## 2025-04-18 PROCEDURE — 6370000000 HC RX 637 (ALT 250 FOR IP): Performed by: STUDENT IN AN ORGANIZED HEALTH CARE EDUCATION/TRAINING PROGRAM

## 2025-04-18 PROCEDURE — 80053 COMPREHEN METABOLIC PANEL: CPT

## 2025-04-18 PROCEDURE — 96376 TX/PRO/DX INJ SAME DRUG ADON: CPT

## 2025-04-18 PROCEDURE — G0378 HOSPITAL OBSERVATION PER HR: HCPCS

## 2025-04-18 PROCEDURE — 83880 ASSAY OF NATRIURETIC PEPTIDE: CPT

## 2025-04-18 PROCEDURE — 94640 AIRWAY INHALATION TREATMENT: CPT

## 2025-04-18 PROCEDURE — 87040 BLOOD CULTURE FOR BACTERIA: CPT

## 2025-04-18 PROCEDURE — 99222 1ST HOSP IP/OBS MODERATE 55: CPT | Performed by: NURSE PRACTITIONER

## 2025-04-18 PROCEDURE — 96374 THER/PROPH/DIAG INJ IV PUSH: CPT

## 2025-04-18 PROCEDURE — 6370000000 HC RX 637 (ALT 250 FOR IP): Performed by: INTERNAL MEDICINE

## 2025-04-18 PROCEDURE — 82570 ASSAY OF URINE CREATININE: CPT

## 2025-04-18 PROCEDURE — 96367 TX/PROPH/DG ADDL SEQ IV INF: CPT

## 2025-04-18 PROCEDURE — 81001 URINALYSIS AUTO W/SCOPE: CPT

## 2025-04-18 PROCEDURE — 96375 TX/PRO/DX INJ NEW DRUG ADDON: CPT

## 2025-04-18 PROCEDURE — 2580000003 HC RX 258: Performed by: STUDENT IN AN ORGANIZED HEALTH CARE EDUCATION/TRAINING PROGRAM

## 2025-04-18 PROCEDURE — 96361 HYDRATE IV INFUSION ADD-ON: CPT

## 2025-04-18 PROCEDURE — 96365 THER/PROPH/DIAG IV INF INIT: CPT

## 2025-04-18 PROCEDURE — 2500000003 HC RX 250 WO HCPCS: Performed by: STUDENT IN AN ORGANIZED HEALTH CARE EDUCATION/TRAINING PROGRAM

## 2025-04-18 PROCEDURE — 2500000003 HC RX 250 WO HCPCS: Performed by: NURSE PRACTITIONER

## 2025-04-18 PROCEDURE — 99285 EMERGENCY DEPT VISIT HI MDM: CPT

## 2025-04-18 PROCEDURE — 84300 ASSAY OF URINE SODIUM: CPT

## 2025-04-18 PROCEDURE — 82962 GLUCOSE BLOOD TEST: CPT

## 2025-04-18 RX ORDER — ACETAMINOPHEN 325 MG/1
650 TABLET ORAL EVERY 6 HOURS PRN
Status: DISCONTINUED | OUTPATIENT
Start: 2025-04-18 | End: 2025-04-21 | Stop reason: HOSPADM

## 2025-04-18 RX ORDER — MIDODRINE HYDROCHLORIDE 5 MG/1
5 TABLET ORAL
Status: DISCONTINUED | OUTPATIENT
Start: 2025-04-18 | End: 2025-04-21 | Stop reason: HOSPADM

## 2025-04-18 RX ORDER — ALBUTEROL SULFATE 0.83 MG/ML
2.5 SOLUTION RESPIRATORY (INHALATION) EVERY 6 HOURS PRN
Status: DISCONTINUED | OUTPATIENT
Start: 2025-04-18 | End: 2025-04-21 | Stop reason: HOSPADM

## 2025-04-18 RX ORDER — SODIUM CHLORIDE 0.9 % (FLUSH) 0.9 %
5-40 SYRINGE (ML) INJECTION PRN
Status: DISCONTINUED | OUTPATIENT
Start: 2025-04-18 | End: 2025-04-21 | Stop reason: HOSPADM

## 2025-04-18 RX ORDER — ONDANSETRON 4 MG/1
4 TABLET, ORALLY DISINTEGRATING ORAL EVERY 8 HOURS PRN
Status: DISCONTINUED | OUTPATIENT
Start: 2025-04-18 | End: 2025-04-21 | Stop reason: HOSPADM

## 2025-04-18 RX ORDER — METRONIDAZOLE 500 MG/100ML
500 INJECTION, SOLUTION INTRAVENOUS EVERY 8 HOURS
Status: DISCONTINUED | OUTPATIENT
Start: 2025-04-18 | End: 2025-04-21 | Stop reason: HOSPADM

## 2025-04-18 RX ORDER — DEXTROSE MONOHYDRATE 100 MG/ML
INJECTION, SOLUTION INTRAVENOUS CONTINUOUS PRN
Status: DISCONTINUED | OUTPATIENT
Start: 2025-04-18 | End: 2025-04-21 | Stop reason: HOSPADM

## 2025-04-18 RX ORDER — 0.9 % SODIUM CHLORIDE 0.9 %
1000 INTRAVENOUS SOLUTION INTRAVENOUS ONCE
Status: COMPLETED | OUTPATIENT
Start: 2025-04-18 | End: 2025-04-18

## 2025-04-18 RX ORDER — SODIUM CHLORIDE 9 MG/ML
INJECTION, SOLUTION INTRAVENOUS PRN
Status: DISCONTINUED | OUTPATIENT
Start: 2025-04-18 | End: 2025-04-21 | Stop reason: HOSPADM

## 2025-04-18 RX ORDER — ONDANSETRON 2 MG/ML
4 INJECTION INTRAMUSCULAR; INTRAVENOUS EVERY 6 HOURS PRN
Status: DISCONTINUED | OUTPATIENT
Start: 2025-04-18 | End: 2025-04-21 | Stop reason: HOSPADM

## 2025-04-18 RX ORDER — CETIRIZINE HYDROCHLORIDE 10 MG/1
10 TABLET ORAL DAILY
Status: DISCONTINUED | OUTPATIENT
Start: 2025-04-18 | End: 2025-04-21 | Stop reason: HOSPADM

## 2025-04-18 RX ORDER — SODIUM CHLORIDE 9 MG/ML
INJECTION, SOLUTION INTRAVENOUS CONTINUOUS
Status: DISCONTINUED | OUTPATIENT
Start: 2025-04-18 | End: 2025-04-18

## 2025-04-18 RX ORDER — ROSUVASTATIN CALCIUM 10 MG/1
10 TABLET, COATED ORAL DAILY
Status: DISCONTINUED | OUTPATIENT
Start: 2025-04-18 | End: 2025-04-21 | Stop reason: HOSPADM

## 2025-04-18 RX ORDER — CALCIUM GLUCONATE 20 MG/ML
1000 INJECTION, SOLUTION INTRAVENOUS ONCE
Status: COMPLETED | OUTPATIENT
Start: 2025-04-18 | End: 2025-04-18

## 2025-04-18 RX ORDER — METRONIDAZOLE 500 MG/100ML
500 INJECTION, SOLUTION INTRAVENOUS ONCE
Status: COMPLETED | OUTPATIENT
Start: 2025-04-18 | End: 2025-04-18

## 2025-04-18 RX ORDER — MORPHINE SULFATE 2 MG/ML
2 INJECTION, SOLUTION INTRAMUSCULAR; INTRAVENOUS EVERY 4 HOURS PRN
Status: DISCONTINUED | OUTPATIENT
Start: 2025-04-18 | End: 2025-04-21 | Stop reason: HOSPADM

## 2025-04-18 RX ORDER — AMIODARONE HYDROCHLORIDE 200 MG/1
100 TABLET ORAL DAILY
Status: DISCONTINUED | OUTPATIENT
Start: 2025-04-18 | End: 2025-04-21 | Stop reason: HOSPADM

## 2025-04-18 RX ORDER — ONDANSETRON 2 MG/ML
4 INJECTION INTRAMUSCULAR; INTRAVENOUS ONCE
Status: COMPLETED | OUTPATIENT
Start: 2025-04-18 | End: 2025-04-18

## 2025-04-18 RX ORDER — FERROUS SULFATE 325(65) MG
325 TABLET ORAL EVERY OTHER DAY
Status: DISCONTINUED | OUTPATIENT
Start: 2025-04-18 | End: 2025-04-21 | Stop reason: HOSPADM

## 2025-04-18 RX ORDER — FENTANYL CITRATE 50 UG/ML
50 INJECTION, SOLUTION INTRAMUSCULAR; INTRAVENOUS ONCE
Status: COMPLETED | OUTPATIENT
Start: 2025-04-18 | End: 2025-04-18

## 2025-04-18 RX ORDER — ACETAMINOPHEN 650 MG/1
650 SUPPOSITORY RECTAL EVERY 6 HOURS PRN
Status: DISCONTINUED | OUTPATIENT
Start: 2025-04-18 | End: 2025-04-21 | Stop reason: HOSPADM

## 2025-04-18 RX ORDER — ENOXAPARIN SODIUM 100 MG/ML
30 INJECTION SUBCUTANEOUS DAILY
Status: DISCONTINUED | OUTPATIENT
Start: 2025-04-18 | End: 2025-04-18

## 2025-04-18 RX ORDER — POLYETHYLENE GLYCOL 3350 17 G/17G
17 POWDER, FOR SOLUTION ORAL DAILY PRN
Status: DISCONTINUED | OUTPATIENT
Start: 2025-04-18 | End: 2025-04-21 | Stop reason: HOSPADM

## 2025-04-18 RX ORDER — MORPHINE SULFATE 2 MG/ML
2 INJECTION, SOLUTION INTRAMUSCULAR; INTRAVENOUS
Refills: 0 | Status: DISCONTINUED | OUTPATIENT
Start: 2025-04-18 | End: 2025-04-18

## 2025-04-18 RX ORDER — LEVOTHYROXINE SODIUM 112 UG/1
112 TABLET ORAL DAILY
Status: DISCONTINUED | OUTPATIENT
Start: 2025-04-18 | End: 2025-04-21 | Stop reason: HOSPADM

## 2025-04-18 RX ORDER — GLUCAGON 1 MG/ML
1 KIT INJECTION PRN
Status: DISCONTINUED | OUTPATIENT
Start: 2025-04-18 | End: 2025-04-21 | Stop reason: HOSPADM

## 2025-04-18 RX ORDER — INDOMETHACIN 25 MG/1
50 CAPSULE ORAL ONCE
Status: COMPLETED | OUTPATIENT
Start: 2025-04-18 | End: 2025-04-18

## 2025-04-18 RX ORDER — CITALOPRAM HYDROBROMIDE 20 MG/1
20 TABLET ORAL DAILY
Status: DISCONTINUED | OUTPATIENT
Start: 2025-04-18 | End: 2025-04-21 | Stop reason: HOSPADM

## 2025-04-18 RX ORDER — DILTIAZEM HYDROCHLORIDE 180 MG/1
180 CAPSULE, COATED, EXTENDED RELEASE ORAL DAILY
Status: DISCONTINUED | OUTPATIENT
Start: 2025-04-18 | End: 2025-04-21 | Stop reason: HOSPADM

## 2025-04-18 RX ORDER — SODIUM CHLORIDE 0.9 % (FLUSH) 0.9 %
5-40 SYRINGE (ML) INJECTION EVERY 12 HOURS SCHEDULED
Status: DISCONTINUED | OUTPATIENT
Start: 2025-04-18 | End: 2025-04-21 | Stop reason: HOSPADM

## 2025-04-18 RX ADMIN — CITALOPRAM HYDROBROMIDE 20 MG: 20 TABLET ORAL at 12:29

## 2025-04-18 RX ADMIN — ROSUVASTATIN 10 MG: 10 TABLET, FILM COATED ORAL at 12:29

## 2025-04-18 RX ADMIN — ARFORMOTEROL TARTRATE: 15 SOLUTION RESPIRATORY (INHALATION) at 11:06

## 2025-04-18 RX ADMIN — DEXTROSE MONOHYDRATE 250 ML: 10 INJECTION, SOLUTION INTRAVENOUS at 08:23

## 2025-04-18 RX ADMIN — MORPHINE SULFATE 2 MG: 2 INJECTION, SOLUTION INTRAMUSCULAR; INTRAVENOUS at 14:50

## 2025-04-18 RX ADMIN — DILTIAZEM HYDROCHLORIDE 180 MG: 180 CAPSULE, COATED, EXTENDED RELEASE ORAL at 12:30

## 2025-04-18 RX ADMIN — ONDANSETRON 4 MG: 2 INJECTION, SOLUTION INTRAMUSCULAR; INTRAVENOUS at 12:51

## 2025-04-18 RX ADMIN — SODIUM CHLORIDE 1000 ML: 0.9 INJECTION, SOLUTION INTRAVENOUS at 05:37

## 2025-04-18 RX ADMIN — LEVOTHYROXINE SODIUM 112 MCG: 0.11 TABLET ORAL at 12:30

## 2025-04-18 RX ADMIN — METOPROLOL SUCCINATE 75 MG: 50 TABLET, EXTENDED RELEASE ORAL at 12:24

## 2025-04-18 RX ADMIN — CEFTRIAXONE SODIUM 2000 MG: 2 INJECTION, POWDER, FOR SOLUTION INTRAMUSCULAR; INTRAVENOUS at 09:20

## 2025-04-18 RX ADMIN — SODIUM CHLORIDE 1000 ML: 0.9 INJECTION, SOLUTION INTRAVENOUS at 08:01

## 2025-04-18 RX ADMIN — SODIUM CHLORIDE, PRESERVATIVE FREE 10 ML: 5 INJECTION INTRAVENOUS at 12:45

## 2025-04-18 RX ADMIN — FENTANYL CITRATE 50 MCG: 50 INJECTION INTRAMUSCULAR; INTRAVENOUS at 05:35

## 2025-04-18 RX ADMIN — SODIUM BICARBONATE: 84 INJECTION, SOLUTION INTRAVENOUS at 12:45

## 2025-04-18 RX ADMIN — INSULIN HUMAN 5 UNITS: 100 INJECTION, SOLUTION PARENTERAL at 08:40

## 2025-04-18 RX ADMIN — METRONIDAZOLE 500 MG: 500 SOLUTION INTRAVENOUS at 09:25

## 2025-04-18 RX ADMIN — MIDODRINE HYDROCHLORIDE 5 MG: 5 TABLET ORAL at 20:39

## 2025-04-18 RX ADMIN — SODIUM BICARBONATE 50 MEQ: 84 INJECTION INTRAVENOUS at 08:17

## 2025-04-18 RX ADMIN — FERROUS SULFATE TAB 325 MG (65 MG ELEMENTAL FE) 325 MG: 325 (65 FE) TAB at 12:27

## 2025-04-18 RX ADMIN — CALCIUM GLUCONATE 1000 MG: 20 INJECTION, SOLUTION INTRAVENOUS at 08:00

## 2025-04-18 RX ADMIN — ONDANSETRON 4 MG: 2 INJECTION, SOLUTION INTRAMUSCULAR; INTRAVENOUS at 05:34

## 2025-04-18 RX ADMIN — CETIRIZINE HYDROCHLORIDE 10 MG: 10 TABLET, FILM COATED ORAL at 12:29

## 2025-04-18 RX ADMIN — AMIODARONE HYDROCHLORIDE 100 MG: 200 TABLET ORAL at 12:27

## 2025-04-18 RX ADMIN — SODIUM BICARBONATE: 84 INJECTION, SOLUTION INTRAVENOUS at 23:11

## 2025-04-18 ASSESSMENT — PAIN SCALES - GENERAL
PAINLEVEL_OUTOF10: 2
PAINLEVEL_OUTOF10: 0
PAINLEVEL_OUTOF10: 7
PAINLEVEL_OUTOF10: 5
PAINLEVEL_OUTOF10: 3

## 2025-04-18 ASSESSMENT — PAIN DESCRIPTION - LOCATION
LOCATION: ABDOMEN

## 2025-04-18 ASSESSMENT — PAIN DESCRIPTION - DESCRIPTORS
DESCRIPTORS: DISCOMFORT;SPASM;SHOOTING
DESCRIPTORS: SHARP;SPASM
DESCRIPTORS: PRESSURE
DESCRIPTORS: DISCOMFORT;CRAMPING;SPASM

## 2025-04-18 ASSESSMENT — PAIN DESCRIPTION - FREQUENCY
FREQUENCY: INTERMITTENT
FREQUENCY: INTERMITTENT

## 2025-04-18 ASSESSMENT — PAIN - FUNCTIONAL ASSESSMENT: PAIN_FUNCTIONAL_ASSESSMENT: 0-10

## 2025-04-18 ASSESSMENT — PAIN DESCRIPTION - ORIENTATION
ORIENTATION: MID
ORIENTATION: MID
ORIENTATION: LOWER
ORIENTATION: MID;LOWER

## 2025-04-18 ASSESSMENT — PAIN DESCRIPTION - PAIN TYPE
TYPE: ACUTE PAIN
TYPE: ACUTE PAIN

## 2025-04-18 ASSESSMENT — LIFESTYLE VARIABLES: HOW MANY STANDARD DRINKS CONTAINING ALCOHOL DO YOU HAVE ON A TYPICAL DAY: PATIENT DOES NOT DRINK

## 2025-04-18 NOTE — CONSULTS
Department of Internal Medicine  Nephrology Attending Consult Note      Reason for Consult:  MALGORZATA  Requesting Physician:  Libby Pavon APRN - CNP     CHIEF COMPLAINT:  abdominal pain    History Obtained From:  patient, electronic medical record    HISTORY OF PRESENT ILLNESS:  Briefly Mrs. Alma Dixon is a 89-year-old female with a past medical history of urothelial carcinoma s/p right nephrectomy on 3/20/2025 HFpEF 50-55%, PAF, hyperlipidemia who was admitted on 4/18/25 for abdominal pain. Lab work showed sodium 133, potassium 5.6, bicarb 21, creatinine 2.5 mg/dL, proBNP 3258, reason for this consultation.  Significant home medications include Bumex, potassium chloride, and metoprolol.    Past Medical History:        Diagnosis Date    Asthma     Atrial fibrillation, persistent (HCC)     Cancer (HCC)     Depression     Generalized osteoarthritis 09/12/2006    Hyperlipidemia     Knee pain     Sees Dr. Manrique for b/l knee pain / gel injections    Pinched nerve in neck     Prolonged emergence from general anesthesia     Thyroid disease     Vitamin D deficiency      Past Surgical History:        Procedure Laterality Date    APPENDECTOMY  58 YEARS AGO    ARTHROPLASTY Left 03/29/2023    LEFT CARPAL TUNNEL RELEASE LEFT MEDIAN NERVE DECOMPRESSION AT ELBOW LEFT THUMB TRAPEZIECTOMY LIGAMENT RECONSTRUCTION TENDON INTERPOSITION WITH FIBERTAK performed by Chino Handley MD at [facility] OR    CARDIOVERSION  09/20/2019    Dr. Ledesma 1 shock 200 joules Successful    CARDIOVERSION  08/02/2021    Dr. Matias. 200J x1 converted to NSR    CHOLECYSTECTOMY      CYSTOSCOPY N/A 12/31/2024    CYSTOSCOPY BILATERAL RETROGRADE PYELOGRAM, RIGHT URETEROSCOPY, RIGHT URETERAL BIOPSIES, RIGHT URETERAL STENT INSERTION, PELVIC EXAM [place]ha [place] [facility]    CYSTOSCOPY N/A 1/30/2025    CYSTOSCOPY RIGHT RETROGRADE PYELOGRAM, RIGHT URETEROSCOPY, BASKET EXTRACTION RIGHT URETERAL TUMOR, LASER ABLATION OF TUMOR, RIGHT URETERAL

## 2025-04-18 NOTE — ED PROVIDER NOTES
[facility] EMERGENCY DEPARTMENT  EMERGENCY DEPARTMENT ENCOUNTER        Pt Name: Radha Dixon  MRN: [id number]  Birthdate 7/11/1934  Date of evaluation: 4/18/2025  Provider: Heather Carvajal MD  PCP: Madeleine Caro NP-C  Note Started: 5:41 AM EDT 4/18/25    HPI  89 y.o. female presenting for abdominal pain.  Patient complains of abdominal pain that started through the night.  She states she went to the bathroom twice and \"expelled gas.\"  Last bowel movement was yesterday morning.  She had nausea while in the ambulance.  She had nephrectomy last month for cancer.  She denies fever, chest pain, shortness of breath, dysuria, difficulty urinating.      --------------------------------------------- PAST HISTORY ---------------------------------------------  Past Medical History:  has a past medical history of Asthma, Atrial fibrillation, persistent (HCC), Cancer (HCC), Depression, Generalized osteoarthritis, Hyperlipidemia, Knee pain, Pinched nerve in neck, Prolonged emergence from general anesthesia, Thyroid disease, and Vitamin D deficiency.    Past Surgical History:  has a past surgical history that includes Tonsillectomy; Hysterectomy; Thyroidectomy (17 YEARS AGO); Appendectomy (58 YEARS AGO); eye surgery; Neck surgery (09/09/2011); Cholecystectomy; Cardioversion (09/20/2019); Cardioversion (08/02/2021); arthroplasty (Left, 03/29/2023); thoracentesis (Right, 10/11/2024); Cystoscopy (N/A, 12/31/2024); and Cystoscopy (N/A, 1/30/2025).    Social History:  reports that she quit smoking about 30 years ago. Her smoking use included cigarettes. She started smoking about 70 years ago. She has never used smokeless tobacco. She reports that she does not drink alcohol and does not use drugs.    Family History: family history includes Breast Cancer (age of onset: 40) in her daughter; Melanoma (age of onset: 60) in her mother.     The patient’s home medications have been  reviewed.    Allergies: Codeine, Codimal-a [brompheniramine], Lipitor, Spiriva handihaler [tiotropium bromide monohydrate], Tudorza pressair [aclidinium bromide], and Ciprofloxacin      Review of Systems   Constitutional:  Negative for chills and fever.   HENT:  Negative for congestion and sore throat.    Eyes:  Negative for photophobia and visual disturbance.   Respiratory:  Negative for cough and shortness of breath.    Cardiovascular:  Negative for chest pain and leg swelling.   Gastrointestinal:  Positive for abdominal pain and nausea. Negative for diarrhea.   Genitourinary:  Negative for dysuria and flank pain.   Musculoskeletal:  Negative for back pain and myalgias.   Skin:  Negative for rash and wound.   Neurological:  Negative for dizziness, light-headedness and headaches.        Physical Exam  Constitutional:       General: She is not in acute distress.     Appearance: Normal appearance. She is not ill-appearing.   HENT:      Head: Normocephalic and atraumatic.      Right Ear: External ear normal.      Left Ear: External ear normal.      Nose: Nose normal.   Eyes:      Conjunctiva/sclera: Conjunctivae normal.   Cardiovascular:      Rate and Rhythm: Normal rate and regular rhythm.   Pulmonary:      Effort: Pulmonary effort is normal. No respiratory distress.      Breath sounds: Normal breath sounds. No stridor. No wheezing, rhonchi or rales.   Abdominal:      General: There is no distension.      Palpations: Abdomen is soft.      Tenderness: There is abdominal tenderness in the right lower quadrant, suprapubic area and left lower quadrant.   Musculoskeletal:         General: No swelling or deformity.   Skin:     General: Skin is warm and dry.   Neurological:      General: No focal deficit present.      Mental Status: She is alert.   Psychiatric:         Mood and Affect: Mood normal.          Procedures     -------------------------------------------------- RESULTS

## 2025-04-18 NOTE — CARE COORDINATION
04/18/25 Case Management Note: Patient is in the ER. Chart reviewed as patient is a return to the ER within 30 days of discharge to home with homecare. She is being admitted as Observation with fluids and a renal consult. Electronically signed by Rosi Klein RN CM on 4/18/2025 at 12:44 PM

## 2025-04-18 NOTE — H&P
[facility] Hospitalist Group History and Physical      CHIEF COMPLAINT: Abdominal pain     History of Present Illness: This is a 89-year-old female with a past medical history of asthma, A-fib, depression, OA, kidney cancer, HLD, hypothyroidism, and vitamin D deficiency who presents to the ED with complaints of abdominal pain.  Patient states her pain began last night around 3:30 AM.  She reports experiencing mid abdominal pain and tenderness.  She attempted to go to the bathroom but only passed gas.  Patient denies any nausea or vomiting.  She does state she is mildly constipated last BM was around 2 days ago.  Patient denies any fever or chills.  She states she has had diverticulitis in the past.    ED course is as follows: Vital signs-T97.6, HR 95, /68, SpO2 97% on room air.  Sodium 133, potassium 5.6, CO2 21, BUN/creatinine 50/2.5, LA 2.3, glucose 102, alk phos 118, WBCs 18.6.  BC pending.  CT A/P shows inflammatory process most consistent with acute diverticulitis at the junction of the proximal sigmoid colon without perforation or abscess.  She received 2 L normal saline bolus, potassium cocktail, fentanyl 50 mcg x 1 in ED.    Patient will be admitted for further management.    Informant(s) for H&P: Patient    REVIEW OF SYSTEMS:  A comprehensive review of systems was negative except for: what is in the HPI      PMH:  Past Medical History:   Diagnosis Date    Asthma     Atrial fibrillation, persistent (HCC)     Cancer (HCC)     Depression     Generalized osteoarthritis 09/12/2006    Hyperlipidemia     Knee pain     Sees Dr. Manrique for b/l knee pain / gel injections    Pinched nerve in neck     Prolonged emergence from general anesthesia     Thyroid disease     Vitamin D deficiency        Surgical History:  Past Surgical History:   Procedure Laterality Date    APPENDECTOMY  58 YEARS AGO    ARTHROPLASTY Left 03/29/2023    LEFT CARPAL TUNNEL RELEASE LEFT MEDIAN NERVE DECOMPRESSION AT ELBOW LEFT THUMB  Jose RANGEL MD   amiodarone (PACERONE) 100 MG tablet Take 1 tablet by mouth daily 2/5/25   Jose Alicia MD   magnesium gluconate (MAGONATE) 500 MG tablet Take 1 tablet by mouth daily    Bolivar Anaya MD   fluticasone-salmeterol (ADVAIR DISKUS) 250-50 MCG/ACT AEPB diskus inhaler Inhale 1 puff into the lungs in the morning and 1 puff in the evening. 11/6/24   Marjan Roberts APRN - NP   XARELTO 15 MG TABS tablet Take 1 tablet by mouth Daily with supper    ProviderBolivar MD   citalopram (CELEXA) 20 MG tablet Take 1 tablet by mouth daily    Bolivar Anaya MD   ferrous sulfate (IRON 325) 325 (65 Fe) MG tablet Take 1 tablet by mouth every other day    Bolivar Anaya MD   albuterol (PROVENTIL) (2.5 MG/3ML) 0.083% nebulizer solution Take 3 mLs by nebulization every 6 hours as needed for Wheezing 12/14/23   Marjan Roberts APRN - NP   albuterol sulfate HFA (PROVENTIL;VENTOLIN;PROAIR) 108 (90 Base) MCG/ACT inhaler Inhale 2 puffs into the lungs 4 times daily as needed for Wheezing 12/14/23   Marjan Roberts APRN - NP   fluticasone (FLONASE) 50 MCG/ACT nasal spray 1 spray by Nasal route daily as needed for Rhinitis    ProviderBolivar MD   loratadine (CLARITIN) 10 MG tablet Take 1 tablet by mouth daily as needed    ProviderBolivar MD   Cholecalciferol (VITAMIN D3) 50 MCG (2000 UT) TABS Take 1 tablet by mouth daily    ProviderBolivar MD   Vitamin E 400 UNIT TABS Take 400 Units by mouth daily    ProviderBolivar MD       Allergies:    Codeine, Codimal-a [brompheniramine], Lipitor, Spiriva handihaler [tiotropium bromide monohydrate], Tudorza pressair [aclidinium bromide], and Ciprofloxacin    Social History:    reports that she quit smoking about 30 years ago. Her smoking use included cigarettes. She started smoking about 70 years ago. She has never used smokeless tobacco. She reports that she does not drink alcohol and does not use drugs.    Family History:   family history includes

## 2025-04-18 NOTE — ED NOTES
ED TO INPATIENT SBAR HANDOFF    Patient Name: Radha Dixon   Preferred Name: Sofia  : 1934  89 y.o.   Code Status Order: Full Code  Telemetry Order: Yes  C-SSRS: Risk of Suicide: No Risk  Sitter no   Restraints:       Situation  Chief Complaint   Patient presents with    Abdominal Pain     Mid abd. She states that it woke her up from sleep. Pain is intermittent. She had nausea in ambulance but none upon arrival      Brief Description of Patient's Condition: Hyperkalema,   Mental Status: oriented and alert    Background  Allergies:   Allergies   Allergen Reactions    Codeine Nausea And Vomiting    Codimal-A [Brompheniramine] Other (See Comments)     Altered mental state and confusion    Lipitor Other (See Comments)     GENERALIZED ACHING, DIFFICULT TO FUNCTION    Spiriva Handihaler [Tiotropium Bromide Monohydrate]     Tudorza Pressair [Aclidinium Bromide]     Ciprofloxacin Nausea And Vomiting       Assessment  Vitals/MEWS: MEWS Score: 2  Level of Consciousness: Alert (0)   Vitals:    25 1107 25 1224 25 1450 25 1557   BP:  111/73     Pulse: 100 (!) 104  98   Resp: 18 16   Temp:       TempSrc:       SpO2: 96% 94%  94%   Weight:       Height:         Cardiac Rhythm: Cardiac Rhythm: Atrial fibrillation  Deterioration Index (DI): Deterioration Index: 29.88  Deterioration Index (DI) Interventions Performed:    O2 Flow Rate:    O2 Device: O2 Device: None (Room air)    Active Central Lines:                          Active Wounds:    Active Flroes's:      Recommendation  Patient Belongings:    Additional Comments: hard of hearing  If any further questions, please call Sending RN at [phone]  Opportunity for questions and clarification were provided to (name of person notified and time): Marie @[phone]       Electronically signed by: Electronically signed by Desi Hernandez RN on 2025 at 3:59 PM

## 2025-04-19 PROBLEM — A41.9 SEPSIS (HCC): Status: ACTIVE | Noted: 2025-04-19

## 2025-04-19 LAB
ANION GAP SERPL CALCULATED.3IONS-SCNC: 11 MMOL/L (ref 7–16)
BASOPHILS # BLD: 0.07 K/UL (ref 0–0.2)
BASOPHILS NFR BLD: 0 % (ref 0–2)
BUN SERPL-MCNC: 34 MG/DL (ref 8–23)
CALCIUM SERPL-MCNC: 8.4 MG/DL (ref 8.8–10.2)
CHLORIDE SERPL-SCNC: 103 MMOL/L (ref 98–107)
CO2 SERPL-SCNC: 21 MMOL/L (ref 22–29)
CREAT SERPL-MCNC: 1.9 MG/DL (ref 0.5–1)
EOSINOPHIL # BLD: 0.04 K/UL (ref 0.05–0.5)
EOSINOPHILS RELATIVE PERCENT: 0 % (ref 0–6)
ERYTHROCYTE [DISTWIDTH] IN BLOOD BY AUTOMATED COUNT: 20.1 % (ref 11.5–15)
GFR, ESTIMATED: 24 ML/MIN/1.73M2
GLUCOSE SERPL-MCNC: 115 MG/DL (ref 74–99)
HCT VFR BLD AUTO: 36.5 % (ref 34–48)
HGB BLD-MCNC: 10.5 G/DL (ref 11.5–15.5)
IMM GRANULOCYTES # BLD AUTO: 0.14 K/UL (ref 0–0.58)
IMM GRANULOCYTES NFR BLD: 1 % (ref 0–5)
LACTATE BLDV-SCNC: 1.1 MMOL/L (ref 0.5–2.2)
LYMPHOCYTES NFR BLD: 0.68 K/UL (ref 1.5–4)
LYMPHOCYTES RELATIVE PERCENT: 4 % (ref 20–42)
MCH RBC QN AUTO: 26.6 PG (ref 26–35)
MCHC RBC AUTO-ENTMCNC: 28.8 G/DL (ref 32–34.5)
MCV RBC AUTO: 92.4 FL (ref 80–99.9)
MONOCYTES NFR BLD: 1.23 K/UL (ref 0.1–0.95)
MONOCYTES NFR BLD: 8 % (ref 2–12)
NEUTROPHILS NFR BLD: 86 % (ref 43–80)
NEUTS SEG NFR BLD: 13.42 K/UL (ref 1.8–7.3)
PLATELET # BLD AUTO: 176 K/UL (ref 130–450)
PMV BLD AUTO: 12.9 FL (ref 7–12)
POTASSIUM SERPL-SCNC: 4.9 MMOL/L (ref 3.4–4.5)
RBC # BLD AUTO: 3.95 M/UL (ref 3.5–5.5)
RBC # BLD: ABNORMAL 10*6/UL
SODIUM SERPL-SCNC: 134 MMOL/L (ref 136–145)
WBC OTHER # BLD: 15.6 K/UL (ref 4.5–11.5)

## 2025-04-19 PROCEDURE — 99233 SBSQ HOSP IP/OBS HIGH 50: CPT | Performed by: INTERNAL MEDICINE

## 2025-04-19 PROCEDURE — 96368 THER/DIAG CONCURRENT INF: CPT

## 2025-04-19 PROCEDURE — 85025 COMPLETE CBC W/AUTO DIFF WBC: CPT

## 2025-04-19 PROCEDURE — 94640 AIRWAY INHALATION TREATMENT: CPT

## 2025-04-19 PROCEDURE — 96376 TX/PRO/DX INJ SAME DRUG ADON: CPT

## 2025-04-19 PROCEDURE — 6370000000 HC RX 637 (ALT 250 FOR IP): Performed by: NURSE PRACTITIONER

## 2025-04-19 PROCEDURE — 2580000003 HC RX 258: Performed by: INTERNAL MEDICINE

## 2025-04-19 PROCEDURE — 6360000002 HC RX W HCPCS: Performed by: NURSE PRACTITIONER

## 2025-04-19 PROCEDURE — 83605 ASSAY OF LACTIC ACID: CPT

## 2025-04-19 PROCEDURE — 2580000003 HC RX 258

## 2025-04-19 PROCEDURE — 6370000000 HC RX 637 (ALT 250 FOR IP): Performed by: INTERNAL MEDICINE

## 2025-04-19 PROCEDURE — 80048 BASIC METABOLIC PNL TOTAL CA: CPT

## 2025-04-19 PROCEDURE — 6360000002 HC RX W HCPCS: Performed by: INTERNAL MEDICINE

## 2025-04-19 PROCEDURE — 96366 THER/PROPH/DIAG IV INF ADDON: CPT

## 2025-04-19 PROCEDURE — 2500000003 HC RX 250 WO HCPCS

## 2025-04-19 PROCEDURE — 2060000000 HC ICU INTERMEDIATE R&B

## 2025-04-19 PROCEDURE — 36415 COLL VENOUS BLD VENIPUNCTURE: CPT

## 2025-04-19 PROCEDURE — P9047 ALBUMIN (HUMAN), 25%, 50ML: HCPCS | Performed by: INTERNAL MEDICINE

## 2025-04-19 PROCEDURE — 2500000003 HC RX 250 WO HCPCS: Performed by: NURSE PRACTITIONER

## 2025-04-19 PROCEDURE — 2500000003 HC RX 250 WO HCPCS: Performed by: INTERNAL MEDICINE

## 2025-04-19 RX ORDER — ALBUMIN (HUMAN) 12.5 G/50ML
25 SOLUTION INTRAVENOUS ONCE
Status: COMPLETED | OUTPATIENT
Start: 2025-04-19 | End: 2025-04-19

## 2025-04-19 RX ORDER — 0.9 % SODIUM CHLORIDE 0.9 %
500 INTRAVENOUS SOLUTION INTRAVENOUS ONCE
Status: COMPLETED | OUTPATIENT
Start: 2025-04-19 | End: 2025-04-19

## 2025-04-19 RX ADMIN — MIDODRINE HYDROCHLORIDE 5 MG: 5 TABLET ORAL at 12:22

## 2025-04-19 RX ADMIN — METRONIDAZOLE 500 MG: 500 INJECTION, SOLUTION INTRAVENOUS at 18:07

## 2025-04-19 RX ADMIN — RIVAROXABAN 15 MG: 15 TABLET, FILM COATED ORAL at 19:10

## 2025-04-19 RX ADMIN — SODIUM BICARBONATE: 84 INJECTION, SOLUTION INTRAVENOUS at 10:46

## 2025-04-19 RX ADMIN — WATER 2000 MG: 1 INJECTION INTRAMUSCULAR; INTRAVENOUS; SUBCUTANEOUS at 09:28

## 2025-04-19 RX ADMIN — METRONIDAZOLE 500 MG: 500 INJECTION, SOLUTION INTRAVENOUS at 09:47

## 2025-04-19 RX ADMIN — CETIRIZINE HYDROCHLORIDE 10 MG: 10 TABLET, FILM COATED ORAL at 09:26

## 2025-04-19 RX ADMIN — SODIUM CHLORIDE 500 ML: 0.9 INJECTION, SOLUTION INTRAVENOUS at 08:35

## 2025-04-19 RX ADMIN — CITALOPRAM HYDROBROMIDE 20 MG: 20 TABLET ORAL at 09:26

## 2025-04-19 RX ADMIN — METRONIDAZOLE 500 MG: 500 INJECTION, SOLUTION INTRAVENOUS at 02:06

## 2025-04-19 RX ADMIN — ARFORMOTEROL TARTRATE: 15 SOLUTION RESPIRATORY (INHALATION) at 07:54

## 2025-04-19 RX ADMIN — Medication 3 MG: at 02:04

## 2025-04-19 RX ADMIN — SODIUM BICARBONATE: 84 INJECTION, SOLUTION INTRAVENOUS at 21:14

## 2025-04-19 RX ADMIN — ONDANSETRON 4 MG: 2 INJECTION, SOLUTION INTRAMUSCULAR; INTRAVENOUS at 02:04

## 2025-04-19 RX ADMIN — MIDODRINE HYDROCHLORIDE 5 MG: 5 TABLET ORAL at 06:02

## 2025-04-19 RX ADMIN — ROSUVASTATIN 10 MG: 10 TABLET, FILM COATED ORAL at 09:55

## 2025-04-19 RX ADMIN — MIDODRINE HYDROCHLORIDE 5 MG: 5 TABLET ORAL at 18:02

## 2025-04-19 RX ADMIN — LEVOTHYROXINE SODIUM 112 MCG: 0.11 TABLET ORAL at 05:48

## 2025-04-19 RX ADMIN — ACETAMINOPHEN 650 MG: 325 TABLET ORAL at 02:04

## 2025-04-19 RX ADMIN — ALBUMIN (HUMAN) 25 G: 0.25 INJECTION, SOLUTION INTRAVENOUS at 12:24

## 2025-04-19 RX ADMIN — SODIUM CHLORIDE, PRESERVATIVE FREE 10 ML: 5 INJECTION INTRAVENOUS at 21:13

## 2025-04-19 RX ADMIN — SODIUM CHLORIDE 500 ML: 0.9 INJECTION, SOLUTION INTRAVENOUS at 07:13

## 2025-04-19 RX ADMIN — ARFORMOTEROL TARTRATE: 15 SOLUTION RESPIRATORY (INHALATION) at 20:09

## 2025-04-19 ASSESSMENT — PAIN DESCRIPTION - PAIN TYPE: TYPE: ACUTE PAIN

## 2025-04-19 ASSESSMENT — PAIN DESCRIPTION - ORIENTATION: ORIENTATION: LOWER;MID

## 2025-04-19 ASSESSMENT — PAIN DESCRIPTION - DESCRIPTORS: DESCRIPTORS: ACHING;DISCOMFORT;SORE

## 2025-04-19 ASSESSMENT — PAIN SCALES - GENERAL
PAINLEVEL_OUTOF10: 0
PAINLEVEL_OUTOF10: 6
PAINLEVEL_OUTOF10: 0
PAINLEVEL_OUTOF10: 3

## 2025-04-19 ASSESSMENT — PAIN DESCRIPTION - FREQUENCY: FREQUENCY: CONTINUOUS

## 2025-04-19 ASSESSMENT — PAIN DESCRIPTION - ONSET: ONSET: ON-GOING

## 2025-04-19 ASSESSMENT — PAIN DESCRIPTION - LOCATION: LOCATION: ABDOMEN

## 2025-04-19 NOTE — PROGRESS NOTES
Verbal order received by Dr. Mendoza to order an ECHO, order Albumin 25% IV 25 g one time, and increase continuous fluid mixture rate to 125ml/hr

## 2025-04-19 NOTE — PROGRESS NOTES
Notified Dr. Mendoza of BP 70/52 after bolus.    Per Dr. Mendoza, order another 500cc NS bolus over an hour and a lactic acid     Notified Dr. Funk per Dr. Mendoza's request

## 2025-04-19 NOTE — PROGRESS NOTES
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  She was admitted 4/18 for abdominal pain, lab work showed significantly elevated white count with CT showing diverticulitis.  She was also noted to have MALGORZATA on CKD.  She was fluid resuscitated in ED and after her blood pressure improved she was admitted to intermediate floor with IV antibiotics and consult to nephrology.    Subjective:  Patient is being followed for Hyperkalemia [E87.5]  Diverticulitis [K57.92]  Diverticulitis of colon [K57.32]  MALGORZATA (acute kidney injury) [N17.9]     Per RN blood pressure has been running low, fluid bolus has been ordered.  Patient denies any acute complaints, she is requesting that.  She states abdominal pain is better.      ROS: denies fever, chills, cp, sob, n/v, HA unless stated above.      sodium chloride flush  5-40 mL IntraVENous 2 times per day    cefTRIAXone (ROCEPHIN) IV  2,000 mg IntraVENous Q24H    metroNIDAZOLE  500 mg IntraVENous Q8H    amiodarone  100 mg Oral Daily    citalopram  20 mg Oral Daily    dilTIAZem  180 mg Oral Daily    ferrous sulfate  325 mg Oral Every Other Day    arformoterol 15 mcg-budesonide 0.5 mg neb solution   Nebulization BID RT    levothyroxine  112 mcg Oral Daily    cetirizine  10 mg Oral Daily    metoprolol succinate  75 mg Oral Daily    rosuvastatin  10 mg Oral Daily    rivaroxaban  15 mg Oral Dinner    midodrine  5 mg Oral TID WC     glucose, 4 tablet, PRN  dextrose bolus, 125 mL, PRN   Or  dextrose bolus, 250 mL, PRN  glucagon (rDNA), 1 mg, PRN  dextrose, , Continuous PRN  sodium chloride flush,  downtrending.  Blood pressure was soft on presentation to ED, improved with fluid resuscitation.  Blood culture NTD.  Ceftriaxone Flagyl.  Blood pressure had improved yesterday, overnight low reads, fluid bolus has been ordered by nephrology.  Continue to monitor blood pressure closely.  Repeat lactic acid 1.1    MALGORZATA on CKD  Metabolic acidosis.  Hyperkalemia  S/p right nephrectomy on 3/20/2025  Baseline creatinine around 1.1 to 1.3  Presented with elevated creatinine of 2.5.  Started on bicarb drip with improvement in potassium and kidney function.    History of HFpEF.  Monitor volume status closely    Persistent A-fib  History of DC cardioversion multiple times.  Currently rate controlled.  Home meds resumed.    Code Status: Full Code  DVT/GI Prophylaxis:     Dispo: Blood pressure low, she is otherwise asymptomatic, continue fluids and monitor, lactic acid is negative.  Start on clear liquid diet today    NOTE: This report was transcribed using voice recognition software. Every effort was made to ensure accuracy; however, inadvertent computerized transcription errors may be present.  Electronically signed by Hollie Funk MD on 4/19/2025 at 9:58 AM

## 2025-04-19 NOTE — PLAN OF CARE
Problem: ABCDS Injury Assessment  Goal: Absence of physical injury  Outcome: Progressing  Flowsheets (Taken 4/18/2025 1727 by Amanda Sheth RN)  Absence of Physical Injury: Implement safety measures based on patient assessment     Problem: Safety - Adult  Goal: Free from fall injury  Outcome: Progressing     Problem: Chronic Conditions and Co-morbidities  Goal: Patient's chronic conditions and co-morbidity symptoms are monitored and maintained or improved  Outcome: Progressing     Problem: Discharge Planning  Goal: Discharge to home or other facility with appropriate resources  Outcome: Progressing     Problem: Pain  Goal: Verbalizes/displays adequate comfort level or baseline comfort level  Outcome: Progressing

## 2025-04-19 NOTE — PROGRESS NOTES
Department of Internal Medicine  Nephrology Attending Consult Note      Events reviewed.    SUBJECTIVE: We are following Ms. Dixon for MALGORZATA. Patient reports no new complaints today.     PHYSICAL EXAM:      Vitals:    VITALS:  BP (!) 87/64   Pulse 74   Temp 98.2 °F (36.8 °C) (Oral)   Resp 21   Ht 1.727 m (5' 8\")   Wt 65.3 kg (144 lb)   SpO2 91%   BMI 21.90 kg/m²   24HR INTAKE/OUTPUT:    Intake/Output Summary (Last 24 hours) at 4/19/2025 1201  Last data filed at 4/19/2025 0947  Gross per 24 hour   Intake 0 ml   Output --   Net 0 ml       Constitutional:  Awake, alert, oriented, in NAD  HEENT:  PERRLA, normocephalic, atraumatic  Respiratory:  CTA  Cardiovascular/Edema:  RRR, normal S1, normal S2  Gastrointestinal:  Soft, flat, non-distended,   Neurologic:  Nonfocal  Skin:  warm, dry, no rashes, no lesions    Scheduled Meds:   albumin human 25%  25 g IntraVENous Once    sodium chloride flush  5-40 mL IntraVENous 2 times per day    cefTRIAXone (ROCEPHIN) IV  2,000 mg IntraVENous Q24H    metroNIDAZOLE  500 mg IntraVENous Q8H    amiodarone  100 mg Oral Daily    citalopram  20 mg Oral Daily    dilTIAZem  180 mg Oral Daily    ferrous sulfate  325 mg Oral Every Other Day    arformoterol 15 mcg-budesonide 0.5 mg neb solution   Nebulization BID RT    levothyroxine  112 mcg Oral Daily    cetirizine  10 mg Oral Daily    metoprolol succinate  75 mg Oral Daily    rosuvastatin  10 mg Oral Daily    rivaroxaban  15 mg Oral Dinner    midodrine  5 mg Oral TID WC     Continuous Infusions:   dextrose      sodium chloride      sodium bicarbonate 75 mEq in dextrose 5 % and 0.45 % NaCl 1,000 mL infusion 100 mL/hr at 04/19/25 1046     PRN Meds:.glucose, dextrose bolus **OR** dextrose bolus, glucagon (rDNA), dextrose, sodium chloride flush, sodium chloride, ondansetron **OR** ondansetron, polyethylene glycol, acetaminophen **OR** acetaminophen, melatonin, morphine, albuterol        DATA:    CBC:   Lab Results   Component Value Date/Time

## 2025-04-20 ENCOUNTER — APPOINTMENT (OUTPATIENT)
Age: 89
DRG: 872 | End: 2025-04-20
Attending: INTERNAL MEDICINE
Payer: MEDICARE

## 2025-04-20 LAB
ANION GAP SERPL CALCULATED.3IONS-SCNC: 10 MMOL/L (ref 7–16)
BASOPHILS # BLD: 0.02 K/UL (ref 0–0.2)
BASOPHILS NFR BLD: 0 % (ref 0–2)
BUN SERPL-MCNC: 21 MG/DL (ref 8–23)
CALCIUM SERPL-MCNC: 8.5 MG/DL (ref 8.8–10.2)
CHLORIDE SERPL-SCNC: 101 MMOL/L (ref 98–107)
CO2 SERPL-SCNC: 23 MMOL/L (ref 22–29)
CREAT SERPL-MCNC: 1.6 MG/DL (ref 0.5–1)
ECHO AO ASC DIAM: 3.1 CM
ECHO AO ASCENDING AORTA INDEX: 1.74 CM/M2
ECHO AV AREA PEAK VELOCITY: 1.2 CM2
ECHO AV AREA PLAN/BSA: 0.39 CM2/M2
ECHO AV AREA PLAN: 0.7 CM2
ECHO AV AREA VTI: 1.1 CM2
ECHO AV AREA/BSA PEAK VELOCITY: 0.7 CM2/M2
ECHO AV AREA/BSA VTI: 0.6 CM2/M2
ECHO AV MEAN GRADIENT: 11 MMHG
ECHO AV MEAN VELOCITY: 1.6 M/S
ECHO AV PEAK GRADIENT: 20 MMHG
ECHO AV PEAK VELOCITY: 2.2 M/S
ECHO AV VELOCITY RATIO: 0.32
ECHO AV VTI: 41.3 CM
ECHO BSA: 1.77 M2
ECHO EST RA PRESSURE: 8 MMHG
ECHO IVC PROX: 2.1 CM
ECHO LA DIAMETER INDEX: 2.25 CM/M2
ECHO LA DIAMETER: 4 CM
ECHO LA VOL A-L A2C: 96 ML (ref 22–52)
ECHO LA VOL A-L A4C: 106 ML (ref 22–52)
ECHO LA VOL BP: 97 ML (ref 22–52)
ECHO LA VOL MOD A2C: 89 ML (ref 22–52)
ECHO LA VOL MOD A4C: 98 ML (ref 22–52)
ECHO LA VOL/BSA BIPLANE: 54 ML/M2 (ref 16–34)
ECHO LA VOLUME AREA LENGTH: 106 ML
ECHO LA VOLUME INDEX A-L A2C: 54 ML/M2 (ref 16–34)
ECHO LA VOLUME INDEX A-L A4C: 60 ML/M2 (ref 16–34)
ECHO LA VOLUME INDEX AREA LENGTH: 60 ML/M2 (ref 16–34)
ECHO LA VOLUME INDEX MOD A2C: 50 ML/M2 (ref 16–34)
ECHO LA VOLUME INDEX MOD A4C: 55 ML/M2 (ref 16–34)
ECHO LV E' LATERAL VELOCITY: 0.11 CM/S
ECHO LV E' SEPTAL VELOCITY: 0.07 CM/S
ECHO LV EDV A2C: 61 ML
ECHO LV EDV A4C: 53 ML
ECHO LV EDV BP: 58 ML (ref 56–104)
ECHO LV EDV INDEX A4C: 30 ML/M2
ECHO LV EDV INDEX BP: 33 ML/M2
ECHO LV EDV NDEX A2C: 34 ML/M2
ECHO LV EF PHYSICIAN: 60 %
ECHO LV EJECTION FRACTION A2C: 49 %
ECHO LV EJECTION FRACTION A4C: 69 %
ECHO LV EJECTION FRACTION BIPLANE: 61 % (ref 55–100)
ECHO LV ESV A2C: 31 ML
ECHO LV ESV A4C: 17 ML
ECHO LV ESV BP: 23 ML (ref 19–49)
ECHO LV ESV INDEX A2C: 17 ML/M2
ECHO LV ESV INDEX A4C: 10 ML/M2
ECHO LV ESV INDEX BP: 13 ML/M2
ECHO LV FRACTIONAL SHORTENING: 14 % (ref 28–44)
ECHO LV INTERNAL DIMENSION DIASTOLE INDEX: 2.02 CM/M2
ECHO LV INTERNAL DIMENSION DIASTOLIC: 3.6 CM (ref 3.9–5.3)
ECHO LV INTERNAL DIMENSION SYSTOLIC INDEX: 1.74 CM/M2
ECHO LV INTERNAL DIMENSION SYSTOLIC: 3.1 CM
ECHO LV ISOVOLUMETRIC RELAXATION TIME (IVRT): 64.7 MS
ECHO LV IVSD: 1.7 CM (ref 0.6–0.9)
ECHO LV IVSS: 1.6 CM
ECHO LV MASS 2D: 190.3 G (ref 67–162)
ECHO LV MASS INDEX 2D: 106.9 G/M2 (ref 43–95)
ECHO LV POSTERIOR WALL DIASTOLIC: 1.2 CM (ref 0.6–0.9)
ECHO LV POSTERIOR WALL SYSTOLIC: 1.6 CM
ECHO LV RELATIVE WALL THICKNESS RATIO: 0.67
ECHO LVOT AREA: 3.5 CM2
ECHO LVOT AV VTI INDEX: 0.3
ECHO LVOT DIAM: 2.1 CM
ECHO LVOT MEAN GRADIENT: 1 MMHG
ECHO LVOT PEAK GRADIENT: 2 MMHG
ECHO LVOT PEAK VELOCITY: 0.7 M/S
ECHO LVOT STROKE VOLUME INDEX: 23.9 ML/M2
ECHO LVOT SV: 42.6 ML
ECHO LVOT VTI: 12.3 CM
ECHO MV AREA PHT: 2.8 CM2
ECHO MV AREA VTI: 2.2 CM2
ECHO MV E DECELERATION TIME (DT): 248.6 MS
ECHO MV LVOT VTI INDEX: 1.54
ECHO MV MAX VELOCITY: 1.1 M/S
ECHO MV MEAN GRADIENT: 2 MMHG
ECHO MV MEAN VELOCITY: 0.7 M/S
ECHO MV PEAK GRADIENT: 5 MMHG
ECHO MV PRESSURE HALF TIME (PHT): 77.8 MS
ECHO MV VTI: 19 CM
ECHO PULMONARY ARTERY END DIASTOLIC PRESSURE: 9 MMHG
ECHO PV MAX VELOCITY: 0.6 M/S
ECHO PV MEAN GRADIENT: 1 MMHG
ECHO PV MEAN VELOCITY: 0.3 M/S
ECHO PV PEAK GRADIENT: 1 MMHG
ECHO PV REGURGITANT MAX VELOCITY: 1.5 M/S
ECHO PV VTI: 9.4 CM
ECHO PVEIN PEAK D VELOCITY: 0.5 M/S
ECHO PVEIN PEAK S VELOCITY: 0.2 M/S
ECHO PVEIN S/D RATIO: 0.4
ECHO RIGHT VENTRICULAR SYSTOLIC PRESSURE (RVSP): 49 MMHG
ECHO RV BASAL DIMENSION: 5.1 CM
ECHO RV INTERNAL DIMENSION: 3.2 CM
ECHO RV LONGITUDINAL DIMENSION: 7.8 CM
ECHO RV MID DIMENSION: 3.2 CM
ECHO RV TAPSE: 1.1 CM (ref 1.7–?)
ECHO TV REGURGITANT MAX VELOCITY: 3.19 M/S
ECHO TV REGURGITANT PEAK GRADIENT: 41 MMHG
EKG ATRIAL RATE: 100 BPM
EKG Q-T INTERVAL: 400 MS
EKG QRS DURATION: 78 MS
EKG QTC CALCULATION (BAZETT): 489 MS
EKG R AXIS: -37 DEGREES
EKG T AXIS: -122 DEGREES
EKG VENTRICULAR RATE: 90 BPM
EOSINOPHIL # BLD: 0.17 K/UL (ref 0.05–0.5)
EOSINOPHILS RELATIVE PERCENT: 1 % (ref 0–6)
ERYTHROCYTE [DISTWIDTH] IN BLOOD BY AUTOMATED COUNT: 20.3 % (ref 11.5–15)
GFR, ESTIMATED: 31 ML/MIN/1.73M2
GLUCOSE SERPL-MCNC: 98 MG/DL (ref 74–99)
HCT VFR BLD AUTO: 37 % (ref 34–48)
HGB BLD-MCNC: 10.8 G/DL (ref 11.5–15.5)
IMM GRANULOCYTES # BLD AUTO: 0.09 K/UL (ref 0–0.58)
IMM GRANULOCYTES NFR BLD: 1 % (ref 0–5)
LYMPHOCYTES NFR BLD: 0.62 K/UL (ref 1.5–4)
LYMPHOCYTES RELATIVE PERCENT: 5 % (ref 20–42)
MCH RBC QN AUTO: 26.3 PG (ref 26–35)
MCHC RBC AUTO-ENTMCNC: 29.2 G/DL (ref 32–34.5)
MCV RBC AUTO: 90.2 FL (ref 80–99.9)
MONOCYTES NFR BLD: 0.82 K/UL (ref 0.1–0.95)
MONOCYTES NFR BLD: 7 % (ref 2–12)
NEUTROPHILS NFR BLD: 86 % (ref 43–80)
NEUTS SEG NFR BLD: 10.37 K/UL (ref 1.8–7.3)
PLATELET # BLD AUTO: 153 K/UL (ref 130–450)
PMV BLD AUTO: 12.5 FL (ref 7–12)
POTASSIUM SERPL-SCNC: 4.5 MMOL/L (ref 3.4–4.5)
RBC # BLD AUTO: 4.1 M/UL (ref 3.5–5.5)
RBC # BLD: ABNORMAL 10*6/UL
SODIUM SERPL-SCNC: 134 MMOL/L (ref 136–145)
WBC OTHER # BLD: 12.1 K/UL (ref 4.5–11.5)

## 2025-04-20 PROCEDURE — 6370000000 HC RX 637 (ALT 250 FOR IP): Performed by: NURSE PRACTITIONER

## 2025-04-20 PROCEDURE — 93321 DOPPLER ECHO F-UP/LMTD STD: CPT

## 2025-04-20 PROCEDURE — 99232 SBSQ HOSP IP/OBS MODERATE 35: CPT | Performed by: INTERNAL MEDICINE

## 2025-04-20 PROCEDURE — 93306 TTE W/DOPPLER COMPLETE: CPT | Performed by: INTERNAL MEDICINE

## 2025-04-20 PROCEDURE — 94640 AIRWAY INHALATION TREATMENT: CPT

## 2025-04-20 PROCEDURE — 2500000003 HC RX 250 WO HCPCS: Performed by: INTERNAL MEDICINE

## 2025-04-20 PROCEDURE — 6360000002 HC RX W HCPCS: Performed by: NURSE PRACTITIONER

## 2025-04-20 PROCEDURE — 85025 COMPLETE CBC W/AUTO DIFF WBC: CPT

## 2025-04-20 PROCEDURE — 6370000000 HC RX 637 (ALT 250 FOR IP): Performed by: INTERNAL MEDICINE

## 2025-04-20 PROCEDURE — 93010 ELECTROCARDIOGRAM REPORT: CPT | Performed by: INTERNAL MEDICINE

## 2025-04-20 PROCEDURE — 36415 COLL VENOUS BLD VENIPUNCTURE: CPT

## 2025-04-20 PROCEDURE — 2060000000 HC ICU INTERMEDIATE R&B

## 2025-04-20 PROCEDURE — 2580000003 HC RX 258: Performed by: INTERNAL MEDICINE

## 2025-04-20 PROCEDURE — P9047 ALBUMIN (HUMAN), 25%, 50ML: HCPCS

## 2025-04-20 PROCEDURE — 6360000002 HC RX W HCPCS

## 2025-04-20 PROCEDURE — 2580000003 HC RX 258

## 2025-04-20 PROCEDURE — 2500000003 HC RX 250 WO HCPCS: Performed by: NURSE PRACTITIONER

## 2025-04-20 PROCEDURE — 93308 TTE F-UP OR LMTD: CPT

## 2025-04-20 PROCEDURE — 80048 BASIC METABOLIC PNL TOTAL CA: CPT

## 2025-04-20 RX ORDER — DEXTROSE MONOHYDRATE AND SODIUM CHLORIDE 5; .9 G/100ML; G/100ML
INJECTION, SOLUTION INTRAVENOUS CONTINUOUS
Status: DISCONTINUED | OUTPATIENT
Start: 2025-04-20 | End: 2025-04-21

## 2025-04-20 RX ORDER — ALBUMIN (HUMAN) 12.5 G/50ML
25 SOLUTION INTRAVENOUS ONCE
Status: COMPLETED | OUTPATIENT
Start: 2025-04-20 | End: 2025-04-20

## 2025-04-20 RX ADMIN — DEXTROSE AND SODIUM CHLORIDE: 5; 900 INJECTION, SOLUTION INTRAVENOUS at 13:10

## 2025-04-20 RX ADMIN — MIDODRINE HYDROCHLORIDE 5 MG: 5 TABLET ORAL at 09:09

## 2025-04-20 RX ADMIN — AMIODARONE HYDROCHLORIDE 100 MG: 200 TABLET ORAL at 09:08

## 2025-04-20 RX ADMIN — METOPROLOL SUCCINATE 75 MG: 50 TABLET, EXTENDED RELEASE ORAL at 09:08

## 2025-04-20 RX ADMIN — FERROUS SULFATE TAB 325 MG (65 MG ELEMENTAL FE) 325 MG: 325 (65 FE) TAB at 14:14

## 2025-04-20 RX ADMIN — METRONIDAZOLE 500 MG: 500 INJECTION, SOLUTION INTRAVENOUS at 18:06

## 2025-04-20 RX ADMIN — ALBUMIN (HUMAN) 25 G: 0.25 INJECTION, SOLUTION INTRAVENOUS at 13:13

## 2025-04-20 RX ADMIN — ARFORMOTEROL TARTRATE: 15 SOLUTION RESPIRATORY (INHALATION) at 08:03

## 2025-04-20 RX ADMIN — CITALOPRAM HYDROBROMIDE 20 MG: 20 TABLET ORAL at 09:08

## 2025-04-20 RX ADMIN — RIVAROXABAN 15 MG: 15 TABLET, FILM COATED ORAL at 18:02

## 2025-04-20 RX ADMIN — ARFORMOTEROL TARTRATE: 15 SOLUTION RESPIRATORY (INHALATION) at 21:02

## 2025-04-20 RX ADMIN — SODIUM CHLORIDE, PRESERVATIVE FREE 10 ML: 5 INJECTION INTRAVENOUS at 19:23

## 2025-04-20 RX ADMIN — WATER 2000 MG: 1 INJECTION INTRAMUSCULAR; INTRAVENOUS; SUBCUTANEOUS at 09:09

## 2025-04-20 RX ADMIN — SODIUM BICARBONATE: 84 INJECTION, SOLUTION INTRAVENOUS at 05:26

## 2025-04-20 RX ADMIN — METRONIDAZOLE 500 MG: 500 INJECTION, SOLUTION INTRAVENOUS at 09:20

## 2025-04-20 RX ADMIN — SODIUM CHLORIDE, PRESERVATIVE FREE 10 ML: 5 INJECTION INTRAVENOUS at 09:09

## 2025-04-20 RX ADMIN — MIDODRINE HYDROCHLORIDE 5 MG: 5 TABLET ORAL at 13:10

## 2025-04-20 RX ADMIN — MIDODRINE HYDROCHLORIDE 5 MG: 5 TABLET ORAL at 18:02

## 2025-04-20 RX ADMIN — ROSUVASTATIN 10 MG: 10 TABLET, FILM COATED ORAL at 09:09

## 2025-04-20 RX ADMIN — CETIRIZINE HYDROCHLORIDE 10 MG: 10 TABLET, FILM COATED ORAL at 09:09

## 2025-04-20 RX ADMIN — LEVOTHYROXINE SODIUM 112 MCG: 0.11 TABLET ORAL at 05:25

## 2025-04-20 RX ADMIN — DILTIAZEM HYDROCHLORIDE 180 MG: 180 CAPSULE, COATED, EXTENDED RELEASE ORAL at 09:09

## 2025-04-20 RX ADMIN — METRONIDAZOLE 500 MG: 500 INJECTION, SOLUTION INTRAVENOUS at 01:25

## 2025-04-20 ASSESSMENT — PAIN SCALES - GENERAL
PAINLEVEL_OUTOF10: 0

## 2025-04-20 NOTE — PROGRESS NOTES
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  She was admitted 4/18 for abdominal pain, lab work showed significantly elevated white count with CT showing diverticulitis.  She was also noted to have MALGORZATA on CKD.  She was fluid resuscitated in ED and after her blood pressure improved she was admitted to intermediate floor with IV antibiotics and consult to nephrology.    Subjective:  Patient is being followed for Hyperkalemia [E87.5]  Diverticulitis [K57.92]  Diverticulitis of colon [K57.32]  MALGORZATA (acute kidney injury) [N17.9]  Sepsis (HCC) [A41.9]   .  She feels better today.  Requesting to advance diet.  Abdominal pain has improved.  Blood pressure stable.    ROS: denies fever, chills, cp, sob, n/v, HA unless stated above.      sodium chloride flush  5-40 mL IntraVENous 2 times per day    cefTRIAXone (ROCEPHIN) IV  2,000 mg IntraVENous Q24H    metroNIDAZOLE  500 mg IntraVENous Q8H    amiodarone  100 mg Oral Daily    citalopram  20 mg Oral Daily    dilTIAZem  180 mg Oral Daily    ferrous sulfate  325 mg Oral Every Other Day    arformoterol 15 mcg-budesonide 0.5 mg neb solution   Nebulization BID RT    levothyroxine  112 mcg Oral Daily    cetirizine  10 mg Oral Daily    metoprolol succinate  75 mg Oral Daily    rosuvastatin  10 mg Oral Daily    rivaroxaban  15 mg Oral Dinner    midodrine  5 mg Oral TID WC     glucose, 4 tablet, PRN  dextrose bolus, 125 mL, PRN   Or  dextrose bolus, 250 mL, PRN  glucagon (rDNA), 1 mg, PRN  dextrose, , Continuous PRN  sodium chloride flush, 5-40 mL, PRN  sodium

## 2025-04-20 NOTE — PROGRESS NOTES
Department of Internal Medicine  Nephrology Attending Consult Note      Events reviewed.    SUBJECTIVE: We are following Ms. Dixon for MALGORZATA. Patient reports no new complaints today.     PHYSICAL EXAM:      Vitals:    VITALS:  BP (!) 118/92   Pulse 98   Temp 97.5 °F (36.4 °C) (Oral)   Resp 19   Ht 1.727 m (5' 8\")   Wt 65.3 kg (144 lb)   SpO2 94%   BMI 21.90 kg/m²   24HR INTAKE/OUTPUT:    Intake/Output Summary (Last 24 hours) at 4/20/2025 1201  Last data filed at 4/20/2025 0930  Gross per 24 hour   Intake 5166.64 ml   Output --   Net 5166.64 ml       Constitutional:  Awake, alert, oriented, in NAD  HEENT:  PERRLA, normocephalic, atraumatic  Respiratory:  CTA  Cardiovascular/Edema:  RRR, normal S1, normal S2  Gastrointestinal:  Soft, flat, non-distended,   Neurologic:  Nonfocal  Skin:  warm, dry, no rashes, no lesions    Scheduled Meds:   albumin human 25%  25 g IntraVENous Once    sodium chloride flush  5-40 mL IntraVENous 2 times per day    cefTRIAXone (ROCEPHIN) IV  2,000 mg IntraVENous Q24H    metroNIDAZOLE  500 mg IntraVENous Q8H    amiodarone  100 mg Oral Daily    citalopram  20 mg Oral Daily    dilTIAZem  180 mg Oral Daily    ferrous sulfate  325 mg Oral Every Other Day    arformoterol 15 mcg-budesonide 0.5 mg neb solution   Nebulization BID RT    levothyroxine  112 mcg Oral Daily    cetirizine  10 mg Oral Daily    metoprolol succinate  75 mg Oral Daily    rosuvastatin  10 mg Oral Daily    rivaroxaban  15 mg Oral Dinner    midodrine  5 mg Oral TID WC     Continuous Infusions:   dextrose 5 % and 0.9 % NaCl      dextrose      sodium chloride       PRN Meds:.glucose, dextrose bolus **OR** dextrose bolus, glucagon (rDNA), dextrose, sodium chloride flush, sodium chloride, ondansetron **OR** ondansetron, polyethylene glycol, acetaminophen **OR** acetaminophen, melatonin, morphine, albuterol        DATA:    CBC:   Lab Results   Component Value Date/Time    WBC 12.1 04/20/2025 07:25 AM    RBC 4.10 04/20/2025 07:25  AM    HGB 10.8 04/20/2025 07:25 AM    HCT 37.0 04/20/2025 07:25 AM    MCV 90.2 04/20/2025 07:25 AM    MCH 26.3 04/20/2025 07:25 AM    MCHC 29.2 04/20/2025 07:25 AM    RDW 20.3 04/20/2025 07:25 AM     04/20/2025 07:25 AM    MPV 12.5 04/20/2025 07:25 AM     CMP:    Lab Results   Component Value Date/Time     04/20/2025 07:25 AM    K 4.5 04/20/2025 07:25 AM    K 4.3 03/30/2023 02:36 AM     04/20/2025 07:25 AM    CO2 23 04/20/2025 07:25 AM    BUN 21 04/20/2025 07:25 AM    CREATININE 1.6 04/20/2025 07:25 AM    GFRAA 57 01/27/2022 06:25 PM    LABGLOM 31 04/20/2025 07:25 AM    LABGLOM 37 04/16/2024 04:03 PM    GLUCOSE 98 04/20/2025 07:25 AM    CALCIUM 8.5 04/20/2025 07:25 AM    BILITOT 0.4 04/18/2025 05:33 AM    ALKPHOS 118 04/18/2025 05:33 AM    AST 26 04/18/2025 05:33 AM    ALT 16 04/18/2025 05:33 AM     Magnesium:    Lab Results   Component Value Date/Time    MG 1.8 04/01/2025 01:15 AM     Phosphorus:  No results found for: \"PHOS\"  Radiology Review:    CT ABDOMEN PELVIS WO CONTRAST 4/18/25    IMPRESSION:  1. Inflammatory process most consistent of acute diverticulitis at the  junction with the proximal sigmoid colon without perforation or abscess.  2. Moderate colonic stool burden.  3. Solitary left kidney without left hydronephrosis.  4. Small hiatal hernia.       BRIEF SUMMARY OF INITIAL CONSULT:      Briefly Mrs. 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 Lab work showed sodium 133, potassium 5.6, bicarb 21, creatinine 2.5 mg/dL, proBNP 3258, reason for this consultation.  Significant home medications include Bumex, potassium chloride, and metoprolol.    IMPRESSION/RECOMMENDATIONS:    MALGORZATA stage I likely 2/2 volume responsive prerenal MALGORZATA. Renal function improving, creatinine 1.9 mg/dL today, continue IV

## 2025-04-21 VITALS
OXYGEN SATURATION: 95 % | RESPIRATION RATE: 18 BRPM | HEART RATE: 72 BPM | DIASTOLIC BLOOD PRESSURE: 63 MMHG | HEIGHT: 68 IN | BODY MASS INDEX: 21.82 KG/M2 | SYSTOLIC BLOOD PRESSURE: 126 MMHG | WEIGHT: 144 LBS | TEMPERATURE: 98.1 F

## 2025-04-21 LAB
ANION GAP SERPL CALCULATED.3IONS-SCNC: 9 MMOL/L (ref 7–16)
BASOPHILS # BLD: 0.03 K/UL (ref 0–0.2)
BASOPHILS NFR BLD: 0 % (ref 0–2)
BUN SERPL-MCNC: 16 MG/DL (ref 8–23)
CALCIUM SERPL-MCNC: 8.6 MG/DL (ref 8.8–10.2)
CHLORIDE SERPL-SCNC: 104 MMOL/L (ref 98–107)
CO2 SERPL-SCNC: 25 MMOL/L (ref 22–29)
CREAT SERPL-MCNC: 1.5 MG/DL (ref 0.5–1)
EOSINOPHIL # BLD: 0.23 K/UL (ref 0.05–0.5)
EOSINOPHILS RELATIVE PERCENT: 2 % (ref 0–6)
ERYTHROCYTE [DISTWIDTH] IN BLOOD BY AUTOMATED COUNT: 20 % (ref 11.5–15)
GFR, ESTIMATED: 32 ML/MIN/1.73M2
GLUCOSE SERPL-MCNC: 92 MG/DL (ref 74–99)
HCT VFR BLD AUTO: 32.4 % (ref 34–48)
HGB BLD-MCNC: 9.7 G/DL (ref 11.5–15.5)
IMM GRANULOCYTES # BLD AUTO: 0.06 K/UL (ref 0–0.58)
IMM GRANULOCYTES NFR BLD: 1 % (ref 0–5)
LYMPHOCYTES NFR BLD: 0.7 K/UL (ref 1.5–4)
LYMPHOCYTES RELATIVE PERCENT: 7 % (ref 20–42)
MCH RBC QN AUTO: 26.6 PG (ref 26–35)
MCHC RBC AUTO-ENTMCNC: 29.9 G/DL (ref 32–34.5)
MCV RBC AUTO: 88.8 FL (ref 80–99.9)
MONOCYTES NFR BLD: 0.72 K/UL (ref 0.1–0.95)
MONOCYTES NFR BLD: 7 % (ref 2–12)
NEUTROPHILS NFR BLD: 82 % (ref 43–80)
NEUTS SEG NFR BLD: 7.94 K/UL (ref 1.8–7.3)
PLATELET # BLD AUTO: 144 K/UL (ref 130–450)
PMV BLD AUTO: 12.8 FL (ref 7–12)
POTASSIUM SERPL-SCNC: 4.1 MMOL/L (ref 3.4–4.5)
RBC # BLD AUTO: 3.65 M/UL (ref 3.5–5.5)
SODIUM SERPL-SCNC: 138 MMOL/L (ref 136–145)
WBC OTHER # BLD: 9.7 K/UL (ref 4.5–11.5)

## 2025-04-21 PROCEDURE — 6360000002 HC RX W HCPCS: Performed by: NURSE PRACTITIONER

## 2025-04-21 PROCEDURE — 94640 AIRWAY INHALATION TREATMENT: CPT

## 2025-04-21 PROCEDURE — 85025 COMPLETE CBC W/AUTO DIFF WBC: CPT

## 2025-04-21 PROCEDURE — 97166 OT EVAL MOD COMPLEX 45 MIN: CPT

## 2025-04-21 PROCEDURE — 80048 BASIC METABOLIC PNL TOTAL CA: CPT

## 2025-04-21 PROCEDURE — 6370000000 HC RX 637 (ALT 250 FOR IP): Performed by: NURSE PRACTITIONER

## 2025-04-21 PROCEDURE — 2500000003 HC RX 250 WO HCPCS: Performed by: NURSE PRACTITIONER

## 2025-04-21 PROCEDURE — 97530 THERAPEUTIC ACTIVITIES: CPT

## 2025-04-21 PROCEDURE — 99239 HOSP IP/OBS DSCHRG MGMT >30: CPT | Performed by: INTERNAL MEDICINE

## 2025-04-21 PROCEDURE — 97161 PT EVAL LOW COMPLEX 20 MIN: CPT

## 2025-04-21 PROCEDURE — 97535 SELF CARE MNGMENT TRAINING: CPT

## 2025-04-21 PROCEDURE — 36415 COLL VENOUS BLD VENIPUNCTURE: CPT

## 2025-04-21 PROCEDURE — 6370000000 HC RX 637 (ALT 250 FOR IP): Performed by: INTERNAL MEDICINE

## 2025-04-21 RX ORDER — METRONIDAZOLE 500 MG/1
500 TABLET ORAL 3 TIMES DAILY
Qty: 15 TABLET | Refills: 0 | Status: SHIPPED | OUTPATIENT
Start: 2025-04-21 | End: 2025-04-26

## 2025-04-21 RX ORDER — CEPHALEXIN 500 MG/1
500 CAPSULE ORAL 3 TIMES DAILY
Qty: 15 CAPSULE | Refills: 0 | Status: SHIPPED | OUTPATIENT
Start: 2025-04-21 | End: 2025-04-26

## 2025-04-21 RX ADMIN — ARFORMOTEROL TARTRATE: 15 SOLUTION RESPIRATORY (INHALATION) at 08:00

## 2025-04-21 RX ADMIN — MIDODRINE HYDROCHLORIDE 5 MG: 5 TABLET ORAL at 13:00

## 2025-04-21 RX ADMIN — METRONIDAZOLE 500 MG: 500 INJECTION, SOLUTION INTRAVENOUS at 09:46

## 2025-04-21 RX ADMIN — CETIRIZINE HYDROCHLORIDE 10 MG: 10 TABLET, FILM COATED ORAL at 09:45

## 2025-04-21 RX ADMIN — CITALOPRAM HYDROBROMIDE 20 MG: 20 TABLET ORAL at 09:44

## 2025-04-21 RX ADMIN — ROSUVASTATIN 10 MG: 10 TABLET, FILM COATED ORAL at 09:46

## 2025-04-21 RX ADMIN — AMIODARONE HYDROCHLORIDE 100 MG: 200 TABLET ORAL at 09:44

## 2025-04-21 RX ADMIN — METOPROLOL SUCCINATE 75 MG: 50 TABLET, EXTENDED RELEASE ORAL at 09:45

## 2025-04-21 RX ADMIN — LEVOTHYROXINE SODIUM 112 MCG: 0.11 TABLET ORAL at 05:22

## 2025-04-21 RX ADMIN — WATER 2000 MG: 1 INJECTION INTRAMUSCULAR; INTRAVENOUS; SUBCUTANEOUS at 09:44

## 2025-04-21 RX ADMIN — MIDODRINE HYDROCHLORIDE 5 MG: 5 TABLET ORAL at 09:44

## 2025-04-21 RX ADMIN — METRONIDAZOLE 500 MG: 500 INJECTION, SOLUTION INTRAVENOUS at 01:05

## 2025-04-21 RX ADMIN — SODIUM CHLORIDE, PRESERVATIVE FREE 10 ML: 5 INJECTION INTRAVENOUS at 09:46

## 2025-04-21 RX ADMIN — DILTIAZEM HYDROCHLORIDE 180 MG: 180 CAPSULE, COATED, EXTENDED RELEASE ORAL at 09:46

## 2025-04-21 NOTE — DISCHARGE INSTRUCTIONS
Heart Failure: Care Instructions  Overview     Heart failure occurs when your heart does not pump as much blood as the body needs. Failure does not mean that the heart has stopped pumping but rather that it is not pumping as well as it should. Over time, this causes fluid buildup in your lungs and other parts of your body. Fluid buildup can cause shortness of breath, fatigue, swollen ankles, and other problems. Heart failure is treated with medicines, a heart-healthy lifestyle, and the steps you take to check your symptoms. Treatment can slow the disease, help you feel better, and help keep you out of the hospital. Treatment may also help you live longer.  Follow-up care is a key part of your treatment and safety. Be sure to make and go to all appointments, and call your doctor if you are having problems. It's also a good idea to know your test results and keep a list of the medicines you take.  How can you care for yourself at home?  Medicines    Be safe with medicines. Take your medicines exactly as prescribed. Call your doctor if you think you are having a problem with your medicine.     You will get more details on the specific medicines your doctor prescribes. Medicines can help your heart work better, help you feel better, and help keep you out of the hospital. Medicines may also help you live longer.     Talk with your doctor or pharmacist before you take a new prescription or over-the-counter medicine. Ask if the medicine is safe for you to take. Some medicines can affect your heart and make heart failure worse. Others may keep your heart failure medicines from working right. Over-the-counter medicines that you may need to avoid include herbal supplements, vitamins, pain relievers called NSAIDs, antacids, laxatives, and cough, cold, flu, or sinus medicine.   Diet    Eat heart-healthy foods. These foods include vegetables, fruits, nuts, beans, lean meat, fish, and whole grains.     Your doctor may

## 2025-04-21 NOTE — PROGRESS NOTES
Department of Internal Medicine  Nephrology Attending Consult Note      Events reviewed.    SUBJECTIVE: We are following Ms. Dixon for MALGORZATA. Patient reports no new complaints today.     PHYSICAL EXAM:      Vitals:    VITALS:  /69   Pulse 68   Temp 98.3 °F (36.8 °C) (Temporal)   Resp 17   Ht 1.727 m (5' 8\")   Wt 65.3 kg (144 lb)   SpO2 95%   BMI 21.90 kg/m²   24HR INTAKE/OUTPUT:    Intake/Output Summary (Last 24 hours) at 4/21/2025 0826  Last data filed at 4/20/2025 1920  Gross per 24 hour   Intake 2059.32 ml   Output --   Net 2059.32 ml       Constitutional:  Awake, alert, oriented, in NAD  HEENT:  PERRLA, normocephalic, atraumatic  Respiratory:  CTA  Cardiovascular/Edema:  RRR, normal S1, normal S2  Gastrointestinal:  Soft, flat, non-distended,   Neurologic:  Nonfocal  Skin:  warm, dry, no rashes, no lesions    Scheduled Meds:   sodium chloride flush  5-40 mL IntraVENous 2 times per day    cefTRIAXone (ROCEPHIN) IV  2,000 mg IntraVENous Q24H    metroNIDAZOLE  500 mg IntraVENous Q8H    amiodarone  100 mg Oral Daily    citalopram  20 mg Oral Daily    dilTIAZem  180 mg Oral Daily    ferrous sulfate  325 mg Oral Every Other Day    arformoterol 15 mcg-budesonide 0.5 mg neb solution   Nebulization BID RT    levothyroxine  112 mcg Oral Daily    cetirizine  10 mg Oral Daily    metoprolol succinate  75 mg Oral Daily    rosuvastatin  10 mg Oral Daily    rivaroxaban  15 mg Oral Dinner    midodrine  5 mg Oral TID WC     Continuous Infusions:   dextrose 5 % and 0.9 % NaCl 75 mL/hr at 04/20/25 1310    dextrose      sodium chloride       PRN Meds:.glucose, dextrose bolus **OR** dextrose bolus, glucagon (rDNA), dextrose, sodium chloride flush, sodium chloride, ondansetron **OR** ondansetron, polyethylene glycol, acetaminophen **OR** acetaminophen, melatonin, morphine, albuterol        DATA:    CBC:   Lab Results   Component Value Date/Time    WBC 9.7 04/21/2025 05:48 AM    RBC 3.65 04/21/2025 05:48 AM    HGB 9.7

## 2025-04-21 NOTE — CARE COORDINATION
Chart reviewed and case reviewed in IDR.  Patient admitted for abdominal pain.  Diverticulitis, IV Rocephin and IV Flagyl ordered.  Met with the patient at the bedside to discuss transition of care planning.  Patient stated he second daughter Malissa has been staying with her in her one story home with a four step entry.  Patient has a shower bench, walker, rollator, and walk-in shower.  Patient has a history of outpatient therapy.  Patient goes to [facility] for her PCP and gets her medications from [facility].  Discussed Transition of care plans.  Patient declined [facility], stating she did not feel that they did anything for her .  She stated she would consider outpatient therapy in the future when she is able to drive again.  Referral previously made to [facility], Spoke with Brooks.  Patient assessment completed and patient stated she was not homebound and would like to have outpatient therapy.  [facility] notified by [facility] co of this.  Patient plans to return home and will have her daughter provide transportation.  Will continue to follow for further transition of care planning needs.           Payal Garg RN.  P:  [phone]          Case Management Assessment  Initial Evaluation    Date/Time of Evaluation: 4/21/2025 2:38 PM  Assessment Completed by: Payal Garg RN    If patient is discharged prior to next notation, then this note serves as note for discharge by case management.    Patient Name: Radha Dixon                   YOB: 1934  Diagnosis: Hyperkalemia [E87.5]  Diverticulitis [K57.92]  Diverticulitis of colon [K57.32]  MALGORZATA (acute kidney injury) [N17.9]  Sepsis (HCC) [A41.9]                   Date / Time: 4/18/2025  5:12 AM    Patient Admission Status: Inpatient   Readmission Risk (Low < 19, Mod (19-27), High > 27): Readmission Risk Score: 25.1    Current PCP: Madeleine Caro NP-C  PCP verified by CM? Yes (Madeleine Caro NP-C, [place]  [place] [address]  Phone: [phone] Fax: [phone]    [facility] - [place], [place] - [address] - P [phone] - F [phone]  [address]  [address]  [facility] [address]  Phone: [phone] Fax: [phone]      Notes:    Factors facilitating achievement of predicted outcomes: Family support, Cooperative, Pleasant, Good insight into deficits, Has needed Durable Medical Equipment at home, and Knowledge about rehab    Barriers to discharge: Stairs at home    Additional Case Management Notes: See ABove     The Plan for Transition of Care is related to the following treatment goals of Hyperkalemia [E87.5]  Diverticulitis [K57.92]  Diverticulitis of colon [K57.32]  MALGORZATA (acute kidney injury) [N17.9]  Sepsis (HCC) [A41.9]    IF APPLICABLE: The Patient and/or patient representative Radha and her family were provided with a choice of provider and agrees with the discharge plan. Freedom of choice list with basic dialogue that supports the patient's individualized plan of care/goals and shares the quality data associated with the providers was provided to:     Patient Representative Name:       The Patient and/or Patient Representative Agree with the Discharge Plan?      Payal Garg RN  Case Management Department  Ph: [phone]   Fax: [phone]

## 2025-04-21 NOTE — PROGRESS NOTES
Notified Kaitlin Best NP patient unsteady at times walking to and from bathroom. Noticed PT and OT are not ordered.     Per Kaitlin, okay to order both.

## 2025-04-21 NOTE — DISCHARGE SUMMARY
[facility] Hospitalist Physician Discharge Summary       Madeleine Caro, BLAYNE-C  [address]  [address]  [facility] [address]  [phone]    Follow up      Christian Ferrer, DO  [address]  [address]  [facility] [id number] [phone]    Follow up      Mariana Mendoza MD  [address] [address] [phone]    Follow up        Activity level: As tolerated     Dispo: Home    Condition on discharge: Stable     Patient ID:  Radha Dixon  [id number]  89 y.o.  7/11/1934    Admit date: 4/18/2025    Discharge date and time:  4/21/2025  12:52 PM    Admission Diagnoses: Principal Problem:    Diverticulitis  Active Problems:    A-fib (HCC)    HLD (hyperlipidemia)    Hypothyroid    Acute kidney injury superimposed on CKD    Hyponatremia    Hyperkalemia    History of kidney cancer    (HFpEF) heart failure with preserved ejection fraction (HCC)    Sepsis (HCC)  Resolved Problems:    * No resolved hospital problems. *      Discharge Diagnoses: Principal Problem:    Diverticulitis  Active Problems:    A-fib (HCC)    HLD (hyperlipidemia)    Hypothyroid    Acute kidney injury superimposed on CKD    Hyponatremia    Hyperkalemia    History of kidney cancer    (HFpEF) heart failure with preserved ejection fraction (HCC)    Sepsis (HCC)  Resolved Problems:    * No resolved hospital problems. 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  She was admitted 4/18 for abdominal pain, lab work showed significantly elevated white count with CT showing diverticulitis.  She was also noted to have MALGORZATA on CKD.  She was fluid resuscitated in ED and after her blood pressure improved she was admitted to [facility]    metoprolol succinate 50 MG extended release tablet  Commonly known as: TOPROL XL  Take 1.5 tablets by mouth daily     phenazopyridine 100 MG tablet  Commonly known as: PYRIDIUM     potassium chloride 20 MEQ Tbcr extended release tablet  Commonly known as: K-TAB     simvastatin 40 MG tablet  Commonly known as: ZOCOR     Vitamin D3 50 MCG (2000 UT) Tabs     Vitamin E 400 units Tabs     Xarelto 15 MG Tabs tablet  Generic drug: rivaroxaban               Where to Get Your Medications        These medications were sent to [address] - P [phone] - F [phone]  [address] [facility] [address]      Phone: [phone]   cephALEXin 500 MG capsule  metroNIDAZOLE 500 MG tablet           Note that more than 30 minutes was spent in preparing discharge papers, discussing discharge with patient, medication review, etc.    Signed:  Electronically signed by Hollie Funk MD on 4/21/2025 at 12:52 PM

## 2025-04-21 NOTE — ACP (ADVANCE CARE PLANNING)
Advance Care Planning   Healthcare Decision Maker:    Primary Decision Maker: Genny Dexter - Child - [phone]    Secondary Decision Maker (Active): tony Flores - Janessa - [phone]    Click here to complete Healthcare Decision Makers including selection of the Healthcare Decision Maker Relationship (ie \"Primary\").                 Payal Garg RN.

## 2025-04-21 NOTE — PROGRESS NOTES
OCCUPATIONAL THERAPY INITIAL EVALUATION    [facility] [address] [place], [place]       Date:2025                                                  Patient Name: Radha Dixon  MRN: [id number]  : 1934  Room: [address] [place] [address]    Evaluating OT: Arnaud Jj OTR/L [id number]    Referring Provider: Kaitlin Best APRN - CNP      Specific Provider Orders/Date: OT evaluation and treatment 25 0100    Diagnosis:  Hyperkalemia [E87.5]  Diverticulitis [K57.92]  Diverticulitis of colon [K57.32]  MALGORZATA (acute kidney injury) [N17.9]  Sepsis (HCC) [A41.9]      Pertinent Medical History:  has a past medical history of Asthma, Atrial fibrillation, persistent (HCC), Cancer (HCC), Depression, Generalized osteoarthritis, Hyperlipidemia, Knee pain, Pinched nerve in neck, Prolonged emergence from general anesthesia, Thyroid disease, and Vitamin D deficiency.   Past Surgical History:   Procedure Laterality Date    APPENDECTOMY  58 YEARS AGO    ARTHROPLASTY Left 2023    LEFT CARPAL TUNNEL RELEASE LEFT MEDIAN NERVE DECOMPRESSION AT ELBOW LEFT THUMB TRAPEZIECTOMY LIGAMENT RECONSTRUCTION TENDON INTERPOSITION WITH FIBERTAK performed by Chino Handley MD at [facility] OR    CARDIOVERSION  2019    Dr. Ledesma 1 shock 200 joules Successful    CARDIOVERSION  2021    Dr. Matias. 200J x1 converted to NSR    CHOLECYSTECTOMY      CYSTOSCOPY N/A 2024    CYSTOSCOPY BILATERAL RETROGRADE PYELOGRAM, RIGHT URETEROSCOPY, RIGHT URETERAL BIOPSIES, RIGHT URETERAL STENT INSERTION, PELVIC EXAM performed by Georgi Shrestha MD at [facility] OR    CYSTOSCOPY N/A 2025    CYSTOSCOPY RIGHT RETROGRADE PYELOGRAM, RIGHT URETEROSCOPY, BASKET EXTRACTION RIGHT URETERAL TUMOR, LASER ABLATION OF TUMOR, RIGHT URETERAL STENT EXCHANGE performed by Georgi Shrestha MD at [facility] OR    EYE SURGERY      CATARACT RIGHT AND LEFT    HYSTERECTOMY (CERVIX STATUS UNKNOWN)      NECK

## 2025-04-21 NOTE — PROGRESS NOTES
Physical Therapy  Initial Assessment     Name: Radha Dixon  : 1934  MRN: [id number]      Date of Service: 2025    Evaluating PT: Cirilo Logan, PT, DPT, [id number]      Room #:  8516/8516-B  Diagnosis:  Hyperkalemia [E87.5]  Diverticulitis [K57.92]  Diverticulitis of colon [K57.32]  MALGORZATA (acute kidney injury) [N17.9]  Sepsis (HCC) [A41.9]  PMHx/PSHx:   has a past medical history of Asthma, Atrial fibrillation, persistent (HCC), Cancer (HCC), Depression, Generalized osteoarthritis, Hyperlipidemia, Knee pain, Pinched nerve in neck, Prolonged emergence from general anesthesia, Thyroid disease, and Vitamin D deficiency.  Procedure/Surgery:  n/a  Precautions:  Fall risk, hearing difficulty   Equipment Needs:  N/A    SUBJECTIVE:    Pt lives alone in a 1 story condo with 5 stair(s) and 1 rail(s) to enter. Pt independent with all mobility prior. Pt ambulated with a rollator prior to admission.     OBJECTIVE:   Initial Evaluation  Date: 25 Treatment Date: Short Term/ Long Term   Goals   AM-PAC 6 Clicks      Was pt agreeable to Eval/treatment? yes     Does pt have pain? No pain reported     Bed Mobility  Rolling: NT  Supine to sit: SBA  Sit to supine: NT  Scooting: SBA  Rolling: Independent  Supine to sit: Independent  Sit to supine: Independent  Scooting: Independent   Transfers Sit to stand: SBA  Stand to sit: SBA  Stand pivot: SBA  Sit to stand: Independent  Stand to sit: Independent  Stand pivot: Independent   Ambulation   75 x 2 feet with SBA with WW  300 feet with Independent with LRD   Stair negotiation: ascended and descended NT  5 step(s) with 1 rail(s) with Independent   ROM BUE: Refer to OT note  BLE: WFL     Strength BUE: Refer to OT note  BLE: WFL     Balance Sitting EOB: SBA  Dynamic Standing: SBA  Sitting EOB: Independent  Dynamic Standing: Independent     Pt is A & O x: x4, able to answer all questions consistently   Sensation: no reports of numbness or tingling   Edema: n/a    Patient  minutes  [] Neuromuscular reeducation [id number] 0 minutes     Cirilo Logan, PT, DPT  [id number]

## 2025-04-23 LAB
MICROORGANISM SPEC CULT: NORMAL
MICROORGANISM SPEC CULT: NORMAL
SERVICE CMNT-IMP: NORMAL
SERVICE CMNT-IMP: NORMAL
SPECIMEN DESCRIPTION: NORMAL
SPECIMEN DESCRIPTION: NORMAL

## 2025-05-01 ENCOUNTER — RESULTS FOLLOW-UP (OUTPATIENT)
Dept: CARDIOLOGY CLINIC | Age: 89
End: 2025-05-01

## 2025-05-01 ENCOUNTER — HOSPITAL ENCOUNTER (OUTPATIENT)
Dept: OTHER | Age: 89
Setting detail: THERAPIES SERIES
Discharge: HOME | End: 2025-05-01
Payer: MEDICARE

## 2025-05-01 VITALS
WEIGHT: 153 LBS | SYSTOLIC BLOOD PRESSURE: 124 MMHG | BODY MASS INDEX: 23.26 KG/M2 | RESPIRATION RATE: 20 BRPM | DIASTOLIC BLOOD PRESSURE: 90 MMHG | OXYGEN SATURATION: 96 % | HEART RATE: 90 BPM

## 2025-05-01 LAB
ANION GAP SERPL CALCULATED.3IONS-SCNC: 13 MMOL/L (ref 7–16)
BNP SERPL-MCNC: ABNORMAL PG/ML (ref 0–450)
BUN SERPL-MCNC: 21 MG/DL (ref 6–23)
CALCIUM SERPL-MCNC: 9.3 MG/DL (ref 8.6–10.2)
CHLORIDE SERPL-SCNC: 104 MMOL/L (ref 98–107)
CO2 SERPL-SCNC: 22 MMOL/L (ref 22–29)
CREAT SERPL-MCNC: 2 MG/DL (ref 0.5–1)
GFR, ESTIMATED: 23 ML/MIN/1.73M2
GLUCOSE SERPL-MCNC: 92 MG/DL (ref 74–99)
POTASSIUM SERPL-SCNC: 4 MMOL/L (ref 3.5–5)
SODIUM SERPL-SCNC: 139 MMOL/L (ref 132–146)

## 2025-05-01 PROCEDURE — 99214 OFFICE O/P EST MOD 30 MIN: CPT

## 2025-05-01 PROCEDURE — 80048 BASIC METABOLIC PNL TOTAL CA: CPT

## 2025-05-01 PROCEDURE — 36415 COLL VENOUS BLD VENIPUNCTURE: CPT

## 2025-05-01 PROCEDURE — 83880 ASSAY OF NATRIURETIC PEPTIDE: CPT

## 2025-05-01 NOTE — PROGRESS NOTES
Congestive Heart Failure Clinic   [facility]      Referring Provider: DR. JASSO  Primary Care Physician: [facility]  Cardiologist: DR. JASSO  Nephrologist: N/A      HISTORY OF PRESENT ILLNESS:     Radha Dixon is a 89 y.o. (7/11/1934) female with a history of HFrEF (EF < 40%)  Pre Cupid:     Lab Results   Component Value Date    LVEF 55 04/20/2025     Post Cupid:    Lab Results   Component Value Date    EFBP 61 04/20/2025         She presents to the CHF clinic for ongoing evaluation and monitoring of heart failure.    In the CHF clinic today she denies any adverse symptoms except:  [] Dizziness or lightheadedness  [] Syncope or near syncope  [] Recent Fall  [] Shortness of breath at rest   [] Dyspnea with exertion  [] Decline in functional capacity (unable to perform activities they had previously been able to do)  [] Fatigue   [] Orthopnea  [] PND  [] Nocturnal cough  [] Hemoptysis  [] Chest pain, pressure, or discomfort  [] Palpitations  [] Abdominal distention  [] Abdominal bloating  [] Early satiety  [] Blood in stool   [] Diarrhea  [] Constipation  [] Nausea/Vomiting  [] Decreased urinary response to oral diuretic   [] Scrotal swelling   [] Lower extremity edema  [] Used PRN doses of oral diuretic   [] Weight gain    Wt Readings from Last 3 Encounters:   05/01/25 69.4 kg (153 lb)   05/01/25 69.4 kg (153 lb)   04/20/25 65.3 kg (144 lb)           SOCIAL HISTORY:  [x] Denies tobacco, alcohol or illicit drug abuse  [] Tobacco use:  [] ETOH use:  [] Illicit drug use:        MEDICATIONS:    Allergies   Allergen Reactions    Codeine Nausea And Vomiting    Codimal-A [Brompheniramine] Other (See Comments)     Altered mental state and confusion    Lipitor Other (See Comments)     GENERALIZED ACHING, DIFFICULT TO FUNCTION    Spiriva Handihaler [Tiotropium Bromide Monohydrate]     Tudorza Pressair [Aclidinium Bromide]     Ciprofloxacin Nausea And

## 2025-05-03 NOTE — PROGRESS NOTES
Physician Progress Note      PATIENT:               SILVIA YU  CSN #:                  [id number]  :                       1934  ADMIT DATE:       2025 5:12 AM  DISCH DATE:        2025 3:13 PM  RESPONDING  PROVIDER #:        Hollie Funk MD          QUERY TEXT:    Sepsis and acute organ dysfunction of MALGORZATA are documented in the Discharge   Summary by Hollie Funk MD at 2025. Please clarify the relationship, if   any, between Sepsis and MALGORZATA.    The clinical indicators include:  -\"Acute diverticulitis-CT abdomen pelvis noted, cultures ordered.,  IV fluid,   continue ceftriaxone and Flagyl. Monitor abdominal exam. MALGORZATA on CKD/recent   nephrectomy-baseline creatinine 1-1.8, IV fluids, nephrology consulted.\"  -\"Sepsis. Acute diverticulitis. Presented with elevated white count, lactic   acid 2.3. White count is downtrending, 12.1 today. Blood pressure improved.   Blood culture NTD. Continue ceftriaxone, Flagyl. Repeat lactic acid 1.1.\"   (Internal Medicine Progress Notes by Hollie Funk MD at 2025)  -\"Active Problems: Sepsis\" (Discharge Summary by Hollie Funk MD at   2025)  -WBC 18.6, Glucose 102, lactic acid 2.3 (Epic labs)  -Treatment: IV ceftriaxone and Flagyl, CT abdomen and pelvis, IV fluid bolus,   BMP monitorung, Nephrology Consult  Options provided:  -- Acute organ dysfunction is related to sepsis  -- Acute organ dysfunction is unrelated to sepsis  -- Other - I will add my own diagnosis  -- Disagree - Not applicable / Not valid  -- Disagree - Clinically unable to determine / Unknown  -- Refer to Clinical Documentation Reviewer    PROVIDER RESPONSE TEXT:    This patient has acute organ dysfunction related to sepsis.    Query created by: Lennie Thrasher on 2025 11:55 AM      Electronically signed by:  Hollie Funk MD 5/3/2025 11:03 AM

## 2025-05-09 ENCOUNTER — APPOINTMENT (OUTPATIENT)
Dept: GENERAL RADIOLOGY | Age: 89
End: 2025-05-09
Payer: MEDICARE

## 2025-05-09 ENCOUNTER — HOSPITAL ENCOUNTER (EMERGENCY)
Age: 89
Discharge: HOME | End: 2025-05-09
Attending: EMERGENCY MEDICINE
Payer: MEDICARE

## 2025-05-09 VITALS
SYSTOLIC BLOOD PRESSURE: 120 MMHG | BODY MASS INDEX: 22.88 KG/M2 | TEMPERATURE: 98.2 F | DIASTOLIC BLOOD PRESSURE: 83 MMHG | HEART RATE: 94 BPM | RESPIRATION RATE: 21 BRPM | WEIGHT: 151 LBS | OXYGEN SATURATION: 94 % | HEIGHT: 68 IN

## 2025-05-09 DIAGNOSIS — I48.91 ATRIAL FIBRILLATION, UNSPECIFIED TYPE (HCC): Primary | ICD-10-CM

## 2025-05-09 DIAGNOSIS — R06.02 SHORTNESS OF BREATH: ICD-10-CM

## 2025-05-09 DIAGNOSIS — R07.9 CHEST PAIN, UNSPECIFIED TYPE: ICD-10-CM

## 2025-05-09 LAB
ALBUMIN SERPL-MCNC: 3.9 G/DL (ref 3.5–5.2)
ALP SERPL-CCNC: 117 U/L (ref 35–104)
ALT SERPL-CCNC: 16 U/L (ref 0–35)
ANION GAP SERPL CALCULATED.3IONS-SCNC: 13 MMOL/L (ref 7–16)
AST SERPL-CCNC: 68 U/L (ref 0–35)
BASOPHILS # BLD: 0.05 K/UL (ref 0–0.2)
BASOPHILS NFR BLD: 1 % (ref 0–2)
BILIRUB SERPL-MCNC: 0.6 MG/DL (ref 0–1.2)
BNP SERPL-MCNC: ABNORMAL PG/ML (ref 0–450)
BUN SERPL-MCNC: 27 MG/DL (ref 8–23)
CALCIUM SERPL-MCNC: 9.6 MG/DL (ref 8.8–10.2)
CHLORIDE SERPL-SCNC: 106 MMOL/L (ref 98–107)
CO2 SERPL-SCNC: 21 MMOL/L (ref 22–29)
CREAT SERPL-MCNC: 1.8 MG/DL (ref 0.5–1)
EKG ATRIAL RATE: 250 BPM
EKG Q-T INTERVAL: 410 MS
EKG QRS DURATION: 82 MS
EKG QTC CALCULATION (BAZETT): 520 MS
EKG R AXIS: 121 DEGREES
EKG T AXIS: 230 DEGREES
EKG VENTRICULAR RATE: 97 BPM
EOSINOPHIL # BLD: 0.12 K/UL (ref 0.05–0.5)
EOSINOPHILS RELATIVE PERCENT: 1 % (ref 0–6)
ERYTHROCYTE [DISTWIDTH] IN BLOOD BY AUTOMATED COUNT: 20.8 % (ref 11.5–15)
GFR, ESTIMATED: 26 ML/MIN/1.73M2
GLUCOSE SERPL-MCNC: 92 MG/DL (ref 74–99)
HCT VFR BLD AUTO: 39.8 % (ref 34–48)
HGB BLD-MCNC: 12.2 G/DL (ref 11.5–15.5)
IMM GRANULOCYTES # BLD AUTO: 0.05 K/UL (ref 0–0.58)
IMM GRANULOCYTES NFR BLD: 1 % (ref 0–5)
LYMPHOCYTES NFR BLD: 0.86 K/UL (ref 1.5–4)
LYMPHOCYTES RELATIVE PERCENT: 9 % (ref 20–42)
MCH RBC QN AUTO: 27.4 PG (ref 26–35)
MCHC RBC AUTO-ENTMCNC: 30.7 G/DL (ref 32–34.5)
MCV RBC AUTO: 89.4 FL (ref 80–99.9)
MONOCYTES NFR BLD: 0.75 K/UL (ref 0.1–0.95)
MONOCYTES NFR BLD: 8 % (ref 2–12)
NEUTROPHILS NFR BLD: 81 % (ref 43–80)
NEUTS SEG NFR BLD: 7.69 K/UL (ref 1.8–7.3)
PLATELET # BLD AUTO: 270 K/UL (ref 130–450)
PMV BLD AUTO: 12 FL (ref 7–12)
POTASSIUM SERPL-SCNC: 3.4 MMOL/L (ref 3.5–5.1)
POTASSIUM SERPL-SCNC: 5.4 MMOL/L (ref 3.5–5.1)
PROT SERPL-MCNC: 7 G/DL (ref 6.4–8.3)
RBC # BLD AUTO: 4.45 M/UL (ref 3.5–5.5)
RBC # BLD: ABNORMAL 10*6/UL
SODIUM SERPL-SCNC: 140 MMOL/L (ref 136–145)
TROPONIN I SERPL HS-MCNC: 34 NG/L (ref 0–14)
TROPONIN I SERPL HS-MCNC: 35 NG/L (ref 0–14)
TROPONIN I SERPL HS-MCNC: NORMAL NG/L (ref 0–14)
WBC OTHER # BLD: 9.5 K/UL (ref 4.5–11.5)

## 2025-05-09 PROCEDURE — 36415 COLL VENOUS BLD VENIPUNCTURE: CPT

## 2025-05-09 PROCEDURE — 71045 X-RAY EXAM CHEST 1 VIEW: CPT

## 2025-05-09 PROCEDURE — 93010 ELECTROCARDIOGRAM REPORT: CPT | Performed by: INTERNAL MEDICINE

## 2025-05-09 PROCEDURE — 6370000000 HC RX 637 (ALT 250 FOR IP): Performed by: EMERGENCY MEDICINE

## 2025-05-09 PROCEDURE — 80053 COMPREHEN METABOLIC PANEL: CPT

## 2025-05-09 PROCEDURE — 83880 ASSAY OF NATRIURETIC PEPTIDE: CPT

## 2025-05-09 PROCEDURE — 85025 COMPLETE CBC W/AUTO DIFF WBC: CPT

## 2025-05-09 PROCEDURE — 93005 ELECTROCARDIOGRAM TRACING: CPT

## 2025-05-09 PROCEDURE — 84484 ASSAY OF TROPONIN QUANT: CPT

## 2025-05-09 PROCEDURE — 99285 EMERGENCY DEPT VISIT HI MDM: CPT

## 2025-05-09 PROCEDURE — 84132 ASSAY OF SERUM POTASSIUM: CPT

## 2025-05-09 RX ORDER — POTASSIUM CHLORIDE 1500 MG/1
40 TABLET, EXTENDED RELEASE ORAL ONCE
Status: COMPLETED | OUTPATIENT
Start: 2025-05-09 | End: 2025-05-09

## 2025-05-09 RX ADMIN — POTASSIUM CHLORIDE 40 MEQ: 1500 TABLET, EXTENDED RELEASE ORAL at 09:48

## 2025-05-09 ASSESSMENT — PAIN DESCRIPTION - LOCATION: LOCATION: CHEST

## 2025-05-09 ASSESSMENT — PAIN DESCRIPTION - DESCRIPTORS: DESCRIPTORS: ACHING

## 2025-05-09 ASSESSMENT — PAIN - FUNCTIONAL ASSESSMENT: PAIN_FUNCTIONAL_ASSESSMENT: 0-10

## 2025-05-09 ASSESSMENT — PAIN SCALES - GENERAL: PAINLEVEL_OUTOF10: 6

## 2025-05-09 NOTE — ED PROVIDER NOTES
[facility] EMERGENCY DEPARTMENT  EMERGENCY DEPARTMENT ENCOUNTER        Pt Name: Radha Dixon  MRN: [id number]  Birthdate 7/11/1934  Date of evaluation: 5/9/2025  Provider: Flakito Flanagan MD  PCP: Madeleine Caro NP-C  Note Started: 6:39 AM EDT 5/9/25    CHIEF COMPLAINT       Chief Complaint   Patient presents with    Chest Pain     Patient complaining of chest pain since 3 am.  Patient states the pain is in the center of her chest.  Complains of intermittent SOB.  Given 1 Nitro PTA.       HISTORY OF PRESENT ILLNESS: 1 or more Elements   History From: Patient    Limitations to history : None    Radha Dixon is a 89 y.o. female with past medical history of A-fib on Xarelto, hyperlipidemia, depression, asthma, hypothyroidism, COPD, CHF, right urothelial carcinoma not currently undergoing radiation or chemotherapy who presents from home with complaints of chest pain.  Patient states chest pain woke her from sleep, is localized to the epigastric region, nonradiating, has no alleviating factors, worse on palpation, sharp in quality, is currently 6 out of 10 the pain scale.  Patient states she is on Xarelto for A-fib and has been compliant has not missed a dose at this time.  Patient denies any shortness of breath, nausea, vomiting, fevers or chills, headache, blurry vision, falls or trauma, numbness tingling in extremities, dizziness or lightheadedness, dysuria, hematuria, diarrhea, constipation.  Patient denies tobacco, EtOH, illicit drug use at this time.    Nursing Notes were all reviewed and agreed with or any disagreements were addressed in the HPI.    ROS:   Pertinent positives and negatives are stated within HPI, all other systems reviewed and are negative.    --------------------------------------------- PAST HISTORY ---------------------------------------------  Past Medical History:  has a past medical history of Asthma, Atrial fibrillation, persistent (HCC),  the patient is NOT to be included for SEP-1 Core Measure due to: Infection is not suspected    Non-plain film images such as CT, Ultrasound and MRI are read by the radiologist. Plain radiographic images are visualized and preliminarily interpreted by the ED Provider with the below findings:    Demonstration cardiomegaly with a small bilateral pleural effusion    Discussion with Other Professionals: See ED course  CONSULTS: discussion with bolded \"IP consult\", otherwise consult was likely placed by admitting service  None    Social determinants of health affecting patient care:  stress, not elsewhere classified and poor medical literacy    Records Reviewed : Source discharge summary from 4/21/2025    CONSULTS: None    DISPOSITION:     Pt will be d/c and will follow up with her PCP. She is educated on signs and symptoms that require emergent evaluation. Pt is advised to return to the ED if her symptoms change or worsen. If her pain persists, pt may need further evaluation. Pt is agreeable to plan and all questions have been answered at this time.      1. Atrial fibrillation, unspecified type (HCC)    2. Chest pain, unspecified type    3. Shortness of breath      Re-Evaluations/Consultations:             ED Course as of 05/10/25 1446   Fri May 09, 2025   0657 Of note, echo done on 4/20/2025 showing EF of 55 to 60%.  Showed normal wall motion with moderate sclerosis of the aortic valve cusp with moderate severe stenosis of the aortic valve.  Also showed mild to moderate regurg of the tricuspid valve with a mildly dilated right atrium.  Of note, last nuclear stress test done on 10/10/2024 that suggested a low risk of cardiac events at that time. [JH]   0700 Patient presents from home for epigastric and lower chest pain, acute onset at 3:30 AM this morning.  Nonradiating.  No associated dyspnea, nausea, vomiting, diaphoresis.  No recent illness, fever, cough, abdominal pain.  No known history of CAD.  She does have atrial

## 2025-05-09 NOTE — CARE COORDINATION
Social Work/ Case Management Transition of Care Planning (ALIYA Herrmann [phone]):     Pt presented to the ED with chest pain. Pt recently discharged from this facility on 4/21/25. Pt will return home with her daughter when medically clear.   ALIYA Herrmann

## 2025-05-10 NOTE — ED PROVIDER NOTES
ATTENDING PROVIDER ATTESTATION:     Radha Dixon presented to the emergency department for evaluation of chest pain and was initially evaluated by the resident physician. See Original ED Note for H&P and ED course above.     I have reviewed and discussed the case, including pertinent history (medical, surgical, family and social) and exam findings with the resident.  I have personally performed and/or participated in the history, exam, medical decision making, and procedures and agree with all pertinent clinical information except for any differences as noted below. I was present for all key portions of any procedures performed during the ED course. I personally reviewed any EKG obtained and agree with the resident interpretation unless stated otherwise below.   I have reviewed my findings and recommendations with the assigned resident, the patient, and members of family present at the time of disposition.      ED Course as of 05/09/25 2159   Fri May 09, 2025   0657 Of note, echo done on 4/20/2025 showing EF of 55 to 60%.  Showed normal wall motion with moderate sclerosis of the aortic valve cusp with moderate severe stenosis of the aortic valve.  Also showed mild to moderate regurg of the tricuspid valve with a mildly dilated right atrium.  Of note, last nuclear stress test done on 10/10/2024 that suggested a low risk of cardiac events at that time. [JH]   0700 Patient presents from home for epigastric and lower chest pain, acute onset at 3:30 AM this morning.  Nonradiating.  No associated dyspnea, nausea, vomiting, diaphoresis.  No recent illness, fever, cough, abdominal pain.  No known history of CAD.  She does have atrial fib on Xarelto metoprolol and amiodarone.  Non-toxic appearing, afebrile, hemodynamically stable, and in no acute distress. Breathing comfortably on room air without respiratory distress. Neuro intact.  Plan to obtain lab and imaging workup and will treat and disposition accordingly based

## 2025-05-16 ENCOUNTER — TELEPHONE (OUTPATIENT)
Dept: CARDIOLOGY CLINIC | Age: 89
End: 2025-05-16

## 2025-05-16 NOTE — TELEPHONE ENCOUNTER
Pt and pt daughter stopped in the office stating they wanted to speak with someone regarding the care they have been receiving. Patient was wondering why she never got a call back from the  coordinator about a medication issue. I had stated that I was covering for Coral (dr batista coordinator) for today and Monday. Patients daughter then came in and stated she was unhappy with the care her mother had been receiving. I had looked in the chart and there is no telephone encounter of them ever interacting. Pt daughter stated that the CHF had reached out to the office to consult with the medications she was taking but I didn't see a note of that either. Patients daughter then stated she will be going to [facility] and keep dr batista as an emergency doctor incase. I had then told them I am covering for the coordinator and I would reach out to dr batista about the issue but they told me not to. Pt and pt daughter had then left.

## 2025-05-22 ENCOUNTER — APPOINTMENT (OUTPATIENT)
Dept: OTHER | Age: 89
End: 2025-05-22
Payer: MEDICARE

## 2025-05-27 RX ORDER — RIVAROXABAN 15 MG/1
TABLET, FILM COATED ORAL
Qty: 30 TABLET | Refills: 5 | Status: SHIPPED | OUTPATIENT
Start: 2025-05-27

## 2025-05-30 ENCOUNTER — HOSPITAL ENCOUNTER (OUTPATIENT)
Dept: ULTRASOUND IMAGING | Age: 89
End: 2025-05-30
Payer: MEDICARE

## 2025-05-30 ENCOUNTER — APPOINTMENT (OUTPATIENT)
Dept: GENERAL RADIOLOGY | Age: 89
End: 2025-05-30
Payer: MEDICARE

## 2025-05-30 ENCOUNTER — HOSPITAL ENCOUNTER (OUTPATIENT)
Age: 89
Discharge: HOME | End: 2025-05-30
Payer: MEDICARE

## 2025-05-30 ENCOUNTER — HOSPITAL ENCOUNTER (EMERGENCY)
Age: 89
Discharge: HOME | End: 2025-05-30
Attending: STUDENT IN AN ORGANIZED HEALTH CARE EDUCATION/TRAINING PROGRAM
Payer: MEDICARE

## 2025-05-30 VITALS
BODY MASS INDEX: 22.58 KG/M2 | WEIGHT: 149 LBS | HEIGHT: 68 IN | RESPIRATION RATE: 17 BRPM | HEART RATE: 98 BPM | SYSTOLIC BLOOD PRESSURE: 140 MMHG | TEMPERATURE: 97.1 F | OXYGEN SATURATION: 96 % | DIASTOLIC BLOOD PRESSURE: 92 MMHG

## 2025-05-30 DIAGNOSIS — M79.605 LEFT LEG PAIN: Primary | ICD-10-CM

## 2025-05-30 DIAGNOSIS — M79.605 LEFT LEG PAIN: ICD-10-CM

## 2025-05-30 PROCEDURE — 93971 EXTREMITY STUDY: CPT

## 2025-05-30 PROCEDURE — 73590 X-RAY EXAM OF LOWER LEG: CPT

## 2025-05-30 PROCEDURE — 99283 EMERGENCY DEPT VISIT LOW MDM: CPT

## 2025-05-30 PROCEDURE — 6370000000 HC RX 637 (ALT 250 FOR IP)

## 2025-05-30 RX ORDER — ACETAMINOPHEN 325 MG/1
650 TABLET ORAL ONCE
Status: COMPLETED | OUTPATIENT
Start: 2025-05-30 | End: 2025-05-30

## 2025-05-30 RX ADMIN — ACETAMINOPHEN 650 MG: 325 TABLET ORAL at 04:59

## 2025-05-30 ASSESSMENT — PAIN DESCRIPTION - LOCATION
LOCATION: ANKLE;FOOT
LOCATION: ANKLE

## 2025-05-30 ASSESSMENT — PAIN DESCRIPTION - DESCRIPTORS
DESCRIPTORS: ACHING;CRAMPING;DISCOMFORT
DESCRIPTORS: ACHING;CRUSHING;DISCOMFORT

## 2025-05-30 ASSESSMENT — PAIN SCALES - GENERAL
PAINLEVEL_OUTOF10: 7
PAINLEVEL_OUTOF10: 6

## 2025-05-30 ASSESSMENT — PAIN DESCRIPTION - ORIENTATION: ORIENTATION: LEFT

## 2025-05-30 ASSESSMENT — PAIN - FUNCTIONAL ASSESSMENT: PAIN_FUNCTIONAL_ASSESSMENT: 0-10

## 2025-05-30 ASSESSMENT — LIFESTYLE VARIABLES: HOW OFTEN DO YOU HAVE A DRINK CONTAINING ALCOHOL: NEVER

## 2025-05-30 NOTE — ED PROVIDER NOTES
[facility] EMERGENCY DEPARTMENT  EMERGENCY DEPARTMENT ENCOUNTER        Pt Name: Radha Dixon  MRN: [id number]  Birthdate 7/11/1934  Date of evaluation: 5/30/2025  Provider: Radha Urban MD  PCP: Madeleine Caro NP-C  Note Started: 2:50 AM EDT 5/30/25    CHIEF COMPLAINT       Chief Complaint   Patient presents with    Ankle Pain     Ankle pain after dinner last night concerned because pain is going up leg now has hx of blood clots       HISTORY OF PRESENT ILLNESS: 1 or more Elements   History From: Patient    Limitations to history : None    Radha Dixon is a 89 y.o. female who presents for left ankle and left shin pain starting today after leaving a restaurant.  Patient states she was walking to the car when the pain started and has gotten worse throughout the night.  She does have a history of blood clots and is on Xarelto.  She reports compliance but is concerned that she may have another blood clot.  She denies any injuries or trauma.  She does also have a history of atrial fibrillation.  Denies chest pain, shortness of breath, abdominal pain, nausea, vomiting, fevers.    Nursing Notes were all reviewed and agreed with or any disagreements were addressed in the HPI.        REVIEW OF EXTERNAL NOTE :       Patient was last seen in office by nephrology on 5/16/2025 for history of malignant neoplasm of retroperitoneum, status post nephrectomy, hyperkalemia, acute nontraumatic kidney injury, heart failure with normal ejection fraction, CKD stage IV    REVIEW OF SYSTEMS :           Positives and Pertinent negatives as per HPI.     SURGICAL HISTORY     Past Surgical History:   Procedure Laterality Date    APPENDECTOMY  58 YEARS AGO    ARTHROPLASTY Left 03/29/2023    LEFT CARPAL TUNNEL RELEASE LEFT MEDIAN NERVE DECOMPRESSION AT ELBOW LEFT THUMB TRAPEZIECTOMY LIGAMENT RECONSTRUCTION TENDON INTERPOSITION WITH FIBERTAK performed by Chino Handley MD at [facility] OR       potassium chloride (K-TAB) 20 MEQ TBCR extended release tablet Take 1 tablet by mouth dailyHistorical MedPaused since Mon 4/21/2025 until manually resumed      phenazopyridine (PYRIDIUM) 100 MG tablet Take 1 tablet by mouth 3 times daily as needed for PainHistorical Med      metoprolol succinate (TOPROL XL) 50 MG extended release tablet Take 1.5 tablets by mouth daily, Disp-45 tablet, R-5Normal      amiodarone (PACERONE) 100 MG tablet Take 1 tablet by mouth daily, Disp-30 tablet, R-5Normal      magnesium gluconate (MAGONATE) 500 MG tablet Take 1 tablet by mouth dailyHistorical Med      fluticasone-salmeterol (ADVAIR DISKUS) 250-50 MCG/ACT AEPB diskus inhaler Inhale 1 puff into the lungs in the morning and 1 puff in the evening., Disp-60 each, R-3Normal      citalopram (CELEXA) 20 MG tablet Take 0.5 tablets by mouth dailyHistorical Med      ferrous sulfate (IRON 325) 325 (65 Fe) MG tablet Take 1 tablet by mouth every other dayHistorical Med      albuterol sulfate HFA (PROVENTIL;VENTOLIN;PROAIR) 108 (90 Base) MCG/ACT inhaler Inhale 2 puffs into the lungs 4 times daily as needed for Wheezing, Disp-54 g, R-1Normal      fluticasone (FLONASE) 50 MCG/ACT nasal spray 1 spray by Nasal route daily as needed for RhinitisHistorical Med      loratadine (CLARITIN) 10 MG tablet Take 1 tablet by mouth daily as needed (allergy)Historical Med      Cholecalciferol (VITAMIN D3) 50 MCG (2000 UT) TABS Take 1 tablet by mouth dailyHistorical Med      Vitamin E 400 UNIT TABS Take 400 Units by mouth dailyHistorical Med             ALLERGIES     Codeine, Codimal-a [brompheniramine], Lipitor, Spiriva handihaler [tiotropium bromide monohydrate], Tudorza pressair [aclidinium bromide], and Ciprofloxacin    FAMILYHISTORY       Family History   Problem Relation Age of Onset    Melanoma Mother 60    Breast Cancer Daughter 40        invasive, recurrence age 58        SOCIAL HISTORY       Social History     Tobacco Use    Smoking status: Former      interpreted by me, the above displayed labs are abnormal. All other labs obtained during this visit were within normal range or not returned as of this dictation.      EKG Interpretation  Interpreted by emergency department physician, MD CARON Vigil      RADIOLOGY:   Non-plain film images such as CT, Ultrasound and MRI are read by the radiologist. Plain radiographic images are visualized and preliminarily interpreted by the ED Provider with the below findings:    XR left tib fib shows no acute fracture or subcutaneous gas    Interpretation per the Radiologist below, if available at the time of this note:    XR TIBIA FIBULA LEFT (2 VIEWS)   Final Result   No acute osseous abnormality.         Vascular duplex lower extremity venous left    (Results Pending)     No results found.    No results found.    PROCEDURES   Unless otherwise noted below, none          CRITICAL CARE TIME (.cct)       PAST MEDICAL HISTORY/Chronic Conditions Affecting Care      has a past medical history of Asthma, Atrial fibrillation, persistent (HCC), Cancer (HCC), Depression, Generalized osteoarthritis (09/12/2006), Hyperlipidemia, Knee pain, Pinched nerve in neck, Prolonged emergence from general anesthesia, Thyroid disease, and Vitamin D deficiency.     EMERGENCY DEPARTMENT COURSE    Vitals:    Vitals:    05/30/25 0113 05/30/25 0145   BP:  (!) 140/92   Pulse: (!) 104 98   Resp:  17   Temp: 97.1 °F (36.2 °C)    TempSrc: Temporal    SpO2: 94% 96%   Weight:  67.6 kg (149 lb)   Height:  1.727 m (5' 8\")       Patient was given the following medications:  Medications   acetaminophen (TYLENOL) tablet 650 mg (650 mg Oral Given 5/30/25 0459)         No   Exclusion criteria - the patient is NOT to be included for SEP-1 Core Measure due to:  Infection is not suspected        Medical Decision Making/Differential Diagnosis:    CC/HPI Summary, Social Determinants of health, Records Reviewed, DDx, testing done/not done, ED Course, Reassessment,

## 2025-05-30 NOTE — DISCHARGE INSTRUCTIONS
Please present to the hospital this morning to have ultrasound of left lower extremity to evaluate for DVT.  Please follow-up with your primary care physician.  Please return to the emergency department if you have redness, worsening pain, other concerning symptoms

## 2025-05-31 ENCOUNTER — HOSPITAL ENCOUNTER (EMERGENCY)
Age: 89
Discharge: HOME | End: 2025-05-31
Attending: EMERGENCY MEDICINE
Payer: MEDICARE

## 2025-05-31 VITALS
TEMPERATURE: 97.5 F | HEART RATE: 96 BPM | DIASTOLIC BLOOD PRESSURE: 81 MMHG | RESPIRATION RATE: 16 BRPM | SYSTOLIC BLOOD PRESSURE: 132 MMHG | OXYGEN SATURATION: 96 %

## 2025-05-31 DIAGNOSIS — M79.605 PAIN OF LEFT LOWER EXTREMITY: Primary | ICD-10-CM

## 2025-05-31 LAB
ALBUMIN SERPL-MCNC: 4.1 G/DL (ref 3.5–5.2)
ALP SERPL-CCNC: 143 U/L (ref 35–104)
ALT SERPL-CCNC: 19 U/L (ref 0–32)
ANION GAP SERPL CALCULATED.3IONS-SCNC: 11 MMOL/L (ref 7–16)
AST SERPL-CCNC: 30 U/L (ref 0–31)
BASOPHILS # BLD: 0.04 K/UL (ref 0–0.2)
BASOPHILS NFR BLD: 1 % (ref 0–2)
BILIRUB SERPL-MCNC: 0.8 MG/DL (ref 0–1.2)
BUN SERPL-MCNC: 29 MG/DL (ref 6–23)
CALCIUM SERPL-MCNC: 9.9 MG/DL (ref 8.6–10.2)
CHLORIDE SERPL-SCNC: 106 MMOL/L (ref 98–107)
CO2 SERPL-SCNC: 22 MMOL/L (ref 22–29)
CREAT SERPL-MCNC: 1.8 MG/DL (ref 0.5–1)
EOSINOPHIL # BLD: 0.04 K/UL (ref 0.05–0.5)
EOSINOPHILS RELATIVE PERCENT: 1 % (ref 0–6)
ERYTHROCYTE [DISTWIDTH] IN BLOOD BY AUTOMATED COUNT: 19.2 % (ref 11.5–15)
ERYTHROCYTE [SEDIMENTATION RATE] IN BLOOD BY WESTERGREN METHOD: 0 MM/HR (ref 0–20)
GFR, ESTIMATED: 27 ML/MIN/1.73M2
GLUCOSE SERPL-MCNC: 107 MG/DL (ref 74–99)
HCT VFR BLD AUTO: 43 % (ref 34–48)
HGB BLD-MCNC: 13.1 G/DL (ref 11.5–15.5)
IMM GRANULOCYTES # BLD AUTO: 0.03 K/UL (ref 0–0.58)
IMM GRANULOCYTES NFR BLD: 0 % (ref 0–5)
LYMPHOCYTES NFR BLD: 0.62 K/UL (ref 1.5–4)
LYMPHOCYTES RELATIVE PERCENT: 9 % (ref 20–42)
MAGNESIUM SERPL-MCNC: 1.6 MG/DL (ref 1.6–2.6)
MCH RBC QN AUTO: 27.9 PG (ref 26–35)
MCHC RBC AUTO-ENTMCNC: 30.5 G/DL (ref 32–34.5)
MCV RBC AUTO: 91.7 FL (ref 80–99.9)
MONOCYTES NFR BLD: 0.51 K/UL (ref 0.1–0.95)
MONOCYTES NFR BLD: 7 % (ref 2–12)
NEUTROPHILS NFR BLD: 83 % (ref 43–80)
NEUTS SEG NFR BLD: 6.04 K/UL (ref 1.8–7.3)
PLATELET # BLD AUTO: 187 K/UL (ref 130–450)
PMV BLD AUTO: 12.2 FL (ref 7–12)
POTASSIUM SERPL-SCNC: 4 MMOL/L (ref 3.5–5)
PROT SERPL-MCNC: 6.7 G/DL (ref 6.4–8.3)
RBC # BLD AUTO: 4.69 M/UL (ref 3.5–5.5)
SODIUM SERPL-SCNC: 139 MMOL/L (ref 132–146)
WBC OTHER # BLD: 7.3 K/UL (ref 4.5–11.5)

## 2025-05-31 PROCEDURE — 99283 EMERGENCY DEPT VISIT LOW MDM: CPT

## 2025-05-31 PROCEDURE — 80053 COMPREHEN METABOLIC PANEL: CPT

## 2025-05-31 PROCEDURE — 85025 COMPLETE CBC W/AUTO DIFF WBC: CPT

## 2025-05-31 PROCEDURE — 85652 RBC SED RATE AUTOMATED: CPT

## 2025-05-31 PROCEDURE — 6370000000 HC RX 637 (ALT 250 FOR IP): Performed by: EMERGENCY MEDICINE

## 2025-05-31 PROCEDURE — 83735 ASSAY OF MAGNESIUM: CPT

## 2025-05-31 RX ORDER — IBUPROFEN 600 MG/1
600 TABLET, FILM COATED ORAL ONCE
Status: DISCONTINUED | OUTPATIENT
Start: 2025-05-31 | End: 2025-05-31

## 2025-05-31 RX ORDER — HYDROCODONE BITARTRATE AND ACETAMINOPHEN 5; 325 MG/1; MG/1
1 TABLET ORAL ONCE
Status: COMPLETED | OUTPATIENT
Start: 2025-05-31 | End: 2025-05-31

## 2025-05-31 RX ADMIN — HYDROCODONE BITARTRATE AND ACETAMINOPHEN 1 TABLET: 5; 325 TABLET ORAL at 17:14

## 2025-05-31 ASSESSMENT — ENCOUNTER SYMPTOMS
VOMITING: 0
NAUSEA: 0
CHEST TIGHTNESS: 0
DIARRHEA: 0
WHEEZING: 0
ABDOMINAL PAIN: 0
SHORTNESS OF BREATH: 0
BACK PAIN: 0
SORE THROAT: 0
COUGH: 0

## 2025-05-31 ASSESSMENT — PAIN DESCRIPTION - ORIENTATION: ORIENTATION: LEFT

## 2025-05-31 ASSESSMENT — PAIN SCALES - GENERAL: PAINLEVEL_OUTOF10: 7

## 2025-05-31 ASSESSMENT — PAIN DESCRIPTION - DESCRIPTORS: DESCRIPTORS: DULL;ACHING

## 2025-05-31 ASSESSMENT — PAIN DESCRIPTION - LOCATION: LOCATION: FOOT

## 2025-05-31 NOTE — ED PROVIDER NOTES
Chief complaint:  Left leg pain      HPI history provided by the patient  Patient presents here complaining of left leg pain mostly from the mid distal tibia down to the ankle.  States that she was walking a day and heard or felt a pop and has pain there now.  No general leg swelling or pain otherwise.  No chest pain or palpitation or shortness of breath.  No fevers, sweats or chills.  No rash.  No weakness or numbness or paresthesia.  No fall or injury.    Review of Systems   Constitutional:  Negative for chills, diaphoresis, fatigue and fever.   HENT:  Negative for congestion and sore throat.    Respiratory:  Negative for cough, chest tightness, shortness of breath and wheezing.    Cardiovascular:  Negative for chest pain, palpitations and leg swelling.   Gastrointestinal:  Negative for abdominal pain, diarrhea, nausea and vomiting.   Genitourinary:  Negative for dysuria, flank pain, frequency and urgency.   Musculoskeletal:  Negative for arthralgias, back pain, gait problem, joint swelling, myalgias, neck pain and neck stiffness.   Skin:  Negative for rash and wound.   Neurological:  Negative for dizziness, seizures, syncope, weakness, light-headedness, numbness and headaches.   All other systems reviewed and are negative.       Physical Exam  Vitals and nursing note reviewed.   Constitutional:       General: She is awake. She is not in acute distress.     Appearance: She is well-developed. She is not ill-appearing, toxic-appearing or diaphoretic.   HENT:      Head: Normocephalic and atraumatic.   Eyes:      General: No scleral icterus.     Pupils: Pupils are equal, round, and reactive to light.   Cardiovascular:      Rate and Rhythm: Normal rate and regular rhythm.      Heart sounds: Normal heart sounds. No murmur heard.  Pulmonary:      Effort: Pulmonary effort is normal. No respiratory distress.      Breath sounds: Normal breath sounds. No stridor, decreased air movement or transmitted upper airway sounds. No  exam.      Social factors affecting care:none  Chronic conditions affecting care:none  Chart reviewed: Previous workup and labs including ultrasound and x-rays from the last 24 hours    Differential includes but not limited to: DVT has been ruled out, x-ray showed no fracture, possible diminished arterial flow although equal pulses bilaterally, Doppler pulses and exam actually shows pretty good perfusion.  Possible infection or cellulitis or inflammation,    Work up includes with interpretations: Sed rate negative, CBC unremarkable white count normal, no fever.  CBC and basic metabolic panel otherwise unremarkable.    Advanced directive discussion:none    Treatment in ER: [place]     Consultations in ER:none    Diagnosis and disposition: Leg pain.  Stable for discharge and outpatient follow-up.  She is to return if symptoms change or worsen verbalized understanding.    Is this patient to be included in the SEP-1 core measure? No Exclusion criteria - the patient is NOT to be included for SEP-1 Core Measure due to: May have criteria for sepsis, but does not meet criteria for severe sepsis or septic shock    ED Course as of 05/31/25 1823   Sat May 31, 2025   1659 Patient with equal dopplerable dorsal pedal pulses [NC]   1736 X-rays and ultrasound results reviewed from 5-30-25 in our system no DVTs noted and no fractures noted [NC]   1820 Patient sitting in a chair resting comfortably no distress.  Updated on workup results.  She states she feels fine and is ready to go home only came in because her daughter made her come in.  She states she is comfortable having some pain in the leg uses going to follow with her doctor she can rest ice and use Tylenol.  She will return if symptoms change or worsen.  At this time she has had an ultrasound that showed no DVT x-ray showed no fracture she is ambulating with her normal walker here in the ER with no difficulty.  She has well-perfused leg equal to the right with Doppler pulses

## 2025-06-02 ENCOUNTER — HOSPITAL ENCOUNTER (EMERGENCY)
Age: 89
Discharge: HOME | End: 2025-06-02
Attending: STUDENT IN AN ORGANIZED HEALTH CARE EDUCATION/TRAINING PROGRAM
Payer: MEDICARE

## 2025-06-02 ENCOUNTER — APPOINTMENT (OUTPATIENT)
Dept: CT IMAGING | Age: 89
End: 2025-06-02
Payer: MEDICARE

## 2025-06-02 ENCOUNTER — APPOINTMENT (OUTPATIENT)
Dept: GENERAL RADIOLOGY | Age: 89
End: 2025-06-02
Payer: MEDICARE

## 2025-06-02 VITALS
RESPIRATION RATE: 18 BRPM | SYSTOLIC BLOOD PRESSURE: 138 MMHG | OXYGEN SATURATION: 94 % | HEART RATE: 97 BPM | DIASTOLIC BLOOD PRESSURE: 107 MMHG | TEMPERATURE: 97.4 F | WEIGHT: 150 LBS | BODY MASS INDEX: 22.73 KG/M2 | HEIGHT: 68 IN

## 2025-06-02 DIAGNOSIS — J90 BILATERAL PLEURAL EFFUSION: ICD-10-CM

## 2025-06-02 DIAGNOSIS — N39.0 URINARY TRACT INFECTION WITH HEMATURIA, SITE UNSPECIFIED: ICD-10-CM

## 2025-06-02 DIAGNOSIS — R31.9 URINARY TRACT INFECTION WITH HEMATURIA, SITE UNSPECIFIED: ICD-10-CM

## 2025-06-02 DIAGNOSIS — K57.92 ACUTE DIVERTICULITIS: Primary | ICD-10-CM

## 2025-06-02 LAB
ALBUMIN SERPL-MCNC: 3.8 G/DL (ref 3.5–5.2)
ALP SERPL-CCNC: 146 U/L (ref 35–104)
ALT SERPL-CCNC: 23 U/L (ref 0–32)
ANION GAP SERPL CALCULATED.3IONS-SCNC: 16 MMOL/L (ref 7–16)
AST SERPL-CCNC: 34 U/L (ref 0–31)
BACTERIA URNS QL MICRO: ABNORMAL
BASOPHILS # BLD: 0.05 K/UL (ref 0–0.2)
BASOPHILS NFR BLD: 1 % (ref 0–2)
BILIRUB SERPL-MCNC: 0.9 MG/DL (ref 0–1.2)
BILIRUB UR QL STRIP: NEGATIVE
BUN SERPL-MCNC: 27 MG/DL (ref 6–23)
CALCIUM SERPL-MCNC: 9.4 MG/DL (ref 8.6–10.2)
CHLORIDE SERPL-SCNC: 106 MMOL/L (ref 98–107)
CLARITY UR: ABNORMAL
CO2 SERPL-SCNC: 17 MMOL/L (ref 22–29)
COLOR UR: YELLOW
CREAT SERPL-MCNC: 1.6 MG/DL (ref 0.5–1)
EOSINOPHIL # BLD: 0.09 K/UL (ref 0.05–0.5)
EOSINOPHILS RELATIVE PERCENT: 1 % (ref 0–6)
ERYTHROCYTE [DISTWIDTH] IN BLOOD BY AUTOMATED COUNT: 18.7 % (ref 11.5–15)
GFR, ESTIMATED: 31 ML/MIN/1.73M2
GLUCOSE SERPL-MCNC: 101 MG/DL (ref 74–99)
GLUCOSE UR STRIP-MCNC: NEGATIVE MG/DL
HCT VFR BLD AUTO: 40.6 % (ref 34–48)
HGB BLD-MCNC: 12.2 G/DL (ref 11.5–15.5)
HGB UR QL STRIP.AUTO: ABNORMAL
IMM GRANULOCYTES # BLD AUTO: 0.04 K/UL (ref 0–0.58)
IMM GRANULOCYTES NFR BLD: 1 % (ref 0–5)
KETONES UR STRIP-MCNC: NEGATIVE MG/DL
LACTATE BLDV-SCNC: 1.9 MMOL/L (ref 0.5–2.2)
LEUKOCYTE ESTERASE UR QL STRIP: ABNORMAL
LIPASE SERPL-CCNC: 17 U/L (ref 13–60)
LYMPHOCYTES NFR BLD: 0.73 K/UL (ref 1.5–4)
LYMPHOCYTES RELATIVE PERCENT: 8 % (ref 20–42)
MCH RBC QN AUTO: 27.7 PG (ref 26–35)
MCHC RBC AUTO-ENTMCNC: 30 G/DL (ref 32–34.5)
MCV RBC AUTO: 92.1 FL (ref 80–99.9)
MONOCYTES NFR BLD: 0.75 K/UL (ref 0.1–0.95)
MONOCYTES NFR BLD: 9 % (ref 2–12)
NEUTROPHILS NFR BLD: 81 % (ref 43–80)
NEUTS SEG NFR BLD: 7.12 K/UL (ref 1.8–7.3)
NITRITE UR QL STRIP: POSITIVE
PH UR STRIP: 5.5 [PH] (ref 5–8)
PLATELET # BLD AUTO: 176 K/UL (ref 130–450)
PMV BLD AUTO: 12.3 FL (ref 7–12)
POTASSIUM SERPL-SCNC: 4.3 MMOL/L (ref 3.5–5)
PROT SERPL-MCNC: 6.2 G/DL (ref 6.4–8.3)
PROT UR STRIP-MCNC: 100 MG/DL
RBC # BLD AUTO: 4.41 M/UL (ref 3.5–5.5)
RBC #/AREA URNS HPF: ABNORMAL /HPF
SODIUM SERPL-SCNC: 139 MMOL/L (ref 132–146)
SP GR UR STRIP: 1.02 (ref 1–1.03)
TROPONIN I SERPL HS-MCNC: 29 NG/L (ref 0–14)
TROPONIN I SERPL HS-MCNC: 35 NG/L (ref 0–14)
UROBILINOGEN UR STRIP-ACNC: 0.2 EU/DL (ref 0–1)
WBC #/AREA URNS HPF: ABNORMAL /HPF
WBC OTHER # BLD: 8.8 K/UL (ref 4.5–11.5)

## 2025-06-02 PROCEDURE — 81001 URINALYSIS AUTO W/SCOPE: CPT

## 2025-06-02 PROCEDURE — 87086 URINE CULTURE/COLONY COUNT: CPT

## 2025-06-02 PROCEDURE — 83605 ASSAY OF LACTIC ACID: CPT

## 2025-06-02 PROCEDURE — 2500000003 HC RX 250 WO HCPCS

## 2025-06-02 PROCEDURE — 96361 HYDRATE IV INFUSION ADD-ON: CPT

## 2025-06-02 PROCEDURE — 6360000002 HC RX W HCPCS

## 2025-06-02 PROCEDURE — 93005 ELECTROCARDIOGRAM TRACING: CPT

## 2025-06-02 PROCEDURE — 2580000003 HC RX 258

## 2025-06-02 PROCEDURE — 84484 ASSAY OF TROPONIN QUANT: CPT

## 2025-06-02 PROCEDURE — 85025 COMPLETE CBC W/AUTO DIFF WBC: CPT

## 2025-06-02 PROCEDURE — 96375 TX/PRO/DX INJ NEW DRUG ADDON: CPT

## 2025-06-02 PROCEDURE — 74176 CT ABD & PELVIS W/O CONTRAST: CPT

## 2025-06-02 PROCEDURE — 71045 X-RAY EXAM CHEST 1 VIEW: CPT

## 2025-06-02 PROCEDURE — 83690 ASSAY OF LIPASE: CPT

## 2025-06-02 PROCEDURE — 96365 THER/PROPH/DIAG IV INF INIT: CPT

## 2025-06-02 PROCEDURE — 96366 THER/PROPH/DIAG IV INF ADDON: CPT

## 2025-06-02 PROCEDURE — 87077 CULTURE AEROBIC IDENTIFY: CPT

## 2025-06-02 PROCEDURE — 80053 COMPREHEN METABOLIC PANEL: CPT

## 2025-06-02 PROCEDURE — 99285 EMERGENCY DEPT VISIT HI MDM: CPT

## 2025-06-02 RX ORDER — 0.9 % SODIUM CHLORIDE 0.9 %
1000 INTRAVENOUS SOLUTION INTRAVENOUS ONCE
Status: COMPLETED | OUTPATIENT
Start: 2025-06-02 | End: 2025-06-02

## 2025-06-02 RX ORDER — METRONIDAZOLE 500 MG/1
500 TABLET ORAL 3 TIMES DAILY
Qty: 21 TABLET | Refills: 0 | Status: SHIPPED | OUTPATIENT
Start: 2025-06-02 | End: 2025-06-09

## 2025-06-02 RX ORDER — CEFDINIR 300 MG/1
300 CAPSULE ORAL DAILY
Qty: 7 CAPSULE | Refills: 0 | Status: SHIPPED | OUTPATIENT
Start: 2025-06-02 | End: 2025-06-09

## 2025-06-02 RX ORDER — METRONIDAZOLE 500 MG/100ML
500 INJECTION, SOLUTION INTRAVENOUS ONCE
Status: COMPLETED | OUTPATIENT
Start: 2025-06-02 | End: 2025-06-02

## 2025-06-02 RX ADMIN — SODIUM CHLORIDE 1000 ML: 0.9 INJECTION, SOLUTION INTRAVENOUS at 02:52

## 2025-06-02 RX ADMIN — METRONIDAZOLE 500 MG: 500 INJECTION, SOLUTION INTRAVENOUS at 06:27

## 2025-06-02 RX ADMIN — WATER 2000 MG: 1 INJECTION INTRAMUSCULAR; INTRAVENOUS; SUBCUTANEOUS at 06:20

## 2025-06-02 NOTE — ED PROVIDER NOTES
[facility] EMERGENCY DEPARTMENT  EMERGENCY DEPARTMENT ENCOUNTER        Pt Name: Radha Dixon  MRN: [id number]  Birthdate 7/11/1934  Date of evaluation: 6/2/2025  Provider: Radha Urban MD  PCP: Madeleine Caro NP-C  Note Started: 2:10 AM EDT 6/2/25    CHIEF COMPLAINT       Chief Complaint   Patient presents with    Abdominal Pain     Pt has LUQ pain starting tonight, had kidney removed at  in march, takes xarelto for afib       HISTORY OF PRESENT ILLNESS: 1 or more Elements   History From: Patient    Limitations to history : None    Radha Dixon is a 89 y.o. female who presents for epigastric abdominal pain starting tonight that woke her from sleep.  Patient has had a prior cholecystectomy, appendectomy, kidney removal due to early cancer noted.  She denies associated nausea, vomiting, diarrhea, constipation, urinary symptoms, chest pain, shortness of breath, fevers.  She does have a history of atrial fibrillation and is on Xarelto and is compliant.  She denies NSAID use    Nursing Notes were all reviewed and agreed with or any disagreements were addressed in the HPI.        REVIEW OF EXTERNAL NOTE :       Patient was last seen by nephrology on 5/16/2025 for history of malignant neoplasm of retroperitoneum, status post nephrectomy, hyperkalemia, acute nontraumatic kidney injury, heart failure with normal ejection fraction, CKD stage IV    REVIEW OF SYSTEMS :           Positives and Pertinent negatives as per HPI.     SURGICAL HISTORY     Past Surgical History:   Procedure Laterality Date    APPENDECTOMY  58 YEARS AGO    ARTHROPLASTY Left 03/29/2023    LEFT CARPAL TUNNEL RELEASE LEFT MEDIAN NERVE DECOMPRESSION AT ELBOW LEFT THUMB TRAPEZIECTOMY LIGAMENT RECONSTRUCTION TENDON INTERPOSITION WITH FIBERTAK performed by Chino Handley MD at [facility] OR    CARDIOVERSION  09/20/2019    Dr. Ledesma 1 shock 200 joules Successful    CARDIOVERSION  08/02/2021    Dr. Matias. 200J x1

## 2025-06-02 NOTE — ED NOTES
Patient was ambulated in the hallway with minimal assistance maintaining an O2 saturation of 94-95% on room air

## 2025-06-03 LAB
EKG ATRIAL RATE: 242 BPM
EKG Q-T INTERVAL: 384 MS
EKG QRS DURATION: 82 MS
EKG QTC CALCULATION (BAZETT): 512 MS
EKG R AXIS: -58 DEGREES
EKG T AXIS: 179 DEGREES
EKG VENTRICULAR RATE: 107 BPM

## 2025-06-03 PROCEDURE — 93010 ELECTROCARDIOGRAM REPORT: CPT | Performed by: INTERNAL MEDICINE

## 2025-06-04 ENCOUNTER — RESULTS FOLLOW-UP (OUTPATIENT)
Dept: PHARMACY | Age: 89
End: 2025-06-04

## 2025-06-04 LAB
MICROORGANISM SPEC CULT: ABNORMAL
SERVICE CMNT-IMP: ABNORMAL
SPECIMEN DESCRIPTION: ABNORMAL

## 2025-06-06 ENCOUNTER — APPOINTMENT (OUTPATIENT)
Dept: GENERAL RADIOLOGY | Age: 89
End: 2025-06-06
Payer: MEDICARE

## 2025-06-06 ENCOUNTER — HOSPITAL ENCOUNTER (EMERGENCY)
Age: 89
Discharge: HOME | End: 2025-06-06
Attending: EMERGENCY MEDICINE
Payer: MEDICARE

## 2025-06-06 VITALS
BODY MASS INDEX: 22.73 KG/M2 | OXYGEN SATURATION: 97 % | HEIGHT: 68 IN | SYSTOLIC BLOOD PRESSURE: 120 MMHG | TEMPERATURE: 97.8 F | WEIGHT: 150 LBS | DIASTOLIC BLOOD PRESSURE: 88 MMHG | RESPIRATION RATE: 16 BRPM | HEART RATE: 96 BPM

## 2025-06-06 DIAGNOSIS — M79.605 ANTERIOR LEG PAIN, LEFT: Primary | ICD-10-CM

## 2025-06-06 DIAGNOSIS — I50.9 CHRONIC CONGESTIVE HEART FAILURE, UNSPECIFIED HEART FAILURE TYPE (HCC): ICD-10-CM

## 2025-06-06 DIAGNOSIS — M79.2 NEUROPATHIC PAIN: ICD-10-CM

## 2025-06-06 DIAGNOSIS — R60.0 LOWER EXTREMITY EDEMA: ICD-10-CM

## 2025-06-06 LAB
ALBUMIN SERPL-MCNC: 3.7 G/DL (ref 3.5–5.2)
ALP SERPL-CCNC: 113 U/L (ref 35–104)
ALT SERPL-CCNC: 17 U/L (ref 0–32)
ANION GAP SERPL CALCULATED.3IONS-SCNC: 15 MMOL/L (ref 7–16)
AST SERPL-CCNC: 25 U/L (ref 0–31)
BASOPHILS # BLD: 0.04 K/UL (ref 0–0.2)
BASOPHILS NFR BLD: 0 % (ref 0–2)
BILIRUB SERPL-MCNC: 0.6 MG/DL (ref 0–1.2)
BNP SERPL-MCNC: ABNORMAL PG/ML (ref 0–450)
BUN SERPL-MCNC: 25 MG/DL (ref 6–23)
CALCIUM SERPL-MCNC: 8.9 MG/DL (ref 8.6–10.2)
CHLORIDE SERPL-SCNC: 103 MMOL/L (ref 98–107)
CO2 SERPL-SCNC: 20 MMOL/L (ref 22–29)
CREAT SERPL-MCNC: 1.8 MG/DL (ref 0.5–1)
EKG ATRIAL RATE: 85 BPM
EKG Q-T INTERVAL: 406 MS
EKG QRS DURATION: 82 MS
EKG QTC CALCULATION (BAZETT): 491 MS
EKG R AXIS: -32 DEGREES
EKG T AXIS: 211 DEGREES
EKG VENTRICULAR RATE: 88 BPM
EOSINOPHIL # BLD: 0.17 K/UL (ref 0.05–0.5)
EOSINOPHILS RELATIVE PERCENT: 2 % (ref 0–6)
ERYTHROCYTE [DISTWIDTH] IN BLOOD BY AUTOMATED COUNT: 18.2 % (ref 11.5–15)
GFR, ESTIMATED: 26 ML/MIN/1.73M2
GLUCOSE SERPL-MCNC: 101 MG/DL (ref 74–99)
HCT VFR BLD AUTO: 41.9 % (ref 34–48)
HGB BLD-MCNC: 12.8 G/DL (ref 11.5–15.5)
IMM GRANULOCYTES # BLD AUTO: 0.05 K/UL (ref 0–0.58)
IMM GRANULOCYTES NFR BLD: 1 % (ref 0–5)
LYMPHOCYTES NFR BLD: 0.91 K/UL (ref 1.5–4)
LYMPHOCYTES RELATIVE PERCENT: 10 % (ref 20–42)
MCH RBC QN AUTO: 28.1 PG (ref 26–35)
MCHC RBC AUTO-ENTMCNC: 30.5 G/DL (ref 32–34.5)
MCV RBC AUTO: 91.9 FL (ref 80–99.9)
MONOCYTES NFR BLD: 0.83 K/UL (ref 0.1–0.95)
MONOCYTES NFR BLD: 9 % (ref 2–12)
NEUTROPHILS NFR BLD: 79 % (ref 43–80)
NEUTS SEG NFR BLD: 7.36 K/UL (ref 1.8–7.3)
PLATELET # BLD AUTO: 199 K/UL (ref 130–450)
PMV BLD AUTO: 12.6 FL (ref 7–12)
POTASSIUM SERPL-SCNC: 3.8 MMOL/L (ref 3.5–5)
PROT SERPL-MCNC: 6.1 G/DL (ref 6.4–8.3)
RBC # BLD AUTO: 4.56 M/UL (ref 3.5–5.5)
SODIUM SERPL-SCNC: 138 MMOL/L (ref 132–146)
TROPONIN I SERPL HS-MCNC: 44 NG/L (ref 0–14)
TROPONIN I SERPL HS-MCNC: 45 NG/L (ref 0–14)
WBC OTHER # BLD: 9.4 K/UL (ref 4.5–11.5)

## 2025-06-06 PROCEDURE — 99285 EMERGENCY DEPT VISIT HI MDM: CPT

## 2025-06-06 PROCEDURE — 85025 COMPLETE CBC W/AUTO DIFF WBC: CPT

## 2025-06-06 PROCEDURE — 84484 ASSAY OF TROPONIN QUANT: CPT

## 2025-06-06 PROCEDURE — 80053 COMPREHEN METABOLIC PANEL: CPT

## 2025-06-06 PROCEDURE — 83880 ASSAY OF NATRIURETIC PEPTIDE: CPT

## 2025-06-06 PROCEDURE — 93005 ELECTROCARDIOGRAM TRACING: CPT

## 2025-06-06 PROCEDURE — 6370000000 HC RX 637 (ALT 250 FOR IP)

## 2025-06-06 PROCEDURE — 93010 ELECTROCARDIOGRAM REPORT: CPT | Performed by: INTERNAL MEDICINE

## 2025-06-06 PROCEDURE — 71046 X-RAY EXAM CHEST 2 VIEWS: CPT

## 2025-06-06 RX ORDER — OXYCODONE HYDROCHLORIDE 5 MG/1
5 TABLET ORAL ONCE
Refills: 0 | Status: COMPLETED | OUTPATIENT
Start: 2025-06-06 | End: 2025-06-06

## 2025-06-06 RX ORDER — GABAPENTIN 100 MG/1
100 CAPSULE ORAL 2 TIMES DAILY
Qty: 8 CAPSULE | Refills: 0 | Status: SHIPPED | OUTPATIENT
Start: 2025-06-06 | End: 2025-06-10

## 2025-06-06 RX ADMIN — OXYCODONE 5 MG: 5 TABLET ORAL at 02:28

## 2025-06-06 ASSESSMENT — PAIN DESCRIPTION - DESCRIPTORS
DESCRIPTORS: BURNING;ACHING;DISCOMFORT
DESCRIPTORS: ACHING;DISCOMFORT;DULL

## 2025-06-06 ASSESSMENT — PAIN DESCRIPTION - LOCATION
LOCATION: FOOT
LOCATION: LEG

## 2025-06-06 ASSESSMENT — PAIN SCALES - GENERAL
PAINLEVEL_OUTOF10: 8
PAINLEVEL_OUTOF10: 0
PAINLEVEL_OUTOF10: 4

## 2025-06-06 ASSESSMENT — PAIN - FUNCTIONAL ASSESSMENT
PAIN_FUNCTIONAL_ASSESSMENT: NONE - DENIES PAIN
PAIN_FUNCTIONAL_ASSESSMENT: 0-10

## 2025-06-06 ASSESSMENT — PAIN DESCRIPTION - FREQUENCY: FREQUENCY: CONTINUOUS

## 2025-06-06 ASSESSMENT — PAIN DESCRIPTION - PAIN TYPE: TYPE: ACUTE PAIN

## 2025-06-06 ASSESSMENT — PAIN DESCRIPTION - ORIENTATION: ORIENTATION: LEFT;LOWER

## 2025-06-06 NOTE — ED PROVIDER NOTES
[facility] EMERGENCY DEPARTMENT  EMERGENCY DEPARTMENT ENCOUNTER        Pt Name: Radha Dixon  MRN: [id number]  Birthdate 7/11/1934  Date of evaluation: 6/6/2025  Provider: Flakito Flanagan MD  PCP: Madeleine Caro NP-C  Note Started: 1:42 AM EDT 6/6/25    CHIEF COMPLAINT       Chief Complaint   Patient presents with    Foot Pain     And swelling bilaterally. Left worse than right. States that this is her 3rd visit for same complaint. States that she was walking and felt a pop last week to the left foot but no injury. Pt states that she intermittently has pain going up her left shin and erythremia noted to left shin       HISTORY OF PRESENT ILLNESS: 1 or more Elements   History From: PAtient    Limitations to history : None    Radha Dixon is a 89 y.o. female with past medical history of A-fib on Xarelto, CHF on Bumex, asthma, COPD, hypothyroidism, CKD, hyperlipidemia, depression, diverticulitis, asthma, osteoarthritis, urothelial carcinoma of the kidney who presents from home with complaints of atraumatic left lower extremity pain this been ongoing for some time.  Patient states pain is below his to the left shin and radiates down to the left foot, sharp and burning in nature, has no alleviating factors, worse on certain movements, sharp in quality is currently severe in intensity.  Denies any falls or trauma.  Patient states she has had this pain for some time and states she has not been able to get relief.  Denies any numbness or tingling in extremities, headache, blurry vision, fevers or chills, dysuria, hematuria, chest pain, shortness of breath, abdominal pain, nausea, vomiting, diarrhea, neck pain or neck stiffness, dizziness or lightheadedness.  Denies tobacco, EtOH, illicit drug use.  Patient states she tried Tylenol tonight without pain control.    Nursing Notes were all reviewed and agreed with or any disagreements were addressed in the HPI.    ROS:  obvious obstruction. Pelvis: The bladder is incompletely distended.  Uterus is been surgically removed.  Small amount of fluid in the pelvis. Peritoneum/Retroperitoneum: No abdominal retroperitoneal lymphadenopathy with vascular calcifications seen within the abdominal aorta and iliac vessels. No pelvic adenopathy.  Free fluid identified within the upper abdomen and pelvis. Bones/Soft Tissues: Bony structures reveal multilevel degenerative changes seen within the spine.  Degenerative changes seen within the pelvis.  No ventral abdominal wall mass or defect.     1. Interval development of moderate-sized bilateral pleural effusions. 2. Mild wall thickening of the duodenum which a duodenitis cannot be excluded. Correlation upper endoscopy can be considered if clinical symptoms persist. 3. Acute sigmoid diverticulitis with no evidence of perforation or abscess. 4. Slight increase seen in the  free fluid identified within the upper abdomen and pelvis. 5. Mild left perinephric stranding to suggest mild renal insufficiency.     Vascular duplex lower extremity venous left  Result Date: 5/30/2025  EXAMINATION: DUPLEX VENOUS ULTRASOUND OF THE LEFT LOWER EXTREMITY 5/30/2025 5:45 pm TECHNIQUE: Duplex ultrasound using B-mode/gray scaled imaging and Doppler spectral analysis and color flow was obtained of the deep venous structures of the left extremity. COMPARISON: None. HISTORY: ORDERING SYSTEM PROVIDED HISTORY: DVT Hx TECHNOLOGIST PROVIDED HISTORY: What reading provider will be dictating this exam?->CRC FINDINGS: The visualized veins of the left lower extremity are patent and free of echogenic thrombus. The veins demonstrate good compressibility with normal color flow study and spectral analysis.     No evidence of DVT in the left lower extremity.     XR TIBIA FIBULA LEFT (2 VIEWS)  Result Date: 5/30/2025  EXAMINATION: 4 XRAY VIEWS OF THE LEFT TIBIA AND FIBULA 5/30/2025 4:42 am COMPARISON: None. HISTORY: ORDERING SYSTEM

## 2025-06-17 LAB
ANION GAP SERPL CALCULATED.3IONS-SCNC: 11 MMOL/L (ref 7–16)
BUN SERPL-MCNC: 34 MG/DL (ref 8–23)
CALCIUM SERPL-MCNC: 8.4 MG/DL (ref 8.8–10.2)
CHLORIDE SERPL-SCNC: 103 MMOL/L (ref 98–107)
CO2 SERPL-SCNC: 21 MMOL/L (ref 22–29)
CREAT SERPL-MCNC: 1.9 MG/DL (ref 0.5–1)
GFR, ESTIMATED: 24 ML/MIN/1.73M2
GLUCOSE SERPL-MCNC: 115 MG/DL (ref 74–99)
POTASSIUM SERPL-SCNC: 4.9 MMOL/L (ref 3.4–4.5)
SODIUM SERPL-SCNC: 134 MMOL/L (ref 136–145)

## 2025-06-21 ENCOUNTER — HOSPITAL ENCOUNTER (OUTPATIENT)
Age: 89
Setting detail: OBSERVATION
Discharge: HOME | End: 2025-06-22
Attending: STUDENT IN AN ORGANIZED HEALTH CARE EDUCATION/TRAINING PROGRAM | Admitting: INTERNAL MEDICINE
Payer: MEDICARE

## 2025-06-21 ENCOUNTER — APPOINTMENT (OUTPATIENT)
Dept: GENERAL RADIOLOGY | Age: 89
End: 2025-06-21
Payer: MEDICARE

## 2025-06-21 DIAGNOSIS — I50.33 ACUTE ON CHRONIC DIASTOLIC CONGESTIVE HEART FAILURE (HCC): ICD-10-CM

## 2025-06-21 DIAGNOSIS — I50.9 ACUTE ON CHRONIC CONGESTIVE HEART FAILURE, UNSPECIFIED HEART FAILURE TYPE (HCC): Primary | ICD-10-CM

## 2025-06-21 DIAGNOSIS — I35.0 NONRHEUMATIC AORTIC VALVE STENOSIS: ICD-10-CM

## 2025-06-21 PROBLEM — I50.43 ACUTE ON CHRONIC COMBINED SYSTOLIC AND DIASTOLIC CHF (CONGESTIVE HEART FAILURE) (HCC): Status: ACTIVE | Noted: 2025-06-21

## 2025-06-21 LAB
ALBUMIN SERPL-MCNC: 3.6 G/DL (ref 3.5–5.2)
ALP SERPL-CCNC: 130 U/L (ref 35–104)
ALT SERPL-CCNC: 12 U/L (ref 0–35)
ANION GAP SERPL CALCULATED.3IONS-SCNC: 12 MMOL/L (ref 7–16)
AST SERPL-CCNC: 31 U/L (ref 0–35)
B PARAP IS1001 DNA NPH QL NAA+NON-PROBE: NOT DETECTED
B PERT DNA SPEC QL NAA+PROBE: NOT DETECTED
BASOPHILS # BLD: 0.04 K/UL (ref 0–0.2)
BASOPHILS NFR BLD: 1 % (ref 0–2)
BILIRUB SERPL-MCNC: 0.5 MG/DL (ref 0–1.2)
BNP SERPL-MCNC: ABNORMAL PG/ML (ref 0–450)
BUN SERPL-MCNC: 23 MG/DL (ref 8–23)
C PNEUM DNA NPH QL NAA+NON-PROBE: NOT DETECTED
CALCIUM SERPL-MCNC: 9 MG/DL (ref 8.8–10.2)
CHLORIDE SERPL-SCNC: 103 MMOL/L (ref 98–107)
CO2 SERPL-SCNC: 24 MMOL/L (ref 22–29)
CREAT SERPL-MCNC: 1.6 MG/DL (ref 0.5–1)
EOSINOPHIL # BLD: 0.09 K/UL (ref 0.05–0.5)
EOSINOPHILS RELATIVE PERCENT: 1 % (ref 0–6)
ERYTHROCYTE [DISTWIDTH] IN BLOOD BY AUTOMATED COUNT: 16.9 % (ref 11.5–15)
FLUAV RNA NPH QL NAA+NON-PROBE: NOT DETECTED
FLUBV RNA NPH QL NAA+NON-PROBE: NOT DETECTED
GFR, ESTIMATED: 32 ML/MIN/1.73M2
GLUCOSE SERPL-MCNC: 99 MG/DL (ref 74–99)
HADV DNA NPH QL NAA+NON-PROBE: NOT DETECTED
HCOV 229E RNA NPH QL NAA+NON-PROBE: NOT DETECTED
HCOV HKU1 RNA NPH QL NAA+NON-PROBE: NOT DETECTED
HCOV NL63 RNA NPH QL NAA+NON-PROBE: NOT DETECTED
HCOV OC43 RNA NPH QL NAA+NON-PROBE: NOT DETECTED
HCT VFR BLD AUTO: 41.7 % (ref 34–48)
HGB BLD-MCNC: 12.6 G/DL (ref 11.5–15.5)
HMPV RNA NPH QL NAA+NON-PROBE: NOT DETECTED
HPIV1 RNA NPH QL NAA+NON-PROBE: NOT DETECTED
HPIV2 RNA NPH QL NAA+NON-PROBE: NOT DETECTED
HPIV3 RNA NPH QL NAA+NON-PROBE: NOT DETECTED
HPIV4 RNA NPH QL NAA+NON-PROBE: NOT DETECTED
IMM GRANULOCYTES # BLD AUTO: 0.05 K/UL (ref 0–0.58)
IMM GRANULOCYTES NFR BLD: 1 % (ref 0–5)
LYMPHOCYTES NFR BLD: 0.78 K/UL (ref 1.5–4)
LYMPHOCYTES RELATIVE PERCENT: 11 % (ref 20–42)
M PNEUMO DNA NPH QL NAA+NON-PROBE: NOT DETECTED
MAGNESIUM SERPL-MCNC: 1.8 MG/DL (ref 1.6–2.4)
MCH RBC QN AUTO: 27.9 PG (ref 26–35)
MCHC RBC AUTO-ENTMCNC: 30.2 G/DL (ref 32–34.5)
MCV RBC AUTO: 92.5 FL (ref 80–99.9)
MONOCYTES NFR BLD: 0.63 K/UL (ref 0.1–0.95)
MONOCYTES NFR BLD: 9 % (ref 2–12)
NEUTROPHILS NFR BLD: 79 % (ref 43–80)
NEUTS SEG NFR BLD: 5.84 K/UL (ref 1.8–7.3)
PLATELET # BLD AUTO: 178 K/UL (ref 130–450)
PMV BLD AUTO: 12.9 FL (ref 7–12)
POTASSIUM SERPL-SCNC: 3.5 MMOL/L (ref 3.5–5.1)
PROT SERPL-MCNC: 6.1 G/DL (ref 6.4–8.3)
RBC # BLD AUTO: 4.51 M/UL (ref 3.5–5.5)
RSV RNA NPH QL NAA+NON-PROBE: NOT DETECTED
RV+EV RNA NPH QL NAA+NON-PROBE: NOT DETECTED
SARS-COV-2 RNA NPH QL NAA+NON-PROBE: NOT DETECTED
SODIUM SERPL-SCNC: 139 MMOL/L (ref 136–145)
SPECIMEN DESCRIPTION: NORMAL
T4 FREE SERPL-MCNC: 1.5 NG/DL (ref 0.9–1.7)
TROPONIN I SERPL HS-MCNC: 37 NG/L (ref 0–14)
TROPONIN I SERPL HS-MCNC: 41 NG/L (ref 0–14)
TSH SERPL DL<=0.05 MIU/L-ACNC: 2.13 UIU/ML (ref 0.27–4.2)
WBC OTHER # BLD: 7.4 K/UL (ref 4.5–11.5)

## 2025-06-21 PROCEDURE — 80053 COMPREHEN METABOLIC PANEL: CPT

## 2025-06-21 PROCEDURE — 2500000003 HC RX 250 WO HCPCS: Performed by: INTERNAL MEDICINE

## 2025-06-21 PROCEDURE — 85025 COMPLETE CBC W/AUTO DIFF WBC: CPT

## 2025-06-21 PROCEDURE — 99222 1ST HOSP IP/OBS MODERATE 55: CPT | Performed by: INTERNAL MEDICINE

## 2025-06-21 PROCEDURE — 6360000002 HC RX W HCPCS: Performed by: STUDENT IN AN ORGANIZED HEALTH CARE EDUCATION/TRAINING PROGRAM

## 2025-06-21 PROCEDURE — 0202U NFCT DS 22 TRGT SARS-COV-2: CPT

## 2025-06-21 PROCEDURE — 84484 ASSAY OF TROPONIN QUANT: CPT

## 2025-06-21 PROCEDURE — APPSS180 APP SPLIT SHARED TIME > 60 MINUTES: Performed by: NURSE PRACTITIONER

## 2025-06-21 PROCEDURE — 99285 EMERGENCY DEPT VISIT HI MDM: CPT

## 2025-06-21 PROCEDURE — 96376 TX/PRO/DX INJ SAME DRUG ADON: CPT

## 2025-06-21 PROCEDURE — 6370000000 HC RX 637 (ALT 250 FOR IP): Performed by: INTERNAL MEDICINE

## 2025-06-21 PROCEDURE — 84439 ASSAY OF FREE THYROXINE: CPT

## 2025-06-21 PROCEDURE — 99223 1ST HOSP IP/OBS HIGH 75: CPT | Performed by: INTERNAL MEDICINE

## 2025-06-21 PROCEDURE — 83880 ASSAY OF NATRIURETIC PEPTIDE: CPT

## 2025-06-21 PROCEDURE — 6370000000 HC RX 637 (ALT 250 FOR IP): Performed by: NURSE PRACTITIONER

## 2025-06-21 PROCEDURE — 84443 ASSAY THYROID STIM HORMONE: CPT

## 2025-06-21 PROCEDURE — 71045 X-RAY EXAM CHEST 1 VIEW: CPT

## 2025-06-21 PROCEDURE — G0378 HOSPITAL OBSERVATION PER HR: HCPCS

## 2025-06-21 PROCEDURE — 96374 THER/PROPH/DIAG INJ IV PUSH: CPT

## 2025-06-21 PROCEDURE — 93005 ELECTROCARDIOGRAM TRACING: CPT | Performed by: STUDENT IN AN ORGANIZED HEALTH CARE EDUCATION/TRAINING PROGRAM

## 2025-06-21 PROCEDURE — 93005 ELECTROCARDIOGRAM TRACING: CPT | Performed by: NURSE PRACTITIONER

## 2025-06-21 PROCEDURE — 99221 1ST HOSP IP/OBS SF/LOW 40: CPT | Performed by: NURSE PRACTITIONER

## 2025-06-21 PROCEDURE — 83735 ASSAY OF MAGNESIUM: CPT

## 2025-06-21 PROCEDURE — 6370000000 HC RX 637 (ALT 250 FOR IP): Performed by: STUDENT IN AN ORGANIZED HEALTH CARE EDUCATION/TRAINING PROGRAM

## 2025-06-21 RX ORDER — ACETAMINOPHEN 325 MG/1
650 TABLET ORAL EVERY 6 HOURS PRN
Status: DISCONTINUED | OUTPATIENT
Start: 2025-06-21 | End: 2025-06-22 | Stop reason: HOSPADM

## 2025-06-21 RX ORDER — POTASSIUM CHLORIDE 7.45 MG/ML
10 INJECTION INTRAVENOUS PRN
Status: DISCONTINUED | OUTPATIENT
Start: 2025-06-21 | End: 2025-06-22 | Stop reason: HOSPADM

## 2025-06-21 RX ORDER — METOPROLOL SUCCINATE 100 MG/1
100 TABLET, EXTENDED RELEASE ORAL DAILY
Status: DISCONTINUED | OUTPATIENT
Start: 2025-06-22 | End: 2025-06-22 | Stop reason: HOSPADM

## 2025-06-21 RX ORDER — SODIUM CHLORIDE 0.9 % (FLUSH) 0.9 %
5-40 SYRINGE (ML) INJECTION PRN
Status: DISCONTINUED | OUTPATIENT
Start: 2025-06-21 | End: 2025-06-22 | Stop reason: HOSPADM

## 2025-06-21 RX ORDER — METOPROLOL SUCCINATE 25 MG/1
25 TABLET, EXTENDED RELEASE ORAL ONCE
Status: COMPLETED | OUTPATIENT
Start: 2025-06-21 | End: 2025-06-21

## 2025-06-21 RX ORDER — SODIUM CHLORIDE 0.9 % (FLUSH) 0.9 %
5-40 SYRINGE (ML) INJECTION EVERY 12 HOURS SCHEDULED
Status: DISCONTINUED | OUTPATIENT
Start: 2025-06-21 | End: 2025-06-22 | Stop reason: HOSPADM

## 2025-06-21 RX ORDER — CITALOPRAM HYDROBROMIDE 10 MG/1
10 TABLET ORAL DAILY
Status: DISCONTINUED | OUTPATIENT
Start: 2025-06-21 | End: 2025-06-22 | Stop reason: HOSPADM

## 2025-06-21 RX ORDER — MAGNESIUM SULFATE IN WATER 40 MG/ML
2000 INJECTION, SOLUTION INTRAVENOUS PRN
Status: DISCONTINUED | OUTPATIENT
Start: 2025-06-21 | End: 2025-06-22 | Stop reason: HOSPADM

## 2025-06-21 RX ORDER — POTASSIUM CHLORIDE 1500 MG/1
40 TABLET, EXTENDED RELEASE ORAL PRN
Status: DISCONTINUED | OUTPATIENT
Start: 2025-06-21 | End: 2025-06-22 | Stop reason: HOSPADM

## 2025-06-21 RX ORDER — SODIUM CHLORIDE 9 MG/ML
INJECTION, SOLUTION INTRAVENOUS PRN
Status: DISCONTINUED | OUTPATIENT
Start: 2025-06-21 | End: 2025-06-22 | Stop reason: HOSPADM

## 2025-06-21 RX ORDER — MULTIVITAMIN WITH IRON
1 TABLET ORAL DAILY
Status: DISCONTINUED | OUTPATIENT
Start: 2025-06-21 | End: 2025-06-22 | Stop reason: HOSPADM

## 2025-06-21 RX ORDER — BUMETANIDE 0.25 MG/ML
1 INJECTION, SOLUTION INTRAMUSCULAR; INTRAVENOUS ONCE
Status: COMPLETED | OUTPATIENT
Start: 2025-06-21 | End: 2025-06-21

## 2025-06-21 RX ORDER — CHOLECALCIFEROL (VITAMIN D3) 50 MCG
2000 TABLET ORAL DAILY
Status: DISCONTINUED | OUTPATIENT
Start: 2025-06-21 | End: 2025-06-22 | Stop reason: HOSPADM

## 2025-06-21 RX ORDER — SPIRONOLACTONE 25 MG/1
25 TABLET ORAL DAILY
Status: DISCONTINUED | OUTPATIENT
Start: 2025-06-21 | End: 2025-06-22 | Stop reason: HOSPADM

## 2025-06-21 RX ORDER — SIMVASTATIN 40 MG
40 TABLET ORAL NIGHTLY
Status: DISCONTINUED | OUTPATIENT
Start: 2025-06-21 | End: 2025-06-22 | Stop reason: HOSPADM

## 2025-06-21 RX ORDER — FERROUS SULFATE 325(65) MG
325 TABLET ORAL EVERY OTHER DAY
Status: DISCONTINUED | OUTPATIENT
Start: 2025-06-22 | End: 2025-06-22 | Stop reason: HOSPADM

## 2025-06-21 RX ORDER — ACETAMINOPHEN 650 MG/1
650 SUPPOSITORY RECTAL EVERY 6 HOURS PRN
Status: DISCONTINUED | OUTPATIENT
Start: 2025-06-21 | End: 2025-06-22 | Stop reason: HOSPADM

## 2025-06-21 RX ORDER — AMIODARONE HYDROCHLORIDE 200 MG/1
100 TABLET ORAL DAILY
Status: DISCONTINUED | OUTPATIENT
Start: 2025-06-21 | End: 2025-06-21

## 2025-06-21 RX ORDER — ATORVASTATIN CALCIUM 20 MG/1
20 TABLET, FILM COATED ORAL DAILY
Status: DISCONTINUED | OUTPATIENT
Start: 2025-06-21 | End: 2025-06-21 | Stop reason: ALTCHOICE

## 2025-06-21 RX ORDER — POLYETHYLENE GLYCOL 3350 17 G/17G
17 POWDER, FOR SOLUTION ORAL DAILY PRN
Status: DISCONTINUED | OUTPATIENT
Start: 2025-06-21 | End: 2025-06-22 | Stop reason: HOSPADM

## 2025-06-21 RX ORDER — LANOLIN ALCOHOL/MO/W.PET/CERES
1000 CREAM (GRAM) TOPICAL EVERY OTHER DAY
Status: DISCONTINUED | OUTPATIENT
Start: 2025-06-22 | End: 2025-06-22 | Stop reason: HOSPADM

## 2025-06-21 RX ORDER — BUMETANIDE 0.25 MG/ML
2 INJECTION, SOLUTION INTRAMUSCULAR; INTRAVENOUS DAILY
Status: DISCONTINUED | OUTPATIENT
Start: 2025-06-21 | End: 2025-06-22

## 2025-06-21 RX ORDER — ASPIRIN 81 MG/1
324 TABLET, CHEWABLE ORAL ONCE
Status: COMPLETED | OUTPATIENT
Start: 2025-06-21 | End: 2025-06-21

## 2025-06-21 RX ORDER — ONDANSETRON 4 MG/1
4 TABLET, ORALLY DISINTEGRATING ORAL EVERY 8 HOURS PRN
Status: DISCONTINUED | OUTPATIENT
Start: 2025-06-21 | End: 2025-06-22 | Stop reason: HOSPADM

## 2025-06-21 RX ORDER — ONDANSETRON 2 MG/ML
4 INJECTION INTRAMUSCULAR; INTRAVENOUS EVERY 6 HOURS PRN
Status: DISCONTINUED | OUTPATIENT
Start: 2025-06-21 | End: 2025-06-22 | Stop reason: HOSPADM

## 2025-06-21 RX ORDER — LEVOTHYROXINE SODIUM 112 UG/1
112 TABLET ORAL DAILY
Status: DISCONTINUED | OUTPATIENT
Start: 2025-06-21 | End: 2025-06-22 | Stop reason: HOSPADM

## 2025-06-21 RX ADMIN — BUMETANIDE 1 MG: 0.25 INJECTION INTRAMUSCULAR; INTRAVENOUS at 06:26

## 2025-06-21 RX ADMIN — METOPROLOL SUCCINATE 25 MG: 25 TABLET, EXTENDED RELEASE ORAL at 17:13

## 2025-06-21 RX ADMIN — SODIUM CHLORIDE, PRESERVATIVE FREE 10 ML: 5 INJECTION INTRAVENOUS at 20:40

## 2025-06-21 RX ADMIN — SODIUM CHLORIDE, PRESERVATIVE FREE 10 ML: 5 INJECTION INTRAVENOUS at 08:49

## 2025-06-21 RX ADMIN — SPIRONOLACTONE 25 MG: 25 TABLET ORAL at 08:41

## 2025-06-21 RX ADMIN — AMIODARONE HYDROCHLORIDE 100 MG: 200 TABLET ORAL at 08:41

## 2025-06-21 RX ADMIN — CITALOPRAM 10 MG: 10 TABLET, FILM COATED ORAL at 08:41

## 2025-06-21 RX ADMIN — LEVOTHYROXINE SODIUM 112 MCG: 0.11 TABLET ORAL at 08:41

## 2025-06-21 RX ADMIN — Medication 2000 UNITS: at 08:41

## 2025-06-21 RX ADMIN — METOPROLOL SUCCINATE 75 MG: 25 TABLET, EXTENDED RELEASE ORAL at 08:41

## 2025-06-21 RX ADMIN — Medication 1 TABLET: at 08:40

## 2025-06-21 RX ADMIN — ASPIRIN 324 MG: 81 TABLET, CHEWABLE ORAL at 05:22

## 2025-06-21 RX ADMIN — RIVAROXABAN 15 MG: 15 TABLET, FILM COATED ORAL at 08:41

## 2025-06-21 ASSESSMENT — PAIN SCALES - GENERAL
PAINLEVEL_OUTOF10: 0
PAINLEVEL_OUTOF10: 5

## 2025-06-21 NOTE — PLAN OF CARE
Patient's chart updated to reflect:        - HF care plan, HF education points and HF discharge instructions.  -Orders: 2 gram sodium diet, daily weights, I/O.  -PCP and cardiology follow up appointments to be scheduled within 7 days of hospital discharge.  -CHF education session will be provided to the patient prior to hospital discharge.    Nadeen Mayberry RN BSN  Heart Failure Navigator

## 2025-06-21 NOTE — H&P
[facility] HOSPITALIST GROUP   HISTORY AND PHYSICAL       CHIEF COMPLAINT:  had concerns including Shortness of Breath (Pt complaint of SOB tonight and left on/off sided pain by her ribs. ).     HISTORY OF PRESENT ILLNESS:     89-year-old female with known medical condition of heart failure with improved ejection fraction 35 to 55%, chronic kidney disease, DVT on Xarelto, hypothyroidism, urothelial carcinoma status post nephrectomy, CKD, HLD presented to ED with chief concern of shortness of breath. She is supposed to be on Bumex every other day, missed last dose.  Complaining of fatigue, leg swelling and shortness of breath the night before this prompted ED visit. Patient is hemodynamically stable, other workup noted for proBNP 23,145.  She was admitted to hospital for further care    Discussed with ED physician    Informant for H&P: Patient, family and Medical Records     REVIEW OF SYSTEMS:  A comprehensive review of systems was negative except for: what is in the HPI    PHYSICAL EXAM:    General Appearance: alert and oriented to person, place and time and in no acute distress  Skin: warm and dry  HEENT: normocephalic and atraumatic, pupils equal, round, and reactive to light, extraocular eye movements intact, conjunctivae normal  Chest: Left sided basal crackles  cardiovascular: normal rate, normal S1 and S2 and no carotid bruits  Abdomen: soft, non-tender, non-distended, normal bowel sounds, no masses or organomegaly  Extremities: no cyanosis, no clubbing and no edema  Neurologic: grossly intact     LABS:  Recent Labs     06/21/25  0452      K 3.5      CO2 24   BUN 23   CREATININE 1.6*   GLUCOSE 99   CALCIUM 9.0       Recent Labs     06/21/25  0452   WBC 7.4   RBC 4.51   HGB 12.6   HCT 41.7   MCV 92.5   MCH 27.9   MCHC 30.2*   RDW 16.9*      MPV 12.9*       No results for input(s): \"POCGLU\" in the last 72 hours.    Radiology:   XR CHEST PORTABLE   Final Result   No acute process.

## 2025-06-21 NOTE — CONSULTS
Palliative Care Department  [phone]  Palliative Care Initial Consult  Provider Fiordaliza Viadl, SONY - CNP    Radha Dixon  [id number]  Hospital Day: 1  Date of Initial Consult: 6/21/2025  Referring Provider: Adryan Almanzar MD  Palliative Medicine was consulted for assistance with: Code status     HPI:   Radha Dixon is a 89 y.o. with a medical history of heart failure, CKD and urethral carcinoma s/p nephrectomy asthma, depression, hyperlipidemia and vitamin D deficiency who was admitted on 6/21/2025 from home with a CHIEF COMPLAINT of leg pain.  CXR showing no acute process.  Patient was admitted for further medical management.  ASSESSMENT/PLAN:     Pertinent Hospital Diagnoses     Acute decompensated heart failure with preserved EF  A-fib  CKD  Hx urethral cancer s/p right nephrectomy      Palliative Care Encounter / Counseling Regarding Goals of Care  Please see detailed goals of care discussion as below  At this time, Radha Dixon, Does have capacity for medical decision-making.  Capacity is time limited and situation/question specific  During encounter there was no surrogate medical decision-maker needed  Outcome of goals of care meeting:   Change CODE STATUS to DNR CCA  Code status DNR-CCA  Advanced Directives: POA in epic  Surrogate/Legal NOK:  Genny Dexter (child/POA) [phone]  Alma Everett (child/POA) [phone]    Spiritual assessment: no spiritual distress identified  Bereavement and grief: to be determined  Referrals to: none today  SUBJECTIVE:     Current medical issues leading to Palliative Medicine involvement include   Active Hospital Problems    Diagnosis Date Noted    Acute on chronic combined systolic and diastolic CHF (congestive heart failure) (HCC) [I50.43] 06/21/2025    (HFpEF) heart failure with preserved ejection fraction (HCC) [I50.30] 04/18/2025    Acute decompensated heart failure (HCC) [I50.9] 04/01/2025    Combined hyperlipidemia [E78.2] 03/30/2023    Vitamin

## 2025-06-21 NOTE — ED PROVIDER NOTES
[facility]  EMERGENCY DEPARTMENT ENCOUNTER        Pt Name: Radha Dixon  MRN: [id number]  Birthdate 7/11/1934  Date of evaluation: 6/21/2025  Provider: Barbara Etienne DO  PCP: Madeleine Caro NP-C  Note Started: 4:32 AM EDT 6/21/25    CHIEF COMPLAINT       Chief Complaint   Patient presents with    Shortness of Breath     Pt complaint of SOB tonight and left on/off sided pain by her ribs.        HISTORY OF PRESENT ILLNESS: 1 or more Elements   History From: patient    Limitations to history : None    Radha Dixon is a 89 y.o. female with a history of hypertension, hyperlipidemia, atrial fibrillation anticoagulated on Xarelto, CHF, COPD, CKD presenting to the emergency department complaining of shortness of breath.  Patient states that she feels pain in the left side of her ribs on and off.  Nothing makes it better or worse.  She describes pain as sharp on the left side of her ribs.  Denies any cough, congestion, fevers, chills, lightheadedness, dizziness, syncope, abdominal pain, nausea, vomiting, diarrhea, recent hospitalization, recent illness, or other acute symptoms or concerns.  She is supposed to be on pubic every other day but thinks she may have missed her last dose.    Nursing Notes were all reviewed and agreed with or any disagreements were addressed in the HPI.    REVIEW OF SYSTEMS :      Review of Systems    Positives and Pertinent negatives as per HPI.     SURGICAL HISTORY     Past Surgical History:   Procedure Laterality Date    APPENDECTOMY  58 YEARS AGO    ARTHROPLASTY Left 03/29/2023    LEFT CARPAL TUNNEL RELEASE LEFT MEDIAN NERVE DECOMPRESSION AT ELBOW LEFT THUMB TRAPEZIECTOMY LIGAMENT RECONSTRUCTION TENDON INTERPOSITION WITH FIBERTAK performed by Chino Handley MD at [facility] OR    CARDIOVERSION  09/20/2019    Dr. Ledesma 1 shock 200 joules Successful    CARDIOVERSION  08/02/2021    Dr. Matias. 200J x1 converted to NSR    CHOLECYSTECTOMY      CYSTOSCOPY N/A 12/31/2024

## 2025-06-21 NOTE — CONSULTS
Inpatient Cardiology Consultation      Reason for Consult:  ADHF    Consulting Physician: Dr Nichols    Requesting Physician:  Dr JUANITO Almanzar    Date of Consultation: 6/21/2025    HISTORY OF PRESENT ILLNESS: 89-year-old elderly  Known to Dr. Ledesma last seen in the hospital setting by Dr. Matias 4/1/2025 for HFpEF.    Patient now follows @ [facility] (CHF) last seen 6/10/2025. Instructed to take extra Bumex 1 mg in afternoon for fluid build-up. Outpatient [facility] EP consult sent. EKG AFL. Also referred for TAVR evaluation @ [facility].     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  Patient appeared euvolemic.  Next appointment scheduled for 5/22/2025> did not come to appointment?  Recent hospitalization    Patient stated that she just recently got a prescription for Eliquis from her nephrologist but has not gotten it filled yet and has continued to take her Xarelto 15 mg daily.  She was told by her nephrologist she needed to switch to Eliquis.     6/20/2025 went to bed kept tossing and turning, chest felt heavy, SOB and had to elevate head>took a nebulizer treatment> helped breathing for a little while then she became SOB again, developed pain in her L side(rib area). EMS was called. L sided pain(right around rib area) lasted less than a minute. No coughing, dizziness, palpitations.  Denies any CP/pressure, dizziness, or palpitations.   Patient normally wears compression hose but has noticed over the last few days worsening BLE swelling.    [facility] 6/21/2025 /110 HR 86 AF CVR afebrile O2 saturation 94% on RA      Troponin 40> 37, NT proBNP 23,140([id number] 6/20/2025), Alk phos 100 Na

## 2025-06-22 VITALS
OXYGEN SATURATION: 96 % | HEART RATE: 106 BPM | HEIGHT: 68 IN | BODY MASS INDEX: 23.36 KG/M2 | DIASTOLIC BLOOD PRESSURE: 99 MMHG | RESPIRATION RATE: 18 BRPM | WEIGHT: 154.1 LBS | SYSTOLIC BLOOD PRESSURE: 133 MMHG | TEMPERATURE: 97.8 F

## 2025-06-22 LAB
ANION GAP SERPL CALCULATED.3IONS-SCNC: 12 MMOL/L (ref 7–16)
BUN SERPL-MCNC: 25 MG/DL (ref 8–23)
CALCIUM SERPL-MCNC: 9 MG/DL (ref 8.8–10.2)
CHLORIDE SERPL-SCNC: 104 MMOL/L (ref 98–107)
CHOLEST SERPL-MCNC: 113 MG/DL
CO2 SERPL-SCNC: 24 MMOL/L (ref 22–29)
CREAT SERPL-MCNC: 1.6 MG/DL (ref 0.5–1)
EKG ATRIAL RATE: 267 BPM
EKG ATRIAL RATE: 267 BPM
EKG ATRIAL RATE: 98 BPM
EKG Q-T INTERVAL: 380 MS
EKG Q-T INTERVAL: 406 MS
EKG Q-T INTERVAL: 416 MS
EKG QRS DURATION: 82 MS
EKG QRS DURATION: 82 MS
EKG QRS DURATION: 84 MS
EKG QTC CALCULATION (BAZETT): 454 MS
EKG QTC CALCULATION (BAZETT): 488 MS
EKG QTC CALCULATION (BAZETT): 491 MS
EKG R AXIS: -46 DEGREES
EKG R AXIS: 167 DEGREES
EKG R AXIS: 175 DEGREES
EKG T AXIS: 217 DEGREES
EKG T AXIS: 231 DEGREES
EKG T AXIS: 247 DEGREES
EKG VENTRICULAR RATE: 84 BPM
EKG VENTRICULAR RATE: 86 BPM
EKG VENTRICULAR RATE: 87 BPM
GFR, ESTIMATED: 31 ML/MIN/1.73M2
GLUCOSE SERPL-MCNC: 92 MG/DL (ref 74–99)
HDLC SERPL-MCNC: 47 MG/DL
LDLC SERPL CALC-MCNC: 50 MG/DL
MAGNESIUM SERPL-MCNC: 1.7 MG/DL (ref 1.6–2.4)
POTASSIUM SERPL-SCNC: 3.2 MMOL/L (ref 3.5–5.1)
SODIUM SERPL-SCNC: 140 MMOL/L (ref 136–145)
TRIGL SERPL-MCNC: 78 MG/DL
VLDLC SERPL CALC-MCNC: 16 MG/DL

## 2025-06-22 PROCEDURE — 6360000002 HC RX W HCPCS: Performed by: INTERNAL MEDICINE

## 2025-06-22 PROCEDURE — 2500000003 HC RX 250 WO HCPCS: Performed by: INTERNAL MEDICINE

## 2025-06-22 PROCEDURE — 83735 ASSAY OF MAGNESIUM: CPT

## 2025-06-22 PROCEDURE — 93010 ELECTROCARDIOGRAM REPORT: CPT | Performed by: INTERNAL MEDICINE

## 2025-06-22 PROCEDURE — 6370000000 HC RX 637 (ALT 250 FOR IP): Performed by: NURSE PRACTITIONER

## 2025-06-22 PROCEDURE — 99232 SBSQ HOSP IP/OBS MODERATE 35: CPT | Performed by: INTERNAL MEDICINE

## 2025-06-22 PROCEDURE — 80061 LIPID PANEL: CPT

## 2025-06-22 PROCEDURE — 6370000000 HC RX 637 (ALT 250 FOR IP): Performed by: INTERNAL MEDICINE

## 2025-06-22 PROCEDURE — 96376 TX/PRO/DX INJ SAME DRUG ADON: CPT

## 2025-06-22 PROCEDURE — 80048 BASIC METABOLIC PNL TOTAL CA: CPT

## 2025-06-22 PROCEDURE — 36415 COLL VENOUS BLD VENIPUNCTURE: CPT

## 2025-06-22 PROCEDURE — G0378 HOSPITAL OBSERVATION PER HR: HCPCS

## 2025-06-22 RX ORDER — DAPAGLIFLOZIN 10 MG/1
10 TABLET, FILM COATED ORAL EVERY MORNING
Qty: 30 TABLET | Refills: 5 | Status: SHIPPED | OUTPATIENT
Start: 2025-06-22

## 2025-06-22 RX ORDER — BUMETANIDE 1 MG/1
1 TABLET ORAL DAILY
Status: DISCONTINUED | OUTPATIENT
Start: 2025-06-26 | End: 2025-06-22 | Stop reason: HOSPADM

## 2025-06-22 RX ORDER — METOPROLOL SUCCINATE 100 MG/1
100 TABLET, EXTENDED RELEASE ORAL DAILY
Qty: 30 TABLET | Refills: 3 | Status: SHIPPED | OUTPATIENT
Start: 2025-06-23

## 2025-06-22 RX ORDER — BUMETANIDE 1 MG/1
1 TABLET ORAL 2 TIMES DAILY
Qty: 30 TABLET | Refills: 3 | Status: SHIPPED | OUTPATIENT
Start: 2025-06-22 | End: 2025-06-25

## 2025-06-22 RX ORDER — BUMETANIDE 1 MG/1
1 TABLET ORAL 2 TIMES DAILY
Status: DISCONTINUED | OUTPATIENT
Start: 2025-06-22 | End: 2025-06-22 | Stop reason: HOSPADM

## 2025-06-22 RX ORDER — BUMETANIDE 1 MG/1
1 TABLET ORAL 2 TIMES DAILY
Status: DISCONTINUED | OUTPATIENT
Start: 2025-06-22 | End: 2025-06-22

## 2025-06-22 RX ORDER — BUMETANIDE 1 MG/1
2 TABLET ORAL 2 TIMES DAILY
Qty: 360 TABLET | Refills: 1 | Status: SHIPPED | OUTPATIENT
Start: 2025-06-22 | End: 2025-06-22 | Stop reason: HOSPADM

## 2025-06-22 RX ADMIN — APIXABAN 2.5 MG: 2.5 TABLET, FILM COATED ORAL at 08:25

## 2025-06-22 RX ADMIN — Medication 2000 UNITS: at 08:24

## 2025-06-22 RX ADMIN — FERROUS SULFATE TAB 325 MG (65 MG ELEMENTAL FE) 325 MG: 325 (65 FE) TAB at 08:25

## 2025-06-22 RX ADMIN — EMPAGLIFLOZIN 10 MG: 10 TABLET, FILM COATED ORAL at 08:25

## 2025-06-22 RX ADMIN — LEVOTHYROXINE SODIUM 112 MCG: 0.11 TABLET ORAL at 05:48

## 2025-06-22 RX ADMIN — CITALOPRAM 10 MG: 10 TABLET, FILM COATED ORAL at 08:25

## 2025-06-22 RX ADMIN — SPIRONOLACTONE 25 MG: 25 TABLET ORAL at 08:25

## 2025-06-22 RX ADMIN — SODIUM CHLORIDE, PRESERVATIVE FREE 10 ML: 5 INJECTION INTRAVENOUS at 08:30

## 2025-06-22 RX ADMIN — BUMETANIDE 2 MG: 0.25 INJECTION INTRAMUSCULAR; INTRAVENOUS at 08:24

## 2025-06-22 RX ADMIN — METOPROLOL SUCCINATE 100 MG: 100 TABLET, EXTENDED RELEASE ORAL at 08:24

## 2025-06-22 RX ADMIN — CYANOCOBALAMIN TAB 1000 MCG 1000 MCG: 1000 TAB at 08:25

## 2025-06-22 ASSESSMENT — PAIN SCALES - GENERAL: PAINLEVEL_OUTOF10: 0

## 2025-06-22 NOTE — PROGRESS NOTES
Inpatient Cardiology Progress note     PATIENT IS BEING FOLLOWED FOR: Acute diastolic heart failure    Radha Dixon is a 89 y.o. female who used to follow with [facility] cardiology Dr. Ledesma but now follows with the [facility].    She presented to [facility] on 2025 with shortness of breath and was admitted for the management of decompensated congestive heart failure     SUBJECTIVE: States she is breathing much better.  States her lower extremity edema improved with mild residual pedal edema more so on the left.  OBJECTIVE: Sitting at edge of bed eating dinner in no distress     ROS:  Consist: Denies fevers, chills or night sweats  Heart: Denies chest pain, palpitations, lightheadedness, dizziness or syncope  Lungs: Denies SOB, cough, wheezing, orthopnea or PND  GI: Denies abdominal pain, vomiting or diarrhea    PHYSICAL EXAM:   BP (!) 133/99   Pulse (!) 106   Temp 97.8 °F (36.6 °C) (Oral) Comment: 97.8  Resp 18   Ht 1.727 m (5' 8\")   Wt 69.9 kg (154 lb 1.6 oz) Comment: Patient too drowsy upon waking for standing scale  SpO2 96%   BMI 23.43 kg/m²    B/P Range last 24 hours: Systolic (24hrs), Av , Min:126 , Max:133    Diastolic (24hrs), Av, Min:78, Max:104    CONST: Well developed, well nourished elderly who appears of stated age. Awake, alert and cooperative. No apparent distress  HEENT:   Head- Normocephalic, atraumatic   Eyes- Conjunctivae pink, anicteric  Throat- Oral mucosa pink and moist  Neck-  No stridor, trachea midline, no jugular venous distention. No carotid bruit  CHEST: Chest symmetrical and non-tender to palpation. No accessory muscle use or intercostal retractions  RESPIRATORY:  Lung sounds -fairly clear bilaterally  CARDIOVASCULAR:     Heart Inspection- shows no noted pulsations  Heart Palpation- no heaves or thrills; PMI is non-displaced   Heart Ausculation-irregular rate and rhythm.  1/6 systolic murmur. No s3 or rub   PV: Trace to 1+ pitting 
4 Eyes Skin Assessment     NAME:  Radha Dixon  YOB: 1934  MEDICAL RECORD NUMBER:  [id number]    The patient is being assessed for  Admission    I agree that at least one RN has performed a thorough Head to Toe Skin Assessment on the patient. ALL assessment sites listed below have been assessed.      Areas assessed by both nurses:    Head, Face, Ears, Shoulders, Back, Chest, Arms, Elbows, Hands, Sacrum. Buttock, Coccyx, Ischium, Legs. Feet and Heels, and Under Medical Devices         Does the Patient have a Wound? No noted wound(s)       Broderick Prevention initiated by RN: Yes  Wound Care Orders initiated by RN: No    Pressure Injury (Stage 3,4, Unstageable, DTI, NWPT, and Complex wounds) if present, place Wound referral order by RN under : No    New Ostomies, if present place, Ostomy referral order under : No     Nurse 1 eSignature: Electronically signed by Anaid Dover RN on 6/21/25 at 1:31 PM EDT    **SHARE this note so that the co-signing nurse can place an eSignature**    Nurse 2 eSignature: Electronically signed by Tamiko Juarez RN on 6/22/25 at 5:17 PM EDT   
Radha Dixon was ordered simvastatin (Zocor) which is a nonformulary medication.  This medication will need to be supplied by the patient as the pharmacy does not carry this non-formulary medication.    If the medication has not been administered by 1400 on the following day from the time the order was placed, a pharmacist will follow-up with the nurse of the patient to assess the capability of the patient to bring in the medication.    If it is determined that the patient cannot supply the medication and it is not available to be dispensed from the pharmacy, the provider will be notified.    Makayla Arreola, Pharm D 6/21/2025 8:46 AM     
cardiology  Appreciated      Chronic kidney disease, baseline CR 1.8-2  Hx urothelial cancer SP right nephrectomy  Hypokalemia   Replace K  Monitor kidney function  Repeat BMP     Chronic comorbidities:  Hypothyroidism, HLD  Continue home medicine and monitor    DISPOSITION: Pending final cardiology recs                                                              This note was generated by the epic EMR system/Dragon speech recognition and may contain inherent errors or omissions not intended by the user. Grammatical errors, random word insertions, deletions, pronoun errors and incomplete sentences are occasional consequences of this technology due to software limitations. Not all errors are caught and corrected. If there are questions or concerns about the content of this note or information contained within the body of this dictation they should be addressed directly with the author for clarification.    Electronically signed by Adryan Almanzar MD on 6/22/2025 at 1:39 PM

## 2025-06-22 NOTE — PLAN OF CARE
Problem: Chronic Conditions and Co-morbidities  Goal: Patient's chronic conditions and co-morbidity symptoms are monitored and maintained or improved  6/21/2025 2200 by Jenna Alicia RN  Outcome: Progressing  6/21/2025 1058 by Anaid Dover RN  Outcome: Progressing  Flowsheets (Taken 6/21/2025 1042)  Care Plan - Patient's Chronic Conditions and Co-Morbidity Symptoms are Monitored and Maintained or Improved: Monitor and assess patient's chronic conditions and comorbid symptoms for stability, deterioration, or improvement     Problem: Discharge Planning  Goal: Discharge to home or other facility with appropriate resources  6/21/2025 2200 by Jenna Alicia RN  Outcome: Progressing  Flowsheets (Taken 6/21/2025 1104 by Anaid Dover RN)  Discharge to home or other facility with appropriate resources: Identify barriers to discharge with patient and caregiver  6/21/2025 1058 by Anaid Dover RN  Outcome: Progressing     Problem: Pain  Goal: Verbalizes/displays adequate comfort level or baseline comfort level  6/21/2025 2200 by Jenna Alicia RN  Outcome: Progressing  6/21/2025 1058 by Anaid Dover RN  Outcome: Progressing     Problem: Skin/Tissue Integrity  Goal: Skin integrity remains intact  Description: 1.  Monitor for areas of redness and/or skin breakdown  2.  Assess vascular access sites hourly  3.  Every 4-6 hours minimum:  Change oxygen saturation probe site  4.  Every 4-6 hours:  If on nasal continuous positive airway pressure, respiratory therapy assess nares and determine need for appliance change or resting period  6/21/2025 2200 by Jenna Alicia RN  Outcome: Progressing  6/21/2025 1058 by Anaid Dover RN  Outcome: Progressing  Flowsheets (Taken 6/21/2025 1042)  Skin Integrity Remains Intact: Monitor for areas of redness and/or skin breakdown     Problem: Safety - Adult  Goal: Free from fall injury  6/21/2025 2200 by Jenna Alicia RN  Outcome: Progressing  6/21/2025 1058 by Anaid Dover

## 2025-06-22 NOTE — PLAN OF CARE
Problem: Chronic Conditions and Co-morbidities  Goal: Patient's chronic conditions and co-morbidity symptoms are monitored and maintained or improved  Outcome: Adequate for Discharge  Flowsheets (Taken 6/22/2025 0800 by Anaid Dover, RN)  Care Plan - Patient's Chronic Conditions and Co-Morbidity Symptoms are Monitored and Maintained or Improved: Monitor and assess patient's chronic conditions and comorbid symptoms for stability, deterioration, or improvement     Problem: Discharge Planning  Goal: Discharge to home or other facility with appropriate resources  Outcome: Adequate for Discharge  Flowsheets (Taken 6/22/2025 0800 by Anaid Dover, RN)  Discharge to home or other facility with appropriate resources: Identify barriers to discharge with patient and caregiver     Problem: Pain  Goal: Verbalizes/displays adequate comfort level or baseline comfort level  Outcome: Adequate for Discharge     Problem: Skin/Tissue Integrity  Goal: Skin integrity remains intact  Description: 1.  Monitor for areas of redness and/or skin breakdown  2.  Assess vascular access sites hourly  3.  Every 4-6 hours minimum:  Change oxygen saturation probe site  4.  Every 4-6 hours:  If on nasal continuous positive airway pressure, respiratory therapy assess nares and determine need for appliance change or resting period  Outcome: Adequate for Discharge  Flowsheets (Taken 6/22/2025 0800 by Anaid Dover, RN)  Skin Integrity Remains Intact: Monitor for areas of redness and/or skin breakdown     Problem: Safety - Adult  Goal: Free from fall injury  Outcome: Adequate for Discharge     Problem: ABCDS Injury Assessment  Goal: Absence of physical injury  Outcome: Adequate for Discharge

## 2025-06-23 RX ORDER — RIVAROXABAN 15 MG/1
TABLET, FILM COATED ORAL
Qty: 30 TABLET | Refills: 5 | Status: SHIPPED | OUTPATIENT
Start: 2025-06-23

## 2025-06-23 NOTE — DISCHARGE SUMMARY
addition to providing specific details for the plan of care and counseling regarding the diagnosis and prognosis.  The plan has been discussed in detail and they are aware of the specific conditions for emergent return, as well as the importance of follow-up.  Their questions are answered at this time and they are agreeable with the plan for discharge home.    Continued appropriate risk factor modification of blood pressure, diabetes and serum lipids will remain essential to reducing risk of future atherosclerotic development      Discharge Exam:    Patient was seen and examined on the day of discharge.    General Appearance: alert and oriented to person, place and time and in no acute distress  Skin: warm and dry  Head: normocephalic and atraumatic  Eyes: pupils equal, round, and reactive to light, extraocular eye movements intact, conjunctivae normal  Neck: neck supple and non tender without mass   Pulmonary/Chest: clear to auscultation bilaterally- no wheezes, rales or rhonchi, normal air movement, no respiratory distress  Cardiovascular: normal rate, normal S1 and S2 and no carotid bruits  Abdomen: soft, non-tender, non-distended, normal bowel sounds, no masses or organomegaly  Extremities: no cyanosis, no clubbing and no edema  Neurologic: no cranial nerve deficit and speech normal    No intake/output data recorded.  I/O this shift:  In: 580 [P.O.:570; I.V.:10]  Out: 1350 [Urine:1350]      LABS:  Recent Labs     06/21/25  0452 06/22/25  0546    140   K 3.5 3.2*    104   CO2 24 24   BUN 23 25*   CREATININE 1.6* 1.6*   GLUCOSE 99 92   CALCIUM 9.0 9.0       Recent Labs     06/21/25  0452   WBC 7.4   RBC 4.51   HGB 12.6   HCT 41.7   MCV 92.5   MCH 27.9   MCHC 30.2*   RDW 16.9*      MPV 12.9*       No results for input(s): \"POCGLU\" in the last 72 hours.    Imaging:  XR CHEST PORTABLE  Result Date: 6/21/2025  EXAMINATION: ONE XRAY VIEW OF THE CHEST 6/21/2025 5:05 am COMPARISON: 06/06/2025 HISTORY:

## 2025-06-27 ENCOUNTER — TELEPHONE (OUTPATIENT)
Dept: OTHER | Age: 89
End: 2025-06-27

## 2025-06-27 NOTE — TELEPHONE ENCOUNTER
Patient needs scheuled a HFU  Received: Yesterday   Schedule follow-up appointment  Pennie Prado, Clare eBth RN; Pennie Prado RN  She was discharged on 6/23  Needs clinic appt      3:55 PM 6/27/2025 Called patient and spoke with daughter. Pt expressed frustration that she has not heard from the local cardiologist. She does not have a HFU scheduled. She is no longer interested in following with a local cardiologist at this time.  Pt is not interested in the CHF clinic, therefore appointment was no scheduled.    Future Appointments   Date Time Provider Department Center   7/21/2025  1:00 PM Kevin Huynh MD [facility]

## 2025-07-08 ENCOUNTER — TELEPHONE (OUTPATIENT)
Dept: CARDIOLOGY CLINIC | Age: 89
End: 2025-07-08

## 2025-07-17 ENCOUNTER — TELEPHONE (OUTPATIENT)
Dept: CARDIOLOGY CLINIC | Age: 89
End: 2025-07-17

## 2025-07-17 ENCOUNTER — TELEPHONE (OUTPATIENT)
Dept: VASCULAR SURGERY | Age: 89
End: 2025-07-17

## 2025-07-17 NOTE — TELEPHONE ENCOUNTER
I spoke to patients daughter Genny, and she would like to move forward with [facility] only.

## 2025-07-17 NOTE — TELEPHONE ENCOUNTER
Patient canceled initial consultation to see Dr. Huynh for foot swelling, foot is better.  Will call if needs to reschedule.

## (undated) DEVICE — SURGICAL PROCEDURE PACK HND

## (undated) DEVICE — PADDING,UNDERCAST,COTTON, 3X4YD STERILE: Brand: MEDLINE

## (undated) DEVICE — SOLUTION IRRG H2O 3000 ML USP STRL TITAN XL CONTAINER

## (undated) DEVICE — DRAPE CARM MINI FOR IMAG SYS INSIGHT FLROSCN

## (undated) DEVICE — PAD,NON-ADHERENT,2X3,STERILE,LF,1/PK: Brand: MEDLINE

## (undated) DEVICE — PRECISION THIN (9.0 X 0.38 X 25.0MM)

## (undated) DEVICE — HEWSON SUTURE RETRIEVER: Brand: HEWSON SUTURE RETRIEVER

## (undated) DEVICE — BAG DRAINAGE CONTAINER 15 LT FLUID COLLCTN

## (undated) DEVICE — GOWN,SIRUS,FABRNF,XL,20/CS: Brand: MEDLINE

## (undated) DEVICE — Device

## (undated) DEVICE — SOLUTION IRRIG 3000ML STRL H2O USP UROMATIC PLAS CONT

## (undated) DEVICE — BLADE,STAINLESS-STEEL,15,STRL,DISPOSABLE: Brand: MEDLINE

## (undated) DEVICE — SPLINT ORTH W4XL15IN PLSTR OF PARIS LO EXOTHERM SMOOTH

## (undated) DEVICE — ZIMMER® STERILE DISPOSABLE TOURNIQUET CUFF WITH PLC, DUAL PORT, SINGLE BLADDER, 18 IN. (46 CM)

## (undated) DEVICE — MEDIA CONTRAST ISOVUE 300 100ML

## (undated) DEVICE — GLOVE SURG SZ 65 L12IN FNGR THK79MIL GRN LTX FREE

## (undated) DEVICE — GLOVE ORANGE PI 8 1/2   MSG9085

## (undated) DEVICE — DOUBLE BASIN SET: Brand: MEDLINE INDUSTRIES, INC.

## (undated) DEVICE — SYRINGE MED 20ML STD CLR PLAS LUERLOCK TIP N CTRL DISP

## (undated) DEVICE — APPLICATOR  COTTON-TIPPED 6 IN WOOD STRL

## (undated) DEVICE — SYRINGE MED 10ML LUERLOCK TIP W/O SFTY DISP

## (undated) DEVICE — ELECTRODE PT RET AD L9FT HI MOIST COND ADH HYDRGEL CORDED

## (undated) DEVICE — URETEROSCOPIC BIOPSY FORCEPS: Brand: PIRANHA

## (undated) DEVICE — GLOVE SURG SZ 6 THK91MIL LTX FREE SYN POLYISOPRENE ANTI

## (undated) DEVICE — SOLUTION IRRIG 1000ML STRL H2O USP PLAS POUR BTL

## (undated) DEVICE — 2.0MM ROUND SOLID CARBIDE BUR MEDIUM

## (undated) DEVICE — COVER HNDL LT DISP

## (undated) DEVICE — FIBER LASER FLEXIVA PULSE ID 242

## (undated) DEVICE — 1.5 FR, 120 CM, NITI TIPLESS STONE BASKET: Brand: HALO

## (undated) DEVICE — GARMENT,MEDLINE,DVT,INT,CALF,MED, GEN2: Brand: MEDLINE

## (undated) DEVICE — GUIDEWIRE URO L145CM DIA0.035IN TIP L7CM S STL PTFE BENT

## (undated) DEVICE — CATHETER URET 5FR L70CM OPN END SGL LUMN INJ HUB FLEXIMA

## (undated) DEVICE — GOWN,SIRUS,FABRNF,2XL,18/CS: Brand: MEDLINE

## (undated) DEVICE — BEDSIDE KIT XXXX ENDOSCP 500 CC TBLR SPNG LO FOAM DISP

## (undated) DEVICE — DRESSING,GAUZE,XEROFORM,CURAD,1"X8",ST: Brand: CURAD

## (undated) DEVICE — 4-PORT MANIFOLD: Brand: NEPTUNE 2

## (undated) DEVICE — GLOVE ORANGE PI 7 1/2   MSG9075

## (undated) DEVICE — CYSTO: Brand: MEDLINE INDUSTRIES, INC.

## (undated) DEVICE — PADDING UNDERCAST W4INXL4YD COT FBR LO LINTING WYTEX

## (undated) DEVICE — ADHESIVE SKIN CLSR 0.7ML TOP DERMBND ADV

## (undated) DEVICE — CRADLE ARM W8.75XH12.5XL16IN FOAM SUPP ELEVATION VENT

## (undated) DEVICE — GUIDEWIRE ENDOSCP L150CM DIA0.035IN TIP 3CM PTFE NIT

## (undated) DEVICE — SOLUTION IRRIG 3000ML 0.9% SOD CHL USP UROMATIC PLAS CONT

## (undated) DEVICE — SOLUTION IRRIG 1000ML 0.9% SOD CHL USP POUR PLAS BTL